# Patient Record
Sex: MALE | Race: WHITE | NOT HISPANIC OR LATINO | Employment: OTHER | URBAN - METROPOLITAN AREA
[De-identification: names, ages, dates, MRNs, and addresses within clinical notes are randomized per-mention and may not be internally consistent; named-entity substitution may affect disease eponyms.]

---

## 2017-09-19 ENCOUNTER — HOSPITAL ENCOUNTER (INPATIENT)
Facility: HOSPITAL | Age: 67
LOS: 3 days | Discharge: HOME | End: 2017-09-22
Attending: INTERNAL MEDICINE | Admitting: INTERNAL MEDICINE

## 2017-09-19 DIAGNOSIS — Z74.09 IMPAIRED FUNCTIONAL MOBILITY, BALANCE, GAIT, AND ENDURANCE: ICD-10-CM

## 2017-09-19 DIAGNOSIS — Z78.9 IMPAIRED MOBILITY AND ADLS: ICD-10-CM

## 2017-09-19 DIAGNOSIS — I63.9 CEREBROVASCULAR ACCIDENT (CVA), UNSPECIFIED MECHANISM (HCC): Primary | ICD-10-CM

## 2017-09-19 DIAGNOSIS — Z74.09 IMPAIRED MOBILITY AND ADLS: ICD-10-CM

## 2017-09-19 PROCEDURE — 93005 ELECTROCARDIOGRAM TRACING: CPT | Performed by: NURSE PRACTITIONER

## 2017-09-19 RX ORDER — ACETAMINOPHEN 325 MG/1
650 TABLET ORAL EVERY 4 HOURS PRN
Status: DISCONTINUED | OUTPATIENT
Start: 2017-09-19 | End: 2017-09-22 | Stop reason: HOSPADM

## 2017-09-19 RX ORDER — ASPIRIN 325 MG
325 TABLET ORAL DAILY
Status: DISCONTINUED | OUTPATIENT
Start: 2017-09-21 | End: 2017-09-20

## 2017-09-19 RX ORDER — ASPIRIN 300 MG/1
300 SUPPOSITORY RECTAL DAILY
Status: DISCONTINUED | OUTPATIENT
Start: 2017-09-21 | End: 2017-09-20

## 2017-09-19 RX ORDER — ONDANSETRON 2 MG/ML
4 INJECTION INTRAMUSCULAR; INTRAVENOUS EVERY 6 HOURS PRN
Status: DISCONTINUED | OUTPATIENT
Start: 2017-09-19 | End: 2017-09-22 | Stop reason: HOSPADM

## 2017-09-19 RX ORDER — ACETAMINOPHEN 650 MG/1
650 SUPPOSITORY RECTAL EVERY 4 HOURS PRN
Status: DISCONTINUED | OUTPATIENT
Start: 2017-09-19 | End: 2017-09-22 | Stop reason: HOSPADM

## 2017-09-19 RX ORDER — LEVOTHYROXINE SODIUM 0.1 MG/1
200 TABLET ORAL
Status: DISCONTINUED | OUTPATIENT
Start: 2017-09-20 | End: 2017-09-22 | Stop reason: HOSPADM

## 2017-09-19 RX ORDER — ATORVASTATIN CALCIUM 40 MG/1
80 TABLET, FILM COATED ORAL NIGHTLY
Status: DISCONTINUED | OUTPATIENT
Start: 2017-09-20 | End: 2017-09-22 | Stop reason: HOSPADM

## 2017-09-19 RX ORDER — SODIUM CHLORIDE 0.9 % (FLUSH) 0.9 %
1-10 SYRINGE (ML) INJECTION AS NEEDED
Status: DISCONTINUED | OUTPATIENT
Start: 2017-09-19 | End: 2017-09-22 | Stop reason: HOSPADM

## 2017-09-19 RX ORDER — SODIUM CHLORIDE 9 MG/ML
50 INJECTION, SOLUTION INTRAVENOUS CONTINUOUS
Status: DISCONTINUED | OUTPATIENT
Start: 2017-09-20 | End: 2017-09-20

## 2017-09-20 ENCOUNTER — APPOINTMENT (OUTPATIENT)
Dept: CT IMAGING | Facility: HOSPITAL | Age: 67
End: 2017-09-20

## 2017-09-20 ENCOUNTER — APPOINTMENT (OUTPATIENT)
Dept: GENERAL RADIOLOGY | Facility: HOSPITAL | Age: 67
End: 2017-09-20

## 2017-09-20 ENCOUNTER — APPOINTMENT (OUTPATIENT)
Dept: CARDIOLOGY | Facility: HOSPITAL | Age: 67
End: 2017-09-20

## 2017-09-20 ENCOUNTER — APPOINTMENT (OUTPATIENT)
Dept: MRI IMAGING | Facility: HOSPITAL | Age: 67
End: 2017-09-20

## 2017-09-20 PROBLEM — I71.40 AAA (ABDOMINAL AORTIC ANEURYSM): Status: ACTIVE | Noted: 2017-09-20

## 2017-09-20 PROBLEM — I25.10 CORONARY ARTERY DISEASE: Status: ACTIVE | Noted: 2017-09-20

## 2017-09-20 PROBLEM — I10 HYPERTENSION: Status: ACTIVE | Noted: 2017-09-20

## 2017-09-20 PROBLEM — E03.9 HYPOTHYROIDISM: Status: ACTIVE | Noted: 2017-09-20

## 2017-09-20 LAB
ALBUMIN SERPL-MCNC: 3.6 G/DL (ref 3.2–4.8)
ALBUMIN/GLOB SERPL: 1.6 G/DL (ref 1.5–2.5)
ALP SERPL-CCNC: 97 U/L (ref 25–100)
ALT SERPL W P-5'-P-CCNC: 36 U/L (ref 7–40)
ANION GAP SERPL CALCULATED.3IONS-SCNC: 6 MMOL/L (ref 3–11)
ARTICHOKE IGE QN: 127 MG/DL (ref 0–130)
AST SERPL-CCNC: 25 U/L (ref 0–33)
BH CV ECHO MEAS - AO ROOT AREA (BSA CORRECTED): 1.7
BH CV ECHO MEAS - AO ROOT AREA: 11.9 CM^2
BH CV ECHO MEAS - AO ROOT DIAM: 3.9 CM
BH CV ECHO MEAS - BSA(HAYCOCK): 2.5 M^2
BH CV ECHO MEAS - BSA: 2.4 M^2
BH CV ECHO MEAS - BZI_BMI: 40.3 KILOGRAMS/M^2
BH CV ECHO MEAS - BZI_METRIC_HEIGHT: 175.3 CM
BH CV ECHO MEAS - BZI_METRIC_WEIGHT: 123.8 KG
BH CV ECHO MEAS - CONTRAST EF (2CH): 65.4 ML/M^2
BH CV ECHO MEAS - CONTRAST EF 4CH: 54.4 ML/M^2
BH CV ECHO MEAS - EDV(CUBED): 52.3 ML
BH CV ECHO MEAS - EDV(MOD-SP2): 107 ML
BH CV ECHO MEAS - EDV(MOD-SP4): 114 ML
BH CV ECHO MEAS - EDV(TEICH): 59.6 ML
BH CV ECHO MEAS - EF(CUBED): 59.4 %
BH CV ECHO MEAS - EF(MOD-SP2): 65.4 %
BH CV ECHO MEAS - EF(MOD-SP4): 54.4 %
BH CV ECHO MEAS - EF(TEICH): 51.7 %
BH CV ECHO MEAS - ESV(CUBED): 21.3 ML
BH CV ECHO MEAS - ESV(MOD-SP2): 37 ML
BH CV ECHO MEAS - ESV(MOD-SP4): 52 ML
BH CV ECHO MEAS - ESV(TEICH): 28.8 ML
BH CV ECHO MEAS - FS: 25.9 %
BH CV ECHO MEAS - IVS/LVPW: 1.4
BH CV ECHO MEAS - IVSD: 2.2 CM
BH CV ECHO MEAS - LA DIMENSION: 3.7 CM
BH CV ECHO MEAS - LA/AO: 0.95
BH CV ECHO MEAS - LAT PEAK E' VEL: 6.8 CM/SEC
BH CV ECHO MEAS - LV DIASTOLIC VOL/BSA (35-75): 48.4 ML/M^2
BH CV ECHO MEAS - LV MASS(C)D: 303.9 GRAMS
BH CV ECHO MEAS - LV MASS(C)DI: 128.9 GRAMS/M^2
BH CV ECHO MEAS - LV MAX PG: 5.8 MMHG
BH CV ECHO MEAS - LV MEAN PG: 3 MMHG
BH CV ECHO MEAS - LV SYSTOLIC VOL/BSA (12-30): 22.1 ML/M^2
BH CV ECHO MEAS - LV V1 MAX: 120 CM/SEC
BH CV ECHO MEAS - LV V1 MEAN: 79.2 CM/SEC
BH CV ECHO MEAS - LV V1 VTI: 36.2 CM
BH CV ECHO MEAS - LVIDD: 3.7 CM
BH CV ECHO MEAS - LVIDS: 2.5 CM
BH CV ECHO MEAS - LVLD AP2: 9.4 CM
BH CV ECHO MEAS - LVLD AP4: 10 CM
BH CV ECHO MEAS - LVLS AP2: 8.1 CM
BH CV ECHO MEAS - LVLS AP4: 7.5 CM
BH CV ECHO MEAS - LVOT AREA (M): 3.1 CM^2
BH CV ECHO MEAS - LVOT AREA: 3.1 CM^2
BH CV ECHO MEAS - LVOT DIAM: 2 CM
BH CV ECHO MEAS - LVPWD: 1.6 CM
BH CV ECHO MEAS - MED PEAK E' VEL: 5.26 CM/SEC
BH CV ECHO MEAS - MV A MAX VEL: 103 CM/SEC
BH CV ECHO MEAS - MV E MAX VEL: 76.5 CM/SEC
BH CV ECHO MEAS - MV E/A: 0.74
BH CV ECHO MEAS - PA ACC SLOPE: 533 CM/SEC^2
BH CV ECHO MEAS - PA ACC TIME: 0.14 SEC
BH CV ECHO MEAS - PA PR(ACCEL): 16 MMHG
BH CV ECHO MEAS - RVDD: 3.2 CM
BH CV ECHO MEAS - SI(CUBED): 13.2 ML/M^2
BH CV ECHO MEAS - SI(LVOT): 48.2 ML/M^2
BH CV ECHO MEAS - SI(MOD-SP2): 29.7 ML/M^2
BH CV ECHO MEAS - SI(MOD-SP4): 26.3 ML/M^2
BH CV ECHO MEAS - SI(TEICH): 13.1 ML/M^2
BH CV ECHO MEAS - SV(CUBED): 31.1 ML
BH CV ECHO MEAS - SV(LVOT): 113.7 ML
BH CV ECHO MEAS - SV(MOD-SP2): 70 ML
BH CV ECHO MEAS - SV(MOD-SP4): 62 ML
BH CV ECHO MEAS - SV(TEICH): 30.9 ML
BH CV VAS BP RIGHT ARM: NORMAL MMHG
BH CV XLRA MEAS LEFT CCA RATIO VEL: 71.1 CM/SEC
BH CV XLRA MEAS LEFT DIST CCA EDV: 13.8 CM/SEC
BH CV XLRA MEAS LEFT DIST CCA PSV: 71.7 CM/SEC
BH CV XLRA MEAS LEFT DIST ICA EDV: 30.3 CM/SEC
BH CV XLRA MEAS LEFT DIST ICA PSV: 79.4 CM/SEC
BH CV XLRA MEAS LEFT ICA RATIO VEL: 80 CM/SEC
BH CV XLRA MEAS LEFT ICA/CCA RATIO: 1.1
BH CV XLRA MEAS LEFT MID ICA EDV: 24.3 CM/SEC
BH CV XLRA MEAS LEFT MID ICA PSV: 78.3 CM/SEC
BH CV XLRA MEAS LEFT PROX CCA EDV: 9.4 CM/SEC
BH CV XLRA MEAS LEFT PROX CCA PSV: 106.9 CM/SEC
BH CV XLRA MEAS LEFT PROX ECA PSV: 131.8 CM/SEC
BH CV XLRA MEAS LEFT PROX ICA EDV: 18.2 CM/SEC
BH CV XLRA MEAS LEFT PROX ICA PSV: 80.5 CM/SEC
BH CV XLRA MEAS LEFT PROX SCLA PSV: 184.8 CM/SEC
BH CV XLRA MEAS LEFT VERTEBRAL A EDV: 13 CM/SEC
BH CV XLRA MEAS LEFT VERTEBRAL A PSV: 39.3 CM/SEC
BH CV XLRA MEAS RIGHT CCA RATIO VEL: 62.4 CM/SEC
BH CV XLRA MEAS RIGHT DIST CCA EDV: 11.8 CM/SEC
BH CV XLRA MEAS RIGHT DIST CCA PSV: 62.9 CM/SEC
BH CV XLRA MEAS RIGHT DIST ICA EDV: 21.7 CM/SEC
BH CV XLRA MEAS RIGHT DIST ICA PSV: 54.1 CM/SEC
BH CV XLRA MEAS RIGHT ICA RATIO VEL: 48.1 CM/SEC
BH CV XLRA MEAS RIGHT ICA/CCA RATIO: 0.77
BH CV XLRA MEAS RIGHT MID ICA EDV: 18.5 CM/SEC
BH CV XLRA MEAS RIGHT MID ICA PSV: 48.4 CM/SEC
BH CV XLRA MEAS RIGHT PROX CCA EDV: 4.8 CM/SEC
BH CV XLRA MEAS RIGHT PROX CCA PSV: 78.6 CM/SEC
BH CV XLRA MEAS RIGHT PROX ECA PSV: 80.8 CM/SEC
BH CV XLRA MEAS RIGHT PROX ICA EDV: 8.8 CM/SEC
BH CV XLRA MEAS RIGHT PROX ICA PSV: 34.3 CM/SEC
BH CV XLRA MEAS RIGHT PROX SCLA PSV: 110 CM/SEC
BH CV XLRA MEAS RIGHT VERTEBRAL A EDV: 10.1 CM/SEC
BH CV XLRA MEAS RIGHT VERTEBRAL A PSV: 32.1 CM/SEC
BILIRUB SERPL-MCNC: 0.5 MG/DL (ref 0.3–1.2)
BUN BLD-MCNC: 11 MG/DL (ref 9–23)
BUN/CREAT SERPL: 13.8 (ref 7–25)
CALCIUM SPEC-SCNC: 8.5 MG/DL (ref 8.7–10.4)
CHLORIDE SERPL-SCNC: 106 MMOL/L (ref 99–109)
CHOLEST SERPL-MCNC: 164 MG/DL (ref 0–200)
CO2 SERPL-SCNC: 25 MMOL/L (ref 20–31)
CREAT BLD-MCNC: 0.8 MG/DL (ref 0.6–1.3)
DEPRECATED RDW RBC AUTO: 40.7 FL (ref 37–54)
E/E' RATIO: 13
ERYTHROCYTE [DISTWIDTH] IN BLOOD BY AUTOMATED COUNT: 13.1 % (ref 11.3–14.5)
GFR SERPL CREATININE-BSD FRML MDRD: 97 ML/MIN/1.73
GLOBULIN UR ELPH-MCNC: 2.2 GM/DL
GLUCOSE BLD-MCNC: 132 MG/DL (ref 70–100)
GLUCOSE BLDC GLUCOMTR-MCNC: 109 MG/DL (ref 70–130)
GLUCOSE BLDC GLUCOMTR-MCNC: 120 MG/DL (ref 70–130)
HBA1C MFR BLD: 5.9 % (ref 4.8–5.6)
HCT VFR BLD AUTO: 43.1 % (ref 38.9–50.9)
HDLC SERPL-MCNC: 34 MG/DL (ref 40–60)
HGB BLD-MCNC: 15 G/DL (ref 13.1–17.5)
LEFT ATRIUM VOLUME INDEX: 19.5 ML/M2
LV EF 2D ECHO EST: 65 %
MCH RBC QN AUTO: 29.5 PG (ref 27–31)
MCHC RBC AUTO-ENTMCNC: 34.8 G/DL (ref 32–36)
MCV RBC AUTO: 84.7 FL (ref 80–99)
PA ADP PRP-ACNC: 157 PRU
PLATELET # BLD AUTO: 179 10*3/MM3 (ref 150–450)
PMV BLD AUTO: 10.7 FL (ref 6–12)
POTASSIUM BLD-SCNC: 3.7 MMOL/L (ref 3.5–5.5)
PROT SERPL-MCNC: 5.8 G/DL (ref 5.7–8.2)
RBC # BLD AUTO: 5.09 10*6/MM3 (ref 4.2–5.76)
SODIUM BLD-SCNC: 137 MMOL/L (ref 132–146)
TRIGL SERPL-MCNC: 132 MG/DL (ref 0–150)
TROPONIN I SERPL-MCNC: <0.006 NG/ML
TSH SERPL DL<=0.05 MIU/L-ACNC: 6.06 MIU/ML (ref 0.35–5.35)
WBC NRBC COR # BLD: 5.72 10*3/MM3 (ref 3.5–10.8)

## 2017-09-20 PROCEDURE — 80061 LIPID PANEL: CPT | Performed by: NURSE PRACTITIONER

## 2017-09-20 PROCEDURE — 97165 OT EVAL LOW COMPLEX 30 MIN: CPT

## 2017-09-20 PROCEDURE — 70551 MRI BRAIN STEM W/O DYE: CPT

## 2017-09-20 PROCEDURE — 93880 EXTRACRANIAL BILAT STUDY: CPT | Performed by: INTERNAL MEDICINE

## 2017-09-20 PROCEDURE — 70450 CT HEAD/BRAIN W/O DYE: CPT

## 2017-09-20 PROCEDURE — 83036 HEMOGLOBIN GLYCOSYLATED A1C: CPT | Performed by: NURSE PRACTITIONER

## 2017-09-20 PROCEDURE — 85027 COMPLETE CBC AUTOMATED: CPT | Performed by: NURSE PRACTITIONER

## 2017-09-20 PROCEDURE — 80053 COMPREHEN METABOLIC PANEL: CPT | Performed by: NURSE PRACTITIONER

## 2017-09-20 PROCEDURE — 82962 GLUCOSE BLOOD TEST: CPT

## 2017-09-20 PROCEDURE — 93880 EXTRACRANIAL BILAT STUDY: CPT

## 2017-09-20 PROCEDURE — 93306 TTE W/DOPPLER COMPLETE: CPT | Performed by: INTERNAL MEDICINE

## 2017-09-20 PROCEDURE — 99223 1ST HOSP IP/OBS HIGH 75: CPT | Performed by: PSYCHIATRY & NEUROLOGY

## 2017-09-20 PROCEDURE — 84484 ASSAY OF TROPONIN QUANT: CPT | Performed by: NURSE PRACTITIONER

## 2017-09-20 PROCEDURE — 97162 PT EVAL MOD COMPLEX 30 MIN: CPT

## 2017-09-20 PROCEDURE — 85576 BLOOD PLATELET AGGREGATION: CPT | Performed by: NURSE PRACTITIONER

## 2017-09-20 PROCEDURE — 93306 TTE W/DOPPLER COMPLETE: CPT

## 2017-09-20 PROCEDURE — 71010 HC CHEST PA OR AP: CPT

## 2017-09-20 PROCEDURE — 84443 ASSAY THYROID STIM HORMONE: CPT | Performed by: NURSE PRACTITIONER

## 2017-09-20 PROCEDURE — 92523 SPEECH SOUND LANG COMPREHEN: CPT

## 2017-09-20 PROCEDURE — 99291 CRITICAL CARE FIRST HOUR: CPT | Performed by: INTERNAL MEDICINE

## 2017-09-20 PROCEDURE — 93010 ELECTROCARDIOGRAM REPORT: CPT | Performed by: INTERNAL MEDICINE

## 2017-09-20 RX ORDER — ASPIRIN 325 MG
325 TABLET ORAL DAILY
Status: DISCONTINUED | OUTPATIENT
Start: 2017-09-20 | End: 2017-09-21

## 2017-09-20 RX ORDER — LEVOTHYROXINE SODIUM 0.2 MG/1
150 TABLET ORAL
Status: ON HOLD | COMMUNITY
End: 2021-08-02

## 2017-09-20 RX ORDER — LOSARTAN POTASSIUM 100 MG/1
100 TABLET ORAL DAILY
COMMUNITY
End: 2022-02-21

## 2017-09-20 RX ORDER — CLOPIDOGREL BISULFATE 75 MG/1
75 TABLET ORAL DAILY
COMMUNITY
End: 2019-03-29 | Stop reason: HOSPADM

## 2017-09-20 RX ORDER — AMLODIPINE BESYLATE 10 MG/1
10 TABLET ORAL DAILY
Status: ON HOLD | COMMUNITY
End: 2017-09-22

## 2017-09-20 RX ORDER — ASPIRIN 300 MG/1
300 SUPPOSITORY RECTAL DAILY
Status: DISCONTINUED | OUTPATIENT
Start: 2017-09-20 | End: 2017-09-21

## 2017-09-20 RX ORDER — FUROSEMIDE 20 MG/1
20 TABLET ORAL DAILY
Status: ON HOLD | COMMUNITY
End: 2021-08-04 | Stop reason: SDUPTHER

## 2017-09-20 RX ORDER — HYDRALAZINE HYDROCHLORIDE 50 MG/1
50 TABLET, FILM COATED ORAL 2 TIMES DAILY
Status: ON HOLD | COMMUNITY
End: 2021-08-04 | Stop reason: SDUPTHER

## 2017-09-20 RX ORDER — CARVEDILOL 12.5 MG/1
12.5 TABLET ORAL DAILY
COMMUNITY
End: 2019-03-29 | Stop reason: HOSPADM

## 2017-09-20 RX ORDER — CLOPIDOGREL BISULFATE 75 MG/1
75 TABLET ORAL DAILY
Status: DISCONTINUED | OUTPATIENT
Start: 2017-09-21 | End: 2017-09-22 | Stop reason: HOSPADM

## 2017-09-20 RX ADMIN — LEVOTHYROXINE SODIUM 200 MCG: 100 TABLET ORAL at 06:54

## 2017-09-20 RX ADMIN — NICARDIPINE HYDROCHLORIDE 5 MG/HR: 25 INJECTION INTRAVENOUS at 08:45

## 2017-09-20 RX ADMIN — ACETAMINOPHEN 650 MG: 325 TABLET, FILM COATED ORAL at 13:24

## 2017-09-20 RX ADMIN — SODIUM CHLORIDE 50 ML/HR: 9 INJECTION, SOLUTION INTRAVENOUS at 00:18

## 2017-09-20 RX ADMIN — ATORVASTATIN CALCIUM 80 MG: 40 TABLET, FILM COATED ORAL at 00:17

## 2017-09-20 RX ADMIN — NICARDIPINE HYDROCHLORIDE 5 MG/HR: 25 INJECTION INTRAVENOUS at 04:32

## 2017-09-20 RX ADMIN — NICARDIPINE HYDROCHLORIDE 5 MG/HR: 25 INJECTION INTRAVENOUS at 00:17

## 2017-09-20 RX ADMIN — ATORVASTATIN CALCIUM 80 MG: 40 TABLET, FILM COATED ORAL at 20:01

## 2017-09-20 NOTE — CONSULTS
"Neurology    Referring Provider: FADUMO Meraz    Reason for Consultation: stroke      Chief complaint: left hemiparesis    Subjective .     History of present illness:  Mr. Hendrix is a pleasant 67-year-old male with a past mental history significant for prior TIA, hypertension, CAD who is admitted to the ICU service for sudden onset left hemiparesis.  He states that he was watching TV on the evening of presentation when he began to \"feel funny.\"  He went to take a bath and noticed that he was later unable to get out of the bathtub on his own.  He complained of weakness involving the left arm and leg as well as some mild numbness.  He denied any visual disturbance or chest discomfort or palpitations.  On arrival to the ED noncontrast CT head was reportedly unremarkable and he received tPA the outlined facility prior to transfer here.  Since arrival here he reports improvement in sensation and strength still complains of some mild left leg heaviness. Of note, pt on Plavix at home.    Review of Systems: Positive for weakness and numbness.Otherwise complete review of systems was discussed with the patient and found to be negative except for that mentioned in history of present illness or in the initial H&P dated 09/20/2017    History  Past Medical History:   Diagnosis Date   • AAA (abdominal aortic aneurysm)    • Coronary artery disease    • CVA (cerebral vascular accident)    • Hypertension    • Hypothyroidism    • TIA (transient ischemic attack)    ,   Past Surgical History:   Procedure Laterality Date   • CORONARY ANGIOPLASTY WITH STENT PLACEMENT     ,   Family History   Problem Relation Age of Onset   • Heart failure Mother    • Aneurysm Father    ,   Social History   Substance Use Topics   • Smoking status: Former Smoker     Packs/day: 1.00     Years: 20.00     Quit date: 9/20/1985   • Smokeless tobacco: None      Comment: quit 20 y ago   • Alcohol use Yes      Comment: \"Drinks once or twice a year\"   , " "  Prescriptions Prior to Admission   Medication Sig Dispense Refill Last Dose   • carvedilol (COREG) 12.5 MG tablet Take 12.5 mg by mouth Daily.      • clopidogrel (PLAVIX) 75 MG tablet Take 75 mg by mouth Daily.      • levothyroxine (SYNTHROID, LEVOTHROID) 200 MCG tablet Take 200 mcg by mouth Daily.      • losartan (COZAAR) 100 MG tablet Take 100 mg by mouth Daily.       and Allergies:  Codeine    Objective     Vital Signs   Blood pressure 155/82, pulse 74, temperature 97.9 °F (36.6 °C), temperature source Oral, resp. rate 18, height 69\" (175.3 cm), weight 273 lb 5.9 oz (124 kg), SpO2 95 %.    Physical Exam:      Gen: Sitting in bedside chair with eyes open. In NAD. Appears stated age   Eyes: PERRL, conjuntivae/lids normal   ENT: External canals normal bilaterally. Dentition normal   Neck: Supple. No thyroid enlargement noted   Respiratory: CTA bilaterally. Respirations unlabored   CV: RRR, S1 and S2 nml. Radial pulses 2+ bilaterally.    Skin: No rashes noted on exposed skin. Normal tugor.   MSK: Normal bulk and tone. Nml ROM     Neurologic:   Mental status: Awake, alert, oriented x4. Follows commands. Speech fluent.    CN: PERRL, EOM intact, sensation intact in upper/mid/lower face bilaterally, facial movements symmetric, hearing intact to finger rub bilaterally, palate elevates symmetrically, tongue movements and SCMs strong bilaterally    Motor: Full strength noted in the right upper and lower extremities throughout.  There is mild weakness about 4+/5 with left  but otherwise he exhibits antigravity movement.  Moderate weakness in the left leg about 4-/5.    Reflexes: 2+ throughout.    Sensory: Mild decreased light touch in the left arm and leg compared to the right   Coordination: No dysmetria noted   Gait: Not tested        Results Reviewed:     Labs reviewed  OSH CT head report reviewed  EKG report reviewed                 Assessment/Plan     1.  Acute ischemic stroke = Mechanism likely due to small " vessel disease. Recommend MRI brain. Post-tPA CT head pending. Carotid duplex report unremarkable. TTE pending. Will check P2Y12 assay in AM. Rehab eval. Currently on ASA and Plavix.    2.  Hypertension = continue meds    3.  Prediabetes = A1c 5.9. Continue glycemic monitoring    4.  Hyperlipidemia = . On atorvastatin        Kaitlynn Samano MD  09/20/17  4:02 PM

## 2017-09-20 NOTE — THERAPY EVALUATION
Acute Care - Physical Therapy Initial Evaluation  [facility]     Patient Name: Camron Hendrix  : 1950  MRN: [id number]  Today's Date: 2017   Onset of Illness/Injury or Date of Surgery Date: (P) 17  Date of Referral to PT: 17  Referring Physician: DARIUS) FADUMO Means      Admit Date: 2017     Visit Dx:    ICD-10-CM ICD-9-CM   1. Cerebrovascular accident (CVA), unspecified mechanism I63.9 434.91   2. Impaired functional mobility, balance, gait, and endurance Z74.09 V49.89   3. Impaired mobility and ADLs Z74.09 799.89     Patient Active Problem List   Diagnosis   • CVA (cerebral vascular accident)   • Hypothyroidism   • Hypertension   • AAA (abdominal aortic aneurysm)   • Coronary artery disease     Past Medical History:   Diagnosis Date   • AAA (abdominal aortic aneurysm)    • Coronary artery disease    • CVA (cerebral vascular accident)    • Hypertension    • Hypothyroidism    • TIA (transient ischemic attack)      Past Surgical History:   Procedure Laterality Date   • CORONARY ANGIOPLASTY WITH STENT PLACEMENT            PT ASSESSMENT (last 72 hours)      PT Evaluation       17 1450 17 1445    Rehab Evaluation    Document Type (P)  evaluation  -CL evaluation  -LM    Subjective Information (P)  agree to therapy;no complaints  -CL agree to therapy;no complaints  -LM    Patient Effort, Rehab Treatment (P)  good  -CL good  -LM    Symptoms Noted During/After Treatment (P)  increased pain;fatigue  -CL increased pain;fatigue  -LM    Symptoms Noted Comment (P)  Pt c/o L calf pain, RN notified.   -CL Pt experienced increased pain in L calf when ambulating; subsided with rest.  -LM    General Information    Patient Profile Review (P)  yes  -CL yes  -LM    Onset of Illness/Injury or Date of Surgery Date (P)  17  -CL 17  -LM    Referring Physician (P)  FADUMO Means  -FADUMO Pendleton  -GARCÍA    General Observations  Pt received in fowlers in bed with tele; no visitors present.   -LM    Pertinent History Of Current Problem (P)  Pt presented to ED 2/2 to L sided weakness/numbness. Pt s/p tPA, imaging pending. PMH: TIA and CVA.   -CL Pt admitted for acute CVA; developed L sided weakness and numbness in transit to OSH ED. Received TPA and transferred to [facility]. History of TIA and CVA.  -LM    Precautions/Limitations (P)  fall precautions  -CL fall precautions  -LM    Prior Level of Function (P)  independent:;all household mobility;community mobility;transfer;ADL's;dressing;bathing;yard work;driving  -CL independent:;all household mobility;community mobility;gait;transfer;bed mobility;ADL's;cooking;cleaning;bathing;dressing  -LM    Equipment Currently Used at Home (P)  shower chair  -CL shower chair  -LM    Plans/Goals Discussed With (P)  patient;agreed upon  -CL patient;agreed upon  -LM    Risks Reviewed (P)  patient:;nausea/vomiting;LOB;dizziness;increased discomfort;change in vital signs;lines disloged  -CL patient:;LOB;nausea/vomiting;dizziness;increased discomfort;change in vital signs  -LM    Benefits Reviewed (P)  patient:;improve function;increase independence;increase strength;increase balance;decrease pain;increase knowledge  -CL patient:;improve function;increase independence;increase strength;increase balance;decrease pain  -LM    Barriers to Rehab (P)  none identified  -CL none identified  -LM    Living Environment    Lives With (P)  spouse  -CL spouse  -LM    Living Arrangements (P)  house  -CL house  -LM    Home Accessibility (P)  bed and bath on same level;tub/shower is not walk in;stairs to enter home  -CL bed and bath on same level;tub/shower is not walk in;stairs to enter home  -LM    Number of Stairs to Enter Home (P)  3  -CL 3  -LM    Stair Railings at Home (P)  none  -CL none  -LM    Clinical Impression    Date of Referral to PT  09/19/17  -LM    PT Diagnosis  Impaired functional mobility  -LM    Prognosis  good  -LM    Patient/Family Goals Statement  return home  -LM     Criteria for Skilled Therapeutic Interventions Met  yes;treatment indicated  -LM    Rehab Potential  good, to achieve stated therapy goals  -LM    Vital Signs    Pre Systolic BP Rehab (P)  155  -  -LM    Pre Treatment Diastolic BP (P)  82  -CL 82  -LM    Post Systolic BP Rehab (P)  170  -  -LM    Post Treatment Diastolic BP (P)  92  -CL 92  -LM    Pretreatment Heart Rate (beats/min) (P)  65  -CL 65  -LM    Posttreatment Heart Rate (beats/min) (P)  72  -CL 72  -LM    Pre SpO2 (%) (P)  --   Pulse ox not reading  -CL     Post SpO2 (%) (P)  97  -CL 97  -LM    O2 Delivery Post Treatment (P)  room air  -CL room air  -LM    Pre Patient Position (P)  Supine  -CL Supine  -LM    Intra Patient Position (P)  Standing  -CL Standing  -LM    Post Patient Position (P)  Sitting  -CL Sitting  -LM    Pain Assessment    Pain Assessment (P)  [place]-Baker FACES  -CL No/denies pain  -LM    Vision Assessment/Intervention    Visual Impairment  WFL with corrective lenses  -LM    Cognitive Assessment/Intervention    Current Cognitive/Communication Assessment  functional  -LM    Orientation Status  oriented x 4  -LM    Follows Commands/Answers Questions  100% of the time;able to follow multi-step instructions;needs cueing  -LM    Personal Safety  WNL/WFL  -LM    Personal Safety Interventions  fall prevention program maintained;gait belt;nonskid shoes/slippers when out of bed  -LM    ROM (Range of Motion)    General ROM  no range of motion deficits identified   BLEs; defer UE to OT  -LM    MMT (Manual Muscle Testing)    General MMT Assessment  lower extremity strength deficits identified   BLEs; defer UE to OT  -LM    Lower Extremity    Lower Ext Manual Muscle Testing Detail  LLE grossly 4-/5; RLE 4+/5  -LM    Bed Mobility, Assessment/Treatment    Bed Mobility, Assistive Device  head of bed elevated;bed rails  -LM    Bed Mob, Supine to Sit, [place]  minimum assist (75% patient effort);2 person assist required;verbal cues required   -LM    Bed Mob, Sit to Supine, [place]  not tested   Pt left UIC  -LM    Bed Mobility, Impairments  strength decreased  -LM    Bed Mobility, Comment  Verbal cues for sequencing; assist required at trunk to upright.  -LM    Transfer Assessment/Treatment    Transfers, Sit-Stand [place]  minimum assist (75% patient effort);2 person assist required;verbal cues required  -LM    Transfers, Stand-Sit [place]  minimum assist (75% patient effort);2 person assist required;verbal cues required  -LM    Transfers, Sit-Stand-Sit, Assist Device  rolling walker  -LM    Transfer, Safety Issues  sequencing ability decreased;step length decreased  -LM    Transfer, Impairments  strength decreased;impaired balance  -LM    Transfer, Comment  Verbal cues for sequencing and hand placement.  -LM    Gait Assessment/Treatment    Gait, [place] Level  contact guard assist;2 person assist required;verbal cues required  -LM    Gait, Assistive Device  rolling walker  -LM    Gait, Distance (Feet)  50  -LM    Gait, Gait Pattern Analysis  swing-through gait  -LM    Gait, Gait Deviations  bilateral:;triston decreased;decreased heel strike;forward flexed posture;step length decreased;weight-shifting ability decreased  -LM    Gait, Safety Issues  sequencing ability decreased;step length decreased  -LM    Gait, Impairments  strength decreased;impaired balance  -LM    Gait, Comment  Pt ambulated with step-through gait pattern at decreased pace. Verbal cues for upright posture and increase step length. After 50 feet; pt bent over to lean on RW due to pain in L calf, bedside chair brought behind pt. Gait distance limited by L calf pain; RN aware.  -LM    Therapy Exercises    Bilateral Lower Extremities  AROM:;10 reps;sitting;ankle pumps/circles;glut sets;quad sets  -LM    Sensory Assessment/Intervention    Sensory Impairment  dull  -LM    Light Touch  LLE;RLE  -LM    LLE Light Touch  mild impairment  -LM    RLE Light Touch  mild  impairment  -LM    Positioning and Restraints    Pre-Treatment Position  in bed  -LM    Post Treatment Position  chair  -LM    In Chair  notified nsg;reclined;sitting;call light within reach;encouraged to call for assist;legs elevated   exit alarm deferred per nursing recommendation  -LM      09/20/17 1115 09/20/17 0600    Rehab Evaluation    Document Type evaluation  -AC (r) AS (t) AC (c)     Subjective Information no complaints;agree to therapy  -AC (r) AS (t) AC (c)     Living Environment    Lives With spouse  -AC (r) AS (t) AC (c)     Living Arrangements house  -AC (r) AS (t) AC (c)     Pain Assessment    Pain Assessment No/denies pain  -AC (r) AS (t) AC (c)     Cognitive Assessment/Intervention    Current Cognitive/Communication Assessment functional  -AC (r) AS (t) AC (c)     Orientation Status oriented x 4  -AC (r) AS (t) AC (c)     Follows Commands/Answers Questions 100% of the time;able to follow multi-step instructions  -AC (r) AS (t) AC (c)     Short/Long Term Memory mild impairment, short term memory;other (see comments)   pt and pt's wife report pt is at [facility]   -AC (r) AS (t) AC (c)     Cognitive Assessment Intervention    Behavior/Mood Observations behavior appropriate to situation, WNL/WFL  -AC (r) AS (t) AC (c)     Attention WNL/WFL  -AC (r) AS (t) AC (c)     Pragmatics WNL/WFL  -AC (r) AS (t) AC (c)     Problem Solving WNL/WFL  -AC (r) AS (t) AC (c)     Executive Function Skills WNL/WFL  -AC (r) AS (t) AC (c)     Reasoning WNL/WFL  -AC (r) AS (t) AC (c)     Organization WNL/WFL  -AC (r) AS (t) AC (c)     Diffuse Language Characteristics no concerns, diffuse language characteristics  -AC (r) AS (t) AC (c)     Muscle Tone Assessment    Muscle Tone Assessment  Left-Side Extremities  -BS    Left-Side Extremities Muscle Tone Assessment  moderately increased tone  -BS      09/20/17 0400 09/20/17 0200    Muscle Tone Assessment    Muscle Tone Assessment Left-Side Extremities  -BS Left-Side Extremities   -BS    Left-Side Extremities Muscle Tone Assessment moderately increased tone  -BS moderately increased tone  -BS      09/20/17 0035 09/20/17 0027    General Information    Equipment Currently Used at Home  none  -BS    Living Environment    Lives With spouse  -SC     Living Arrangements house  -SC     Home Accessibility no concerns  -SC     Stair Railings at Home none  -SC     Type of Financial/Environmental Concern none  -SC     Transportation Available car  -SC       09/20/17 0000       Muscle Tone Assessment    Muscle Tone Assessment Left-Side Extremities  -BS     Left-Side Extremities Muscle Tone Assessment moderately increased tone  -BS       User Key  (r) = Recorded By, (t) = Taken By, (c) = Cosigned By    Initials Name Provider Type    AC Bertha Richardson, MS CCC-SLP Speech and Language Pathologist    SC Svitlana aCrdoso, RN Registered Nurse    DANIEL Valle, OT Occupational Therapist    GARCÍA Correa, PT Physical Therapist    BS Sammy Lin, RN Registered Nurse    AS Ana Duque, Speech Therapy Student Speech Therapy Student          Physical Therapy Education     Title: PT OT SLP Therapies (Done)     Topic: Physical Therapy (Done)     Point: Mobility training (Done)    Learning Progress Summary    Learner Readiness Method Response Comment Documented by Status   Patient Acceptance E,D VU,NR,DU Reviewed benefits of activity, transfer training, HEP.  09/20/17 1602 Done               Point: Home exercise program (Done)    Learning Progress Summary    Learner Readiness Method Response Comment Documented by Status   Patient Acceptance BALDOMERO CRESPO VU,NR,DU Reviewed benefits of activity, transfer training, HEP.  09/20/17 1602 Done               Point: Body mechanics (Done)    Learning Progress Summary    Learner Readiness Method Response Comment Documented by Status   Patient Acceptance CHERIE,BALDOMERO ROLDAN,NR,DU Reviewed benefits of activity, transfer training, HEP.  09/20/17 1602 Done               Point:  Precautions (Done)    Learning Progress Summary    Learner Readiness Method Response Comment Documented by Status   Patient Acceptance E,D YULI,NR,DU Reviewed benefits of activity, transfer training, HEP.  09/20/17 1602 Done                      User Key     Initials Effective Dates Name Provider Type Discipline     06/09/17 -  Sofi Correa, PT Physical Therapist PT                PT Recommendation and Plan  Anticipated Equipment Needs At Discharge: front wheeled walker  Anticipated Discharge Disposition: home with assist, home with outpatient services  Demonstrates Need for Referral to Another Service:  (outpatient PT)  Planned Therapy Interventions: balance training, bed mobility training, gait training, home exercise program, patient/family education, strengthening, stair training, transfer training  PT Frequency: daily  Plan of Care Review  Plan Of Care Reviewed With: patient  Progress: progress toward functional goals as expected  Outcome Summary/Follow up Plan: PT eval complete. Pt presents with decreased functional independence with evolving signs/symptoms warranting further need for skilled PT services. Pt ambulated 50 feet with CGAx2 and RW, limited by L calf pain during ambulation that subsided with rest. Recommend d/c home with assist and outpatient PT.           IP PT Goals       09/20/17 1603          Bed Mobility PT LTG    Bed Mobility PT LTG, Date Established 09/20/17  -LM      Bed Mobility PT LTG, Time to Achieve 2 wks  -LM      Bed Mobility PT LTG, Activity Type supine to sit/sit to supine  -LM      Bed Mobility PT LTG, [place] Level conditional independence  -LM      Transfer Training PT LTG    Transfer Training PT LTG, Date Established 09/20/17  -LM      Transfer Training PT LTG, Time to Achieve 2 wks  -LM      Transfer Training PT LTG, Activity Type sit to stand/stand to sit  -LM      Transfer Training PT LTG, [place] Level supervision required  -LM      Transfer Training PT LTG,  Assist Device walker, rolling  -LM      Gait Training PT LTG    Gait Training Goal PT LTG, Date Established 09/20/17  -LM      Gait Training Goal PT LTG, Time to Achieve 2 wks  -LM      Gait Training Goal PT LTG, [place] Level supervision required  -LM      Gait Training Goal PT LTG, Assist Device walker, rolling  -LM      Gait Training Goal PT LTG, Distance to Achieve 250 feet  -LM      Stair Training PT LTG    Stair Training Goal PT LTG, Date Established 09/20/17  -LM      Stair Training Goal PT LTG, Time to Achieve 2 wks  -LM      Stair Training Goal PT LTG, Number of Steps 3  -LM      Stair Training Goal PT LTG, [place] Level contact guard assist  -LM      Stair Training Goal PT LTG, Assist Device 2 handrails  -LM        User Key  (r) = Recorded By, (t) = Taken By, (c) = Cosigned By    Initials Name Provider Type    GARCÍA Correa, PT Physical Therapist                Outcome Measures       09/20/17 1445 09/20/17 1311       How much help from another person do you currently need...    Turning from your back to your side while in flat bed without using bedrails? 3  -LM --  -LM     Moving from lying on back to sitting on the side of a flat bed without bedrails? 3  -LM --  -LM     Moving to and from a bed to a chair (including a wheelchair)? 3  -LM --  -LM     Standing up from a chair using your arms (e.g., wheelchair, bedside chair)? 3  -LM --  -LM     Climbing 3-5 steps with a railing? 2  -LM --  -LM     To walk in hospital room? 3  -LM --  -LM     AM-PAC 6 Clicks Score 17  -LM --  -LM     Modified Rachel Scale    Modified Rachel Scale 3 - Moderate disability.  Requiring some help, but able to walk without assistance.  -LM      Functional Assessment    Outcome Measure Options AM-PAC 6 Clicks Basic Mobility (PT);Modified [place]  -LM --  -LM       User Key  (r) = Recorded By, (t) = Taken By, (c) = Cosigned By    Initials Name Provider Type    GARCÍA Correa PT Physical Therapist           Time  Calculation:         PT Charges       09/20/17 1610          Time Calculation    Start Time 1445  -LM      PT Received On 09/20/17  -LM      PT Goal Re-Cert Due Date 09/30/17  -LM      Time Calculation- PT    Total Timed Code Minutes- PT 0 minute(s)  -        User Key  (r) = Recorded By, (t) = Taken By, (c) = Cosigned By    Initials Name Provider Type    GARCÍA Correa PT Physical Therapist          Therapy Charges for Today     Code Description Service Date Service Provider Modifiers Qty    89171024973 HC PT EVAL MOD COMPLEXITY 4 9/20/2017 Sofi Corrae, PT GP 1          PT G-Codes  Outcome Measure Options: AM-PAC 6 Clicks Basic Mobility (PT), Modified Rachel Correa PT  9/20/2017

## 2017-09-20 NOTE — PROGRESS NOTES
Discharge Planning Assessment  [facility]     Patient Name: Camron Hendrix  MRN: [id number]  Today's Date: 9/20/2017    Admit Date: 9/19/2017          Discharge Needs Assessment       09/20/17 1755    Living Environment    Lives With spouse    Living Arrangements house    Home Accessibility no concerns;bed and bath on same level    Number of Stairs to Enter Home 3    Type of Financial/Environmental Concern none    Transportation Available car;family or friend will provide    Living Environment    Provides Primary Care For no one    Primary Care Provided By spouse/significant other    Quality Of Family Relationships supportive    Able to Return to Prior Living Arrangements yes    Living Arrangement Comments Resides in private residence with spouse    Discharge Needs Assessment    Concerns To Be Addressed discharge planning concerns    Concerns Comments Plan outpatient PT/OT post d/c; PT recommending rolling walker for home use    Readmission Within The Last 30 Days no previous admission in last 30 days    Anticipated Changes Related to Illness other (see comments)    Equipment Currently Used at Home shower chair    Equipment Needed After Discharge walker, rolling    Discharge Facility/Level Of Care Needs other (see comments)    Current Discharge Risk other (see comments)    Discharge Disposition home or self-care;rehab facility    Discharge Contact Information if Applicable Spouse:  Rowan Hendrix, # [phone]    Discharge Planning Comments Plan home with outpatient PT/OT arranged; rolling walker for home use            Discharge Plan       09/20/17 1759    Case Management/Social Work Plan    Plan Home with Outpatient Rehab    Additional Comments Currently in ICU.  Case Management to follow for all needs.        Discharge Placement     No information found                Demographic Summary       09/20/17 1754    Referral Information    Admission Type inpatient    Arrived From admitted as an inpatient    Referral  Source admission list    Reason For Consult discharge planning    Record Reviewed clinical discipline documentation;history and physical;medical record;patient profile;plan of care    Contact Information    Permission Granted to Share Information With     Behavorial Health Release Obtained no    Primary Care Physician Information    Name Leslie Rhodes MD    Phone [phone]            Functional Status     None            Psychosocial     None            Abuse/Neglect     None            Legal     None            Substance Abuse     None            Patient Forms     None          BOLIVAR M[place]ia

## 2017-09-20 NOTE — THERAPY EVALUATION
Acute Care - Speech Language Pathology Initial Evaluation  [facility]   Cognitive-Communication Evaluation     Patient Name: Camron Hendrix  : 1950  MRN: [id number]  Today's Date: 2017     Date of Referral to SLP: (P) 17         Admit Date: 2017     Visit Dx:    ICD-10-CM ICD-9-CM   1. Cerebrovascular accident (CVA), unspecified mechanism I63.9 434.91     Patient Active Problem List   Diagnosis   • CVA (cerebral vascular accident)   • Hypothyroidism   • Hypertension   • AAA (abdominal aortic aneurysm)   • Coronary artery disease     Past Medical History:   Diagnosis Date   • AAA (abdominal aortic aneurysm)    • Coronary artery disease    • CVA (cerebral vascular accident)    • Hypertension    • Hypothyroidism    • TIA (transient ischemic attack)      Past Surgical History:   Procedure Laterality Date   • CORONARY ANGIOPLASTY WITH STENT PLACEMENT            SLP EVALUATION (last 72 hours)      SLP Evaluation       17 1115                Rehab Evaluation    Document Type (P)  evaluation  -AS        Subjective Information (P)  no complaints;agree to therapy  -AS        General Information    Patient Profile Review (P)  yes  -AS        Onset of Illness/Injury (P)  17  -AS        Subjective Patient Observations (P)  Pt alert and cooperative. Wife present.   -AS        Pertinent History Of Current Problem (P)  CVA. High blood pressure, abdominal aortic aneurysm, coronary heart disease.   -AS        Current Diet Limitations (P)  thin liquids;regular solid  -AS        Precautions/Limitations, Vision (P)  WFL with corrective lenses;other (see comments)   for purposes of eval   -AS        Precautions/Limitations, Hearing (P)  WFL;other (see comments)   for puposes of eval   -AS        Prior Level of Function- Communication (P)  functional in all spheres;other (comment)   for purposes of eval   -AS        Prior Level of Function- Swallowing (P)  no diet consistency restrictions  -AS         Plans/Goals Discussed With (P)  patient;spouse/S.O.;agreed upon  -AS        Barriers to Rehab (P)  none identified  -AS        Living Environment    Lives With (P)  spouse  -AS        Living Arrangements (P)  house  -AS        Clinical Impression    Date of Referral to SLP (P)  09/20/17  -AS        SLP Diagnosis (P)  Communication/cognitive evaluation complete. Pt was alert and cooperative. Receptive/expressive language WFL. Problem solving, attention, reading/writing skills, and orientation WFL. Diffiulty noted w/ divergent naming, which pt was able to complete w/ cues. Mild immediate recall and short-term memory defecits noted. Pt and pt's wife report that this is baseline from previous stroke. They agreed no treatment warranted at this time. Education on outpatient services were provided to the pt and family should issues arise, which they acknowledged. SLP will sign off at this time.   -AS        Functional Level At Time Of Evaluation (P)  WFL  -AS        Patient's Goals For Discharge (P)  return home  -AS        Family Goals For Discharge (P)  family did not state  -AS        Criteria for Skilled Therapeutic Interventions Met (P)  no problems identified which require skilled intervention;other (see comments)   pt and pt's wife report pt is at baseline  -AS        Pain Assessment    Pain Assessment (P)  No/denies pain  -AS        Cognitive Assessment/Intervention    Current Cognitive/Communication Assessment (P)  functional  -AS        Orientation Status (P)  oriented x 4  -AS        Follows Commands/Answers Questions (P)  100% of the time;able to follow multi-step instructions  -AS        Short/Long Term Memory (P)  mild impairment, short term memory;other (see comments)   pt and pt's wife report pt is at basline   -AS        Cognitive Assessment Intervention    Behavior/Mood Observations (P)  behavior appropriate to situation, WNL/WFL  -AS        Attention (P)  WNL/WFL  -AS        Pragmatics (P)  WNL/WFL  -AS         Problem Solving (P)  WNL/WFL  -AS        Executive Function Skills (P)  WNL/WFL  -AS        Reasoning (P)  WNL/WFL  -AS        Organization (P)  WNL/WFL  -AS        Diffuse Language Characteristics (P)  no concerns, diffuse language characteristics  -AS        Auditory Comprehen Assess/Intervention    Auditory Comprehension (P)  WNL/WFL  -AS        Auditory Comprehen Assess/Intervention    Able to Identify Pictures (P)  WNL/WFL;successful, multiple pictures  -AS        Answers Yes/No Questions (P)  WNL/WFL;successful, complex questions  -AS        Able to Follow Commands (P)  WNL/WFL;success, 3-step commands  -AS        Verbal Expression Assess/Intervention    Speech Repetition (P)  WNL/WFL;successful, words  -AS        Speech Fluency (P)  fluent speech  -AS        Conversational Speech (P)  WNL/WFL;successful, sentence  -AS        Conversational Speech Comment (P)  Pt reported intermittent word finding difficulties from previous stroke. Reports this is baseline.  -AS        Reading Assessment/Intervention    Oral Reading Ability (P)  WNL/WFL;successful, paragraphs  -AS        Reading Comprehension (P)  WNL/WFL;successful, paragraph length  -AS        Writing Assessment/Intervention    Writing Skills (P)  WNL/WFL  -AS        Writing Skills Comment (P)  Pt able to generate a sentence on his own w/ 100% acc.  -AS        Motor Speech Assess/Intervention    Motor Speech-Apraxia Observations (P)  no concerns  -AS        Motor Speech- Apraxia (P)  WNL/WFL  -AS        Motor Speech-Dysarthria Observations (P)  no concerns  -AS        Motor Speech- Dysarthria (P)  WNL/WFL  -AS          User Key  (r) = Recorded By, (t) = Taken By, (c) = Cosigned By    Initials Name Effective Dates    AS Ana Duque Speech Therapy Student 08/24/17 -            EDUCATION  The patient has been educated in the following areas:   Cognitive Impairment Communication Impairment.    SLP Recommendation and Plan  SLP Diagnosis: (P)  Communication/cognitive evaluation complete. Pt was alert and cooperative. Receptive/expressive language WFL. Problem solving, attention, reading/writing skills, and orientation WFL. Diffiulty noted w/ divergent naming, which pt was able to complete w/ cues. Mild immediate recall and short-term memory defecits noted. Pt and pt's wife report that this is baseline from previous stroke. They agreed no treatment warranted at this time. Education on outpatient services were provided to the pt and family should issues arise, which they acknowledged. SLP will sign off at this time.         Criteria for Skilled Therapeutic Interventions Met: (P) no problems identified which require skilled intervention, other (see comments) (pt and pt's wife report pt is at baseline)                   Plan of Care Review  Plan Of Care Reviewed With: (P) patient, spouse  Progress: (P)  (eval)  Outcome Summary/Follow up Plan: (P) Communication/cognitive evaluation complete. Pt was alert and cooperative. Receptive/expressive language WFL. Problem solving, attention, reading/writing skills, and orientation WFL. Diffiulty noted w/ divergent naming, which pt was able to complete w/ cues. Mild immediate recall and short-term memory defecits noted. Pt and pt's wife report that this is baseline from previous stroke. They agreed no treatment warranted at this time. Education on outpatient services were provided to the pt and family should issues arise, which they acknowledged. SLP will sign off at this time.             Time Calculation:         Time Calculation- SLP       09/20/17 1352          Time Calculation- SLP    SLP Start Time (P)  1115  -AS      SLP Received On (P)  09/20/17  -AS        User Key  (r) = Recorded By, (t) = Taken By, (c) = Cosigned By    Initials Name Provider Type    AS Ana Duque, Speech Therapy Student Speech Therapy Student          Therapy Charges for Today     Code Description Service Date Service Provider Modifiers Qty     [id number] [facility] EVAL SPEECH AND PROD W LANG  6 9/20/2017 Ana Duque, Speech Therapy Student GN 1                     Ana Duque Speech Therapy Student  9/20/2017

## 2017-09-20 NOTE — PLAN OF CARE
Problem: Patient Care Overview (Adult)  Goal: Plan of Care Review  Outcome: Ongoing (interventions implemented as appropriate)    09/20/17 1626   Coping/Psychosocial Response Interventions   Plan Of Care Reviewed With patient   Patient Care Overview   Progress progress toward functional goals as expected   Outcome Evaluation   Outcome Summary/Follow up Plan Pt presents w/ a brief chart review relating to presenting physical deficits limiting functional performance. Pt Min Ax2 for STS t/f and CGAx2 to ambulate 50 ft. Recommend pt DC home w/ assist and OP rehab. Recommend cont skilled IPOT intervention.          Problem: Inpatient Occupational Therapy  Goal: Transfer Training Goal 1 LTG- OT  Outcome: Ongoing (interventions implemented as appropriate)    09/20/17 1626   Transfer Training OT LTG   Transfer Training OT LTG, Date Established 09/20/17   Transfer Training OT LTG, Time to Achieve by discharge   Transfer Training OT LTG, Activity Type bed to chair /chair to bed;toilet;sit to stand/stand to sit   Transfer Training OT LTG, [place] Level supervision required   Transfer Training OT LTG, Additional Goal AAD       Goal: Patient Education Goal LTG- OT  Outcome: Ongoing (interventions implemented as appropriate)    09/20/17 1626   Patient Education OT LTG   Patient Education OT LTG, Date Established 09/20/17   Patient Education OT LTG, Time to Achieve by discharge   Patient Education OT LTG, Education Type written program;HEP;posture/body mechanics;home safety;energy conservation   Patient Education OT LTG, Education Understanding verbalizes understanding       Goal: LB Dressing Goal LTG- OT  Outcome: Ongoing (interventions implemented as appropriate)    09/20/17 1626   LB Dressing OT LTG   LB Dressing Goal OT LTG, Date Established 09/20/17   LB Dressing Goal OT LTG, Time to Achieve by discharge   LB Dressing Goal OT LTG, Activity Type Don/doff socks   LB Dressing Goal OT LTG, [place] Level supervision  required   LB Dressing Goal OT LTG, Additional Goal AAD       Goal: Functional Mobility Goal LTG- OT  Outcome: Ongoing (interventions implemented as appropriate)    09/20/17 1626   Functional Mobility OT LTG   Functional Mobility Goal OT LTG, Date Established 09/20/17   Functional Mobility Goal OT LTG, Time to Achieve by discharge   Functional Mobility Goal OT LTG, [place] Level standby assist   Functional Mobility Goal OT LTG, Assist Device appropriate AD   Functional Mobility Goal OT LTG, Distance to Achieve to the bathroom   Functional Mobility Goal OT LTG, Additional Goal AAD

## 2017-09-20 NOTE — THERAPY EVALUATION
Acute Care - Occupational Therapy Initial Evaluation  [facility]     Patient Name: Camron Hendrix  : 1950  MRN: [id number]  Today's Date: 2017  Onset of Illness/Injury or Date of Surgery Date: 17  Date of Referral to OT: 17  Referring Physician: FADUMO Means    Admit Date: 2017       ICD-10-CM ICD-9-CM   1. Cerebrovascular accident (CVA), unspecified mechanism I63.9 434.91   2. Impaired functional mobility, balance, gait, and endurance Z74.09 V49.89   3. Impaired mobility and ADLs Z74.09 799.89     Patient Active Problem List   Diagnosis   • CVA (cerebral vascular accident)   • Hypothyroidism   • Hypertension   • AAA (abdominal aortic aneurysm)   • Coronary artery disease     Past Medical History:   Diagnosis Date   • AAA (abdominal aortic aneurysm)    • Coronary artery disease    • CVA (cerebral vascular accident)    • Hypertension    • Hypothyroidism    • TIA (transient ischemic attack)      Past Surgical History:   Procedure Laterality Date   • CORONARY ANGIOPLASTY WITH STENT PLACEMENT            OT ASSESSMENT FLOWSHEET (last 72 hours)      OT Evaluation       17 1450 17 1445 17 1115 17 0600 17 0400    Rehab Evaluation    Document Type evaluation  -CL evaluation  -LM evaluation  -AC (r) AS (t) AC (c)      Subjective Information agree to therapy;no complaints  -CL agree to therapy;no complaints  -LM no complaints;agree to therapy  -AC (r) AS (t) AC (c)      Patient Effort, Rehab Treatment good  -CL good  -LM       Symptoms Noted During/After Treatment increased pain;fatigue  -CL increased pain;fatigue  -LM       Symptoms Noted Comment Pt c/o L calf pain, RN notified.   -CL Pt experienced increased pain in L calf when ambulating; subsided with rest.  -LM       General Information    Patient Profile Review yes  -CL yes  -LM       Onset of Illness/Injury or Date of Surgery Date 17  -CL 17  -LM       Referring Physician FADUMO Means  -CL Clary  APRN  -LM       General Observations  Pt received in fowlers in bed with tele; no visitors present.  -LM       Pertinent History Of Current Problem Pt presented to ED 2/2 to L sided weakness/numbness. Pt s/p tPA, imaging pending. PMH: TIA and CVA.   -CL Pt admitted for acute CVA; developed L sided weakness and numbness in transit to OSH ED. Received TPA and transferred to [facility]. History of TIA and CVA.  -LM       Precautions/Limitations fall precautions  -CL fall precautions  -LM       Prior Level of Function independent:;all household mobility;community mobility;transfer;ADL's;dressing;bathing;yard work;driving  -CL independent:;all household mobility;community mobility;gait;transfer;bed mobility;ADL's;cooking;cleaning;bathing;dressing  -LM       Equipment Currently Used at Home shower chair  -CL shower chair  -LM       Plans/Goals Discussed With patient;agreed upon  -CL patient;agreed upon  -LM       Risks Reviewed patient:;nausea/vomiting;LOB;dizziness;increased discomfort;change in vital signs;lines disloged  -CL patient:;LOB;nausea/vomiting;dizziness;increased discomfort;change in vital signs  -LM       Benefits Reviewed patient:;improve function;increase independence;increase strength;increase balance;decrease pain;increase knowledge  -CL patient:;improve function;increase independence;increase strength;increase balance;decrease pain  -LM       Barriers to Rehab none identified  -CL none identified  -LM       Living Environment    Lives With spouse  -CL spouse  -LM spouse  -AC (r) AS (t) AC (c)      Living Arrangements house  -CL house  -LM house  -AC (r) AS (t) AC (c)      Home Accessibility bed and bath on same level;tub/shower is not walk in;stairs to enter home  -CL bed and bath on same level;tub/shower is not walk in;stairs to enter home  -LM       Number of Stairs to Enter Home 3  -CL 3  -LM       Stair Railings at Home none  -CL none  -LM       Clinical Impression    Date of Referral to OT 09/19/17  -CL         OT Diagnosis Decreased independence in ADLs.   -CL        Patient/Family Goals Statement Return to PLOF.   -CL        Criteria for Skilled Therapeutic Interventions Met yes;treatment indicated  -CL        Rehab Potential good, to achieve stated therapy goals  -CL        Therapy Frequency daily  -CL        Anticipated Equipment Needs At Discharge front wheeled walker   TBA further  -CL        Anticipated Discharge Disposition home with assist;home with outpatient services  -CL        Vital Signs    Pre Systolic BP Rehab 155  -  -LM       Pre Treatment Diastolic BP 82  -CL 82  -LM       Post Systolic BP Rehab 170  -  -LM       Post Treatment Diastolic BP 92  -CL 92  -LM       Pretreatment Heart Rate (beats/min) 65  -CL 65  -LM       Posttreatment Heart Rate (beats/min) 72  -CL 72  -LM       Pre SpO2 (%) --   Pulse ox not reading  -CL        Post SpO2 (%) 97  -CL 97  -LM       O2 Delivery Post Treatment room air  -CL room air  -LM       Pre Patient Position Supine  -CL Supine  -LM       Intra Patient Position Standing  -CL Standing  -LM       Post Patient Position Sitting  -CL Sitting  -LM       Pain Assessment    Pain Assessment Marina-Churchill FACES  -CL No/denies pain  -LM No/denies pain  -AC (r) AS (t) AC (c)      Marina-Baker FACES Pain Rating 4  -CL        Pain Type Acute pain  -CL        Pain Location Leg  -CL        Pain Orientation Left  -CL        Pain Intervention(s) Repositioned;Ambulation/increased activity  -CL        Response to Interventions Tolerated.   -CL        Vision Assessment/Intervention    Visual Impairment  WFL with corrective lenses  -LM       Visual Impairment Comment Slightly decreased L peripheral vision compared to R, though pt reports wearing classes at baseline.   -CL        Visual Tracking Comment WFL in all planes.   -CL        Cognitive Assessment/Intervention    Current Cognitive/Communication Assessment functional  -CL functional  -LM functional  -AC (r) AS (t) AC (c)       Orientation Status oriented x 4  -CL oriented x 4  -LM oriented x 4  -AC (r) AS (t) AC (c)      Follows Commands/Answers Questions 100% of the time  -% of the time;able to follow multi-step instructions;needs cueing  -% of the time;able to follow multi-step instructions  -AC (r) AS (t) AC (c)      Personal Safety WNL/WFL  -CL WNL/WFL  -LM       Personal Safety Interventions fall prevention program maintained;gait belt;nonskid shoes/slippers when out of bed  -CL fall prevention program maintained;gait belt;nonskid shoes/slippers when out of bed  -LM       Short/Long Term Memory   mild impairment, short term memory;other (see comments)   pt and pt's wife report pt is at [facility]   -AC (r) AS (t) AC (c)      Cognitive Assessment Intervention    Behavior/Mood Observations   behavior appropriate to situation, WNL/WFL  -AC (r) AS (t) AC (c)      Attention   WNL/WFL  -AC (r) AS (t) AC (c)      Pragmatics   WNL/WFL  -AC (r) AS (t) AC (c)      Problem Solving   WNL/WFL  -AC (r) AS (t) AC (c)      Executive Function Skills   WNL/WFL  -AC (r) AS (t) AC (c)      Reasoning   WNL/WFL  -AC (r) AS (t) AC (c)      Organization   WNL/WFL  -AC (r) AS (t) AC (c)      Diffuse Language Characteristics   no concerns, diffuse language characteristics  -AC (r) AS (t) AC (c)      ROM (Range of Motion)    General ROM no range of motion deficits identified  -CL no range of motion deficits identified   BLEs; defer UE to OT  -LM       MMT (Manual Muscle Testing)    General MMT Assessment  lower extremity strength deficits identified   BLEs; defer UE to OT  -LM       General MMT Assessment Detail BUE grossly 4-/5.   -CL        Muscle Tone Assessment    Muscle Tone Assessment    Left-Side Extremities  -BS Left-Side Extremities  -BS    Left-Side Extremities Muscle Tone Assessment    moderately increased tone  -BS moderately increased tone  -BS    Bed Mobility, Assessment/Treatment    Bed Mobility, Assistive Device bed rails;head of  bed elevated  -CL head of bed elevated;bed rails  -LM       Bed Mob, Supine to Sit, [place] minimum assist (75% patient effort);2 person assist required;verbal cues required  -CL minimum assist (75% patient effort);2 person assist required;verbal cues required  -LM       Bed Mob, Sit to Supine, [place]  not tested   Pt left UIC  -LM       Bed Mobility, Impairments  strength decreased  -LM       Bed Mobility, Comment VCs for HP/sequencing.   -CL Verbal cues for sequencing; assist required at trunk to upright.  -LM       Transfer Assessment/Treatment    Transfers, Sit-Stand [place] minimum assist (75% patient effort);2 person assist required;verbal cues required  -CL minimum assist (75% patient effort);2 person assist required;verbal cues required  -LM       Transfers, Stand-Sit [place] minimum assist (75% patient effort);2 person assist required;verbal cues required  -CL minimum assist (75% patient effort);2 person assist required;verbal cues required  -LM       Transfers, Sit-Stand-Sit, Assist Device rolling walker  -CL rolling walker  -LM       Transfer, Safety Issues  sequencing ability decreased;step length decreased  -LM       Transfer, Impairments  strength decreased;impaired balance  -LM       Transfer, Comment VCs for HP/sequencing.   -CL Verbal cues for sequencing and hand placement.  -LM       Functional Mobility    Functional Mobility- Ind. Level contact guard assist;2 person assist required;verbal cues required  -CL        Functional Mobility- Device rolling walker  -CL        Functional Mobility-Distance (Feet) 50  -CL        Functional Mobility- Comment Pt c/o episode of significant calf pain, leaned down onto forearms on RW, chair brought up behind.   -CL        Motor Skills/Interventions    Additional Documentation Balance Skills Training (Group);Fine Motor Coordination Training (Group);Gross Motor Coordination Training (Group)  -CL        Balance Skills Training    Sitting-Level  of Assistance Contact guard  -CL        Sitting-Balance Support Right upper extremity supported;Left upper extremity supported;Feet supported  -CL        Sitting-Balance Activities Forward lean;Reaching for objects;Trunk control activities  -CL        Standing-Level of Assistance Contact guard;x2  -CL        Static Standing Balance Support assistive device  -CL        Standing-Balance Activities Weight Shift A-P;Weight Shift R-L  -CL        Gait Balance-Level of Assistance Contact guard;x2  -CL        Gait Balance Support assistive device  -CL        Gait Balance Activities scanning environment R/L;side-stepping  -CL        Therapy Exercises    Bilateral Lower Extremities  AROM:;10 reps;sitting;ankle pumps/circles;glut sets;quad sets  -LM       Gross Motor Coordination Training    Gross Motor Skill, Impairments Detail LUE finger nose w/ eyes closely slightly impaired.   -CL        Fine Motor Coordination Training    Opposition Right:;Left:;intact  -CL        Sensory Assessment/Intervention    Sensory Impairment  dull  -LM       Light Touch LUE;RUE  -CL LLE;RLE  -LM       LUE Light Touch mild impairment  -CL        RUE Light Touch WNL  -CL        LLE Light Touch  mild impairment  -LM       RLE Light Touch  mild impairment  -LM       General Therapy Interventions    Planned Therapy Interventions adaptive equipment training;ADL retraining;balance training;bed mobility training;energy conservation;home exercise program;transfer training  -CL        Positioning and Restraints    Pre-Treatment Position in bed  -CL in bed  -LM       Post Treatment Position chair  -CL chair  -LM       In Chair notified nsg;reclined;call light within reach;encouraged to call for assist;with nsg;legs elevated   RN deferred exit alarm  -CL notified nsg;reclined;sitting;call light within reach;encouraged to call for assist;legs elevated   exit alarm deferred per nursing recommendation  -LM       Lower Extremity    Lower Ext Manual Muscle  Testing Detail  LLE grossly 4-/5; RLE 4+/5  -LM         09/20/17 0200 09/20/17 0035 09/20/17 0027 09/20/17 0000       General Information    Equipment Currently Used at Home   none  -BS      Living Environment    Lives With  spouse  -SC       Living Arrangements  house  -SC       Home Accessibility  no concerns  -SC       Stair Railings at Home  none  -SC       Type of Financial/Environmental Concern  none  -SC       Transportation Available  car  -SC       Functional Level Prior    Ambulation   0-->independent  -BS      Transferring   0-->independent  -BS      Toileting   0-->independent  -BS      Bathing   0-->independent  -BS      Dressing   0-->independent  -BS      Eating   0-->independent  -BS      Communication   0-->understands/communicates without difficulty  -BS      Swallowing   0-->swallows foods/liquids without difficulty  -BS      Muscle Tone Assessment    Muscle Tone Assessment Left-Side Extremities  -BS   Left-Side Extremities  -BS     Left-Side Extremities Muscle Tone Assessment moderately increased tone  -BS   moderately increased tone  -BS       User Key  (r) = Recorded By, (t) = Taken By, (c) = Cosigned By    Initials Name Effective Dates    AC Bertha Richardson, MS CCC-SLP 07/27/17 -     SC Svitlana Cardoso, RN 06/16/16 -     CL Kecia Valle OT 06/08/16 -     LM Sofi Correa, PT 06/09/17 -     BS Sammy Lin, RN 07/25/17 -     AS Ana Duque, Speech Therapy Student 08/24/17 -            Occupational Therapy Education     Title: PT OT SLP Therapies (Active)     Topic: Occupational Therapy (Active)     Point: ADL training (Active)    Description: Instruct learner(s) on proper safety adaptation and remediation techniques during self care or transfers.   Instruct in proper use of assistive devices.    Learning Progress Summary    Learner Readiness Method Response Comment Documented by Status   Patient Acceptance E,D NR Pt educated on appropriate safety precautions, t/f techniques, and  benefits of therapy.  09/20/17 1626 Active               Point: Precautions (Active)    Description: Instruct learner(s) on prescribed precautions during self-care and functional transfers.    Learning Progress Summary    Learner Readiness Method Response Comment Documented by Status   Patient Acceptance E,D NR Pt educated on appropriate safety precautions, t/f techniques, and benefits of therapy.  09/20/17 1626 Active               Point: Body mechanics (Active)    Description: Instruct learner(s) on proper positioning and spine alignment during self-care, functional mobility activities and/or exercises.    Learning Progress Summary    Learner Readiness Method Response Comment Documented by Status   Patient Acceptance E,D NR Pt educated on appropriate safety precautions, t/f techniques, and benefits of therapy.  09/20/17 1626 Active                      User Key     Initials Effective Dates Name Provider Type Discipline     06/08/16 -  Kecia Valle OT Occupational Therapist OT                  OT Recommendation and Plan  Anticipated Equipment Needs At Discharge: front wheeled walker (TBA further)  Anticipated Discharge Disposition: home with assist, home with outpatient services  Planned Therapy Interventions: adaptive equipment training, ADL retraining, balance training, bed mobility training, energy conservation, home exercise program, transfer training  Therapy Frequency: daily  Plan of Care Review  Plan Of Care Reviewed With: patient  Progress: progress toward functional goals as expected  Outcome Summary/Follow up Plan: Pt presents w/ a brief chart review relating to presenting physical deficits limiting functional performance. Pt Min Ax2 for STS t/f and CGAx2 to ambulate 50 ft. Recommend pt DC home w/ assist and OP rehab. Recommend cont skilled IPOT intervention.           OT Goals       09/20/17 1626          Transfer Training OT LTG    Transfer Training OT LTG, Date Established 09/20/17  -       Transfer Training OT LTG, Time to Achieve by discharge  -CL      Transfer Training OT LTG, Activity Type bed to chair /chair to bed;toilet;sit to stand/stand to sit  -CL      Transfer Training OT LTG, [place] Level supervision required  -CL      Transfer Training OT LTG, Additional Goal AAD  -CL      Patient Education OT LTG    Patient Education OT LTG, Date Established 09/20/17  -CL      Patient Education OT LTG, Time to Achieve by discharge  -CL      Patient Education OT LTG, Education Type written program;HEP;posture/body mechanics;home safety;energy conservation  -CL      Patient Education OT LTG, Education Understanding verbalizes understanding  -CL      LB Dressing OT LTG    LB Dressing Goal OT LTG, Date Established 09/20/17  -CL      LB Dressing Goal OT LTG, Time to Achieve by discharge  -CL      LB Dressing Goal OT LTG, Activity Type Don/doff socks  -CL      LB Dressing Goal OT LTG, [place] Level supervision required  -CL      LB Dressing Goal OT LTG, Additional Goal AAD  -CL      Functional Mobility OT LTG    Functional Mobility Goal OT LTG, Date Established 09/20/17  -CL      Functional Mobility Goal OT LTG, Time to Achieve by discharge  -CL      Functional Mobility Goal OT LTG, [place] Level standby assist  -CL      Functional Mobility Goal OT LTG, Assist Device appropriate AD  -CL      Functional Mobility Goal OT LTG, Distance to Achieve to the bathroom  -CL      Functional Mobility Goal OT LTG, Additional Goal AAD  -CL        User Key  (r) = Recorded By, (t) = Taken By, (c) = Cosigned By    Initials Name Provider Type    DANIEL Valle OT Occupational Therapist                Outcome Measures       09/20/17 1450 09/20/17 1445 09/20/17 1311    How much help from another person do you currently need...    Turning from your back to your side while in flat bed without using bedrails?  3  -LM --  -LM    Moving from lying on back to sitting on the side of a flat bed without bedrails?  3  -LM  --  -LM    Moving to and from a bed to a chair (including a wheelchair)?  3  -LM --  -LM    Standing up from a chair using your arms (e.g., wheelchair, bedside chair)?  3  -LM --  -LM    Climbing 3-5 steps with a railing?  2  -LM --  -LM    To walk in hospital room?  3  -LM --  -LM    AM-PAC 6 Clicks Score  17  -LM --  -LM    How much help from another is currently needed...    Putting on and taking off regular lower body clothing? 2  -CL      Bathing (including washing, rinsing, and drying) 2  -CL      Toileting (which includes using toilet bed pan or urinal) 2  -CL      Putting on and taking off regular upper body clothing 3  -CL      Taking care of personal grooming (such as brushing teeth) 3  -CL      Eating meals 4  -CL      Score 16  -CL      Modified Rachel Scale    Modified Whitney Scale 3 - Moderate disability.  Requiring some help, but able to walk without assistance.  -CL 3 - Moderate disability.  Requiring some help, but able to walk without assistance.  -LM     Functional Assessment    Outcome Measure Options AM-PAC 6 Clicks Daily Activity (OT)  -CL AM-PAC 6 Clicks Basic Mobility (PT);Modified Whitney  -LM --  -LM      User Key  (r) = Recorded By, (t) = Taken By, (c) = Cosigned By    Initials Name Provider Type    CL Kecia Valle OT Occupational Therapist    GARCÍA Correa, PT Physical Therapist          Time Calculation:   OT Start Time: 1450    Therapy Charges for Today     Code Description Service Date Service Provider Modifiers Qty    [id number]  OT EVAL LOW COMPLEXITY 4 9/20/2017 Kecia Valle OT GO 1               Kecia Valle OT  9/20/2017

## 2017-09-20 NOTE — PLAN OF CARE
Problem: Patient Care Overview (Adult)  Goal: Plan of Care Review  Outcome: Ongoing (interventions implemented as appropriate)    09/20/17 1603   Coping/Psychosocial Response Interventions   Plan Of Care Reviewed With patient   Patient Care Overview   Progress progress toward functional goals as expected   Outcome Evaluation   Outcome Summary/Follow up Plan PT eval complete. Pt presents with decreased functional independence with evolving signs/symptoms warranting further need for skilled PT services. Pt ambulated 50 feet with CGAx2 and RW, limited by L calf pain during ambulation that subsided with rest. Recommend d/c home with assist and outpatient PT.          Problem: Inpatient Physical Therapy  Goal: Bed Mobility Goal LTG- PT  Outcome: Ongoing (interventions implemented as appropriate)    09/20/17 1603   Bed Mobility PT LTG   Bed Mobility PT LTG, Date Established 09/20/17   Bed Mobility PT LTG, Time to Achieve 2 wks   Bed Mobility PT LTG, Activity Type supine to sit/sit to supine   Bed Mobility PT LTG, [place] Level conditional independence       Goal: Transfer Training Goal 1 LTG- PT  Outcome: Ongoing (interventions implemented as appropriate)    09/20/17 1603   Transfer Training PT LTG   Transfer Training PT LTG, Date Established 09/20/17   Transfer Training PT LTG, Time to Achieve 2 wks   Transfer Training PT LTG, Activity Type sit to stand/stand to sit   Transfer Training PT LTG, [place] Level supervision required   Transfer Training PT LTG, Assist Device walker, rolling       Goal: Gait Training Goal LTG- PT  Outcome: Ongoing (interventions implemented as appropriate)    09/20/17 1603   Gait Training PT LTG   Gait Training Goal PT LTG, Date Established 09/20/17   Gait Training Goal PT LTG, Time to Achieve 2 wks   Gait Training Goal PT LTG, [place] Level supervision required   Gait Training Goal PT LTG, Assist Device walker, rolling   Gait Training Goal PT LTG, Distance to Achieve 250 feet        Goal: Stair Training Goal LTG- PT  Outcome: Ongoing (interventions implemented as appropriate)    09/20/17 1603   Stair Training PT LTG   Stair Training Goal PT LTG, Date Established 09/20/17   Stair Training Goal PT LTG, Time to Achieve 2 wks   Stair Training Goal PT LTG, Number of Steps 3   Stair Training Goal PT LTG, [place] Level contact guard assist   Stair Training Goal PT LTG, Assist Device 2 handrails

## 2017-09-20 NOTE — PLAN OF CARE
Problem: Patient Care Overview (Adult)  Goal: Plan of Care Review  Outcome: Ongoing (interventions implemented as appropriate)    09/20/17 1350   Coping/Psychosocial Response Interventions   Plan Of Care Reviewed With patient;spouse   Patient Care Overview   Progress (eval)   Outcome Evaluation   Outcome Summary/Follow up Plan Communication/cognitive evaluation complete. Complete per CVA pathway protocol. Pt was alert and cooperative. Receptive/expressive language WFL. Problem solving, attention, reading/writing skills, and orientation WFL. Difficulty noted w/ divergent naming, which pt was able to complete w/ cues. Mild immediate recall and short-term memory defecits noted. Pt and pt's wife report that this is baseline from previous stroke. They agreed no treatment warranted at this time. Education on outpatient services were provided to the pt and family should issues arise, which they acknowledged. SLP will sign off at this time.

## 2017-09-20 NOTE — H&P
"Pulmonary/Critical Care History and Physical Exam     LOS: 1 day   Patient Care Team:  Leslie Rhodes MD as PCP - General (Family Medicine)    Chief Complaint:  weakness    Subjective     HPI: Camron Hendrix is a 67 y.o. male who was watching TV this evening when he began to \"feel funny\".  He went to go take a bath and had some difficulty getting down into the tub and was later unable to extract himself from the tub on his own.  He asked his wife to drive him to the ED and while in transit developed L sided weakness/numbness.  Initial NIH was 6.  The patient received TPA @ 2200 and was transferred to our facility.  His NIH upon arrival here is 2.  Lab work was WNL.    He has had a H/O TIA & CVA in the past for which he was placed on plavix.      History taken from: patient    Past Medical History:   Diagnosis Date   • AAA (abdominal aortic aneurysm)    • Coronary artery disease    • CVA (cerebral vascular accident)    • Hypertension    • Hypothyroidism    • TIA (transient ischemic attack)        Past Surgical History:   Procedure Laterality Date   • CORONARY ANGIOPLASTY WITH STENT PLACEMENT         Family History   Problem Relation Age of Onset   • Heart failure Mother    • Aneurysm Father        Social History     Social History   • Marital status:      Spouse name: N/A   • Number of children: N/A   • Years of education: N/A     Occupational History   • Not on file.     Social History Main Topics   • Smoking status: Former Smoker     Packs/day: 1.00     Years: 20.00     Quit date: 9/20/1985   • Smokeless tobacco: Not on file      Comment: quit 20 y ago   • Alcohol use Yes      Comment: \"Drinks once or twice a year\"   • Drug use: No   • Sexual activity: Defer     Other Topics Concern   • Not on file     Social History Narrative       Allergies   Allergen Reactions   • Codeine Hallucinations       PMH/FH/SocH were reviewed by me and updates were made.     Review of Systems:    All systems were reviewed and " negative except as noted in subjective.  Musculoskeletal: positive for  See HPI  Neurological: positive for  See HPI    Objective     Vital Signs  Temp:  [98.1 °F (36.7 °C)] 98.1 °F (36.7 °C)  Heart Rate:  [65] 65  Resp:  [12] 12  BP: (203)/(111) 203/111    Physical Exam:     General Appearance:    Alert, cooperative, in no acute distress   Head:    Normocephalic, without obvious abnormality, atraumatic   Eyes:            Lids and lashes normal, conjunctivae and sclerae normal, no   icterus, no pallor, corneas clear, PERRLA   ENMT:   Ears appear intact with no abnormalities noted      No oral lesions, no thrush, oral mucosa moist   Neck:   No adenopathy, supple, trachea midline, no thyromegaly, no   carotid bruit, no JVD       Lungs/resp:     Normal effort, symmetric chest rise, no crepitus, clear to      auscultation bilaterally, no chest wall tenderness, resonant to percussion throughout.                  Heart/CV:    Regular rhythm and normal rate, normal S1 and S2, no            murmur, no gallop, no rub, no click   Abdomen/GI:     Normal bowel sounds, no masses, no organomegaly, soft        non-tender, non-distended, no guarding, no rebound                tenderness   G/U:     Deferred   Extremities/MSK:   No clubbing, cyanosis or edema.  No deformities.    Pulses:   Pulses palpable and equal bilaterally   Skin:   No bleeding, bruising or rash   Hem/Lymph:   No cervical or supraclavicular adenopathy.    Neurologic:   Moves all extremities with no obvious focal motor deficit.  Cranial nerves 2 - 12 grossly intact.  C/o numbness of L hand and FA            Psychiatric:  Normal mood and affect, oriented x 3.      Results Review:     I reviewed the patient's new clinical results.         Invalid input(s): LABALBU, PROT        Invalid input(s): NEUTOPHILPCT,  EOSPCT        I reviewed the patient's new imaging including images and reports.    Medication Review:     [START ON 9/21/2017] aspirin 325 mg Oral Daily   Or       [START ON 9/21/2017] aspirin 300 mg Rectal Daily   atorvastatin 80 mg Oral Nightly   enoxaparin 40 mg Subcutaneous Daily   levothyroxine 200 mcg Oral Q AM       niCARdipine 5-15 mg/hr Last Rate: 5 mg/hr (09/20/17 0017)   sodium chloride 50 mL/hr Last Rate: 50 mL/hr (09/20/17 0018)       Assessment/Plan     Hospital Problem List     * (Principal)CVA (cerebral vascular accident)    Coronary artery disease    Hypothyroidism    Hypertension    AAA (abdominal aortic aneurysm)        Patient with stroke-like symptoms s/p TPA at referring facility after phone consultation with Dr. Samano with neurology.  Symptoms currently improved but not resolved.     Plan:    - Admit to Neuro ICU  - post-TPA orderset utilized  - high dose statin  - check TSH, P2Y12, FLP, HA1c  - resume ASA/Plavix 24 h post TPA  - check head CT 24 H post-TPA  - Neurology consult in am  - GI & DVT PPX  - Cardene for BP control  - resume home medications      FADUMO De La Rosa  09/20/17  12:35 AM  I performed an independent history and physical examination. Portions of the history were obtained by APRN and were modified by me according to my findings. The above note reflects my findings, assessment, and plan.    Delmer Brady MD    32 minutes of critical care provided by me personally. This time excludes other billable procedures. Time does include preparation of documents, medical consultations, review of old records, and direct bedside care. Patient was at high risk for life-threatening deterioration due to acute CVA.

## 2017-09-21 ENCOUNTER — APPOINTMENT (OUTPATIENT)
Dept: CT IMAGING | Facility: HOSPITAL | Age: 67
End: 2017-09-21
Attending: PSYCHIATRY & NEUROLOGY

## 2017-09-21 PROBLEM — I42.2 ASYMMETRIC SEPTAL HYPERTROPHY (HCC): Status: ACTIVE | Noted: 2017-09-21

## 2017-09-21 PROBLEM — I25.119 CORONARY ARTERY DISEASE INVOLVING NATIVE CORONARY ARTERY OF NATIVE HEART WITH ANGINA PECTORIS (HCC): Status: ACTIVE | Noted: 2017-09-20

## 2017-09-21 PROBLEM — I51.7 ASYMMETRIC SEPTAL HYPERTROPHY: Status: ACTIVE | Noted: 2017-09-21

## 2017-09-21 PROBLEM — I51.7 LVH (LEFT VENTRICULAR HYPERTROPHY): Status: RESOLVED | Noted: 2017-09-21 | Resolved: 2017-09-21

## 2017-09-21 PROBLEM — I51.7 LVH (LEFT VENTRICULAR HYPERTROPHY): Status: ACTIVE | Noted: 2017-09-21

## 2017-09-21 PROBLEM — E66.01 MORBID OBESITY WITH BMI OF 40.0-44.9, ADULT: Status: ACTIVE | Noted: 2017-09-21

## 2017-09-21 LAB
ALBUMIN SERPL-MCNC: 3.7 G/DL (ref 3.2–4.8)
ALBUMIN/GLOB SERPL: 1.6 G/DL (ref 1.5–2.5)
ALP SERPL-CCNC: 97 U/L (ref 25–100)
ALT SERPL W P-5'-P-CCNC: 35 U/L (ref 7–40)
ANION GAP SERPL CALCULATED.3IONS-SCNC: 4 MMOL/L (ref 3–11)
AST SERPL-CCNC: 21 U/L (ref 0–33)
BASOPHILS # BLD AUTO: 0.03 10*3/MM3 (ref 0–0.2)
BASOPHILS NFR BLD AUTO: 0.6 % (ref 0–1)
BILIRUB SERPL-MCNC: 0.5 MG/DL (ref 0.3–1.2)
BUN BLD-MCNC: 9 MG/DL (ref 9–23)
BUN/CREAT SERPL: 11.3 (ref 7–25)
CALCIUM SPEC-SCNC: 8.5 MG/DL (ref 8.7–10.4)
CHLORIDE SERPL-SCNC: 106 MMOL/L (ref 99–109)
CO2 SERPL-SCNC: 28 MMOL/L (ref 20–31)
CREAT BLD-MCNC: 0.8 MG/DL (ref 0.6–1.3)
DEPRECATED RDW RBC AUTO: 38.5 FL (ref 37–54)
EOSINOPHIL # BLD AUTO: 0.3 10*3/MM3 (ref 0–0.3)
EOSINOPHIL NFR BLD AUTO: 6 % (ref 0–3)
ERYTHROCYTE [DISTWIDTH] IN BLOOD BY AUTOMATED COUNT: 12.9 % (ref 11.3–14.5)
GFR SERPL CREATININE-BSD FRML MDRD: 97 ML/MIN/1.73
GLOBULIN UR ELPH-MCNC: 2.3 GM/DL
GLUCOSE BLD-MCNC: 121 MG/DL (ref 70–100)
GLUCOSE BLDC GLUCOMTR-MCNC: 118 MG/DL (ref 70–130)
HCT VFR BLD AUTO: 45.3 % (ref 38.9–50.9)
HGB BLD-MCNC: 15.5 G/DL (ref 13.1–17.5)
IMM GRANULOCYTES # BLD: 0.01 10*3/MM3 (ref 0–0.03)
IMM GRANULOCYTES NFR BLD: 0.2 % (ref 0–0.6)
LYMPHOCYTES # BLD AUTO: 1.99 10*3/MM3 (ref 0.6–4.8)
LYMPHOCYTES NFR BLD AUTO: 40.1 % (ref 24–44)
MAGNESIUM SERPL-MCNC: 1.9 MG/DL (ref 1.3–2.7)
MCH RBC QN AUTO: 28.7 PG (ref 27–31)
MCHC RBC AUTO-ENTMCNC: 34.2 G/DL (ref 32–36)
MCV RBC AUTO: 83.7 FL (ref 80–99)
MONOCYTES # BLD AUTO: 0.41 10*3/MM3 (ref 0–1)
MONOCYTES NFR BLD AUTO: 8.3 % (ref 0–12)
NEUTROPHILS # BLD AUTO: 2.22 10*3/MM3 (ref 1.5–8.3)
NEUTROPHILS NFR BLD AUTO: 44.8 % (ref 41–71)
PA ADP PRP-ACNC: 125 PRU
PHOSPHATE SERPL-MCNC: 3.5 MG/DL (ref 2.4–5.1)
PLATELET # BLD AUTO: 185 10*3/MM3 (ref 150–450)
PMV BLD AUTO: 10.7 FL (ref 6–12)
POTASSIUM BLD-SCNC: 3.7 MMOL/L (ref 3.5–5.5)
PROT SERPL-MCNC: 6 G/DL (ref 5.7–8.2)
RBC # BLD AUTO: 5.41 10*6/MM3 (ref 4.2–5.76)
SODIUM BLD-SCNC: 138 MMOL/L (ref 132–146)
WBC NRBC COR # BLD: 4.96 10*3/MM3 (ref 3.5–10.8)

## 2017-09-21 PROCEDURE — 25010000002 ENOXAPARIN PER 10 MG

## 2017-09-21 PROCEDURE — 82962 GLUCOSE BLOOD TEST: CPT

## 2017-09-21 PROCEDURE — 99291 CRITICAL CARE FIRST HOUR: CPT | Performed by: INTERNAL MEDICINE

## 2017-09-21 PROCEDURE — 84100 ASSAY OF PHOSPHORUS: CPT | Performed by: INTERNAL MEDICINE

## 2017-09-21 PROCEDURE — 99232 SBSQ HOSP IP/OBS MODERATE 35: CPT | Performed by: PSYCHIATRY & NEUROLOGY

## 2017-09-21 PROCEDURE — 99222 1ST HOSP IP/OBS MODERATE 55: CPT | Performed by: INTERNAL MEDICINE

## 2017-09-21 PROCEDURE — 85025 COMPLETE CBC W/AUTO DIFF WBC: CPT | Performed by: INTERNAL MEDICINE

## 2017-09-21 PROCEDURE — 70450 CT HEAD/BRAIN W/O DYE: CPT

## 2017-09-21 PROCEDURE — 83735 ASSAY OF MAGNESIUM: CPT | Performed by: INTERNAL MEDICINE

## 2017-09-21 PROCEDURE — 80053 COMPREHEN METABOLIC PANEL: CPT | Performed by: INTERNAL MEDICINE

## 2017-09-21 PROCEDURE — 85576 BLOOD PLATELET AGGREGATION: CPT | Performed by: PSYCHIATRY & NEUROLOGY

## 2017-09-21 RX ORDER — LEVOTHYROXINE SODIUM 0.1 MG/1
200 TABLET ORAL
Status: DISCONTINUED | OUTPATIENT
Start: 2017-09-21 | End: 2017-09-21

## 2017-09-21 RX ORDER — FUROSEMIDE 20 MG/1
20 TABLET ORAL DAILY
Status: DISCONTINUED | OUTPATIENT
Start: 2017-09-21 | End: 2017-09-22 | Stop reason: HOSPADM

## 2017-09-21 RX ORDER — CARVEDILOL 12.5 MG/1
12.5 TABLET ORAL DAILY
Status: DISCONTINUED | OUTPATIENT
Start: 2017-09-21 | End: 2017-09-22 | Stop reason: HOSPADM

## 2017-09-21 RX ORDER — AMLODIPINE BESYLATE 10 MG/1
10 TABLET ORAL DAILY
Status: DISCONTINUED | OUTPATIENT
Start: 2017-09-21 | End: 2017-09-22 | Stop reason: HOSPADM

## 2017-09-21 RX ORDER — LOSARTAN POTASSIUM 50 MG/1
100 TABLET ORAL DAILY
Status: DISCONTINUED | OUTPATIENT
Start: 2017-09-21 | End: 2017-09-22 | Stop reason: HOSPADM

## 2017-09-21 RX ORDER — ASPIRIN 81 MG/1
81 TABLET, CHEWABLE ORAL DAILY
Status: DISCONTINUED | OUTPATIENT
Start: 2017-09-22 | End: 2017-09-22 | Stop reason: HOSPADM

## 2017-09-21 RX ORDER — CLOPIDOGREL BISULFATE 75 MG/1
75 TABLET ORAL DAILY
Status: CANCELLED | OUTPATIENT
Start: 2017-09-21

## 2017-09-21 RX ORDER — HYDRALAZINE HYDROCHLORIDE 50 MG/1
50 TABLET, FILM COATED ORAL 2 TIMES DAILY
Status: DISCONTINUED | OUTPATIENT
Start: 2017-09-21 | End: 2017-09-22 | Stop reason: HOSPADM

## 2017-09-21 RX ADMIN — LOSARTAN POTASSIUM 100 MG: 50 TABLET, FILM COATED ORAL at 10:53

## 2017-09-21 RX ADMIN — FUROSEMIDE 20 MG: 20 TABLET ORAL at 10:55

## 2017-09-21 RX ADMIN — NICARDIPINE HYDROCHLORIDE 5 MG/HR: 25 INJECTION INTRAVENOUS at 06:48

## 2017-09-21 RX ADMIN — ASPIRIN 325 MG ORAL TABLET 325 MG: 325 PILL ORAL at 00:15

## 2017-09-21 RX ADMIN — NICARDIPINE HYDROCHLORIDE 5 MG/HR: 25 INJECTION INTRAVENOUS at 01:14

## 2017-09-21 RX ADMIN — AMLODIPINE BESYLATE 10 MG: 10 TABLET ORAL at 10:55

## 2017-09-21 RX ADMIN — HYDRALAZINE HYDROCHLORIDE 50 MG: 50 TABLET, FILM COATED ORAL at 10:55

## 2017-09-21 RX ADMIN — LEVOTHYROXINE SODIUM 200 MCG: 100 TABLET ORAL at 06:15

## 2017-09-21 RX ADMIN — ATORVASTATIN CALCIUM 80 MG: 40 TABLET, FILM COATED ORAL at 20:03

## 2017-09-21 RX ADMIN — HYDRALAZINE HYDROCHLORIDE 50 MG: 50 TABLET, FILM COATED ORAL at 17:38

## 2017-09-21 RX ADMIN — CARVEDILOL 12.5 MG: 12.5 TABLET, FILM COATED ORAL at 10:56

## 2017-09-21 RX ADMIN — CLOPIDOGREL BISULFATE 75 MG: 75 TABLET ORAL at 08:05

## 2017-09-21 RX ADMIN — ENOXAPARIN SODIUM 40 MG: 40 INJECTION SUBCUTANEOUS at 08:05

## 2017-09-21 NOTE — PROGRESS NOTES
INTENSIVIST / PULMONARY FOLLOW UP NOTE     Hospital:  LOS: 2 days   Mr. Camron Hendrix, 67 y.o. male is followed for:   Principal Problem:    CVA (cerebral vascular accident)  Active Problems:    Hypothyroidism    Hypertension    AAA (abdominal aortic aneurysm)    Coronary artery disease          SUBJECTIVE   Was doing well when I saw him earlier today, reportedly had some neuro changes and became clammy and diaphoretic.  Went for emergent CT, read pending.    The patient's relevant past medical, surgical, family, and social history were reviewed    Allergies and medications were reviewed    ROS:  Per subjective, all other systems were reviewed and were negative        OBJECTIVE     Vitals:    09/21/17 1313   BP: 131/77   Pulse:    Resp:    Temp:    SpO2:      General Appearance:  Conversant, in no acute distress  Eyes:  No scleral icterus or pallor, PERRLA  Ears, Nose, Mouth, Throat:  Atraumatic, oropharynx clear  Neck:  Trachea midline, thyroid normal  Respiratory:  Clear to auscultation bilaterally, normal effort  Cardiovascular:  Regular rate and rhythm, no murmurs, no peripheral edema  Gastrointestinal:  Soft, non-tender, non-distended, no hepatosplenomegaly  Skin:  Normal temperature, no rash  Psychiatric:  Alert and oriented x 3, normal judgement and insight  Neuro:  No new focal neurologic deficits observed    Relevant imaging studies and labs from 09/21/17 were reviewed and interpreted by me    Assessment/Plan   IMPRESSION / PLAN     Inpatient Problem List:  67 y.o.male:  Principal Problem:    CVA (cerebral vascular accident)  Active Problems:    Hypothyroidism    Hypertension    AAA (abdominal aortic aneurysm)    Coronary artery disease       Impression:  67 y.o.male with relevant PMH of HTN, CAD, Stent, Hypothyroidism admitted 9/19/2017 w/ Left weakness / numbness NIH 6 s/p TPA    Plan:  -CVA - appears to be lacunar on MRI.  Plane per Neuro.    -moderate to severe LVH - will ask cards to comment if this  could be HOCM    -supportive care    -keep in ICU given Neuro changes, repeat CT w/o bleeding by my read    -SCDs, start lovenox tomorrow    Plan of care and goals reviewed with mulitdisciplinary team at daily rounds    Critical care time:  30 min       Jakob Jc MD  Intensive Care Medicine  09/21/17 1:21 PM

## 2017-09-21 NOTE — CONSULTS
[facility] at [facility]  Cardiovascular Consultation Note    Reason for Consult: abnormal echo/?HCM          The patient is a 67-year-old gentleman with a history of coronary artery disease status post PCI, AAA, and historically uncontrolled hypertension who was transferred to [facility] on 9/19/17 for left-sided weakness and numbness.  He received TPA for stroke.  A subsequent MRI of his brain showed chronic small vessel disease and remote lacunar infarcts.  As part of her workup for his CVA, an echocardiogram was performed.  This demonstrated normal LV systolic function but hypertrophy of the left ventricle with particular basal septal hypertrophy.  We were asked to evaluate for the possibility of hypertrophic cardiomyopathy.  The patient states that for nearly 20 years he had uncontrolled hypertension because he will not take medications.  However recently, he has been agreeable to medical therapy and has had when he describes his well-controlled blood pressure.  He denies any exertional chest discomfort to suggest angina.  He does get short of breath with moderate activity.  The patient receives his cardiac care from Dr. Job Perdomo in [place].  There is never been mention of hypertrophic cardiomyopathy.  He states he has no family history of sudden cardiac death.  He denies syncope or palpitations himself.        Cardiac risk factors: Age, Gender, HTN    Past medical and surgical history, social and family history reviewed in EMR.    REVIEW OF SYSTEMS:   H&P ROS reviewed and pertinent CV ROS as noted in HPI.         Vital Sign Min/Max for last 24 hours  Temp  Min: 97.6 °F (36.4 °C)  Max: 98.2 °F (36.8 °C)   BP  Min: 131/77  Max: 196/94   Pulse  Min: 61  Max: 80   Resp  Min: 12  Max: 18   SpO2  Min: 92 %  Max: 98 %   No Data Recorded      Intake/Output Summary (Last 24 hours) at 09/21/17 1706  Last data filed at 09/21/17 0800   Gross per 24 hour   Intake            839.4 ml  "  Output             2150 ml   Net          -1310.6 ml           /77  Pulse 68  Temp 97.9 °F (36.6 °C) (Oral)   Resp 16  Ht 69\" (175.3 cm)  Wt 273 lb 5.9 oz (124 kg)  SpO2 96%  BMI 40.37 kg/m2    General Appearance:    Alert, cooperative, no distress, appears stated age   Head:    Normocephalic, without obvious abnormality, atraumatic   Eyes:    Extra ocular movements intact.     Ears:    Normal External ears    Nose:   Nares normal, septum midline, mucosa normal, no drainage    or sinus tenderness   Throat:   Lips, mucosa, and tongue normal; teeth and gums normal   Neck:   Supple, symmetrical, trachea midline,        thyroid:  no carotid  bruit or JVD   Back:     Symmetric, no curvature, ROM normal   Lungs:     Clear to auscultation bilaterally, respirations unlabored   Chest wall:    No tenderness or deformity   Heart:    Regular rate and rhythm, S1 and S2 normal, 2/6 systolic murmur best heard at the apex   Abdomen:     Soft, non-tender, no masses, no organomegaly   Extremities:   Extremities normal, atraumatic, no cyanosis or edema   Pulses:   2+ and symmetric all extremities   Skin:   Skin color, texture, turgor normal, no rashes or lesions   Neurologic:   Normal strength, sensation and reflexes       throughout         EKG: NSR. Rate= 64 bpm. No acute changes. Normal TX, QRS, and QT     I reviewed the echo images from 9/20/2017.  LV systolic function was normal.  There was concentric LVH with prominent focal basal hypertrophy.  There was no systolic anterior movement of the mitral valve or LVOT obstruction.  Mild to moderate MR.  The aortic valve was sclerotic with no significant stenosis.    Lab Review:   Labs reviewed in the electronic medical record.  Pertinent findings include:  Lab Results   Component Value Date    GLUCOSE 121 (H) 09/21/2017    BUN 9 09/21/2017    CREATININE 0.80 09/21/2017    EGFRIFNONA 97 09/21/2017    BCR 11.3 09/21/2017    K 3.7 09/21/2017    CO2 28.0 09/21/2017    " CALCIUM 8.5 (L) 09/21/2017    ALBUMIN 3.70 09/21/2017    LABIL2 1.6 09/21/2017    AST 21 09/21/2017    ALT 35 09/21/2017     Lab Results   Component Value Date    WBC 4.96 09/21/2017    HGB 15.5 09/21/2017    HCT 45.3 09/21/2017    MCV 83.7 09/21/2017     09/21/2017         Results from last 7 days  Lab Units 09/20/17  0439   HEMOGLOBIN A1C % 5.90*            Hospital Problem List     * (Principal)CVA (cerebral vascular accident)    Overview Signed 9/21/2017  2:37 PM by MIKI Summers     · Remote Hx of TIA/CVA  · Presentation to [facility] 9/19/17 for acute L hemiparesis s/p tPA. MRI brain showed lacunar infarcts          Hypothyroidism    Essential hypertension    AAA (abdominal aortic aneurysm)    Coronary artery disease involving native coronary artery of native heart with angina pectoris    Overview Signed 9/21/2017  4:58 PM by Arias Ferris MD     · Previous PCI, data deficit         Asymmetric septal hypertrophy    Overview Addendum 9/21/2017  5:06 PM by Arias Ferris MD     · Echocardiogram (9/19/17):  LVEF 65%. Mod to severe asymmetric septal hypertrophy. No DANIEL.  Mild to moderate MR.         Morbid obesity with BMI of 40.0-44.9, adult        I suspect the patient's LV hypertrophy is related to previously uncontrolled hypertension.  The mitral valve does not demonstrate any systolic anterior motion nor is their LVOT obstruction.  The most definitive assessment for HCM would be cardiac MRI.  I do not think that this is needed at this time as the patient has no symptoms, no LVOT obstruction, no syncope or palpitations, and no risk factors such as family history of sudden cardiac death.         · Recommend continued blood pressure management with beta blocker and ARB.  · Follow-up with primary cardiologist, Dr. Job Perdomo  · If desired, cardiac MRI would be reasonable to exclude the diagnosis of HCM.  This would best be performed at .  · His call with any other  questions.      Arias Ferris MD  9/21/2017

## 2017-09-21 NOTE — PROGRESS NOTES
Adult Nutrition  Assessment/PES    Patient Name:  Camron Hendrix  YOB: 1950  MRN: [id number]  Admit Date:  9/19/2017    Assessment Date:  9/21/2017        Reason for Assessment       09/21/17 1146    Reason for Assessment    Reason For Assessment/Visit multidisciplinary rounds    Time Spent (min) 20              Nutrition/Diet History       09/21/17 1146    Nutrition/Diet History    Reported/Observed By RN;MD    Other Pt doing , ate most of breakfast, ready for transfer once off cardene  drip; restart home meds                    Nutrition Prescription Ordered       09/21/17 1148    Nutrition Prescription PO    Current PO Diet Regular    Common Modifiers Cardiac                Problem/Interventions:                    Nutrition Intervention       09/21/17 1155    Nutrition Intervention    RD/Tech Action Follow Tx progress;Care plan reviewd              Education/Evaluation       09/21/17 1156    Monitor/Evaluation    Monitor Per protocol;PO intake        Comments:      Electronically signed by:  Shelly Nunes RD  09/21/17 11:56 AM

## 2017-09-21 NOTE — PROGRESS NOTES
Daily Progress Note  Neurology     LOS: 2 days     Subjective     Chief Complaint: Left hemiparesis    Interval History:  Patient had onset of diaphoresis and altered mentation after receiving his BP medication today.  This improved with IV fluid resuscitation.  He still complains of left-sided numbness.    ROS: Negative for fever    Objective     Vital signs in last 24 hours:  Temp:  [97.6 °F (36.4 °C)-98.2 °F (36.8 °C)] 97.9 °F (36.6 °C)  Heart Rate:  [61-80] 75  Resp:  [12-18] 16  BP: (131-196)/() 131/77      Physical Exam:   General: Lying in bed with eyes open. In NAD.     Respiratory: Respirations unlabored   CV: RRR       Neurologic Exam:   Mental status: Awake, alert, Follows commands. Speech fluent   CN: II-XII intact                     Results from last 7 days  Lab Units 09/21/17  0344   WBC 10*3/mm3 4.96   HEMOGLOBIN g/dL 15.5   HEMATOCRIT % 45.3   PLATELETS 10*3/mm3 185           Results Review:    Labs reviewed  MRI brain personally reviewed.  No acute infarct seen.  Agree with the report  Repeat CT head personally reviewed.  No acute process seen.  TTE report reviewed    Assessment/Plan     Principal Problem:    CVA (cerebral vascular accident)  Active Problems:    Hypothyroidism    Hypertension    AAA (abdominal aortic aneurysm)    Coronary artery disease    Asymmetric septal hypertrophy        1.  Acute ischemic stroke = Likely small infarct not appreciated on MRI. Post-tPA CT head negative for hemorrhage. On ASA, Plavix, and atorvastatin. Carotid duplex report unremarkable. TTE with signs of LVH. Cardiology consulted. P2Y12 assay 125 (unremarkable).      2.  Hypertension = continue meds     3.  Prediabetes = A1c 5.9. Continue glycemic monitoring     4.  Hyperlipidemia = . On atorvastatin    No further recommendations.  Okay from neuro standpoint for transfer to the floor or DC home when okay with primary team and cardiology.  Follow-up in neurology clinic with FADUMO Akers,  first available appointment.    Kaitlynn Samano MD  09/21/17  3:12 PM

## 2017-09-22 VITALS
DIASTOLIC BLOOD PRESSURE: 68 MMHG | RESPIRATION RATE: 16 BRPM | HEIGHT: 69 IN | WEIGHT: 273.37 LBS | HEART RATE: 66 BPM | OXYGEN SATURATION: 93 % | TEMPERATURE: 98.6 F | BODY MASS INDEX: 40.49 KG/M2 | SYSTOLIC BLOOD PRESSURE: 120 MMHG

## 2017-09-22 LAB
ALBUMIN SERPL-MCNC: 3.5 G/DL (ref 3.2–4.8)
ALBUMIN/GLOB SERPL: 1.5 G/DL (ref 1.5–2.5)
ALP SERPL-CCNC: 96 U/L (ref 25–100)
ALT SERPL W P-5'-P-CCNC: 31 U/L (ref 7–40)
ANION GAP SERPL CALCULATED.3IONS-SCNC: 5 MMOL/L (ref 3–11)
AST SERPL-CCNC: 16 U/L (ref 0–33)
BASOPHILS # BLD AUTO: 0.02 10*3/MM3 (ref 0–0.2)
BASOPHILS NFR BLD AUTO: 0.3 % (ref 0–1)
BILIRUB SERPL-MCNC: 0.6 MG/DL (ref 0.3–1.2)
BUN BLD-MCNC: 10 MG/DL (ref 9–23)
BUN/CREAT SERPL: 12.5 (ref 7–25)
CALCIUM SPEC-SCNC: 8.8 MG/DL (ref 8.7–10.4)
CHLORIDE SERPL-SCNC: 106 MMOL/L (ref 99–109)
CO2 SERPL-SCNC: 26 MMOL/L (ref 20–31)
CREAT BLD-MCNC: 0.8 MG/DL (ref 0.6–1.3)
DEPRECATED RDW RBC AUTO: 40.3 FL (ref 37–54)
EOSINOPHIL # BLD AUTO: 0.33 10*3/MM3 (ref 0–0.3)
EOSINOPHIL NFR BLD AUTO: 5.3 % (ref 0–3)
ERYTHROCYTE [DISTWIDTH] IN BLOOD BY AUTOMATED COUNT: 13.2 % (ref 11.3–14.5)
GFR SERPL CREATININE-BSD FRML MDRD: 97 ML/MIN/1.73
GLOBULIN UR ELPH-MCNC: 2.3 GM/DL
GLUCOSE BLD-MCNC: 133 MG/DL (ref 70–100)
HCT VFR BLD AUTO: 45.8 % (ref 38.9–50.9)
HGB BLD-MCNC: 15.4 G/DL (ref 13.1–17.5)
IMM GRANULOCYTES # BLD: 0.02 10*3/MM3 (ref 0–0.03)
IMM GRANULOCYTES NFR BLD: 0.3 % (ref 0–0.6)
LYMPHOCYTES # BLD AUTO: 2.13 10*3/MM3 (ref 0.6–4.8)
LYMPHOCYTES NFR BLD AUTO: 34.2 % (ref 24–44)
MAGNESIUM SERPL-MCNC: 1.9 MG/DL (ref 1.3–2.7)
MCH RBC QN AUTO: 28.5 PG (ref 27–31)
MCHC RBC AUTO-ENTMCNC: 33.6 G/DL (ref 32–36)
MCV RBC AUTO: 84.8 FL (ref 80–99)
MONOCYTES # BLD AUTO: 0.47 10*3/MM3 (ref 0–1)
MONOCYTES NFR BLD AUTO: 7.5 % (ref 0–12)
NEUTROPHILS # BLD AUTO: 3.26 10*3/MM3 (ref 1.5–8.3)
NEUTROPHILS NFR BLD AUTO: 52.4 % (ref 41–71)
PHOSPHATE SERPL-MCNC: 3.5 MG/DL (ref 2.4–5.1)
PLATELET # BLD AUTO: 175 10*3/MM3 (ref 150–450)
PMV BLD AUTO: 10.5 FL (ref 6–12)
POTASSIUM BLD-SCNC: 3.8 MMOL/L (ref 3.5–5.5)
PROT SERPL-MCNC: 5.8 G/DL (ref 5.7–8.2)
RBC # BLD AUTO: 5.4 10*6/MM3 (ref 4.2–5.76)
SODIUM BLD-SCNC: 137 MMOL/L (ref 132–146)
WBC NRBC COR # BLD: 6.23 10*3/MM3 (ref 3.5–10.8)

## 2017-09-22 PROCEDURE — 83735 ASSAY OF MAGNESIUM: CPT | Performed by: INTERNAL MEDICINE

## 2017-09-22 PROCEDURE — 97530 THERAPEUTIC ACTIVITIES: CPT

## 2017-09-22 PROCEDURE — 85025 COMPLETE CBC W/AUTO DIFF WBC: CPT | Performed by: INTERNAL MEDICINE

## 2017-09-22 PROCEDURE — 97110 THERAPEUTIC EXERCISES: CPT

## 2017-09-22 PROCEDURE — 80053 COMPREHEN METABOLIC PANEL: CPT | Performed by: INTERNAL MEDICINE

## 2017-09-22 PROCEDURE — 84100 ASSAY OF PHOSPHORUS: CPT | Performed by: INTERNAL MEDICINE

## 2017-09-22 PROCEDURE — 99238 HOSP IP/OBS DSCHRG MGMT 30/<: CPT | Performed by: NURSE PRACTITIONER

## 2017-09-22 RX ORDER — ATORVASTATIN CALCIUM 80 MG/1
80 TABLET, FILM COATED ORAL NIGHTLY
Qty: 30 TABLET | Refills: 3 | Status: SHIPPED | OUTPATIENT
Start: 2017-09-22 | End: 2022-02-21

## 2017-09-22 RX ORDER — ASPIRIN 81 MG/1
81 TABLET, CHEWABLE ORAL DAILY
Qty: 30 TABLET | Refills: 3 | Status: SHIPPED | OUTPATIENT
Start: 2017-09-22

## 2017-09-22 RX ADMIN — HYDRALAZINE HYDROCHLORIDE 50 MG: 50 TABLET, FILM COATED ORAL at 10:44

## 2017-09-22 RX ADMIN — ASPIRIN 81 MG 81 MG: 81 TABLET ORAL at 10:44

## 2017-09-22 RX ADMIN — CARVEDILOL 12.5 MG: 12.5 TABLET, FILM COATED ORAL at 10:44

## 2017-09-22 RX ADMIN — LEVOTHYROXINE SODIUM 200 MCG: 100 TABLET ORAL at 06:13

## 2017-09-22 RX ADMIN — AMLODIPINE BESYLATE 10 MG: 10 TABLET ORAL at 10:42

## 2017-09-22 RX ADMIN — LOSARTAN POTASSIUM 100 MG: 50 TABLET, FILM COATED ORAL at 10:43

## 2017-09-22 RX ADMIN — CLOPIDOGREL BISULFATE 75 MG: 75 TABLET ORAL at 10:43

## 2017-09-22 RX ADMIN — FUROSEMIDE 20 MG: 20 TABLET ORAL at 10:44

## 2017-09-22 NOTE — THERAPY TREATMENT NOTE
Acute Care - Physical Therapy Treatment Note  [facility]     Patient Name: Camron Hendrix  : 1950  MRN: [id number]  Today's Date: 2017  Onset of Illness/Injury or Date of Surgery Date: 17  Date of Referral to PT: 17  Referring Physician: FADUMO Means    Admit Date: 2017    Visit Dx:    ICD-10-CM ICD-9-CM   1. Cerebrovascular accident (CVA), unspecified mechanism I63.9 434.91   2. Impaired functional mobility, balance, gait, and endurance Z74.09 V49.89   3. Impaired mobility and ADLs Z74.09 799.89     Patient Active Problem List   Diagnosis   • CVA (cerebral vascular accident)   • Hypothyroidism   • Essential hypertension   • AAA (abdominal aortic aneurysm)   • Coronary artery disease involving native coronary artery of native heart with angina pectoris   • Asymmetric septal hypertrophy   • Morbid obesity with BMI of 40.0-44.9, adult               Adult Rehabilitation Note       17 0943 17 0940       Rehab Assessment/Intervention    Discipline physical therapist  -LM occupational therapist  -CL     Document Type therapy note (daily note)  -LM therapy note (daily note)  -CL     Subjective Information agree to therapy;no complaints  -LM agree to therapy;no complaints  -CL     Patient Effort, Rehab Treatment excellent  -LM excellent  -CL     Symptoms Noted During/After Treatment none  -LM none  -CL     Precautions/Limitations fall precautions  -LM fall precautions  -CL     Recorded by [LM] Sofi Correa PT [CL] Kecia Valle OT     Vital Signs    Pre Systolic BP Rehab 170  -  -CL     Pre Treatment Diastolic BP 87  -LM 87  -CL     Post Systolic BP Rehab 179  -  -CL     Post Treatment Diastolic BP 93  -LM 93  -CL     Pretreatment Heart Rate (beats/min) 75  -LM 76  -CL     Posttreatment Heart Rate (beats/min) 75  -LM 78  -CL     Pre SpO2 (%) 96  -LM 96  -CL     O2 Delivery Pre Treatment room air  -LM room air  -CL     Post SpO2 (%) 97  -LM 95  -CL     O2 Delivery  Post Treatment room air  -LM room air  -CL     Pre Patient Position Sitting  -LM Sitting  -CL     Intra Patient Position Standing  -LM Standing  -CL     Post Patient Position Sitting  -LM Sitting  -CL     Recorded by [LM] Sofi Correa, PT [CL] Kecia Valle OT     Pain Assessment    Pain Assessment No/denies pain  -LM No/denies pain  -CL     [place] FACES Pain Rating  0  -CL     Recorded by [LM] Sofi Correa, PT [CL] Kecia Valle OT     Cognitive Assessment/Intervention    Current Cognitive/Communication Assessment functional  -LM functional  -CL     Orientation Status oriented x 4  -LM oriented x 4  -CL     Follows Commands/Answers Questions 100% of the time;able to follow multi-step instructions  -% of the time  -CL     Personal Safety WNL/WFL  -LM WNL/WFL  -CL     Personal Safety Interventions fall prevention program maintained;gait belt;nonskid shoes/slippers when out of bed  -LM fall prevention program maintained;gait belt;nonskid shoes/slippers when out of bed  -CL     Recorded by [LM] Sofi Correa, PT [CL] Kecia Valle OT     Bed Mobility, Assessment/Treatment    Bed Mobility, Comment Pt received and left UIC  -LM UIC.   -CL     Recorded by [LM] Sofi Correa PT [CL] Kecia Valle OT     Transfer Assessment/Treatment    Transfers, Sit-Stand [place] contact guard assist;verbal cues required  -LM contact guard assist;verbal cues required  -CL     Transfers, Stand-Sit [place] contact guard assist;verbal cues required  -LM contact guard assist;verbal cues required  -CL     Transfers, Sit-Stand-Sit, Assist Device other (see comments)   support at gait belt  -LM      Transfer, Comment Verbal cues to push from chair armrests when standing; reach for armrests when sitting with slow descent.  -LM VCs for HP/safety.   -CL     Recorded by [LM] Sofi Correa, PT [CL] Kecia Valle OT     Gait Assessment/Treatment    Gait, [place] Level contact guard assist;verbal cues required  -LM       Gait, Assistive Device other (see comments)   none  -LM      Gait, Distance (Feet) 400  -LM      Gait, Gait Pattern Analysis swing-through gait  -LM      Gait, Gait Deviations bilateral:;triston decreased;decreased heel strike;step length decreased;weight-shifting ability decreased  -LM      Gait, Safety Issues balance decreased during turns;sequencing ability decreased;step length decreased  -LM      Gait, Impairments strength decreased;impaired balance  -LM      Gait, Comment Pt ambulated with step-through gait pattern at decreased pace. Pt demo good safety awareness. Verbal cues to increase step length, upright posture. Gait limited by fatigue.  -LM      Recorded by [LM] Sofi Correa PT      Functional Mobility    Functional Mobility- Ind. Level  contact guard assist;1 person + 1 person to manage equipment;verbal cues required  -CL     Functional Mobility-Distance (Feet)  400  -CL     Recorded by  [CL] Kecia Valle OT     Lower Body Dressing Assessment/Training    LB Dressing Assess/Train, Clothing Type  doffing:;donning:;socks  -CL     LB Dressing Assess/Train, Position  supported sitting  -CL     LB Dressing Assess/Train, [place]  supervision required;verbal cues required  -CL     LB Dressing Assess/Train, Comment  Pt performed utilizing crossed-leg technique, educated on side sitting technique.   -CL     Recorded by  [CL] Kecia Valle OT     Motor Skills/Interventions    Additional Documentation Balance Skills Training (Group)  - Balance Skills Training (Group)  -CL     Recorded by [LM] Sofi Correa PT [CL] Kecia Valle OT     Balance Skills Training    Sitting-Level of Assistance Distant supervision  -LM Distant supervision  -CL     Sitting-Balance Support Right upper extremity supported;Left upper extremity supported;Feet supported  -LM Right upper extremity supported;Left upper extremity supported;Feet supported  -CL     Sitting-Balance Activities Forward lean;Reaching for objects;Trunk  control activities  -LM Forward lean;Reaching for objects;Trunk control activities  -CL     Standing-Level of Assistance Contact guard  -LM Contact guard  -CL     Static Standing Balance Support No upper extremity supported  -LM No upper extremity supported  -CL     Standing-Balance Activities Weight Shift A-P;Weight Shift R-L;Retrieve object from floor  -LM Weight Shift A-P;Weight Shift R-L;Reaching for objects;Retrieve object from floor  -CL     Gait Balance-Level of Assistance Contact guard  -LM Contact guard  -CL     Gait Balance Support No upper extremity supported  -LM No upper extremity supported  -CL     Gait Balance Activities backwards;scanning environment R/L;side-stepping  -LM backwards;scanning environment R/L;side-stepping  -CL     Recorded by [LM] Sofi Correa, PT [CL] Kecia Valle OT     Therapy Exercises    Bilateral Lower Extremities --   Reviewed ankle pumps, quad set, glut set; pt return demo  -LM      Recorded by [LM] Sofi Correa PT      Positioning and Restraints    Pre-Treatment Position sitting in chair/recliner  -LM sitting in chair/recliner  -CL     Post Treatment Position chair  -LM chair  -CL     In Chair notified nsg;reclined;sitting;call light within reach;encouraged to call for assist;with family/caregiver  -LM sitting;call light within reach;encouraged to call for assist;with family/caregiver  -CL     Recorded by [LM] Sofi Correa, PT [CL] Kecia Valle OT       User Key  (r) = Recorded By, (t) = Taken By, (c) = Cosigned By    Initials Name Effective Dates    CL Kecia Valle OT 06/08/16 -     LM Sofi Correa PT 06/09/17 -                 IP PT Goals       09/22/17 1047 09/20/17 1603       Bed Mobility PT LTG    Bed Mobility PT LTG, Date Established  09/20/17  -LM     Bed Mobility PT LTG, Time to Achieve  2 wks  -LM     Bed Mobility PT LTG, Activity Type  supine to sit/sit to supine  -LM     Bed Mobility PT LTG, [place] Level  conditional independence  -LM     Bed  Mobility PT LTG, Date Goal Reviewed 09/22/17  -LM      Bed Mobility PT LTG, Outcome goal ongoing  -LM      Transfer Training PT LTG    Transfer Training PT LTG, Date Established  09/20/17  -LM     Transfer Training PT LTG, Time to Achieve  2 wks  -LM     Transfer Training PT LTG, Activity Type  sit to stand/stand to sit  -LM     Transfer Training PT LTG, [place] Level  supervision required  -LM     Transfer Training PT LTG, Assist Device  walker, rolling  -LM     Transfer Training PT  LTG, Date Goal Reviewed 09/22/17  -LM      Transfer Training PT LTG, Outcome goal ongoing  -LM      Gait Training PT LTG    Gait Training Goal PT LTG, Date Established  09/20/17  -LM     Gait Training Goal PT LTG, Time to Achieve  2 wks  -LM     Gait Training Goal PT LTG, [place] Level  supervision required  -LM     Gait Training Goal PT LTG, Assist Device  walker, rolling  -LM     Gait Training Goal PT LTG, Distance to Achieve  250 feet  -LM     Gait Training Goal PT LTG, Date Goal Reviewed 09/22/17  -LM      Gait Training Goal PT LTG, Outcome goal partially met   met distance; able to perform without AD  -LM      Stair Training PT LTG    Stair Training Goal PT LTG, Date Established  09/20/17  -LM     Stair Training Goal PT LTG, Time to Achieve  2 wks  -LM     Stair Training Goal PT LTG, Number of Steps  3  -LM     Stair Training Goal PT LTG, [place] Level  contact guard assist  -LM     Stair Training Goal PT LTG, Assist Device  2 handrails  -LM     Stair Training Goal PT LTG, Date Goal Reviewed 09/22/17  -LM      Stair Training Goal PT LTG, Outcome goal ongoing  -LM        User Key  (r) = Recorded By, (t) = Taken By, (c) = Cosigned By    Initials Name Provider Type    GARCÍA Correa PT Physical Therapist          Physical Therapy Education     Title: PT OT SLP Therapies (Active)     Topic: Physical Therapy (Done)     Point: Mobility training (Done)    Learning Progress Summary    Learner Readiness Method Response  Comment Documented by Status   Patient Acceptance BALDOMERO CRESPO DU Reviewed benefits of activity, safety with mobility, balance exercises, HEP, gait mechanics.  09/22/17 1047 Done    Acceptance BALDOMERO CRESPO,NR,TANIA Reviewed benefits of activity, transfer training, HEP.  09/20/17 1602 Done   Significant Other Acceptance BALDOMERO CRESPO DU Reviewed benefits of activity, safety with mobility, balance exercises, HEP, gait mechanics.  09/22/17 1047 Done               Point: Home exercise program (Done)    Learning Progress Summary    Learner Readiness Method Response Comment Documented by Status   Patient Acceptance BALDOMERO CRESPO DU Reviewed benefits of activity, safety with mobility, balance exercises, HEP, gait mechanics.  09/22/17 1047 Done    Acceptance BALDOMERO CRESPO,NR,TANIA Reviewed benefits of activity, transfer training, HEP.  09/20/17 1602 Done   Significant Other Acceptance BALDOMERO CRESPO DU Reviewed benefits of activity, safety with mobility, balance exercises, HEP, gait mechanics.  09/22/17 1047 Done               Point: Body mechanics (Done)    Learning Progress Summary    Learner Readiness Method Response Comment Documented by Status   Patient Acceptance BALDOMERO CRESPO DU Reviewed benefits of activity, safety with mobility, balance exercises, HEP, gait mechanics.  09/22/17 1047 Done    Acceptance BALDOMERO CRESPO,NR,TANIA Reviewed benefits of activity, transfer training, HEP.  09/20/17 1602 Done   Significant Other Acceptance BALDOMERO CRESPO DU Reviewed benefits of activity, safety with mobility, balance exercises, HEP, gait mechanics.  09/22/17 1047 Done               Point: Precautions (Done)    Learning Progress Summary    Learner Readiness Method Response Comment Documented by Status   Patient Acceptance BALDOMERO CRESPO DU Reviewed benefits of activity, safety with mobility, balance exercises, HEP, gait mechanics.  09/22/17 1047 Done    Acceptance BALDOMERO CRESPO,NR,TANIA Reviewed benefits of activity, transfer training, HEP.  09/20/17 1602 Done   Significant Other Acceptance CHERIED  TANIA ROLDAN Reviewed benefits of activity, safety with mobility, balance exercises, HEP, gait mechanics.  09/22/17 1047 Done                      User Key     Initials Effective Dates Name Provider Type Discipline     06/09/17 -  Sofi Correa, PT Physical Therapist PT                    PT Recommendation and Plan  Anticipated Equipment Needs At Discharge: front wheeled walker  Anticipated Discharge Disposition: home with assist, home with outpatient services  Demonstrates Need for Referral to Another Service:  (outpatient PT)  Planned Therapy Interventions: balance training, bed mobility training, gait training, home exercise program, patient/family education, strengthening, stair training, transfer training  PT Frequency: daily  Plan of Care Review  Plan Of Care Reviewed With: patient  Progress: progress toward functional goals as expected  Outcome Summary/Follow up Plan: Pt demo improved independence with ambulation; ambulated 400 feet with CGA, no AD, and good safety awareness. Balance exercises initiated. Will continue to progress with mobility as able.            Outcome Measures       09/22/17 0943 09/22/17 0940 09/20/17 1450    How much help from another person do you currently need...    Turning from your back to your side while in flat bed without using bedrails? 3  -LM      Moving from lying on back to sitting on the side of a flat bed without bedrails? 3  -LM      Moving to and from a bed to a chair (including a wheelchair)? 3  -LM      Standing up from a chair using your arms (e.g., wheelchair, bedside chair)? 3  -LM      Climbing 3-5 steps with a railing? 3  -LM      To walk in hospital room? 3  -LM      AM-PAC 6 Clicks Score 18  -LM      How much help from another is currently needed...    Putting on and taking off regular lower body clothing?  3  -CL 2  -CL    Bathing (including washing, rinsing, and drying)  2  -CL 2  -CL    Toileting (which includes using toilet bed pan or urinal)  3  -CL 2  -CL     Putting on and taking off regular upper body clothing  3  -CL 3  -CL    Taking care of personal grooming (such as brushing teeth)  4  -CL 3  -CL    Eating meals  4  -CL 4  -CL    Score  19  -CL 16  -CL    Modified Rachel Scale    Modified Rachel Scale 3 - Moderate disability.  Requiring some help, but able to walk without assistance.  -LM 3 - Moderate disability.  Requiring some help, but able to walk without assistance.  -CL 3 - Moderate disability.  Requiring some help, but able to walk without assistance.  -CL    Functional Assessment    Outcome Measure Options AM-PAC 6 Clicks Basic Mobility (PT);Modified [place]  -LM AM-PAC 6 Clicks Daily Activity (OT)  -CL AM-PAC 6 Clicks Daily Activity (OT)  -CL      09/20/17 1445 09/20/17 1311       How much help from another person do you currently need...    Turning from your back to your side while in flat bed without using bedrails? 3  -LM --  -LM     Moving from lying on back to sitting on the side of a flat bed without bedrails? 3  -LM --  -LM     Moving to and from a bed to a chair (including a wheelchair)? 3  -LM --  -LM     Standing up from a chair using your arms (e.g., wheelchair, bedside chair)? 3  -LM --  -LM     Climbing 3-5 steps with a railing? 2  -LM --  -LM     To walk in hospital room? 3  -LM --  -LM     AM-PAC 6 Clicks Score 17  -LM --  -LM     Modified [place] Scale    Modified Rachel Scale 3 - Moderate disability.  Requiring some help, but able to walk without assistance.  -LM      Functional Assessment    Outcome Measure Options AM-PAC 6 Clicks Basic Mobility (PT);Modified [place]  -LM --  -LM       User Key  (r) = Recorded By, (t) = Taken By, (c) = Cosigned By    Initials Name Provider Type    DAINEL Valle, OT Occupational Therapist    GARCÍA Correa, PT Physical Therapist           Time Calculation:         PT Charges       09/22/17 1051          Time Calculation    Start Time 0943  -LM      PT Received On 09/22/17  -LM      PT Goal Re-Cert Due  Date 09/30/17  -LM      Time Calculation- PT    Total Timed Code Minutes- PT 12 minute(s)  -LM        User Key  (r) = Recorded By, (t) = Taken By, (c) = Cosigned By    Initials Name Provider Type    GARCÍA Correa PT Physical Therapist          Therapy Charges for Today     Code Description Service Date Service Provider Modifiers Qty    [id number] HC PT THER PROC EA 15 MIN 9/22/2017 Sofi Correa PT GP 1          PT G-Codes  Outcome Measure Options: AM-PAC 6 Clicks Basic Mobility (PT), Modified Gig Harbornaseem Correa PT  9/22/2017

## 2017-09-22 NOTE — DISCHARGE INSTR - LAB
Appt for outpatient physical therapy scheduled for Monday, October 2, 2017, at 1:00 pm.   Outpatient therapy location: [facility] ([address] [place] [address]/[address]). Phone: [phone].    Patient has an appt with Dr. Rhodes PCP on October 4, 2017 at 4:00pm (please arrive at 3:45)    Appt with Dr. Job Perdomo (Cardiologist) on October 6,2017 11:30am    Appt with Renata Richardson ([facility]) October 3, 2017 at 10:00am

## 2017-09-22 NOTE — PLAN OF CARE
Problem: Patient Care Overview (Adult)  Goal: Plan of Care Review  Outcome: Ongoing (interventions implemented as appropriate)    09/22/17 1047   Coping/Psychosocial Response Interventions   Plan Of Care Reviewed With patient   Patient Care Overview   Progress progress toward functional goals as expected   Outcome Evaluation   Outcome Summary/Follow up Plan Pt demo improved independence with ambulation; ambulated 400 feet with CGA, no AD, and good safety awareness. Balance exercises initiated. Will continue to progress with mobility as able.          Problem: Inpatient Physical Therapy  Goal: Bed Mobility Goal LTG- PT  Outcome: Ongoing (interventions implemented as appropriate)    09/20/17 1603 09/22/17 1047   Bed Mobility PT LTG   Bed Mobility PT LTG, Date Established 09/20/17 --    Bed Mobility PT LTG, Time to Achieve 2 wks --    Bed Mobility PT LTG, Activity Type supine to sit/sit to supine --    Bed Mobility PT LTG, [place] Level conditional independence --    Bed Mobility PT LTG, Date Goal Reviewed --  09/22/17   Bed Mobility PT LTG, Outcome --  goal ongoing       Goal: Transfer Training Goal 1 LTG- PT  Outcome: Ongoing (interventions implemented as appropriate)    09/20/17 1603 09/22/17 1047   Transfer Training PT LTG   Transfer Training PT LTG, Date Established 09/20/17 --    Transfer Training PT LTG, Time to Achieve 2 wks --    Transfer Training PT LTG, Activity Type sit to stand/stand to sit --    Transfer Training PT LTG, [place] Level supervision required --    Transfer Training PT LTG, Assist Device walker, rolling --    Transfer Training PT LTG, Date Goal Reviewed --  09/22/17   Transfer Training PT LTG, Outcome --  goal ongoing       Goal: Gait Training Goal LTG- PT  Outcome: Ongoing (interventions implemented as appropriate)    09/20/17 1603 09/22/17 1047   Gait Training PT LTG   Gait Training Goal PT LTG, Date Established 09/20/17 --    Gait Training Goal PT LTG, Time to Achieve 2 wks --     Gait Training Goal PT LTG, [place] Level supervision required --    Gait Training Goal PT LTG, Assist Device walker, rolling --    Gait Training Goal PT LTG, Distance to Achieve 250 feet --    Gait Training Goal PT LTG, Date Goal Reviewed --  09/22/17   Gait Training Goal PT LTG, Outcome --  goal partially met  (met distance; able to perform without AD)       Goal: Stair Training Goal LTG- PT  Outcome: Ongoing (interventions implemented as appropriate)    09/20/17 1603 09/22/17 1047   Stair Training PT LTG   Stair Training Goal PT LTG, Date Established 09/20/17 --    Stair Training Goal PT LTG, Time to Achieve 2 wks --    Stair Training Goal PT LTG, Number of Steps 3 --    Stair Training Goal PT LTG, [place] Level contact guard assist --    Stair Training Goal PT LTG, Assist Device 2 handrails --    Stair Training Goal PT LTG, Date Goal Reviewed --  09/22/17   Stair Training Goal PT LTG, Outcome --  goal ongoing         Problem: Stroke (Ischemic) (Adult)  Goal: Signs and Symptoms of Listed Potential Problems Will be Absent or Manageable (Stroke)  Outcome: Ongoing (interventions implemented as appropriate)    09/22/17 1047   Stroke (Ischemic)   Problems Assessed (Stroke (Ischemic)/TIA) motor/sensory impairment   Problems Present (Stroke (Ischemic)/TIA) motor/sensory impairment

## 2017-09-22 NOTE — PROGRESS NOTES
Continued Stay Note  [facility]     Patient Name: Camron Hendrix  MRN: [id number]  Today's Date: 9/22/2017    Admit Date: 9/19/2017          Discharge Plan       09/22/17 1023    Case Management/Social Work Plan    Plan Home    Patient/Family In Agreement With Plan --   Patient/Spouse    Additional Comments Ready for discharge home today.  Arrangements in place for outpatient physical therapy @ [facility] ([address], telephone# [phone]); appointment scheduled for Monday, October 2 @ 1:00pm, (first available).  Faxed order/face sheet to [facility] per request.  No additional needs at this time.      Martha Foreman, Ext. [phone]              Discharge Codes     None            BOLIVAR Murguia

## 2017-09-22 NOTE — THERAPY TREATMENT NOTE
Acute Care - Occupational Therapy Treatment Note  [facility]     Patient Name: Camron Hendrix  : 1950  MRN: [id number]  Today's Date: 2017  Onset of Illness/Injury or Date of Surgery Date: 17  Date of Referral to OT: 17  Referring Physician: FADUMO Means      Admit Date: 2017    Visit Dx:     ICD-10-CM ICD-9-CM   1. Cerebrovascular accident (CVA), unspecified mechanism I63.9 434.91   2. Impaired functional mobility, balance, gait, and endurance Z74.09 V49.89   3. Impaired mobility and ADLs Z74.09 799.89     Patient Active Problem List   Diagnosis   • CVA (cerebral vascular accident)   • Hypothyroidism   • Essential hypertension   • AAA (abdominal aortic aneurysm)   • Coronary artery disease involving native coronary artery of native heart with angina pectoris   • Asymmetric septal hypertrophy   • Morbid obesity with BMI of 40.0-44.9, adult             Adult Rehabilitation Note       17 0940          Rehab Assessment/Intervention    Discipline occupational therapist  -CL      Document Type therapy note (daily note)  -CL      Subjective Information agree to therapy;no complaints  -CL      Patient Effort, Rehab Treatment excellent  -CL      Symptoms Noted During/After Treatment none  -CL      Precautions/Limitations fall precautions  -CL      Recorded by [CL] Kecia Valle OT      Vital Signs    Pre Systolic BP Rehab 170  -CL      Pre Treatment Diastolic BP 87  -CL      Post Systolic BP Rehab 179  -CL      Post Treatment Diastolic BP 93  -CL      Pretreatment Heart Rate (beats/min) 76  -CL      Posttreatment Heart Rate (beats/min) 78  -CL      Pre SpO2 (%) 96  -CL      O2 Delivery Pre Treatment room air  -CL      Post SpO2 (%) 95  -CL      O2 Delivery Post Treatment room air  -CL      Pre Patient Position Sitting  -CL      Intra Patient Position Standing  -CL      Post Patient Position Sitting  -CL      Recorded by [CL] Kecia Valle OT      Pain Assessment    Pain Assessment  No/denies pain  -CL      [place] FACES Pain Rating 0  -CL      Recorded by [CL] Kecia Valle OT      Cognitive Assessment/Intervention    Current Cognitive/Communication Assessment functional  -CL      Orientation Status oriented x 4  -CL      Follows Commands/Answers Questions 100% of the time  -CL      Personal Safety WNL/WFL  -CL      Personal Safety Interventions fall prevention program maintained;gait belt;nonskid shoes/slippers when out of bed  -CL      Recorded by [CL] Kecia Valle OT      Bed Mobility, Assessment/Treatment    Bed Mobility, Comment UIC.   -CL      Recorded by [CL] Kecia Valle OT      Transfer Assessment/Treatment    Transfers, Sit-Stand [place] contact guard assist;verbal cues required  -CL      Transfers, Stand-Sit [place] contact guard assist;verbal cues required  -CL      Transfer, Comment VCs for HP/safety.   -CL      Recorded by [CL] Kecia Valle OT      Functional Mobility    Functional Mobility- Ind. Level contact guard assist;1 person + 1 person to manage equipment;verbal cues required  -CL      Functional Mobility-Distance (Feet) 400  -CL      Recorded by [CL] Kecia Valle OT      Lower Body Dressing Assessment/Training    LB Dressing Assess/Train, Clothing Type doffing:;donning:;socks  -CL      LB Dressing Assess/Train, Position supported sitting  -CL      LB Dressing Assess/Train, [place] supervision required;verbal cues required  -CL      LB Dressing Assess/Train, Comment Pt performed utilizing crossed-leg technique, educated on side sitting technique.   -CL      Recorded by [CL] Kecia Valle OT      Motor Skills/Interventions    Additional Documentation Balance Skills Training (Group)  -CL      Recorded by [CL] Kecia Valle OT      Balance Skills Training    Sitting-Level of Assistance Distant supervision  -CL      Sitting-Balance Support Right upper extremity supported;Left upper extremity supported;Feet supported  -CL      Sitting-Balance Activities Forward  lean;Reaching for objects;Trunk control activities  -CL      Standing-Level of Assistance Contact guard  -CL      Static Standing Balance Support No upper extremity supported  -CL      Standing-Balance Activities Weight Shift A-P;Weight Shift R-L;Reaching for objects;Retrieve object from floor  -CL      Gait Balance-Level of Assistance Contact guard  -CL      Gait Balance Support No upper extremity supported  -CL      Gait Balance Activities backwards;scanning environment R/L;side-stepping  -CL      Recorded by [CL] Kecia Valle OT      Positioning and Restraints    Pre-Treatment Position sitting in chair/recliner  -CL      Post Treatment Position chair  -CL      In Chair sitting;call light within reach;encouraged to call for assist;with family/caregiver  -CL      Recorded by [CL] Kecia Valle OT        User Key  (r) = Recorded By, (t) = Taken By, (c) = Cosigned By    Initials Name Effective Dates    CL Kecia Valle OT 06/08/16 -                 OT Goals       09/22/17 1028 09/20/17 1626       Transfer Training OT LTG    Transfer Training OT LTG, Date Established  09/20/17  -CL     Transfer Training OT LTG, Time to Achieve  by discharge  -CL     Transfer Training OT LTG, Activity Type  bed to chair /chair to bed;toilet;sit to stand/stand to sit  -CL     Transfer Training OT LTG, [place] Level  supervision required  -CL     Transfer Training OT LTG, Additional Goal  AAD  -CL     Transfer Training OT LTG, Outcome goal ongoing  -CL      Patient Education OT LTG    Patient Education OT LTG, Date Established  09/20/17  -CL     Patient Education OT LTG, Time to Achieve  by discharge  -CL     Patient Education OT LTG, Education Type  written program;HEP;posture/body mechanics;home safety;energy conservation  -CL     Patient Education OT LTG, Education Understanding  verbalizes understanding  -CL     Patient Education OT LTG Outcome goal ongoing  -CL      LB Dressing OT LTG    LB Dressing Goal OT LTG, Date Established   09/20/17  -CL     LB Dressing Goal OT LTG, Time to Achieve  by discharge  -CL     LB Dressing Goal OT LTG, Activity Type  Don/doff socks  -CL     LB Dressing Goal OT LTG, [place] Level  supervision required  -CL     LB Dressing Goal OT LTG, Additional Goal  AAD  -CL     LB Dressing Goal OT LTG, Outcome goal met  -CL      Functional Mobility OT LTG    Functional Mobility Goal OT LTG, Date Established  09/20/17  -CL     Functional Mobility Goal OT LTG, Time to Achieve  by discharge  -CL     Functional Mobility Goal OT LTG, [place] Level  standby assist  -CL     Functional Mobility Goal OT LTG, Assist Device  appropriate AD  -CL     Functional Mobility Goal OT LTG, Distance to Achieve  to the bathroom  -CL     Functional Mobility Goal OT LTG, Additional Goal  AAD  -CL     Functional Mobility Goal OT LTG, Outcome goal ongoing  -CL        User Key  (r) = Recorded By, (t) = Taken By, (c) = Cosigned By    Initials Name Provider Type    DANIEL Valle, OT Occupational Therapist          Occupational Therapy Education     Title: PT OT SLP Therapies (Active)     Topic: Occupational Therapy (Active)     Point: ADL training (Active)    Description: Instruct learner(s) on proper safety adaptation and remediation techniques during self care or transfers.   Instruct in proper use of assistive devices.    Learning Progress Summary    Learner Readiness Method Response Comment Documented by Status   Patient Acceptance E,D NR Pt educated on adaptive dressing techniques, energy conservation, appropriate t/f techniques, and benefits of OP therapy.  09/22/17 1028 Active    Acceptance E,D NR Pt educated on appropriate safety precautions, t/f techniques, and benefits of therapy.  09/20/17 1626 Active   Family Acceptance E,D NR Pt educated on adaptive dressing techniques, energy conservation, appropriate t/f techniques, and benefits of OP therapy.  09/22/17 1028 Active               Point: Precautions (Active)     Description: Instruct learner(s) on prescribed precautions during self-care and functional transfers.    Learning Progress Summary    Learner Readiness Method Response Comment Documented by Status   Patient Acceptance E,D NR Pt educated on adaptive dressing techniques, energy conservation, appropriate t/f techniques, and benefits of OP therapy.  09/22/17 1028 Active    Acceptance E,D NR Pt educated on appropriate safety precautions, t/f techniques, and benefits of therapy.  09/20/17 1626 Active   Family Acceptance E,D NR Pt educated on adaptive dressing techniques, energy conservation, appropriate t/f techniques, and benefits of OP therapy.  09/22/17 1028 Active               Point: Body mechanics (Active)    Description: Instruct learner(s) on proper positioning and spine alignment during self-care, functional mobility activities and/or exercises.    Learning Progress Summary    Learner Readiness Method Response Comment Documented by Status   Patient Acceptance E,D NR Pt educated on adaptive dressing techniques, energy conservation, appropriate t/f techniques, and benefits of OP therapy.  09/22/17 1028 Active    Acceptance E,D NR Pt educated on appropriate safety precautions, t/f techniques, and benefits of therapy.  09/20/17 1626 Active   Family Acceptance E,D NR Pt educated on adaptive dressing techniques, energy conservation, appropriate t/f techniques, and benefits of OP therapy.  09/22/17 1028 Active                      User Key     Initials Effective Dates Name Provider Type Discipline     06/08/16 -  Kecia Valle OT Occupational Therapist OT                  OT Recommendation and Plan  Anticipated Equipment Needs At Discharge: front wheeled walker (TBA further)  Anticipated Discharge Disposition: home with assist, home with outpatient services  Planned Therapy Interventions: adaptive equipment training, ADL retraining, balance training, bed mobility training, energy conservation, home exercise  program, transfer training  Therapy Frequency: daily  Plan of Care Review  Plan Of Care Reviewed With: patient, spouse  Progress: progress toward functional goals as expected  Outcome Summary/Follow up Plan: Pt demo improved independence and activity tolerance this date by peforming STS t/f and ambulating 400 ft w/ CGA. Pt met LBD goal this date. Recommend cont skilled IPOT POC. Recommend pt DC home w/ assist and OP rehab.         Outcome Measures       09/22/17 0940 09/20/17 1450 09/20/17 1445    How much help from another person do you currently need...    Turning from your back to your side while in flat bed without using bedrails?   3  -LM    Moving from lying on back to sitting on the side of a flat bed without bedrails?   3  -LM    Moving to and from a bed to a chair (including a wheelchair)?   3  -LM    Standing up from a chair using your arms (e.g., wheelchair, bedside chair)?   3  -LM    Climbing 3-5 steps with a railing?   2  -LM    To walk in hospital room?   3  -LM    AM-PAC 6 Clicks Score   17  -LM    How much help from another is currently needed...    Putting on and taking off regular lower body clothing? 3  -CL 2  -CL     Bathing (including washing, rinsing, and drying) 2  -CL 2  -CL     Toileting (which includes using toilet bed pan or urinal) 3  -CL 2  -CL     Putting on and taking off regular upper body clothing 3  -CL 3  -CL     Taking care of personal grooming (such as brushing teeth) 4  -CL 3  -CL     Eating meals 4  -CL 4  -CL     Score 19  -CL 16  -CL     Modified [place] Scale    Modified [place] Scale 3 - Moderate disability.  Requiring some help, but able to walk without assistance.  -CL 3 - Moderate disability.  Requiring some help, but able to walk without assistance.  -CL 3 - Moderate disability.  Requiring some help, but able to walk without assistance.  -LM    Functional Assessment    Outcome Measure Options AM-PAC 6 Clicks Daily Activity (OT)  -CL AM-PAC 6 Clicks Daily Activity (OT)   -CL AM-PAC 6 Clicks Basic Mobility (PT);Modified [place]  -LM      09/20/17 1311          How much help from another person do you currently need...    Turning from your back to your side while in flat bed without using bedrails? --  -LM      Moving from lying on back to sitting on the side of a flat bed without bedrails? --  -LM      Moving to and from a bed to a chair (including a wheelchair)? --  -LM      Standing up from a chair using your arms (e.g., wheelchair, bedside chair)? --  -LM      Climbing 3-5 steps with a railing? --  -LM      To walk in hospital room? --  -LM      AM-PAC 6 Clicks Score --  -LM      Functional Assessment    Outcome Measure Options --  -LM        User Key  (r) = Recorded By, (t) = Taken By, (c) = Cosigned By    Initials Name Provider Type    CL Kecia Valle OT Occupational Therapist    LM Sofi Correa, PT Physical Therapist           Time Calculation:         Time Calculation- OT       09/22/17 1030          Time Calculation- OT    OT Start Time 0940  -CL      Total Timed Code Minutes- OT 14 minute(s)  -CL      OT Received On 09/22/17  -CL      OT Goal Re-Cert Due Date 09/30/17  -CL        User Key  (r) = Recorded By, (t) = Taken By, (c) = Cosigned By    Initials Name Provider Type    DANIEL Valle OT Occupational Therapist           Therapy Charges for Today     Code Description Service Date Service Provider Modifiers Qty    [id number]  OT THERAPEUTIC ACT EA 15 MIN 9/22/2017 Kecia Valle OT GO 1               Kecia Valle OT  9/22/2017

## 2017-09-22 NOTE — PROGRESS NOTES
Adult Nutrition  Assessment/PES    Patient Name:  Camron Hendrix  YOB: 1950  MRN: [id number]  Admit Date:  9/19/2017    Assessment Date:  9/22/2017        Reason for Assessment       09/22/17 1202    Reason for Assessment    Reason For Assessment/Visit multidisciplinary rounds    Time Spent (min) 20              Nutrition/Diet History       09/22/17 1202    Nutrition/Diet History    Reported/Observed By RN;MD    Other pt doing well, eating, wants to go home, NiHSS down to 1 for dullness in [place].  Plan dc home today.                    Nutrition Prescription Ordered       09/22/17 1203    Nutrition Prescription PO    Current PO Diet Regular    Common Modifiers Cardiac                Problem/Interventions:                    Nutrition Intervention       09/22/17 1203    Nutrition Intervention    RD/Tech Action Follow Tx progress;Care plan reviewd              Education/Evaluation       09/22/17 1203    Monitor/Evaluation    Monitor Per protocol        Comments:      Electronically signed by:  Shelly Nunes RD  09/22/17 12:04 PM

## 2017-09-22 NOTE — PLAN OF CARE
Problem: Patient Care Overview (Adult)  Goal: Plan of Care Review  Outcome: Ongoing (interventions implemented as appropriate)    09/22/17 1028   Coping/Psychosocial Response Interventions   Plan Of Care Reviewed With patient;spouse   Patient Care Overview   Progress progress toward functional goals as expected   Outcome Evaluation   Outcome Summary/Follow up Plan Pt demo improved independence and activity tolerance this date by peforming STS t/f and ambulating 400 ft w/ CGA. Pt met LBD goal this date. Recommend cont skilled IPOT POC. Recommend pt DC home w/ assist and OP rehab.          Problem: Inpatient Occupational Therapy  Goal: Transfer Training Goal 1 LTG- OT  Outcome: Ongoing (interventions implemented as appropriate)    09/20/17 1626 09/22/17 1028   Transfer Training OT LTG   Transfer Training OT LTG, Date Established 09/20/17 --    Transfer Training OT LTG, Time to Achieve by discharge --    Transfer Training OT LTG, Activity Type bed to chair /chair to bed;toilet;sit to stand/stand to sit --    Transfer Training OT LTG, [place] Level supervision required --    Transfer Training OT LTG, Additional Goal AAD --    Transfer Training OT LTG, Outcome --  goal ongoing       Goal: Patient Education Goal LTG- OT  Outcome: Ongoing (interventions implemented as appropriate)    09/20/17 1626 09/22/17 1028   Patient Education OT LTG   Patient Education OT LTG, Date Established 09/20/17 --    Patient Education OT LTG, Time to Achieve by discharge --    Patient Education OT LTG, Education Type written program;HEP;posture/body mechanics;home safety;energy conservation --    Patient Education OT LTG, Education Understanding verbalizes understanding --    Patient Education OT LTG Outcome --  goal ongoing       Goal: LB Dressing Goal LTG- OT  Outcome: Outcome(s) achieved Date Met:  09/22/17 09/20/17 1626 09/22/17 1028   LB Dressing OT LTG   LB Dressing Goal OT LTG, Date Established 09/20/17 --    LB Dressing Goal OT  LTG, Time to Achieve by discharge --    LB Dressing Goal OT LTG, Activity Type Don/doff socks --    LB Dressing Goal OT LTG, [place] Level supervision required --    LB Dressing Goal OT LTG, Additional Goal AAD --    LB Dressing Goal OT LTG, Outcome --  goal met       Goal: Functional Mobility Goal LTG- OT  Outcome: Ongoing (interventions implemented as appropriate)    09/20/17 1626 09/22/17 1028   Functional Mobility OT LTG   Functional Mobility Goal OT LTG, Date Established 09/20/17 --    Functional Mobility Goal OT LTG, Time to Achieve by discharge --    Functional Mobility Goal OT LTG, [place] Level standby assist --    Functional Mobility Goal OT LTG, Assist Device appropriate AD --    Functional Mobility Goal OT LTG, Distance to Achieve to the bathroom --    Functional Mobility Goal OT LTG, Additional Goal AAD --    Functional Mobility Goal OT LTG, Outcome --  goal ongoing

## 2017-09-22 NOTE — DISCHARGE SUMMARY
"Discharge Summary    Patient name: Camron Hendrix  CSN: [id number]  MRN: [id number]  : 1950  Today's date: 2017     Date of Admission: 2017  Date of Discharge:  2017    Admitting Physician:  Delmer Brady MD  Primary Care Provider: Leslie Rhodes MD  Consultations:   Kaitlynn Samano MD- Neurology  Arias Ferris MD- Cardiology    Admission Diagnosis: CVA     Discharge Diagnoses:   Hospital Problem List     * (Principal)CVA (cerebral vascular accident)    Overview Signed 2017  2:37 PM by MIKI Summers     · Remote Hx of TIA/CVA  · Presentation to [facility] 17 for acute L hemiparesis s/p tPA. MRI brain showed lacunar infarcts          Hypothyroidism    Essential hypertension    AAA (abdominal aortic aneurysm)    Coronary artery disease involving native coronary artery of native heart with angina pectoris    Overview Signed 2017  4:58 PM by Arias Ferris MD     · Previous PCI, data deficit         Asymmetric septal hypertrophy    Overview Addendum 2017  5:06 PM by Arias Ferris MD     · Echocardiogram (17):  LVEF 65%. Mod to severe asymmetric septal hypertrophy. No DANIEL.  Mild to moderate MR.         Morbid obesity with BMI of 40.0-44.9, adult          Allergies:  Codeine    Procedures:  none       History of Present Illness:  Camron Hendrix is a 67 y.o. male who was watching TV this evening when he began to \"feel funny\".  He went to go take a bath and had some difficulty getting down into the tub and was later unable to extract himself from the tub on his own.  He asked his wife to drive him to the ED and while in transit developed L sided weakness/numbness.  Initial NIH was 6.  The patient received TPA @ 2200 and was transferred to our facility.  His NIH upon arrival here is 2.  Lab work was WNL.    Hospital Course:  He was admitted to the ICU per the TPA protocol.  His carotid duplex was unremarkable.  He was placed on Lipitor.  He " "had a MRI brain that showed lacunar stroke.  Cardiology was consulted for possible HCM.  They recommended follow-up with his Cardiologist in [place] and abnormal ECHo was felt to be due to long term untreated hypertension.  They also recommended a cardiac MRI at [facility], if further diagnosis of HCM was needed.  The patient was started on a 81mg ASA and he was already on Plavix for a previous CVA.  He has a slight deficit of weakness and dullness on his left lower extremity that he states is improving.  He had walked around the unit today with assistance.  His family requests he have outpatient PT at [facility] due to him living in [place].  He is eating and drinking at baseline.  He is deemed ready for discharge today and his wife will be assisting him at home.          Vitals:  BP (!) 164/109  Pulse 66  Temp 97.9 °F (36.6 °C) (Oral)   Resp 14  Ht 69\" (175.3 cm)  Wt 273 lb 5.9 oz (124 kg)  SpO2 96%  BMI 40.37 kg/m2    Physical Exam:  Constitutional:  Appears well-developed and well-nourished. No distress.   HEENT:  Normocephalic and atraumatic. PERRL  Neck:  Neck supple. No JVD present.   CV: Normal rate, regular rhythm,  intact distal pulses.  +murmur,  No gallop or rub.  Pulmonary/Chest: Effort normal and breath sounds normal. No respiratory distress. No wheezes, rhonchi or rales.   Abdominal: Soft. +BS. No distension and no mass. There is no tenderness.   Musculoskeletal: Normal muscle tone and strength  Neurological: Alert and oriented to person, place, and time.  No focal deficits, slight dullness on left lower extremity.  Skin: Skin is warm and dry. No rash noted.   Extremities:  No clubbing, edema or cyanosis  Psychiatric: Normal mood and affect. Behavior is normal.     Labs:    Results from last 7 days  Lab Units 09/22/17  0411   WBC 10*3/mm3 6.23   HEMOGLOBIN g/dL 15.4   HEMATOCRIT % 45.8   PLATELETS 10*3/mm3 175       Results from last 7 days  Lab Units 09/22/17  0411   SODIUM " mmol/L 137   POTASSIUM mmol/L 3.8   CHLORIDE mmol/L 106   CO2 mmol/L 26.0   BUN mg/dL 10   CREATININE mg/dL 0.80   CALCIUM mg/dL 8.8   BILIRUBIN mg/dL 0.6   ALK PHOS U/L 96   ALT (SGPT) U/L 31   AST (SGOT) U/L 16   GLUCOSE mg/dL 133*         Magnesium   Date Value Ref Range Status   09/22/2017 1.9 1.3 - 2.7 mg/dL Final   09/21/2017 1.9 1.3 - 2.7 mg/dL Final     Phosphorus   Date Value Ref Range Status   09/22/2017 3.5 2.4 - 5.1 mg/dL Final   09/21/2017 3.5 2.4 - 5.1 mg/dL Final               Discharge Medications:   Camron Hendrix   Home Medication Instructions SHANNAN:[id number]    Printed on:09/22/17 1041   Medication Information                      amLODIPine (NORVASC) 10 MG tablet  Take 10 mg by mouth Daily.             aspirin 81 MG chewable tablet  Chew 1 tablet Daily.             atorvastatin (LIPITOR) 80 MG tablet  Take 1 tablet by mouth Every Night.             carvedilol (COREG) 12.5 MG tablet  Take 12.5 mg by mouth Daily.             clopidogrel (PLAVIX) 75 MG tablet  Take 75 mg by mouth Daily.             furosemide (LASIX) 20 MG tablet  Take 20 mg by mouth Daily As Needed.             hydrALAZINE (APRESOLINE) 50 MG tablet  Take 50 mg by mouth 2 (Two) Times a Day.             levothyroxine (SYNTHROID, LEVOTHROID) 200 MCG tablet  Take 200 mcg by mouth Daily.             losartan (COZAAR) 100 MG tablet  Take 100 mg by mouth Daily.                 Discharge Diet:   Diet Instructions     Diet: Regular; Thin       Discharge Diet:  Regular   Fluid Consistency:  Thin                 Activity at Discharge:    Activity Instructions     Activity as Tolerated                     Follow-up Appointments  No future appointments.  Additional Instructions for the Follow-ups that You Need to Schedule     Additional Discharge Follow-Up (Specify Provider)    As directed    To:  FADUMO Akers   Follow Up Details:  first available appointment       Ambulatory Referral to Physical Therapy    As directed    Patient wants  to do PT as outpatient at [facility]       Discharge Follow-Up With Specified Provider    As directed    To:  Dr. Job Perdomo   Follow Up:  2 Weeks   Follow Up Details:  in [place] (his cardiologist)       Discharge Follow-up with PCP    As directed    Follow Up Details:  Dr. Rhodes in 1 week in [place]                 Discharge Instructions:  OK for Discharge  F/U ar ordered above  Lipitor and ASA scripts sent to his pharmacy       FADUMO Lozada  Pulmonary & Critical Care Medicine  09/22/17 10:41 AM    Time: Discharge 30 min    CC: Leslie Rhodes MD             *Please note that portions of this note were completed with a voice recognition program. Efforts were made to edit the dictations, but occasionally words are mistranscribed.

## 2017-09-29 PROBLEM — F03.90 DEMENTIA: Status: ACTIVE | Noted: 2017-09-29

## 2017-09-29 PROBLEM — G47.30 SLEEP APNEA: Status: ACTIVE | Noted: 2017-09-29

## 2017-10-03 ENCOUNTER — OFFICE VISIT (OUTPATIENT)
Dept: NEUROLOGY | Facility: CLINIC | Age: 67
End: 2017-10-03

## 2017-10-03 VITALS
HEIGHT: 69 IN | DIASTOLIC BLOOD PRESSURE: 72 MMHG | WEIGHT: 273 LBS | SYSTOLIC BLOOD PRESSURE: 122 MMHG | BODY MASS INDEX: 40.43 KG/M2

## 2017-10-03 DIAGNOSIS — I63.9 CEREBROVASCULAR ACCIDENT (CVA), UNSPECIFIED MECHANISM (HCC): Primary | ICD-10-CM

## 2017-10-03 PROCEDURE — 99214 OFFICE O/P EST MOD 30 MIN: CPT | Performed by: PHYSICIAN ASSISTANT

## 2017-10-03 RX ORDER — CARVEDILOL 3.12 MG/1
TABLET ORAL
COMMUNITY
Start: 2017-10-02 | End: 2017-10-18 | Stop reason: HOSPADM

## 2017-10-03 RX ORDER — MONTELUKAST SODIUM 10 MG/1
TABLET ORAL
COMMUNITY
Start: 2017-07-03 | End: 2022-02-21

## 2017-10-03 NOTE — PROGRESS NOTES
"Subjective     History of Present Illness   Camron Hendrix is a 67 y.o. male who comes to clinic today following a hospitalization in 9/17 for stroke. He noted left sided weakness and numbness just prior to his hospitalization. He also noted associated blurry vision. He denied any additional associated neurological symptoms. An MRI was notable for remote lacunar infarcts in the basal ganglia bilaterally, though was otherwise unremarkable. An echo and carotids were also unremarkable. He was treated with tPA and tolerated this well. He was also treated with ASA 81mg, Plavix, and Lipitor 80mg daily. Since his hospitalization, he notes that his left sided weakness and numbness have significantly improved, though have not completely resolved.       I have reviewed and confirmed the past family, social and medical history as accurate on 10/3/17.     Review of Systems   Constitutional: Negative.    HENT: Negative.    Eyes: Negative.    Respiratory: Negative.    Cardiovascular: Negative.    Gastrointestinal: Negative.    Endocrine: Negative.    Genitourinary: Negative.    Musculoskeletal: Negative.    Skin: Negative.    Allergic/Immunologic: Negative.    Hematological: Negative.    Psychiatric/Behavioral: Negative.        Objective     /72  Ht 69\" (175.3 cm)  Wt 273 lb (124 kg)  BMI 40.32 kg/m2    General appearance today is normal.       Physical Exam   Neurological: He has a normal Finger-Nose-Finger Test. Gait normal.   Reflex Scores:       Tricep reflexes are 1+ on the right side and 1+ on the left side.       Bicep reflexes are 1+ on the right side and 1+ on the left side.       Brachioradialis reflexes are 1+ on the right side and 1+ on the left side.       Patellar reflexes are 1+ on the right side and 1+ on the left side.       Neurologic Exam     Mental Status   Level of consciousness: alert  Normal comprehension.     Cranial Nerves   Cranial nerves II through XII intact.     CN XII   Tongue deviation: " left    Motor Exam   Muscle bulk: normal  Overall muscle tone: normal    Strength   Strength 5/5 except as noted.        Left sided weakness noted      Sensory Exam        Decreased sensation to light touch on left      Gait, Coordination, and Reflexes     Gait  Gait: normal    Coordination   Finger to nose coordination: normal    Tremor   Resting tremor: absent    Reflexes   Right brachioradialis: 1+  Left brachioradialis: 1+  Right biceps: 1+  Left biceps: 1+  Right triceps: 1+  Left triceps: 1+  Right patellar: 1+  Left patellar: 1+        Assessment/Plan   Camron was seen today for stroke.    Diagnoses and all orders for this visit:    Cerebrovascular accident (CVA), unspecified mechanism          Discussion/Summary   Camron Hendrix comes to clinic today with a history of stroke. His workup has been complete and appropriate. Therefore, I do not have any new recommendations concerning this. After discussing potential treatment options, it was elected to continue on ASA, Plavix, and Lipitor unchanged. I may consider discontinuing Plavix after the initial 90 days following his stroke and simply remaining on ASA at that point. However, he will discuss this further with his PCP. He will also continue to work closely with physical therapy. He will then follow up in our clinic on an as needed basis.      I spent 20 minutes out of 25 minutes face to face with the patient and family and discussing diagnosis, prognosis, diagnostic testing, evaluation, current status, treatment options and management.    As part of this visit I reviewed radiology results, reviewed radiology images, obtained additional history from the family which is incorporated in the HPI and reviewed records from prior hospitalizations which is incorporated in the HPI.    Renata Richardson PA-C

## 2017-10-17 ENCOUNTER — HOSPITAL ENCOUNTER (INPATIENT)
Facility: HOSPITAL | Age: 67
LOS: 1 days | Discharge: HOME | End: 2017-10-18
Attending: HOSPITALIST | Admitting: HOSPITALIST

## 2017-10-17 ENCOUNTER — APPOINTMENT (OUTPATIENT)
Dept: CT IMAGING | Facility: HOSPITAL | Age: 67
End: 2017-10-17

## 2017-10-17 DIAGNOSIS — Z74.09 IMPAIRED FUNCTIONAL MOBILITY, BALANCE, GAIT, AND ENDURANCE: Primary | ICD-10-CM

## 2017-10-17 PROBLEM — I63.9 CVA (CEREBRAL VASCULAR ACCIDENT): Status: ACTIVE | Noted: 2017-10-17

## 2017-10-17 PROBLEM — I34.0 MITRAL VALVE REGURGITATION: Status: ACTIVE | Noted: 2017-10-17

## 2017-10-17 PROCEDURE — 0 IOPAMIDOL PER 1 ML: Performed by: HOSPITALIST

## 2017-10-17 PROCEDURE — 70498 CT ANGIOGRAPHY NECK: CPT

## 2017-10-17 PROCEDURE — 85576 BLOOD PLATELET AGGREGATION: CPT | Performed by: NURSE PRACTITIONER

## 2017-10-17 PROCEDURE — 86140 C-REACTIVE PROTEIN: CPT | Performed by: NURSE PRACTITIONER

## 2017-10-17 PROCEDURE — 85730 THROMBOPLASTIN TIME PARTIAL: CPT | Performed by: NURSE PRACTITIONER

## 2017-10-17 PROCEDURE — 85610 PROTHROMBIN TIME: CPT | Performed by: NURSE PRACTITIONER

## 2017-10-17 PROCEDURE — 70496 CT ANGIOGRAPHY HEAD: CPT

## 2017-10-17 PROCEDURE — 0042T HC CT CEREBRAL PERFUSION W/WO CONTRAST: CPT

## 2017-10-17 PROCEDURE — 85652 RBC SED RATE AUTOMATED: CPT | Performed by: NURSE PRACTITIONER

## 2017-10-17 PROCEDURE — 99223 1ST HOSP IP/OBS HIGH 75: CPT | Performed by: HOSPITALIST

## 2017-10-17 PROCEDURE — 93005 ELECTROCARDIOGRAM TRACING: CPT | Performed by: NURSE PRACTITIONER

## 2017-10-17 PROCEDURE — 84484 ASSAY OF TROPONIN QUANT: CPT | Performed by: NURSE PRACTITIONER

## 2017-10-17 RX ORDER — CARVEDILOL 12.5 MG/1
12.5 TABLET ORAL DAILY
Status: DISCONTINUED | OUTPATIENT
Start: 2017-10-18 | End: 2017-10-18 | Stop reason: HOSPADM

## 2017-10-17 RX ORDER — SODIUM CHLORIDE 0.9 % (FLUSH) 0.9 %
1-10 SYRINGE (ML) INJECTION AS NEEDED
Status: DISCONTINUED | OUTPATIENT
Start: 2017-10-17 | End: 2017-10-18 | Stop reason: HOSPADM

## 2017-10-17 RX ORDER — ACETAMINOPHEN 325 MG/1
650 TABLET ORAL EVERY 4 HOURS PRN
Status: DISCONTINUED | OUTPATIENT
Start: 2017-10-17 | End: 2017-10-18 | Stop reason: HOSPADM

## 2017-10-17 RX ORDER — ONDANSETRON 2 MG/ML
4 INJECTION INTRAMUSCULAR; INTRAVENOUS EVERY 6 HOURS PRN
Status: DISCONTINUED | OUTPATIENT
Start: 2017-10-17 | End: 2017-10-18 | Stop reason: HOSPADM

## 2017-10-17 RX ORDER — CLOPIDOGREL BISULFATE 75 MG/1
75 TABLET ORAL DAILY
Status: DISCONTINUED | OUTPATIENT
Start: 2017-10-18 | End: 2017-10-18 | Stop reason: HOSPADM

## 2017-10-17 RX ORDER — ONDANSETRON 4 MG/1
4 TABLET, FILM COATED ORAL EVERY 6 HOURS PRN
Status: DISCONTINUED | OUTPATIENT
Start: 2017-10-17 | End: 2017-10-18 | Stop reason: HOSPADM

## 2017-10-17 RX ORDER — LEVOTHYROXINE SODIUM 0.1 MG/1
200 TABLET ORAL DAILY
Status: DISCONTINUED | OUTPATIENT
Start: 2017-10-18 | End: 2017-10-18 | Stop reason: HOSPADM

## 2017-10-17 RX ORDER — ASPIRIN 81 MG/1
81 TABLET, CHEWABLE ORAL DAILY
Status: DISCONTINUED | OUTPATIENT
Start: 2017-10-18 | End: 2017-10-18 | Stop reason: HOSPADM

## 2017-10-17 RX ORDER — MONTELUKAST SODIUM 10 MG/1
10 TABLET ORAL NIGHTLY
Status: DISCONTINUED | OUTPATIENT
Start: 2017-10-17 | End: 2017-10-18

## 2017-10-17 RX ORDER — ATORVASTATIN CALCIUM 40 MG/1
80 TABLET, FILM COATED ORAL NIGHTLY
Status: DISCONTINUED | OUTPATIENT
Start: 2017-10-17 | End: 2017-10-18 | Stop reason: HOSPADM

## 2017-10-17 RX ORDER — DOCUSATE SODIUM 100 MG/1
100 CAPSULE, LIQUID FILLED ORAL 2 TIMES DAILY PRN
Status: DISCONTINUED | OUTPATIENT
Start: 2017-10-17 | End: 2017-10-18 | Stop reason: HOSPADM

## 2017-10-17 RX ORDER — CALCIUM CARBONATE 200(500)MG
2 TABLET,CHEWABLE ORAL 2 TIMES DAILY PRN
Status: DISCONTINUED | OUTPATIENT
Start: 2017-10-17 | End: 2017-10-18 | Stop reason: HOSPADM

## 2017-10-17 RX ORDER — HEPARIN SODIUM 5000 [USP'U]/ML
5000 INJECTION, SOLUTION INTRAVENOUS; SUBCUTANEOUS EVERY 8 HOURS SCHEDULED
Status: DISCONTINUED | OUTPATIENT
Start: 2017-10-18 | End: 2017-10-18 | Stop reason: HOSPADM

## 2017-10-17 RX ADMIN — IOPAMIDOL 115 ML: 755 INJECTION, SOLUTION INTRAVENOUS at 23:05

## 2017-10-17 NOTE — NURSING NOTE
ACC REVIEW REPORT: [facility]        PATIENT NAME: Camron Hendrix    PATIENT ID: [id number]    BED: [id number]    BED TYPE: TELE    BED GIVEN TO:  DAI MON RN     TIME BED GIVEN: 1845    YOB: 1950    AGE: 67 YRS    GENDER: MALE    PREVIOUS ADMIT TO [facility]:     PREVIOUS ADMISSION DATE:     PATIENT CLASS:     TODAY'S DATE: 10/17/2017    TRANSFER DATE: 10/17/17    ETA:     TRANSFERRING FACILITY: [facility]    TRANSFERRING FACILITY PHONE # : [phone]    TRANSFERRING MD: DR JONES    DATE/TIME REQUEST RECEIVED: 10/17/17 @ 1839    [facility] RN: YAIR CHAIDEZ     REPORT FROM: DAI MON RN     TIME REPORT TAKEN: 1845    DIAGNOSIS: TIA    REASON FOR TRANSFER TO [facility]: HIGHER LEVEL CARE    TRANSPORTATION: GROUND    CLINICAL REASON FOR TRANSFER TO [facility]: PATIENT WAS DISCHARGED FROM [facility] ABOUT A MONTH AGO WITH A CVA AND RECEIVED TPA AT THAT TIME.  HE WAS DISCHARGED ON AT THAT TIME ON PLAVIX & ASA.  HE IS STILL RECEIVING PHYSICAL THERAPY FOR HIS RISIDUAL LOWER EXTREMITY WEAKNESS.  HE DOES AMBULATE WITH SOME DIFFICULTY.   PATIENT WAS AT PHYSICAL THERAPY TODAY WHEN HE STARTED COMPLAINING OF HAVING OF HALOS IN HIS PERIPHERAL VISION.    HE WAS ALSO HAVING TROUBLE FINDING WORDS.  HE HAS A HISTORY OF HAVING A AAA, STROKE AND CAD.  PER NURSE, THE ONLY NEW DEFICIT IS THE VISION CHANGES.      CLINICAL INFORMATION    HEIGHT:     WEIGHT:     ALLERGIES:  CODEINE    HENRY:     INFECTIOUS DISEASE:     ISOLATION:       LAST VITAL SIGNS:  TIME: 1900  TEMP: AFEBRILE  PULSE: 64  B/P: 157/87  RESP: 17    LAB INFORMATION:  ALL LAB RESULTS WERE WNL, PER REPORT.    CULTURE INFORMATION:     MEDS/IV FLUIDS: #  18 G RIGHT HAND/SL  HAS RECEIVED NO MEDICATIONS SINCE HE'S BEEN IN THE ER.      CARDIAC SYSTEM:    CHEST PAIN:     RATE:     SCALE:     RHYTHM:     Is patient taking or has patient been given any drugs that could increase bleeding? YES  (Plavix, Brilinta, Effient, Eliquis, Xarelto, Warfarin, Integrilin,  Angiomax)    DRUG:  PLAVIX 75 MG, ASA 81 MG    DOSE/FREQUENCY:     CARDIAC ENZYMES:    DATE:   TIME:   CK:   CKMB:   CANDIDO:   TROP:     DATE:   TIME:   CK:   CKMB:   CANDIDO:   TROP:               CARDIAC NOTES:       RESPIRATORY SYSTEM:    LUNG SOUNDS:    CLEAR:   CRACKLES:   WHEEZES:   RHONCHI:   DIMINISHED:           RESPIRATORY STATUS:       CNS/MUSCULOSKELETAL    ALERT AND ORIENTED:    PERSON: YES  PLACE: YES  TIME: YES      STROKE SCALE:   NIH=2    SIZE OF HEMORRHAGE:     SYMPTOMS: (CHOOSE APPROPRIATE)    ASPHASIA:   ATAXIA:   DYSARTHRIA:   DYSPHASIA:   HEADACHE:   PARALYSIS:   SEIZURE:   SYNCOPE:   VERTIGO:   VISION CHANGE:  YES, COMPLAINING OF SEEING HALOS IN HIS PERIPHERAL VISION, WHICH STARTED TODAY.         EXTREMITY WEAKNESS: RISIDUAL LEFT LOWER EXTREMITY WEAKNESS, CONTINUING TO SEE PHYSICAL THERAPY.      LEFT ARM:   RIGHT ARM:   LEFT LEG:   RIGHT LEG:     CAT SCAN RESULTS:  NEGATIVE.  HE WILL HAVE A CTA OF HIS HEAD AND NECK AND A CT PERFUSION WHEN HE ARRIVES HERE.      MRI RESULTS:     CNS/MUSCULOSKELETAL NOTES:       GI//GY-WNL            CT SCAN:      CT SCAN RESULTS:       GI//GY NOTES:     PAST MEDICAL HISTORY:  CVA, AAA, DEPRESSION, HYN., THYROID DISEASE.    OTHER SYMPTOM NOTES:     ADDITIONAL NOTES:           Maria Victoria Silva RN  10/17/2017  6:35 PM

## 2017-10-18 VITALS
WEIGHT: 258.6 LBS | RESPIRATION RATE: 16 BRPM | OXYGEN SATURATION: 96 % | BODY MASS INDEX: 38.3 KG/M2 | TEMPERATURE: 97.8 F | HEIGHT: 69 IN | SYSTOLIC BLOOD PRESSURE: 144 MMHG | HEART RATE: 65 BPM | DIASTOLIC BLOOD PRESSURE: 74 MMHG

## 2017-10-18 LAB
ALBUMIN SERPL-MCNC: 3.7 G/DL (ref 3.2–4.8)
ALBUMIN/GLOB SERPL: 1.7 G/DL (ref 1.5–2.5)
ALP SERPL-CCNC: 82 U/L (ref 25–100)
ALT SERPL W P-5'-P-CCNC: 23 U/L (ref 7–40)
ANION GAP SERPL CALCULATED.3IONS-SCNC: 6 MMOL/L (ref 3–11)
APTT PPP: 27.1 SECONDS (ref 24–31)
ARTICHOKE IGE QN: 64 MG/DL (ref 0–130)
AST SERPL-CCNC: 15 U/L (ref 0–33)
BASOPHILS # BLD AUTO: 0.02 10*3/MM3 (ref 0–0.2)
BASOPHILS NFR BLD AUTO: 0.3 % (ref 0–1)
BILIRUB SERPL-MCNC: 0.4 MG/DL (ref 0.3–1.2)
BILIRUB UR QL STRIP: NEGATIVE
BUN BLD-MCNC: 21 MG/DL (ref 9–23)
BUN/CREAT SERPL: 21 (ref 7–25)
CALCIUM SPEC-SCNC: 9 MG/DL (ref 8.7–10.4)
CHLORIDE SERPL-SCNC: 103 MMOL/L (ref 99–109)
CHOLEST SERPL-MCNC: 109 MG/DL (ref 0–200)
CLARITY UR: CLEAR
CLOSURE TME COLL+ADP BLD: 114 SECONDS (ref 54–123)
CLOSURE TME COLL+EPINEP BLD: >300 SECONDS (ref 72–192)
CO2 SERPL-SCNC: 27 MMOL/L (ref 20–31)
COLOR UR: YELLOW
CREAT BLD-MCNC: 1 MG/DL (ref 0.6–1.3)
CRP SERPL-MCNC: 0.04 MG/DL (ref 0–1)
DEPRECATED RDW RBC AUTO: 40.8 FL (ref 37–54)
EOSINOPHIL # BLD AUTO: 0.15 10*3/MM3 (ref 0–0.3)
EOSINOPHIL NFR BLD AUTO: 1.9 % (ref 0–3)
ERYTHROCYTE [DISTWIDTH] IN BLOOD BY AUTOMATED COUNT: 13.2 % (ref 11.3–14.5)
ERYTHROCYTE [SEDIMENTATION RATE] IN BLOOD: 1 MM/HR (ref 0–20)
GFR SERPL CREATININE-BSD FRML MDRD: 75 ML/MIN/1.73
GLOBULIN UR ELPH-MCNC: 2.2 GM/DL
GLUCOSE BLD-MCNC: 111 MG/DL (ref 70–100)
GLUCOSE UR STRIP-MCNC: NEGATIVE MG/DL
HBA1C MFR BLD: 5.9 % (ref 4.8–5.6)
HCT VFR BLD AUTO: 46.4 % (ref 38.9–50.9)
HDLC SERPL-MCNC: 32 MG/DL (ref 40–60)
HGB BLD-MCNC: 15.5 G/DL (ref 13.1–17.5)
HGB UR QL STRIP.AUTO: NEGATIVE
IMM GRANULOCYTES # BLD: 0.03 10*3/MM3 (ref 0–0.03)
IMM GRANULOCYTES NFR BLD: 0.4 % (ref 0–0.6)
INR PPP: 1.06
KETONES UR QL STRIP: NEGATIVE
LEUKOCYTE ESTERASE UR QL STRIP.AUTO: NEGATIVE
LYMPHOCYTES # BLD AUTO: 2.62 10*3/MM3 (ref 0.6–4.8)
LYMPHOCYTES NFR BLD AUTO: 33.8 % (ref 24–44)
MCH RBC QN AUTO: 28.2 PG (ref 27–31)
MCHC RBC AUTO-ENTMCNC: 33.4 G/DL (ref 32–36)
MCV RBC AUTO: 84.4 FL (ref 80–99)
MONOCYTES # BLD AUTO: 0.64 10*3/MM3 (ref 0–1)
MONOCYTES NFR BLD AUTO: 8.2 % (ref 0–12)
NEUTROPHILS # BLD AUTO: 4.3 10*3/MM3 (ref 1.5–8.3)
NEUTROPHILS NFR BLD AUTO: 55.4 % (ref 41–71)
NITRITE UR QL STRIP: NEGATIVE
PA ADP PRP-ACNC: 92 PRU
PH UR STRIP.AUTO: 6 [PH] (ref 5–8)
PLATELET # BLD AUTO: 211 10*3/MM3 (ref 150–450)
PMV BLD AUTO: 10.6 FL (ref 6–12)
POTASSIUM BLD-SCNC: 3.8 MMOL/L (ref 3.5–5.5)
PROT SERPL-MCNC: 5.9 G/DL (ref 5.7–8.2)
PROT UR QL STRIP: NEGATIVE
PROTHROMBIN TIME: 11.6 SECONDS (ref 9.6–11.5)
RBC # BLD AUTO: 5.5 10*6/MM3 (ref 4.2–5.76)
SODIUM BLD-SCNC: 136 MMOL/L (ref 132–146)
SP GR UR STRIP: 1.04 (ref 1–1.03)
T4 FREE SERPL-MCNC: 1.66 NG/DL (ref 0.89–1.76)
TRIGL SERPL-MCNC: 130 MG/DL (ref 0–150)
TROPONIN I SERPL-MCNC: <0.006 NG/ML
TSH SERPL DL<=0.05 MIU/L-ACNC: 10.26 MIU/ML (ref 0.35–5.35)
UROBILINOGEN UR QL STRIP: ABNORMAL
WBC NRBC COR # BLD: 7.76 10*3/MM3 (ref 3.5–10.8)

## 2017-10-18 PROCEDURE — 85025 COMPLETE CBC W/AUTO DIFF WBC: CPT | Performed by: NURSE PRACTITIONER

## 2017-10-18 PROCEDURE — 83036 HEMOGLOBIN GLYCOSYLATED A1C: CPT | Performed by: NURSE PRACTITIONER

## 2017-10-18 PROCEDURE — G8979 MOBILITY GOAL STATUS: HCPCS

## 2017-10-18 PROCEDURE — 80061 LIPID PANEL: CPT | Performed by: NURSE PRACTITIONER

## 2017-10-18 PROCEDURE — 97165 OT EVAL LOW COMPLEX 30 MIN: CPT

## 2017-10-18 PROCEDURE — 84439 ASSAY OF FREE THYROXINE: CPT | Performed by: NURSE PRACTITIONER

## 2017-10-18 PROCEDURE — 81003 URINALYSIS AUTO W/O SCOPE: CPT | Performed by: NURSE PRACTITIONER

## 2017-10-18 PROCEDURE — G8989 SELF CARE D/C STATUS: HCPCS

## 2017-10-18 PROCEDURE — G8987 SELF CARE CURRENT STATUS: HCPCS

## 2017-10-18 PROCEDURE — 84484 ASSAY OF TROPONIN QUANT: CPT | Performed by: NURSE PRACTITIONER

## 2017-10-18 PROCEDURE — 85576 BLOOD PLATELET AGGREGATION: CPT | Performed by: HOSPITALIST

## 2017-10-18 PROCEDURE — 25010000002 HEPARIN (PORCINE) PER 1000 UNITS: Performed by: HOSPITALIST

## 2017-10-18 PROCEDURE — 93010 ELECTROCARDIOGRAM REPORT: CPT | Performed by: INTERNAL MEDICINE

## 2017-10-18 PROCEDURE — 97161 PT EVAL LOW COMPLEX 20 MIN: CPT

## 2017-10-18 PROCEDURE — 99222 1ST HOSP IP/OBS MODERATE 55: CPT | Performed by: PSYCHIATRY & NEUROLOGY

## 2017-10-18 PROCEDURE — 99239 HOSP IP/OBS DSCHRG MGMT >30: CPT | Performed by: INTERNAL MEDICINE

## 2017-10-18 PROCEDURE — G8988 SELF CARE GOAL STATUS: HCPCS

## 2017-10-18 PROCEDURE — 92523 SPEECH SOUND LANG COMPREHEN: CPT

## 2017-10-18 PROCEDURE — 80053 COMPREHEN METABOLIC PANEL: CPT | Performed by: NURSE PRACTITIONER

## 2017-10-18 PROCEDURE — 84443 ASSAY THYROID STIM HORMONE: CPT | Performed by: NURSE PRACTITIONER

## 2017-10-18 PROCEDURE — G8978 MOBILITY CURRENT STATUS: HCPCS

## 2017-10-18 PROCEDURE — G8980 MOBILITY D/C STATUS: HCPCS

## 2017-10-18 RX ADMIN — HEPARIN SODIUM 5000 UNITS: 5000 INJECTION, SOLUTION INTRAVENOUS; SUBCUTANEOUS at 06:06

## 2017-10-18 RX ADMIN — CLOPIDOGREL BISULFATE 75 MG: 75 TABLET ORAL at 10:09

## 2017-10-18 RX ADMIN — ATORVASTATIN CALCIUM 80 MG: 40 TABLET, FILM COATED ORAL at 00:05

## 2017-10-18 RX ADMIN — CARVEDILOL 12.5 MG: 12.5 TABLET, FILM COATED ORAL at 10:18

## 2017-10-18 RX ADMIN — ASPIRIN 81 MG 81 MG: 81 TABLET ORAL at 10:09

## 2017-10-18 RX ADMIN — LEVOTHYROXINE SODIUM 200 MCG: 100 TABLET ORAL at 06:06

## 2017-10-18 NOTE — CONSULTS
"Neurology    Referring Provider: FADUMO Wiseman    Reason for Consultation: TIA      Chief complaint: word-finding difficulty    Subjective .     History of present illness:  Mr. Hendrix is a pleasant 67-year-old male with a past medical history significant for recent ischemic stroke, hypertension, hyperlipidemia who is admitted to the hospitalist service for weakness and word finding difficulty.  He was recently admitted here in September 2017 for left-sided weakness and numbness and was diagnosed clinically with an ischemic stroke though his MRI was unremarkable.  He has been on aspirin, Plavix, and atorvastatin and has been getting physical therapy since his discharge.  He was at a PT session and reportedly his BP was somewhat elevated with systolic BP in the 150s.  He \"did not feel well\" and per report his wife stated that he looked \"off.\"  He began having some word finding trouble and felt generally weak.  He was taken to an outside hospital where initial NIH was 2 and transferred here for further evaluation. IV tPA was not administered due to prolonged onset of symptoms. Since arrival here he has returned to baseline.  He does have some residual left arm and hand numbness from his previous stroke however.    Review of Systems: Positive for word finding difficulty.Otherwise complete review of systems was discussed with the patient and found to be negative except for that mentioned in history of present illness or in the initial H&P dated 10/17/2017    History  Past Medical History:   Diagnosis Date   • AAA (abdominal aortic aneurysm)    • Coronary artery disease    • CVA (cerebral vascular accident)    • Hypertension    • Hypothyroidism    • TIA (transient ischemic attack)    ,   Past Surgical History:   Procedure Laterality Date   • CORONARY ANGIOPLASTY WITH STENT PLACEMENT     ,   Family History   Problem Relation Age of Onset   • Heart failure Mother    • Aneurysm Father    ,   Social History   Substance " "Use Topics   • Smoking status: Former Smoker     Packs/day: 1.00     Years: 20.00     Quit date: 9/20/1985   • Smokeless tobacco: None      Comment: quit 20 y ago   • Alcohol use Yes      Comment: \"Drinks once or twice a year\"   ,   Prescriptions Prior to Admission   Medication Sig Dispense Refill Last Dose   • aspirin 81 MG chewable tablet Chew 1 tablet Daily. 30 tablet 3    • atorvastatin (LIPITOR) 80 MG tablet Take 1 tablet by mouth Every Night. 30 tablet 3    • carvedilol (COREG) 12.5 MG tablet Take 12.5 mg by mouth Daily.      • clopidogrel (PLAVIX) 75 MG tablet Take 75 mg by mouth Daily.      • furosemide (LASIX) 20 MG tablet Take 20 mg by mouth Daily As Needed.      • hydrALAZINE (APRESOLINE) 50 MG tablet Take 50 mg by mouth 2 (Two) Times a Day.      • levothyroxine (SYNTHROID, LEVOTHROID) 200 MCG tablet Take 200 mcg by mouth Daily.      • losartan (COZAAR) 100 MG tablet Take 100 mg by mouth Daily.      • carvedilol (COREG) 3.125 MG tablet       • montelukast (SINGULAIR) 10 MG tablet        and Allergies:  Codeine    Objective     Vital Signs   Blood pressure 144/74, pulse 65, temperature 97.8 °F (36.6 °C), temperature source Oral, resp. rate 16, height 69\" (175.3 cm), weight 258 lb 9.6 oz (117 kg), SpO2 96 %.    Physical Exam:      Gen: Sitting in bedside chair with eyes open. In NAD. Appears stated age   Eyes: PERRL, conjuntivae/lids normal   ENT: External canals normal bilaterally. Oropharynx normal.    Neck: Supple. No thyroid enlargement noted   Respiratory: CTA bilaterally. Respirations unlabored   CV: RRR, S1 and S2 nml. Radial pulses 2+ bilaterally.    Skin: No rashes noted on exposed skin. Normal tugor.   MSK: Normal bulk and tone. Nml ROM     Neurologic:   Mental status: Awake, alert, oriented x4. Follows commands. Speech fluent.    CN: PERRL, EOM intact, sensation intact in upper/mid/lower face bilaterally, facial movements symmetric, hearing intact to finger rub bilaterally, palate elevates " symmetrically, tongue movements and SCMs strong bilaterally    Motor: Strength full (5/5) throughout in BUE and BLE    Reflexes: 2+ throughout.    Sensory: Intact to LT throughout   Coordination: No dysmetria noted   Gait: Not tested        Results Reviewed:     Labs reviewed  CT perfusion report reviewed  CTA head and neck personally reviewed.  No significant stenosis or occlusion seen.  Agree with the report             EKG report reviewed      Assessment/Plan     1.  TIA = symptoms resolved.  Possibly related to his hypertension.  Recommend improved BP control with a goal systolic BP less than 140.  Continue aspirin, Plavix, and atorvastatin.    2.  Hypertension = continue meds    3.  Hyperlipidemia = on atorvastatin    Okay from neuro standpoint for discharge home when okay with primary team.  Follow-up with PCP.      Kaitlynn Samano MD  10/18/17  3:17 PM

## 2017-10-18 NOTE — DISCHARGE SUMMARY
[facility] Medicine Services  DISCHARGE SUMMARY       Date of Admission: 10/17/2017  Date of Discharge:  10/18/2017  Primary Care Physician: Leslie Rhodes MD  Consulting Physician(s)     Provider Relationship Specialty    Kaitlynn Samano MD Consulting Physician Neurology          Discharge Diagnoses:  Active Hospital Problems (** Indicates Principal Problem)    Diagnosis Date Noted   • **CVA (cerebral vascular accident) [I63.9] 10/17/2017   • Mitral valve regurgitation [I34.0] 10/17/2017     Mild to Moderate per Echo September 2017     • Dementia [F03.90] 09/29/2017     Moderate, likely mixed vascular /degenerative     • Hypothyroidism [E03.9] 09/20/2017   • Essential hypertension [I10] 09/20/2017      Resolved Hospital Problems    Diagnosis Date Noted Date Resolved   • LVH (left ventricular hypertrophy) [I51.7] 09/21/2017 09/21/2017       Presenting Problem/History of Present Illness  TIA (transient ischemic attack) [G45.9]  CVA (cerebral vascular accident) [I63.9]     Chief Complaint on Day of Discharge: F/U vision changes    History of Present Illness on Day of Discharge: Feeling much better now.  Symptoms have resolved.  Still has numbness in left hand from stroke last month.    Hospital Course  Patient is a 67 y.o. male with recent ischemic stroke s/p TPA, HTN, HLD who presented to the ED with weakness, word-finding difficulty, and vision changes.  All of his symptoms rapidly resolved upon admission.  CT perfusion was concerning for area of ischemia in right MCA territory.  CTA head/neck did not show any significant blockages.  He was evaluated by neurology who felt this may be TIA related to HTN vs. New ischemic stroke.  MRI was considered, however this was not performed as he is already on optimal medical therapy for stroke prevention and MRI results would not .  His CT incidentally showed esophageal thickening which may be seen with esophagitis or esophageal  cancer.  This finding was discussed with the patient.  He does have occasional heartburn, but nothing significant and no difficulty swallowing.  He was instructed to follow up with his primary care provider to discuss possibly EGD for further evaluation.  He has already been following closely with his PCP for titration of his blood pressure medications and should continue to do so for goal BP <140/90.    Procedures Performed         Consults:   Consults     Date and Time Order Name Status Description    10/17/2017 2223 Inpatient Consult to Neurology Completed     9/21/2017 1327 Inpatient Consult to Cardiology Completed     9/19/2017 2357 Inpatient Consult to Neurology Completed           Pertinent Test Results:    Results from last 7 days  Lab Units 10/18/17  0612   SODIUM mmol/L 136   POTASSIUM mmol/L 3.8   CHLORIDE mmol/L 103   CO2 mmol/L 27.0   BUN mg/dL 21   CREATININE mg/dL 1.00   CALCIUM mg/dL 9.0   BILIRUBIN mg/dL 0.4   ALK PHOS U/L 82   ALT (SGPT) U/L 23   AST (SGOT) U/L 15   GLUCOSE mg/dL 111*       Results from last 7 days  Lab Units 10/18/17  0612   WBC 10*3/mm3 7.76   HEMOGLOBIN g/dL 15.5   HEMATOCRIT % 46.4   PLATELETS 10*3/mm3 211     Imaging Results (last 72 hours)     Procedure Component Value Units Date/Time    CT Cerebral Perfusion With & Without Contrast [[id number]] Collected:  10/17/17 2218     Updated:  10/17/17 2327    Addenda:        THIS REPORT CONTAINS FINDINGS THAT MAY BE CRITICAL TO PATIENT CARE. The   findings were verbally communicated via telephone conference with LUIS CHUNG at 11:27 PM EDT on 10/17/2017. The findings were acknowledged and   understood.    THIS DOCUMENT HAS BEEN ELECTRONICALLY SIGNED BY COLETTE MAST MD  Signed:  10/17/17 2327 by Colette Mast MD    Narrative:       EXAM:    CT Brain Perfusion With Intravenous Contrast    EXAM DATE/TIME:    10/17/2017 10:18 PM    CLINICAL HISTORY:    67 years old, male; Signs and symptoms; Other: See notes; Additional info:    Recurrent tia/cva symptoms. CVA last month    TECHNIQUE:    Axial computed tomography images of the brain with intravenous contrast using   protocol.  Post-processing parametric maps were created and reviewed.  These   include cerebral blood flow, cerebral blood volume and mean transit time.  All   CT scans at this facility use one or more dose reduction techniques, viz.:   automated exposure control; ma/kV adjustment per patient size (including   targeted exams where dose is matched to indication; i.e. head); or iterative   reconstruction technique.    MIP reconstructed images were created and reviewed.    CONTRAST:    40 mL of Isovue 370 administered intravenously.    COMPARISON:    CT HEAD WO CONTRAST 2017-09-21 13:00    FINDINGS:    Cerebral blood flow:  Unremarkable.  Symmetric with no defects.    Cerebral blood volume:  Unremarkable.  Symmetric.    Mean transit time: Delayed right cerebral perfusion with elevation in time to   drain and mild elevation in mean transit time centered at the right MCA   territory.        Impression:         Findings concerning for right cerebral ischemia centered at the right MCA   territory.  No definite core infarction.        THIS DOCUMENT HAS BEEN ELECTRONICALLY SIGNED BY COLETTE MAST MD    CT Angiogram Neck With & Without Contrast [[id number]] Collected:  10/17/17 2218     Updated:  10/17/17 2331    Narrative:       EXAM:    CT Angiography Neck Without and With Intravenous Contrast    EXAM DATE/TIME:    10/17/2017 10:18 PM    CLINICAL HISTORY:    67 years old, male; Signs and symptoms; Speech disturbance and visual   disturbance and weakness; Additional info: Recurrent tia/cva symptoms with word   finding difficulty, visual changes, left sided weakness    TECHNIQUE:    Axial computed tomographic angiography images of the neck without and with   intravenous contrast using CT angiography protocol.  All CT scans at this   facility use one or more dose reduction techniques, viz.:  automated exposure   control; ma/kV adjustment per patient size (including targeted exams where dose   is matched to indication; i.e. head); or iterative reconstruction technique.    MIP reconstructed images were created and reviewed.    Coronal and sagittal reformatted images were created and reviewed.    CONTRAST:    75 mL of Isovue 370 administered intravenously.    COMPARISON:    MR MRA NECK W CONTRAST 2011-11-28 22:48    FINDINGS:     VASCULATURE:    Right common carotid artery:  Mild calcification of the right common carotid   bifurcation without significant stenosis.  No dissection or occlusion.    Right internal carotid artery:  Unremarkable.  Extracranial segment is patent   with no significant stenosis.  No dissection or occlusion.    Right external carotid artery:  Unremarkable.  No occlusion.    Right vertebral artery:  Unremarkable.  No significant stenosis.  No   dissection or occlusion.      Left common carotid artery:  Mild to moderate calcification involving the   left common carotid bifurcation without significant stenosis.  No dissection or   occlusion.    Left internal carotid artery:  Unremarkable.  Extracranial segment is patent   with no significant stenosis.  No dissection or occlusion.    Left external carotid artery:  Unremarkable.  No occlusion.    Left vertebral artery:  Unremarkable.  No significant stenosis.  No   dissection or occlusion.    Other vasculature:  Calcification involving the thoracic aorta.     NECK:    Bones/joints:  Degenerative changes involving the spine.    Soft tissues:  Unremarkable as visualized.  No mass.    Other findings:  There is mild esophageal thickening, nonspecific.     CAROTID STENOSIS REFERENCE USING NASCET CRITERIA:    % ICA stenosis = (1 - narrowest ICA diameter/diameter of distal cervical ICA)   x 100.    Mild - <50% stenosis.    Moderate - 50-69% stenosis.    Severe - 70-94% stenosis.    Near occlusion - 95-99% stenosis.    Occluded - 100%  stenosis.      Impression:         No hemodynamically significant stenosis.      Mild nonspecific esophageal thickening.  Consider esophagitis, neoplasia   considered less likely however not excluded.      THIS DOCUMENT HAS BEEN ELECTRONICALLY SIGNED BY COLETTE MAST MD    CT Angiogram Head With & Without Contrast [[id number]] Collected:  10/17/17 2217     Updated:  10/17/17 2341    Narrative:       EXAM:    CT Angiography Head Without and With Intravenous Contrast    CLINICAL HISTORY:    67 years old, male; Signs and symptoms; Speech disturbance and visual   disturbance and weakness; Additional info: Recurrent tia/cva symptoms with word   finding difficulty, visual changes.    TECHNIQUE:    Axial computed tomographic angiography images of the head without and with   intravenous contrast using CT angiography protocol.  All CT scans at this   facility use one or more dose reduction techniques, viz.: automated exposure   control; ma/kV adjustment per patient size (including targeted exams where dose   is matched to indication; i.e. head); or iterative reconstruction technique.    MIP reconstructed images were created and reviewed.    Coronal and sagittal reformatted images were created and reviewed.    CONTRAST:    75 mL of Isovue 370 administered intravenously.    COMPARISON:    No relevant prior studies available.    FINDINGS:     VASCULATURE:    Right internal carotid artery:  Calcification involving the right carotid   siphon without definite significant stenosis.  No aneurysm.    Right anterior cerebral artery:  Patient anterior communicating artery.  No   occlusion or significant stenosis.  No aneurysm.    Right middle cerebral artery:  There is truncation of the proximal right M1   segment with high-grade stenosis.  Short segment occlusion of the right mid M1   segment.  There is reconstitution of right MCA sylvian branches however there   remains relative right MCA territory oligemia.  No aneurysm.    Right  posterior cerebral artery:  There is irregularity involving the right   P2 segment with mild to moderate stenosis and mild fusiform aneurysmal   dilatation measuring up to 3 mm.    Right vertebral artery:  Mild calcification involving the right vertebral   artery without stenosis.      Left internal carotid artery:  Calcification involving the left carotid   siphon without significant stenosis.  No aneurysm.    Left anterior cerebral artery:  Hypoplastic left A1 segment.    Left middle cerebral artery:  Irregularity involving the distal left M1   segment with moderate stenosis.  No aneurysm.    Left posterior cerebral artery:  Unremarkable.  No occlusion or significant   stenosis.  No aneurysm.    Left vertebral artery:  Unremarkable as visualized.      Basilar artery:  Unremarkable.  No occlusion or significant stenosis.  No   aneurysm.      Impression:         Abrupt truncation of the proximal right M1 segment with high-grade stenosis.    There is short segment occlusion involving the mid right M1 segment.      Reconstitution of right MCA sylvian branches, however there remains relative   right MCA territory oligemia.      Short segment irregularity involving the right P2 segment with mild to   moderate stenosis and areas of mild fusiform aneurysmal dilatation measuring up   to 3 mm.      Irregularity involving the distal left M1 segment with moderate stenosis.      _______________________________________________    EXAM:    CT Head Without Intravenous Contrast    EXAM DATE/TIME:    10/17/2017 10:17 PM    CLINICAL HISTORY:    67 years old, male; Signs and symptoms; Speech disturbance and visual   disturbance and weakness; Additional info: Recurrent tia/cva symptoms with word   finding difficulty, visual changes.    TECHNIQUE:    Axial computed tomography images of the head/brain without intravenous   contrast.    COMPARISON:    CT HEAD WO CONTRAST 2017-09-21 13:00    FINDINGS:    Brain:  Decreased attenuation of  "the supratentorial white matter is likely   secondary to chronic microvascular ischemia.  Small chronic right cerebellar   infarct.  No hemorrhage.    Ventricles:  Ventricular and subarachnoid spaces are age appropriate.    Bones/joints:  Unremarkable.  No acute fracture.    Soft tissues:  Unremarkable.    Vasculature:  Intracranial vascular calcification.    Sinuses:  Unremarkable as visualized.  No acute sinusitis.    Mastoid air cells:  Partial opacification of the mastoid air cells.    IMPRESSION:         No acute intracranial abnormality.      THIS DOCUMENT HAS BEEN ELECTRONICALLY SIGNED BY COLETTE MAST MD          Condition on Discharge:  good    Physical Exam on Discharge:/74 (BP Location: Left arm, Patient Position: Lying)  Pulse 65  Temp 97.8 °F (36.6 °C) (Oral)   Resp 16  Ht 69\" (175.3 cm)  Wt 258 lb 9.6 oz (117 kg)  SpO2 96%  BMI 38.19 kg/m2  Physical Exam  Constitutional: No acute distress, awake, alert, sitting up in chair  HENT: NCAT, mucous membranes moist  Respiratory: Clear to auscultation bilaterally, respiratory effort normal   Cardiovascular: RRR, no murmurs, rubs, or gallops  Gastrointestinal: Positive bowel sounds, soft, nontender, nondistended  Musculoskeletal: No bilateral ankle edema  Psychiatric: Oriented x 3, appropriate affect, cooperative  Neurologic: Strength symmetric in all extremities, CN grossly intact to confrontation, speech is clear  Skin: No rashes    Discharge Disposition  Home or Self Care    Discharge Medications   Camron Hendrix   Home Medication Instructions SHANNAN:[id number]    Printed on:10/18/17 2220   Medication Information                      aspirin 81 MG chewable tablet  Chew 1 tablet Daily.             atorvastatin (LIPITOR) 80 MG tablet  Take 1 tablet by mouth Every Night.             carvedilol (COREG) 12.5 MG tablet  Take 12.5 mg by mouth Daily.             clopidogrel (PLAVIX) 75 MG tablet  Take 75 mg by mouth Daily.             furosemide (LASIX) 20 " MG tablet  Take 20 mg by mouth Daily As Needed.             hydrALAZINE (APRESOLINE) 50 MG tablet  Take 50 mg by mouth 2 (Two) Times a Day.             levothyroxine (SYNTHROID, LEVOTHROID) 200 MCG tablet  Take 200 mcg by mouth Daily.             losartan (COZAAR) 100 MG tablet  Take 100 mg by mouth Daily.             montelukast (SINGULAIR) 10 MG tablet                   Discharge Diet:     Discharge Care Plan / Instructions: F/U with PCP in 1 week    Activity at Discharge: As tolerated    Follow-up Appointments  No future appointments.  Additional Instructions for the Follow-ups that You Need to Schedule     Discharge Follow-up with PCP    As directed    Follow Up Details:  1 week                 Test Results Pending at Discharge       Letha Larios MD 10/18/17 10:20 PM    Time: Discharge 37 min    Please note that portions of this note may have been completed with a voice recognition program. Efforts were made to edit the dictations, but occasionally words are mistranscribed.

## 2017-10-18 NOTE — THERAPY EVALUATION
Acute Care - Speech Language Pathology Initial Evaluation  [facility]   Cognitive-Communication Evaluation     Patient Name: Camron Hendrxi  : 1950  MRN: [id number]  Today's Date: 10/18/2017               Admit Date: 10/17/2017     Visit Dx:  No diagnosis found.  Patient Active Problem List   Diagnosis   • CVA (cerebral vascular accident)   • Hypothyroidism   • Essential hypertension   • AAA (abdominal aortic aneurysm)   • Coronary artery disease involving native coronary artery of native heart with angina pectoris   • Asymmetric septal hypertrophy   • Morbid obesity with BMI of 40.0-44.9, adult   • Sleep apnea   • Dementia   • CVA (cerebral vascular accident)   • Mitral valve regurgitation     Past Medical History:   Diagnosis Date   • AAA (abdominal aortic aneurysm)    • Coronary artery disease    • CVA (cerebral vascular accident)    • Hypertension    • Hypothyroidism    • TIA (transient ischemic attack)      Past Surgical History:   Procedure Laterality Date   • CORONARY ANGIOPLASTY WITH STENT PLACEMENT            SLP EVALUATION (last 72 hours)      SLP Evaluation       10/18/17 0930                Rehab Evaluation    Document Type evaluation  -RD        Subjective Information no complaints;agree to therapy  -RD        Patient Effort, Rehab Treatment excellent  -RD        General Information    Patient Profile Review yes  -RD        Onset of Illness/Injury 10/17/17  -RD        Subjective Patient Observations alert and cooperative  -RD        Pertinent History Of Current Problem Adm w/ CVA, dementia. Hx of prior CVA ~1mo ago. Passed RN dysphagia screen. Per stroke protocol.   -RD        Current Diet Limitations thin liquids;regular solid  -RD        Precautions/Limitations, Vision WFL with corrective lenses  -RD        Precautions/Limitations, Hearing hearing impairment, bilaterally;other (see comments)   Does not wear HA's per pt and spouse  -RD        Prior Level of Function- Communication other  (comment)   some word-finding difficulty from prior CVA & dementia  -RD        Prior Level of Function- Swallowing safe, efficient swallowing in all situations  -RD        Plans/Goals Discussed With patient;spouse/S.O.;agreed upon  -RD        Barriers to Rehab none identified  -RD        Living Environment    Lives With spouse  -RD        Living Arrangements house  -RD        Clinical Impression    SLP Diagnosis Cognitive-linguistic skills were functional and at baseline for patient. Receptive and expressive language, reading, and writing were found to be WFL. No dysarthria. Speech was 100% intelligible in conversation. Pt and spouse report that deficits have resolved and pt's skills have returned to baseline.   -RD        Functional Level At Time Of Evaluation WFL  -RD        Patient's Goals For Discharge return to all previous roles/activities  -RD        Family Goals For Discharge patient able to return to all previous activities/roles  -RD        Criteria for Skilled Therapeutic Interventions Met current level of function same as previous level of function  -RD        Rehab Potential good, to achieve stated therapy goals  -RD        Therapy Frequency evaluation only  -RD        Pain Assessment    Pain Assessment No/denies pain  -RD        Cognitive Assessment/Intervention    Current Cognitive/Communication Assessment functional  -RD        Orientation Status oriented x 4  -RD        Follows Commands/Answers Questions 100% of the time;able to follow multi-step instructions  -RD        Short/Long Term Memory intact short term memory;intact long term memory  -RD        Additional Documentation Cognitive Assessment Intervention (Group)  -RD        Cognitive Assessment Intervention    Behavior/Mood Observations behavior appropriate to situation, WNL/WFL  -RD        Attention WNL/WFL  -RD        Pragmatics WNL/WFL  -RD        Problem Solving WNL/WFL  -RD        Executive Function Skills WNL/WFL  -RD        Reasoning  WNL/WFL  -RD        Diffuse Language Characteristics no concerns, diffuse language characteristics  -RD        Communication Assessment/Intervention    Additional Documentation Auditory Comprehen Assess/Intervention (Group);Reading Assessment/Intervention (Group);Verbal Expression Assess/Intervention (Group);Writing Assessment/Intervention (Group);Motor Speech Assessment/Intervention (Group)  -RD        Auditory Comprehen Assess/Intervention    Auditory Comprehension WNL/WFL  -RD        Auditory Comprehen Assess/Intervention    Able to Identify Objects WNL/WFL  -RD        Able to Identify Pictures WNL/WFL;successful, multiple pictures  -RD        Answers Yes/No Questions WNL/WFL;successful, complex questions  -RD        Able to Follow Commands WNL/WFL  -RD        Verbal Expression Assess/Intervention    Automatic Speech WNL/WFL  -RD        Speech Repetition WNL/WFL  -RD        Speech Fluency fluent speech  -RD        Conversational Speech WNL/WFL;other (see comments)   pt reports some word-finding difficulty at baseline   -RD        Reading Assessment/Intervention    Reading Skills WNL/WFL  -RD        Oral Reading Ability WNL/WFL  -RD        Reading Comprehension WNL/WFL  -RD        Writing Assessment/Intervention    Writing Skills WNL/WFL  -RD        Motor Speech Assess/Intervention    Motor Speech-Apraxia Observations no concerns  -RD        Motor Speech- Apraxia WNL/WFL  -RD        Motor Speech-Dysarthria Observations no concerns  -RD        Motor Speech- Dysarthria WNL/WFL  -RD          User Key  (r) = Recorded By, (t) = Taken By, (c) = Cosigned By    Initials Name Effective Dates    DOT Salas, MS CCC-SLP 09/27/17 -            EDUCATION  The patient has been educated in the following areas:   Cognitive Impairment Communication Impairment.    SLP Recommendation and Plan  SLP Diagnosis: Cognitive-linguistic skills were functional and at baseline for patient. Receptive and expressive language, reading,  and writing were found to be WFL. No dysarthria. Speech was 100% intelligible in conversation. Pt and spouse report that deficits have resolved and pt's skills have returned to baseline.      Rehab Potential: good, to achieve stated therapy goals  Criteria for Skilled Therapeutic Interventions Met: current level of function same as previous level of function        Therapy Frequency: evaluation only          Plan of Care Review  Plan Of Care Reviewed With: patient, spouse  Progress:  (initial eval)  Outcome Summary/Follow up Plan: Cognitive-linguistic skills were functional and at baseline for patient. Receptive and expressive language, reading, and writing were found to be WFL. No dysarthria. Speech was 100% intelligible in conversation. Pt and spouse report that deficits have resolved and pt's skills have returned to baseline. Pt passed RN dysphagia screen and reports tolerating diet w/o complications. RECS: No further ST needs at this time.             Time Calculation:         Time Calculation- SLP       10/18/17 1059          Time Calculation- SLP    SLP Start Time 0930  -RD      SLP Received On 10/18/17  -RD        User Key  (r) = Recorded By, (t) = Taken By, (c) = Cosigned By    Initials Name Provider Type    RD Dilia Salas MS CCC-SLP Speech and Language Pathologist          Therapy Charges for Today     Code Description Service Date Service Provider Modifiers Qty    [id number] HC ST EVAL SPEECH AND PROD W LANG  5 10/18/2017 iDlia Salas MS CCC-SLP GN 1                     MS MANUEL Antony  10/18/2017

## 2017-10-18 NOTE — PLAN OF CARE
Problem: Patient Care Overview (Adult)  Goal: Plan of Care Review  Outcome: Ongoing (interventions implemented as appropriate)    10/18/17 0102   Coping/Psychosocial Response Interventions   Plan Of Care Reviewed With patient   Patient Care Overview   Progress progress toward functional goals as expected   Outcome Evaluation   Outcome Summary/Follow up Plan pt admitted with vision changes, word finding difficulty, mri angiogram completed, vss, no c/o at this time         Problem: Stroke (Ischemic) (Adult)  Goal: Signs and Symptoms of Listed Potential Problems Will be Absent or Manageable (Stroke)  Outcome: Ongoing (interventions implemented as appropriate)

## 2017-10-18 NOTE — PROGRESS NOTES
Discharge Planning Assessment  [facility]     Patient Name: Camron Hendrix  MRN: [id number]  Today's Date: 10/18/2017    Admit Date: 10/17/2017          Discharge Needs Assessment       10/18/17 1156    Living Environment    Lives With spouse    Living Arrangements house    Transportation Available car;family or friend will provide    Living Environment    Provides Primary Care For no one    Quality Of Family Relationships supportive    Able to Return to Prior Living Arrangements yes    Discharge Needs Assessment    Concerns To Be Addressed no discharge needs identified;denies needs/concerns at this time    Readmission Within The Last 30 Days previous discharge plan unsuccessful    Anticipated Changes Related to Illness none    Equipment Currently Used at Home shower chair;grab bar;cane, straight;bipap/ cpap    Equipment Needed After Discharge none            Discharge Plan       10/18/17 1158    Case Management/Social Work Plan    Plan Home with OP services    Patient/Family In Agreement With Plan yes    Additional Comments Met with pt and wife at bedside.  They reside in [facility] [place] [facility].  Pt is relatively independent with ADLs.  Has all necessary DME.  Has been completing OP PT in [facility] [place] [facility].  Confirmed he has Medicare and MOO secondary with Rx coverage.  Goal is to return home and resume OP therapy.  CM will cont to follow.        Discharge Placement     No information found        Expected Discharge Date and Time     Expected Discharge Date Expected Discharge Time    Oct 20, 2017               Demographic Summary       10/18/17 1156    Referral Information    Referral Source admission list    Reason For Consult discharge planning    Record Reviewed history and physical;medical record    Contact Information    Permission Granted to Share Information With ;family/designee            Functional Status       10/18/17 1156    Functional Status Prior    Ambulation 1-->assistive equipment    Transferring  0-->independent    Toileting 0-->independent    Bathing 1-->assistive equipment    Dressing 0-->independent    Eating 0-->independent    Communication 0-->understands/communicates without difficulty    IADL    Medications independent    Meal Preparation independent    Housekeeping independent    Laundry independent    Shopping independent    Oral Care independent    Activity Tolerance    Usual Activity Tolerance good            Psychosocial     None            Abuse/Neglect     None            Legal     None            Substance Abuse     None            Patient Forms     None          Angelina June

## 2017-10-18 NOTE — THERAPY DISCHARGE NOTE
Acute Care - Occupational Therapy Initial Eval/Discharge  [facility]     Patient Name: Camron Hendrix  : 1950  MRN: [id number]  Today's Date: 10/18/2017  Onset of Illness/Injury or Date of Surgery Date: 10/17/17  Date of Referral to OT: 10/17/17  Referring Physician: FADUMO Santacruz      Admit Date: 10/17/2017     No diagnosis found.  Patient Active Problem List   Diagnosis   • CVA (cerebral vascular accident)   • Hypothyroidism   • Essential hypertension   • AAA (abdominal aortic aneurysm)   • Coronary artery disease involving native coronary artery of native heart with angina pectoris   • Asymmetric septal hypertrophy   • Morbid obesity with BMI of 40.0-44.9, adult   • Sleep apnea   • Dementia   • CVA (cerebral vascular accident)   • Mitral valve regurgitation     Past Medical History:   Diagnosis Date   • AAA (abdominal aortic aneurysm)    • Coronary artery disease    • CVA (cerebral vascular accident)    • Hypertension    • Hypothyroidism    • TIA (transient ischemic attack)      Past Surgical History:   Procedure Laterality Date   • CORONARY ANGIOPLASTY WITH STENT PLACEMENT            OT ASSESSMENT FLOWSHEET (last 72 hours)      OT Evaluation       10/18/17 1142 10/18/17 1055 10/18/17 0930 10/17/17 2000       Rehab Evaluation    Document Type  evaluation;discharge evaluation/summary  - evaluation  -RD      Subjective Information  no complaints;agree to therapy  - no complaints;agree to therapy  -RD      Patient Effort, Rehab Treatment  excellent  -MC excellent  -RD      Symptoms Noted During/After Treatment  none  -       General Information    Patient Profile Review  yes  -MC       Onset of Illness/Injury or Date of Surgery Date  10/17/17  -       Referring Physician  FADUMO Santacruz  -       General Observations  Pt received UIC, wife present  -       Pertinent History Of Current Problem  Pt is 67 YOM who was at PT, had sudden onset of weakness, visual disturbances, difficulty finding words,  "and \"not feeling well.\" In 9/2017, pt with CVA and s/p tPA  -       Precautions/Limitations  no known precautions/limitations  -       Prior Level of Function  independent:;all household mobility;community mobility;gait;transfer;bed mobility;ADL's;home management  -       Equipment Currently Used at Home  shower chair;grab bar;rollator  -  none  -     Plans/Goals Discussed With  patient and family;agreed upon  -       Risks Reviewed  patient and family:;nausea/vomiting;LOB;dizziness;increased discomfort;change in vital signs  -       Benefits Reviewed  patient and family:;improve function;increase independence;increase strength;increase balance;increase knowledge  -       Barriers to Rehab  previous functional deficit  -       Living Environment    Lives With  spouse  - spouse  -RD spouse  -     Living Arrangements  house  - house  - house  -     Home Accessibility  stairs to enter home;stairs within home;tub/shower is not walk in  -  no concerns  -TA     Number of Stairs to Enter Home  1  -       Number of Stairs Within Home  --   All needs met on first floor  -       Stair Railings at Home    none  -     Type of Financial/Environmental Concern    none  -     Transportation Available    car;family or friend will provide  -     Living Environment Comment  pt's wife available to assist 24/7  -       Clinical Impression    Date of Referral to OT  10/17/17  -       Patient/Family Goals Statement  to go home  -       Criteria for Skilled Therapeutic Interventions Met  no  -       Therapy Frequency  evaluation only  -       Anticipated Discharge Disposition home  -[facility]  -       Functional Level Prior    Ambulation    0-->independent  -TA     Transferring    0-->independent  -TA     Toileting    0-->independent  -TA     Bathing    0-->independent  -TA     Dressing    0-->independent  -TA     Eating    0-->independent  -TA     Communication    " 0-->understands/communicates without difficulty  -TA     Swallowing    0-->swallows foods/liquids without difficulty  -TA     Vital Signs    Pre Systolic BP Rehab  149  -MC       Pre Treatment Diastolic BP  84  -MC       Post Systolic BP Rehab  136  -MC       Post Treatment Diastolic BP  79  -MC       Pretreatment Heart Rate (beats/min)  70  -MC       Posttreatment Heart Rate (beats/min)  83  -MC       Pre Patient Position  Sitting  -MC       Intra Patient Position  Standing  -MC       Post Patient Position  Sitting  -MC       Pain Assessment    Pain Assessment  No/denies pain  -MC No/denies pain  -RD      Vision Assessment/Intervention    Visual Impairment  [facility]  -       Vision Comment  Tracking, peripheral vision,  ID room numbers on wall all L  -       Cognitive Assessment/Intervention    Current Cognitive/Communication Assessment  functional  - functional  -RD      Orientation Status  oriented x 4  - oriented x 4  -RD      Follows Commands/Answers Questions  100% of the time  - 100% of the time;able to follow multi-step instructions  -RD      Personal Safety  WNL/WFL  -       Personal Safety Interventions  gait belt  -       Short/Long Term Memory  intact short term memory;intact long term memory  - intact short term memory;intact long term memory  -RD      Additional Documentation   Cognitive Assessment Intervention (Group)  -RD      Cognitive Assessment Intervention    Behavior/Mood Observations   behavior appropriate to situation, WNL/WFL  -RD      Attention   WNL/WFL  -RD      Pragmatics   WNL/WFL  -RD      Problem Solving   WNL/WFL  -RD      Executive Function Skills   WNL/WFL  -RD      Reasoning   WNL/WFL  -RD      Diffuse Language Characteristics   no concerns, diffuse language characteristics  -RD      ROM (Range of Motion)    General ROM  no range of motion deficits identified  -       General ROM Detail  for BUE. Defer BLE to PT  -MC       MMT (Manual Muscle Testing)    General MMT  Assessment  no strength deficits identified  -       General MMT Assessment Detail  for BUE. Defer BLE to PT  -       Bed Mobility, Assessment/Treatment    Bed Mobility, Comment  NT - UIC  -       Transfer Assessment/Treatment    Transfers, Sit-Stand Sanders  independent  -       Transfers, Stand-Sit Sanders  independent  -       Bathtub Transfer, Sanders  independent   Simulated  -       Transfer, Comment  pt demonstrated good strength and balance as well as safety awareness during all transfers  -       Functional Mobility    Functional Mobility- Ind. Level  independent  -       Functional Mobility- Comment  No LOB noted.   -       Upper Body Bathing Assessment/Training    UB Bathing Assess/Train, Position  supported sitting  -       UB Bathing Assess/Train, Sanders Level  independent  -       Upper Body Dressing Assessment/Training    UB Dressing Assess/Train, Clothing Type  donning:;hospital gown  -       UB Dressing Assess/Train, Position  sitting  -       UB Dressing Assess/Train, Sanders  independent  -       Lower Body Dressing Assessment/Training    LB Dressing Assess/Train, Clothing Type  doffing:;donning:;slipper socks  -       LB Dressing Assess/Train, Position  sitting  -       LB Dressing Assess/Train, Sanders  independent  -       Motor Skills/Interventions    Additional Documentation  Balance Skills Training (Group);Fine Motor Coordination Training (Group);Gross Motor Coordination Training (Group)  -       Balance Skills Training    Sitting-Level of Assistance  Independent  -       Sitting-Balance Support  Feet supported  -       Standing-Level of Assistance  Independent  -       Static Standing Balance Support  No upper extremity supported  -       Standing-Balance Activities  Retrieve object from floor  -       Gait Balance-Level of Assistance  Independent  -       Gait Balance Support  No upper extremity supported  -        Gross Motor Coordination Training    Gross Motor Skill, Impairments Detail  BUE WFL  -       Fine Motor Coordination Training    Opposition  Right:;Left:;intact  -       Sensory Assessment/Intervention    Sensory Impairment  tingling  -       Light Touch  LUE;RUE  -       LUE Light Touch  mild impairment   residual from CVA in 9/2017  -       RUE Light Touch  WNL  -       Positioning and Restraints    Pre-Treatment Position  sitting in chair/recliner  -       Post Treatment Position  chair  -       In Chair  notified nsg;sitting;call light within reach;encouraged to call for assist;with family/caregiver  -         User Key  (r) = Recorded By, (t) = Taken By, (c) = Cosigned By    Initials Name Effective Dates    TA Noni Kelly RN 06/16/16 -      Cheri Mahajan OT 03/14/16 -     DOT Salas MS CCC-SLP 09/27/17 -           Occupational Therapy Education     Title: PT OT SLP Therapies (Active)     Topic: Occupational Therapy (Active)     Point: ADL training (Done)    Description: Instruct learner(s) on proper safety adaptation and remediation techniques during self care or transfers.   Instruct in proper use of assistive devices.    Learning Progress Summary    Learner Readiness Method Response Comment Documented by Status   Patient BALDOMERO Parada DU   10/18/17 1140 Done   Family BALDOMERO Parada DU   10/18/17 1140 Done               Point: Body mechanics (Done)    Description: Instruct learner(s) on proper positioning and spine alignment during self-care, functional mobility activities and/or exercises.    Learning Progress Summary    Learner Readiness Method Response Comment Documented by Status   Patient BALDOMERO Parada DU   10/18/17 1140 Done   Family BALDOMERO Parada DU   10/18/17 1140 Done                      User Key     Initials Effective Dates Name Provider Type Discipline     03/14/16 -  Cheri Mahajan, OT Occupational Therapist OT                OT Recommendation and  Plan  Anticipated Discharge Disposition: home  Therapy Frequency: evaluation only  Plan of Care Review  Plan Of Care Reviewed With: patient, spouse  Progress: improving  Outcome Summary/Follow up Plan: OT initial eval completed. Pt demonstrates good strength, balance, safety awareness and performance of ADLs. Pt is independent.  No skilled OT services indicated, OR OT.                Outcome Measures       10/18/17 1055          How much help from another is currently needed...    Putting on and taking off regular lower body clothing? 4  -MC      Bathing (including washing, rinsing, and drying) 4  -MC      Toileting (which includes using toilet bed pan or urinal) 4  -MC      Putting on and taking off regular upper body clothing 4  -MC      Taking care of personal grooming (such as brushing teeth) 4  -MC      Eating meals 4  -      Score 24  -      Modified [place] Scale    Modified [place] Scale 1 - No significant disability despite symptoms.  Able to carry out all usual duties and activities.  -      Functional Assessment    Outcome Measure Options AM-PAC 6 Clicks Daily Activity (OT);Modified Rachel  -        User Key  (r) = Recorded By, (t) = Taken By, (c) = Cosigned By    Initials Name Provider Type     Cheri Mahajan OT Occupational Therapist          Time Calculation:         Time Calculation- OT       10/18/17 1142          Time Calculation- OT    OT Start Time 1055  -      Total Timed Code Minutes- OT 0 minute(s)  -      OT Received On 10/18/17  -        User Key  (r) = Recorded By, (t) = Taken By, (c) = Cosigned By    Initials Name Provider Type    ANJANA Mahajan OT Occupational Therapist          Therapy Charges for Today     Code Description Service Date Service Provider Modifiers Qty    [id number]  OT EVAL LOW COMPLEXITY 4 10/18/2017 Cheri Mahajan OT GO 1               OT Discharge Summary  Anticipated Discharge Disposition: home  Reason for Discharge: Independent  Outcomes Achieved: Able  to achieve all goals within established timeline  Discharge Destination: Home    Cheri Mahajan, OT  10/18/2017

## 2017-10-18 NOTE — PLAN OF CARE
Problem: Patient Care Overview (Adult)  Goal: Plan of Care Review  Outcome: Outcome(s) achieved Date Met:  10/18/17    10/18/17 1141   Coping/Psychosocial Response Interventions   Plan Of Care Reviewed With patient;spouse   Patient Care Overview   Progress improving   Outcome Evaluation   Outcome Summary/Follow up Plan OT initial eval completed. Pt demonstrates good strength, balance, safety awareness and performance of ADLs. Pt is independent. No skilled OT services indicated, DC OT.

## 2017-10-18 NOTE — PLAN OF CARE
Problem: Patient Care Overview (Adult)  Goal: Plan of Care Review  Outcome: Outcome(s) achieved Date Met:  10/18/17    10/18/17 7930   Coping/Psychosocial Response Interventions   Plan Of Care Reviewed With patient   Outcome Evaluation   Outcome Summary/Follow up Plan GOALS NOT ESTABLISHED AS PT IS AT BASELINE WITH FUNCTIONAL MOBILITY. NO SKILLED INPT P.T. SERVICES NEEDED AT THIS TIME. D/C P.T. PT IS SAFE TO BE UP AD KERMIT.          Problem: Stroke (Ischemic) (Adult)  Goal: Signs and Symptoms of Listed Potential Problems Will be Absent or Manageable (Stroke)  Outcome: Outcome(s) achieved Date Met:  10/18/17

## 2017-10-18 NOTE — H&P
"    [facility] Medicine Services  HISTORY AND PHYSICAL    Primary Care Physician: Leslie Rhodes MD    Subjective     Chief Complaint:  Word finding difficulty and sudden onset weakness.    History of Present Illness:   Chronic knee pain he is a very pleasant 67-year-old gentleman [facility].  He was at physical therapy today when he began having sudden onset of weakness and \"not feeling well\" and had some visual disturbances.  According to the patient and his wife his blood pressure was elevated when checked at PT and the therapist stated he didn't look well.  EMS was called and he was taken to the local hospital where she stroke protocol was initiated.  Initial stroke score NIH of 2.  He did have a CT which did not show any acute changes.  Stroke team was contacted the outside hospital and patient was referred for transfer siting the need for higher level of care.  Patient is been referred for admission to [facility] medicine service.  At this time he expresses he is still having some word finding difficulty which started prior to physical therapy this morning and some visual disturbances with some central blurring and questionable slight field cuts.  He denies any chest pain, palpitations or feelings of near syncope.  Since his discharge last month he states that he has been very compliant with his medications he has had an intentional weight loss of 13 pounds as well.  He does state that he has been having some episodes where he will feel very flushed and sweat profusely but has no other accompanying symptoms.    The patient has a history of CVA last month s/p tpa. Today he was working with PT and prior to that \"just didn't feel right/well\". Upon performing his work up exercises, he experienced visual changes with \"halos\", and dysarthric speech with word finding difficulty. All of his symptoms have resolved, except a ?peripheral vision deficity. He has felt flushed and " "had sweating episodes. Otherwise agree with above.    Review of Systems   Otherwise complete 10 system ROS performed and negative except as mentioned in the HPI.    Past Medical History:   Diagnosis Date   • AAA (abdominal aortic aneurysm)    • Coronary artery disease    • CVA (cerebral vascular accident)    • Hypertension    • Hypothyroidism    • TIA (transient ischemic attack)        Past Surgical History:   Procedure Laterality Date   • CORONARY ANGIOPLASTY WITH STENT PLACEMENT         Family History   Problem Relation Age of Onset   • Heart failure Mother    • Aneurysm Father        Social History     Social History   • Marital status:      Spouse name: N/A   • Number of children: N/A   • Years of education: N/A     Occupational History   • Not on file.     Social History Main Topics   • Smoking status: Former Smoker     Packs/day: 1.00     Years: 20.00     Quit date: 9/20/1985   • Smokeless tobacco: Not on file      Comment: quit 20 y ago   • Alcohol use Yes      Comment: \"Drinks once or twice a year\"   • Drug use: No   • Sexual activity: Defer     Other Topics Concern   • Not on file     Social History Narrative    . Lives with wife. Retired from [employer]       Medications:  Prescriptions Prior to Admission   Medication Sig Dispense Refill Last Dose   • aspirin 81 MG chewable tablet Chew 1 tablet Daily. 30 tablet 3    • atorvastatin (LIPITOR) 80 MG tablet Take 1 tablet by mouth Every Night. 30 tablet 3    • carvedilol (COREG) 12.5 MG tablet Take 12.5 mg by mouth Daily.      • clopidogrel (PLAVIX) 75 MG tablet Take 75 mg by mouth Daily.      • furosemide (LASIX) 20 MG tablet Take 20 mg by mouth Daily As Needed.      • hydrALAZINE (APRESOLINE) 50 MG tablet Take 50 mg by mouth 2 (Two) Times a Day.      • levothyroxine (SYNTHROID, LEVOTHROID) 200 MCG tablet Take 200 mcg by mouth Daily.      • losartan (COZAAR) 100 MG tablet Take 100 mg by mouth Daily.      • carvedilol (COREG) 3.125 MG tablet       • " montelukast (SINGULAIR) 10 MG tablet           Allergies:  Allergies   Allergen Reactions   • Codeine Hallucinations         Objective     Physical Exam:  Vital Signs: There were no vitals taken for this visit.  Physical Exam   Constitutional: He is oriented to person, place, and time. He appears well-developed. No distress.   HENT:   Head: Normocephalic and atraumatic.   Mouth/Throat: Oropharynx is clear and moist.   Eyes: EOM are normal. Pupils are equal, round, and reactive to light. Right eye exhibits no discharge. Left eye exhibits no discharge. No scleral icterus.   Patient with questionable slight 10° to 15° circumferential field cut.  Expresses some central blurring.   Neck: Normal range of motion. Neck supple. No JVD present. No tracheal deviation present. No thyromegaly present.   Cardiovascular: Normal rate, regular rhythm and intact distal pulses.    Murmur (II/VI systolic ) heard.  Pulmonary/Chest: Effort normal and breath sounds normal. No respiratory distress. He has no wheezes. He has no rales. He exhibits no tenderness.   Abdominal: Soft. Bowel sounds are normal. He exhibits no distension and no mass. There is no tenderness. There is no guarding.   Musculoskeletal: Normal range of motion. He exhibits no edema, tenderness or deformity.   Lymphadenopathy:     He has no cervical adenopathy.   Neurological: He is alert and oriented to person, place, and time. He exhibits normal muscle tone. Coordination normal.   Very subtle word finding difficulty.    Skin: Skin is warm and dry. No rash noted. He is not diaphoretic. No erythema. No pallor.   Psychiatric: He has a normal mood and affect. His behavior is normal. Judgment and thought content normal.   Nursing note and vitals reviewed.    NAD, alert and oriented, normal affect, pleasant  OP clear, MMM  Face symmetric, tongue midline, speech clear/fluent  PERRL  Neck supple  RRR  CTAB  +BS, mild distention, NT  No c/c/e  DELUNA strength preserved in all  extremities  No rashes  Agree with above      Results Reviewed:            I have personally reviewed and interpreted available lab data, radiology studies and ECG obtained at time of admission.     Assessment / Plan     Problem List:   Hospital Problem List     * (Principal)CVA (cerebral vascular accident)    Hypothyroidism    Essential hypertension    Dementia    Overview Signed 9/29/2017  6:41 AM by Dilia Rodriguez     Moderate, likely mixed vascular /degenerative         Mitral valve regurgitation    Overview Signed 10/17/2017 10:01 PM by FADUMO Ornelas     Mild to Moderate per Echo September 2017               Assessment:    Plan:  1. CVA/TIA: Not a candidate for TPA due to time restraints.  Patient develop symptoms greater than 3 hours ago.  Symptoms improved by time of arrival.  Neurology will see in the morning.  They've requested CTA of the head and neck tonight as well as cerebral perfusion CT.  Patient is on optimal medication management at this point.  2.  Hypothyroid: Will check TSH and T4.  Is on replacement.  3.  Essential hypertension: Had to discontinue amlodipine due to edema.  Is on ARB hydralazine and Lasix and Coreg.  Restart as soon as possible.  We will hold tonight until perfusion studies completed.  There was some concern of hypertensive cardiomyopathy noted on last discharge summary.  Patient had been referred for a cardiac MRI at the [facility].  4.  Dementia: Data deficit is not currently on any therapeutic agents  5.  Mitral valve regurgitation: Mild to moderate noted on echo last month  6.  Left ventricular hypertrophy: Is on an ARB.  Echocardiogram within the past 30 days..    TIA/CVA  --Abnormal CT perfusion, d/w DrHarris Given, nothing acutely needed, continue current therapy, further work up tomorrow  --continue asa/plavix, check p2y12  HTN  --permissive HTN per stroke protocol  Hx of LVH  Hypothyroidism  DVT prophylaxis    DVT prophylaxis:TAMIE SCD  Code Status:  Full  Admission Status: Patient will be admitted to (LEXOBS or LEXINPT)     FADUMO Arvizu 10/17/17 10:41 PM

## 2017-10-18 NOTE — PLAN OF CARE
Problem: Patient Care Overview (Adult)  Goal: Plan of Care Review  Outcome: Ongoing (interventions implemented as appropriate)    10/18/17 1057   Coping/Psychosocial Response Interventions   Plan Of Care Reviewed With patient;spouse   Patient Care Overview   Progress (initial eval)   Outcome Evaluation   Outcome Summary/Follow up Plan Cognitive-linguistic skills were functional and at baseline for patient. Receptive and expressive language, reading, and writing were found to be WFL. Pt reports some difficulty w/ word-finding from prior CVA, but not observed during eval. No dysarthria. Speech was 100% intelligible in conversation. Pt and spouse report that deficits have resolved and pt's skills have returned to baseline. Pt passed RN dysphagia screen and reports tolerating diet w/o complications. RECS: No further ST needs at this time.          Problem: Stroke (Ischemic) (Adult)  Goal: Signs and Symptoms of Listed Potential Problems Will be Absent or Manageable (Stroke)  Outcome: Ongoing (interventions implemented as appropriate)    10/18/17 1057   Stroke (Ischemic)   Problems Assessed (Stroke (Ischemic)/TIA) cognitive impairment;communication impairment   Problems Present (Stroke (Ischemic)/TIA) none

## 2018-05-15 ENCOUNTER — LAB (OUTPATIENT)
Dept: LAB | Facility: HOSPITAL | Age: 68
End: 2018-05-15

## 2018-05-15 ENCOUNTER — TRANSCRIBE ORDERS (OUTPATIENT)
Dept: LAB | Facility: HOSPITAL | Age: 68
End: 2018-05-15

## 2018-05-15 DIAGNOSIS — I13.10 BENIGN HYPERTENSIVE HEART AND RENAL DISEASE: Primary | ICD-10-CM

## 2018-05-15 DIAGNOSIS — I13.10 BENIGN HYPERTENSIVE HEART AND RENAL DISEASE: ICD-10-CM

## 2018-05-15 LAB
ALBUMIN SERPL-MCNC: 2.2 G/DL (ref 3.2–4.8)
ANION GAP SERPL CALCULATED.3IONS-SCNC: 19 MMOL/L (ref 3–11)
BACTERIA UR QL AUTO: ABNORMAL /HPF
BASOPHILS # BLD AUTO: 0.02 10*3/MM3 (ref 0–0.2)
BASOPHILS NFR BLD AUTO: 0.4 % (ref 0–1)
BILIRUB UR QL STRIP: NEGATIVE
BUN BLD-MCNC: 9 MG/DL (ref 9–23)
BUN/CREAT SERPL: 22.5 (ref 7–25)
CALCIUM SPEC-SCNC: 8.8 MG/DL (ref 8.7–10.4)
CHLORIDE SERPL-SCNC: 107 MMOL/L (ref 99–109)
CLARITY UR: ABNORMAL
CO2 SERPL-SCNC: 14 MMOL/L (ref 20–31)
COLOR UR: YELLOW
CREAT BLD-MCNC: 0.4 MG/DL (ref 0.6–1.3)
CREAT UR-MCNC: 199 MG/DL
DEPRECATED RDW RBC AUTO: 41.2 FL (ref 37–54)
EOSINOPHIL # BLD AUTO: 0.2 10*3/MM3 (ref 0–0.3)
EOSINOPHIL NFR BLD AUTO: 3.8 % (ref 0–3)
ERYTHROCYTE [DISTWIDTH] IN BLOOD BY AUTOMATED COUNT: 13.2 % (ref 11.3–14.5)
GFR SERPL CREATININE-BSD FRML MDRD: >150 ML/MIN/1.73
GLUCOSE BLD-MCNC: 73 MG/DL (ref 70–100)
GLUCOSE UR STRIP-MCNC: NEGATIVE MG/DL
HCT VFR BLD AUTO: 42.2 % (ref 38.9–50.9)
HGB BLD-MCNC: 14.3 G/DL (ref 13.1–17.5)
HGB UR QL STRIP.AUTO: NEGATIVE
HYALINE CASTS UR QL AUTO: ABNORMAL /LPF
IMM GRANULOCYTES # BLD: 0.01 10*3/MM3 (ref 0–0.03)
IMM GRANULOCYTES NFR BLD: 0.2 % (ref 0–0.6)
KETONES UR QL STRIP: NEGATIVE
LEUKOCYTE ESTERASE UR QL STRIP.AUTO: ABNORMAL
LYMPHOCYTES # BLD AUTO: 1.88 10*3/MM3 (ref 0.6–4.8)
LYMPHOCYTES NFR BLD AUTO: 35.6 % (ref 24–44)
MCH RBC QN AUTO: 29.1 PG (ref 27–31)
MCHC RBC AUTO-ENTMCNC: 33.9 G/DL (ref 32–36)
MCV RBC AUTO: 85.8 FL (ref 80–99)
MONOCYTES # BLD AUTO: 0.46 10*3/MM3 (ref 0–1)
MONOCYTES NFR BLD AUTO: 8.7 % (ref 0–12)
NEUTROPHILS # BLD AUTO: 2.72 10*3/MM3 (ref 1.5–8.3)
NEUTROPHILS NFR BLD AUTO: 51.5 % (ref 41–71)
NITRITE UR QL STRIP: NEGATIVE
PH UR STRIP.AUTO: 5.5 [PH] (ref 5–8)
PHOSPHATE SERPL-MCNC: 1.9 MG/DL (ref 2.4–5.1)
PLATELET # BLD AUTO: 196 10*3/MM3 (ref 150–450)
PMV BLD AUTO: 11.9 FL (ref 6–12)
POTASSIUM BLD-SCNC: 3.8 MMOL/L (ref 3.5–5.5)
PROT UR QL STRIP: ABNORMAL
PROT UR-MCNC: 22 MG/DL (ref 1–14)
RBC # BLD AUTO: 4.92 10*6/MM3 (ref 4.2–5.76)
RBC # UR: ABNORMAL /HPF
REF LAB TEST METHOD: ABNORMAL
SODIUM BLD-SCNC: 140 MMOL/L (ref 132–146)
SP GR UR STRIP: 1.02 (ref 1–1.03)
SQUAMOUS #/AREA URNS HPF: ABNORMAL /HPF
UROBILINOGEN UR QL STRIP: ABNORMAL
WBC NRBC COR # BLD: 5.28 10*3/MM3 (ref 3.5–10.8)
WBC UR QL AUTO: ABNORMAL /HPF

## 2018-05-15 PROCEDURE — 84156 ASSAY OF PROTEIN URINE: CPT

## 2018-05-15 PROCEDURE — 81001 URINALYSIS AUTO W/SCOPE: CPT

## 2018-05-15 PROCEDURE — 82570 ASSAY OF URINE CREATININE: CPT | Performed by: INTERNAL MEDICINE

## 2018-05-15 PROCEDURE — 36415 COLL VENOUS BLD VENIPUNCTURE: CPT

## 2018-05-15 PROCEDURE — 80069 RENAL FUNCTION PANEL: CPT

## 2018-05-15 PROCEDURE — 85025 COMPLETE CBC W/AUTO DIFF WBC: CPT

## 2018-06-04 ENCOUNTER — TRANSCRIBE ORDERS (OUTPATIENT)
Dept: LAB | Facility: HOSPITAL | Age: 68
End: 2018-06-04

## 2018-06-04 ENCOUNTER — LAB (OUTPATIENT)
Dept: LAB | Facility: HOSPITAL | Age: 68
End: 2018-06-04

## 2018-06-04 DIAGNOSIS — I10 ESSENTIAL HYPERTENSION, MALIGNANT: ICD-10-CM

## 2018-06-04 DIAGNOSIS — I10 ESSENTIAL HYPERTENSION, MALIGNANT: Primary | ICD-10-CM

## 2018-06-04 LAB
ALBUMIN SERPL-MCNC: 3.9 G/DL (ref 3.2–4.8)
ANION GAP SERPL CALCULATED.3IONS-SCNC: 5 MMOL/L (ref 3–11)
BUN BLD-MCNC: 14 MG/DL (ref 9–23)
BUN/CREAT SERPL: 15.6 (ref 7–25)
CALCIUM SPEC-SCNC: 8.8 MG/DL (ref 8.7–10.4)
CHLORIDE SERPL-SCNC: 104 MMOL/L (ref 99–109)
CO2 SERPL-SCNC: 28 MMOL/L (ref 20–31)
CREAT BLD-MCNC: 0.9 MG/DL (ref 0.6–1.3)
CREAT UR-MCNC: 71.3 MG/DL
GFR SERPL CREATININE-BSD FRML MDRD: 84 ML/MIN/1.73
GLUCOSE BLD-MCNC: 156 MG/DL (ref 70–100)
PHOSPHATE SERPL-MCNC: 3.5 MG/DL (ref 2.4–5.1)
POTASSIUM BLD-SCNC: 4.6 MMOL/L (ref 3.5–5.5)
PROT UR-MCNC: 3 MG/DL (ref 1–14)
SODIUM BLD-SCNC: 137 MMOL/L (ref 132–146)
URATE SERPL-MCNC: 4.8 MG/DL (ref 3.7–9.2)

## 2018-06-04 PROCEDURE — 80069 RENAL FUNCTION PANEL: CPT

## 2018-06-04 PROCEDURE — 82043 UR ALBUMIN QUANTITATIVE: CPT

## 2018-06-04 PROCEDURE — 36415 COLL VENOUS BLD VENIPUNCTURE: CPT

## 2018-06-04 PROCEDURE — 84550 ASSAY OF BLOOD/URIC ACID: CPT

## 2018-06-04 PROCEDURE — 84156 ASSAY OF PROTEIN URINE: CPT

## 2018-06-04 PROCEDURE — 84244 ASSAY OF RENIN: CPT

## 2018-06-04 PROCEDURE — 82088 ASSAY OF ALDOSTERONE: CPT

## 2018-06-04 PROCEDURE — 82570 ASSAY OF URINE CREATININE: CPT | Performed by: INTERNAL MEDICINE

## 2018-06-06 LAB — MICROALBUMIN UR-MCNC: <3 UG/ML

## 2018-06-08 LAB
ALDOST SERPL-MCNC: 6 NG/DL (ref 0–30)
ALDOST SERPL-MCNC: 6.6 NG/DL (ref 0–30)
RENIN PLAS-CCNC: 2.25 NG/ML/HR (ref 0.17–5.38)

## 2019-03-28 ENCOUNTER — APPOINTMENT (OUTPATIENT)
Dept: CT IMAGING | Facility: HOSPITAL | Age: 69
End: 2019-03-28

## 2019-03-28 ENCOUNTER — APPOINTMENT (OUTPATIENT)
Dept: CARDIOLOGY | Facility: HOSPITAL | Age: 69
End: 2019-03-28

## 2019-03-28 ENCOUNTER — APPOINTMENT (OUTPATIENT)
Dept: GENERAL RADIOLOGY | Facility: HOSPITAL | Age: 69
End: 2019-03-28

## 2019-03-28 ENCOUNTER — HOSPITAL ENCOUNTER (INPATIENT)
Facility: HOSPITAL | Age: 69
LOS: 1 days | Discharge: HOME | End: 2019-03-29
Attending: INTERNAL MEDICINE | Admitting: HOSPITALIST

## 2019-03-28 DIAGNOSIS — I21.4 NSTEMI (NON-ST ELEVATED MYOCARDIAL INFARCTION) (HCC): Primary | ICD-10-CM

## 2019-03-28 PROBLEM — I48.0 PAF (PAROXYSMAL ATRIAL FIBRILLATION) (HCC): Status: ACTIVE | Noted: 2019-03-28

## 2019-03-28 LAB
ALBUMIN SERPL-MCNC: 3.89 G/DL (ref 3.2–4.8)
ALBUMIN/GLOB SERPL: 1.9 G/DL (ref 1.5–2.5)
ALP SERPL-CCNC: 103 U/L (ref 25–100)
ALT SERPL W P-5'-P-CCNC: 39 U/L (ref 7–40)
ANION GAP SERPL CALCULATED.3IONS-SCNC: 10 MMOL/L (ref 3–11)
APTT PPP: 29.6 SECONDS (ref 85–120)
AST SERPL-CCNC: 28 U/L (ref 0–33)
BASOPHILS # BLD AUTO: 0.02 10*3/MM3 (ref 0–0.2)
BASOPHILS NFR BLD AUTO: 0.3 % (ref 0–1)
BILIRUB SERPL-MCNC: 0.5 MG/DL (ref 0.3–1.2)
BNP SERPL-MCNC: 20 PG/ML (ref 0–100)
BUN BLD-MCNC: 17 MG/DL (ref 9–23)
BUN/CREAT SERPL: 15.6 (ref 7–25)
CALCIUM SPEC-SCNC: 9.2 MG/DL (ref 8.7–10.4)
CHLORIDE SERPL-SCNC: 91 MMOL/L (ref 99–109)
CO2 SERPL-SCNC: 32 MMOL/L (ref 20–31)
CREAT BLD-MCNC: 1.09 MG/DL (ref 0.6–1.3)
DEPRECATED RDW RBC AUTO: 39 FL (ref 37–54)
EOSINOPHIL # BLD AUTO: 0.14 10*3/MM3 (ref 0–0.3)
EOSINOPHIL NFR BLD AUTO: 2 % (ref 0–3)
ERYTHROCYTE [DISTWIDTH] IN BLOOD BY AUTOMATED COUNT: 13.2 % (ref 11.3–14.5)
GFR SERPL CREATININE-BSD FRML MDRD: 67 ML/MIN/1.73
GLOBULIN UR ELPH-MCNC: 2 GM/DL
GLUCOSE BLD-MCNC: 177 MG/DL (ref 70–100)
HCT VFR BLD AUTO: 44.5 % (ref 38.9–50.9)
HGB BLD-MCNC: 15.7 G/DL (ref 13.1–17.5)
IMM GRANULOCYTES # BLD AUTO: 0.02 10*3/MM3 (ref 0–0.05)
IMM GRANULOCYTES NFR BLD AUTO: 0.3 % (ref 0–0.6)
INR PPP: 1.05 (ref 0.85–1.16)
LYMPHOCYTES # BLD AUTO: 2.06 10*3/MM3 (ref 0.6–4.8)
LYMPHOCYTES NFR BLD AUTO: 30.1 % (ref 24–44)
MAGNESIUM SERPL-MCNC: 2 MG/DL (ref 1.3–2.7)
MCH RBC QN AUTO: 29 PG (ref 27–31)
MCHC RBC AUTO-ENTMCNC: 35.3 G/DL (ref 32–36)
MCV RBC AUTO: 82.3 FL (ref 80–99)
MONOCYTES # BLD AUTO: 0.6 10*3/MM3 (ref 0–1)
MONOCYTES NFR BLD AUTO: 8.8 % (ref 0–12)
NEUTROPHILS # BLD AUTO: 4.02 10*3/MM3 (ref 1.5–8.3)
NEUTROPHILS NFR BLD AUTO: 58.8 % (ref 41–71)
PLATELET # BLD AUTO: 222 10*3/MM3 (ref 150–450)
PMV BLD AUTO: 10.1 FL (ref 6–12)
POTASSIUM BLD-SCNC: 2.6 MMOL/L (ref 3.5–5.5)
POTASSIUM BLD-SCNC: 2.9 MMOL/L (ref 3.5–5.5)
PROT SERPL-MCNC: 5.9 G/DL (ref 5.7–8.2)
PROTHROMBIN TIME: 13.2 SECONDS (ref 11.2–14.3)
RBC # BLD AUTO: 5.41 10*6/MM3 (ref 4.2–5.76)
SODIUM BLD-SCNC: 133 MMOL/L (ref 132–146)
TROPONIN I SERPL-MCNC: 0.13 NG/ML
TROPONIN I SERPL-MCNC: 0.14 NG/ML
UFH PPP CHRO-ACNC: 0.1 IU/ML (ref 0.3–0.7)
UFH PPP CHRO-ACNC: 0.31 IU/ML (ref 0.3–0.7)
WBC NRBC COR # BLD: 6.84 10*3/MM3 (ref 3.5–10.8)

## 2019-03-28 PROCEDURE — 99223 1ST HOSP IP/OBS HIGH 75: CPT | Performed by: FAMILY MEDICINE

## 2019-03-28 PROCEDURE — 83735 ASSAY OF MAGNESIUM: CPT | Performed by: PHYSICIAN ASSISTANT

## 2019-03-28 PROCEDURE — 80053 COMPREHEN METABOLIC PANEL: CPT | Performed by: PHYSICIAN ASSISTANT

## 2019-03-28 PROCEDURE — 85730 THROMBOPLASTIN TIME PARTIAL: CPT | Performed by: PHYSICIAN ASSISTANT

## 2019-03-28 PROCEDURE — 85520 HEPARIN ASSAY: CPT

## 2019-03-28 PROCEDURE — 84484 ASSAY OF TROPONIN QUANT: CPT | Performed by: PHYSICIAN ASSISTANT

## 2019-03-28 PROCEDURE — 71045 X-RAY EXAM CHEST 1 VIEW: CPT

## 2019-03-28 PROCEDURE — 25010000002 HEPARIN (PORCINE) PER 1000 UNITS

## 2019-03-28 PROCEDURE — 94799 UNLISTED PULMONARY SVC/PX: CPT

## 2019-03-28 PROCEDURE — 85520 HEPARIN ASSAY: CPT | Performed by: HOSPITALIST

## 2019-03-28 PROCEDURE — 25010000002 HEPARIN (PORCINE) PER 1000 UNITS: Performed by: HOSPITALIST

## 2019-03-28 PROCEDURE — 85025 COMPLETE CBC W/AUTO DIFF WBC: CPT | Performed by: PHYSICIAN ASSISTANT

## 2019-03-28 PROCEDURE — 93306 TTE W/DOPPLER COMPLETE: CPT | Performed by: INTERNAL MEDICINE

## 2019-03-28 PROCEDURE — 93306 TTE W/DOPPLER COMPLETE: CPT

## 2019-03-28 PROCEDURE — 75635 CT ANGIO ABDOMINAL ARTERIES: CPT

## 2019-03-28 PROCEDURE — 99223 1ST HOSP IP/OBS HIGH 75: CPT | Performed by: PHYSICIAN ASSISTANT

## 2019-03-28 PROCEDURE — 85610 PROTHROMBIN TIME: CPT | Performed by: HOSPITALIST

## 2019-03-28 PROCEDURE — 93005 ELECTROCARDIOGRAM TRACING: CPT | Performed by: PHYSICIAN ASSISTANT

## 2019-03-28 PROCEDURE — 0 IOPAMIDOL PER 1 ML: Performed by: HOSPITALIST

## 2019-03-28 PROCEDURE — 84132 ASSAY OF SERUM POTASSIUM: CPT | Performed by: HOSPITALIST

## 2019-03-28 PROCEDURE — 83880 ASSAY OF NATRIURETIC PEPTIDE: CPT | Performed by: PHYSICIAN ASSISTANT

## 2019-03-28 PROCEDURE — 93010 ELECTROCARDIOGRAM REPORT: CPT | Performed by: INTERNAL MEDICINE

## 2019-03-28 RX ORDER — HEPARIN SODIUM 1000 [USP'U]/ML
30 INJECTION, SOLUTION INTRAVENOUS; SUBCUTANEOUS AS NEEDED
Status: DISCONTINUED | OUTPATIENT
Start: 2019-03-28 | End: 2019-03-28

## 2019-03-28 RX ORDER — SODIUM CHLORIDE 0.9 % (FLUSH) 0.9 %
3 SYRINGE (ML) INJECTION EVERY 12 HOURS SCHEDULED
Status: DISCONTINUED | OUTPATIENT
Start: 2019-03-28 | End: 2019-03-28

## 2019-03-28 RX ORDER — HEPARIN SODIUM 1000 [USP'U]/ML
60 INJECTION, SOLUTION INTRAVENOUS; SUBCUTANEOUS AS NEEDED
Status: DISCONTINUED | OUTPATIENT
Start: 2019-03-28 | End: 2019-03-28

## 2019-03-28 RX ORDER — SODIUM CHLORIDE 9 MG/ML
330 INJECTION, SOLUTION INTRAVENOUS CONTINUOUS
Status: DISCONTINUED | OUTPATIENT
Start: 2019-03-28 | End: 2019-03-28

## 2019-03-28 RX ORDER — SODIUM CHLORIDE 0.9 % (FLUSH) 0.9 %
3-10 SYRINGE (ML) INJECTION AS NEEDED
Status: DISCONTINUED | OUTPATIENT
Start: 2019-03-28 | End: 2019-03-29 | Stop reason: HOSPADM

## 2019-03-28 RX ORDER — POTASSIUM CHLORIDE 1.5 G/1.77G
40 POWDER, FOR SOLUTION ORAL AS NEEDED
Status: DISCONTINUED | OUTPATIENT
Start: 2019-03-28 | End: 2019-03-29 | Stop reason: HOSPADM

## 2019-03-28 RX ORDER — CARVEDILOL 12.5 MG/1
12.5 TABLET ORAL DAILY
Status: DISCONTINUED | OUTPATIENT
Start: 2019-03-28 | End: 2019-03-28

## 2019-03-28 RX ORDER — POTASSIUM CHLORIDE 750 MG/1
40 CAPSULE, EXTENDED RELEASE ORAL AS NEEDED
Status: DISCONTINUED | OUTPATIENT
Start: 2019-03-28 | End: 2019-03-29 | Stop reason: HOSPADM

## 2019-03-28 RX ORDER — FLUTICASONE PROPIONATE 50 MCG
2 SPRAY, SUSPENSION (ML) NASAL DAILY
Status: DISCONTINUED | OUTPATIENT
Start: 2019-03-28 | End: 2019-03-29 | Stop reason: HOSPADM

## 2019-03-28 RX ORDER — MAGNESIUM SULFATE HEPTAHYDRATE 40 MG/ML
2 INJECTION, SOLUTION INTRAVENOUS AS NEEDED
Status: DISCONTINUED | OUTPATIENT
Start: 2019-03-28 | End: 2019-03-29 | Stop reason: HOSPADM

## 2019-03-28 RX ORDER — CLOPIDOGREL BISULFATE 75 MG/1
75 TABLET ORAL DAILY
Status: DISCONTINUED | OUTPATIENT
Start: 2019-03-28 | End: 2019-03-29

## 2019-03-28 RX ORDER — HEPARIN SODIUM 10000 [USP'U]/100ML
16 INJECTION, SOLUTION INTRAVENOUS
Status: DISCONTINUED | OUTPATIENT
Start: 2019-03-28 | End: 2019-03-29

## 2019-03-28 RX ORDER — ATORVASTATIN CALCIUM 40 MG/1
80 TABLET, FILM COATED ORAL NIGHTLY
Status: DISCONTINUED | OUTPATIENT
Start: 2019-03-28 | End: 2019-03-29 | Stop reason: HOSPADM

## 2019-03-28 RX ORDER — HYDRALAZINE HYDROCHLORIDE 50 MG/1
50 TABLET, FILM COATED ORAL EVERY 12 HOURS SCHEDULED
Status: DISCONTINUED | OUTPATIENT
Start: 2019-03-28 | End: 2019-03-29 | Stop reason: HOSPADM

## 2019-03-28 RX ORDER — MAGNESIUM SULFATE HEPTAHYDRATE 40 MG/ML
4 INJECTION, SOLUTION INTRAVENOUS AS NEEDED
Status: DISCONTINUED | OUTPATIENT
Start: 2019-03-28 | End: 2019-03-29 | Stop reason: HOSPADM

## 2019-03-28 RX ORDER — FUROSEMIDE 20 MG/1
20 TABLET ORAL DAILY PRN
Status: DISCONTINUED | OUTPATIENT
Start: 2019-03-28 | End: 2019-03-28

## 2019-03-28 RX ORDER — LEVOTHYROXINE SODIUM 0.1 MG/1
200 TABLET ORAL DAILY
Status: DISCONTINUED | OUTPATIENT
Start: 2019-03-28 | End: 2019-03-29 | Stop reason: HOSPADM

## 2019-03-28 RX ORDER — SODIUM CHLORIDE 0.9 % (FLUSH) 0.9 %
3 SYRINGE (ML) INJECTION EVERY 12 HOURS SCHEDULED
Status: DISCONTINUED | OUTPATIENT
Start: 2019-03-28 | End: 2019-03-29 | Stop reason: HOSPADM

## 2019-03-28 RX ORDER — ASPIRIN 81 MG/1
324 TABLET, CHEWABLE ORAL DAILY
Status: DISCONTINUED | OUTPATIENT
Start: 2019-03-28 | End: 2019-03-29 | Stop reason: HOSPADM

## 2019-03-28 RX ORDER — GUAIFENESIN/DEXTROMETHORPHAN 100-10MG/5
10 SYRUP ORAL EVERY 4 HOURS PRN
Status: DISCONTINUED | OUTPATIENT
Start: 2019-03-28 | End: 2019-03-29 | Stop reason: HOSPADM

## 2019-03-28 RX ORDER — LOSARTAN POTASSIUM 50 MG/1
100 TABLET ORAL DAILY
Status: DISCONTINUED | OUTPATIENT
Start: 2019-03-28 | End: 2019-03-29 | Stop reason: HOSPADM

## 2019-03-28 RX ORDER — CARVEDILOL 3.12 MG/1
3.12 TABLET ORAL DAILY
Status: DISCONTINUED | OUTPATIENT
Start: 2019-03-28 | End: 2019-03-29

## 2019-03-28 RX ORDER — POTASSIUM CHLORIDE 7.45 MG/ML
10 INJECTION INTRAVENOUS
Status: DISCONTINUED | OUTPATIENT
Start: 2019-03-28 | End: 2019-03-29 | Stop reason: HOSPADM

## 2019-03-28 RX ORDER — HEPARIN SODIUM 1000 [USP'U]/ML
4000 INJECTION, SOLUTION INTRAVENOUS; SUBCUTANEOUS ONCE
Status: COMPLETED | OUTPATIENT
Start: 2019-03-28 | End: 2019-03-28

## 2019-03-28 RX ADMIN — GUAIFENESIN AND DEXTROMETHORPHAN 10 ML: 100; 10 SYRUP ORAL at 23:49

## 2019-03-28 RX ADMIN — LOSARTAN POTASSIUM 100 MG: 50 TABLET ORAL at 09:03

## 2019-03-28 RX ADMIN — SODIUM CHLORIDE, PRESERVATIVE FREE 3 ML: 5 INJECTION INTRAVENOUS at 09:10

## 2019-03-28 RX ADMIN — HEPARIN SODIUM 8.2 UNITS/KG/HR: 10000 INJECTION, SOLUTION INTRAVENOUS at 09:04

## 2019-03-28 RX ADMIN — HYDRALAZINE HYDROCHLORIDE 50 MG: 50 TABLET, FILM COATED ORAL at 09:03

## 2019-03-28 RX ADMIN — ASPIRIN 81 MG 324 MG: 81 TABLET ORAL at 09:03

## 2019-03-28 RX ADMIN — CLOPIDOGREL BISULFATE 75 MG: 75 TABLET ORAL at 09:03

## 2019-03-28 RX ADMIN — POTASSIUM CHLORIDE 40 MEQ: 750 CAPSULE, EXTENDED RELEASE ORAL at 23:18

## 2019-03-28 RX ADMIN — LEVOTHYROXINE SODIUM 200 MCG: 100 TABLET ORAL at 06:30

## 2019-03-28 RX ADMIN — POTASSIUM CHLORIDE 40 MEQ: 750 CAPSULE, EXTENDED RELEASE ORAL at 12:30

## 2019-03-28 RX ADMIN — HEPARIN SODIUM 4000 UNITS: 1000 INJECTION, SOLUTION INTRAVENOUS; SUBCUTANEOUS at 14:35

## 2019-03-28 RX ADMIN — ATORVASTATIN CALCIUM 80 MG: 40 TABLET, FILM COATED ORAL at 23:01

## 2019-03-28 RX ADMIN — CARVEDILOL 3.12 MG: 3.12 TABLET, FILM COATED ORAL at 09:03

## 2019-03-28 RX ADMIN — POTASSIUM CHLORIDE 40 MEQ: 750 CAPSULE, EXTENDED RELEASE ORAL at 09:02

## 2019-03-28 RX ADMIN — SODIUM CHLORIDE, PRESERVATIVE FREE 3 ML: 5 INJECTION INTRAVENOUS at 23:07

## 2019-03-28 RX ADMIN — HYDRALAZINE HYDROCHLORIDE 50 MG: 50 TABLET, FILM COATED ORAL at 23:05

## 2019-03-28 RX ADMIN — IOPAMIDOL 150 ML: 755 INJECTION, SOLUTION INTRAVENOUS at 11:40

## 2019-03-28 RX ADMIN — POTASSIUM CHLORIDE 40 MEQ: 750 CAPSULE, EXTENDED RELEASE ORAL at 16:25

## 2019-03-29 VITALS
SYSTOLIC BLOOD PRESSURE: 121 MMHG | RESPIRATION RATE: 18 BRPM | BODY MASS INDEX: 39.84 KG/M2 | TEMPERATURE: 97.7 F | DIASTOLIC BLOOD PRESSURE: 66 MMHG | OXYGEN SATURATION: 96 % | HEIGHT: 69 IN | WEIGHT: 269 LBS | HEART RATE: 77 BPM

## 2019-03-29 PROBLEM — R77.8 ELEVATED TROPONIN: Status: ACTIVE | Noted: 2019-03-28

## 2019-03-29 PROBLEM — R07.9 CHEST PAIN: Status: ACTIVE | Noted: 2019-03-29

## 2019-03-29 PROBLEM — R77.8 ELEVATED TROPONIN: Status: RESOLVED | Noted: 2019-03-28 | Resolved: 2019-03-29

## 2019-03-29 PROBLEM — R79.89 ELEVATED TROPONIN: Status: RESOLVED | Noted: 2019-03-28 | Resolved: 2019-03-29

## 2019-03-29 PROBLEM — E87.6 HYPOKALEMIA: Status: ACTIVE | Noted: 2019-03-29

## 2019-03-29 PROBLEM — R07.9 CHEST PAIN: Status: RESOLVED | Noted: 2019-03-29 | Resolved: 2019-03-29

## 2019-03-29 PROBLEM — R79.89 ELEVATED TROPONIN: Status: ACTIVE | Noted: 2019-03-28

## 2019-03-29 PROBLEM — E87.6 HYPOKALEMIA: Status: RESOLVED | Noted: 2019-03-29 | Resolved: 2019-03-29

## 2019-03-29 LAB
ANION GAP SERPL CALCULATED.3IONS-SCNC: 11 MMOL/L (ref 3–11)
ARTICHOKE IGE QN: 60 MG/DL (ref 0–130)
BH CV ECHO MEAS - AO MAX PG: 15.7 MMHG
BH CV ECHO MEAS - AO MEAN PG: 8.9 MMHG
BH CV ECHO MEAS - AO V2 MAX: 198.2 CM/SEC
BH CV ECHO MEAS - AO V2 MEAN: 136.1 CM/SEC
BH CV ECHO MEAS - AO V2 VTI: 40.2 CM
BH CV ECHO MEAS - BSA(HAYCOCK): 2.5 M^2
BH CV ECHO MEAS - BSA: 2.3 M^2
BH CV ECHO MEAS - BZI_BMI: 39.7 KILOGRAMS/M^2
BH CV ECHO MEAS - BZI_METRIC_HEIGHT: 175.3 CM
BH CV ECHO MEAS - BZI_METRIC_WEIGHT: 122 KG
BH CV ECHO MEAS - EDV(CUBED): 70.8 ML
BH CV ECHO MEAS - EDV(TEICH): 75.8 ML
BH CV ECHO MEAS - EF(CUBED): 69.6 %
BH CV ECHO MEAS - EF(TEICH): 61.6 %
BH CV ECHO MEAS - ESV(CUBED): 21.6 ML
BH CV ECHO MEAS - ESV(TEICH): 29.1 ML
BH CV ECHO MEAS - FS: 32.7 %
BH CV ECHO MEAS - IVS/LVPW: 1
BH CV ECHO MEAS - IVSD: 1.5 CM
BH CV ECHO MEAS - LA DIMENSION: 3.2 CM
BH CV ECHO MEAS - LAD MAJOR: 4.3 CM
BH CV ECHO MEAS - LAT PEAK E' VEL: 5 CM/SEC
BH CV ECHO MEAS - LATERAL E/E' RATIO: 16.4
BH CV ECHO MEAS - LV MASS(C)D: 245.9 GRAMS
BH CV ECHO MEAS - LV MASS(C)DI: 104.9 GRAMS/M^2
BH CV ECHO MEAS - LVIDD: 4.1 CM
BH CV ECHO MEAS - LVIDS: 2.8 CM
BH CV ECHO MEAS - LVOT AREA (M): 4.2 CM^2
BH CV ECHO MEAS - LVOT AREA: 4 CM^2
BH CV ECHO MEAS - LVOT DIAM: 2.3 CM
BH CV ECHO MEAS - LVPWD: 1.5 CM
BH CV ECHO MEAS - MED PEAK E' VEL: 5.3 CM/SEC
BH CV ECHO MEAS - MEDIAL E/E' RATIO: 15.7
BH CV ECHO MEAS - MV A MAX VEL: 102.7 CM/SEC
BH CV ECHO MEAS - MV DEC TIME: 0.35 SEC
BH CV ECHO MEAS - MV E MAX VEL: 84.9 CM/SEC
BH CV ECHO MEAS - MV E/A: 0.83
BH CV ECHO MEAS - PA ACC SLOPE: 768.9 CM/SEC^2
BH CV ECHO MEAS - PA ACC TIME: 0.12 SEC
BH CV ECHO MEAS - PA MAX PG: 4.8 MMHG
BH CV ECHO MEAS - PA PR(ACCEL): 26.7 MMHG
BH CV ECHO MEAS - PA V2 MAX: 109.9 CM/SEC
BH CV ECHO MEAS - SI(CUBED): 21 ML/M^2
BH CV ECHO MEAS - SI(TEICH): 19.9 ML/M^2
BH CV ECHO MEAS - SV(CUBED): 49.3 ML
BH CV ECHO MEAS - SV(TEICH): 46.7 ML
BH CV ECHO MEAS - TAPSE (>1.6): 4.4 CM2
BH CV ECHO MEASUREMENTS AVERAGE E/E' RATIO: 16.49
BH CV XLRA - RV BASE: 3.5 CM
BH CV XLRA - RV LENGTH: 8 CM
BH CV XLRA - RV MID: 3.6 CM
BH CV XLRA - TDI S': 14.7 CM/SEC
BUN BLD-MCNC: 17 MG/DL (ref 9–23)
BUN/CREAT SERPL: 16 (ref 7–25)
CALCIUM SPEC-SCNC: 8.6 MG/DL (ref 8.7–10.4)
CHLORIDE SERPL-SCNC: 93 MMOL/L (ref 99–109)
CHOLEST SERPL-MCNC: 110 MG/DL (ref 0–200)
CO2 SERPL-SCNC: 29 MMOL/L (ref 20–31)
CREAT BLD-MCNC: 1.06 MG/DL (ref 0.6–1.3)
DEPRECATED RDW RBC AUTO: 40.1 FL (ref 37–54)
ERYTHROCYTE [DISTWIDTH] IN BLOOD BY AUTOMATED COUNT: 13.4 % (ref 11.3–14.5)
GFR SERPL CREATININE-BSD FRML MDRD: 70 ML/MIN/1.73
GLUCOSE BLD-MCNC: 156 MG/DL (ref 70–100)
GLUCOSE BLDC GLUCOMTR-MCNC: 161 MG/DL (ref 70–130)
HBA1C MFR BLD: 7.2 % (ref 4.8–5.6)
HCT VFR BLD AUTO: 42.2 % (ref 38.9–50.9)
HDLC SERPL-MCNC: 28 MG/DL (ref 40–60)
HGB BLD-MCNC: 14.7 G/DL (ref 13.1–17.5)
LEFT ATRIUM VOLUME INDEX: 12.8 ML/M^2
LEFT ATRIUM VOLUME: 30 ML
LV EF 2D ECHO EST: 75 %
MAGNESIUM SERPL-MCNC: 2.1 MG/DL (ref 1.3–2.7)
MAXIMAL PREDICTED HEART RATE: 153 BPM
MCH RBC QN AUTO: 29.2 PG (ref 27–31)
MCHC RBC AUTO-ENTMCNC: 34.8 G/DL (ref 32–36)
MCV RBC AUTO: 83.7 FL (ref 80–99)
PLATELET # BLD AUTO: 204 10*3/MM3 (ref 150–450)
PMV BLD AUTO: 10.2 FL (ref 6–12)
POTASSIUM BLD-SCNC: 2.9 MMOL/L (ref 3.5–5.5)
POTASSIUM BLD-SCNC: 3.4 MMOL/L (ref 3.5–5.5)
RBC # BLD AUTO: 5.04 10*6/MM3 (ref 4.2–5.76)
SODIUM BLD-SCNC: 133 MMOL/L (ref 132–146)
STRESS TARGET HR: 130 BPM
TRIGL SERPL-MCNC: 188 MG/DL (ref 0–150)
TSH SERPL DL<=0.05 MIU/L-ACNC: 0.62 MIU/ML (ref 0.35–5.35)
UFH PPP CHRO-ACNC: 0.23 IU/ML (ref 0.3–0.7)
UFH PPP CHRO-ACNC: 0.29 IU/ML (ref 0.3–0.7)
WBC NRBC COR # BLD: 7.28 10*3/MM3 (ref 3.5–10.8)

## 2019-03-29 PROCEDURE — 0 IOPAMIDOL PER 1 ML: Performed by: INTERNAL MEDICINE

## 2019-03-29 PROCEDURE — 25010000002 HEPARIN (PORCINE) PER 1000 UNITS

## 2019-03-29 PROCEDURE — 99232 SBSQ HOSP IP/OBS MODERATE 35: CPT | Performed by: INTERNAL MEDICINE

## 2019-03-29 PROCEDURE — 4A023N7 MEASUREMENT OF CARDIAC SAMPLING AND PRESSURE, LEFT HEART, PERCUTANEOUS APPROACH: ICD-10-PCS | Performed by: INTERNAL MEDICINE

## 2019-03-29 PROCEDURE — 93458 L HRT ARTERY/VENTRICLE ANGIO: CPT | Performed by: INTERNAL MEDICINE

## 2019-03-29 PROCEDURE — 85520 HEPARIN ASSAY: CPT

## 2019-03-29 PROCEDURE — 25010000002 MIDAZOLAM PER 1 MG: Performed by: INTERNAL MEDICINE

## 2019-03-29 PROCEDURE — 80048 BASIC METABOLIC PNL TOTAL CA: CPT | Performed by: HOSPITALIST

## 2019-03-29 PROCEDURE — C1894 INTRO/SHEATH, NON-LASER: HCPCS | Performed by: INTERNAL MEDICINE

## 2019-03-29 PROCEDURE — C1769 GUIDE WIRE: HCPCS | Performed by: INTERNAL MEDICINE

## 2019-03-29 PROCEDURE — 99239 HOSP IP/OBS DSCHRG MGMT >30: CPT | Performed by: HOSPITALIST

## 2019-03-29 PROCEDURE — 84443 ASSAY THYROID STIM HORMONE: CPT | Performed by: PHYSICIAN ASSISTANT

## 2019-03-29 PROCEDURE — B2111ZZ FLUOROSCOPY OF MULTIPLE CORONARY ARTERIES USING LOW OSMOLAR CONTRAST: ICD-10-PCS | Performed by: INTERNAL MEDICINE

## 2019-03-29 PROCEDURE — 85027 COMPLETE CBC AUTOMATED: CPT | Performed by: HOSPITALIST

## 2019-03-29 PROCEDURE — 83036 HEMOGLOBIN GLYCOSYLATED A1C: CPT | Performed by: FAMILY MEDICINE

## 2019-03-29 PROCEDURE — 80061 LIPID PANEL: CPT | Performed by: PHYSICIAN ASSISTANT

## 2019-03-29 PROCEDURE — 25010000002 FENTANYL CITRATE (PF) 100 MCG/2ML SOLUTION: Performed by: INTERNAL MEDICINE

## 2019-03-29 PROCEDURE — 82962 GLUCOSE BLOOD TEST: CPT

## 2019-03-29 PROCEDURE — 84132 ASSAY OF SERUM POTASSIUM: CPT | Performed by: HOSPITALIST

## 2019-03-29 PROCEDURE — 25010000002 HEPARIN (PORCINE) PER 1000 UNITS: Performed by: INTERNAL MEDICINE

## 2019-03-29 PROCEDURE — 83735 ASSAY OF MAGNESIUM: CPT | Performed by: PHYSICIAN ASSISTANT

## 2019-03-29 PROCEDURE — B2151ZZ FLUOROSCOPY OF LEFT HEART USING LOW OSMOLAR CONTRAST: ICD-10-PCS | Performed by: INTERNAL MEDICINE

## 2019-03-29 RX ORDER — POTASSIUM CHLORIDE 750 MG/1
20 TABLET, FILM COATED, EXTENDED RELEASE ORAL DAILY
Qty: 60 TABLET | Refills: 0 | Status: SHIPPED | OUTPATIENT
Start: 2019-03-29 | End: 2019-04-28

## 2019-03-29 RX ORDER — DIPHENHYDRAMINE HYDROCHLORIDE 50 MG/ML
25 INJECTION INTRAMUSCULAR; INTRAVENOUS EVERY 6 HOURS PRN
Status: DISCONTINUED | OUTPATIENT
Start: 2019-03-29 | End: 2019-03-29 | Stop reason: HOSPADM

## 2019-03-29 RX ORDER — HEPARIN SODIUM 1000 [USP'U]/ML
3000 INJECTION, SOLUTION INTRAVENOUS; SUBCUTANEOUS ONCE
Status: COMPLETED | OUTPATIENT
Start: 2019-03-29 | End: 2019-03-29

## 2019-03-29 RX ORDER — LIDOCAINE HYDROCHLORIDE 10 MG/ML
INJECTION, SOLUTION EPIDURAL; INFILTRATION; INTRACAUDAL; PERINEURAL AS NEEDED
Status: DISCONTINUED | OUTPATIENT
Start: 2019-03-29 | End: 2019-03-29 | Stop reason: HOSPADM

## 2019-03-29 RX ORDER — FENTANYL CITRATE 50 UG/ML
INJECTION, SOLUTION INTRAMUSCULAR; INTRAVENOUS AS NEEDED
Status: DISCONTINUED | OUTPATIENT
Start: 2019-03-29 | End: 2019-03-29 | Stop reason: HOSPADM

## 2019-03-29 RX ORDER — MIDAZOLAM HYDROCHLORIDE 1 MG/ML
INJECTION INTRAMUSCULAR; INTRAVENOUS AS NEEDED
Status: DISCONTINUED | OUTPATIENT
Start: 2019-03-29 | End: 2019-03-29 | Stop reason: HOSPADM

## 2019-03-29 RX ORDER — ACETAMINOPHEN 325 MG/1
650 TABLET ORAL EVERY 6 HOURS PRN
Status: DISCONTINUED | OUTPATIENT
Start: 2019-03-29 | End: 2019-03-29 | Stop reason: HOSPADM

## 2019-03-29 RX ORDER — ACETAMINOPHEN 325 MG/1
650 TABLET ORAL EVERY 4 HOURS PRN
Status: DISCONTINUED | OUTPATIENT
Start: 2019-03-29 | End: 2019-03-29 | Stop reason: HOSPADM

## 2019-03-29 RX ORDER — SODIUM CHLORIDE 9 MG/ML
INJECTION, SOLUTION INTRAVENOUS CONTINUOUS PRN
Status: COMPLETED | OUTPATIENT
Start: 2019-03-29 | End: 2019-03-29

## 2019-03-29 RX ORDER — CETIRIZINE HYDROCHLORIDE 10 MG/1
10 TABLET ORAL DAILY
Qty: 30 TABLET | Refills: 0 | Status: SHIPPED | OUTPATIENT
Start: 2019-03-30 | End: 2019-04-29

## 2019-03-29 RX ORDER — FLUTICASONE PROPIONATE 50 MCG
2 SPRAY, SUSPENSION (ML) NASAL DAILY
Qty: 1 BOTTLE | Refills: 0 | Status: SHIPPED | OUTPATIENT
Start: 2019-03-30 | End: 2019-04-29

## 2019-03-29 RX ORDER — CETIRIZINE HYDROCHLORIDE 10 MG/1
10 TABLET ORAL DAILY
Status: DISCONTINUED | OUTPATIENT
Start: 2019-03-29 | End: 2019-03-29 | Stop reason: HOSPADM

## 2019-03-29 RX ADMIN — CLOPIDOGREL BISULFATE 75 MG: 75 TABLET ORAL at 08:09

## 2019-03-29 RX ADMIN — POTASSIUM CHLORIDE 40 MEQ: 750 CAPSULE, EXTENDED RELEASE ORAL at 08:09

## 2019-03-29 RX ADMIN — HYDRALAZINE HYDROCHLORIDE 50 MG: 50 TABLET, FILM COATED ORAL at 08:12

## 2019-03-29 RX ADMIN — LOSARTAN POTASSIUM 100 MG: 50 TABLET ORAL at 08:12

## 2019-03-29 RX ADMIN — ASPIRIN 81 MG 324 MG: 81 TABLET ORAL at 08:09

## 2019-03-29 RX ADMIN — HEPARIN SODIUM 3000 UNITS: 1000 INJECTION, SOLUTION INTRAVENOUS; SUBCUTANEOUS at 10:57

## 2019-03-29 RX ADMIN — APIXABAN 5 MG: 5 TABLET, FILM COATED ORAL at 12:44

## 2019-03-29 RX ADMIN — ACETAMINOPHEN 650 MG: 325 TABLET, FILM COATED ORAL at 10:51

## 2019-03-29 RX ADMIN — POTASSIUM CHLORIDE 40 MEQ: 750 CAPSULE, EXTENDED RELEASE ORAL at 03:38

## 2019-03-29 RX ADMIN — FLUTICASONE PROPIONATE 2 SPRAY: 50 SPRAY, METERED NASAL at 10:53

## 2019-03-29 RX ADMIN — CETIRIZINE HYDROCHLORIDE 10 MG: 10 TABLET, FILM COATED ORAL at 10:57

## 2019-03-29 RX ADMIN — HEPARIN SODIUM 14 UNITS/KG/HR: 10000 INJECTION, SOLUTION INTRAVENOUS at 03:42

## 2019-03-29 RX ADMIN — LEVOTHYROXINE SODIUM 200 MCG: 100 TABLET ORAL at 05:42

## 2019-03-29 RX ADMIN — SODIUM CHLORIDE, PRESERVATIVE FREE 3 ML: 5 INJECTION INTRAVENOUS at 08:13

## 2019-03-29 RX ADMIN — CARVEDILOL 3.12 MG: 3.12 TABLET, FILM COATED ORAL at 08:11

## 2019-03-30 ENCOUNTER — READMISSION MANAGEMENT (OUTPATIENT)
Dept: CALL CENTER | Facility: HOSPITAL | Age: 69
End: 2019-03-30

## 2019-03-31 NOTE — OUTREACH NOTE
Prep Survey      Responses   Facility patient discharged from?  [facility]   Is patient eligible?  Yes   Discharge diagnosis  Chest pain  NSTEMI (non-ST elevated myocardial infarction   Does the patient have one of the following disease processes/diagnoses(primary or secondary)?  Acute MI (STEMI,NSTEMI)   Does the patient have Home health ordered?  No   Is there a DME ordered?  No   Medication alerts for this patient  Eliquis   Prep survey completed?  Yes          Brigida Cha RN

## 2019-04-01 ENCOUNTER — READMISSION MANAGEMENT (OUTPATIENT)
Dept: CALL CENTER | Facility: HOSPITAL | Age: 69
End: 2019-04-01

## 2019-04-01 NOTE — OUTREACH NOTE
AMI Week 1 Survey      Responses   Facility patient discharged from?  [facility]   Does the patient have one of the following disease processes/diagnoses(primary or secondary)?  Acute MI (STEMI,NSTEMI)   Is there a successful TCM telephone encounter documented?  No   Week 1 attempt successful?  Yes   Call start time  1648   Call end time  1700   Discharge diagnosis  Chest pain  NSTEMI (non-ST elevated myocardial infarction   Is patient permission given to speak with other caregiver?  Yes   List who call center can speak with  Rowan, spouse   Person spoke with today (if not patient) and relationship  Rowan, spouse   Meds reviewed with patient/caregiver?  Yes   Is the patient having any side effects they believe may be caused by any medication additions or changes?  Yes   Side effects comments   Wife states patient feeling weak, problems with balance.    Does the patient have all prescriptions related to this admission filled (includes statins,anticoagulants,HTN meds,anti-arrhythmia meds)  Yes   Is the patient taking all medications as directed (includes completed medication regime)?  Yes   Does the patient have a primary care provider?   Yes   Does the patient have an appointment with their PCP,cardiologist,or clinic within 7 days of discharge?  Yes   Comments regarding PCP  Leslie Rhodes MD   Has the patient kept scheduled appointments due by today?  Yes   Comments  Patient seeing PCP and cardiology this week.    Has home health visited the patient within 72 hours of discharge?  N/A   Psychosocial issues?  No   Comments  Wife states that they are monitoring patients B/P and keeping record.    Did the patient receive a copy of their discharge instructions?  Yes   Nursing interventions  Reviewed instructions with patient   What is the patient's perception of their health status since discharge?  Improving   Nursing interventions  Nurse provided patient education   Is the patient/caregiver able to teach back signs  and symptoms of when to call for help immediately:  Sudden chest discomfort, Sudden discomfort in arms, back, neck or jaw, Shortness of breath at any time, Sudden sweating or clammy skin, Nausea or vomiting, Dizziness or lightheadedness, Irregular or rapid heart rate   Nursing interventions  Nurse provided patient education   Is the patient/caregiver able to teach back lifestyle changes to help prevent MIs  Regular exercise as approved by provider, Heart healthy diet, Maintaining a healthy weight   Is the patient/caregiver able to teach back ways to prevent a second heart attack:  Take medications, Follow up with MD, Manage risk factors   Is the patient/caregiver able to teach back the hierarchy of who to call/visit for symptoms/problems? PCP, Specialist, Home health nurse, Urgent Care, ED, 911  Yes   Week 1 call completed?  Yes          Cheri Hawley RN

## 2019-04-08 ENCOUNTER — READMISSION MANAGEMENT (OUTPATIENT)
Dept: CALL CENTER | Facility: HOSPITAL | Age: 69
End: 2019-04-08

## 2019-04-08 NOTE — OUTREACH NOTE
AMI Week 2 Survey      Responses   Facility patient discharged from?  [facility]   Does the patient have one of the following disease processes/diagnoses(primary or secondary)?  Acute MI (STEMI,NSTEMI)   Week 2 attempt successful?  Yes   Call start time  1207   Call end time  1209   Meds reviewed with patient/caregiver?  Yes   Is the patient taking all medications as directed (includes completed medication regime)?  Yes   Has the patient kept scheduled appointments due by today?  Yes   What is the patient's perception of their health status since discharge?  Improving   Week 2 call completed?  Yes          Shira Wu RN

## 2019-04-16 ENCOUNTER — READMISSION MANAGEMENT (OUTPATIENT)
Dept: CALL CENTER | Facility: HOSPITAL | Age: 69
End: 2019-04-16

## 2019-04-16 NOTE — OUTREACH NOTE
AMI Week 3 Survey      Responses   Facility patient discharged from?  [facility]   Does the patient have one of the following disease processes/diagnoses(primary or secondary)?  Acute MI (STEMI,NSTEMI)   Week 3 attempt successful?  Yes   Call start time  1004   Call end time  1007   Discharge diagnosis  Chest pain  NSTEMI (non-ST elevated myocardial infarction   Meds reviewed with patient/caregiver?  Yes   Is the patient having any side effects they believe may be caused by any medication additions or changes?  No   Is the patient taking all medications as directed (includes completed medication regime)?  Yes   Does the patient have a primary care provider?   Yes   Has the patient kept scheduled appointments due by today?  Yes   Psychosocial issues?  No   Comments  LHC left wrist site is healed. He did have to go to ER for CP since DC but everything was okay per his report.    What is the patient's perception of their health status since discharge?  Returned to baseline/stable   Nursing interventions  Nurse provided patient education   Is the patient/caregiver able to teach back signs and symptoms of when to call for help immediately:  Sudden chest discomfort, Shortness of breath at any time, Dizziness or lightheadedness   Nursing interventions  Nurse provided patient education   Is the pateint /caregiver able to teach back the importance of cardiac rehab?  No   Is the patient/caregiver able to teach back lifestyle changes to help prevent MIs  Managing diabetes, Limiting alcohol intake, Regular exercise as approved by provider   Is the patient/caregiver able to teach back ways to prevent a second heart attack:  Participate in Cardiac Rehab, Follow up with MD, Take medications   Is the patient/caregiver able to teach back the hierarchy of who to call/visit for symptoms/problems? PCP, Specialist, Home health nurse, Urgent Care, ED, 911  Yes   Week 3 call completed?  Yes          Izabella Villa RN

## 2019-04-24 ENCOUNTER — READMISSION MANAGEMENT (OUTPATIENT)
Dept: CALL CENTER | Facility: HOSPITAL | Age: 69
End: 2019-04-24

## 2019-04-24 NOTE — OUTREACH NOTE
AMI Week 4 Survey      Responses   Facility patient discharged from?  [facility]   Does the patient have one of the following disease processes/diagnoses(primary or secondary)?  Acute MI (STEMI,NSTEMI)   Week 4 attempt successful?  No          Leslie Boswell RN

## 2019-06-03 NOTE — PROGRESS NOTES
"Subjective:     Encounter Date:06/04/2019    Primary Care Physician: Leslie Rhodes MD      Patient ID: Camron Hendrix is a 69 y.o. male.    Chief Complaint:Follow-up    PROBLEM LIST/PMHx:  1. Coronary artery disease )  1. H/O abnormal MPS with LHC.  S/P stenting of the \" maker,\" idb.  Follows with Dr. Job Perdomo.  2. NSTEMI  - slight elevation of troponins in setting of hypokalemia, cold, atrial fibrillation.  3. March 29, 2019 cardiac catheterization non-flow-limiting disease, 40% distal circumflex and 50% mid and distal RCA.  LVEF 75% with mid cavity gradient of approximately 18 mmHg.  2. Essential Hypertension  3. PAF  1. CHADS2 Vasc = 5.  2. First occurrence noted in ED 2019 at OSH.   4. AAA, reported history  1. March 28, 2019 CTA with no evidence of abdominal aortic aneurysm.  5. H/O CVA and TIAs  6. Hypothyroidism  7. Sleep apnea, non compliant with CPAP  8. Obesity, Body mass index is 39.72 kg/m².  9. Former Smoking Tobacco Abuse, quit 1985  10. Hyperglycemia, currently diet controlled  11. Surgeries:  1. Cervical fusion        Allergies   Allergen Reactions   • Codeine Hallucinations         Current Outpatient Medications:   •  apixaban (ELIQUIS) 5 MG tablet tablet, Take 1 tablet by mouth Every 12 (Twelve) Hours., Disp: 60 tablet, Rfl: 0  •  aspirin 81 MG chewable tablet, Chew 1 tablet Daily., Disp: 30 tablet, Rfl: 3  •  atorvastatin (LIPITOR) 80 MG tablet, Take 1 tablet by mouth Every Night., Disp: 30 tablet, Rfl: 3  •  carvedilol (COREG) 3.125 MG tablet, Take 3.125 mg by mouth 2 (Two) Times a Day With Meals., Disp: , Rfl:   •  furosemide (LASIX) 20 MG tablet, Take 40 mg by mouth Daily., Disp: , Rfl:   •  furosemide (LASIX) 20 MG tablet, Take 20 mg by mouth Daily As Needed (edema)., Disp: , Rfl:   •  losartan (COZAAR) 100 MG tablet, Take 100 mg by mouth Daily., Disp: , Rfl:   •  montelukast (SINGULAIR) 10 MG tablet, , Disp: , Rfl:   •  potassium chloride (K-DUR) 10 MEQ CR tablet, Take 10 mEq by " "mouth Every Morning., Disp: , Rfl:   •  Thyroid 90 MG PO tablet, Take 90 mg by mouth Daily., Disp: , Rfl:   •  hydrALAZINE (APRESOLINE) 50 MG tablet, Take 50 mg by mouth 2 (Two) Times a Day., Disp: , Rfl:   •  levothyroxine (SYNTHROID, LEVOTHROID) 200 MCG tablet, Take 200 mcg by mouth Daily., Disp: , Rfl:         History of Present Illness    Today for annual follow-up of coronary artery disease and atrial fibrillation.  Patient was recent hospitalization at [facility] for this, was found to have non-flow-limiting disease.  His primary cardiologist is Nunu Perdomo and this visit was made as part of hospital follow-up.  His only symptoms were apparently developed some increasing volume overload, which he took his metolazone for 2 days which is followed by significant dehydration and recurrent orthostasis since then.  He is slowly getting better since stopping his metolazone.    The following portions of the patient's history were reviewed and updated as appropriate: allergies, current medications, past family history, past medical history, past social history, past surgical history and problem list.    Social History     Tobacco Use   • Smoking status: Former Smoker     Packs/day: 1.00     Years: 20.00     Pack years: 20.00     Last attempt to quit: 1985     Years since quittin.7   • Tobacco comment: quit 20 y ago   Substance Use Topics   • Alcohol use: Yes     Comment: \"Drinks once or twice a year\"   • Drug use: No         Review of Systems   Constitution: Negative.   Cardiovascular: Negative.    Respiratory: Negative.    Hematologic/Lymphatic: Negative for bleeding problem. Does not bruise/bleed easily.   Skin: Negative for rash.   Musculoskeletal: Negative for muscle weakness and myalgias.   Gastrointestinal: Negative for heartburn, nausea and vomiting.   Neurological: Positive for dizziness.          Objective:    height is 175.3 cm (69\") and weight is 122 kg (270 lb). His blood pressure is 122/72 " and his pulse is 76. His oxygen saturation is 96%.         Physical Exam   Constitutional: He is oriented to person, place, and time. He appears well-developed and well-nourished.   HENT:   Mouth/Throat: Oropharynx is clear and moist.   Neck: No JVD present. Carotid bruit is not present. No thyromegaly present.   Cardiovascular: Regular rhythm, S1 normal, S2 normal, normal heart sounds and intact distal pulses. Exam reveals no gallop, no S3 and no S4.   No murmur heard.  Pulses:       Carotid pulses are 2+ on the right side, and 2+ on the left side.       Radial pulses are 2+ on the right side, and 2+ on the left side.   Pulmonary/Chest: Breath sounds normal.   Abdominal: Soft. Bowel sounds are normal. He exhibits no mass. There is no tenderness.   Musculoskeletal: He exhibits no edema.   Neurological: He is alert and oriented to person, place, and time.   Skin: Skin is warm and dry. No rash noted.       Procedures          Assessment:   Assessment/Plan      Camron was seen today for follow-up.    Diagnoses and all orders for this visit:    Coronary artery disease involving native coronary artery of native heart with angina pectoris (CMS/HCC)    PAF (paroxysmal atrial fibrillation) (CMS/[facility])    Essential hypertension      1.  Coronary artery disease, moderate nonfluctuating by cath last month.  Continue current primary preventative strategies.  2.  Dyslipidemia tolerating high-dose statin  3.  Paroxysmal atrial fibrillation, currently maintaining sinus rhythm  4.  Attention, not well controlled  5.  Volume overload, over her diuresed with metolazone.  Proved with time/gentle hydration  .  Hyperdynamic left ventricular function on beta-blocker.  This certainly makes him more susceptible to relative volume depletion.  Improving slowly with hydration, make no further changes at this time.  He will follow-up with his primary cardiologist Dr. Job Perdomo and will see us on a as needed basis.       Andrea Holloway,  MD      Dictated utilizing Dragon dictation

## 2019-06-04 ENCOUNTER — OFFICE VISIT (OUTPATIENT)
Dept: CARDIOLOGY | Facility: CLINIC | Age: 69
End: 2019-06-04

## 2019-06-04 VITALS
HEIGHT: 69 IN | BODY MASS INDEX: 39.99 KG/M2 | WEIGHT: 270 LBS | OXYGEN SATURATION: 96 % | DIASTOLIC BLOOD PRESSURE: 72 MMHG | SYSTOLIC BLOOD PRESSURE: 122 MMHG | HEART RATE: 76 BPM

## 2019-06-04 DIAGNOSIS — I10 ESSENTIAL HYPERTENSION: ICD-10-CM

## 2019-06-04 DIAGNOSIS — I25.119 CORONARY ARTERY DISEASE INVOLVING NATIVE CORONARY ARTERY OF NATIVE HEART WITH ANGINA PECTORIS (HCC): Primary | ICD-10-CM

## 2019-06-04 DIAGNOSIS — I48.0 PAF (PAROXYSMAL ATRIAL FIBRILLATION) (HCC): ICD-10-CM

## 2019-06-04 PROCEDURE — 99214 OFFICE O/P EST MOD 30 MIN: CPT | Performed by: INTERNAL MEDICINE

## 2019-06-04 RX ORDER — POTASSIUM CHLORIDE 750 MG/1
10 TABLET, FILM COATED, EXTENDED RELEASE ORAL EVERY MORNING
COMMUNITY

## 2019-06-04 RX ORDER — CARVEDILOL 3.12 MG/1
3.12 TABLET ORAL 2 TIMES DAILY WITH MEALS
COMMUNITY
End: 2021-08-04 | Stop reason: HOSPADM

## 2019-06-04 RX ORDER — LEVOTHYROXINE AND LIOTHYRONINE 57; 13.5 UG/1; UG/1
90 TABLET ORAL DAILY
Status: ON HOLD | COMMUNITY
End: 2021-08-02

## 2019-06-04 RX ORDER — FUROSEMIDE 20 MG/1
20 TABLET ORAL DAILY PRN
COMMUNITY
End: 2021-08-04 | Stop reason: HOSPADM

## 2020-01-01 NOTE — THERAPY DISCHARGE NOTE
"Acute Care - Physical Therapy Initial Eval/Discharge  [facility]     Patient Name: Camron Hendrix  : 1950  MRN: [id number]  Today's Date: 10/18/2017   Onset of Illness/Injury or Date of Surgery Date: 10/17/17  Date of Referral to PT: 10/17/17  Referring Physician: FADUMO CHUNG      Admit Date: 10/17/2017    Visit Dx:    ICD-10-CM ICD-9-CM   1. Impaired functional mobility, balance, gait, and endurance Z74.09 V49.89     Patient Active Problem List   Diagnosis   • CVA (cerebral vascular accident)   • Hypothyroidism   • Essential hypertension   • AAA (abdominal aortic aneurysm)   • Coronary artery disease involving native coronary artery of native heart with angina pectoris   • Asymmetric septal hypertrophy   • Morbid obesity with BMI of 40.0-44.9, adult   • Sleep apnea   • Dementia   • CVA (cerebral vascular accident)   • Mitral valve regurgitation     Past Medical History:   Diagnosis Date   • AAA (abdominal aortic aneurysm)    • Coronary artery disease    • CVA (cerebral vascular accident)    • Hypertension    • Hypothyroidism    • TIA (transient ischemic attack)      Past Surgical History:   Procedure Laterality Date   • CORONARY ANGIOPLASTY WITH STENT PLACEMENT            PT ASSESSMENT (last 72 hours)      PT Evaluation       10/18/17 1405 10/18/17 1156    Rehab Evaluation    Document Type evaluation   COMPLETED CHART REVIEW [id number].   -CD     Subjective Information agree to therapy;no complaints  -CD     Patient Effort, Rehab Treatment excellent  -CD     Symptoms Noted During/After Treatment none  -CD     General Information    Patient Profile Review yes  -CD     Onset of Illness/Injury or Date of Surgery Date 10/17/17  -CD     Referring Physician FADUMO CHUNG  -CD     General Observations PT SUPINE UPON ARRIVAL ON RA AND WITH IV HEPLOCKED.   -CD     Pertinent History Of Current Problem Pt is 67 YOM who was at PT, had sudden onset of weakness, visual disturbances, difficulty finding words, and \"not " Neonatology "feeling well.\" In 9/2017, pt with CVA and s/p tPA  -CD     Precautions/Limitations no known precautions/limitations  -CD     Prior Level of Function independent:;all household mobility;community mobility;ADL's;home management   WAS GOING TO OPPT FOLLOWING LAST CVA.   -CD     Equipment Currently Used at Home shower chair;grab bar   HAS A CANE BUT WAS NOT USING PTA.   -CD shower chair;grab bar;cane, straight;bipap/ cpap  -SG    Plans/Goals Discussed With patient;agreed upon  -CD     Risks Reviewed patient:;dizziness;change in vital signs  -CD     Benefits Reviewed patient:;increase knowledge  -CD     Barriers to Rehab none identified  -CD     Living Environment    Lives With spouse  -CD spouse  -SG    Living Arrangements house  -CD house  -SG    Home Accessibility no concerns  -CD     Transportation Available  car;family or friend will provide  -SG    Living Environment Comment WIFE IS AVAILABLE 24/7 IF NEEDED.   -CD     Clinical Impression    Date of Referral to PT 10/17/17  -CD     PT Diagnosis TIA  -CD     Criteria for Skilled Therapeutic Interventions Met no problems identified which require skilled intervention  -CD     Vital Signs    Pre Systolic BP Rehab --   VITALS STABLE AFTER GAIT IN [place].   -CD     Pain Assessment    Pain Assessment No/denies pain  -CD     Vision Assessment/Intervention    Visual Impairment WFL  -CD     Cognitive Assessment/Intervention    Current Cognitive/Communication Assessment functional  -CD     Orientation Status oriented x 4  -CD     Follows Commands/Answers Questions 100% of the time;able to follow multi-step instructions  -CD     ROM (Range of Motion)    General ROM no range of motion deficits identified   B LE'S   -CD     MMT (Manual Muscle Testing)    General MMT Assessment no strength deficits identified   B LE'S   -CD     Muscle Tone Assessment    Muscle Tone Assessment LLE;RLE   WNL'S   -CD     Bed Mobility, Assessment/Treatment    Bed Mob, Supine to Sit, [place] " "independent  -CD     Transfer Assessment/Treatment    Transfers, Sit-Stand [place] independent  -CD     Gait Assessment/Treatment    Gait, [place] Level independent  -CD     Gait, Assistive Device --   GAIT BELT FOR EVAL SAFETY.   -CD     Gait, Distance (Feet) 350  -CD     Gait, Comment NO LOB WITH BACKWARDS, SIDESTEPPING OR RETRIEVING ITEM OFF FLOOR.   -CD     Motor Skills/Interventions    Additional Documentation Balance Skills Training (Group)  -CD     Balance Skills Training    Sitting-Level of Assistance Independent  -CD     Standing-Level of Assistance Independent  -CD     Gait Balance-Level of Assistance Independent  -CD     Sensory Assessment/Intervention    Light Touch LLE;RLE   WNL'S   -CD     Positioning and Restraints    Pre-Treatment Position in bed  -CD     Post Treatment Position chair  -CD     In Chair sitting;call light within reach;encouraged to call for assist;notified nsg   PT IS SAFE TO BE UP AD KERMIT. NSG NOTIFIED.   -CD       10/18/17 1055 10/18/17 0930    Rehab Evaluation    Document Type evaluation;discharge evaluation/summary  - evaluation  -    Subjective Information no complaints;agree to therapy  - no complaints;agree to therapy  -    Patient Effort, Rehab Treatment excellent  - excellent  -RD    Symptoms Noted During/After Treatment none  -     General Information    Patient Profile Review yes  -MC     Onset of Illness/Injury or Date of Surgery Date 10/17/17  -     Referring Physician FADUMO Santacruz  -     General Observations Pt received [facility], wife present  -     Pertinent History Of Current Problem Pt is 67 YOM who was at PT, had sudden onset of weakness, visual disturbances, difficulty finding words, and \"not feeling well.\" In 9/2017, pt with CVA and s/p tPA  -     Precautions/Limitations no known precautions/limitations  -     Prior Level of Function independent:;all household mobility;community mobility;gait;transfer;bed mobility;ADL's;home management  " -     Equipment Currently Used at Home shower chair;grab bar;rollator  -     Plans/Goals Discussed With patient and family;agreed upon  -     Risks Reviewed patient and family:;nausea/vomiting;LOB;dizziness;increased discomfort;change in vital signs  -     Benefits Reviewed patient and family:;improve function;increase independence;increase strength;increase balance;increase knowledge  -     Barriers to Rehab previous functional deficit  -     Living Environment    Lives With spouse  - spouse  -    Living Arrangements house  - house  -RD    Home Accessibility stairs to enter home;stairs within home;tub/shower is not walk in  -     Number of Stairs to Enter Home 1  -     Number of Stairs Within Home --   All needs met on first floor  -     Living Environment Comment pt's wife available to assist 24/7  -     Vital Signs    Pre Systolic BP Rehab 149  -MC     Pre Treatment Diastolic BP 84  -MC     Post Systolic BP Rehab 136  -MC     Post Treatment Diastolic BP 79  -     Pretreatment Heart Rate (beats/min) 70  -     Posttreatment Heart Rate (beats/min) 83  -MC     Pre Patient Position Sitting  -     Intra Patient Position Standing  -     Post Patient Position Sitting  -     Pain Assessment    Pain Assessment No/denies pain  - No/denies pain  -RD    Vision Assessment/Intervention    Visual Impairment WFL  -     Vision Comment Tracking, peripheral vision,  ID room numbers on wall all WFL  -     Cognitive Assessment/Intervention    Current Cognitive/Communication Assessment functional  - functional  -    Orientation Status oriented x 4  - oriented x 4  -RD    Follows Commands/Answers Questions 100% of the time  - 100% of the time;able to follow multi-step instructions  -    Personal Safety WNL/WFL  -     Personal Safety Interventions gait belt  -     Short/Long Term Memory intact short term memory;intact long term memory  - intact short term memory;intact long term  memory  -RD    Additional Documentation  Cognitive Assessment Intervention (Group)  -RD    Cognitive Assessment Intervention    Behavior/Mood Observations  behavior appropriate to situation, WNL/WFL  -RD    Attention  WNL/WFL  -RD    Pragmatics  WNL/WFL  -RD    Problem Solving  WNL/WFL  -RD    Executive Function Skills  WNL/WFL  -RD    Reasoning  WNL/WFL  -RD    Diffuse Language Characteristics  no concerns, diffuse language characteristics  -RD    ROM (Range of Motion)    General ROM no range of motion deficits identified  -     General ROM Detail for BUE. Defer BLE to PT  -     MMT (Manual Muscle Testing)    General MMT Assessment no strength deficits identified  -     General MMT Assessment Detail for BUE. Defer BLE to PT  -     Bed Mobility, Assessment/Treatment    Bed Mobility, Comment NT - UIC  -     Transfer Assessment/Treatment    Transfers, Sit-Stand [place] independent  -     Transfers, Stand-Sit [place] independent  -     Bathtub Transfer, [place] independent   Simulated  -     Transfer, Comment pt demonstrated good strength and balance as well as safety awareness during all transfers  -     Motor Skills/Interventions    Additional Documentation Balance Skills Training (Group);Fine Motor Coordination Training (Group);Gross Motor Coordination Training (Group)  -     Balance Skills Training    Sitting-Level of Assistance Independent  -     Sitting-Balance Support Feet supported  -     Standing-Level of Assistance Independent  -     Static Standing Balance Support No upper extremity supported  -     Standing-Balance Activities Retrieve object from floor  -     Gait Balance-Level of Assistance Independent  -     Gait Balance Support No upper extremity supported  -     Fine Motor Coordination Training    Opposition Right:;Left:;intact  -     Gross Motor Coordination Training    Gross Motor Skill, Impairments Detail BUE [facility]  -     Sensory  Assessment/Intervention    Sensory Impairment tingling  -     Light Touch LUE;RUE  -     LUE Light Touch mild impairment   residual from CVA in 9/2017  -     RUE Light Touch WNL  -     Positioning and Restraints    Pre-Treatment Position sitting in chair/recliner  -     Post Treatment Position chair  -MC     In Chair notified nsg;sitting;call light within reach;encouraged to call for assist;with family/caregiver  -       10/17/17 2000       General Information    Equipment Currently Used at Home none  -TA     Living Environment    Lives With spouse  -     Living Arrangements house  -TA     Home Accessibility no concerns  -TA     Stair Railings at Home none  -TA     Type of Financial/Environmental Concern none  -TA     Transportation Available car;family or friend will provide  -TA       User Key  (r) = Recorded By, (t) = Taken By, (c) = Cosigned By    Initials Name Provider Type    CD Ines Gill, PT Physical Therapist    TA Noni Kelly, RN Registered Nurse    NOEL June      Cheri Mahajan, OT Occupational Therapist    DOT Salas, MS CCC-SLP Speech and Language Pathologist          Physical Therapy Education     Title: PT OT SLP Therapies (Active)     Topic: Physical Therapy (Done)     Point: Precautions (Done)    Learning Progress Summary    Learner Readiness Method Response Comment Documented by Status   Patient Acceptance E VU BENEFITS OF OOB ACTIVITY, S&S OF CVA, F.A.S.T.  10/18/17 1429 Done                      User Key     Initials Effective Dates Name Provider Type Discipline     06/19/15 -  Ines Gill, PT Physical Therapist PT                PT Recommendation and Plan  Anticipated Discharge Disposition: home, home with home health  PT Frequency: evaluation only  Plan of Care Review  Plan Of Care Reviewed With: patient  Outcome Summary/Follow up Plan: GOALS NOT ESTABLISHED AS PT IS AT BASELINE WITH FUNCTIONAL MOBILITY. NO SKILLED INPT P.T. SERVICES  NEEDED AT THIS TIME. D/C P.T. PT IS SAFE TO BE UP AD KERMIT.               Outcome Measures       10/18/17 1405 10/18/17 1055       How much help from another person do you currently need...    Turning from your back to your side while in flat bed without using bedrails? 4  -CD      Moving from lying on back to sitting on the side of a flat bed without bedrails? 4  -CD      Moving to and from a bed to a chair (including a wheelchair)? 4  -CD      Standing up from a chair using your arms (e.g., wheelchair, bedside chair)? 4  -CD      Climbing 3-5 steps with a railing? 4  -CD      To walk in hospital room? 4  -CD      AM-PAC 6 Clicks Score 24  -CD      How much help from another is currently needed...    Putting on and taking off regular lower body clothing?  4  -MC     Bathing (including washing, rinsing, and drying)  4  -MC     Toileting (which includes using toilet bed pan or urinal)  4  -MC     Putting on and taking off regular upper body clothing  4  -MC     Taking care of personal grooming (such as brushing teeth)  4  -MC     Eating meals  4  -MC     Score  24  -MC     Modified [place] Scale    Modified [place] Scale 0 - No Symptoms at all.  -CD 1 - No significant disability despite symptoms.  Able to carry out all usual duties and activities.  -     Functional Assessment    Outcome Measure Options AM-PAC 6 Clicks Basic Mobility (PT)  -CD AM-PAC 6 Clicks Daily Activity (OT);Modified [place]  -       User Key  (r) = Recorded By, (t) = Taken By, (c) = Cosigned By    Initials Name Provider Type    SHILO Gill, KARLY Physical Therapist    ANJANA Mahajan, OT Occupational Therapist           Time Calculation:         PT Charges       10/18/17 1432          Time Calculation    Start Time 1405  -CD      PT Received On 10/18/17  -CD        User Key  (r) = Recorded By, (t) = Taken By, (c) = Cosigned By    Initials Name Provider Type    SHILO Gill, KARLY Physical Therapist          Therapy Charges for Today     Code  Description Service Date Service Provider Modifiers Qty    48848849957 HC PT EVAL LOW COMPLEXITY 3 10/18/2017 Ines Gill, PT GP 1          PT G-Codes  Outcome Measure Options: AM-PAC 6 Clicks Basic Mobility (PT)    PT Discharge Summary  Anticipated Discharge Disposition: home, home with home health  Reason for Discharge: At baseline function  Discharge Destination: Home, Home with home health    Ines Gill, PT  10/18/2017

## 2021-07-30 ENCOUNTER — APPOINTMENT (OUTPATIENT)
Dept: CT IMAGING | Facility: HOSPITAL | Age: 71
End: 2021-07-30

## 2021-07-30 ENCOUNTER — APPOINTMENT (OUTPATIENT)
Dept: GENERAL RADIOLOGY | Facility: HOSPITAL | Age: 71
End: 2021-07-30

## 2021-07-30 ENCOUNTER — APPOINTMENT (OUTPATIENT)
Dept: OTHER | Facility: HOSPITAL | Age: 71
End: 2021-07-30

## 2021-07-30 ENCOUNTER — APPOINTMENT (OUTPATIENT)
Dept: MRI IMAGING | Facility: HOSPITAL | Age: 71
End: 2021-07-30

## 2021-07-30 ENCOUNTER — HOSPITAL ENCOUNTER (OUTPATIENT)
Facility: HOSPITAL | Age: 71
Setting detail: OBSERVATION
Discharge: ANOTHER HOSPITAL | End: 2021-08-04
Attending: INTERNAL MEDICINE | Admitting: INTERNAL MEDICINE

## 2021-07-30 PROBLEM — G45.9 TIA (TRANSIENT ISCHEMIC ATTACK): Status: ACTIVE | Noted: 2021-07-30

## 2021-07-30 LAB
ALBUMIN SERPL-MCNC: 3.6 G/DL (ref 3.5–5.2)
ALBUMIN/GLOB SERPL: 1.8 G/DL
ALP SERPL-CCNC: 119 U/L (ref 39–117)
ALT SERPL W P-5'-P-CCNC: 33 U/L (ref 1–41)
ANION GAP SERPL CALCULATED.3IONS-SCNC: 10 MMOL/L (ref 5–15)
AST SERPL-CCNC: 26 U/L (ref 1–40)
BASOPHILS # BLD AUTO: 0.02 10*3/MM3 (ref 0–0.2)
BASOPHILS NFR BLD AUTO: 0.4 % (ref 0–1.5)
BILIRUB SERPL-MCNC: 0.4 MG/DL (ref 0–1.2)
BUN SERPL-MCNC: 13 MG/DL (ref 8–23)
BUN/CREAT SERPL: 13.7 (ref 7–25)
CALCIUM SPEC-SCNC: 8.6 MG/DL (ref 8.6–10.5)
CHLORIDE SERPL-SCNC: 106 MMOL/L (ref 98–107)
CO2 SERPL-SCNC: 25 MMOL/L (ref 22–29)
CREAT SERPL-MCNC: 0.95 MG/DL (ref 0.76–1.27)
DEPRECATED RDW RBC AUTO: 38.5 FL (ref 37–54)
EOSINOPHIL # BLD AUTO: 0.14 10*3/MM3 (ref 0–0.4)
EOSINOPHIL NFR BLD AUTO: 2.7 % (ref 0.3–6.2)
ERYTHROCYTE [DISTWIDTH] IN BLOOD BY AUTOMATED COUNT: 12.7 % (ref 12.3–15.4)
GFR SERPL CREATININE-BSD FRML MDRD: 78 ML/MIN/1.73
GLOBULIN UR ELPH-MCNC: 2 GM/DL
GLUCOSE BLDC GLUCOMTR-MCNC: 100 MG/DL (ref 70–130)
GLUCOSE SERPL-MCNC: 113 MG/DL (ref 65–99)
HCT VFR BLD AUTO: 45.7 % (ref 37.5–51)
HGB BLD-MCNC: 15.7 G/DL (ref 13–17.7)
IMM GRANULOCYTES # BLD AUTO: 0.01 10*3/MM3 (ref 0–0.05)
IMM GRANULOCYTES NFR BLD AUTO: 0.2 % (ref 0–0.5)
INR PPP: 2.34 (ref 0.85–1.16)
LYMPHOCYTES # BLD AUTO: 1.97 10*3/MM3 (ref 0.7–3.1)
LYMPHOCYTES NFR BLD AUTO: 37.9 % (ref 19.6–45.3)
MAGNESIUM SERPL-MCNC: 2 MG/DL (ref 1.6–2.4)
MCH RBC QN AUTO: 28.8 PG (ref 26.6–33)
MCHC RBC AUTO-ENTMCNC: 34.4 G/DL (ref 31.5–35.7)
MCV RBC AUTO: 83.9 FL (ref 79–97)
MONOCYTES # BLD AUTO: 0.5 10*3/MM3 (ref 0.1–0.9)
MONOCYTES NFR BLD AUTO: 9.6 % (ref 5–12)
NEUTROPHILS NFR BLD AUTO: 2.56 10*3/MM3 (ref 1.7–7)
NEUTROPHILS NFR BLD AUTO: 49.2 % (ref 42.7–76)
NRBC BLD AUTO-RTO: 0 /100 WBC (ref 0–0.2)
PLATELET # BLD AUTO: 183 10*3/MM3 (ref 140–450)
PMV BLD AUTO: 10.9 FL (ref 6–12)
POTASSIUM SERPL-SCNC: 4.5 MMOL/L (ref 3.5–5.2)
PROT SERPL-MCNC: 5.6 G/DL (ref 6–8.5)
PROTHROMBIN TIME: 24.7 SECONDS (ref 11.4–14.4)
RBC # BLD AUTO: 5.45 10*6/MM3 (ref 4.14–5.8)
SODIUM SERPL-SCNC: 141 MMOL/L (ref 136–145)
TROPONIN T SERPL-MCNC: <0.01 NG/ML (ref 0–0.03)
TSH SERPL DL<=0.05 MIU/L-ACNC: 0.01 UIU/ML (ref 0.27–4.2)
WBC # BLD AUTO: 5.2 10*3/MM3 (ref 3.4–10.8)

## 2021-07-30 PROCEDURE — G0379 DIRECT REFER HOSPITAL OBSERV: HCPCS

## 2021-07-30 PROCEDURE — 84443 ASSAY THYROID STIM HORMONE: CPT | Performed by: NURSE PRACTITIONER

## 2021-07-30 PROCEDURE — G0378 HOSPITAL OBSERVATION PER HR: HCPCS

## 2021-07-30 PROCEDURE — 70496 CT ANGIOGRAPHY HEAD: CPT

## 2021-07-30 PROCEDURE — 85610 PROTHROMBIN TIME: CPT | Performed by: NURSE PRACTITIONER

## 2021-07-30 PROCEDURE — 70450 CT HEAD/BRAIN W/O DYE: CPT

## 2021-07-30 PROCEDURE — 99220 PR INITIAL OBSERVATION CARE/DAY 70 MINUTES: CPT | Performed by: INTERNAL MEDICINE

## 2021-07-30 PROCEDURE — 71045 X-RAY EXAM CHEST 1 VIEW: CPT

## 2021-07-30 PROCEDURE — 85025 COMPLETE CBC W/AUTO DIFF WBC: CPT | Performed by: NURSE PRACTITIONER

## 2021-07-30 PROCEDURE — 0 IOPAMIDOL PER 1 ML: Performed by: INTERNAL MEDICINE

## 2021-07-30 PROCEDURE — 0042T HC CT CEREBRAL PERFUSION W/WO CONTRAST: CPT

## 2021-07-30 PROCEDURE — 83735 ASSAY OF MAGNESIUM: CPT | Performed by: NURSE PRACTITIONER

## 2021-07-30 PROCEDURE — 99204 OFFICE O/P NEW MOD 45 MIN: CPT | Performed by: PSYCHIATRY & NEUROLOGY

## 2021-07-30 PROCEDURE — 82962 GLUCOSE BLOOD TEST: CPT

## 2021-07-30 PROCEDURE — 70498 CT ANGIOGRAPHY NECK: CPT

## 2021-07-30 PROCEDURE — 80053 COMPREHEN METABOLIC PANEL: CPT | Performed by: NURSE PRACTITIONER

## 2021-07-30 PROCEDURE — 70551 MRI BRAIN STEM W/O DYE: CPT

## 2021-07-30 PROCEDURE — 84484 ASSAY OF TROPONIN QUANT: CPT | Performed by: NURSE PRACTITIONER

## 2021-07-30 PROCEDURE — 93005 ELECTROCARDIOGRAM TRACING: CPT | Performed by: NURSE PRACTITIONER

## 2021-07-30 RX ORDER — WARFARIN SODIUM 10 MG/1
10 TABLET ORAL NIGHTLY
COMMUNITY

## 2021-07-30 RX ORDER — AMLODIPINE BESYLATE 10 MG/1
10 TABLET ORAL DAILY
COMMUNITY

## 2021-07-30 RX ORDER — ACETAMINOPHEN 650 MG/1
650 SUPPOSITORY RECTAL EVERY 4 HOURS PRN
Status: DISCONTINUED | OUTPATIENT
Start: 2021-07-30 | End: 2021-08-04 | Stop reason: HOSPADM

## 2021-07-30 RX ORDER — SODIUM CHLORIDE 0.9 % (FLUSH) 0.9 %
10 SYRINGE (ML) INJECTION AS NEEDED
Status: DISCONTINUED | OUTPATIENT
Start: 2021-07-30 | End: 2021-08-04 | Stop reason: HOSPADM

## 2021-07-30 RX ORDER — POTASSIUM CHLORIDE 7.45 MG/ML
10 INJECTION INTRAVENOUS
Status: DISCONTINUED | OUTPATIENT
Start: 2021-07-30 | End: 2021-08-04 | Stop reason: HOSPADM

## 2021-07-30 RX ORDER — SODIUM CHLORIDE 0.9 % (FLUSH) 0.9 %
10 SYRINGE (ML) INJECTION EVERY 12 HOURS SCHEDULED
Status: DISCONTINUED | OUTPATIENT
Start: 2021-07-30 | End: 2021-08-04 | Stop reason: HOSPADM

## 2021-07-30 RX ORDER — LEVOTHYROXINE SODIUM 300 UG/1
300 TABLET ORAL DAILY
COMMUNITY
End: 2021-08-04 | Stop reason: HOSPADM

## 2021-07-30 RX ORDER — BISACODYL 10 MG
10 SUPPOSITORY, RECTAL RECTAL DAILY PRN
Status: DISCONTINUED | OUTPATIENT
Start: 2021-07-30 | End: 2021-08-04 | Stop reason: HOSPADM

## 2021-07-30 RX ORDER — ASPIRIN 325 MG
325 TABLET ORAL DAILY
Status: DISCONTINUED | OUTPATIENT
Start: 2021-07-30 | End: 2021-08-01

## 2021-07-30 RX ORDER — NALTREXONE HYDROCHLORIDE 50 MG/1
4.5 TABLET, FILM COATED ORAL DAILY
COMMUNITY
End: 2023-03-13

## 2021-07-30 RX ORDER — ASPIRIN 300 MG/1
300 SUPPOSITORY RECTAL DAILY
Status: DISCONTINUED | OUTPATIENT
Start: 2021-07-30 | End: 2021-08-01

## 2021-07-30 RX ORDER — WARFARIN SODIUM 5 MG/1
5 TABLET ORAL NIGHTLY
COMMUNITY

## 2021-07-30 RX ORDER — LEVOTHYROXINE SODIUM 0.15 MG/1
300 TABLET ORAL
Status: DISCONTINUED | OUTPATIENT
Start: 2021-07-31 | End: 2021-08-01

## 2021-07-30 RX ORDER — MAGNESIUM SULFATE HEPTAHYDRATE 40 MG/ML
2 INJECTION, SOLUTION INTRAVENOUS AS NEEDED
Status: DISCONTINUED | OUTPATIENT
Start: 2021-07-30 | End: 2021-08-04 | Stop reason: HOSPADM

## 2021-07-30 RX ORDER — POTASSIUM CHLORIDE 750 MG/1
40 CAPSULE, EXTENDED RELEASE ORAL AS NEEDED
Status: DISCONTINUED | OUTPATIENT
Start: 2021-07-30 | End: 2021-08-04 | Stop reason: HOSPADM

## 2021-07-30 RX ORDER — ACETAMINOPHEN 325 MG/1
650 TABLET ORAL EVERY 4 HOURS PRN
Status: DISCONTINUED | OUTPATIENT
Start: 2021-07-30 | End: 2021-08-04 | Stop reason: HOSPADM

## 2021-07-30 RX ORDER — POTASSIUM CHLORIDE 1.5 G/1.77G
40 POWDER, FOR SOLUTION ORAL AS NEEDED
Status: DISCONTINUED | OUTPATIENT
Start: 2021-07-30 | End: 2021-08-04 | Stop reason: HOSPADM

## 2021-07-30 RX ORDER — POLYETHYLENE GLYCOL 3350 17 G/17G
17 POWDER, FOR SOLUTION ORAL DAILY PRN
Status: DISCONTINUED | OUTPATIENT
Start: 2021-07-30 | End: 2021-08-04 | Stop reason: HOSPADM

## 2021-07-30 RX ORDER — MAGNESIUM SULFATE HEPTAHYDRATE 40 MG/ML
4 INJECTION, SOLUTION INTRAVENOUS AS NEEDED
Status: DISCONTINUED | OUTPATIENT
Start: 2021-07-30 | End: 2021-08-04 | Stop reason: HOSPADM

## 2021-07-30 RX ORDER — ACETAMINOPHEN 650 MG/1
650 SUPPOSITORY RECTAL EVERY 4 HOURS PRN
Status: DISCONTINUED | OUTPATIENT
Start: 2021-07-30 | End: 2021-07-30 | Stop reason: SDUPTHER

## 2021-07-30 RX ORDER — ACETAMINOPHEN 325 MG/1
650 TABLET ORAL EVERY 4 HOURS PRN
Status: DISCONTINUED | OUTPATIENT
Start: 2021-07-30 | End: 2021-07-30 | Stop reason: SDUPTHER

## 2021-07-30 RX ORDER — BISACODYL 5 MG/1
5 TABLET, DELAYED RELEASE ORAL DAILY PRN
Status: DISCONTINUED | OUTPATIENT
Start: 2021-07-30 | End: 2021-08-04 | Stop reason: HOSPADM

## 2021-07-30 RX ORDER — POTASSIUM CHLORIDE 750 MG/1
10 TABLET, FILM COATED, EXTENDED RELEASE ORAL DAILY PRN
COMMUNITY

## 2021-07-30 RX ORDER — ATORVASTATIN CALCIUM 40 MG/1
80 TABLET, FILM COATED ORAL NIGHTLY
Status: DISCONTINUED | OUTPATIENT
Start: 2021-07-30 | End: 2021-08-04 | Stop reason: HOSPADM

## 2021-07-30 RX ORDER — ACETAMINOPHEN 160 MG/5ML
650 SOLUTION ORAL EVERY 4 HOURS PRN
Status: DISCONTINUED | OUTPATIENT
Start: 2021-07-30 | End: 2021-07-30 | Stop reason: SDUPTHER

## 2021-07-30 RX ORDER — AMOXICILLIN 250 MG
2 CAPSULE ORAL 2 TIMES DAILY PRN
Status: DISCONTINUED | OUTPATIENT
Start: 2021-07-30 | End: 2021-08-04 | Stop reason: HOSPADM

## 2021-07-30 RX ADMIN — SODIUM CHLORIDE, PRESERVATIVE FREE 10 ML: 5 INJECTION INTRAVENOUS at 23:42

## 2021-07-30 RX ADMIN — IOPAMIDOL 110 ML: 755 INJECTION, SOLUTION INTRAVENOUS at 20:00

## 2021-07-30 RX ADMIN — ATORVASTATIN CALCIUM 80 MG: 40 TABLET, FILM COATED ORAL at 23:42

## 2021-07-30 RX ADMIN — ASPIRIN 325 MG ORAL TABLET 325 MG: 325 PILL ORAL at 23:42

## 2021-07-31 ENCOUNTER — APPOINTMENT (OUTPATIENT)
Dept: CARDIOLOGY | Facility: HOSPITAL | Age: 71
End: 2021-07-31

## 2021-07-31 LAB
ANION GAP SERPL CALCULATED.3IONS-SCNC: 10 MMOL/L (ref 5–15)
B PARAPERT DNA SPEC QL NAA+PROBE: NOT DETECTED
B PERT DNA SPEC QL NAA+PROBE: NOT DETECTED
BASOPHILS # BLD AUTO: 0.02 10*3/MM3 (ref 0–0.2)
BASOPHILS NFR BLD AUTO: 0.4 % (ref 0–1.5)
BH CV ECHO MEAS - AO MAX PG (FULL): 19.3 MMHG
BH CV ECHO MEAS - AO MAX PG: 25 MMHG
BH CV ECHO MEAS - AO MEAN PG (FULL): 8.8 MMHG
BH CV ECHO MEAS - AO MEAN PG: 11.8 MMHG
BH CV ECHO MEAS - AO ROOT AREA (BSA CORRECTED): 1.8
BH CV ECHO MEAS - AO ROOT AREA: 13.9 CM^2
BH CV ECHO MEAS - AO ROOT DIAM: 4.2 CM
BH CV ECHO MEAS - AO V2 MAX: 247.5 CM/SEC
BH CV ECHO MEAS - AO V2 MEAN: 157.8 CM/SEC
BH CV ECHO MEAS - AO V2 VTI: 54.2 CM
BH CV ECHO MEAS - AVA(I,A): 2.4 CM^2
BH CV ECHO MEAS - AVA(I,D): 2.4 CM^2
BH CV ECHO MEAS - AVA(V,A): 2.2 CM^2
BH CV ECHO MEAS - AVA(V,D): 2.2 CM^2
BH CV ECHO MEAS - BSA(HAYCOCK): 2.4 M^2
BH CV ECHO MEAS - BSA: 2.3 M^2
BH CV ECHO MEAS - BZI_BMI: 38 KILOGRAMS/M^2
BH CV ECHO MEAS - BZI_METRIC_HEIGHT: 175.3 CM
BH CV ECHO MEAS - BZI_METRIC_WEIGHT: 116.6 KG
BH CV ECHO MEAS - EDV(CUBED): 144.7 ML
BH CV ECHO MEAS - EDV(MOD-SP2): 108 ML
BH CV ECHO MEAS - EDV(MOD-SP4): 60.7 ML
BH CV ECHO MEAS - EDV(TEICH): 132.4 ML
BH CV ECHO MEAS - EF(CUBED): 76.3 %
BH CV ECHO MEAS - EF(MOD-BP): 50 %
BH CV ECHO MEAS - EF(MOD-SP2): 53.6 %
BH CV ECHO MEAS - EF(MOD-SP4): 50.7 %
BH CV ECHO MEAS - EF(TEICH): 67.9 %
BH CV ECHO MEAS - ESV(CUBED): 34.3 ML
BH CV ECHO MEAS - ESV(MOD-SP2): 50.1 ML
BH CV ECHO MEAS - ESV(MOD-SP4): 29.9 ML
BH CV ECHO MEAS - ESV(TEICH): 42.5 ML
BH CV ECHO MEAS - FS: 38.1 %
BH CV ECHO MEAS - IVS/LVPW: 1
BH CV ECHO MEAS - IVSD: 1.4 CM
BH CV ECHO MEAS - LA DIMENSION: 4.6 CM
BH CV ECHO MEAS - LA/AO: 1.1
BH CV ECHO MEAS - LAD MAJOR: 4.3 CM
BH CV ECHO MEAS - LAT PEAK E' VEL: 6.4 CM/SEC
BH CV ECHO MEAS - LATERAL E/E' RATIO: 14.9
BH CV ECHO MEAS - LV DIASTOLIC VOL/BSA (35-75): 26.4 ML/M^2
BH CV ECHO MEAS - LV MASS(C)D: 306.2 GRAMS
BH CV ECHO MEAS - LV MASS(C)DI: 133.2 GRAMS/M^2
BH CV ECHO MEAS - LV MAX PG: 5.7 MMHG
BH CV ECHO MEAS - LV MEAN PG: 3 MMHG
BH CV ECHO MEAS - LV SYSTOLIC VOL/BSA (12-30): 13 ML/M^2
BH CV ECHO MEAS - LV V1 MAX: 119 CM/SEC
BH CV ECHO MEAS - LV V1 MEAN: 78.6 CM/SEC
BH CV ECHO MEAS - LV V1 VTI: 28.4 CM
BH CV ECHO MEAS - LVIDD: 5.3 CM
BH CV ECHO MEAS - LVIDS: 3.3 CM
BH CV ECHO MEAS - LVLD AP2: 8.8 CM
BH CV ECHO MEAS - LVLD AP4: 8.9 CM
BH CV ECHO MEAS - LVLS AP2: 7 CM
BH CV ECHO MEAS - LVLS AP4: 6.5 CM
BH CV ECHO MEAS - LVOT AREA (M): 4.5 CM^2
BH CV ECHO MEAS - LVOT AREA: 4.5 CM^2
BH CV ECHO MEAS - LVOT DIAM: 2.4 CM
BH CV ECHO MEAS - LVPWD: 1.4 CM
BH CV ECHO MEAS - MED PEAK E' VEL: 4.5 CM/SEC
BH CV ECHO MEAS - MEDIAL E/E' RATIO: 21.4
BH CV ECHO MEAS - MV A MAX VEL: 113 CM/SEC
BH CV ECHO MEAS - MV DEC SLOPE: 272.5 CM/SEC^2
BH CV ECHO MEAS - MV DEC TIME: 0.36 SEC
BH CV ECHO MEAS - MV E MAX VEL: 95.5 CM/SEC
BH CV ECHO MEAS - MV E/A: 0.85
BH CV ECHO MEAS - MV P1/2T MAX VEL: 92.1 CM/SEC
BH CV ECHO MEAS - MV P1/2T: 99 MSEC
BH CV ECHO MEAS - MVA P1/2T LCG: 2.4 CM^2
BH CV ECHO MEAS - MVA(P1/2T): 2.2 CM^2
BH CV ECHO MEAS - PA ACC TIME: 0.14 SEC
BH CV ECHO MEAS - PA MAX PG: 5.3 MMHG
BH CV ECHO MEAS - PA PR(ACCEL): 17.8 MMHG
BH CV ECHO MEAS - PA V2 MAX: 115 CM/SEC
BH CV ECHO MEAS - RAP SYSTOLE: 3 MMHG
BH CV ECHO MEAS - RVSP: 12 MMHG
BH CV ECHO MEAS - SI(AO): 326.5 ML/M^2
BH CV ECHO MEAS - SI(CUBED): 48 ML/M^2
BH CV ECHO MEAS - SI(LVOT): 55.9 ML/M^2
BH CV ECHO MEAS - SI(MOD-SP2): 25.2 ML/M^2
BH CV ECHO MEAS - SI(MOD-SP4): 13.4 ML/M^2
BH CV ECHO MEAS - SI(TEICH): 39.1 ML/M^2
BH CV ECHO MEAS - SV(AO): 750.2 ML
BH CV ECHO MEAS - SV(CUBED): 110.4 ML
BH CV ECHO MEAS - SV(LVOT): 128.5 ML
BH CV ECHO MEAS - SV(MOD-SP2): 57.9 ML
BH CV ECHO MEAS - SV(MOD-SP4): 30.8 ML
BH CV ECHO MEAS - SV(TEICH): 89.9 ML
BH CV ECHO MEAS - TAPSE (>1.6): 2 CM
BH CV ECHO MEAS - TR MAX PG: 9 MMHG
BH CV ECHO MEAS - TR MAX VEL: 152 CM/SEC
BH CV ECHO MEASUREMENTS AVERAGE E/E' RATIO: 17.52
BH CV XLRA - RV BASE: 3.6 CM
BH CV XLRA - RV LENGTH: 7 CM
BH CV XLRA - RV MID: 3.1 CM
BH CV XLRA - TDI S': 14.8 CM/SEC
BUN SERPL-MCNC: 11 MG/DL (ref 8–23)
BUN/CREAT SERPL: 12.8 (ref 7–25)
C PNEUM DNA NPH QL NAA+NON-PROBE: NOT DETECTED
CALCIUM SPEC-SCNC: 8.6 MG/DL (ref 8.6–10.5)
CHLORIDE SERPL-SCNC: 106 MMOL/L (ref 98–107)
CHOLEST SERPL-MCNC: 77 MG/DL (ref 0–200)
CO2 SERPL-SCNC: 24 MMOL/L (ref 22–29)
CREAT SERPL-MCNC: 0.86 MG/DL (ref 0.76–1.27)
DEPRECATED RDW RBC AUTO: 37.9 FL (ref 37–54)
EOSINOPHIL # BLD AUTO: 0.17 10*3/MM3 (ref 0–0.4)
EOSINOPHIL NFR BLD AUTO: 3.7 % (ref 0.3–6.2)
ERYTHROCYTE [DISTWIDTH] IN BLOOD BY AUTOMATED COUNT: 12.6 % (ref 12.3–15.4)
FLUAV SUBTYP SPEC NAA+PROBE: NOT DETECTED
FLUBV RNA ISLT QL NAA+PROBE: NOT DETECTED
GFR SERPL CREATININE-BSD FRML MDRD: 88 ML/MIN/1.73
GLUCOSE BLDC GLUCOMTR-MCNC: 112 MG/DL (ref 70–130)
GLUCOSE BLDC GLUCOMTR-MCNC: 126 MG/DL (ref 70–130)
GLUCOSE BLDC GLUCOMTR-MCNC: 181 MG/DL (ref 70–130)
GLUCOSE BLDC GLUCOMTR-MCNC: 193 MG/DL (ref 70–130)
GLUCOSE SERPL-MCNC: 115 MG/DL (ref 65–99)
HADV DNA SPEC NAA+PROBE: NOT DETECTED
HBA1C MFR BLD: 6.7 % (ref 4.8–5.6)
HCOV 229E RNA SPEC QL NAA+PROBE: NOT DETECTED
HCOV HKU1 RNA SPEC QL NAA+PROBE: NOT DETECTED
HCOV NL63 RNA SPEC QL NAA+PROBE: NOT DETECTED
HCOV OC43 RNA SPEC QL NAA+PROBE: NOT DETECTED
HCT VFR BLD AUTO: 44.6 % (ref 37.5–51)
HDLC SERPL-MCNC: 30 MG/DL (ref 40–60)
HGB BLD-MCNC: 15.2 G/DL (ref 13–17.7)
HMPV RNA NPH QL NAA+NON-PROBE: NOT DETECTED
HPIV1 RNA SPEC QL NAA+PROBE: NOT DETECTED
HPIV2 RNA SPEC QL NAA+PROBE: NOT DETECTED
HPIV3 RNA NPH QL NAA+PROBE: NOT DETECTED
HPIV4 P GENE NPH QL NAA+PROBE: NOT DETECTED
IMM GRANULOCYTES # BLD AUTO: 0.01 10*3/MM3 (ref 0–0.05)
IMM GRANULOCYTES NFR BLD AUTO: 0.2 % (ref 0–0.5)
INR PPP: 2.4 (ref 0.85–1.16)
LDLC SERPL CALC-MCNC: 29 MG/DL (ref 0–100)
LDLC/HDLC SERPL: 0.95 {RATIO}
LEFT ATRIUM VOLUME INDEX: 22.7 ML/M2
LV EF 2D ECHO EST: 55 %
LYMPHOCYTES # BLD AUTO: 1.62 10*3/MM3 (ref 0.7–3.1)
LYMPHOCYTES NFR BLD AUTO: 35.1 % (ref 19.6–45.3)
M PNEUMO IGG SER IA-ACNC: NOT DETECTED
MAXIMAL PREDICTED HEART RATE: 150 BPM
MCH RBC QN AUTO: 28.5 PG (ref 26.6–33)
MCHC RBC AUTO-ENTMCNC: 34.1 G/DL (ref 31.5–35.7)
MCV RBC AUTO: 83.7 FL (ref 79–97)
MONOCYTES # BLD AUTO: 0.48 10*3/MM3 (ref 0.1–0.9)
MONOCYTES NFR BLD AUTO: 10.4 % (ref 5–12)
NEUTROPHILS NFR BLD AUTO: 2.32 10*3/MM3 (ref 1.7–7)
NEUTROPHILS NFR BLD AUTO: 50.2 % (ref 42.7–76)
NRBC BLD AUTO-RTO: 0 /100 WBC (ref 0–0.2)
PLATELET # BLD AUTO: 179 10*3/MM3 (ref 140–450)
PMV BLD AUTO: 11 FL (ref 6–12)
POTASSIUM SERPL-SCNC: 3.8 MMOL/L (ref 3.5–5.2)
PROTHROMBIN TIME: 25.2 SECONDS (ref 11.4–14.4)
RBC # BLD AUTO: 5.33 10*6/MM3 (ref 4.14–5.8)
RHINOVIRUS RNA SPEC NAA+PROBE: NOT DETECTED
RSV RNA NPH QL NAA+NON-PROBE: NOT DETECTED
SARS-COV-2 RNA NPH QL NAA+NON-PROBE: NOT DETECTED
SODIUM SERPL-SCNC: 140 MMOL/L (ref 136–145)
STRESS TARGET HR: 128 BPM
T4 FREE SERPL-MCNC: 2.53 NG/DL (ref 0.93–1.7)
TRIGL SERPL-MCNC: 92 MG/DL (ref 0–150)
VLDLC SERPL-MCNC: 18 MG/DL (ref 5–40)
WBC # BLD AUTO: 4.62 10*3/MM3 (ref 3.4–10.8)

## 2021-07-31 PROCEDURE — 97166 OT EVAL MOD COMPLEX 45 MIN: CPT

## 2021-07-31 PROCEDURE — 80061 LIPID PANEL: CPT | Performed by: NURSE PRACTITIONER

## 2021-07-31 PROCEDURE — 93010 ELECTROCARDIOGRAM REPORT: CPT | Performed by: INTERNAL MEDICINE

## 2021-07-31 PROCEDURE — 85610 PROTHROMBIN TIME: CPT | Performed by: NURSE PRACTITIONER

## 2021-07-31 PROCEDURE — 93306 TTE W/DOPPLER COMPLETE: CPT | Performed by: INTERNAL MEDICINE

## 2021-07-31 PROCEDURE — 83036 HEMOGLOBIN GLYCOSYLATED A1C: CPT | Performed by: NURSE PRACTITIONER

## 2021-07-31 PROCEDURE — 82962 GLUCOSE BLOOD TEST: CPT

## 2021-07-31 PROCEDURE — G0378 HOSPITAL OBSERVATION PER HR: HCPCS

## 2021-07-31 PROCEDURE — 97162 PT EVAL MOD COMPLEX 30 MIN: CPT

## 2021-07-31 PROCEDURE — 99226 PR SBSQ OBSERVATION CARE/DAY 35 MINUTES: CPT | Performed by: FAMILY MEDICINE

## 2021-07-31 PROCEDURE — 93306 TTE W/DOPPLER COMPLETE: CPT

## 2021-07-31 PROCEDURE — 80048 BASIC METABOLIC PNL TOTAL CA: CPT | Performed by: NURSE PRACTITIONER

## 2021-07-31 PROCEDURE — 97530 THERAPEUTIC ACTIVITIES: CPT

## 2021-07-31 PROCEDURE — 85025 COMPLETE CBC W/AUTO DIFF WBC: CPT | Performed by: NURSE PRACTITIONER

## 2021-07-31 PROCEDURE — 97110 THERAPEUTIC EXERCISES: CPT

## 2021-07-31 PROCEDURE — 0202U NFCT DS 22 TRGT SARS-COV-2: CPT | Performed by: INTERNAL MEDICINE

## 2021-07-31 PROCEDURE — 84439 ASSAY OF FREE THYROXINE: CPT | Performed by: FAMILY MEDICINE

## 2021-07-31 PROCEDURE — 92523 SPEECH SOUND LANG COMPREHEN: CPT

## 2021-07-31 RX ORDER — CLOPIDOGREL BISULFATE 75 MG/1
300 TABLET ORAL ONCE
Status: COMPLETED | OUTPATIENT
Start: 2021-07-31 | End: 2021-07-31

## 2021-07-31 RX ORDER — WARFARIN SODIUM 5 MG/1
5 TABLET ORAL
Status: DISCONTINUED | OUTPATIENT
Start: 2021-07-31 | End: 2021-07-31

## 2021-07-31 RX ORDER — WARFARIN SODIUM 5 MG/1
5 TABLET ORAL 3 TIMES WEEKLY
Status: DISCONTINUED | OUTPATIENT
Start: 2021-08-02 | End: 2021-08-04 | Stop reason: HOSPADM

## 2021-07-31 RX ORDER — CLOPIDOGREL BISULFATE 75 MG/1
75 TABLET ORAL DAILY
Status: DISCONTINUED | OUTPATIENT
Start: 2021-08-01 | End: 2021-08-04 | Stop reason: HOSPADM

## 2021-07-31 RX ORDER — WARFARIN SODIUM 5 MG/1
10 TABLET ORAL
Status: DISCONTINUED | OUTPATIENT
Start: 2021-07-31 | End: 2021-08-04 | Stop reason: HOSPADM

## 2021-07-31 RX ORDER — WARFARIN SODIUM 5 MG/1
5 TABLET ORAL ONCE
Status: COMPLETED | OUTPATIENT
Start: 2021-07-31 | End: 2021-07-31

## 2021-07-31 RX ADMIN — WARFARIN SODIUM 10 MG: 5 TABLET ORAL at 17:11

## 2021-07-31 RX ADMIN — CLOPIDOGREL BISULFATE 300 MG: 75 TABLET ORAL at 17:11

## 2021-07-31 RX ADMIN — SODIUM CHLORIDE, PRESERVATIVE FREE 10 ML: 5 INJECTION INTRAVENOUS at 21:33

## 2021-07-31 RX ADMIN — LEVOTHYROXINE SODIUM 300 MCG: 150 TABLET ORAL at 05:55

## 2021-07-31 RX ADMIN — WARFARIN SODIUM 5 MG: 5 TABLET ORAL at 02:29

## 2021-07-31 RX ADMIN — ATORVASTATIN CALCIUM 80 MG: 40 TABLET, FILM COATED ORAL at 21:32

## 2021-08-01 LAB
GLUCOSE BLDC GLUCOMTR-MCNC: 136 MG/DL (ref 70–130)
INR PPP: 2.43 (ref 0.85–1.16)
PROTHROMBIN TIME: 25.4 SECONDS (ref 11.4–14.4)
QT INTERVAL: 394 MS
QTC INTERVAL: 422 MS

## 2021-08-01 PROCEDURE — 99225 PR SBSQ OBSERVATION CARE/DAY 25 MINUTES: CPT | Performed by: FAMILY MEDICINE

## 2021-08-01 PROCEDURE — 82962 GLUCOSE BLOOD TEST: CPT

## 2021-08-01 PROCEDURE — 99214 OFFICE O/P EST MOD 30 MIN: CPT | Performed by: PSYCHIATRY & NEUROLOGY

## 2021-08-01 PROCEDURE — G0378 HOSPITAL OBSERVATION PER HR: HCPCS

## 2021-08-01 PROCEDURE — 85610 PROTHROMBIN TIME: CPT | Performed by: NURSE PRACTITIONER

## 2021-08-01 RX ORDER — ASPIRIN 81 MG/1
81 TABLET, CHEWABLE ORAL DAILY
Status: DISCONTINUED | OUTPATIENT
Start: 2021-08-02 | End: 2021-08-04 | Stop reason: HOSPADM

## 2021-08-01 RX ADMIN — LEVOTHYROXINE SODIUM 300 MCG: 150 TABLET ORAL at 05:42

## 2021-08-01 RX ADMIN — ASPIRIN 325 MG ORAL TABLET 325 MG: 325 PILL ORAL at 09:40

## 2021-08-01 RX ADMIN — CLOPIDOGREL BISULFATE 75 MG: 75 TABLET ORAL at 09:40

## 2021-08-01 RX ADMIN — ATORVASTATIN CALCIUM 80 MG: 40 TABLET, FILM COATED ORAL at 21:12

## 2021-08-01 RX ADMIN — WARFARIN SODIUM 10 MG: 5 TABLET ORAL at 16:28

## 2021-08-01 RX ADMIN — SODIUM CHLORIDE, PRESERVATIVE FREE 10 ML: 5 INJECTION INTRAVENOUS at 09:40

## 2021-08-01 NOTE — PROGRESS NOTES
"Pharmacy Consult  -  Warfarin  Camron Hendrix is a  71 y.o. male   Height - 175.3 cm (69\")  Weight - 117 kg (257 lb)    Consulting Service: Neuro   Indication: A fib  Goal INR: 2-3  Home Regimen:   - Warfarin 5 mg on Mon, Wed, Fri   - Warfarin 10 mg on all other days (Sun, Tues, Thurs, Sat)    Bridge Therapy: No     Drug-Drug Interactions with current regimen:     Aspirin - may increase risk of bleeding     Clopidogrel -may increase bleeding risk    Warfarin Dosing During Admission:    Date  7/30 7/31 8/1         INR  2.34 2.4 2.43         Dose  5 mg (given 7/31 @ 0229) (10 mg) 10 mg            Education Provided: Warfarin education provided on 7/31 verbally and in writing.  Discussed effects of warfarin, importance of checking INR, drug-drug and drug-food interactions, and signs/symptoms of bleeding and clotting.  Patient verbalized understanding through teach back.  All pertinent questions were answered.      Discharge Follow up:  Following Provider - Coumadin Clinic at [facility]  Follow up time range or appointment - Recommend repeat INR within 2-3 days of discharge    Labs:  Results from last 7 days   Lab Units 08/01/21  0607 07/31/21  0559 07/30/21  2221   INR  2.43* 2.40* 2.34*   HEMOGLOBIN g/dL  --  15.2 15.7   HEMATOCRIT %  --  44.6 45.7   PLATELETS 10*3/mm3  --  179 183     Results from last 7 days   Lab Units 07/31/21  0559 07/30/21  2221   SODIUM mmol/L 140 141   POTASSIUM mmol/L 3.8 4.5   CHLORIDE mmol/L 106 106   CO2 mmol/L 24.0 25.0   BUN mg/dL 11 13   CREATININE mg/dL 0.86 0.95   CALCIUM mg/dL 8.6 8.6   BILIRUBIN mg/dL  --  0.4   ALK PHOS U/L  --  119*   ALT (SGPT) U/L  --  33   AST (SGOT) U/L  --  26   GLUCOSE mg/dL 115* 113*     Current dietary intake: % of meals documented since admission    Diet Order   Procedures    Diet Regular; Cardiac, Consistent Carbohydrate     Assessment/Plan:    Warfarin therapy was initiated for AFIB. Goal INR level 2-3  Today's INR is  2.42  Based on " patient specific factors, will continue home dose of warfarin 10 mg today  Obtain PT/INR daily.  Monitor for signs and symptoms of bleeding, food intake, and drug - drug interactions. Make dose adjustments as neccessary   Pharmacy will follow.     Thank you,    Isauro Basilio  Pharmacy Resident  8/1/2021  10:43 EDT

## 2021-08-01 NOTE — PROGRESS NOTES
[facility] Medicine Services  PROGRESS NOTE    Patient Name: Camron Hendrix  : 1950  MRN: [id number]    Date of Admission: 2021  Primary Care Physician: Leslie Rhodes MD    Subjective   Subjective     CC:  F/U L side weakness     HPI:  Patient seen and examined. RN notes reviewed. No acute events overnight. Patient doing well, wife at bedside. He doesn't have any complaints, wants to take a shower. Discussed that we need to keep his BP elevated.     ROS:  Gen- No fevers, chills  CV- No chest pain, palpitations  Resp- No cough, dyspnea  GI- No N/V/D, abd pain    Objective   Objective     Vital Signs:   Temp:  [97.8 °F (36.6 °C)-98.5 °F (36.9 °C)] 98.5 °F (36.9 °C)  Heart Rate:  [64-78] 69  Resp:  [16-18] 16  BP: (160-184)/(83-99) 168/91     Physical Exam:  Constitutional: No acute distress, awake, alert  HENT: NCAT, mucous membranes moist  Respiratory: Clear to auscultation bilaterally, respiratory effort normal   Cardiovascular: RRR, no murmurs, rubs, or gallops  Gastrointestinal: Positive bowel sounds, soft, nontender, nondistended  Musculoskeletal: No bilateral ankle edema  Psychiatric: Appropriate affect, cooperative  Neurologic: Oriented x 3, speech clear, proximal L LE weakness   Skin: No rashes    Results Reviewed:  LAB RESULTS:      Lab 21  0607 21  0559 21  2221   WBC  --  4.62 5.20   HEMOGLOBIN  --  15.2 15.7   HEMATOCRIT  --  44.6 45.7   PLATELETS  --  179 183   NEUTROS ABS  --  2.32 2.56   IMMATURE GRANS (ABS)  --  0.01 0.01   LYMPHS ABS  --  1.62 1.97   MONOS ABS  --  0.48 0.50   EOS ABS  --  0.17 0.14   MCV  --  83.7 83.9   PROTIME 25.4* 25.2* 24.7*         Lab 21  0559 21  2221   SODIUM 140 141   POTASSIUM 3.8 4.5   CHLORIDE 106 106   CO2 24.0 25.0   ANION GAP 10.0 10.0   BUN 11 13   CREATININE 0.86 0.95   GLUCOSE 115* 113*   CALCIUM 8.6 8.6   MAGNESIUM  --  2.0   HEMOGLOBIN A1C 6.70*  --    TSH  --  0.012*         Lab  07/30/21  2221   TOTAL PROTEIN 5.6*   ALBUMIN 3.60   GLOBULIN 2.0   ALT (SGPT) 33   AST (SGOT) 26   BILIRUBIN 0.4   ALK PHOS 119*         Lab 08/01/21  0607 07/31/21  0559 07/30/21  2221   TROPONIN T  --   --  <0.010   PROTIME 25.4* 25.2* 24.7*   INR 2.43* 2.40* 2.34*         Lab 07/31/21  0559   CHOLESTEROL 77   LDL CHOL 29   HDL CHOL 30*   TRIGLYCERIDES 92             Brief Urine Lab Results     None          Microbiology Results Abnormal     Procedure Component Value - Date/Time    COVID PRE-OP / PRE-PROCEDURE SCREENING ORDER (NO ISOLATION) - Swab, Nasopharynx [[id number]]  (Normal) Collected: 07/31/21 0035    Lab Status: Final result Specimen: Swab from Nasopharynx Updated: 07/31/21 0125    Narrative:      The following orders were created for panel order COVID PRE-OP / PRE-PROCEDURE SCREENING ORDER (NO ISOLATION) - Swab, Nasopharynx.  Procedure                               Abnormality         Status                     ---------                               -----------         ------                     Respiratory Panel PCR w/...[[id number]]  Normal              Final result                 Please view results for these tests on the individual orders.    Respiratory Panel PCR w/COVID-19(SARS-CoV-2) DINO/CONNIE/PRICILA/PAD/COR/MAD/PARAS In-House, NP Swab in UTM/VTM, 3-4 HR TAT - Swab, Nasopharynx [[id number]]  (Normal) Collected: 07/31/21 0035    Lab Status: Final result Specimen: Swab from Nasopharynx Updated: 07/31/21 0125     ADENOVIRUS, PCR Not Detected     Coronavirus 229E Not Detected     Coronavirus HKU1 Not Detected     Coronavirus NL63 Not Detected     Coronavirus OC43 Not Detected     COVID19 Not Detected     Human Metapneumovirus Not Detected     Human Rhinovirus/Enterovirus Not Detected     Influenza A PCR Not Detected     Influenza B PCR Not Detected     Parainfluenza Virus 1 Not Detected     Parainfluenza Virus 2 Not Detected     Parainfluenza Virus 3 Not Detected     Parainfluenza Virus 4 Not Detected      RSV, PCR Not Detected     Bordetella pertussis pcr Not Detected     Bordetella parapertussis PCR Not Detected     Chlamydophila pneumoniae PCR Not Detected     Mycoplasma pneumo by PCR Not Detected    Narrative:      In the setting of a positive respiratory panel with a viral infection PLUS a negative procalcitonin without other underlying concern for bacterial infection, consider observing off antibiotics or discontinuation of antibiotics and continue supportive care. If the respiratory panel is positive for atypical bacterial infection (Bordetella pertussis, Chlamydophila pneumoniae, or Mycoplasma pneumoniae), consider antibiotic de-escalation to target atypical bacterial infection.          Adult Transthoracic Echo Complete W/ Cont if Necessary Per Protocol    Result Date: 7/31/2021  · Moderate left atrial enlargement. · Left ventricular wall thickness is consistent with mild concentric hypertrophy. · Normal left ventricular systolic function, estimated EF 55%. · Calcified aortic valve with mild aortic stenosis, mean gradient 11.8 mmHg. · Mild mitral vegetation. · Trace tricuspid regurgitation with normal RVSP. · Dilated aortic root (4.2 cm).      CT Head Without Contrast    Result Date: 7/30/2021  HISTORY: Stroke follow-up. Slurred speech starting at 1500 today. On blood thinners. Recurrent TIAs. Time of scan 7:58 PM. Radiologist not called. Inpatient. TECHNIQUE: CT head without contrast. Radiation dose reduction techniques included automated exposure control or exposure modulation based on body size. Radiation audit for number of CT and nuclear cardiology exams performed in the last year: 0.  COMPARISON: Head CT from 9/21/2017. FINDINGS: There is a small chronic lacune in the right cerebellar hemisphere inferiorly. There is chronic low-attenuation left hemipons that is probably artifact. There is no evidence for acute intracranial hemorrhage or extra-axial fluid collection. There is mild age-appropriate volume  loss. There is mild white matter low-attenuation that is nonspecific but likely due to small vessel disease. There are small lacunar-type insults likely in the basal ganglia bilaterally. No acute cortical infarct is suspected but if there is clinical concern for acute CVA, follow-up imaging is recommended. There are prominent vascular calcifications at the base of the brain. There is no intracranial mass affect. There is mild mucosal thickening in the visualized paranasal sinuses. The mastoid air cells are underpneumatized and partially opacified.     Impression: 1. No acute intracranial abnormality suspected but if there is clinical concern for acute CVA, follow-up imaging is recommended. 2. Mild probable sequelae of small vessel disease similar to the study from 2017. Signer Name: Jyotsna Liang MD  Signed: 7/30/2021 8:11 PM  Workstation Name: [email or web address]  [facility] [place]    CT Angiogram Neck    Result Date: 7/30/2021  CTA  HEAD W AI ANALYSIS FOR LVO, CTA Neck Critical results relayed by myself to the stroke navigator at [facility] at 8:22 PM. The CT angiogram is still in progress but I did discuss with her the findings on the noncontrast head CT and CT perfusion. There is a small ischemic penumbra of 12 mL within the right parietal lobe but there is a large area of prolonged Tmax greater than 4 seconds involving the right MCA territory. On the images available for review of the right MCA territory there appears to be a vascular cut off at the origin of the right M1 vessel with reconstitution via collaterals. There is contrast enhancement in the right MCA territory but it is diminished on comparison to the left side. The patient is at risk for infarction if there is any compromise of collateral flow. Stroke navigator notes that the patient is currently quite hypertensive. INDICATION: Stroke evaluation. Slurred speech onset at 1500 on blood thinners. Subtle left body weakness on exam.  TECHNIQUE: CT angiogram of the head and neck with during IV contrast. Contrast dose recorded in the patient's chart. 3-D  MIP reconstructions were obtained and reviewed.  Evaluation for a significant carotid arterial stenosis is based on the NASCET criteria. Radiation dose reduction techniques included automated exposure control or exposure modulation based on body size. Count of known CT and cardiac nuc med studies performed in previous 12 months: 0. Noncontrast images were also obtained. AI analysis of LVO was utilized COMPARISON: Earlier noncontrast head CT and CT perfusion. FINDINGS: CTA neck:  There are vascular calcifications at the aortic arch and involving great vessel origins. There is no hemodynamically significant stenosis of great vessel origins. Proximal right common carotid artery is obscured by venous contrast inflow. Right carotid system: There is mild calcified plaque at the right carotid bifurcation but there is 0% stenosis by NASCET criteria. There is calcified plaque at the right carotid siphon with approximately moderate stenosis. Left carotid system: There is mostly calcified plaque at the left carotid bifurcation. By NASCET criteria there is about 10% diameter stenosis. There is also considerable calcified plaque at the left carotid siphon with at least moderate stenosis. Both vertebral arteries are patent and essentially codominant. Both supply the basilar. CTA head:  There is an abnormal appearance to the right MCA territory with what appears to be occlusion (or possibly near complete occlusion) at the origin of the right M1 vessel extending over about a centimeter in length. There are indistinct collaterals along the expected right M1 course. There is contrast enhancement of the more distal right MCA vessels but this is diminished visually on comparison to the contrast enhancement in the left MCA vessels. There is an anterior communicating artery present. There is some irregular narrowing  of the proximal left A2 vessel with likely some hemodynamically significant narrowing. Allowing for technical factors there is likely irregularity of the intracranial vessels in general suggestive of intracranial atherosclerotic disease or less likely vasculitis. No significant sized posterior communicating artery is seen on either side. There is irregularity of the proximal left posterior cerebral artery distribution again likely due to atherosclerotic disease. The dural venous sinuses are grossly patent. There is no obvious intracranial aneurysm on this limited CT angiogram performed for stroke evaluation with rapid imaging technique. There are coronary artery calcifications. There are degenerative changes in the spine.     Impression: 1. There is an abnormal appearance the right middle cerebral artery territory. There is either occlusion or near complete occlusion of the right M1 vessel beginning at its origin and extending over about a centimeter in length with collateral reconstitution of the more distal right MCA vessels. There is flow within the more distal right MCA territory but usually there is diminished contrast enhancement in the right MCA territory when compared to the left. This is consistent with the accompanying perfusion study. The findings are partly been discussed by myself with the stroke navigator. 2. Likely intracranial atherosclerotic disease with other areas of intracranial vascular irregularity. Vasculitis is a differential consideration. 3. By NASCET criteria, 0% stenosis of the right carotid bifurcation and about 10% stenosis of the left carotid bifurcation. 4. Both vertebral arteries are patent with supply the basilar. Signer Name: Jyotsna Liang MD  Signed: 7/30/2021 8:34 PM  Workstation Name: [email or web address]  [facility] [place]    MRI Brain Without Contrast    Result Date: 7/30/2021  INDICATION:  Follow-up CVA. Lower extremity weakness. Evaluate for acute CVA. TECHNIQUE: MRI  of the brain without contrast. COMPARISON:  Please see earlier head CT, CT perfusion, and CT angiograms head and neck FINDINGS: There is no evidence for a recent infarct on the diffusion series. There is no MRI evidence for intracranial hemorrhage. There is no extra-axial fluid collection. The basilar cisterns are patent. There is no intracranial mass affect. There our small chronic lacunes in the right greater than left cerebellar hemisphere and brainstem. There is mild to moderate white matter signal abnormality predominantly in the deep and periventricular white matter of the supratentorial brain that is likely due to small vessel disease. There is a small focus of cortical malacia at the right more superolateral frontal lobe consistent with old insult with mild focal volume loss. There is generalized prominence of perivascular spaces in addition to chronic lacunar insults in the basal ganglia. There is a small amount of fluid or inflammatory change in the mastoid air cells bilaterally. There is only mild paranasal sinus mucosal thickening. Please refer to the CT angiogram report for description of the abnormal right MCA. The right MCA flow voids are smaller than the left MCA flow voids consistent with asymmetrically diminished flow to the right MCA territory.     Impression: 1. No evidence for recent infarct. 2. Generalized atrophy with prominence of perivascular spaces and overall moderate probable sequelae of small vessel disease. 3. There is a small focus of cortical malacia at the superior lateral right frontal lobe consistent with a chronic right MCA territory insult. The right MCA abnormality that is best appreciated on the CT angiogram is age indeterminate on imaging, but the presence of a chronic MCA territory insult favors the presence of at least a component of chronic vascular disease. Please correlate further with the clinical findings as well as the earlier CT perfusion/angiogram results. Tommy  Name: Jyotsna Liang MD  Signed: 7/30/2021 11:32 PM  Workstation Name: [email or web address]  [facility] [place]    XR Chest 1 View    Result Date: 7/30/2021  CR Chest 1 Vw INDICATION: Stroke. Hypertension. COMPARISON:  3/28/2019 FINDINGS: Single portable AP view(s) of the chest. There is support equipment artifact. Cardiac silhouette stable. Unremarkable vascularity. There is some probable atelectasis in the lung bases. No pneumothorax. No effusion.     Impression: 1. Probable bibasilar atelectasis. No pneumothorax or effusion. Signer Name: Richie Donahue MD  Signed: 7/30/2021 10:33 PM  Workstation Name: [email or web address]  [facility]    XR Outside Films    Result Date: 7/30/2021  This procedure was auto-finalized with no dictation required.    CT Angiogram Head w AI Analysis of LVO    Result Date: 7/30/2021  CTA  HEAD W AI ANALYSIS FOR LVO, CTA Neck Critical results relayed by myself to the stroke navigator at [facility] at 8:22 PM. The CT angiogram is still in progress but I did discuss with her the findings on the noncontrast head CT and CT perfusion. There is a small ischemic penumbra of 12 mL within the right parietal lobe but there is a large area of prolonged Tmax greater than 4 seconds involving the right MCA territory. On the images available for review of the right MCA territory there appears to be a vascular cut off at the origin of the right M1 vessel with reconstitution via collaterals. There is contrast enhancement in the right MCA territory but it is diminished on comparison to the left side. The patient is at risk for infarction if there is any compromise of collateral flow. Stroke navigator notes that the patient is currently quite hypertensive. INDICATION: Stroke evaluation. Slurred speech onset at 1500 on blood thinners. Subtle left body weakness on exam. TECHNIQUE: CT angiogram of the head and neck with during IV contrast. Contrast dose recorded in the  patient's chart. 3-D  MIP reconstructions were obtained and reviewed.  Evaluation for a significant carotid arterial stenosis is based on the NASCET criteria. Radiation dose reduction techniques included automated exposure control or exposure modulation based on body size. Count of known CT and cardiac nuc med studies performed in previous 12 months: 0. Noncontrast images were also obtained. AI analysis of LVO was utilized COMPARISON: Earlier noncontrast head CT and CT perfusion. FINDINGS: CTA neck:  There are vascular calcifications at the aortic arch and involving great vessel origins. There is no hemodynamically significant stenosis of great vessel origins. Proximal right common carotid artery is obscured by venous contrast inflow. Right carotid system: There is mild calcified plaque at the right carotid bifurcation but there is 0% stenosis by NASCET criteria. There is calcified plaque at the right carotid siphon with approximately moderate stenosis. Left carotid system: There is mostly calcified plaque at the left carotid bifurcation. By NASCET criteria there is about 10% diameter stenosis. There is also considerable calcified plaque at the left carotid siphon with at least moderate stenosis. Both vertebral arteries are patent and essentially codominant. Both supply the basilar. CTA head:  There is an abnormal appearance to the right MCA territory with what appears to be occlusion (or possibly near complete occlusion) at the origin of the right M1 vessel extending over about a centimeter in length. There are indistinct collaterals along the expected right M1 course. There is contrast enhancement of the more distal right MCA vessels but this is diminished visually on comparison to the contrast enhancement in the left MCA vessels. There is an anterior communicating artery present. There is some irregular narrowing of the proximal left A2 vessel with likely some hemodynamically significant narrowing. Allowing for  technical factors there is likely irregularity of the intracranial vessels in general suggestive of intracranial atherosclerotic disease or less likely vasculitis. No significant sized posterior communicating artery is seen on either side. There is irregularity of the proximal left posterior cerebral artery distribution again likely due to atherosclerotic disease. The dural venous sinuses are grossly patent. There is no obvious intracranial aneurysm on this limited CT angiogram performed for stroke evaluation with rapid imaging technique. There are coronary artery calcifications. There are degenerative changes in the spine.     Impression: 1. There is an abnormal appearance the right middle cerebral artery territory. There is either occlusion or near complete occlusion of the right M1 vessel beginning at its origin and extending over about a centimeter in length with collateral reconstitution of the more distal right MCA vessels. There is flow within the more distal right MCA territory but usually there is diminished contrast enhancement in the right MCA territory when compared to the left. This is consistent with the accompanying perfusion study. The findings are partly been discussed by myself with the stroke navigator. 2. Likely intracranial atherosclerotic disease with other areas of intracranial vascular irregularity. Vasculitis is a differential consideration. 3. By NASCET criteria, 0% stenosis of the right carotid bifurcation and about 10% stenosis of the left carotid bifurcation. 4. Both vertebral arteries are patent with supply the basilar. Signer Name: Jyotsna Liang MD  Signed: 7/30/2021 8:34 PM  Workstation Name: [email or web address]  [facility] [place]    CT CEREBRAL PERFUSION WITH & WITHOUT CONTRAST    Result Date: 7/30/2021  CT CEREBRAL PERFUSION W WO CONTRAST HISTORY:  bilateral lower extremity weakness and recurrent TIA. Slurred speech started at 1600 on blood thinners. TECHNIQUE: Axial CT  images of the brain without and with intravenous contrast using cerebral perfusion protocol. Post-processing parametric maps were created using RAPID software and reviewed. Radiation dose reduction techniques included automated exposure control or exposure modulation based on body size. CT and nuclear cardiology exams in the last 12 months: 0. COMPARISON: Earlier noncontrast head CT. FINDINGS: Arterial input function is optimal. Ischemic tissue volume (Tmax > 6 seconds, includes core and penumbra): 12 mL in the right parietal lobe Ischemic core volume (rCBF < 30%): 0 Mismatch (penumbra) volume 12, ratio infinite Volume of poor collateral perfusion (Tmax > 10 seconds): 0 Hypoperfusion index (Tmax > 10 seconds/Tmax > 6 seconds): 0 CBV index: 0.6 There is a large portion of the right middle cerebral artery distribution that shows prolonged mean transit time with Tmax greater than 4 seconds (179 mL. See the accompanying CT angiogram when it becomes available, there is visually delayed enhancement of the right MCA territory on contrast inflow.     Impression: 1. Abnormal brain perfusion study. There is a 12 mL focus of ischemic tissue in the right parietal cortex. No core infarct is documented. Of note there is a large volume of Tmax greater than 4 seconds involving the right middle cerebral artery territory (179 mL) and on visual inspection of the perfusion source imaging there is delayed enhancement of the right MCA territory. Please refer to the CT angiogram report when images become available. They are currently in process. Signer Name: Jyotsna Liang MD  Signed: 7/30/2021 8:17 PM  Workstation Name: [email or web address]  [facility] [place]    CT Outside Films    Result Date: 7/30/2021  This procedure was auto-finalized with no dictation required.      Results for orders placed during the hospital encounter of 07/30/21    Adult Transthoracic Echo Complete W/ Cont if Necessary Per Protocol    Interpretation  Summary  · Moderate left atrial enlargement.  · Left ventricular wall thickness is consistent with mild concentric hypertrophy.  · Normal left ventricular systolic function, estimated EF 55%.  · Calcified aortic valve with mild aortic stenosis, mean gradient 11.8 mmHg.  · Mild mitral vegetation.  · Trace tricuspid regurgitation with normal RVSP.  · Dilated aortic root (4.2 cm).      I have reviewed the medications:  Scheduled Meds:aspirin, 325 mg, Oral, Daily   Or  aspirin, 300 mg, Rectal, Daily  atorvastatin, 80 mg, Oral, Nightly  clopidogrel, 75 mg, Oral, Daily  levothyroxine, 300 mcg, Oral, Q AM  sodium chloride, 10 mL, Intravenous, Q12H  sodium chloride, 10 mL, Intravenous, Q12H  [START ON 8/2/2021] warfarin, 5 mg, Oral, Once per day on Mon Wed Fri   And  warfarin, 10 mg, Oral, Once per day on Sun Tue Thu Sat      Continuous Infusions:Pharmacy to dose warfarin,       PRN Meds:.•  acetaminophen **OR** acetaminophen  •  senna-docusate sodium **AND** polyethylene glycol **AND** bisacodyl **AND** bisacodyl  •  magnesium sulfate **OR** magnesium sulfate **OR** magnesium sulfate  •  Pharmacy to dose warfarin  •  potassium chloride **OR** potassium chloride **OR** potassium chloride  •  sodium chloride  •  sodium chloride    Assessment/Plan   Assessment & Plan     Active Hospital Problems    Diagnosis  POA   • TIA (transient ischemic attack) [G45.9]  Yes   • PAF (paroxysmal atrial fibrillation) (CMS/[facility]) [I48.0]  Yes   • Hypothyroidism [E03.9]  Yes   • Essential hypertension [I10]  Yes   • Coronary artery disease involving native coronary artery of native heart with angina pectoris (CMS/[facility]) [I25.119]  Yes   • CVA (cerebral vascular accident) (CMS/[facility]) [I63.9]  Yes      Resolved Hospital Problems   No resolved problems to display.        Brief Hospital Course to date:  Camron Hendrix is a 71 y.o. male with PMH significant for CVA, HTN, PAF (on warfarin), CAD, hypothyroid, HLD, [place]  [facility] [place] and [place] who is found to have concern for CVA.     This patient's problems and plans were partially entered by my partner and updated as appropriate by me 08/01/21.    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 Currently recommends outpatient cerebral angiogram as long as patient remains stable. Could be a candidate for stent if he fails medical therapy. Bedrest unless working with PT. HOB flat when lying down. Keep -220.   -Continue ASA 325mg, Plavix added, 75mg daily   -Continue high intensity statin  -Echo reviewed: EF normal   -PT/OT following, recommends rehab, patient interested, CM to follow-up tomorrow.      PAF  -rate controlled   -Continue Warfarin      Hypertension   -As above, keep -220     Hypothyroid   -TSH low, T4 elevated   -Decrease Synthroid from 300mcg to 275mcg  -Pt follows with Endocrine outpatient per wife  -Repeat thyroid panel in 4-6 weeks      Hypokalemia  -resolved     DVT prophylaxis:  Medical and mechanical DVT prophylaxis orders are present.     Disposition: I expect the patient to be discharged to rehab when arranged and OK with Neuro    CODE STATUS:   Code Status and Medical Interventions:   Ordered at: 07/30/21 2200     Level Of Support Discussed With:    Patient     Code Status:    CPR     Medical Interventions (Level of Support Prior to Arrest):    Full       Krystle Hanson,   08/01/21

## 2021-08-01 NOTE — PROGRESS NOTES
"Neurology       Patient Care Team:  Leslie Rhodes MD as PCP - General (Family Medicine)  Delmer Perdomo MD as Cardiologist (Cardiology)    Chief complaint left leg weakness      Subjective .     History: No new problems overnight.  Blood pressures running 160//99.  Leg strength is no worse and may be a little better.  Still some trouble getting up, but able to get to the shower with a walker.  No worsening of weakness with getting up.  No arm weakness.      ROS: No fever or chest pain    Objective     Vital Signs   Blood pressure 168/91, pulse 69, temperature 98.5 °F (36.9 °C), temperature source Oral, resp. rate 16, height 175.3 cm (69\"), weight 117 kg (257 lb), SpO2 96 %.    Physical Exam:              Neuro: Obese elderly white man in no acute distress lying comfortably in the hospital bed, alert, fluent and appropriate.  No dysarthria or dysphonia.  Pupils 3 mm and reactive EOMI face symmetric  Motor:  equal and strong, proximal left lower extremity weakness 4/5, right 5/5    Results Review:              Labs reviewed  INR today 2.43      Assessment/Plan     Impression:   Left sided weakness due to right MCA territory slow flow causing ischemia, due to right M1 occlusion or near occlusion.  The M1 narrowing or occlusion appears to be chronic given presence of collaterals, with symptoms more likely due to variable blood pressure affecting perfusion. Patient remains clinically stable.    Yesterday discussed the case with Dr. Can who notes patient could probably benefit from an angiogram but this may be done as an outpatient unless circumstances change.  Could be candidate for stent if he fails medical therapy.      Plavix loaded yesterday and now on scheduled dosing of that and aspirin along with warfarin.  INR therapeutic for A. fib.  Will decrease aspirin to 81 mg.  Discussed with patient and his wife his increased risk of bleeding, but in the short-term, higher risk of very large " right MCA infarct which would likely be life-threatening.  Recommend continuing to maintain blood pressure between 140 and 220 systolic for another day.  Blood pressure is currently ideal.    I discussed the patients findings and my recommendations with patient and family    Angelina Cartwright MD  08/01/21  13:28 EDT

## 2021-08-02 LAB
INR PPP: 2.69 (ref 0.85–1.16)
PROTHROMBIN TIME: 27.5 SECONDS (ref 11.4–14.4)

## 2021-08-02 PROCEDURE — 99226 PR SBSQ OBSERVATION CARE/DAY 35 MINUTES: CPT | Performed by: INTERNAL MEDICINE

## 2021-08-02 PROCEDURE — G0378 HOSPITAL OBSERVATION PER HR: HCPCS

## 2021-08-02 PROCEDURE — 85610 PROTHROMBIN TIME: CPT | Performed by: NURSE PRACTITIONER

## 2021-08-02 PROCEDURE — 99214 OFFICE O/P EST MOD 30 MIN: CPT | Performed by: PSYCHIATRY & NEUROLOGY

## 2021-08-02 RX ORDER — CARVEDILOL 3.12 MG/1
3.12 TABLET ORAL 2 TIMES DAILY WITH MEALS
Status: DISCONTINUED | OUTPATIENT
Start: 2021-08-02 | End: 2021-08-03

## 2021-08-02 RX ADMIN — CARVEDILOL 3.12 MG: 3.12 TABLET, FILM COATED ORAL at 18:26

## 2021-08-02 RX ADMIN — SODIUM CHLORIDE, PRESERVATIVE FREE 10 ML: 5 INJECTION INTRAVENOUS at 20:32

## 2021-08-02 RX ADMIN — ASPIRIN 81 MG: 81 TABLET, CHEWABLE ORAL at 08:53

## 2021-08-02 RX ADMIN — LEVOTHYROXINE SODIUM 275 MCG: 150 TABLET ORAL at 05:31

## 2021-08-02 RX ADMIN — SODIUM CHLORIDE, PRESERVATIVE FREE 10 ML: 5 INJECTION INTRAVENOUS at 08:53

## 2021-08-02 RX ADMIN — WARFARIN SODIUM 5 MG: 5 TABLET ORAL at 18:26

## 2021-08-02 RX ADMIN — CLOPIDOGREL BISULFATE 75 MG: 75 TABLET ORAL at 08:53

## 2021-08-02 RX ADMIN — ATORVASTATIN CALCIUM 80 MG: 40 TABLET, FILM COATED ORAL at 20:31

## 2021-08-02 NOTE — PROGRESS NOTES
"Neurology       Patient Care Team:  Leslie Rhodes MD as PCP - General (Family Medicine)  Delmer Perdomo MD as Cardiologist (Cardiology)    Chief complaint left leg weakness, right MCA territory ischemia      Subjective .     History: Has done well since yesterday, left leg seems stronger although still not back to baseline.  No new weakness or numbness.    ROS: No fever or chest pain    Objective     Vital Signs   Blood pressure 179/92, pulse 71, temperature 98.1 °F (36.7 °C), temperature source Axillary, resp. rate 16, height 175.3 cm (69\"), weight 117 kg (257 lb), SpO2 94 %.    Physical Exam:              Neuro: Obese elderly white man lying in hospital bed in no acute distress, alert, fluent and appropriate.  No dysarthria.  Pupils 2.5 mm and reactive EOMI face symmetric  Motor: Upper extremities with no noted weakness; mild proximal left lower extremity weakness  Gait not tested    Results Review:              INR today 2.69    Assessment/Plan     Left sided weakness due to right MCA territory slow flow causing ischemia, due to right M1 occlusion or near occlusion.  The M1 narrowing or occlusion appears to be chronic given presence of collaterals, with symptoms more likely due to variable blood pressure affecting perfusion. Patient remains clinically stable with elevated blood pressure, tolerating exercise.    Have discussed the case with Dr. Can who notes patient could probably benefit from an angiogram but this may be done as an outpatient unless circumstances change.  Could be candidate for stent if he fails medical therapy.    Okay to lift activity restriction, and for discharge as below.  We will plan on follow-up with Dr. Can in clinic in 2 weeks.    Would like blood pressure to remain elevated, systolic about 140-160 prior to that appointment.        Now on Plavix, aspirin and warfarin.  INR therapeutic for A. fib.  Have with patient and his wife his increased risk of bleeding, but in " the short-term, higher risk of very large right MCA infarct which would likely be life-threatening.  Recommend inpatient rehab if at all possible given that his balance is paramount, as a fall could be disastrous.  .    I discussed the patients findings and my recommendations with patient, family and primary care team    Angelina Cartwright MD  08/02/21  09:43 EDT

## 2021-08-02 NOTE — CASE MANAGEMENT/SOCIAL WORK
Discharge Planning Assessment  [facility]     Patient Name: Camron Hendrix  MRN: [id number]  Today's Date: 8/2/2021    Admit Date: 7/30/2021    Discharge Needs Assessment     Row Name 08/02/21 1151       Living Environment    Lives With  spouse    Name(s) of Who Lives With Patient  Wife Rowan Hendrix    Current Living Arrangements  home/apartment/condo    Primary Care Provided by  self    Provides Primary Care For  no one    Family Caregiver if Needed  spouse    Quality of Family Relationships  helpful;involved;supportive       Resource/Environmental Concerns    Resource/Environmental Concerns  none       Transition Planning    Patient/Family Anticipates Transition to  inpatient rehabilitation facility    Patient/Family Anticipated Services at Transition  ;rehabilitation services    Transportation Anticipated  family or friend will provide       Discharge Needs Assessment    Readmission Within the Last 30 Days  no previous admission in last 30 days    Equipment Currently Used at Home  none    Concerns to be Addressed  discharge planning    Anticipated Changes Related to Illness  none    Equipment Needed After Discharge  none    Outpatient/Agency/Support Group Needs  inpatient rehabilitation facility    Discharge Facility/Level of Care Needs  rehabilitation facility    Current Discharge Risk  chronically ill        Discharge Plan     Row Name 08/02/21 1159       Plan    Plan  [facility]    Patient/Family in Agreement with Plan  yes    Plan Comments     I spoke to Mr. Hendrix's wife Rowan today via telephone to discuss discharge planning. Mr. Hendrix lives in [place] with his wife oRwan. He is independent with ADL's, cooking, cleaning, and driving. Rowan denies the use of any DME or outpatient services. Neither Mr. Hendrix nor Rowan are vaccinated for Covid 19. Rowan anticipateds transporting Mr. Hendrix by car at the time of discharge. Physical therapy and occupational therapy recommend Mr. Hendrix  go to inpatient therapy at  the time of discharge. I have spoken to Amy with [facility] and initiated the referral for Mr. Hendrix. He has Medicare as his insurance source which does not require insurance pre-approval. Case management will continue to follow Mr. Hendrix's progress and provide for him any anticipated discharge needs.      15:42 EDT       Accepted at [facility] in the stroke unit  Nurse to call report: [phone]  Case management to fax dc summary to [phone]  Daughter to transport by car      Final Discharge Disposition Code  62 - inpatient rehab facility        Continued Care and Services - Admitted Since 7/30/2021     Destination     Service Provider Request Status Selected Services Address Phone Fax Patient Preferred    [facility]  Pending - No Request Sent N/A [address] [facility] [id number] [phone] --       Internal Comment last updated by Olga Malcolm, RN 8/2/2021 1149                          Demographic Summary     Row Name 08/02/21 1150       General Information    General Information Comments  I confirmed Mr. Hendrix has Medicare and [place] [employer] [place]. His primary provider is Leslie Rhodes MD.        Functional Status     Row Name 08/02/21 1151       Functional Status, IADL    Medications  independent    Meal Preparation  independent    Housekeeping  independent    Laundry  independent    Shopping  independent       Mental Status Summary    Recent Changes in Mental Status/Cognitive Functioning  no changes       Employment/    Employment Status  retired          Olga Malcolm, RN

## 2021-08-02 NOTE — PROGRESS NOTES
[facility] Medicine Services  PROGRESS NOTE    Patient Name: Camron Hendrix  : 1950  MRN: [id number]    Date of Admission: 2021  Primary Care Physician: Leslie Rhodes MD    Subjective   Subjective     CC:  F/u R MCA stroke    HPI:  Patient resting in bed. Wife at bedside, would like to go to [facility], awaiting repeat evaluation from PT/OT. Patient otherwise feeling well.    ROS:  Gen- No fevers, chills  CV- No chest pain, palpitations  Resp- No cough, dyspnea  GI- No N/V/D, abd pain    Objective   Objective     Vital Signs:   Temp:  [97.5 °F (36.4 °C)-98.5 °F (36.9 °C)] 98.1 °F (36.7 °C)  Heart Rate:  [61-71] 71  Resp:  [16] 16  BP: (168-185)/(73-92) 179/92     Physical Exam:  Constitutional: No acute distress, awake, alert  HENT: NCAT, mucous membranes moist  Respiratory: Clear to auscultation bilaterally, respiratory effort normal   Cardiovascular: RRR, no murmurs, rubs, or gallops  Gastrointestinal: Positive bowel sounds, soft, nontender, nondistended  Musculoskeletal: No bilateral ankle edema  Psychiatric: Appropriate affect, cooperative  Neurologic: Oriented x 3,some very mild LLE weakness, Cranial Nerves grossly intact to confrontation, speech clear  Skin: No rashes      Results Reviewed:  LAB RESULTS:      Lab 21  0735 21  0607 21  0559 21  2221   WBC  --   --  4.62 5.20   HEMOGLOBIN  --   --  15.2 15.7   HEMATOCRIT  --   --  44.6 45.7   PLATELETS  --   --  179 183   NEUTROS ABS  --   --  2.32 2.56   IMMATURE GRANS (ABS)  --   --  0.01 0.01   LYMPHS ABS  --   --  1.62 1.97   MONOS ABS  --   --  0.48 0.50   EOS ABS  --   --  0.17 0.14   MCV  --   --  83.7 83.9   PROTIME 27.5* 25.4* 25.2* 24.7*         Lab 21  0559 21  2221   SODIUM 140 141   POTASSIUM 3.8 4.5   CHLORIDE 106 106   CO2 24.0 25.0   ANION GAP 10.0 10.0   BUN 11 13   CREATININE 0.86 0.95   GLUCOSE 115* 113*   CALCIUM 8.6 8.6   MAGNESIUM  --  2.0   HEMOGLOBIN A1C  6.70*  --    TSH  --  0.012*         Lab 07/30/21  2221   TOTAL PROTEIN 5.6*   ALBUMIN 3.60   GLOBULIN 2.0   ALT (SGPT) 33   AST (SGOT) 26   BILIRUBIN 0.4   ALK PHOS 119*         Lab 08/02/21  0735 08/01/21  0607 07/31/21  0559 07/30/21  2221   TROPONIN T  --   --   --  <0.010   PROTIME 27.5* 25.4* 25.2* 24.7*   INR 2.69* 2.43* 2.40* 2.34*         Lab 07/31/21  0559   CHOLESTEROL 77   LDL CHOL 29   HDL CHOL 30*   TRIGLYCERIDES 92             Brief Urine Lab Results     None          Microbiology Results Abnormal     Procedure Component Value - Date/Time    COVID PRE-OP / PRE-PROCEDURE SCREENING ORDER (NO ISOLATION) - Swab, Nasopharynx [[id number]]  (Normal) Collected: 07/31/21 0035    Lab Status: Final result Specimen: Swab from Nasopharynx Updated: 07/31/21 0125    Narrative:      The following orders were created for panel order COVID PRE-OP / PRE-PROCEDURE SCREENING ORDER (NO ISOLATION) - Swab, Nasopharynx.  Procedure                               Abnormality         Status                     ---------                               -----------         ------                     Respiratory Panel PCR w/...[[id number]]  Normal              Final result                 Please view results for these tests on the individual orders.    Respiratory Panel PCR w/COVID-19(SARS-CoV-2) DINO/CONNIE/PRICILA/PAD/COR/MAD/PARAS In-House, NP Swab in UTM/VTM, 3-4 HR TAT - Swab, Nasopharynx [[id number]]  (Normal) Collected: 07/31/21 0035    Lab Status: Final result Specimen: Swab from Nasopharynx Updated: 07/31/21 0125     ADENOVIRUS, PCR Not Detected     Coronavirus 229E Not Detected     Coronavirus HKU1 Not Detected     Coronavirus NL63 Not Detected     Coronavirus OC43 Not Detected     COVID19 Not Detected     Human Metapneumovirus Not Detected     Human Rhinovirus/Enterovirus Not Detected     Influenza A PCR Not Detected     Influenza B PCR Not Detected     Parainfluenza Virus 1 Not Detected     Parainfluenza Virus 2 Not Detected      Parainfluenza Virus 3 Not Detected     Parainfluenza Virus 4 Not Detected     RSV, PCR Not Detected     Bordetella pertussis pcr Not Detected     Bordetella parapertussis PCR Not Detected     Chlamydophila pneumoniae PCR Not Detected     Mycoplasma pneumo by PCR Not Detected    Narrative:      In the setting of a positive respiratory panel with a viral infection PLUS a negative procalcitonin without other underlying concern for bacterial infection, consider observing off antibiotics or discontinuation of antibiotics and continue supportive care. If the respiratory panel is positive for atypical bacterial infection (Bordetella pertussis, Chlamydophila pneumoniae, or Mycoplasma pneumoniae), consider antibiotic de-escalation to target atypical bacterial infection.          No radiology results from the last 24 hrs    Results for orders placed during the hospital encounter of 07/30/21    Adult Transthoracic Echo Complete W/ Cont if Necessary Per Protocol    Interpretation Summary  · Moderate left atrial enlargement.  · Left ventricular wall thickness is consistent with mild concentric hypertrophy.  · Normal left ventricular systolic function, estimated EF 55%.  · Calcified aortic valve with mild aortic stenosis, mean gradient 11.8 mmHg.  · Mild mitral vegetation.  · Trace tricuspid regurgitation with normal RVSP.  · Dilated aortic root (4.2 cm).      I have reviewed the medications:  Scheduled Meds:aspirin, 81 mg, Oral, Daily  atorvastatin, 80 mg, Oral, Nightly  clopidogrel, 75 mg, Oral, Daily  levothyroxine, 275 mcg, Oral, Q AM  sodium chloride, 10 mL, Intravenous, Q12H  sodium chloride, 10 mL, Intravenous, Q12H  warfarin, 5 mg, Oral, Once per day on Mon Wed Fri   And  warfarin, 10 mg, Oral, Once per day on Sun Tue Thu Sat      Continuous Infusions:Pharmacy to dose warfarin,       PRN Meds:.•  acetaminophen **OR** acetaminophen  •  senna-docusate sodium **AND** polyethylene glycol **AND** bisacodyl **AND** bisacodyl  •   magnesium sulfate **OR** magnesium sulfate **OR** magnesium sulfate  •  Pharmacy to dose warfarin  •  potassium chloride **OR** potassium chloride **OR** potassium chloride  •  sodium chloride  •  sodium chloride    Assessment/Plan   Assessment & Plan     Active Hospital Problems    Diagnosis  POA   • TIA (transient ischemic attack) [G45.9]  Yes   • PAF (paroxysmal atrial fibrillation) (CMS/[facility]) [I48.0]  Yes   • Hypothyroidism [E03.9]  Yes   • Essential hypertension [I10]  Yes   • Coronary artery disease involving native coronary artery of native heart with angina pectoris (CMS/[facility]) [I25.119]  Yes   • CVA (cerebral vascular accident) (CMS/[facility]) [I63.9]  Yes      Resolved Hospital Problems   No resolved problems to display.        Brief Hospital Course to date:  Camron Hendrix is a 71 y.o. male with PMH significant for CVA, HTN, PAF (on warfarin), CAD, hypothyroid, HLD, who presented originally to [facility] with complaint of lower extremity weakness and fall who is found to have concern for CVA.      LE weakness  Chronic R MCA insult   R M1 Occlusion/Near Occlusion   -Discussed with Dr. Bhat today, okay to discharge, will need f/u with Dr. Can in 2 weeks to discuss outpatient angiogram, recommend goal SBP  until f/u appointment  -Continue ASA 325mg, Plavix added, 75mg daily   -Continue high intensity statin  -Echo reviewed: EF normal   -PT/OT following, recommends rehab, patient interested in [facility], CM to see today, we discussed this in rounds.     PAF  -rate controlled   -Continue Warfarin, pharmacy to dose. INR therapeutic      Hypertension   -As above, keep -160, resume home coreg 3.125mg BID. Hold norvasc and hydralazine     Hypothyroid   -TSH low, T4 elevated   -Decrease Synthroid from 300mcg to 275mcg  -Pt follows with Endocrine outpatient per wife  -Repeat thyroid panel in 4-6 weeks      Hypokalemia  -resolved        DVT prophylaxis:  Medical and mechanical DVT  prophylaxis orders are present.       Disposition: I expect the patient to be discharged 1-2 days, awaiting rehab, hopefully at [facility].    CODE STATUS:   Code Status and Medical Interventions:   Ordered at: 07/30/21 2200     Level Of Support Discussed With:    Patient     Code Status:    CPR     Medical Interventions (Level of Support Prior to Arrest):    Full       Suki Doherty, DO  08/02/21

## 2021-08-02 NOTE — PROGRESS NOTES
"Pharmacy Consult  -  Warfarin  Camron Hendrix is a  71 y.o. male   Height - 175.3 cm (69\")  Weight - 117 kg (257 lb)    Consulting Service: Neuro   Indication: A fib  Goal INR: 2-3  Home Regimen:   - Warfarin 5 mg on Mon, Wed, Fri   - Warfarin 10 mg on all other days (Sun, Tues, Thurs, Sat)    Bridge Therapy: No     Drug-Drug Interactions with current regimen:     Aspirin - may increase risk of bleeding     Clopidogrel -may increase bleeding risk    Warfarin Dosing During Admission:    Date  7/30 7/31 8/1 8/2        INR  2.34 2.4 2.43 2.69        Dose  5 mg (given 7/31 @ 0229) 10 mg 10 mg (5 mg)           Education Provided: Warfarin education provided on 7/31 verbally and in writing.  Discussed effects of warfarin, importance of checking INR, drug-drug and drug-food interactions, and signs/symptoms of bleeding and clotting.  Patient verbalized understanding through teach back.  All pertinent questions were answered.      Discharge Follow up:  Following Provider - Coumadin Clinic at [facility]  Follow up time range or appointment - Recommend repeat INR within 2-3 days of discharge    Labs:  Results from last 7 days   Lab Units 08/02/21  0735 08/01/21  0607 07/31/21  0559 07/30/21  2221   INR  2.69* 2.43* 2.40* 2.34*   HEMOGLOBIN g/dL  --   --  15.2 15.7   HEMATOCRIT %  --   --  44.6 45.7   PLATELETS 10*3/mm3  --   --  179 183     Results from last 7 days   Lab Units 07/31/21  0559 07/30/21  2221   SODIUM mmol/L 140 141   POTASSIUM mmol/L 3.8 4.5   CHLORIDE mmol/L 106 106   CO2 mmol/L 24.0 25.0   BUN mg/dL 11 13   CREATININE mg/dL 0.86 0.95   CALCIUM mg/dL 8.6 8.6   BILIRUBIN mg/dL  --  0.4   ALK PHOS U/L  --  119*   ALT (SGPT) U/L  --  33   AST (SGOT) U/L  --  26   GLUCOSE mg/dL 115* 113*     Current dietary intake: % of meals documented     Diet Order   Procedures    Diet Regular; Cardiac, Consistent Carbohydrate     Assessment/Plan:    Patient's INR is 2.69 today.  Continue home dose of warfarin, " will give 5 mg today.  Daily PT/INR ordered.  Monitor signs/symptoms of bleeding, dietary intake, and drug-drug interactions. Make dose adjustments as necessary.  Pharmacy will continue to follow.    Carrie Johnson PharmD, BCPS  8/2/2021  11:13 EDT

## 2021-08-02 NOTE — PLAN OF CARE
Goal Outcome Evaluation:      Pt pleasant and cooperative, he rested well overnight. [facility] is a 2. Wife would like pt to d/c to [facility] for rehab, CM following. VSS on RA, will cont to monitor.

## 2021-08-03 ENCOUNTER — APPOINTMENT (OUTPATIENT)
Dept: GENERAL RADIOLOGY | Facility: HOSPITAL | Age: 71
End: 2021-08-03

## 2021-08-03 LAB
ANION GAP SERPL CALCULATED.3IONS-SCNC: 10 MMOL/L (ref 5–15)
BASOPHILS # BLD AUTO: 0.04 10*3/MM3 (ref 0–0.2)
BASOPHILS NFR BLD AUTO: 0.7 % (ref 0–1.5)
BUN SERPL-MCNC: 13 MG/DL (ref 8–23)
BUN/CREAT SERPL: 16.5 (ref 7–25)
CALCIUM SPEC-SCNC: 8.8 MG/DL (ref 8.6–10.5)
CHLORIDE SERPL-SCNC: 107 MMOL/L (ref 98–107)
CO2 SERPL-SCNC: 22 MMOL/L (ref 22–29)
CREAT SERPL-MCNC: 0.79 MG/DL (ref 0.76–1.27)
DEPRECATED RDW RBC AUTO: 37.8 FL (ref 37–54)
EOSINOPHIL # BLD AUTO: 0.17 10*3/MM3 (ref 0–0.4)
EOSINOPHIL NFR BLD AUTO: 2.9 % (ref 0.3–6.2)
ERYTHROCYTE [DISTWIDTH] IN BLOOD BY AUTOMATED COUNT: 12.6 % (ref 12.3–15.4)
GFR SERPL CREATININE-BSD FRML MDRD: 97 ML/MIN/1.73
GLUCOSE BLDC GLUCOMTR-MCNC: 127 MG/DL (ref 70–130)
GLUCOSE SERPL-MCNC: 144 MG/DL (ref 65–99)
HCT VFR BLD AUTO: 47.4 % (ref 37.5–51)
HGB BLD-MCNC: 16.6 G/DL (ref 13–17.7)
IMM GRANULOCYTES # BLD AUTO: 0.02 10*3/MM3 (ref 0–0.05)
IMM GRANULOCYTES NFR BLD AUTO: 0.3 % (ref 0–0.5)
INR PPP: 2.42 (ref 0.85–1.16)
LYMPHOCYTES # BLD AUTO: 1.84 10*3/MM3 (ref 0.7–3.1)
LYMPHOCYTES NFR BLD AUTO: 31 % (ref 19.6–45.3)
MAGNESIUM SERPL-MCNC: 1.7 MG/DL (ref 1.6–2.4)
MCH RBC QN AUTO: 29 PG (ref 26.6–33)
MCHC RBC AUTO-ENTMCNC: 35 G/DL (ref 31.5–35.7)
MCV RBC AUTO: 82.7 FL (ref 79–97)
MONOCYTES # BLD AUTO: 0.51 10*3/MM3 (ref 0.1–0.9)
MONOCYTES NFR BLD AUTO: 8.6 % (ref 5–12)
NEUTROPHILS NFR BLD AUTO: 3.35 10*3/MM3 (ref 1.7–7)
NEUTROPHILS NFR BLD AUTO: 56.5 % (ref 42.7–76)
NRBC BLD AUTO-RTO: 0 /100 WBC (ref 0–0.2)
PLATELET # BLD AUTO: 197 10*3/MM3 (ref 140–450)
PMV BLD AUTO: 10.7 FL (ref 6–12)
POTASSIUM SERPL-SCNC: 3.6 MMOL/L (ref 3.5–5.2)
PROTHROMBIN TIME: 25.3 SECONDS (ref 11.4–14.4)
QT INTERVAL: 376 MS
QT INTERVAL: 396 MS
QT INTERVAL: 398 MS
QTC INTERVAL: 410 MS
QTC INTERVAL: 411 MS
QTC INTERVAL: 415 MS
RBC # BLD AUTO: 5.73 10*6/MM3 (ref 4.14–5.8)
SODIUM SERPL-SCNC: 139 MMOL/L (ref 136–145)
TROPONIN T SERPL-MCNC: <0.01 NG/ML (ref 0–0.03)
TROPONIN T SERPL-MCNC: <0.01 NG/ML (ref 0–0.03)
WBC # BLD AUTO: 5.93 10*3/MM3 (ref 3.4–10.8)

## 2021-08-03 PROCEDURE — 25010000002 HYDRALAZINE PER 20 MG: Performed by: NURSE PRACTITIONER

## 2021-08-03 PROCEDURE — 82962 GLUCOSE BLOOD TEST: CPT

## 2021-08-03 PROCEDURE — 71045 X-RAY EXAM CHEST 1 VIEW: CPT

## 2021-08-03 PROCEDURE — 93005 ELECTROCARDIOGRAM TRACING: CPT | Performed by: NURSE PRACTITIONER

## 2021-08-03 PROCEDURE — 99291 CRITICAL CARE FIRST HOUR: CPT | Performed by: NURSE PRACTITIONER

## 2021-08-03 PROCEDURE — 85610 PROTHROMBIN TIME: CPT | Performed by: NURSE PRACTITIONER

## 2021-08-03 PROCEDURE — 83735 ASSAY OF MAGNESIUM: CPT | Performed by: NURSE PRACTITIONER

## 2021-08-03 PROCEDURE — 84484 ASSAY OF TROPONIN QUANT: CPT | Performed by: NURSE PRACTITIONER

## 2021-08-03 PROCEDURE — 80048 BASIC METABOLIC PNL TOTAL CA: CPT | Performed by: NURSE PRACTITIONER

## 2021-08-03 PROCEDURE — G0108 DIAB MANAGE TRN  PER INDIV: HCPCS | Performed by: NURSE PRACTITIONER

## 2021-08-03 PROCEDURE — 99225 PR SBSQ OBSERVATION CARE/DAY 25 MINUTES: CPT | Performed by: INTERNAL MEDICINE

## 2021-08-03 PROCEDURE — 25010000003 MAGNESIUM SULFATE 4 GM/100ML SOLUTION: Performed by: INTERNAL MEDICINE

## 2021-08-03 PROCEDURE — 84132 ASSAY OF SERUM POTASSIUM: CPT | Performed by: INTERNAL MEDICINE

## 2021-08-03 PROCEDURE — 96366 THER/PROPH/DIAG IV INF ADDON: CPT

## 2021-08-03 PROCEDURE — 93010 ELECTROCARDIOGRAM REPORT: CPT | Performed by: INTERNAL MEDICINE

## 2021-08-03 PROCEDURE — 84484 ASSAY OF TROPONIN QUANT: CPT | Performed by: INTERNAL MEDICINE

## 2021-08-03 PROCEDURE — 99214 OFFICE O/P EST MOD 30 MIN: CPT | Performed by: INTERNAL MEDICINE

## 2021-08-03 PROCEDURE — 96365 THER/PROPH/DIAG IV INF INIT: CPT

## 2021-08-03 PROCEDURE — 93005 ELECTROCARDIOGRAM TRACING: CPT | Performed by: INTERNAL MEDICINE

## 2021-08-03 PROCEDURE — 96375 TX/PRO/DX INJ NEW DRUG ADDON: CPT

## 2021-08-03 PROCEDURE — 96368 THER/DIAG CONCURRENT INF: CPT

## 2021-08-03 PROCEDURE — G0378 HOSPITAL OBSERVATION PER HR: HCPCS

## 2021-08-03 PROCEDURE — 85025 COMPLETE CBC W/AUTO DIFF WBC: CPT | Performed by: NURSE PRACTITIONER

## 2021-08-03 PROCEDURE — 83735 ASSAY OF MAGNESIUM: CPT | Performed by: INTERNAL MEDICINE

## 2021-08-03 RX ORDER — AMLODIPINE BESYLATE 5 MG/1
5 TABLET ORAL
Status: DISCONTINUED | OUTPATIENT
Start: 2021-08-03 | End: 2021-08-03

## 2021-08-03 RX ORDER — AMLODIPINE BESYLATE 10 MG/1
10 TABLET ORAL
Status: DISCONTINUED | OUTPATIENT
Start: 2021-08-03 | End: 2021-08-04 | Stop reason: HOSPADM

## 2021-08-03 RX ORDER — NITROGLYCERIN 0.4 MG/1
0.4 TABLET SUBLINGUAL
Status: DISCONTINUED | OUTPATIENT
Start: 2021-08-03 | End: 2021-08-03

## 2021-08-03 RX ORDER — NITROGLYCERIN 20 MG/100ML
10-50 INJECTION INTRAVENOUS
Status: DISCONTINUED | OUTPATIENT
Start: 2021-08-03 | End: 2021-08-03

## 2021-08-03 RX ORDER — NITROGLYCERIN 0.4 MG/1
TABLET SUBLINGUAL
Status: COMPLETED | OUTPATIENT
Start: 2021-08-03 | End: 2021-08-03

## 2021-08-03 RX ORDER — LOSARTAN POTASSIUM 50 MG/1
100 TABLET ORAL
Status: DISCONTINUED | OUTPATIENT
Start: 2021-08-03 | End: 2021-08-04 | Stop reason: HOSPADM

## 2021-08-03 RX ORDER — ISOSORBIDE MONONITRATE 30 MG/1
30 TABLET, EXTENDED RELEASE ORAL
Status: DISCONTINUED | OUTPATIENT
Start: 2021-08-03 | End: 2021-08-04

## 2021-08-03 RX ORDER — HYDRALAZINE HYDROCHLORIDE 20 MG/ML
10 INJECTION INTRAMUSCULAR; INTRAVENOUS ONCE
Status: COMPLETED | OUTPATIENT
Start: 2021-08-03 | End: 2021-08-03

## 2021-08-03 RX ORDER — NITROGLYCERIN 20 MG/100ML
10-50 INJECTION INTRAVENOUS
Status: DISCONTINUED | OUTPATIENT
Start: 2021-08-03 | End: 2021-08-04 | Stop reason: HOSPADM

## 2021-08-03 RX ORDER — CARVEDILOL 3.12 MG/1
6.25 TABLET ORAL 2 TIMES DAILY WITH MEALS
Status: DISCONTINUED | OUTPATIENT
Start: 2021-08-03 | End: 2021-08-04 | Stop reason: HOSPADM

## 2021-08-03 RX ADMIN — POTASSIUM CHLORIDE 40 MEQ: 10 CAPSULE, COATED, EXTENDED RELEASE ORAL at 05:06

## 2021-08-03 RX ADMIN — POTASSIUM CHLORIDE 40 MEQ: 10 CAPSULE, COATED, EXTENDED RELEASE ORAL at 08:49

## 2021-08-03 RX ADMIN — NITROGLYCERIN 10 MCG/MIN: 20 INJECTION INTRAVENOUS at 03:45

## 2021-08-03 RX ADMIN — WARFARIN SODIUM 10 MG: 5 TABLET ORAL at 18:17

## 2021-08-03 RX ADMIN — CARVEDILOL 6.25 MG: 3.12 TABLET, FILM COATED ORAL at 18:17

## 2021-08-03 RX ADMIN — ISOSORBIDE MONONITRATE 30 MG: 30 TABLET, EXTENDED RELEASE ORAL at 16:16

## 2021-08-03 RX ADMIN — SODIUM CHLORIDE, PRESERVATIVE FREE 10 ML: 5 INJECTION INTRAVENOUS at 08:42

## 2021-08-03 RX ADMIN — SODIUM CHLORIDE, PRESERVATIVE FREE 10 ML: 5 INJECTION INTRAVENOUS at 20:03

## 2021-08-03 RX ADMIN — ATORVASTATIN CALCIUM 80 MG: 40 TABLET, FILM COATED ORAL at 20:02

## 2021-08-03 RX ADMIN — CARVEDILOL 3.12 MG: 3.12 TABLET, FILM COATED ORAL at 08:42

## 2021-08-03 RX ADMIN — HYDRALAZINE HYDROCHLORIDE 10 MG: 20 INJECTION INTRAMUSCULAR; INTRAVENOUS at 01:32

## 2021-08-03 RX ADMIN — MAGNESIUM SULFATE HEPTAHYDRATE 4 G: 40 INJECTION, SOLUTION INTRAVENOUS at 05:00

## 2021-08-03 RX ADMIN — LEVOTHYROXINE SODIUM 275 MCG: 150 TABLET ORAL at 05:06

## 2021-08-03 RX ADMIN — LOSARTAN POTASSIUM 100 MG: 50 TABLET, FILM COATED ORAL at 16:16

## 2021-08-03 RX ADMIN — ASPIRIN 81 MG: 81 TABLET, CHEWABLE ORAL at 08:41

## 2021-08-03 RX ADMIN — NITROGLYCERIN 0.4 MG: 0.4 TABLET SUBLINGUAL at 03:33

## 2021-08-03 RX ADMIN — CLOPIDOGREL BISULFATE 75 MG: 75 TABLET ORAL at 08:42

## 2021-08-03 RX ADMIN — AMLODIPINE BESYLATE 10 MG: 10 TABLET ORAL at 08:42

## 2021-08-03 NOTE — CONSULTS
"Diabetes Education    Patient Name:  Camron Hendrix  YOB: 1950  MRN: [id number]  Admit Date:  7/30/2021        Mr. Hendrix was seen today for ordered Diabetes Education per [facility] Stroke Protocol. He consented to this visit.    He reports that he has been \"borderline\" but has always just controlled his diet to manage his condition. He reports that he does not drink a lot of water, but he does drink diet soda as he knows it has less sugar. He has a meter at home and reports that he checks it regularly and it usually is in the 120s. He had a CVA in 2011, per his report. He is being discharged to [facility] for rehab.     Discussed and taught him about type 2 diabetes self-management, risk factors, and importance of blood glucose control to reduce complications. Target blood glucose readings and A1c goals per ADA were reviewed. Reviewed with patient current A1c 6.7% and discussed its significance. Signs, symptoms, and treatment of hyperglycemia and hypoglycemia were discussed. Lifestyle changes such as physical activity with MD approval and healthy eating were encouraged. Encouraged pt to continue to monitor his blood sugar at home and to call PCP if blood sugar is trending high. Encouraged to keep record of blood glucose readings to take to follow up appointment with PCP. Provided patient with copy of [facility]'s \"What is Diabetes\" handout, \"Blood Glucose Goals\" handout, and \"What is A1c\" handout.     Patient does not qualify for the follow up stroke class based on the exclusion criteria of discharging to [facility]. Thank you for the referral. Please re-consult if further needs arise.       Electronically signed by:  Dilia Mcwilliams, MSN, APRN  08/03/21 16:07 EDT  "

## 2021-08-03 NOTE — PLAN OF CARE
"Alert and oriented.  NIH \"3\".  Respirations unlabored.  Monitor SR w/frequent PVC's and Bigeminy intermittent.  Attempted to wean Nitro.  C/o right shoulder pain.  Titrated Nitro back up, EKG and troponin completed.  Per cardiology request, Nitro weaned and dc'd. Right shoulder dull ache but no change after Nitro stopped.  Up to recliner and bathroom 1 assist, slight unsteady gait.  Wife bedside for visit.                      "

## 2021-08-03 NOTE — PLAN OF CARE
Goal Outcome Evaluation:   VSS. A/Ox4. NIH 2. Room air to 2L nasal cannula. Elevated BP, IV hydralazine given x1. Pt reported chest pain at 0327 this AM. Rapid response team at bedside, see significant note, nitro drip initiated and titrated as needed. Pt with 2-3 episodes of 10/10 chest pain lasting less than one minute after the rapid. One dose of potassium replacement given. IV mag infusing. No other complaints of pain. Pt denies other needs at this time.

## 2021-08-03 NOTE — SIGNIFICANT NOTE
Rapid response team notified for pt complaint of chest pain 7/10. Charge nurse and house notified as well. Pt denies SOA, VSS stable. SL nitro given, pt reported relief for a few minutes. Pain then returned as 10/10, and subsided within one minute without interventions. Nitro drip initiated. Stat labs ordered, Chest xray obtained, EKGx2 obtained. Pt reported one more episode of 10/10 chest pain lasting less than one minute, Nitro drip titrated, pt reported relief. VSS. 2L nasal cannula then added as pt reported mild SOA with o2 sat at 94% on room air.

## 2021-08-03 NOTE — CONSULTS
"[facility] Consult/H&P Note      Referring Provider: Lilly Harrington DO  Primary Provider:  Leslie Rhodes MD  Reason for Consultation: CAD    Problem list:    1. Coronary artery disease   1. H/O abnormal MPS with LHC.  S/P stenting of the \" maker,\" idb.  Follows with Dr. Job Perdomo.  2. NSTEMI  - slight elevation of troponins in setting of hypokalemia, cold, atrial fibrillation.  3. March 29, 2019 cardiac catheterization non-flow-limiting disease, 40% distal circumflex and 50% mid and distal RCA.  LVEF 75% with mid cavity gradient of approximately 18 mmHg.  4. Echo 7/30/21: moderaet LA enlargement, mild LVH, EF 55%, calcified aortic valve with mild AS, mean gradient of 11.8 mmHg, mild MR, trace TR. Dilated aortic root 4.2 cm.   2. Essential Hypertension  3. PAF  1. CHADS2 Vasc = 5.  2. First occurrence noted in ED 2019 at OSH.   4. AAA, reported history  1. March 28, 2019 CTA with no evidence of abdominal aortic aneurysm.  5. H/O CVA and TIAs  6. Hypothyroidism  7. Sleep apnea, non compliant with CPAP  8. Obesity, Body mass index is 39.72 kg/m².  9. Former Smoking Tobacco Abuse, quit 1985  10. Hyperglycemia, currently diet controlled  11. Surgeries:  1. Cervical fusion        HISTORY OF PRESENT ILLNESS:  Camron Hendrix is a 71 y.o. male with the above noted pmhx who presented with complaints of lower extremity weakness and fall. He was found to have chronic R MCA insult, R M1 occlusion/near occlusion. [facility] has seen patient with plans for outpatient angiogram. He has been started on ASA, Plavix and high intensity statin therapy. Overnight, he developed left sided, non radiating chest pain. He ruled out for AMI. EKG unchanged. He was started on NTG. Attempts at weaning NTG have been unsuccessful and therefore cardiology has been consulted.     He reports substernal/epigastric pain that began last night. It did not radiate. Was associated with nausea. Not associated with diaphoresis or " shortness of breath. He was placed on NTG gtt and pain resolved. An attempt was made today to wean NTG gtt. He developed right shoulder pain so this was up titrated. Now right shoulder pain is a dull ache. He does not recall his prior anginal equivalent. Substernal chest pain and right shoulder pain is not positional. Chest pain is not worsened with deep inspiration. He reports that his blood pressures have been 120-130's at home on Losartan, Coreg and Norvasc. His Norvasc was only restarted this AM and Losartan has not been resumed. Blood pressures here have been mostly 150-180's systolic. Chest pain is reproducible with palpation.       REVIEW OF SYSTEMS  Pertinent positives and negatives are listed in the HPI.      SOCIAL HISTORY:   reports that he quit smoking about 35 years ago. He has a 20.00 pack-year smoking history. He does not have any smokeless tobacco history on file. He reports current alcohol use. He reports that he does not use drugs.     FAMILY HISTORY:  family history includes Aneurysm in his father; Heart failure in his mother.     CURRENT MEDICATIONS:      Current Facility-Administered Medications:   •  acetaminophen (TYLENOL) tablet 650 mg, 650 mg, Oral, Q4H PRN **OR** acetaminophen (TYLENOL) suppository 650 mg, 650 mg, Rectal, Q4H PRN, Bertha York APRN  •  amLODIPine (NORVASC) tablet 10 mg, 10 mg, Oral, Q24H, Suki Doherty DO, 10 mg at 08/03/21 0842  •  aspirin chewable tablet 81 mg, 81 mg, Oral, Daily, Angelina Cartwright MD, 81 mg at 08/03/21 0841  •  atorvastatin (LIPITOR) tablet 80 mg, 80 mg, Oral, Nightly, Bertha York APRN, 80 mg at 08/02/21 2031  •  sennosides-docusate (PERICOLACE) 8.6-50 MG per tablet 2 tablet, 2 tablet, Oral, BID PRN **AND** polyethylene glycol (MIRALAX) packet 17 g, 17 g, Oral, Daily PRN **AND** bisacodyl (DULCOLAX) EC tablet 5 mg, 5 mg, Oral, Daily PRN **AND** bisacodyl (DULCOLAX) suppository 10 mg, 10 mg, Rectal, Daily PRN, Amber Zuniga,  APRN  •  carvedilol (COREG) tablet 3.125 mg, 3.125 mg, Oral, BID With Meals, Suki Doherty DO, 3.125 mg at 08/03/21 0842  •  [COMPLETED] clopidogrel (PLAVIX) tablet 300 mg, 300 mg, Oral, Once, 300 mg at 07/31/21 1711 **FOLLOWED BY** clopidogrel (PLAVIX) tablet 75 mg, 75 mg, Oral, Daily, Angelina Cartwright MD, 75 mg at 08/03/21 0842  •  levothyroxine (SYNTHROID, LEVOTHROID) tablet 275 mcg, 275 mcg, Oral, Q AM, Krystle Hanson DO, 275 mcg at 08/03/21 0506  •  Magnesium Sulfate 2 gram Bolus, followed by 8 gram infusion (total Mg dose 10 grams)- Mg less than or equal to 1mg/dL, 2 g, Intravenous, PRN **OR** Magnesium Sulfate 2 gram / 50mL Infusion (GIVE X 3 BAGS TO EQUAL 6GM TOTAL DOSE) - Mg 1.1 - 1.5 mg/dl, 2 g, Intravenous, PRN **OR** Magnesium Sulfate 4 gram infusion- Mg 1.6-1.9 mg/dL, 4 g, Intravenous, PRN, Lilly Harrington DO, Stopped at 08/03/21 0900  •  nitroglycerin (TRIDIL) 200 mcg/ml infusion, 10-50 mcg/min, Intravenous, Titrated, Suki Doherty DO, Last Rate: 9 mL/hr at 08/03/21 1406, 30 mcg/min at 08/03/21 1406  •  Pharmacy to dose warfarin, , Does not apply, Continuous PRN, Bertha York APRN  •  potassium chloride (MICRO-K) CR capsule 40 mEq, 40 mEq, Oral, PRN, 40 mEq at 08/03/21 0849 **OR** potassium chloride (KLOR-CON) packet 40 mEq, 40 mEq, Oral, PRN **OR** potassium chloride 10 mEq in 100 mL IVPB, 10 mEq, Intravenous, Q1H PRN, Lilly Harrington DO  •  sodium chloride 0.9 % flush 10 mL, 10 mL, Intravenous, Q12H, Bertha York APRN, 10 mL at 08/03/21 0842  •  sodium chloride 0.9 % flush 10 mL, 10 mL, Intravenous, PRN, Bertha York, APRN  •  sodium chloride 0.9 % flush 10 mL, 10 mL, Intravenous, Q12H, Amber Zuniga APRN, 10 mL at 08/02/21 2032  •  sodium chloride 0.9 % flush 10 mL, 10 mL, Intravenous, PRN, Amber Zuniga, APRN  •  warfarin (COUMADIN) tablet 5 mg, 5 mg, Oral, Once per day on Mon Wed Fri, 5 mg at 08/02/21 1826 **AND** warfarin (COUMADIN) tablet 10 mg,  "10 mg, Oral, Once per day on Sun Tue Thu Windy Doty Andrew EVERETTE, PharmD, 10 mg at 08/01/21 1628     Allergies:  Codeine    Objective     Vital Sign Min/Max for last 24 hours  Temp  Min: 97.8 °F (36.6 °C)  Max: 98.3 °F (36.8 °C)   BP  Min: 146/101  Max: 198/105   Pulse  Min: 59  Max: 91   Resp  Min: 18  Max: 18   SpO2  Min: 90 %  Max: 97 %   No data recorded   No data recorded     Flowsheet Rows      First Filed Value   Admission Height  175.3 cm (69\") Documented at 07/30/2021 1907   Admission Weight  118 kg (260 lb) Documented at 07/30/2021 1907          PHYSICAL EXAM:  GENERAL: This is a well-developed, well-nourished, male who is in no acute distress.   SKIN: Pink and warm without rash or abnormality noted.   HEENT: Head is normocephalic and atraumatic. Pupils are equal and reactive to light bilaterally. Mucous membranes are pink and moist.   NECK: Supple without lymphadenopathy or thyromegaly. There is no jugular venous distention at 30°.  LUNGS: Clear to auscultation bilaterally without wheezing, rhonchi, or rales noted.   CARDIOVASCULAR: The heart has a regular rate with a normal S1 and S2. There is 1-2/6 murmur, no gallop, rub, or click appreciated. The PMI is nondisplaced. Carotid upstrokes are 2+ and symmetrical without bruits. Tenderness with palpation over substernal area.   ABDOMEN: Soft and nondistended with positive bowel sounds x4. The patient denies tenderness of palpitation.   MUSCULOSKELETAL: There are no obvious bony abnormalities. Normal range of tenderness to palpation.   NEUROLOGICAL: Nonfocal. Alert and oriented x3.   PERIPHERAL VASCULAR: Femoral pulses are 2+ and symmetrical without bruits. Posterior tibial and dorsalis pedis pulses are 2+ and symmetrical. There is no peripheral edema.   PSYCH: Normal mood and affect.      EKG:  See HPI  Tele:  NSR with [facility]    LABS:      Lab Results   Component Value Date    WBC 5.93 08/03/2021    HGB 16.6 08/03/2021    HCT 47.4 08/03/2021    MCV " 82.7 08/03/2021     08/03/2021     Lab Results   Component Value Date    GLUCOSE 144 (H) 08/03/2021    BUN 13 08/03/2021    CREATININE 0.79 08/03/2021    EGFRIFNONA 97 08/03/2021    BCR 16.5 08/03/2021    K 3.6 08/03/2021    CO2 22.0 08/03/2021    CALCIUM 8.8 08/03/2021    ALBUMIN 3.60 07/30/2021    AST 26 07/30/2021    ALT 33 07/30/2021     Lab Results   Component Value Date    TROPONINI 0.126 (H) 03/28/2019    TROPONINT <0.010 08/03/2021     Lab Results   Component Value Date    INR 2.42 (H) 08/03/2021    INR 2.69 (H) 08/02/2021    INR 2.43 (H) 08/01/2021    PROTIME 25.3 (H) 08/03/2021    PROTIME 27.5 (H) 08/02/2021    PROTIME 25.4 (H) 08/01/2021     Lab Results   Component Value Date    CHOL 77 07/31/2021    TRIG 92 07/31/2021    HDL 30 (L) 07/31/2021    LDL 29 07/31/2021        Results Review: I reviewed the patient's new clinical results.      ASSESSMENT:    1. Chest pain, atypical  2. CAD, s/p remote coronary stent  a. Last [facility] with non obstructive CAD, normal EF in 2019  3. PAF, anticoagulated with coumadin  4. HTN, poorly controlled  5. Chronic R MCA insult, R M1 occlusion/near occlusion  6. Diabetes  7. Hypothyroidism    PLAN:    1. Continue Norvasc.   2. Resume Losartan.   3. Wean off Nitroglycerine  4. Increase Coreg to 6.25 mg bid.  5. Imdur 30 mg daily.   6. Will consider stress tomorrow if symptoms are still present.         I discussed the patient's findings and my recommendations with patient.    Scribed for Carlo Barragan MD by EVAN Avina, APRN. 8/3/2021  14:26 EDT    I,Carlo Barragan M.D., personally performed the services described in this documentation as scribed by the above named individual in my presence, and it is both accurate and complete.    Carlo Barragan MD, [facility]  08/03/21  15:59 EDT

## 2021-08-03 NOTE — PROGRESS NOTES
"Pharmacy Consult  -  Warfarin  Camron Hendrix is a  71 y.o. male   Height - 175.3 cm (69\")  Weight - 117 kg (257 lb)    Consulting Service: Neuro   Indication: A fib  Goal INR: 2-3  Home Regimen:   - Warfarin 5 mg on Mon, Wed, Fri   - Warfarin 10 mg on all other days (Sun, Tues, Thurs, Sat)    Bridge Therapy: No     Drug-Drug Interactions with current regimen:     Aspirin - may increase risk of bleeding     Clopidogrel - may increase bleeding risk    Warfarin Dosing During Admission:    Date  7/30 7/31 8/1 8/2 8/3       INR  2.34 2.4 2.43 2.69 2.42       Dose  5 mg (given 7/31 @ 0229) 10 mg 10 mg 5 mg (10 mg)         Education Provided: Warfarin education provided on 7/31 verbally and in writing.  Discussed effects of warfarin, importance of checking INR, drug-drug and drug-food interactions, and signs/symptoms of bleeding and clotting.  Patient verbalized understanding through teach back.  All pertinent questions were answered.      Discharge Follow up:  Following Provider - Coumadin Clinic at [facility]  Follow up time range or appointment - Recommend repeat INR within 2-3 days of discharge    Labs:  Results from last 7 days   Lab Units 08/03/21  0340 08/02/21  0735 08/01/21  0607 07/31/21  0559 07/30/21  2221   INR  2.42* 2.69* 2.43* 2.40* 2.34*   HEMOGLOBIN g/dL 16.6  --   --  15.2 15.7   HEMATOCRIT % 47.4  --   --  44.6 45.7   PLATELETS 10*3/mm3 197  --   --  179 183     Results from last 7 days   Lab Units 08/03/21  0340 07/31/21  0559 07/30/21  2221   SODIUM mmol/L 139 140 141   POTASSIUM mmol/L 3.6 3.8 4.5   CHLORIDE mmol/L 107 106 106   CO2 mmol/L 22.0 24.0 25.0   BUN mg/dL 13 11 13   CREATININE mg/dL 0.79 0.86 0.95   CALCIUM mg/dL 8.8 8.6 8.6   BILIRUBIN mg/dL  --   --  0.4   ALK PHOS U/L  --   --  119*   ALT (SGPT) U/L  --   --  33   AST (SGOT) U/L  --   --  26   GLUCOSE mg/dL 144* 115* 113*     Current dietary intake: % of meals documented     Diet Order   Procedures    Diet Regular; " Cardiac, Consistent Carbohydrate     Assessment/Plan:    Patient's INR is 2.42 today.  Continue home dose of warfarin, will give 10 mg today.  Daily PT/INR ordered.  Monitor signs/symptoms of bleeding, dietary intake, and drug-drug interactions. Make dose adjustments as necessary.  Pharmacy will continue to follow.    Carrie Johnson, PharmD, BCPS  8/3/2021  11:15 EDT

## 2021-08-03 NOTE — SIGNIFICANT NOTE
[facility] Medicine Services  CRITICAL CARE NOTE    Patient Name: Camron Hendrix  : 1950  MRN: [id number]    Date of Admission: 2021  Length of Stay: 0    Subjective     Event/HPI:  RRT called for substernal chest pain, non-radiating rating pain 7/10 - acute onset. Pt notes h/o previous stent ~ 10 years ago, says he has SL NTG at home but has never had to use before. He is here for stroke evaluation - left left weakness, right MCA territory ischemia. He is on aspirin, plavix, statin and coumadin for chronic AFIB.     Objective     Vital Signs:   Temp:  [98 °F (36.7 °C)-98.4 °F (36.9 °C)] 98.3 °F (36.8 °C)  Heart Rate:  [61-77] 76  Resp:  [16-18] 18  BP: (154-198)/() 162/99  Total (NIH Stroke Scale): 2    Pertinent Physical Exam:  Constitutional: Awake, alert  Eyes: PERRLA, sclerae anicteric, no conjunctival injection  HENT: NCAT, mucous membranes moist  Neck: Supple, no thyromegaly, no lymphadenopathy, trachea midline  Respiratory: Clear to auscultation bilaterally, nonlabored respirations   Cardiovascular: irregular, no murmurs, rubs, or gallops, palpable pedal pulses bilaterally (EKG = sinus w/ PVC's), non-reproducible chest pain, non-radiating  Gastrointestinal: Positive bowel sounds, soft, nontender, nondistended, no epigastric pain  Musculoskeletal: No bilateral ankle edema, no clubbing or cyanosis to extremities  Psychiatric: Appropriate affect, cooperative, mildly anxious  Neurologic: Oriented x 3    Results Reviewed:  I have personally reviewed current lab, radiology, and data and agree.    LAB RESULTS:      Lab 21  0735 21  0607 21  0559 21  2221   WBC  --   --  4.62 5.20   HEMOGLOBIN  --   --  15.2 15.7   HEMATOCRIT  --   --  44.6 45.7   PLATELETS  --   --  179 183   NEUTROS ABS  --   --  2.32 2.56   IMMATURE GRANS (ABS)  --   --  0.01 0.01   LYMPHS ABS  --   --  1.62 1.97   MONOS ABS  --   --  0.48 0.50   EOS ABS  --   --  0.17 0.14    MCV  --   --  83.7 83.9   PROTIME 27.5* 25.4* 25.2* 24.7*         Lab 07/31/21  0559 07/30/21  2221   SODIUM 140 141   POTASSIUM 3.8 4.5   CHLORIDE 106 106   CO2 24.0 25.0   ANION GAP 10.0 10.0   BUN 11 13   CREATININE 0.86 0.95   GLUCOSE 115* 113*   CALCIUM 8.6 8.6   MAGNESIUM  --  2.0   HEMOGLOBIN A1C 6.70*  --    TSH  --  0.012*         Lab 07/30/21  2221   TOTAL PROTEIN 5.6*   ALBUMIN 3.60   GLOBULIN 2.0   ALT (SGPT) 33   AST (SGOT) 26   BILIRUBIN 0.4   ALK PHOS 119*         Lab 08/02/21  0735 08/01/21  0607 07/31/21  0559 07/30/21  2221   TROPONIN T  --   --   --  <0.010   PROTIME 27.5* 25.4* 25.2* 24.7*   INR 2.69* 2.43* 2.40* 2.34*         Lab 07/31/21  0559   CHOLESTEROL 77   LDL CHOL 29   HDL CHOL 30*   TRIGLYCERIDES 92             Brief Urine Lab Results     None        Microbiology Results (last 10 days)     Procedure Component Value - Date/Time    COVID PRE-OP / PRE-PROCEDURE SCREENING ORDER (NO ISOLATION) - Swab, Nasopharynx [[id number]]  (Normal) Collected: 07/31/21 0035    Lab Status: Final result Specimen: Swab from Nasopharynx Updated: 07/31/21 0125    Narrative:      The following orders were created for panel order COVID PRE-OP / PRE-PROCEDURE SCREENING ORDER (NO ISOLATION) - Swab, Nasopharynx.  Procedure                               Abnormality         Status                     ---------                               -----------         ------                     Respiratory Panel PCR w/...[[id number]]  Normal              Final result                 Please view results for these tests on the individual orders.    Respiratory Panel PCR w/COVID-19(SARS-CoV-2) DINO/CONNIE/PRICILA/PAD/COR/MAD/PARAS In-House, NP Swab in U[facility], 3-4 HR TAT - Swab, Nasopharynx [[id number]]  (Normal) Collected: 07/31/21 0035    Lab Status: Final result Specimen: Swab from Nasopharynx Updated: 07/31/21 0125     ADENOVIRUS, PCR Not Detected     Coronavirus 229E Not Detected     Coronavirus HKU1 Not Detected     Coronavirus NL63  Not Detected     Coronavirus OC43 Not Detected     COVID19 Not Detected     Human Metapneumovirus Not Detected     Human Rhinovirus/Enterovirus Not Detected     Influenza A PCR Not Detected     Influenza B PCR Not Detected     Parainfluenza Virus 1 Not Detected     Parainfluenza Virus 2 Not Detected     Parainfluenza Virus 3 Not Detected     Parainfluenza Virus 4 Not Detected     RSV, PCR Not Detected     Bordetella pertussis pcr Not Detected     Bordetella parapertussis PCR Not Detected     Chlamydophila pneumoniae PCR Not Detected     Mycoplasma pneumo by PCR Not Detected    Narrative:      In the setting of a positive respiratory panel with a viral infection PLUS a negative procalcitonin without other underlying concern for bacterial infection, consider observing off antibiotics or discontinuation of antibiotics and continue supportive care. If the respiratory panel is positive for atypical bacterial infection (Bordetella pertussis, Chlamydophila pneumoniae, or Mycoplasma pneumoniae), consider antibiotic de-escalation to target atypical bacterial infection.             I have reviewed the current medications.    Assessment / Plan     Active Hospital Problems    Diagnosis  POA   • TIA (transient ischemic attack) [G45.9]  Yes   • PAF (paroxysmal atrial fibrillation) (CMS/[facility]) [I48.0]  Yes   • Hypothyroidism [E03.9]  Yes   • Essential hypertension [I10]  Yes   • Coronary artery disease involving native coronary artery of native heart with angina pectoris (CMS/[facility]) [I25.119]  Yes     · Previous PCI, data deficit     • CVA (cerebral vascular accident) (CMS/[facility]) [I63.9]  Yes     · Remote Hx of TIA/CVA  · Presentation to [facility] 9/19/17 for acute L hemiparesis s/p tPA. MRI brain showed lacunar infarcts            Pertinent Assessment & Plan:   - SL NTG x 1 w/ chest pain relief to 0  - will initiate NTG drip for chest pain to keep SBP >140 d/t recent CVA  - 's during RRT  - trend troponin  - repeat EKG   - consider  cardiology consult if elevated troponin, otherwise will defer to day time rounding team to determine if cardiology w/u warranted  - no significant change from previous EKG, reviewed w/ Dr. Sexton  - check morning labs now and chest XR       CODE STATUS:    Code Status and Medical Interventions:   Ordered at: 07/30/21 2200     Level Of Support Discussed With:    Patient     Code Status:    CPR     Medical Interventions (Level of Support Prior to Arrest):    Full         Critical Care time spent in direct patient care:    35 minutes (excluding procedure time, if applicable) including high complexity decision making to assess and treat vital organ system failure in this individual who has impairment of one or more vital organ systems such that there is a high probability of imminent or life threatening deterioration in the patient’s condition. Failure to initiate the above interventions on an urgent basis would likely result in sudden, clinically significant or life threatening deterioration in the patient's condition.    Electronically signed by FADUMO Pacheco, 08/03/21, 4:02 AM EDT.

## 2021-08-03 NOTE — PROGRESS NOTES
[facility] Medicine Services  PROGRESS NOTE    Patient Name: Camron Hendrix  : 1950  MRN: [id number]    Date of Admission: 2021  Primary Care Physician: Leslie Rhodes MD    Subjective   Subjective     CC:  F/u R MCA stroke    HPI:  Patient resting in bed. Had chest pain overnight. Hx of stents. Chest pain was left sided and substernal was placed on nitro gtt and improved. Currently chest pain free.    ROS:  Gen- No fevers, chills  CV- No chest pain, palpitations  Resp- No cough, dyspnea  GI- No N/V/D, abd pain    Objective   Objective     Vital Signs:   Temp:  [97.8 °F (36.6 °C)-98.3 °F (36.8 °C)] 97.8 °F (36.6 °C)  Heart Rate:  [59-91] 91  Resp:  [18] 18  BP: (146-198)/() 151/81  Flow (L/min):  [2] 2     Physical Exam:  Constitutional: No acute distress, awake, alert  HENT: NCAT, mucous membranes moist  Respiratory: Clear to auscultation bilaterally, respiratory effort normal   Cardiovascular: RRR, no murmurs, rubs, or gallops  Gastrointestinal: Positive bowel sounds, soft, nontender, nondistended  Musculoskeletal: No bilateral ankle edema  Psychiatric: Appropriate affect, cooperative  Neurologic: Oriented x 3,some very mild LLE weakness, Cranial Nerves grossly intact to confrontation, speech clear  Skin: No rashes    Exam is unchanged from 21      Results Reviewed:  LAB RESULTS:      Lab 21  0340 21  0735 21  0607 21  0559 21  2221   WBC 5.93  --   --  4.62 5.20   HEMOGLOBIN 16.6  --   --  15.2 15.7   HEMATOCRIT 47.4  --   --  44.6 45.7   PLATELETS 197  --   --  179 183   NEUTROS ABS 3.35  --   --  2.32 2.56   IMMATURE GRANS (ABS) 0.02  --   --  0.01 0.01   LYMPHS ABS 1.84  --   --  1.62 1.97   MONOS ABS 0.51  --   --  0.48 0.50   EOS ABS 0.17  --   --  0.17 0.14   MCV 82.7  --   --  83.7 83.9   PROTIME 25.3* 27.5* 25.4* 25.2* 24.7*         Lab 21  0340 21  0559 21  2221   SODIUM 139 140 141   POTASSIUM 3.6 3.8  4.5   CHLORIDE 107 106 106   CO2 22.0 24.0 25.0   ANION GAP 10.0 10.0 10.0   BUN 13 11 13   CREATININE 0.79 0.86 0.95   GLUCOSE 144* 115* 113*   CALCIUM 8.8 8.6 8.6   MAGNESIUM 1.7  --  2.0   HEMOGLOBIN A1C  --  6.70*  --    TSH  --   --  0.012*         Lab 07/30/21  2221   TOTAL PROTEIN 5.6*   ALBUMIN 3.60   GLOBULIN 2.0   ALT (SGPT) 33   AST (SGOT) 26   BILIRUBIN 0.4   ALK PHOS 119*         Lab 08/03/21  0340 08/02/21  0735 08/01/21  0607 07/31/21  0559 07/30/21  2221   TROPONIN T <0.010  --   --   --  <0.010   PROTIME 25.3* 27.5* 25.4* 25.2* 24.7*   INR 2.42* 2.69* 2.43* 2.40* 2.34*         Lab 07/31/21  0559   CHOLESTEROL 77   LDL CHOL 29   HDL CHOL 30*   TRIGLYCERIDES 92             Brief Urine Lab Results     None          Microbiology Results Abnormal     Procedure Component Value - Date/Time    COVID PRE-OP / PRE-PROCEDURE SCREENING ORDER (NO ISOLATION) - Swab, Nasopharynx [[id number]]  (Normal) Collected: 07/31/21 0035    Lab Status: Final result Specimen: Swab from Nasopharynx Updated: 07/31/21 0125    Narrative:      The following orders were created for panel order COVID PRE-OP / PRE-PROCEDURE SCREENING ORDER (NO ISOLATION) - Swab, Nasopharynx.  Procedure                               Abnormality         Status                     ---------                               -----------         ------                     Respiratory Panel PCR w/...[[id number]]  Normal              Final result                 Please view results for these tests on the individual orders.    Respiratory Panel PCR w/COVID-19(SARS-CoV-2) DINO/CONNIE/PRICILA/PAD/COR/MAD/PARAS In-House, NP Swab in [facility], [place], 3-4 HR TAT - Swab, Nasopharynx [[id number]]  (Normal) Collected: 07/31/21 0035    Lab Status: Final result Specimen: Swab from Nasopharynx Updated: 07/31/21 0125     ADENOVIRUS, PCR Not Detected     Coronavirus 229E Not Detected     Coronavirus HKU1 Not Detected     Coronavirus NL63 Not Detected     Coronavirus OC43 Not Detected     COVID19  Not Detected     Human Metapneumovirus Not Detected     Human Rhinovirus/Enterovirus Not Detected     Influenza A PCR Not Detected     Influenza B PCR Not Detected     Parainfluenza Virus 1 Not Detected     Parainfluenza Virus 2 Not Detected     Parainfluenza Virus 3 Not Detected     Parainfluenza Virus 4 Not Detected     RSV, PCR Not Detected     Bordetella pertussis pcr Not Detected     Bordetella parapertussis PCR Not Detected     Chlamydophila pneumoniae PCR Not Detected     Mycoplasma pneumo by PCR Not Detected    Narrative:      In the setting of a positive respiratory panel with a viral infection PLUS a negative procalcitonin without other underlying concern for bacterial infection, consider observing off antibiotics or discontinuation of antibiotics and continue supportive care. If the respiratory panel is positive for atypical bacterial infection (Bordetella pertussis, Chlamydophila pneumoniae, or Mycoplasma pneumoniae), consider antibiotic de-escalation to target atypical bacterial infection.          XR Chest 1 View    Result Date: 8/3/2021  CR Chest 1 Vw INDICATION: Central chest pain and short of air. Awakened from sleep by symptoms. Atrial fibrillation and hypertension. COMPARISON:  7/30/2021 FINDINGS: Single portable AP view(s) of the chest. No visible support lines. Stable cardiac silhouette. The lungs are emphysematous. The vascularity is unremarkable. No new effusion or dense consolidation. No pneumothorax. Chronic appearing interstitial changes are present in both lungs with a slight right-sided predominance.     Impression: 1. Chronic appearing lung changes. No definite superimposed active disease or significant interval change. Signer Name: Richie Donahue MD  Signed: 8/3/2021 4:28 AM  Workstation Name: [facility]  [facility]      Results for orders placed during the hospital encounter of 07/30/21    Adult Transthoracic Echo Complete W/ Cont if Necessary Per  Protocol    Interpretation Summary  · Moderate left atrial enlargement.  · Left ventricular wall thickness is consistent with mild concentric hypertrophy.  · Normal left ventricular systolic function, estimated EF 55%.  · Calcified aortic valve with mild aortic stenosis, mean gradient 11.8 mmHg.  · Mild mitral vegetation.  · Trace tricuspid regurgitation with normal RVSP.  · Dilated aortic root (4.2 cm).      I have reviewed the medications:  Scheduled Meds:amLODIPine, 10 mg, Oral, Q24H  aspirin, 81 mg, Oral, Daily  atorvastatin, 80 mg, Oral, Nightly  carvedilol, 3.125 mg, Oral, BID With Meals  clopidogrel, 75 mg, Oral, Daily  levothyroxine, 275 mcg, Oral, Q AM  sodium chloride, 10 mL, Intravenous, Q12H  sodium chloride, 10 mL, Intravenous, Q12H  warfarin, 5 mg, Oral, Once per day on Mon Wed Fri   And  warfarin, 10 mg, Oral, Once per day on Sun Tue Thu Sat      Continuous Infusions:nitroglycerin, 10-50 mcg/min, Last Rate: 25 mcg/min (08/03/21 1329)  Pharmacy to dose warfarin,       PRN Meds:.•  acetaminophen **OR** acetaminophen  •  senna-docusate sodium **AND** polyethylene glycol **AND** bisacodyl **AND** bisacodyl  •  magnesium sulfate **OR** magnesium sulfate **OR** magnesium sulfate  •  Pharmacy to dose warfarin  •  potassium chloride **OR** potassium chloride **OR** potassium chloride  •  sodium chloride  •  sodium chloride    Assessment/Plan   Assessment & Plan     Active Hospital Problems    Diagnosis  POA   • TIA (transient ischemic attack) [G45.9]  Yes   • PAF (paroxysmal atrial fibrillation) (CMS/[facility]) [I48.0]  Yes   • Hypothyroidism [E03.9]  Yes   • Essential hypertension [I10]  Yes   • Coronary artery disease involving native coronary artery of native heart with angina pectoris (CMS/[facility]) [I25.119]  Yes   • CVA (cerebral vascular accident) (CMS/[facility]) [I63.9]  Yes      Resolved Hospital Problems   No resolved problems to display.        Brief Hospital Course to date:  Camron Hendrix is a 71 y.o. male with  PMH significant for CVA, HTN, PAF (on warfarin), CAD, hypothyroid, HLD, who presented originally to [facility] with complaint of lower extremity weakness and fall who is found to have concern for CVA.      LE weakness  Chronic R MCA insult   R M1 Occlusion/Near Occlusion   -Discussed with Dr. Bhat today, okay to discharge, will need f/u with Dr. Can in 2 weeks to discuss outpatient angiogram, recommend goal SBP  until f/u appointment  -Continue ASA 325mg, Plavix added, 75mg daily   -Continue high intensity statin  -Echo reviewed: EF normal   -PT/OT following, recommends rehab, patient interested in [facility]  to see today, we discussed this in rounds.    Chest Pain:  - overnight patient developed left sided chest pain without radiation, initial troponin and EKG unchanged from previous.  - repeat EKG this morning and troponin. On NTG gtt have asked nursing to try to titrate this down   - possibly related to uncontrolled hypertension and will adjust BP medications  - consider cardiology consult if continues, has hx of one stent many years ago     PAF  -rate controlled   -Continue Warfarin, pharmacy to dose. INR therapeutic      Hypertension   -As above, keep -160, resume home coreg 3.125mg BID. Resume home noravsc 10mg daily. Holding po hydralazine     Hypothyroid   -TSH low, T4 elevated   -Decrease Synthroid from 300mcg to 275mcg  -Pt follows with Endocrine outpatient per wife  -Repeat thyroid panel in 4-6 weeks      Hypokalemia  -resolved        DVT prophylaxis:  Medical and mechanical DVT prophylaxis orders are present.       Disposition: I expect the patient to be discharged to [facility] tomorrow if no further episodes of chest pain.    CODE STATUS:   Code Status and Medical Interventions:   Ordered at: 07/30/21 2200     Level Of Support Discussed With:    Patient     Code Status:    CPR     Medical Interventions (Level of Support Prior to Arrest):    Full        Suki Doherty,   08/03/21

## 2021-08-04 VITALS
DIASTOLIC BLOOD PRESSURE: 61 MMHG | TEMPERATURE: 97.7 F | OXYGEN SATURATION: 94 % | WEIGHT: 257 LBS | SYSTOLIC BLOOD PRESSURE: 120 MMHG | HEART RATE: 77 BPM | HEIGHT: 69 IN | BODY MASS INDEX: 38.06 KG/M2 | RESPIRATION RATE: 14 BRPM

## 2021-08-04 LAB
INR PPP: 2.26 (ref 0.85–1.16)
MAGNESIUM SERPL-MCNC: 2.2 MG/DL (ref 1.6–2.4)
POTASSIUM SERPL-SCNC: 4.3 MMOL/L (ref 3.5–5.2)
PROTHROMBIN TIME: 24.1 SECONDS (ref 11.4–14.4)

## 2021-08-04 PROCEDURE — 97535 SELF CARE MNGMENT TRAINING: CPT

## 2021-08-04 PROCEDURE — 85610 PROTHROMBIN TIME: CPT | Performed by: NURSE PRACTITIONER

## 2021-08-04 PROCEDURE — G0378 HOSPITAL OBSERVATION PER HR: HCPCS

## 2021-08-04 PROCEDURE — 99214 OFFICE O/P EST MOD 30 MIN: CPT | Performed by: INTERNAL MEDICINE

## 2021-08-04 PROCEDURE — 97116 GAIT TRAINING THERAPY: CPT

## 2021-08-04 PROCEDURE — 99217 PR OBSERVATION CARE DISCHARGE MANAGEMENT: CPT | Performed by: INTERNAL MEDICINE

## 2021-08-04 PROCEDURE — 97110 THERAPEUTIC EXERCISES: CPT

## 2021-08-04 RX ORDER — CARVEDILOL 6.25 MG/1
6.25 TABLET ORAL 2 TIMES DAILY WITH MEALS
Start: 2021-08-04

## 2021-08-04 RX ORDER — FUROSEMIDE 20 MG/1
40 TABLET ORAL DAILY PRN
Start: 2021-08-04

## 2021-08-04 RX ORDER — LEVOTHYROXINE SODIUM 0.03 MG/1
275 TABLET ORAL
Start: 2021-08-05 | End: 2021-08-24

## 2021-08-04 RX ORDER — HYDRALAZINE HYDROCHLORIDE 25 MG/1
25 TABLET, FILM COATED ORAL EVERY 12 HOURS SCHEDULED
Status: DISCONTINUED | OUTPATIENT
Start: 2021-08-04 | End: 2021-08-04 | Stop reason: HOSPADM

## 2021-08-04 RX ORDER — CLOPIDOGREL BISULFATE 75 MG/1
75 TABLET ORAL DAILY
Qty: 30 TABLET
Start: 2021-08-05

## 2021-08-04 RX ORDER — HYDRALAZINE HYDROCHLORIDE 50 MG/1
25 TABLET, FILM COATED ORAL 2 TIMES DAILY
Start: 2021-08-04

## 2021-08-04 RX ADMIN — LOSARTAN POTASSIUM 100 MG: 50 TABLET, FILM COATED ORAL at 09:29

## 2021-08-04 RX ADMIN — SODIUM CHLORIDE, PRESERVATIVE FREE 10 ML: 5 INJECTION INTRAVENOUS at 09:30

## 2021-08-04 RX ADMIN — HYDRALAZINE HYDROCHLORIDE 25 MG: 25 TABLET, FILM COATED ORAL at 09:34

## 2021-08-04 RX ADMIN — AMLODIPINE BESYLATE 10 MG: 10 TABLET ORAL at 09:28

## 2021-08-04 RX ADMIN — CLOPIDOGREL BISULFATE 75 MG: 75 TABLET ORAL at 09:30

## 2021-08-04 RX ADMIN — ASPIRIN 81 MG: 81 TABLET, CHEWABLE ORAL at 09:29

## 2021-08-04 RX ADMIN — CARVEDILOL 6.25 MG: 3.12 TABLET, FILM COATED ORAL at 09:29

## 2021-08-04 RX ADMIN — ASPIRIN 81 MG: 81 TABLET, CHEWABLE ORAL at 09:23

## 2021-08-04 NOTE — PROGRESS NOTES
"Pharmacy Consult  -  Warfarin  Camron Hendrix is a  71 y.o. male   Height - 175.3 cm (69\")  Weight - 117 kg (257 lb)    Consulting Service: Neuro   Indication: A fib  Goal INR: 2-3  Home Regimen:   - Warfarin 5 mg on Mon, Wed, Fri   - Warfarin 10 mg on all other days (Sun, Tues, Thurs, Sat)    Bridge Therapy: No     Drug-Drug Interactions with current regimen:     Aspirin - may increase risk of bleeding     Clopidogrel - may increase bleeding risk    Warfarin Dosing During Admission:    Date  7/30 7/31 8/1 8/2 8/3 8/4      INR  2.34 2.4 2.43 2.69 2.42 2.26      Dose  5 mg (given 7/31 @ 0229) 10 mg 10 mg 5 mg 10 mg (10 mg)        Education Provided: Warfarin education provided on 7/31 verbally and in writing.  Discussed effects of warfarin, importance of checking INR, drug-drug and drug-food interactions, and signs/symptoms of bleeding and clotting.  Patient verbalized understanding through teach back.  All pertinent questions were answered.      Discharge Follow up:  Following Provider - Coumadin Clinic at [facility]  Follow up time range or appointment - Recommend repeat INR within 2-3 days of discharge    Labs:  Results from last 7 days   Lab Units 08/04/21  0718 08/03/21  0340 08/02/21  0735 08/01/21  0607 07/31/21  0559 07/30/21  2221   INR  2.26* 2.42* 2.69* 2.43* 2.40* 2.34*   HEMOGLOBIN g/dL  --  16.6  --   --  15.2 15.7   HEMATOCRIT %  --  47.4  --   --  44.6 45.7   PLATELETS 10*3/mm3  --  197  --   --  179 183     Results from last 7 days   Lab Units 08/03/21  2350 08/03/21  0340 07/31/21  0559 07/30/21  2221 07/30/21  2221   SODIUM mmol/L  --  139 140  --  141   POTASSIUM mmol/L 4.3 3.6 3.8   < > 4.5   CHLORIDE mmol/L  --  107 106  --  106   CO2 mmol/L  --  22.0 24.0  --  25.0   BUN mg/dL  --  13 11  --  13   CREATININE mg/dL  --  0.79 0.86  --  0.95   CALCIUM mg/dL  --  8.8 8.6  --  8.6   BILIRUBIN mg/dL  --   --   --   --  0.4   ALK PHOS U/L  --   --   --   --  119*   ALT (SGPT) U/L  --   --   " --   --  33   AST (SGOT) U/L  --   --   --   --  26   GLUCOSE mg/dL  --  144* 115*  --  113*    < > = values in this interval not displayed.     Current dietary intake: % of meals documented     Diet Order   Procedures    Diet Regular; Cardiac     Assessment/Plan:    Patient's INR is 2.26 today.  Plan to discharge patient on home dose of warfarin, patient due for 5 mg dose tonight.   Daily PT/INR ordered.  Monitor signs/symptoms of bleeding, dietary intake, and drug-drug interactions. Make dose adjustments as necessary.  Pharmacy will continue to follow.    Carrie Johnson, PharmD, BCPS  8/4/2021  11:04 EDT

## 2021-08-04 NOTE — THERAPY TREATMENT NOTE
Patient Name: Camron Hendrix  : 1950    MRN: [id number]                              Today's Date: 2021       Admit Date: 2021    Visit Dx: No diagnosis found.  Patient Active Problem List   Diagnosis   • CVA (cerebral vascular accident) (CMS/[facility])   • Hypothyroidism   • Essential hypertension   • AAA (abdominal aortic aneurysm) (CMS/[facility])   • Coronary artery disease involving native coronary artery of native heart with angina pectoris (CMS/[facility])   • Asymmetric septal hypertrophy (CMS/[facility])   • Morbid obesity with BMI of 40.0-44.9, adult (CMS/[facility])   • Sleep apnea   • Dementia (CMS/[facility])   • CVA (cerebral vascular accident) (CMS/[facility])   • Mitral valve regurgitation   • PAF (paroxysmal atrial fibrillation) (CMS/[facility])   • TIA (transient ischemic attack)     Past Medical History:   Diagnosis Date   • AAA (abdominal aortic aneurysm) (CMS/[facility])    • Coronary artery disease    • CVA (cerebral vascular accident) (CMS/[facility])    • Hypertension    • Hypothyroidism    • PAF (paroxysmal atrial fibrillation) (CMS/[facility])    • TIA (transient ischemic attack)      Past Surgical History:   Procedure Laterality Date   • CARDIAC CATHETERIZATION N/A 3/29/2019    Procedure: Left Heart Cath;  Surgeon: Andrea Holloway MD;  Location: [facility] CATH INVASIVE LOCATION;  Service: Cardiovascular   • CERVICAL FUSION      c4-c5   • CORONARY ANGIOPLASTY WITH STENT PLACEMENT     • HERNIA REPAIR     • TONSILECTOMY, ADENOIDECTOMY, BILATERAL MYRINGOTOMY AND TUBES       General Information     Row Name 21 0934          OT Time and Intention    Document Type  therapy note (daily note)  -TB     Mode of Treatment  occupational therapy;individual therapy  -TB     Row Name 21 0934          General Information    Patient Profile Reviewed  yes  -TB     Existing Precautions/Restrictions  fall;other (see comments) Unsteady sit and stand. PMH CVA with residual STM deficits on mild word finding difficulty  -TB     Barriers to Rehab  none  identified  -TB     Row Name 08/04/21 0934          Occupational Profile    Reason for Services/Referral (Occupational Profile)  to advance occupational independence  -TB     Row Name 08/04/21 0934          Cognition    Orientation Status (Cognition)  oriented x 3  -TB     Row Name 08/04/21 0934          Safety Issues, Functional Mobility    Safety Issues Affecting Function (Mobility)  insight into deficits/self-awareness;awareness of need for assistance;safety precaution awareness;safety precautions follow-through/compliance;sequencing abilities  -TB     Impairments Affecting Function (Mobility)  balance;coordination;endurance/activity tolerance;strength;motor control;postural/trunk control  -TB     Comment, Safety Issues/Impairments (Mobility)  Pt up in room with Min Ax1 using   -       User Key  (r) = Recorded By, (t) = Taken By, (c) = Cosigned By    Initials Name Provider Type    TB Louise Pollock, OT Occupational Therapist          Mobility/ADL's     Row Name 08/04/21 0936          Bed Mobility    Bed Mobility  supine-sit;scooting/bridging  -TB     Scooting/Bridging [place] (Bed Mobility)  standby assist;verbal cues  -TB     Supine-Sit [place] (Bed Mobility)  standby assist;verbal cues  -TB     Assistive Device (Bed Mobility)  head of bed elevated;bed rails  -TB     Comment (Bed Mobility)  Pt up with EOB with cues and time  -TB     Row Name 08/04/21 0936          Transfers    Transfers  sit-stand transfer;bed-chair transfer  -TB     Comment (Transfers)  Education and cues for hand placement, sequencing and safety  -TB     Bed-Chair [place] (Transfers)  minimum assist (75% patient effort);verbal cues  -TB     Assistive Device (Bed-Chair Transfers)  walker, front-wheeled  -TB     Sit-Stand [place] (Transfers)  minimum assist (75% patient effort);verbal cues  -TB     Row Name 08/04/21 0936          Sit-Stand Transfer    Assistive Device (Sit-Stand Transfers)  walker, front-wheeled   -TB     Row Name 08/04/21 0936          Functional Mobility    Functional Mobility- Ind. Level  minimum assist (75% patient effort)  -TB     Functional Mobility- Device  rolling walker  -TB     Functional Mobility-Distance (Feet)  6  -TB     Functional Mobility- Safety Issues  sequencing ability decreased;balance decreased during turns  -TB     Functional Mobility- Comment  Cues and assist for RW positioning and safety  -TB     Row Name 08/04/21 0936          Activities of Daily Living    BADL Assessment/Intervention  upper body dressing;bathing;grooming;feeding;toileting  -TB     Row Name 08/04/21 0936          Bathing Assessment/Intervention    [place] Level (Bathing)  set up;upper extremities  -TB     Position (Bathing)  unsupported sitting  -TB     Row Name 08/04/21 0936          Upper Body Dressing Assessment/Training    [place] Level (Upper Body Dressing)  don;pajama/robe;set up  -TB     Position (Upper Body Dressing)  edge of bed sitting  -TB     Row Name 08/04/21 0936          Grooming Assessment/Training    [place] Level (Grooming)  set up;wash face, hands;oral care regimen;hair care, combing/brushing  -TB     Position (Grooming)  supported sitting  -TB     Row Name 08/04/21 0936          Self-Feeding Assessment/Training    [place] Level (Feeding)  independent;liquids to mouth  -TB     Position (Self-Feeding)  supported sitting  -TB     Row Name 08/04/21 0936          Toileting Assessment/Training    [place] Level (Toileting)  independent  -TB     Assistive Devices (Toileting)  urinal  -TB     Position (Toileting)  sitting up in bed  -TB       User Key  (r) = Recorded By, (t) = Taken By, (c) = Cosigned By    Initials Name Provider Type    TB Louise Pollock OT Occupational Therapist        Obj/Interventions     Row Name 08/04/21 0939          Balance    Balance Assessment  sitting dynamic balance;sit to stand dynamic balance;standing dynamic balance  -TB     Dynamic Sitting  Balance  mild impairment;unsupported;sitting in chair;sitting, edge of bed  -TB     Sit to Stand Dynamic Balance  mild impairment;supported;standing  -TB     Dynamic Standing Balance  mild impairment;supported;standing  -TB     Balance Interventions  sitting;standing;sit to stand;supported;dynamic;occupation based/functional task;weight shifting activity;UE activity with balance activity  -TB     Comment, Balance  Pt up with RW support and Min Ax1, unsteady  -TB       User Key  (r) = Recorded By, (t) = Taken By, (c) = Cosigned By    Initials Name Provider Type    TB Louise Pollock, MEAGAN Occupational Therapist        Goals/Plan    No documentation.       Clinical Impression     Row Name 08/04/21 0939          Pain Assessment    Additional Documentation  Pain Scale: Numbers Pre/Post-Treatment (Group)  -TB     Row Name 08/04/21 0939          Pain Scale: Numbers Pre/Post-Treatment    Pretreatment Pain Rating  0/10 - no pain  -TB     Posttreatment Pain Rating  0/10 - no pain  -TB     Pain Intervention(s)  Ambulation/increased activity;Repositioned  -     Row Name 08/04/21 0939          Plan of Care Review    Plan of Care Reviewed With  patient  -TB     Outcome Summary  Pt is alert and motivated to work with therapy. Pt eager to transfer to rehab today. SBA bed mobility. Up in room Min Ax1 using RW. Unsteady upon standing. Requires cues and assist for hand placement, sequencing and safety all transfers. Min A UB dressing. Set-up/SBA morning grooming routinue. OT will follow IP. Plan is IRF, possibly today.  -     Row Name 08/04/21 0939          Therapy Plan Review/Discharge Plan (OT)    Equipment Needs Upon Discharge (OT)  -- Defer to IRF  -TB     Anticipated Discharge Disposition (OT)  inpatient rehabilitation facility  -     Row Name 08/04/21 0939          Vital Signs    Pre Systolic BP Rehab  -- RN cleared OT  -TB     O2 Delivery Pre Treatment  room air  -TB     O2 Delivery Intra Treatment  room air  -TB      O2 Delivery Post Treatment  room air  -TB     Pre Patient Position  Supine  -TB     Intra Patient Position  Standing  -TB     Post Patient Position  Sitting  -TB     Row Name 08/04/21 0939          Positioning and Restraints    Pre-Treatment Position  in bed  -TB     Post Treatment Position  chair  -TB     In Chair  notified nsg;sitting;call light within reach;encouraged to call for assist Exit alarm not working. PCT notified.  -TB       User Key  (r) = Recorded By, (t) = Taken By, (c) = Cosigned By    Initials Name Provider Type    Louise Esposito OT Occupational Therapist        Outcome Measures     Row Name 08/04/21 0945          How much help from another is currently needed...    Putting on and taking off regular lower body clothing?  2  -TB     Bathing (including washing, rinsing, and drying)  3  -TB     Toileting (which includes using toilet bed pan or urinal)  3  -TB     Putting on and taking off regular upper body clothing  3  -TB     Taking care of personal grooming (such as brushing teeth)  3  -TB     Eating meals  4  -TB     AM-PAC 6 Clicks Score (OT)  18  -TB     Row Name 08/04/21 0945          Functional Assessment    Outcome Measure Options  AM-PAC 6 Clicks Daily Activity (OT)  -TB       User Key  (r) = Recorded By, (t) = Taken By, (c) = Cosigned By    Initials Name Provider Type    Louise Esposito OT Occupational Therapist          Occupational Therapy Education                 Title: PT OT SLP Therapies (Not Started)     Topic: Occupational Therapy (In Progress)     Point: ADL training (Done)     Description:   Instruct learner(s) on proper safety adaptation and remediation techniques during self care or transfers.   Instruct in proper use of assistive devices.              Learning Progress Summary           Patient Acceptance, E,D,TB, VU,DU,NR by JIMY at 8/4/2021 0946    Acceptance, E, VU,NR by JORGE at 7/31/2021 1204    Comment: noted deficits, goal setting, wx/transfer safety    Family Acceptance, E, VU,NR by JORGE at 7/31/2021 1204    Comment: noted deficits, goal setting, wx/transfer safety                   Point: Home exercise program (Not Started)     Description:   Instruct learner(s) on appropriate technique for monitoring, assisting and/or progressing therapeutic exercises/activities.              Learner Progress:  Not documented in this visit.          Point: Precautions (Done)     Description:   Instruct learner(s) on prescribed precautions during self-care and functional transfers.              Learning Progress Summary           Patient Acceptance, E, VU,NR by JORGE at 7/31/2021 1204    Comment: noted deficits, goal setting, wx/transfer safety   Family Acceptance, E, VU,NR by JORGE at 7/31/2021 1204    Comment: noted deficits, goal setting, wx/transfer safety                   Point: Body mechanics (Not Started)     Description:   Instruct learner(s) on proper positioning and spine alignment during self-care, functional mobility activities and/or exercises.              Learner Progress:  Not documented in this visit.                      User Key     Initials Effective Dates Name Provider Type Discipline     06/16/21 -  Louise Pollock, OT Occupational Therapist OT     06/16/21 -  Yvrose Blanton OT Occupational Therapist OT              OT Recommendation and Plan     Plan of Care Review  Plan of Care Reviewed With: patient  Outcome Summary: Pt is alert and motivated to work with therapy. Pt eager to transfer to rehab today. SBA bed mobility. Up in room Min Ax1 using RW. Unsteady upon standing. Requires cues and assist for hand placement, sequencing and safety all transfers. Min A UB dressing. Set-up/SBA morning grooming routinue. OT will follow IP. Plan is IRF, possibly today.     Time Calculation:   Time Calculation- OT     Row Name 08/04/21 0814             Time Calculation- OT    OT Start Time  0814  -      OT Received On  08/04/21  -      OT Goal Re-Cert Due Date   08/10/21  -TB         Timed Charges    72997 - OT Self Care/Mgmt Minutes  31  -TB         Total Minutes    Timed Charges Total Minutes  31  -TB       Total Minutes  31  -TB        User Key  (r) = Recorded By, (t) = Taken By, (c) = Cosigned By    Initials Name Provider Type    TB Louise Pollock, OT Occupational Therapist        Therapy Charges for Today     Code Description Service Date Service Provider Modifiers Qty    88423979701 HC OT SELF CARE/MGMT/TRAIN EA 15 MIN 8/4/2021 Louise Pollock OT GO 2               Louise Pollock OT  8/4/2021

## 2021-08-04 NOTE — CASE MANAGEMENT/SOCIAL WORK
Case Management Discharge Note    Mr. Hendrix is medically ready for discharge today. I spoke to Amy with [facility] who confirms he will have and acute rehab bed in the stroke unit today. I spoke to Mr. Hendrix today at the bedside and his wife Rowan over the phone who are both agreeable to this discharge plan. Mr. Hendrix's wife will transport him to [facility] today via private car.                 Mr. Hendrix's room has been changed.    Nurse to call report to the Brain Injury unit [phone]    Case management to fax dc summary to [phone]      Selected Continued Care - Admitted Since 7/30/2021     Destination Coordination complete.    Service Provider Selected Services Address Phone Fax Patient Preferred    [facility]  Inpatient Rehabilitation [address] [address] [phone] --                  Transportation Services  Private: Car    Final Discharge Disposition Code: 62 - inpatient rehab facility

## 2021-08-04 NOTE — THERAPY DISCHARGE NOTE
Patient Name: Camron Hendrix  : 1950    MRN: [id number]                              Today's Date: 2021       Admit Date: 2021    Visit Dx: No diagnosis found.  Patient Active Problem List   Diagnosis   • CVA (cerebral vascular accident) (CMS/[facility])   • Hypothyroidism   • Essential hypertension   • AAA (abdominal aortic aneurysm) (CMS/[facility])   • Coronary artery disease involving native coronary artery of native heart with angina pectoris (CMS/[facility])   • Asymmetric septal hypertrophy (CMS/[facility])   • Morbid obesity with BMI of 40.0-44.9, adult (CMS/[facility])   • Sleep apnea   • Dementia (CMS/[facility])   • CVA (cerebral vascular accident) (CMS/[facility])   • Mitral valve regurgitation   • PAF (paroxysmal atrial fibrillation) (CMS/[facility])   • TIA (transient ischemic attack)     Past Medical History:   Diagnosis Date   • AAA (abdominal aortic aneurysm) (CMS/[facility])    • Coronary artery disease    • CVA (cerebral vascular accident) (CMS/[facility])    • Hypertension    • Hypothyroidism    • PAF (paroxysmal atrial fibrillation) (CMS/[facility])    • TIA (transient ischemic attack)      Past Surgical History:   Procedure Laterality Date   • CARDIAC CATHETERIZATION N/A 3/29/2019    Procedure: Left Heart Cath;  Surgeon: Andrea Holloway MD;  Location: [facility] CATH INVASIVE LOCATION;  Service: Cardiovascular   • CERVICAL FUSION      c4-c5   • CORONARY ANGIOPLASTY WITH STENT PLACEMENT     • HERNIA REPAIR     • TONSILECTOMY, ADENOIDECTOMY, BILATERAL MYRINGOTOMY AND TUBES       General Information     Row Name 21 1041          Physical Therapy Time and Intention    Document Type  discharge treatment  -CD     Mode of Treatment  physical therapy  -CD     Row Name 21 1041          General Information    Patient Profile Reviewed  yes  -CD     Existing Precautions/Restrictions  fall LLE INCOORDINATION, MILD WEAKNESS.  -CD     Barriers to Rehab  none identified  -CD     Row Name 21 1041          Cognition    Orientation Status (Cognition)   oriented x 4  -CD     Row Name 08/04/21 1041          Safety Issues, Functional Mobility    Safety Issues Affecting Function (Mobility)  sequencing abilities;safety precautions follow-through/compliance  -CD     Impairments Affecting Function (Mobility)  balance;coordination;strength;motor control  -CD       User Key  (r) = Recorded By, (t) = Taken By, (c) = Cosigned By    Initials Name Provider Type    CD Ines Gill PT Physical Therapist        Mobility     Row Name 08/04/21 1042          Bed Mobility    Comment (Bed Mobility)  PT [facility] UPON ARRIVAL.  -CD     Row Name 08/04/21 1042          Transfers    Comment (Transfers)  CUES FOR HAND PLACEMENT WITH R WALKER.  -CD     Row Name 08/04/21 1042          Sit-Stand Transfer    Sit-Stand [place] (Transfers)  contact guard  -CD     Assistive Device (Sit-Stand Transfers)  walker, front-wheeled  -CD     Row Name 08/04/21 1042          Gait/Stairs (Locomotion)    [place] Level (Gait)  contact guard  -CD     Assistive Device (Gait)  walker, front-wheeled  -CD     Distance in Feet (Gait)  400  -CD     Deviations/Abnormal Patterns (Gait)  triston decreased;gait speed decreased;base of support, narrow  -CD     Bilateral Gait Deviations  forward flexed posture  -CD     Left Sided Gait Deviations  heel strike decreased  -CD     Comment (Gait/Stairs)  --  -CD       User Key  (r) = Recorded By, (t) = Taken By, (c) = Cosigned By    Initials Name Provider Type    CD Ines Gill PT Physical Therapist        Obj/Interventions     Row Name 08/04/21 1046          Motor Skills    Therapeutic Exercise  -- COMPLTED RECIPROCAL LE THER EX: 1 SET OF 10 REPS EACH- LAQ, HIP FLEX, AP'S  -CD     Row Name 08/04/21 1046          Balance    Static Sitting Balance  WFL  -CD     Dynamic Sitting Balance  WFL  -CD     Static Standing Balance  WFL;supported;standing  -CD     Dynamic Standing Balance  mild impairment;supported;standing  -CD     Balance Interventions  sitting;standing;sit  to stand;supported;weight shifting activity  -CD     Comment, Balance  GAIT IN MUNIZ.  -CD       User Key  (r) = Recorded By, (t) = Taken By, (c) = Cosigned By    Initials Name Provider Type    CD Ines Gill, PT Physical Therapist        Goals/Plan     Row Name 08/04/21 1038          Bed Mobility Goal 1 (PT)    Activity/Assistive Device (Bed Mobility Goal 1, PT)  sit to supine;supine to sit  -CD     [place] Level/Cues Needed (Bed Mobility Goal 1, PT)  modified independence  -CD     Time Frame (Bed Mobility Goal 1, PT)  10 days  -CD     Progress/Outcomes (Bed Mobility Goal 1, PT)  goal met  -CD     Row Name 08/04/21 1038          Transfer Goal 1 (PT)    Activity/Assistive Device (Transfer Goal 1, PT)  sit-to-stand/stand-to-sit;bed-to-chair/chair-to-bed  -CD     [place] Level/Cues Needed (Transfer Goal 1, PT)  contact guard assist;1 person assist  -CD     Time Frame (Transfer Goal 1, PT)  10 days  -CD     Progress/Outcome (Transfer Goal 1, PT)  goal met  -CD     Row Name 08/04/21 1038          Gait Training Goal 1 (PT)    Activity/Assistive Device (Gait Training Goal 1, PT)  gait (walking locomotion);assistive device use;walker, rolling  -CD     [place] Level (Gait Training Goal 1, PT)  contact guard assist;1 person assist  -CD     Distance (Gait Training Goal 1, PT)  150'  -CD     Time Frame (Gait Training Goal 1, PT)  10 days  -CD     Progress/Outcome (Gait Training Goal 1, PT)  goal met  -CD     Row Name 08/04/21 1038          Stairs Goal 1 (PT)    Activity/Assistive Device (Stairs Goal 1, PT)  ascending stairs;descending stairs  -CD     [place] Level/Cues Needed (Stairs Goal 1, PT)  contact guard assist;1 person to manage equipment  -CD     Number of Stairs (Stairs Goal 1, PT)  5  -CD     Time Frame (Stairs Goal 1, PT)  10 days  -CD     Progress/Outcome (Stairs Goal 1, PT)  discharged from facility DEFER TO IRF  -CD       User Key  (r) = Recorded By, (t) = Taken By, (c) = Cosigned By     Initials Name Provider Type    Ines Unger, PT Physical Therapist        Clinical Impression     Row Name 08/04/21 1046          Pain Scale: Numbers Pre/Post-Treatment    Pretreatment Pain Rating  0/10 - no pain  -CD     Posttreatment Pain Rating  0/10 - no pain  -CD     Row Name 08/04/21 1046          Plan of Care Review    Progress  improving  -CD     Outcome Summary  GOALS PARTIALLY MET. INCREASED DISTANCE AMBULATED WITH IMPROVED CONTROL L LE. PT IS DISCHARGING TO [facility] TODAY. EXPECT CONTINUED PROGRESS.  -CD     Row Name 08/04/21 1046          Therapy Assessment/Plan (PT)    Patient/Family Therapy Goals Statement (PT)  TO GO TO [facility].  -CD     Rehab Potential (PT)  good, to achieve stated therapy goals  -CD     Criteria for Skilled Interventions Met (PT)  yes  -CD     Row Name 08/04/21 1046          Vital Signs    Pre Systolic BP Rehab  -- VSS. NSG CLEARED FOR TREATMENT.  -CD     Pre Patient Position  Sitting  -CD     Intra Patient Position  Standing  -CD     Post Patient Position  Sitting  -CD     Row Name 08/04/21 1046          Positioning and Restraints    Pre-Treatment Position  sitting in chair/recliner  -CD     Post Treatment Position  chair  -CD     In Chair  reclined;call light within reach;encouraged to call for assist;legs elevated;notified nsg NO EXIT ALARM UPON ARRIVAL.  -CD       User Key  (r) = Recorded By, (t) = Taken By, (c) = Cosigned By    Initials Name Provider Type    CD Ines Gill, PT Physical Therapist        Outcome Measures     Row Name 08/04/21 1040          How much help from another person do you currently need...    Turning from your back to your side while in flat bed without using bedrails?  3  -CD     Moving from lying on back to sitting on the side of a flat bed without bedrails?  3  -CD     Moving to and from a bed to a chair (including a wheelchair)?  3  -CD     Standing up from a chair using your arms (e.g., wheelchair, bedside chair)?  3  -CD     Climbing 3-5 steps with a  railing?  3  -CD     To walk in hospital room?  3  -CD     AM-PAC 6 Clicks Score (PT)  18  -CD     Row Name 08/04/21 1040          Modified [place] Scale    Modified [place] Scale  3 - Moderate disability.  Requiring some help, but able to walk without assistance.  -CD     Row Name 08/04/21 0945          Functional Assessment    Outcome Measure Options  AM-PAC 6 Clicks Daily Activity (OT)  -TB       User Key  (r) = Recorded By, (t) = Taken By, (c) = Cosigned By    Initials Name Provider Type    TB Louise Pollock, OT Occupational Therapist    CD Ines Gill, PT Physical Therapist        Physical Therapy Education                 Title: PT OT SLP Therapies (Not Started)     Topic: Physical Therapy (Done)     Point: Mobility training (Done)     Learning Progress Summary           Patient Acceptance, E, VU,NR by CD at 8/4/2021 1041    Comment: SEE FLOWSHEET.    Acceptance, E,D, VU,NR by  at 7/31/2021 1344   Family Acceptance, E,D, VU,NR by  at 7/31/2021 1344                   Point: Home exercise program (Done)     Learning Progress Summary           Patient Acceptance, E, VU,NR by CD at 8/4/2021 1041    Comment: SEE FLOWSHEET.    Acceptance, E,D, VU,NR by MP at 7/31/2021 1344   Family Acceptance, E,D, VU,NR by MP at 7/31/2021 1344                   Point: Body mechanics (Done)     Learning Progress Summary           Patient Acceptance, E, VU,NR by CD at 8/4/2021 1041    Comment: SEE FLOWSHEET.    Acceptance, E,D, VU,NR by MP at 7/31/2021 1344   Family Acceptance, E,D, VU,NR by MP at 7/31/2021 1344                   Point: Precautions (Done)     Learning Progress Summary           Patient Acceptance, E, VU,NR by  at 8/4/2021 1041    Comment: SEE FLOWSHEET.    Acceptance, E,D, VU,NR by MP at 7/31/2021 1344   Family Acceptance, E,D, VU,NR by MP at 7/31/2021 1344                               User Key     Initials Effective Dates Name Provider Type Discipline    CD 06/16/21 -  Ines Gill, PT Physical  Therapist PT    MP 06/16/21 -  Steven Frost PT Physical Therapist PT              PT Recommendation and Plan     Progress: improving  Outcome Summary: GOALS PARTIALLY MET. INCREASED DISTANCE AMBULATED WITH IMPROVED CONTROL L LE. PT IS DISCHARGING TO [facility] TODAY. EXPECT CONTINUED PROGRESS.     Time Calculation:   PT Charges     Row Name 08/04/21 1049             Time Calculation    Start Time  1005  -CD      PT Received On  08/04/21  -CD         Time Calculation- PT    Total Timed Code Minutes- PT  25 minute(s)  -CD         Timed Charges    32474 - PT Therapeutic Exercise Minutes  8  -CD      21959 - Gait Training Minutes   17  -CD         Total Minutes    Timed Charges Total Minutes  25  -CD       Total Minutes  25  -CD        User Key  (r) = Recorded By, (t) = Taken By, (c) = Cosigned By    Initials Name Provider Type    CD Ines Gill PT Physical Therapist        Therapy Charges for Today     Code Description Service Date Service Provider Modifiers Qty    68594302730  PT THER PROC EA 15 MIN 8/4/2021 Ines Gill, PT GP 1    [id number] HC GAIT TRAINING EA 15 MIN 8/4/2021 Ines Gill, PT GP 1          PT G-Codes  Outcome Measure Options: AM-PAC 6 Clicks Daily Activity (OT)  AM-PAC 6 Clicks Score (PT): 18  AM-PAC 6 Clicks Score (OT): 18  Modified [place] Scale: 3 - Moderate disability.  Requiring some help, but able to walk without assistance.    PT Discharge Summary  Anticipated Discharge Disposition (PT): inpatient rehabilitation facility    Ines Gill PT  8/4/2021

## 2021-08-04 NOTE — PROGRESS NOTES
[facility]  INPATIENT PROGRESS NOTE         [facility] 3F    8/4/2021      PATIENT IDENTIFICATION:   Name:  Camron Hendrix      MRN:  [id number]     71 y.o.  male             Reason for visit: Chest pain      SUBJECTIVE:   No further chest pain this morning, blood pressure better controlled overnight.     OBJECTIVE:  Vitals:    08/04/21 0700 08/04/21 0900 08/04/21 0928 08/04/21 0934   BP: 177/88  177/88 177/88   BP Location: Right arm      Patient Position: Lying      Pulse: 64 71 76 79   Resp: 18      Temp: 97.9 °F (36.6 °C)      TempSrc: Oral      SpO2: 95% 94%     Weight:       Height:               Body mass index is 37.95 kg/m².    Intake/Output Summary (Last 24 hours) at 8/4/2021 0943  Last data filed at 8/4/2021 0500  Gross per 24 hour   Intake 225 ml   Output 500 ml   Net -275 ml       Telemetry: Personally reviewed, normal sinus rhythm, no arrhythmia     Exam:  General Appearance:   · well developed  · well nourished  Neck:  · thyroid not enlarged  · supple  Respiratory:  · no respiratory distress  · normal breath sounds  · no rales  Cardiovascular:  · no jugular venous distention  · regular rhythm  · apical impulse normal  · S1 normal, S2 normal  · no S3, no S4   · no murmur  · no rub, no thrill  · carotid pulses normal; no bruit  · pedal pulses normal  · lower extremity edema: none    Skin:   warm, dry      Allergies   Allergen Reactions   • Codeine Hallucinations     Scheduled meds:       amLODIPine, 10 mg, Oral, Q24H  aspirin, 81 mg, Oral, Daily  atorvastatin, 80 mg, Oral, Nightly  carvedilol, 6.25 mg, Oral, BID With Meals  clopidogrel, 75 mg, Oral, Daily  hydrALAZINE, 25 mg, Oral, Q12H  levothyroxine, 275 mcg, Oral, Q AM  losartan, 100 mg, Oral, Q24H  sodium chloride, 10 mL, Intravenous, Q12H  sodium chloride, 10 mL, Intravenous, Q12H  warfarin, 5 mg, Oral, Once per day on Mon Wed Fri   And  warfarin, 10 mg, Oral, Once per day on Sun Tue Thu  Sat      IV meds:                      nitroglycerin, 10-50 mcg/min, Last Rate: Stopped (08/03/21 1843)  Pharmacy to dose warfarin,       Data Review:  Results from last 7 days   Lab Units 08/03/21  0340 07/31/21  0559 07/30/21  2221   SODIUM mmol/L 139 140 141   BUN mg/dL 13 11 13   CREATININE mg/dL 0.79 0.86 0.95   GLUCOSE mg/dL 144* 115* 113*     Results from last 7 days   Lab Units 08/03/21  0340 07/31/21  0559 07/30/21  2221   WBC 10*3/mm3 5.93 4.62 5.20   HEMOGLOBIN g/dL 16.6 15.2 15.7     Results from last 7 days   Lab Units 08/04/21  0718 08/03/21  0340 08/02/21  0735 08/01/21  0607 07/31/21  0559   INR  2.26* 2.42* 2.69* 2.43* 2.40*     Results from last 7 days   Lab Units 07/30/21  2221   ALT (SGPT) U/L 33   AST (SGOT) U/L 26     No results found for: DIGOXIN   Lab Results   Component Value Date    TSH 0.012 (L) 07/30/2021     Results from last 7 days   Lab Units 07/31/21  0559   CHOLESTEROL mg/dL 77   HDL CHOL mg/dL 30*       Estimated Creatinine Clearance: 108.4 mL/min (by C-G formula based on SCr of 0.79 mg/dL).        Imaging (last 24 hr):   I personally reviewed the most recent chest x-ray and other pertinent imaging studies.  Results for orders placed during the hospital encounter of 07/30/21    XR Chest 1 View    Narrative  CR Chest 1 Vw    INDICATION:  Central chest pain and short of air. Awakened from sleep by symptoms. Atrial fibrillation and hypertension.    COMPARISON:  7/30/2021    FINDINGS:  Single portable AP view(s) of the chest.    No visible support lines.    Stable cardiac silhouette. The lungs are emphysematous. The vascularity is unremarkable. No new effusion or dense consolidation. No pneumothorax. Chronic appearing interstitial changes are present in both lungs with a slight right-sided predominance.    Impression  1. Chronic appearing lung changes. No definite superimposed active disease or significant interval change.    Signer Name: Richie Donahue MD  Signed: 8/3/2021 4:28  AM  Workstation Name: [facility]  [facility]        Last ECHO:  Results for orders placed during the hospital encounter of 07/30/21    Adult Transthoracic Echo Complete W/ Cont if Necessary Per Protocol    Interpretation Summary  · Moderate left atrial enlargement.  · Left ventricular wall thickness is consistent with mild concentric hypertrophy.  · Normal left ventricular systolic function, estimated EF 55%.  · Calcified aortic valve with mild aortic stenosis, mean gradient 11.8 mmHg.  · Mild mitral vegetation.  · Trace tricuspid regurgitation with normal RVSP.  · Dilated aortic root (4.2 cm).        PROBLEM LIST:     CVA (cerebral vascular accident) (CMS/HCC)    Hypothyroidism    Essential hypertension    Coronary artery disease involving native coronary artery of native heart with angina pectoris (CMS/HCC)    PAF (paroxysmal atrial fibrillation) (CMS/[facility])    TIA (transient ischemic attack)          ASSESSMENT/PLAN:  1.  Atypical chest pain:  Chest pain was positional, tender to palpation, off nitroglycerin drip now  Symptoms resolved overnight  Possible musculoskeletal, versus GI versus hypertension  Follow-up with Dr. Job Perdomo his primary cardiologist in 4 to 6 weeks    2.  Hypertension:  Increase carvedilol to 6.25 mg twice daily  Continue all other home medication    3.  PVCs:  Improved on increased dose of carvedilol  Continue for now.  He is asymptomatic  Echo shows normal EF    Okay for discharge from cardiac standpoint    Discussed with patient's nurse and Dr. Bessy Barragan MD  8/4/2021    09:43 EDT

## 2021-08-04 NOTE — DISCHARGE PLACEMENT REQUEST
"Avel Gutierrez (71 y.o. Male)     Date of Birth Social Security Number Address Home Phone MRN    1950  [address] [place] [id number] [phone] [id number]    Oriental orthodox Marital Status          [employer]        Admission Date Admission Type Admitting Provider Attending Provider Department, Room/Bed    21 Urgent Suki Doherty DO Barbato, Hayley R, DO [facility] 3F, S303/1    Discharge Date Discharge Disposition Discharge Destination         [facility]              Attending Provider: Suki Doherty DO    Allergies: Codeine    Isolation: None   Infection: None   Code Status: CPR    Ht: 175.3 cm (69\")   Wt: 117 kg (257 lb)    Admission Cmt: None   Principal Problem: None                Active Insurance as of 2021     Primary Coverage     Payor Plan Insurance Group Employer/Plan Group    MEDICARE MEDICARE A & B      Payor Plan Address Payor Plan Phone Number Payor Plan Fax Number Effective Dates    [address] [phone]  2014 - None Entered    [facility] [id number]       Subscriber Name Subscriber Birth Date Member ID       AVEL GUTIERREZ 1950 [id number]           Secondary Coverage     Payor Plan Insurance Group Employer/Plan Group    [place] [employer] [place] [employer] [place]      Payor Plan Address Payor Plan Phone Number Payor Plan Fax Number Effective Dates    [address] [phone]  2016 - None Entered    [facility] [id number]       Subscriber Name Subscriber Birth Date Member ID       AVEL GUTIERREZ 1950 [id number]                 Emergency Contacts      (Rel.) Home Phone Work Phone Mobile Phone    Rowan Gutierrez (Spouse) -- -- [phone]                 Discharge Summary      Suki Doherty DO at 21 0957              [facility] Medicine Services  DISCHARGE SUMMARY    Patient Name: Avel Gutierrez  : 1950  MRN: [id number]    Date of Admission: 2021  " 7:04 PM  Date of Discharge:  8/4/2021  Primary Care Physician: Leslie Rhodes MD    Consults     Date and Time Order Name Status Description    8/3/2021  2:10 PM Inpatient Cardiology Consult Completed     7/30/2021  8:05 PM Inpatient Neurology Consult Stroke Completed           Hospital Course     Presenting Problem:   TIA (transient ischemic attack) [G45.9]    Active Hospital Problems    Diagnosis  POA   • TIA (transient ischemic attack) [G45.9]  Yes   • PAF (paroxysmal atrial fibrillation) (CMS/[facility]) [I48.0]  Yes   • Hypothyroidism [E03.9]  Yes   • Essential hypertension [I10]  Yes   • Coronary artery disease involving native coronary artery of native heart with angina pectoris (CMS/[facility]) [I25.119]  Yes   • CVA (cerebral vascular accident) (CMS/[facility]) [I63.9]  Yes      Resolved Hospital Problems   No resolved problems to display.          Hospital Course:  Camron Hendrix is a 71 y.o. male with PMH significant for CVA, HTN, PAF (on warfarin), CAD, hypothyroid, HLD, who presented originally to [facility] with complaint of lower extremity weakness and fall who is found to have concern for CVA.     LE weakness  Chronic R MCA insult   R M1 Occlusion/Near Occlusion   - Per Neurology, okay to discharge, will need f/u with Dr. Can in 2 weeks to discuss outpatient angiogram, recommend goal -160 until f/u appointment  -Continue ASA 325mg, Plavix added, 75mg daily   -Continue high intensity statin  -Echo reviewed: EF normal   -PT/OT following, recommends rehab, patient to transfer to [facility] today     Chest Pain:  - overnight 8/3 patient developed left sided chest pain without radiation,troponin and EKG remain negative and unchanged from previous.  - he was started on NTG with improvement. Cardiology was consulted and adjust beta-blocker. Felt no need for further ischemic evaluation at this time. MSK vs. GI vs. HTN felt to be cause of atypical chest pain. Patient to f/u with his cardiologist  in [place].     PAF  -rate controlled   -Continue Warfarin     Hypertension   -As above, keep -160, coreg restarted at increased dose (per cardiology), resumed home norvasc, resume hydralazine at 1/2 home dose, may need titration.     Hypothyroid   -TSH low (0.012), T4 elevated   -Decreased Synthroid from 300mcg to 275mcg  -Pt follows with Endocrine outpatient per wife  -Repeat thyroid panel in 4-6 weeks      Discharge Follow Up Recommendations for outpatient labs/diagnostics:  - f/u with Dr. Can in 2 weeks to discuss cerebral angiogram  - permissive hypertension with goal -160 until f/u with neurosurgery, BP medications may need some titration while at [facility]  - f/u with PCP 1 week after discharge from rehab, will need repeat TSH in 4-6 weeks  - keep regularly scheduled f/u with Cardiologist in [place] at the end of the month    Day of Discharge     HPI:   Patient with no further chest pain overnight, BP improved and weaned off nitroglycerin. Feels ready for discharge.    Review of Systems  Gen- No fevers, chills  CV- No chest pain, palpitations  Resp- No cough, dyspnea  GI- No N/V/D, abd pain    Vital Signs:   Temp:  [97.9 °F (36.6 °C)-99.2 °F (37.3 °C)] 97.9 °F (36.6 °C)  Heart Rate:  [64-91] 79  Resp:  [18] 18  BP: (121-177)/(45-93) 177/88     Physical Exam:  Constitutional: No acute distress, awake, alert  HENT: NCAT, mucous membranes moist  Respiratory: Clear to auscultation bilaterally, respiratory effort normal   Cardiovascular: RRR, no murmurs, rubs, or gallops  Gastrointestinal: Positive bowel sounds, soft, nontender, nondistended  Musculoskeletal: No bilateral ankle edema  Psychiatric: Appropriate affect, cooperative  Neurologic: Oriented x 3, strength symmetric in all extremities, Cranial Nerves grossly intact to confrontation, speech clear  Skin: No rashes      Pertinent  and/or Most Recent Results     LAB RESULTS:      Lab 08/04/21  0718 08/03/21  0340 08/02/21  0735  08/01/21  0607 07/31/21  0559 07/30/21 2221 07/30/21 2221   WBC  --  5.93  --   --  4.62  --  5.20   HEMOGLOBIN  --  16.6  --   --  15.2  --  15.7   HEMATOCRIT  --  47.4  --   --  44.6  --  45.7   PLATELETS  --  197  --   --  179  --  183   NEUTROS ABS  --  3.35  --   --  2.32  --  2.56   IMMATURE GRANS (ABS)  --  0.02  --   --  0.01  --  0.01   LYMPHS ABS  --  1.84  --   --  1.62  --  1.97   MONOS ABS  --  0.51  --   --  0.48  --  0.50   EOS ABS  --  0.17  --   --  0.17  --  0.14   MCV  --  82.7  --   --  83.7  --  83.9   PROTIME 24.1* 25.3* 27.5* 25.4* 25.2*   < > 24.7*    < > = values in this interval not displayed.         Lab 08/03/21  2350 08/03/21 0340 07/31/21 0559 07/30/21 2221   SODIUM  --  139 140 141   POTASSIUM 4.3 3.6 3.8 4.5   CHLORIDE  --  107 106 106   CO2  --  22.0 24.0 25.0   ANION GAP  --  10.0 10.0 10.0   BUN  --  13 11 13   CREATININE  --  0.79 0.86 0.95   GLUCOSE  --  144* 115* 113*   CALCIUM  --  8.8 8.6 8.6   MAGNESIUM 2.2 1.7  --  2.0   HEMOGLOBIN A1C  --   --  6.70*  --    TSH  --   --   --  0.012*         Lab 07/30/21 2221   TOTAL PROTEIN 5.6*   ALBUMIN 3.60   GLOBULIN 2.0   ALT (SGPT) 33   AST (SGOT) 26   BILIRUBIN 0.4   ALK PHOS 119*         Lab 08/04/21  0718 08/03/21  1421 08/03/21  0340 08/02/21  0735 08/01/21  0607 07/31/21  0559 07/30/21 2221 07/30/21 2221   TROPONIN T  --  <0.010 <0.010  --   --   --   --  <0.010   PROTIME 24.1*  --  25.3* 27.5* 25.4* 25.2*   < > 24.7*   INR 2.26*  --  2.42* 2.69* 2.43* 2.40*   < > 2.34*    < > = values in this interval not displayed.         Lab 07/31/21  0559   CHOLESTEROL 77   LDL CHOL 29   HDL CHOL 30*   TRIGLYCERIDES 92             Brief Urine Lab Results     None        Microbiology Results (last 10 days)     Procedure Component Value - Date/Time    COVID PRE-OP / PRE-PROCEDURE SCREENING ORDER (NO ISOLATION) - Swab, Nasopharynx [[id number]]  (Normal) Collected: 07/31/21 0035    Lab Status: Final result Specimen: Swab from Nasopharynx  Updated: 07/31/21 0125    Narrative:      The following orders were created for panel order COVID PRE-OP / PRE-PROCEDURE SCREENING ORDER (NO ISOLATION) - Swab, Nasopharynx.  Procedure                               Abnormality         Status                     ---------                               -----------         ------                     Respiratory Panel PCR w/...[[id number]]  Normal              Final result                 Please view results for these tests on the individual orders.    Respiratory Panel PCR w/COVID-19(SARS-CoV-2) DINO/CONNIE/PRICILA/PAD/COR/MAD/PARAS In-House, NP Swab in UTM/VTM, 3-4 HR TAT - Swab, Nasopharynx [[id number]]  (Normal) Collected: 07/31/21 0035    Lab Status: Final result Specimen: Swab from Nasopharynx Updated: 07/31/21 0125     ADENOVIRUS, PCR Not Detected     Coronavirus 229E Not Detected     Coronavirus HKU1 Not Detected     Coronavirus NL63 Not Detected     Coronavirus OC43 Not Detected     COVID19 Not Detected     Human Metapneumovirus Not Detected     Human Rhinovirus/Enterovirus Not Detected     Influenza A PCR Not Detected     Influenza B PCR Not Detected     Parainfluenza Virus 1 Not Detected     Parainfluenza Virus 2 Not Detected     Parainfluenza Virus 3 Not Detected     Parainfluenza Virus 4 Not Detected     RSV, PCR Not Detected     Bordetella pertussis pcr Not Detected     Bordetella parapertussis PCR Not Detected     Chlamydophila pneumoniae PCR Not Detected     Mycoplasma pneumo by PCR Not Detected    Narrative:      In the setting of a positive respiratory panel with a viral infection PLUS a negative procalcitonin without other underlying concern for bacterial infection, consider observing off antibiotics or discontinuation of antibiotics and continue supportive care. If the respiratory panel is positive for atypical bacterial infection (Bordetella pertussis, Chlamydophila pneumoniae, or Mycoplasma pneumoniae), consider antibiotic de-escalation to target atypical  bacterial infection.          Adult Transthoracic Echo Complete W/ Cont if Necessary Per Protocol    Result Date: 7/31/2021  · Moderate left atrial enlargement. · Left ventricular wall thickness is consistent with mild concentric hypertrophy. · Normal left ventricular systolic function, estimated EF 55%. · Calcified aortic valve with mild aortic stenosis, mean gradient 11.8 mmHg. · Mild mitral vegetation. · Trace tricuspid regurgitation with normal RVSP. · Dilated aortic root (4.2 cm).      CT Head Without Contrast    Result Date: 7/30/2021  HISTORY: Stroke follow-up. Slurred speech starting at 1500 today. On blood thinners. Recurrent TIAs. Time of scan 7:58 PM. Radiologist not called. Inpatient. TECHNIQUE: CT head without contrast. Radiation dose reduction techniques included automated exposure control or exposure modulation based on body size. Radiation audit for number of CT and nuclear cardiology exams performed in the last year: 0.  COMPARISON: Head CT from 9/21/2017. FINDINGS: There is a small chronic lacune in the right cerebellar hemisphere inferiorly. There is chronic low-attenuation left hemipons that is probably artifact. There is no evidence for acute intracranial hemorrhage or extra-axial fluid collection. There is mild age-appropriate volume loss. There is mild white matter low-attenuation that is nonspecific but likely due to small vessel disease. There are small lacunar-type insults likely in the basal ganglia bilaterally. No acute cortical infarct is suspected but if there is clinical concern for acute CVA, follow-up imaging is recommended. There are prominent vascular calcifications at the base of the brain. There is no intracranial mass affect. There is mild mucosal thickening in the visualized paranasal sinuses. The mastoid air cells are underpneumatized and partially opacified.     1. No acute intracranial abnormality suspected but if there is clinical concern for acute CVA, follow-up imaging is  recommended. 2. Mild probable sequelae of small vessel disease similar to the study from 2017. Signer Name: Jyotsna Liang MD  Signed: 7/30/2021 8:11 PM  Workstation Name: [email or web address]  [facility]    CT Angiogram Neck    Result Date: 7/30/2021  CTA  HEAD W AI ANALYSIS FOR LVO, CTA Neck Critical results relayed by myself to the stroke navigator at [facility] at 8:22 PM. The CT angiogram is still in progress but I did discuss with her the findings on the noncontrast head CT and CT perfusion. There is a small ischemic penumbra of 12 mL within the right parietal lobe but there is a large area of prolonged Tmax greater than 4 seconds involving the right MCA territory. On the images available for review of the right MCA territory there appears to be a vascular cut off at the origin of the right M1 vessel with reconstitution via collaterals. There is contrast enhancement in the right MCA territory but it is diminished on comparison to the left side. The patient is at risk for infarction if there is any compromise of collateral flow. Stroke navigator notes that the patient is currently quite hypertensive. INDICATION: Stroke evaluation. Slurred speech onset at 1500 on blood thinners. Subtle left body weakness on exam. TECHNIQUE: CT angiogram of the head and neck with during IV contrast. Contrast dose recorded in the patient's chart. 3-D  MIP reconstructions were obtained and reviewed.  Evaluation for a significant carotid arterial stenosis is based on the NASCET criteria. Radiation dose reduction techniques included automated exposure control or exposure modulation based on body size. Count of known CT and cardiac nuc med studies performed in previous 12 months: 0. Noncontrast images were also obtained. AI analysis of LVO was utilized COMPARISON: Earlier noncontrast head CT and CT perfusion. FINDINGS: CTA neck:  There are vascular calcifications at the aortic arch and involving great vessel  origins. There is no hemodynamically significant stenosis of great vessel origins. Proximal right common carotid artery is obscured by venous contrast inflow. Right carotid system: There is mild calcified plaque at the right carotid bifurcation but there is 0% stenosis by NASCET criteria. There is calcified plaque at the right carotid siphon with approximately moderate stenosis. Left carotid system: There is mostly calcified plaque at the left carotid bifurcation. By NASCET criteria there is about 10% diameter stenosis. There is also considerable calcified plaque at the left carotid siphon with at least moderate stenosis. Both vertebral arteries are patent and essentially codominant. Both supply the basilar. CTA head:  There is an abnormal appearance to the right MCA territory with what appears to be occlusion (or possibly near complete occlusion) at the origin of the right M1 vessel extending over about a centimeter in length. There are indistinct collaterals along the expected right M1 course. There is contrast enhancement of the more distal right MCA vessels but this is diminished visually on comparison to the contrast enhancement in the left MCA vessels. There is an anterior communicating artery present. There is some irregular narrowing of the proximal left A2 vessel with likely some hemodynamically significant narrowing. Allowing for technical factors there is likely irregularity of the intracranial vessels in general suggestive of intracranial atherosclerotic disease or less likely vasculitis. No significant sized posterior communicating artery is seen on either side. There is irregularity of the proximal left posterior cerebral artery distribution again likely due to atherosclerotic disease. The dural venous sinuses are grossly patent. There is no obvious intracranial aneurysm on this limited CT angiogram performed for stroke evaluation with rapid imaging technique. There are coronary artery calcifications.  There are degenerative changes in the spine.     1. There is an abnormal appearance the right middle cerebral artery territory. There is either occlusion or near complete occlusion of the right M1 vessel beginning at its origin and extending over about a centimeter in length with collateral reconstitution of the more distal right MCA vessels. There is flow within the more distal right MCA territory but usually there is diminished contrast enhancement in the right MCA territory when compared to the left. This is consistent with the accompanying perfusion study. The findings are partly been discussed by myself with the stroke navigator. 2. Likely intracranial atherosclerotic disease with other areas of intracranial vascular irregularity. Vasculitis is a differential consideration. 3. By NASCET criteria, 0% stenosis of the right carotid bifurcation and about 10% stenosis of the left carotid bifurcation. 4. Both vertebral arteries are patent with supply the basilar. Signer Name: Jyotsna Liang MD  Signed: 7/30/2021 8:34 PM  Workstation Name: [email or web address]  [facility]    MRI Brain Without Contrast    Result Date: 7/30/2021  INDICATION:  Follow-up CVA. Lower extremity weakness. Evaluate for acute CVA. TECHNIQUE: MRI of the brain without contrast. COMPARISON:  Please see earlier head CT, CT perfusion, and CT angiograms head and neck FINDINGS: There is no evidence for a recent infarct on the diffusion series. There is no MRI evidence for intracranial hemorrhage. There is no extra-axial fluid collection. The basilar cisterns are patent. There is no intracranial mass affect. There our small chronic lacunes in the right greater than left cerebellar hemisphere and brainstem. There is mild to moderate white matter signal abnormality predominantly in the deep and periventricular white matter of the supratentorial brain that is likely due to small vessel disease. There is a small focus of cortical malacia at the  right more superolateral frontal lobe consistent with old insult with mild focal volume loss. There is generalized prominence of perivascular spaces in addition to chronic lacunar insults in the basal ganglia. There is a small amount of fluid or inflammatory change in the mastoid air cells bilaterally. There is only mild paranasal sinus mucosal thickening. Please refer to the CT angiogram report for description of the abnormal right MCA. The right MCA flow voids are smaller than the left MCA flow voids consistent with asymmetrically diminished flow to the right MCA territory.     1. No evidence for recent infarct. 2. Generalized atrophy with prominence of perivascular spaces and overall moderate probable sequelae of small vessel disease. 3. There is a small focus of cortical malacia at the superior lateral right frontal lobe consistent with a chronic right MCA territory insult. The right MCA abnormality that is best appreciated on the CT angiogram is age indeterminate on imaging, but the presence of a chronic MCA territory insult favors the presence of at least a component of chronic vascular disease. Please correlate further with the clinical findings as well as the earlier CT perfusion/angiogram results. Signer Name: Jyotsna Liang MD  Signed: 7/30/2021 11:32 PM  Workstation Name: [email or web address]  [facility]    XR Chest 1 View    Result Date: 8/3/2021  CR Chest 1 Vw INDICATION: Central chest pain and short of air. Awakened from sleep by symptoms. Atrial fibrillation and hypertension. COMPARISON:  7/30/2021 FINDINGS: Single portable AP view(s) of the chest. No visible support lines. Stable cardiac silhouette. The lungs are emphysematous. The vascularity is unremarkable. No new effusion or dense consolidation. No pneumothorax. Chronic appearing interstitial changes are present in both lungs with a slight right-sided predominance.     1. Chronic appearing lung changes. No definite superimposed active  disease or significant interval change. Signer Name: Richie Donahue MD  Signed: 8/3/2021 4:28 AM  Workstation Name: [facility]  [facility]    XR Chest 1 View    Result Date: 7/30/2021  CR Chest 1 Vw INDICATION: Stroke. Hypertension. COMPARISON:  3/28/2019 FINDINGS: Single portable AP view(s) of the chest. There is support equipment artifact. Cardiac silhouette stable. Unremarkable vascularity. There is some probable atelectasis in the lung bases. No pneumothorax. No effusion.     1. Probable bibasilar atelectasis. No pneumothorax or effusion. Signer Name: Richie Donahue MD  Signed: 7/30/2021 10:33 PM  Workstation Name: [email or web address]  [facility]    XR Outside Films    Result Date: 7/30/2021  This procedure was auto-finalized with no dictation required.    CT Angiogram Head w AI Analysis of LVO    Result Date: 7/30/2021  CTA  HEAD W AI ANALYSIS FOR LVO, CTA Neck Critical results relayed by myself to the stroke navigator at [facility] at 8:22 PM. The CT angiogram is still in progress but I did discuss with her the findings on the noncontrast head CT and CT perfusion. There is a small ischemic penumbra of 12 mL within the right parietal lobe but there is a large area of prolonged Tmax greater than 4 seconds involving the right MCA territory. On the images available for review of the right MCA territory there appears to be a vascular cut off at the origin of the right M1 vessel with reconstitution via collaterals. There is contrast enhancement in the right MCA territory but it is diminished on comparison to the left side. The patient is at risk for infarction if there is any compromise of collateral flow. Stroke navigator notes that the patient is currently quite hypertensive. INDICATION: Stroke evaluation. Slurred speech onset at 1500 on blood thinners. Subtle left body weakness on exam. TECHNIQUE: CT angiogram of the head and neck with during IV contrast.  Contrast dose recorded in the patient's chart. 3-D  MIP reconstructions were obtained and reviewed.  Evaluation for a significant carotid arterial stenosis is based on the NASCET criteria. Radiation dose reduction techniques included automated exposure control or exposure modulation based on body size. Count of known CT and cardiac nuc med studies performed in previous 12 months: 0. Noncontrast images were also obtained. AI analysis of LVO was utilized COMPARISON: Earlier noncontrast head CT and CT perfusion. FINDINGS: CTA neck:  There are vascular calcifications at the aortic arch and involving great vessel origins. There is no hemodynamically significant stenosis of great vessel origins. Proximal right common carotid artery is obscured by venous contrast inflow. Right carotid system: There is mild calcified plaque at the right carotid bifurcation but there is 0% stenosis by NASCET criteria. There is calcified plaque at the right carotid siphon with approximately moderate stenosis. Left carotid system: There is mostly calcified plaque at the left carotid bifurcation. By NASCET criteria there is about 10% diameter stenosis. There is also considerable calcified plaque at the left carotid siphon with at least moderate stenosis. Both vertebral arteries are patent and essentially codominant. Both supply the basilar. CTA head:  There is an abnormal appearance to the right MCA territory with what appears to be occlusion (or possibly near complete occlusion) at the origin of the right M1 vessel extending over about a centimeter in length. There are indistinct collaterals along the expected right M1 course. There is contrast enhancement of the more distal right MCA vessels but this is diminished visually on comparison to the contrast enhancement in the left MCA vessels. There is an anterior communicating artery present. There is some irregular narrowing of the proximal left A2 vessel with likely some hemodynamically  significant narrowing. Allowing for technical factors there is likely irregularity of the intracranial vessels in general suggestive of intracranial atherosclerotic disease or less likely vasculitis. No significant sized posterior communicating artery is seen on either side. There is irregularity of the proximal left posterior cerebral artery distribution again likely due to atherosclerotic disease. The dural venous sinuses are grossly patent. There is no obvious intracranial aneurysm on this limited CT angiogram performed for stroke evaluation with rapid imaging technique. There are coronary artery calcifications. There are degenerative changes in the spine.     1. There is an abnormal appearance the right middle cerebral artery territory. There is either occlusion or near complete occlusion of the right M1 vessel beginning at its origin and extending over about a centimeter in length with collateral reconstitution of the more distal right MCA vessels. There is flow within the more distal right MCA territory but usually there is diminished contrast enhancement in the right MCA territory when compared to the left. This is consistent with the accompanying perfusion study. The findings are partly been discussed by myself with the stroke navigator. 2. Likely intracranial atherosclerotic disease with other areas of intracranial vascular irregularity. Vasculitis is a differential consideration. 3. By NASCET criteria, 0% stenosis of the right carotid bifurcation and about 10% stenosis of the left carotid bifurcation. 4. Both vertebral arteries are patent with supply the basilar. Signer Name: Jyotsna Liang MD  Signed: 7/30/2021 8:34 PM  Workstation Name: [email or web address]  [facility]    CT CEREBRAL PERFUSION WITH & WITHOUT CONTRAST    Result Date: 7/30/2021  CT CEREBRAL PERFUSION W WO CONTRAST HISTORY:  bilateral lower extremity weakness and recurrent TIA. Slurred speech started at 1600 on blood thinners.  TECHNIQUE: Axial CT images of the brain without and with intravenous contrast using cerebral perfusion protocol. Post-processing parametric maps were created using RAPID software and reviewed. Radiation dose reduction techniques included automated exposure control or exposure modulation based on body size. CT and nuclear cardiology exams in the last 12 months: 0. COMPARISON: Earlier noncontrast head CT. FINDINGS: Arterial input function is optimal. Ischemic tissue volume (Tmax > 6 seconds, includes core and penumbra): 12 mL in the right parietal lobe Ischemic core volume (rCBF < 30%): 0 Mismatch (penumbra) volume 12, ratio infinite Volume of poor collateral perfusion (Tmax > 10 seconds): 0 Hypoperfusion index (Tmax > 10 seconds/Tmax > 6 seconds): 0 CBV index: 0.6 There is a large portion of the right middle cerebral artery distribution that shows prolonged mean transit time with Tmax greater than 4 seconds (179 mL. See the accompanying CT angiogram when it becomes available, there is visually delayed enhancement of the right MCA territory on contrast inflow.     1. Abnormal brain perfusion study. There is a 12 mL focus of ischemic tissue in the right parietal cortex. No core infarct is documented. Of note there is a large volume of Tmax greater than 4 seconds involving the right middle cerebral artery territory (179 mL) and on visual inspection of the perfusion source imaging there is delayed enhancement of the right MCA territory. Please refer to the CT angiogram report when images become available. They are currently in process. Signer Name: Jyotsna Liang MD  Signed: 7/30/2021 8:17 PM  Workstation Name: [email or web address]  [facility]    CT Outside Films    Result Date: 7/30/2021  This procedure was auto-finalized with no dictation required.      Results for orders placed during the hospital encounter of 09/19/17    Duplex Carotid Ultrasound CAR    Interpretation Summary  · No obstructive carotid  stenosis bilaterally  · Antegrade bilateral vertebral flow.      Results for orders placed during the hospital encounter of 09/19/17    Duplex Carotid Ultrasound CAR    Interpretation Summary  · No obstructive carotid stenosis bilaterally  · Antegrade bilateral vertebral flow.      Results for orders placed during the hospital encounter of 07/30/21    Adult Transthoracic Echo Complete W/ Cont if Necessary Per Protocol    Interpretation Summary  · Moderate left atrial enlargement.  · Left ventricular wall thickness is consistent with mild concentric hypertrophy.  · Normal left ventricular systolic function, estimated EF 55%.  · Calcified aortic valve with mild aortic stenosis, mean gradient 11.8 mmHg.  · Mild mitral vegetation.  · Trace tricuspid regurgitation with normal RVSP.  · Dilated aortic root (4.2 cm).      Plan for Follow-up of Pending Labs/Results:     Discharge Details        Discharge Medications      New Medications      Instructions Start Date   clopidogrel 75 MG tablet  Commonly known as: PLAVIX   75 mg, Oral, Daily   Start Date: August 5, 2021        Changes to Medications      Instructions Start Date   carvedilol 6.25 MG tablet  Commonly known as: COREG  What changed:   · medication strength  · how much to take   6.25 mg, Oral, 2 Times Daily With Meals      furosemide 20 MG tablet  Commonly known as: LASIX  What changed:   · how much to take  · when to take this  · reasons to take this  · Another medication with the same name was removed. Continue taking this medication, and follow the directions you see here.   40 mg, Oral, Daily PRN      hydrALAZINE 50 MG tablet  Commonly known as: APRESOLINE  What changed:   · how much to take  · when to take this   25 mg, Oral, 2 times daily      levothyroxine 25 MCG tablet  Commonly known as: SYNTHROID, LEVOTHROID  What changed:   · medication strength  · how much to take  · when to take this   275 mcg, Oral, Every Early Morning   Start Date: August 5, 2021         Continue These Medications      Instructions Start Date   amLODIPine 10 MG tablet  Commonly known as: NORVASC   10 mg, Oral, Daily      aspirin 81 MG chewable tablet   81 mg, Oral, Daily      atorvastatin 80 MG tablet  Commonly known as: LIPITOR   80 mg, Oral, Nightly      montelukast 10 MG tablet  Commonly known as: SINGULAIR   No dose, route, or frequency recorded.      naltrexone 50 MG tablet  Commonly known as: DEPADE   4.5 mg, Oral, Daily, Low dose per patient      potassium chloride 10 MEQ CR tablet   10 mEq, Oral, Every Morning      potassium chloride 10 MEQ CR tablet   10 mEq, Oral, Daily PRN      warfarin 5 MG tablet  Commonly known as: COUMADIN   5 mg, Oral, Nightly, MWF       warfarin 10 MG tablet  Commonly known as: COUMADIN   10 mg, Oral, Nightly, Tuesday, Thursday, Saturday, and Sunday          ASK your doctor about these medications      Instructions Start Date   losartan 100 MG tablet  Commonly known as: COZAAR   100 mg, Oral, Daily             Allergies   Allergen Reactions   • Codeine Hallucinations         Discharge Disposition:  Short Term Hospital (DC - External)    Diet:  Hospital:  Diet Order   Procedures   • Diet Regular; Cardiac       Activity:  - as tolerated    Restrictions or Other Recommendations:  - none       CODE STATUS:    Code Status and Medical Interventions:   Ordered at: 07/30/21 2200     Level Of Support Discussed With:    Patient     Code Status:    CPR     Medical Interventions (Level of Support Prior to Arrest):    Full       No future appointments.    Additional Instructions for the Follow-ups that You Need to Schedule     Discharge Follow-up with Specialty: Neurosurgery, Dr. Can; 2 Weeks   As directed      Specialty: NeurosurgeryDr. Can    Follow Up: 2 Weeks                     Suki Doherty DO  08/04/21      Time Spent on Discharge:  I spent  35 minutes on this discharge activity which included: face-to-face encounter with the patient, reviewing the data in  the system, coordination of the care with the nursing staff as well as consultants, documentation, and entering orders.            Electronically signed by Suki Doherty DO at 08/04/21 1002

## 2021-08-04 NOTE — PLAN OF CARE
Goal Outcome Evaluation:   VSS this shift. Pt A/Ox4. NIH 4. No complaints of pain or SOA. Pt rested well this shift. NPO since midnight for possible interventions/procedures today. Continued frequent PVC's, Labs WDL. Pt denies any needs at this time.

## 2021-08-04 NOTE — PLAN OF CARE
Problem: Adult Inpatient Plan of Care  Goal: Plan of Care Review  Recent Flowsheet Documentation  Taken 8/4/2021 0939 by Louise Pollock OT  Plan of Care Reviewed With: patient  Outcome Summary: Pt is alert and motivated to work with therapy. Pt eager to transfer to rehab today. SBA bed mobility. Up in room Min Ax1 using RW. Unsteady upon standing. Requires cues and assist for hand placement, sequencing and safety all transfers. Min A UB dressing. Set-up/SBA morning grooming routinue. OT will follow IP. Plan is IRF, possibly today.

## 2021-08-04 NOTE — PLAN OF CARE
Goal Outcome Evaluation:           Progress: improving  Outcome Summary: GOALS PARTIALLY MET. INCREASED DISTANCE AMBULATED WITH IMPROVED CONTROL L LE. PT IS DISCHARGING TO [facility] TODAY. EXPECT CONTINUED PROGRESS.

## 2021-08-04 NOTE — DISCHARGE SUMMARY
[facility] Medicine Services  DISCHARGE SUMMARY    Patient Name: Camron Hendrix  : 1950  MRN: [id number]    Date of Admission: 2021  7:04 PM  Date of Discharge:  2021  Primary Care Physician: Leslie Rhodes MD    Consults     Date and Time Order Name Status Description    8/3/2021  2:10 PM Inpatient Cardiology Consult Completed     2021  8:05 PM Inpatient Neurology Consult Stroke Completed           Hospital Course     Presenting Problem:   TIA (transient ischemic attack) [G45.9]    Active Hospital Problems    Diagnosis  POA   • TIA (transient ischemic attack) [G45.9]  Yes   • PAF (paroxysmal atrial fibrillation) (CMS/Formerly [facility]) [I48.0]  Yes   • Hypothyroidism [E03.9]  Yes   • Essential hypertension [I10]  Yes   • Coronary artery disease involving native coronary artery of native heart with angina pectoris (CMS/HCC) [I25.119]  Yes   • CVA (cerebral vascular accident) (CMS/Formerly [facility]) [I63.9]  Yes      Resolved Hospital Problems   No resolved problems to display.          Hospital Course:  Camron Hendrix is a 71 y.o. male with PMH significant for CVA, HTN, PAF (on warfarin), CAD, hypothyroid, HLD, who presented originally to [facility] with complaint of lower extremity weakness and fall who is found to have concern for CVA.     LE weakness  Chronic R MCA insult   R M1 Occlusion/Near Occlusion   - Per Neurology, okay to discharge, will need f/u with Dr. Can in 2 weeks to discuss outpatient angiogram, recommend goal -160 until f/u appointment  -Continue ASA 325mg, Plavix added, 75mg daily   -Continue high intensity statin  -Echo reviewed: EF normal   -PT/OT following, recommends rehab, patient to transfer to [facility] today     Chest Pain:  - overnight 8/3 patient developed left sided chest pain without radiation,troponin and EKG remain negative and unchanged from previous.  - he was started on NTG with improvement. Cardiology was consulted and  adjust beta-blocker. Felt no need for further ischemic evaluation at this time. MSK vs. GI vs. HTN felt to be cause of atypical chest pain. Patient to f/u with his cardiologist in [place].     PAF  -rate controlled   -Continue Warfarin     Hypertension   -As above, keep -160, coreg restarted at increased dose (per cardiology), resumed home norvasc, resume hydralazine at 1/2 home dose, may need titration.     Hypothyroid   -TSH low (0.012), T4 elevated   -Decreased Synthroid from 300mcg to 275mcg  -Pt follows with Endocrine outpatient per wife  -Repeat thyroid panel in 4-6 weeks      Discharge Follow Up Recommendations for outpatient labs/diagnostics:  - f/u with Dr. Can in 2 weeks to discuss cerebral angiogram  - permissive hypertension with goal -160 until f/u with neurosurgery, BP medications may need some titration while at [facility]  - f/u with PCP 1 week after discharge from rehab, will need repeat TSH in 4-6 weeks  - keep regularly scheduled f/u with Cardiologist in [place] at the end of the month    Day of Discharge     HPI:   Patient with no further chest pain overnight, BP improved and weaned off nitroglycerin. Feels ready for discharge.    Review of Systems  Gen- No fevers, chills  CV- No chest pain, palpitations  Resp- No cough, dyspnea  GI- No N/V/D, abd pain    Vital Signs:   Temp:  [97.9 °F (36.6 °C)-99.2 °F (37.3 °C)] 97.9 °F (36.6 °C)  Heart Rate:  [64-91] 79  Resp:  [18] 18  BP: (121-177)/(45-93) 177/88     Physical Exam:  Constitutional: No acute distress, awake, alert  HENT: NCAT, mucous membranes moist  Respiratory: Clear to auscultation bilaterally, respiratory effort normal   Cardiovascular: RRR, no murmurs, rubs, or gallops  Gastrointestinal: Positive bowel sounds, soft, nontender, nondistended  Musculoskeletal: No bilateral ankle edema  Psychiatric: Appropriate affect, cooperative  Neurologic: Oriented x 3, strength symmetric in all extremities, Cranial Nerves  grossly intact to confrontation, speech clear  Skin: No rashes      Pertinent  and/or Most Recent Results     LAB RESULTS:      Lab 08/04/21  0718 08/03/21  0340 08/02/21  0735 08/01/21  0607 07/31/21  0559 07/30/21  2221 07/30/21  2221   WBC  --  5.93  --   --  4.62  --  5.20   HEMOGLOBIN  --  16.6  --   --  15.2  --  15.7   HEMATOCRIT  --  47.4  --   --  44.6  --  45.7   PLATELETS  --  197  --   --  179  --  183   NEUTROS ABS  --  3.35  --   --  2.32  --  2.56   IMMATURE GRANS (ABS)  --  0.02  --   --  0.01  --  0.01   LYMPHS ABS  --  1.84  --   --  1.62  --  1.97   MONOS ABS  --  0.51  --   --  0.48  --  0.50   EOS ABS  --  0.17  --   --  0.17  --  0.14   MCV  --  82.7  --   --  83.7  --  83.9   PROTIME 24.1* 25.3* 27.5* 25.4* 25.2*   < > 24.7*    < > = values in this interval not displayed.         Lab 08/03/21 2350 08/03/21 0340 07/31/21 0559 07/30/21  2221   SODIUM  --  139 140 141   POTASSIUM 4.3 3.6 3.8 4.5   CHLORIDE  --  107 106 106   CO2  --  22.0 24.0 25.0   ANION GAP  --  10.0 10.0 10.0   BUN  --  13 11 13   CREATININE  --  0.79 0.86 0.95   GLUCOSE  --  144* 115* 113*   CALCIUM  --  8.8 8.6 8.6   MAGNESIUM 2.2 1.7  --  2.0   HEMOGLOBIN A1C  --   --  6.70*  --    TSH  --   --   --  0.012*         Lab 07/30/21 2221   TOTAL PROTEIN 5.6*   ALBUMIN 3.60   GLOBULIN 2.0   ALT (SGPT) 33   AST (SGOT) 26   BILIRUBIN 0.4   ALK PHOS 119*         Lab 08/04/21  0718 08/03/21  1421 08/03/21  0340 08/02/21  0735 08/01/21  0607 07/31/21  0559 07/30/21  2221 07/30/21 2221   TROPONIN T  --  <0.010 <0.010  --   --   --   --  <0.010   PROTIME 24.1*  --  25.3* 27.5* 25.4* 25.2*   < > 24.7*   INR 2.26*  --  2.42* 2.69* 2.43* 2.40*   < > 2.34*    < > = values in this interval not displayed.         Lab 07/31/21  0559   CHOLESTEROL 77   LDL CHOL 29   HDL CHOL 30*   TRIGLYCERIDES 92             Brief Urine Lab Results     None        Microbiology Results (last 10 days)     Procedure Component Value - Date/Time    COVID  PRE-OP / PRE-PROCEDURE SCREENING ORDER (NO ISOLATION) - Swab, Nasopharynx [[id number]]  (Normal) Collected: 07/31/21 0035    Lab Status: Final result Specimen: Swab from Nasopharynx Updated: 07/31/21 0125    Narrative:      The following orders were created for panel order COVID PRE-OP / PRE-PROCEDURE SCREENING ORDER (NO ISOLATION) - Swab, Nasopharynx.  Procedure                               Abnormality         Status                     ---------                               -----------         ------                     Respiratory Panel PCR w/...[[id number]]  Normal              Final result                 Please view results for these tests on the individual orders.    Respiratory Panel PCR w/COVID-19(SARS-CoV-2) DINO/CONNIE/PRICILA/PAD/COR/MAD/PARAS In-House, NP Swab in UTM/VTM, 3-4 HR TAT - Swab, Nasopharynx [[id number]]  (Normal) Collected: 07/31/21 0035    Lab Status: Final result Specimen: Swab from Nasopharynx Updated: 07/31/21 0125     ADENOVIRUS, PCR Not Detected     Coronavirus 229E Not Detected     Coronavirus HKU1 Not Detected     Coronavirus NL63 Not Detected     Coronavirus OC43 Not Detected     COVID19 Not Detected     Human Metapneumovirus Not Detected     Human Rhinovirus/Enterovirus Not Detected     Influenza A PCR Not Detected     Influenza B PCR Not Detected     Parainfluenza Virus 1 Not Detected     Parainfluenza Virus 2 Not Detected     Parainfluenza Virus 3 Not Detected     Parainfluenza Virus 4 Not Detected     RSV, PCR Not Detected     Bordetella pertussis pcr Not Detected     Bordetella parapertussis PCR Not Detected     Chlamydophila pneumoniae PCR Not Detected     Mycoplasma pneumo by PCR Not Detected    Narrative:      In the setting of a positive respiratory panel with a viral infection PLUS a negative procalcitonin without other underlying concern for bacterial infection, consider observing off antibiotics or discontinuation of antibiotics and continue supportive care. If the respiratory  panel is positive for atypical bacterial infection (Bordetella pertussis, Chlamydophila pneumoniae, or Mycoplasma pneumoniae), consider antibiotic de-escalation to target atypical bacterial infection.          Adult Transthoracic Echo Complete W/ Cont if Necessary Per Protocol    Result Date: 7/31/2021  · Moderate left atrial enlargement. · Left ventricular wall thickness is consistent with mild concentric hypertrophy. · Normal left ventricular systolic function, estimated EF 55%. · Calcified aortic valve with mild aortic stenosis, mean gradient 11.8 mmHg. · Mild mitral vegetation. · Trace tricuspid regurgitation with normal RVSP. · Dilated aortic root (4.2 cm).      CT Head Without Contrast    Result Date: 7/30/2021  HISTORY: Stroke follow-up. Slurred speech starting at 1500 today. On blood thinners. Recurrent TIAs. Time of scan 7:58 PM. Radiologist not called. Inpatient. TECHNIQUE: CT head without contrast. Radiation dose reduction techniques included automated exposure control or exposure modulation based on body size. Radiation audit for number of CT and nuclear cardiology exams performed in the last year: 0.  COMPARISON: Head CT from 9/21/2017. FINDINGS: There is a small chronic lacune in the right cerebellar hemisphere inferiorly. There is chronic low-attenuation left hemipons that is probably artifact. There is no evidence for acute intracranial hemorrhage or extra-axial fluid collection. There is mild age-appropriate volume loss. There is mild white matter low-attenuation that is nonspecific but likely due to small vessel disease. There are small lacunar-type insults likely in the basal ganglia bilaterally. No acute cortical infarct is suspected but if there is clinical concern for acute CVA, follow-up imaging is recommended. There are prominent vascular calcifications at the base of the brain. There is no intracranial mass affect. There is mild mucosal thickening in the visualized paranasal sinuses. The  mastoid air cells are underpneumatized and partially opacified.     1. No acute intracranial abnormality suspected but if there is clinical concern for acute CVA, follow-up imaging is recommended. 2. Mild probable sequelae of small vessel disease similar to the study from 2017. Signer Name: Jyotsna Liang MD  Signed: 7/30/2021 8:11 PM  Workstation Name: [email or web address]  [facility]    CT Angiogram Neck    Result Date: 7/30/2021  CTA  HEAD W AI ANALYSIS FOR LVO, CTA Neck Critical results relayed by myself to the stroke navigator at [facility] at 8:22 PM. The CT angiogram is still in progress but I did discuss with her the findings on the noncontrast head CT and CT perfusion. There is a small ischemic penumbra of 12 mL within the right parietal lobe but there is a large area of prolonged Tmax greater than 4 seconds involving the right MCA territory. On the images available for review of the right MCA territory there appears to be a vascular cut off at the origin of the right M1 vessel with reconstitution via collaterals. There is contrast enhancement in the right MCA territory but it is diminished on comparison to the left side. The patient is at risk for infarction if there is any compromise of collateral flow. Stroke navigator notes that the patient is currently quite hypertensive. INDICATION: Stroke evaluation. Slurred speech onset at 1500 on blood thinners. Subtle left body weakness on exam. TECHNIQUE: CT angiogram of the head and neck with during IV contrast. Contrast dose recorded in the patient's chart. 3-D  MIP reconstructions were obtained and reviewed.  Evaluation for a significant carotid arterial stenosis is based on the NASCET criteria. Radiation dose reduction techniques included automated exposure control or exposure modulation based on body size. Count of known CT and cardiac nuc med studies performed in previous 12 months: 0. Noncontrast images were also obtained. JINNY  analysis of LVO was utilized COMPARISON: Earlier noncontrast head CT and CT perfusion. FINDINGS: CTA neck:  There are vascular calcifications at the aortic arch and involving great vessel origins. There is no hemodynamically significant stenosis of great vessel origins. Proximal right common carotid artery is obscured by venous contrast inflow. Right carotid system: There is mild calcified plaque at the right carotid bifurcation but there is 0% stenosis by NASCET criteria. There is calcified plaque at the right carotid siphon with approximately moderate stenosis. Left carotid system: There is mostly calcified plaque at the left carotid bifurcation. By NASCET criteria there is about 10% diameter stenosis. There is also considerable calcified plaque at the left carotid siphon with at least moderate stenosis. Both vertebral arteries are patent and essentially codominant. Both supply the basilar. CTA head:  There is an abnormal appearance to the right MCA territory with what appears to be occlusion (or possibly near complete occlusion) at the origin of the right M1 vessel extending over about a centimeter in length. There are indistinct collaterals along the expected right M1 course. There is contrast enhancement of the more distal right MCA vessels but this is diminished visually on comparison to the contrast enhancement in the left MCA vessels. There is an anterior communicating artery present. There is some irregular narrowing of the proximal left A2 vessel with likely some hemodynamically significant narrowing. Allowing for technical factors there is likely irregularity of the intracranial vessels in general suggestive of intracranial atherosclerotic disease or less likely vasculitis. No significant sized posterior communicating artery is seen on either side. There is irregularity of the proximal left posterior cerebral artery distribution again likely due to atherosclerotic disease. The dural venous sinuses are  grossly patent. There is no obvious intracranial aneurysm on this limited CT angiogram performed for stroke evaluation with rapid imaging technique. There are coronary artery calcifications. There are degenerative changes in the spine.     1. There is an abnormal appearance the right middle cerebral artery territory. There is either occlusion or near complete occlusion of the right M1 vessel beginning at its origin and extending over about a centimeter in length with collateral reconstitution of the more distal right MCA vessels. There is flow within the more distal right MCA territory but usually there is diminished contrast enhancement in the right MCA territory when compared to the left. This is consistent with the accompanying perfusion study. The findings are partly been discussed by myself with the stroke navigator. 2. Likely intracranial atherosclerotic disease with other areas of intracranial vascular irregularity. Vasculitis is a differential consideration. 3. By NASCET criteria, 0% stenosis of the right carotid bifurcation and about 10% stenosis of the left carotid bifurcation. 4. Both vertebral arteries are patent with supply the basilar. Signer Name: Jyotsna Liang MD  Signed: 7/30/2021 8:34 PM  Workstation Name: [email or web address]  [facility]    MRI Brain Without Contrast    Result Date: 7/30/2021  INDICATION:  Follow-up CVA. Lower extremity weakness. Evaluate for acute CVA. TECHNIQUE: MRI of the brain without contrast. COMPARISON:  Please see earlier head CT, CT perfusion, and CT angiograms head and neck FINDINGS: There is no evidence for a recent infarct on the diffusion series. There is no MRI evidence for intracranial hemorrhage. There is no extra-axial fluid collection. The basilar cisterns are patent. There is no intracranial mass affect. There our small chronic lacunes in the right greater than left cerebellar hemisphere and brainstem. There is mild to moderate white matter signal  abnormality predominantly in the deep and periventricular white matter of the supratentorial brain that is likely due to small vessel disease. There is a small focus of cortical malacia at the right more superolateral frontal lobe consistent with old insult with mild focal volume loss. There is generalized prominence of perivascular spaces in addition to chronic lacunar insults in the basal ganglia. There is a small amount of fluid or inflammatory change in the mastoid air cells bilaterally. There is only mild paranasal sinus mucosal thickening. Please refer to the CT angiogram report for description of the abnormal right MCA. The right MCA flow voids are smaller than the left MCA flow voids consistent with asymmetrically diminished flow to the right MCA territory.     1. No evidence for recent infarct. 2. Generalized atrophy with prominence of perivascular spaces and overall moderate probable sequelae of small vessel disease. 3. There is a small focus of cortical malacia at the superior lateral right frontal lobe consistent with a chronic right MCA territory insult. The right MCA abnormality that is best appreciated on the CT angiogram is age indeterminate on imaging, but the presence of a chronic MCA territory insult favors the presence of at least a component of chronic vascular disease. Please correlate further with the clinical findings as well as the earlier CT perfusion/angiogram results. Signer Name: Jyotsna Liang MD  Signed: 7/30/2021 11:32 PM  Workstation Name: [email or web address]  [facility]    XR Chest 1 View    Result Date: 8/3/2021  CR Chest 1 Vw INDICATION: Central chest pain and short of air. Awakened from sleep by symptoms. Atrial fibrillation and hypertension. COMPARISON:  7/30/2021 FINDINGS: Single portable AP view(s) of the chest. No visible support lines. Stable cardiac silhouette. The lungs are emphysematous. The vascularity is unremarkable. No new effusion or dense consolidation.  No pneumothorax. Chronic appearing interstitial changes are present in both lungs with a slight right-sided predominance.     1. Chronic appearing lung changes. No definite superimposed active disease or significant interval change. Signer Name: Richie Donahue MD  Signed: 8/3/2021 4:28 AM  Workstation Name: [email or web address]  [facility]    XR Chest 1 View    Result Date: 7/30/2021  CR Chest 1 Vw INDICATION: Stroke. Hypertension. COMPARISON:  3/28/2019 FINDINGS: Single portable AP view(s) of the chest. There is support equipment artifact. Cardiac silhouette stable. Unremarkable vascularity. There is some probable atelectasis in the lung bases. No pneumothorax. No effusion.     1. Probable bibasilar atelectasis. No pneumothorax or effusion. Signer Name: Richie Donahue MD  Signed: 7/30/2021 10:33 PM  Workstation Name: [email or web address]  [facility]    XR Outside Films    Result Date: 7/30/2021  This procedure was auto-finalized with no dictation required.    CT Angiogram Head w AI Analysis of LVO    Result Date: 7/30/2021  CTA  HEAD W AI ANALYSIS FOR LVO, CTA Neck Critical results relayed by myself to the stroke navigator at [facility] at 8:22 PM. The CT angiogram is still in progress but I did discuss with her the findings on the noncontrast head CT and CT perfusion. There is a small ischemic penumbra of 12 mL within the right parietal lobe but there is a large area of prolonged Tmax greater than 4 seconds involving the right MCA territory. On the images available for review of the right MCA territory there appears to be a vascular cut off at the origin of the right M1 vessel with reconstitution via collaterals. There is contrast enhancement in the right MCA territory but it is diminished on comparison to the left side. The patient is at risk for infarction if there is any compromise of collateral flow. Stroke navigator notes that the patient is currently quite  hypertensive. INDICATION: Stroke evaluation. Slurred speech onset at 1500 on blood thinners. Subtle left body weakness on exam. TECHNIQUE: CT angiogram of the head and neck with during IV contrast. Contrast dose recorded in the patient's chart. 3-D  MIP reconstructions were obtained and reviewed.  Evaluation for a significant carotid arterial stenosis is based on the NASCET criteria. Radiation dose reduction techniques included automated exposure control or exposure modulation based on body size. Count of known CT and cardiac nuc med studies performed in previous 12 months: 0. Noncontrast images were also obtained. AI analysis of LVO was utilized COMPARISON: Earlier noncontrast head CT and CT perfusion. FINDINGS: CTA neck:  There are vascular calcifications at the aortic arch and involving great vessel origins. There is no hemodynamically significant stenosis of great vessel origins. Proximal right common carotid artery is obscured by venous contrast inflow. Right carotid system: There is mild calcified plaque at the right carotid bifurcation but there is 0% stenosis by NASCET criteria. There is calcified plaque at the right carotid siphon with approximately moderate stenosis. Left carotid system: There is mostly calcified plaque at the left carotid bifurcation. By NASCET criteria there is about 10% diameter stenosis. There is also considerable calcified plaque at the left carotid siphon with at least moderate stenosis. Both vertebral arteries are patent and essentially codominant. Both supply the basilar. CTA head:  There is an abnormal appearance to the right MCA territory with what appears to be occlusion (or possibly near complete occlusion) at the origin of the right M1 vessel extending over about a centimeter in length. There are indistinct collaterals along the expected right M1 course. There is contrast enhancement of the more distal right MCA vessels but this is diminished visually on comparison to the  contrast enhancement in the left MCA vessels. There is an anterior communicating artery present. There is some irregular narrowing of the proximal left A2 vessel with likely some hemodynamically significant narrowing. Allowing for technical factors there is likely irregularity of the intracranial vessels in general suggestive of intracranial atherosclerotic disease or less likely vasculitis. No significant sized posterior communicating artery is seen on either side. There is irregularity of the proximal left posterior cerebral artery distribution again likely due to atherosclerotic disease. The dural venous sinuses are grossly patent. There is no obvious intracranial aneurysm on this limited CT angiogram performed for stroke evaluation with rapid imaging technique. There are coronary artery calcifications. There are degenerative changes in the spine.     1. There is an abnormal appearance the right middle cerebral artery territory. There is either occlusion or near complete occlusion of the right M1 vessel beginning at its origin and extending over about a centimeter in length with collateral reconstitution of the more distal right MCA vessels. There is flow within the more distal right MCA territory but usually there is diminished contrast enhancement in the right MCA territory when compared to the left. This is consistent with the accompanying perfusion study. The findings are partly been discussed by myself with the stroke navigator. 2. Likely intracranial atherosclerotic disease with other areas of intracranial vascular irregularity. Vasculitis is a differential consideration. 3. By NASCET criteria, 0% stenosis of the right carotid bifurcation and about 10% stenosis of the left carotid bifurcation. 4. Both vertebral arteries are patent with supply the basilar. Signer Name: Jyotsna Liang MD  Signed: 7/30/2021 8:34 PM  Workstation Name: [email or web address]  [facility]    CT CEREBRAL PERFUSION WITH &  WITHOUT CONTRAST    Result Date: 7/30/2021  CT CEREBRAL PERFUSION W WO CONTRAST HISTORY:  bilateral lower extremity weakness and recurrent TIA. Slurred speech started at 1600 on blood thinners. TECHNIQUE: Axial CT images of the brain without and with intravenous contrast using cerebral perfusion protocol. Post-processing parametric maps were created using RAPID software and reviewed. Radiation dose reduction techniques included automated exposure control or exposure modulation based on body size. CT and nuclear cardiology exams in the last 12 months: 0. COMPARISON: Earlier noncontrast head CT. FINDINGS: Arterial input function is optimal. Ischemic tissue volume (Tmax > 6 seconds, includes core and penumbra): 12 mL in the right parietal lobe Ischemic core volume (rCBF < 30%): 0 Mismatch (penumbra) volume 12, ratio infinite Volume of poor collateral perfusion (Tmax > 10 seconds): 0 Hypoperfusion index (Tmax > 10 seconds/Tmax > 6 seconds): 0 CBV index: 0.6 There is a large portion of the right middle cerebral artery distribution that shows prolonged mean transit time with Tmax greater than 4 seconds (179 mL. See the accompanying CT angiogram when it becomes available, there is visually delayed enhancement of the right MCA territory on contrast inflow.     1. Abnormal brain perfusion study. There is a 12 mL focus of ischemic tissue in the right parietal cortex. No core infarct is documented. Of note there is a large volume of Tmax greater than 4 seconds involving the right middle cerebral artery territory (179 mL) and on visual inspection of the perfusion source imaging there is delayed enhancement of the right MCA territory. Please refer to the CT angiogram report when images become available. They are currently in process. Signer Name: Jyotsna Liang MD  Signed: 7/30/2021 8:17 PM  Workstation Name: [email or web address]  [facility]    CT Outside Films    Result Date: 7/30/2021  This procedure was  auto-finalized with no dictation required.      Results for orders placed during the hospital encounter of 09/19/17    Duplex Carotid Ultrasound CAR    Interpretation Summary  · No obstructive carotid stenosis bilaterally  · Antegrade bilateral vertebral flow.      Results for orders placed during the hospital encounter of 09/19/17    Duplex Carotid Ultrasound CAR    Interpretation Summary  · No obstructive carotid stenosis bilaterally  · Antegrade bilateral vertebral flow.      Results for orders placed during the hospital encounter of 07/30/21    Adult Transthoracic Echo Complete W/ Cont if Necessary Per Protocol    Interpretation Summary  · Moderate left atrial enlargement.  · Left ventricular wall thickness is consistent with mild concentric hypertrophy.  · Normal left ventricular systolic function, estimated EF 55%.  · Calcified aortic valve with mild aortic stenosis, mean gradient 11.8 mmHg.  · Mild mitral vegetation.  · Trace tricuspid regurgitation with normal RVSP.  · Dilated aortic root (4.2 cm).      Plan for Follow-up of Pending Labs/Results:     Discharge Details        Discharge Medications      New Medications      Instructions Start Date   clopidogrel 75 MG tablet  Commonly known as: PLAVIX   75 mg, Oral, Daily   Start Date: August 5, 2021        Changes to Medications      Instructions Start Date   carvedilol 6.25 MG tablet  Commonly known as: COREG  What changed:   · medication strength  · how much to take   6.25 mg, Oral, 2 Times Daily With Meals      furosemide 20 MG tablet  Commonly known as: LASIX  What changed:   · how much to take  · when to take this  · reasons to take this  · Another medication with the same name was removed. Continue taking this medication, and follow the directions you see here.   40 mg, Oral, Daily PRN      hydrALAZINE 50 MG tablet  Commonly known as: APRESOLINE  What changed:   · how much to take  · when to take this   25 mg, Oral, 2 times daily      levothyroxine 25  MCG tablet  Commonly known as: SYNTHROID, LEVOTHROID  What changed:   · medication strength  · how much to take  · when to take this   275 mcg, Oral, Every Early Morning   Start Date: August 5, 2021        Continue These Medications      Instructions Start Date   amLODIPine 10 MG tablet  Commonly known as: NORVASC   10 mg, Oral, Daily      aspirin 81 MG chewable tablet   81 mg, Oral, Daily      atorvastatin 80 MG tablet  Commonly known as: LIPITOR   80 mg, Oral, Nightly      montelukast 10 MG tablet  Commonly known as: SINGULAIR   No dose, route, or frequency recorded.      naltrexone 50 MG tablet  Commonly known as: DEPADE   4.5 mg, Oral, Daily, Low dose per patient      potassium chloride 10 MEQ CR tablet   10 mEq, Oral, Every Morning      potassium chloride 10 MEQ CR tablet   10 mEq, Oral, Daily PRN      warfarin 5 MG tablet  Commonly known as: COUMADIN   5 mg, Oral, Nightly, MWF       warfarin 10 MG tablet  Commonly known as: COUMADIN   10 mg, Oral, Nightly, Tuesday, Thursday, Saturday, and Sunday          ASK your doctor about these medications      Instructions Start Date   losartan 100 MG tablet  Commonly known as: COZAAR   100 mg, Oral, Daily             Allergies   Allergen Reactions   • Codeine Hallucinations         Discharge Disposition:  Short Term Hospital (DC - External)    Diet:  Hospital:  Diet Order   Procedures   • Diet Regular; Cardiac       Activity:  - as tolerated    Restrictions or Other Recommendations:  - none       CODE STATUS:    Code Status and Medical Interventions:   Ordered at: 07/30/21 2200     Level Of Support Discussed With:    Patient     Code Status:    CPR     Medical Interventions (Level of Support Prior to Arrest):    Full       No future appointments.    Additional Instructions for the Follow-ups that You Need to Schedule     Discharge Follow-up with Specialty: NeurosurgeryDr. Can; 2 Weeks   As directed      Specialty: NeurosurgeryDr. Can    Follow Up: 2 Weeks                      Suki Doherty,   08/04/21      Time Spent on Discharge:  I spent  35 minutes on this discharge activity which included: face-to-face encounter with the patient, reviewing the data in the system, coordination of the care with the nursing staff as well as consultants, documentation, and entering orders.

## 2021-08-23 ENCOUNTER — TELEPHONE (OUTPATIENT)
Dept: NEUROSURGERY | Facility: CLINIC | Age: 71
End: 2021-08-23

## 2021-08-23 NOTE — TELEPHONE ENCOUNTER
Caller: Rowan Hendrix    Relationship: Emergency Contact    Best call back number: [phone]    What was the call regarding: PATIENT'S WIFE CALLED AGAIN AS SHE HAD NOT BEEN CONTACTED YET.   I WARM TRANSFERRED TO TEERSO AT THE PRACTICE AS HIS APPOINTMENT IS 8/24 AT 11:30

## 2021-08-23 NOTE — TELEPHONE ENCOUNTER
Caller: Rowan Hendrix    Relationship: Emergency Contact    Best call back number: [phone]  Type of visit: NEW PATIENT    What was the call regarding:   PATIENT'S WIFE CALLED AS THEIR APPOINTMENT FOR 8/23 WAS RESCHEDULED AND THEY WILL NOT BE ABLE TO MAKE THE RESCHEDULED 8/24 APPOINTMENT AT11:30AM.     SHE STATES THAT THEY COULD MAKE AN APPOINTMENT LATER IN THE DAY AFTER 1PM.     PLEASE CONTACT THE PATIENT'S WIFE TO RESCHEDULE [phone].

## 2021-08-24 ENCOUNTER — OFFICE VISIT (OUTPATIENT)
Dept: NEUROSURGERY | Facility: CLINIC | Age: 71
End: 2021-08-24

## 2021-08-24 VITALS — BODY MASS INDEX: 38.21 KG/M2 | HEIGHT: 69 IN | WEIGHT: 258 LBS

## 2021-08-24 DIAGNOSIS — I63.9 CEREBROVASCULAR ACCIDENT (CVA), UNSPECIFIED MECHANISM (HCC): ICD-10-CM

## 2021-08-24 DIAGNOSIS — I69.30 CHRONIC ISCHEMIC RIGHT MIDDLE CEREBRAL ARTERY (MCA) STROKE: Primary | ICD-10-CM

## 2021-08-24 PROCEDURE — 99213 OFFICE O/P EST LOW 20 MIN: CPT | Performed by: PHYSICIAN ASSISTANT

## 2021-08-24 RX ORDER — LEVOTHYROXINE SODIUM 175 UG/1
TABLET ORAL
COMMUNITY
Start: 2021-08-09

## 2021-08-24 RX ORDER — LEVOTHYROXINE SODIUM 0.1 MG/1
TABLET ORAL
COMMUNITY
Start: 2021-08-09

## 2021-08-24 NOTE — PATIENT INSTRUCTIONS
Obesity, Adult  Obesity is the condition of having too much total body fat. Being overweight or obese means that your weight is greater than what is considered healthy for your body size. Obesity is determined by a measurement called BMI. BMI is an estimate of body fat and is calculated from height and weight. For adults, a BMI of 30 or higher is considered obese.  Obesity can lead to other health concerns and major illnesses, including:  · Stroke.  · Coronary artery disease (CAD).  · Type 2 diabetes.  · Some types of cancer, including cancers of the colon, breast, uterus, and gallbladder.  · Osteoarthritis.  · High blood pressure (hypertension).  · High cholesterol.  · Sleep apnea.  · Gallbladder stones.  · Infertility problems.  What are the causes?  Common causes of this condition include:  · Eating daily meals that are high in calories, sugar, and fat.  · Being born with genes that may make you more likely to become obese.  · Having a medical condition that causes obesity, including:  ? Hypothyroidism.  ? Polycystic ovarian syndrome (PCOS).  ? Binge-eating disorder.  ? Cushing syndrome.  · Taking certain medicines, such as steroids, antidepressants, and seizure medicines.  · Not being physically active (sedentary lifestyle).  · Not getting enough sleep.  · Drinking high amounts of sugar-sweetened beverages, such as soft drinks.  What increases the risk?  The following factors may make you more likely to develop this condition:  · Having a family history of obesity.  · Being a woman of  descent.  · Being a man of  descent.  · Living in an area with limited access to:  ? [place], recreation centers, or sidewalks.  ? Healthy food choices, such as grocery stores and farmers' markets.  What are the signs or symptoms?  The main sign of this condition is having too much body fat.  How is this diagnosed?  This condition is diagnosed based on:  · Your BMI. If you are an adult with a BMI of 30 or  higher, you are considered obese.  · Your waist circumference. This measures the distance around your waistline.  · Your skinfold thickness. Your health care provider may gently pinch a fold of your skin and measure it.  You may have other tests to check for underlying conditions.  How is this treated?  Treatment for this condition often includes changing your lifestyle. Treatment may include some or all of the following:  · Dietary changes. This may include developing a healthy meal plan.  · Regular physical activity. This may include activity that causes your heart to beat faster (aerobic exercise) and strength training. Work with your health care provider to design an exercise program that works for you.  · Medicine to help you lose weight if you are unable to lose 1 pound a week after 6 weeks of healthy eating and more physical activity.  · Treating conditions that cause the obesity (underlying conditions).  · Surgery. Surgical options may include gastric banding and gastric bypass. Surgery may be done if:  ? Other treatments have not helped to improve your condition.  ? You have a BMI of 40 or higher.  ? You have life-threatening health problems related to obesity.  Follow these instructions at home:  Eating and drinking    · Follow recommendations from your health care provider about what you eat and drink. Your health care provider may advise you to:  ? Limit fast food, sweets, and processed snack foods.  ? Choose low-fat options, such as low-fat milk instead of whole milk.  ? Eat 5 or more servings of fruits or vegetables every day.  ? Eat at home more often. This gives you more control over what you eat.  ? Choose healthy foods when you eat out.  ? Learn to read food labels. This will help you understand how much food is considered 1 serving.  ? Learn what a healthy serving size is.  ? Keep low-fat snacks available.  ? Limit sugary drinks, such as soda, fruit juice, sweetened iced tea, and flavored  milk.  · Drink enough water to keep your urine pale yellow.  · Do not follow a fad diet. Fad diets can be unhealthy and even dangerous.  Physical activity  · Exercise regularly, as told by your health care provider.  ? Most adults should get up to 150 minutes of moderate-intensity exercise every week.  ? Ask your health care provider what types of exercise are safe for you and how often you should exercise.  · Warm up and stretch before being active.  · Cool down and stretch after being active.  · Rest between periods of activity.  Lifestyle  · Work with your health care provider and a dietitian to set a weight-loss goal that is healthy and reasonable for you.  · Limit your screen time.  · Find ways to reward yourself that do not involve food.  · Do not drink alcohol if:  ? Your health care provider tells you not to drink.  ? You are pregnant, may be pregnant, or are planning to become pregnant.  · If you drink alcohol:  ? Limit how much you use to:  § 0-1 drink a day for women.  § 0-2 drinks a day for men.  ? Be aware of how much alcohol is in your drink. In the [place] one drink equals one 12 oz bottle of beer (355 mL), one 5 oz glass of wine (148 mL), or one 1½ oz glass of hard liquor (44 mL).  General instructions  · Keep a weight-loss journal to keep track of the food you eat and how much exercise you get.  · Take over-the-counter and prescription medicines only as told by your health care provider.  · Take vitamins and supplements only as told by your health care provider.  · Consider joining a support group. Your health care provider may be able to recommend a support group.  · Keep all follow-up visits as told by your health care provider. This is important.  Contact a health care provider if:  · You are unable to meet your weight loss goal after 6 weeks of dietary and lifestyle changes.  Get help right away if you are having:  · Trouble breathing.  · Suicidal thoughts or behaviors.  Summary  · Obesity is the  condition of having too much total body fat.  · Being overweight or obese means that your weight is greater than what is considered healthy for your body size.  · Work with your health care provider and a dietitian to set a weight-loss goal that is healthy and reasonable for you.  · Exercise regularly, as told by your health care provider. Ask your health care provider what types of exercise are safe for you and how often you should exercise.  This information is not intended to replace advice given to you by your health care provider. Make sure you discuss any questions you have with your health care provider.  Document Revised: 08/22/2019 Document Reviewed: 08/22/2019  [employer] Patient Education © 2021 [employer]

## 2021-08-24 NOTE — PROGRESS NOTES
Patient: Camron Hendrix  : 1950    Primary Care Provider: Leslie Rhodes MD      Chief Complaint: Left-sided weakness    History of Present Illness:       Patient is a nice 71-year-old gentleman who was evaluated by the stroke team on 2021 secondary to some subtle left-sided weakness.  Left upper and left lower extremity 4 out of 5.  Patient had NIH stroke scale of 7 due to Coumadin being therapeutic he was not offered any TPA.    Patient was alert and oriented no sign of speech issues trunk were treated at midline.    Patient was taken to the CT scanner CT perfusion as well CTAs showed holohemispheric right-sided slowing of flow but nonocclusive disease in the right MCA more consistent with a chronic ischemia.    Patient is back today for a follow-up.    Patient back today with no new films.  Patient is on aspirin Plavix and high-dose statin as well as Coumadin.  Patient will continue take these for the indefinite future.    Patient states that he is back to his baseline which is reasonably strong no sided weakness and feeling well.    Review of Systems   Constitutional: Negative for activity change, appetite change, chills, diaphoresis, fatigue, fever and unexpected weight change.   HENT: Negative for congestion, dental problem, drooling, ear discharge, ear pain, facial swelling, hearing loss, mouth sores, nosebleeds, postnasal drip, rhinorrhea, sinus pressure, sneezing, sore throat, tinnitus, trouble swallowing and voice change.    Eyes: Negative for photophobia, pain, discharge, redness, itching and visual disturbance.   Respiratory: Negative for apnea, cough, choking, chest tightness, shortness of breath, wheezing and stridor.    Cardiovascular: Negative for chest pain, palpitations and leg swelling.   Gastrointestinal: Negative for abdominal distention, abdominal pain, anal bleeding, blood in stool, constipation, diarrhea, nausea, rectal pain and vomiting.   Endocrine: Negative for cold  intolerance, heat intolerance, polydipsia, polyphagia and polyuria.   Genitourinary: Negative for decreased urine volume, difficulty urinating, dysuria, enuresis, flank pain, frequency, genital sores, hematuria and urgency.   Musculoskeletal: Negative for arthralgias, back pain, gait problem, joint swelling, myalgias, neck pain and neck stiffness.   Skin: Negative for color change, pallor, rash and wound.   Allergic/Immunologic: Negative for environmental allergies, food allergies and immunocompromised state.   Neurological: Negative for dizziness, tremors, seizures, syncope, facial asymmetry, speech difficulty, weakness, light-headedness, numbness and headaches.   Hematological: Negative for adenopathy. Does not bruise/bleed easily.   Psychiatric/Behavioral: Negative for agitation, behavioral problems, confusion, decreased concentration, dysphoric mood, hallucinations, self-injury, sleep disturbance and suicidal ideas. The patient is not nervous/anxious and is not hyperactive.        Past Medical History:     Past Medical History:   Diagnosis Date    AAA (abdominal aortic aneurysm) (CMS/HCC)     Coronary artery disease     CVA (cerebral vascular accident) (CMS/HCC) 2011    Hypertension     Hypothyroidism     PAF (paroxysmal atrial fibrillation) (CMS/HCC)     TIA (transient ischemic attack)        Family History:     Family History   Problem Relation Age of Onset    Heart failure Mother     Aneurysm Father        Social History:    reports that he quit smoking about 35 years ago. He has a 20.00 pack-year smoking history. He does not have any smokeless tobacco history on file. He reports current alcohol use. He reports that he does not use drugs.   SMOKING STATUS: Non-smoker    Surgical History:     Past Surgical History:   Procedure Laterality Date    CARDIAC CATHETERIZATION N/A 3/29/2019    Procedure: Left Heart Cath;  Surgeon: Andrea Holloway MD;  Location: [facility] CATH INVASIVE LOC[facility];  Service: Cardiovascular  "   CERVICAL FUSION      c4-c5    CORONARY ANGIOPLASTY WITH STENT PLACEMENT      HERNIA REPAIR      TONSILECTOMY, ADENOIDECTOMY, BILATERAL MYRINGOTOMY AND TUBES         Allergies:   Codeine    Physical Exam:    Vital Signs:Ht 175.3 cm (69\")   Wt 117 kg (258 lb)   BMI 38.10 kg/m²    BMI: Body mass index is 38.1 kg/m².     Exam limited secondary to telephone encounter    Medical Decision Making    Data Review:   CTA CTP from hospital reviewed as described in HPI.  Patient has a nonocclusive stenosis of the right MCA causing slowing of the perfusion.    Diagnosis:   Right MCA stenosis  ICAD      Treatment Options:   We will can see the patient back within a month with a repeat CTA to ensure there is no worsening of the iCAD.     At that time we can consider other interventions if necessary.    Patient understands that if he were to have any neurologic deficit that he should present to the emergency department for strokelike symptoms etc.    Patient's Body mass index is 38.1 kg/m². indicating that he is obese (BMI >30). Obesity-related health conditions include the following: none. Obesity is unchanged. BMI is is above average; BMI management plan is completed. We discussed portion control and increasing exercise.     Diagnosis Plan   1. Chronic ischemic right middle cerebral artery (MCA) stroke  CT Angiogram Head    CT Angiogram Neck   2. Cerebrovascular accident (CVA), unspecified mechanism (CMS/HCC)  CT Angiogram Head    CT Angiogram Neck     "

## 2021-09-29 ENCOUNTER — HOSPITAL ENCOUNTER (OUTPATIENT)
Dept: CT IMAGING | Facility: HOSPITAL | Age: 71
Discharge: HOME | End: 2021-09-29

## 2021-09-29 DIAGNOSIS — I69.30 CHRONIC ISCHEMIC RIGHT MIDDLE CEREBRAL ARTERY (MCA) STROKE: ICD-10-CM

## 2021-09-29 DIAGNOSIS — I63.9 CEREBROVASCULAR ACCIDENT (CVA), UNSPECIFIED MECHANISM (HCC): ICD-10-CM

## 2021-09-29 LAB — CREAT BLDA-MCNC: 0.9 MG/DL (ref 0.6–1.3)

## 2021-09-29 PROCEDURE — 82565 ASSAY OF CREATININE: CPT

## 2021-09-30 ENCOUNTER — TELEPHONE (OUTPATIENT)
Dept: NEUROSURGERY | Facility: CLINIC | Age: 71
End: 2021-09-30

## 2021-10-25 ENCOUNTER — OFFICE VISIT (OUTPATIENT)
Dept: NEUROSURGERY | Facility: CLINIC | Age: 71
End: 2021-10-25

## 2021-10-25 ENCOUNTER — HOSPITAL ENCOUNTER (OUTPATIENT)
Dept: CT IMAGING | Facility: HOSPITAL | Age: 71
Discharge: HOME | End: 2021-10-25
Admitting: PHYSICIAN ASSISTANT

## 2021-10-25 VITALS
TEMPERATURE: 97.6 F | SYSTOLIC BLOOD PRESSURE: 142 MMHG | DIASTOLIC BLOOD PRESSURE: 62 MMHG | HEIGHT: 69 IN | WEIGHT: 255 LBS | BODY MASS INDEX: 37.77 KG/M2

## 2021-10-25 DIAGNOSIS — I69.30 CHRONIC ISCHEMIC RIGHT MIDDLE CEREBRAL ARTERY (MCA) STROKE: Primary | ICD-10-CM

## 2021-10-25 PROCEDURE — 99214 OFFICE O/P EST MOD 30 MIN: CPT | Performed by: PHYSICIAN ASSISTANT

## 2021-10-25 PROCEDURE — 70496 CT ANGIOGRAPHY HEAD: CPT

## 2021-10-25 PROCEDURE — 70498 CT ANGIOGRAPHY NECK: CPT

## 2021-10-25 PROCEDURE — 0 IOPAMIDOL PER 1 ML: Performed by: PHYSICIAN ASSISTANT

## 2021-10-25 RX ADMIN — IOPAMIDOL 75 ML: 755 INJECTION, SOLUTION INTRAVENOUS at 13:15

## 2021-10-25 NOTE — PROGRESS NOTES
NAME: AVEL GUTIERREZ   DOS: 10/26/2021  : 1950  PCP: Leslie Rhodes MD    Chief Complaint:  Stroke (Hospital follow-up)      History of Present Illness: Mr. Gutierrez is a 71 y.o. male who is seen today in follow-up due to intracranial atherosclerotic disease.  Patient initially presented to [facility] after left upper and lower extremity weakness.  He was evaluated by the stroke team with an NIH of 7.  Patient was on Coumadin and therefore was not offered any TPA.  MRI did not show any acute infarct, however, there was noted concern for a chronic right MCA territory insult.  CTA at the time demonstrated holohemispheric right-sided slowing of flow, however, no occlusive disease.  Therefore, Plavix was added to his regiment of Coumadin, aspirin and high-dose statin.  He was discharged home near his neurologic baseline.    Today, patient notes he is doing okay.  Denies any episodes of TIA or strokelike symptoms, specifically unilateral weakness/numbness or changes in vision or speech.  He does note that he has been dizzy/off balance for the last several weeks and has complained of shortness of breath.  Has remained on Coumadin, Plavix, aspirin, and high-dose statin without issue.  Does note that his blood pressure is being maintained between 120-130/80.  Patient is a non-smoker.  Nondiabetic.  He is seen today with a CTA.      Past Medical History:   Diagnosis Date   • AAA (abdominal aortic aneurysm) ([facility])    • Coronary artery disease    • CVA (cerebral vascular accident) ([facility])    • Hypertension    • Hypothyroidism    • PAF (paroxysmal atrial fibrillation) ([facility])    • TIA (transient ischemic attack)        Past Surgical History:   Procedure Laterality Date   • CARDIAC CATHETERIZATION N/A 3/29/2019    Procedure: Left Heart Cath;  Surgeon: Andrea Holloway MD;  Location: [facility] CATH INVASIVE LOCATION;  Service: Cardiovascular   • CERVICAL FUSION      c4-c5   • CORONARY ANGIOPLASTY WITH STENT  PLACEMENT     • HERNIA REPAIR     • TONSILECTOMY, ADENOIDECTOMY, BILATERAL MYRINGOTOMY AND TUBES               Review of Systems   Constitutional: Positive for fatigue.   HENT: Positive for hearing loss.    Musculoskeletal: Positive for gait problem.   Neurological: Positive for headaches.   Psychiatric/Behavioral: Positive for sleep disturbance.   All other systems reviewed and are negative.           Medications:    Current Outpatient Medications:   •  amLODIPine (NORVASC) 10 MG tablet, Take 10 mg by mouth Daily., Disp: , Rfl:   •  aspirin 81 MG chewable tablet, Chew 1 tablet Daily., Disp: 30 tablet, Rfl: 3  •  atorvastatin (LIPITOR) 80 MG tablet, Take 1 tablet by mouth Every Night., Disp: 30 tablet, Rfl: 3  •  carvedilol (COREG) 6.25 MG tablet, Take 1 tablet by mouth 2 (Two) Times a Day With Meals., Disp: , Rfl:   •  clopidogrel (PLAVIX) 75 MG tablet, Take 1 tablet by mouth Daily., Disp: 30 tablet, Rfl:   •  furosemide (LASIX) 20 MG tablet, Take 2 tablets by mouth Daily As Needed (edema)., Disp: , Rfl:   •  hydrALAZINE (APRESOLINE) 50 MG tablet, Take 0.5 tablets by mouth 2 (two) times a day., Disp: , Rfl:   •  levothyroxine (SYNTHROID, LEVOTHROID) 100 MCG tablet, , Disp: , Rfl:   •  levothyroxine (SYNTHROID, LEVOTHROID) 175 MCG tablet, , Disp: , Rfl:   •  losartan (COZAAR) 100 MG tablet, Take 100 mg by mouth Daily., Disp: , Rfl:   •  montelukast (SINGULAIR) 10 MG tablet, , Disp: , Rfl:   •  naltrexone (DEPADE) 50 MG tablet, Take 4.5 mg by mouth Daily. Low dose per patient, Disp: , Rfl:   •  potassium chloride (K-DUR) 10 MEQ CR tablet, Take 10 mEq by mouth Every Morning., Disp: , Rfl:   •  potassium chloride 10 MEQ CR tablet, Take 10 mEq by mouth Daily As Needed., Disp: , Rfl:   •  warfarin (COUMADIN) 10 MG tablet, Take 10 mg by mouth Every Night. Tuesday, Thursday, Saturday, and Sunday, Disp: , Rfl:   •  warfarin (COUMADIN) 5 MG tablet, Take 5 mg by mouth Every Night. MWF, Disp: , Rfl:     Allergies:  Allergies    Allergen Reactions   • Codeine Hallucinations       Social History     Tobacco Use   • Smoking status: Former Smoker     Packs/day: 1.00     Years: 20.00     Pack years: 20.00     Quit date: 1985     Years since quittin.1   • Smokeless tobacco: Not on file   Substance Use Topics   • Alcohol use: Yes     Comment: occasional    • Drug use: No       Family History   Problem Relation Age of Onset   • Heart failure Mother    • Aneurysm Father        Review of Imaging:  CTA of the head and neck was reviewed with Dr. Vu and Dr. Can along with the corresponding radiology report.  Study demonstrates most notably right MCA stenosis, however, there is evidence of iCAD throughout.  This was also compared to patient's prior CTAs that were completed on 2021 and 2017.  In comparison of the studies the stenosis/ICAD have remained stable.    Vitals:    10/25/21 1529   BP: 142/62   Temp: 97.6 °F (36.4 °C)     Body mass index is 37.66 kg/m².    Physical Exam  Constitutional:       Appearance: Normal appearance. He is obese.   Neurological:      Mental Status: He is alert and oriented to person, place, and time.      Gait: Gait is intact.      Deep Tendon Reflexes: Strength normal.   Psychiatric:         Speech: Speech normal.       Neurologic Exam     Mental Status   Oriented to person, place, and time.   Speech: speech is normal     Cranial Nerves   Cranial nerves II through XII intact.     Motor Exam   Muscle bulk: normal  Overall muscle tone: normal    Strength   Strength 5/5 throughout.     Gait, Coordination, and Reflexes     Gait  Gait: normal      Diagnoses/Plan:    Mr. Hendrix is a 71 y.o. male who is seen today with right MCA stenosis diffuse intracranial atherosclerotic disease. After reviewing the studies, patient does have evidence of right MCA stenosis and diffuse iCAD, however, this has remained stable over the last several years.  In addition, patient has not had any new symptoms since the  initiation of Plavix.  Therefore, at this time we continue to recommend patient optimal medical management.  Patient and wife did note some concern with this plan, therefore, I did offer for them to follow-up in several months with another CTA of the head and neck to again ensure stability with the medical management.  It was also noted to patient to continue to work on his cardiovascular risk factors.  They were very appreciative of this and willing to proceed.  It was stressed to patient that if he does experience any TIA or strokelike symptoms to call our clinic and/or 911.  He will remain on the Coumadin, Plavix, and aspirin regiment at this time.      Patient's Body mass index is 37.66 kg/m². indicating that he is obese (BMI >30). Obesity-related health conditions include the following: hypertension. Obesity is unchanged. BMI is is above average; BMI management plan is completed. We discussed portion control and increasing exercise..         Richelle Robles PA-C

## 2021-10-26 NOTE — PATIENT INSTRUCTIONS
Obesity, Adult  Obesity is the condition of having too much total body fat. Being overweight or obese means that your weight is greater than what is considered healthy for your body size. Obesity is determined by a measurement called BMI. BMI is an estimate of body fat and is calculated from height and weight. For adults, a BMI of 30 or higher is considered obese.  Obesity can lead to other health concerns and major illnesses, including:  · Stroke.  · Coronary artery disease (CAD).  · Type 2 diabetes.  · Some types of cancer, including cancers of the colon, breast, uterus, and gallbladder.  · Osteoarthritis.  · High blood pressure (hypertension).  · High cholesterol.  · Sleep apnea.  · Gallbladder stones.  · Infertility problems.  What are the causes?  Common causes of this condition include:  · Eating daily meals that are high in calories, sugar, and fat.  · Being born with genes that may make you more likely to become obese.  · Having a medical condition that causes obesity, including:  ? Hypothyroidism.  ? Polycystic ovarian syndrome (PCOS).  ? Binge-eating disorder.  ? Cushing syndrome.  · Taking certain medicines, such as steroids, antidepressants, and seizure medicines.  · Not being physically active (sedentary lifestyle).  · Not getting enough sleep.  · Drinking high amounts of sugar-sweetened beverages, such as soft drinks.  What increases the risk?  The following factors may make you more likely to develop this condition:  · Having a family history of obesity.  · Being a woman of  descent.  · Being a man of  descent.  · Living in an area with limited access to:  ? [place], recreation centers, or sidewalks.  ? Healthy food choices, such as grocery stores and farmers' markets.  What are the signs or symptoms?  The main sign of this condition is having too much body fat.  How is this diagnosed?  This condition is diagnosed based on:  · Your BMI. If you are an adult with a BMI of 30 or  higher, you are considered obese.  · Your waist circumference. This measures the distance around your waistline.  · Your skinfold thickness. Your health care provider may gently pinch a fold of your skin and measure it.  You may have other tests to check for underlying conditions.  How is this treated?  Treatment for this condition often includes changing your lifestyle. Treatment may include some or all of the following:  · Dietary changes. This may include developing a healthy meal plan.  · Regular physical activity. This may include activity that causes your heart to beat faster (aerobic exercise) and strength training. Work with your health care provider to design an exercise program that works for you.  · Medicine to help you lose weight if you are unable to lose 1 pound a week after 6 weeks of healthy eating and more physical activity.  · Treating conditions that cause the obesity (underlying conditions).  · Surgery. Surgical options may include gastric banding and gastric bypass. Surgery may be done if:  ? Other treatments have not helped to improve your condition.  ? You have a BMI of 40 or higher.  ? You have life-threatening health problems related to obesity.  Follow these instructions at home:  Eating and drinking    · Follow recommendations from your health care provider about what you eat and drink. Your health care provider may advise you to:  ? Limit fast food, sweets, and processed snack foods.  ? Choose low-fat options, such as low-fat milk instead of whole milk.  ? Eat 5 or more servings of fruits or vegetables every day.  ? Eat at home more often. This gives you more control over what you eat.  ? Choose healthy foods when you eat out.  ? Learn to read food labels. This will help you understand how much food is considered 1 serving.  ? Learn what a healthy serving size is.  ? Keep low-fat snacks available.  ? Limit sugary drinks, such as soda, fruit juice, sweetened iced tea, and flavored  milk.  · Drink enough water to keep your urine pale yellow.  · Do not follow a fad diet. Fad diets can be unhealthy and even dangerous.    Physical activity  · Exercise regularly, as told by your health care provider.  ? Most adults should get up to 150 minutes of moderate-intensity exercise every week.  ? Ask your health care provider what types of exercise are safe for you and how often you should exercise.  · Warm up and stretch before being active.  · Cool down and stretch after being active.  · Rest between periods of activity.  Lifestyle  · Work with your health care provider and a dietitian to set a weight-loss goal that is healthy and reasonable for you.  · Limit your screen time.  · Find ways to reward yourself that do not involve food.  · Do not drink alcohol if:  ? Your health care provider tells you not to drink.  ? You are pregnant, may be pregnant, or are planning to become pregnant.  · If you drink alcohol:  ? Limit how much you use to:  § 0-1 drink a day for women.  § 0-2 drinks a day for men.  ? Be aware of how much alcohol is in your drink. In the [place] one drink equals one 12 oz bottle of beer (355 mL), one 5 oz glass of wine (148 mL), or one 1½ oz glass of hard liquor (44 mL).  General instructions  · Keep a weight-loss journal to keep track of the food you eat and how much exercise you get.  · Take over-the-counter and prescription medicines only as told by your health care provider.  · Take vitamins and supplements only as told by your health care provider.  · Consider joining a support group. Your health care provider may be able to recommend a support group.  · Keep all follow-up visits as told by your health care provider. This is important.  Contact a health care provider if:  · You are unable to meet your weight loss goal after 6 weeks of dietary and lifestyle changes.  Get help right away if you are having:  · Trouble breathing.  · Suicidal thoughts or behaviors.  Summary  · Obesity is  the condition of having too much total body fat.  · Being overweight or obese means that your weight is greater than what is considered healthy for your body size.  · Work with your health care provider and a dietitian to set a weight-loss goal that is healthy and reasonable for you.  · Exercise regularly, as told by your health care provider. Ask your health care provider what types of exercise are safe for you and how often you should exercise.  This information is not intended to replace advice given to you by your health care provider. Make sure you discuss any questions you have with your health care provider.  Document Revised: 08/22/2019 Document Reviewed: 08/22/2019  Elsevier Patient Education © 2021 [employer]

## 2022-02-18 ENCOUNTER — TELEPHONE (OUTPATIENT)
Dept: NEUROSURGERY | Facility: CLINIC | Age: 72
End: 2022-02-18

## 2022-02-21 ENCOUNTER — OFFICE VISIT (OUTPATIENT)
Dept: NEUROSURGERY | Facility: CLINIC | Age: 72
End: 2022-02-21

## 2022-02-21 ENCOUNTER — HOSPITAL ENCOUNTER (OUTPATIENT)
Dept: CT IMAGING | Facility: HOSPITAL | Age: 72
Discharge: HOME | End: 2022-02-21
Admitting: PHYSICIAN ASSISTANT

## 2022-02-21 VITALS
SYSTOLIC BLOOD PRESSURE: 150 MMHG | DIASTOLIC BLOOD PRESSURE: 80 MMHG | TEMPERATURE: 97.1 F | HEIGHT: 69 IN | WEIGHT: 252.4 LBS | BODY MASS INDEX: 37.38 KG/M2

## 2022-02-21 DIAGNOSIS — I63.9 CEREBROVASCULAR ACCIDENT (CVA), UNSPECIFIED MECHANISM: Primary | ICD-10-CM

## 2022-02-21 DIAGNOSIS — I69.30 CHRONIC ISCHEMIC RIGHT MIDDLE CEREBRAL ARTERY (MCA) STROKE: ICD-10-CM

## 2022-02-21 PROCEDURE — 70498 CT ANGIOGRAPHY NECK: CPT

## 2022-02-21 PROCEDURE — 0 IOPAMIDOL PER 1 ML: Performed by: PHYSICIAN ASSISTANT

## 2022-02-21 PROCEDURE — 70496 CT ANGIOGRAPHY HEAD: CPT

## 2022-02-21 PROCEDURE — 99213 OFFICE O/P EST LOW 20 MIN: CPT | Performed by: PHYSICIAN ASSISTANT

## 2022-02-21 PROCEDURE — 82565 ASSAY OF CREATININE: CPT

## 2022-02-21 RX ORDER — ATORVASTATIN CALCIUM 80 MG/1
80 TABLET, FILM COATED ORAL NIGHTLY
Qty: 30 TABLET | Refills: 6 | Status: SHIPPED | OUTPATIENT
Start: 2022-02-21

## 2022-02-21 RX ADMIN — IOPAMIDOL 100 ML: 755 INJECTION, SOLUTION INTRAVENOUS at 13:00

## 2022-02-21 NOTE — PROGRESS NOTES
Patient: Camron Hendrix  : 1950    Primary Care Provider: Leslie Rhodes MD      Chief Complaint: Intermittent unsteadiness of gait    History of Present Illness:       Patient I 71-year-old gentleman who was initially seen in 2021 for acute stroke work-up for left hemiparesis with 4-5 strength on his left side.  Patient was found to have an old stroke and nothing new and acute but had globalized slowing on this right hemisphere.  Patient was treated with aspirin Plavix and maximal medical therapy and was discharged home in close to baseline condition.  Patient has been taking aspirin Plavix and Coumadin.  Coumadin for A. fib that was apparently an isolated event with hypokalemia.  Patient is not routinely seeing a cardiologist currently.    Aspirin Plavix secondary to his intracranial atherosclerotic disease and right M1 stenosis with left A2 stenosis.  Patient is back today for CTA recheck.  Patient is been 4 months since last being seen.  CTAs do not show any worsening stenosis of the intracranial vasculature nor any hemodynamically significant carotid disease.  Patient has little bit worse carotid plaque that is calcific on his left than right and the left is probably 30-40%.     Patient not really have any neurologic issues other than some intermittent unsteadiness.  Patient states that he drinks about 6.Mountain Dew's a day if not more I encouraged him to get on water only secondary to the excessive sodium in the soda could be functionally dehydrating him.  Other generalized health recommendations also discussed    Review of Systems   Constitutional: Positive for fatigue. Negative for activity change, appetite change, chills, diaphoresis, fever and unexpected weight change.   HENT: Negative for congestion, dental problem, drooling, ear discharge, ear pain, facial swelling, hearing loss, mouth sores, nosebleeds, postnasal drip, rhinorrhea, sinus pressure, sinus pain, sneezing, sore throat,  tinnitus, trouble swallowing and voice change.    Eyes: Negative for photophobia, pain, discharge, redness, itching and visual disturbance.   Respiratory: Positive for shortness of breath. Negative for apnea, cough, choking, chest tightness, wheezing and stridor.    Cardiovascular: Negative for chest pain, palpitations and leg swelling.   Gastrointestinal: Negative for abdominal distention, abdominal pain, anal bleeding, blood in stool, constipation, diarrhea, nausea, rectal pain and vomiting.   Endocrine: Negative for cold intolerance, heat intolerance, polydipsia, polyphagia and polyuria.   Genitourinary: Negative for decreased urine volume, difficulty urinating, dysuria, enuresis, flank pain, frequency, genital sores, hematuria and urgency.   Musculoskeletal: Negative for arthralgias, back pain, gait problem, joint swelling, myalgias, neck pain and neck stiffness.   Skin: Negative for color change, pallor, rash and wound.   Allergic/Immunologic: Negative for environmental allergies, food allergies and immunocompromised state.   Neurological: Negative for dizziness, tremors, seizures, syncope, facial asymmetry, speech difficulty, weakness, light-headedness, numbness and headaches.   Hematological: Negative for adenopathy. Does not bruise/bleed easily.   Psychiatric/Behavioral: Negative for agitation, behavioral problems, confusion, decreased concentration, dysphoric mood, hallucinations, self-injury, sleep disturbance and suicidal ideas. The patient is not nervous/anxious and is not hyperactive.    All other systems reviewed and are negative.      Past Medical History:     Past Medical History:   Diagnosis Date   • AAA (abdominal aortic aneurysm) ([facility])    • Coronary artery disease    • CVA (cerebral vascular accident) ([facility]) 2011   • Hypertension    • Hypothyroidism    • PAF (paroxysmal atrial fibrillation) ([facility])    • TIA (transient ischemic attack)        Family History:     Family History   Problem Relation Age  "of Onset   • Heart failure Mother    • Aneurysm Father        Social History:    reports that he quit smoking about 36 years ago. He has a 20.00 pack-year smoking history. He has never used smokeless tobacco. He reports current alcohol use. He reports that he does not use drugs.   SMOKING STATUS: Non-smoker    Surgical History:     Past Surgical History:   Procedure Laterality Date   • CARDIAC CATHETERIZATION N/A 3/29/2019    Procedure: Left Heart Cath;  Surgeon: Andrea Holloway MD;  Location: [facility] CATH INVASIVE LOCATION;  Service: Cardiovascular   • CERVICAL FUSION      c4-c5   • CORONARY ANGIOPLASTY WITH STENT PLACEMENT     • HERNIA REPAIR     • TONSILECTOMY, ADENOIDECTOMY, BILATERAL MYRINGOTOMY AND TUBES         Allergies:   Codeine    Physical Exam:    Vital Signs:/80 (BP Location: Right arm, Patient Position: Sitting, Cuff Size: Adult)   Temp 97.1 °F (36.2 °C)   Ht 175.3 cm (69.02\")   Wt 114 kg (252 lb 6.4 oz)   BMI 37.26 kg/m²    BMI: Body mass index is 37.26 kg/m².     GENERAL:           The patient is in no acute distress, and is able to answer all questions appropriately.    Neck:          Supple without lymphadenopathy    Cardiovascular:       Peripheral pulses 2+ at dorsalis pedis and posterior tibialis    Lungs:         Breathing unlabored    Musculoskeletal:            strength is 5 out of 5 bilaterally.        Shoulder abduction is 5 out of 5.         Dorsiflexion is 5/5 Bilaterally       Plantarflexion is 5/5 bilaterally       Hip Flexion 5/5 bilaterally.         The patient´s gait is normal without antalgia.    Neurologic:          The patient is alert and oriented by 3.          Pupils are equal and reactive to light.         Visual fields are full.         Extraocular movements are intact without nystagmus.         There is no evidence of central motor drift. No facial droop.  No difficulty with rapid alternating movements.         Sensation is equal bilaterally with no deficit.    "        Reflexes:  2+ through out    CRANIAL NERVES:         Cranial nerve II: Visual fields are full to confrontation.       Cranial nerves III, IV and VI: PERRLA DC.  Extraocular movements are intact.  Nystagmus is not present.       Cranial nerve V: Facial sensation is intact to light touch.       Cranial nerve VII: Muscles of facial expression revealed no asymmetry.       Cranial nerve VIII: Hearing is intact to finger rub bilaterally.       Cranial nerve IX and X: Palate elevates symmetrically.        Cranial nerve XI: Shoulder shrug is intact.       Cranial nerve XII: Tongue is midline without evidence of Atrophy or fasciculation.    Medical Decision Making    Data Review:   CTA of the head neck reviewed and compared with ones from October.  No significant change.  Left A2 stenosis and a right M1 proximal occlusion with revascularization distal to stenosis.    Diagnosis:   CVA  Intracranial atherosclerotic disease  CAD  Obesity    Treatment Options:   I encouraged the patient to try to lose a little weight and drink more water.  As far as recommendations from his medicines go I would continue him on aspirin and Plavix indefinitely secondary to the atherosclerotic disease intracranially.  I have asked him to reach out to his cardiologist because from their recollection he has never had more than 1 episode of A. fib that was caught on monitor.  Consideration for reevaluation for his chads vas score and contemplation discontinuing Coumadin could be considered.    Since they tried Eliquis it has since gone generic and the reason they got on Coumadin in the first place was the expense.    We will see him back in a years time with repeat CTA of the head neck to ensure no worsening stenosis.    Patient's Body mass index is 37.26 kg/m². indicating that he is obese (BMI >30). Obesity-related health conditions include the following: none. Obesity is newly identified. BMI is is above average; BMI management plan is  completed. We discussed portion control and increasing exercise.    No diagnosis found.

## 2022-02-22 ENCOUNTER — TELEPHONE (OUTPATIENT)
Dept: NEUROSURGERY | Facility: CLINIC | Age: 72
End: 2022-02-22

## 2022-02-22 DIAGNOSIS — I69.30 CHRONIC ISCHEMIC RIGHT MIDDLE CEREBRAL ARTERY (MCA) STROKE: ICD-10-CM

## 2022-02-22 DIAGNOSIS — I63.9 CEREBROVASCULAR ACCIDENT (CVA), UNSPECIFIED MECHANISM: Primary | ICD-10-CM

## 2022-02-25 LAB — CREAT BLDA-MCNC: 0.9 MG/DL (ref 0.6–1.3)

## 2023-02-09 NOTE — PLAN OF CARE
"  Chief Complaint   Patient presents with    Lab Results                                                                                                                                       HPI:   Hayley presents today with the following.    Problem   Intractable Migraine Without Aura and Without Status Migrainosus       Current Outpatient Medications   Medication Sig Dispense Refill    Galcanezumab-gnlm (EMGALITY) 120 MG/ML Solution Prefilled Syringe Adminster 2 MLs once subcutaneously for loading dose. 2 mL 0    Galcanezumab-gnlm (EMGALITY) 120 MG/ML Solution Prefilled Syringe Inject 120 mg under the skin Q30 DAYS. 1 mL 10    Rimegepant Sulfate 75 MG TABLET DISPERSIBLE Take 1 Tablet by mouth 1 time a day as needed (migraine). 8 Tablet 5    Multiple Vitamins-Minerals (ONE-A-DAY WOMENS PO) Take  by mouth every day.       No current facility-administered medications for this visit.       Allergies as of 02/09/2023 - Reviewed 01/09/2023   Allergen Reaction Noted    Bupropion Itching 06/08/2018    Latex Hives and Rash 12/26/2014    Topiramate  09/26/2018    Triptans [sumatriptan]  01/09/2023        ROS:  All systems negative expect as addressed in assessment and plan.     /76 (BP Location: Right arm, Patient Position: Sitting, BP Cuff Size: Adult)   Pulse 95   Temp 36.3 °C (97.3 °F) (Temporal)   Ht 1.702 m (5' 7\")   Wt 87.2 kg (192 lb 3.2 oz)   LMP 10/30/2021   SpO2 96%   BMI 30.10 kg/m²       Physical Exam  Vitals reviewed.   Constitutional:       Appearance: Normal appearance.   HENT:      Head: Normocephalic and atraumatic.      Mouth/Throat:      Mouth: Mucous membranes are moist.   Eyes:      Extraocular Movements: Extraocular movements intact.      Conjunctiva/sclera: Conjunctivae normal.   Pulmonary:      Effort: Pulmonary effort is normal.   Musculoskeletal:         General: Normal range of motion.      Cervical back: Normal range of motion.   Skin:     General: Skin is warm and dry.   Neurological: " Problem: Stroke (Ischemic) (Adult)  Goal: Signs and Symptoms of Listed Potential Problems Will be Absent or Manageable (Stroke)  Outcome: Ongoing (interventions implemented as appropriate)            General: No focal deficit present.      Mental Status: She is alert and oriented to person, place, and time.   Psychiatric:         Mood and Affect: Mood normal.         Behavior: Behavior normal.         Thought Content: Thought content normal.       Assessment and Plan:  32 y.o. female with the following issues.    1. Intractable migraine without aura and without status migrainosus  Galcanezumab-gnlm (EMGALITY) 120 MG/ML Solution Prefilled Syringe    Galcanezumab-gnlm (EMGALITY) 120 MG/ML Solution Prefilled Syringe           Intractable migraine without aura and without status migrainosus  This is a chronic condition. She has a long standing history of migraines. Her migraines started around age 6. She has a family history of migraines on both sides of the family. She does get nausea, photosensitivity, and auras. She gets her headaches on both sides.      Patient has been using advil and sinus medication to help with her migraines.  She reports that her migraines have slightly improved since her sinus infection was treated.    Patient reports that she has not been able to get the MRI done as she has not met her deductible and this is quite expensive.  Discussed with patient that MRI is not urgent and she can schedule the MRI when she meets her deductible.     Will start emgality monthly. Will provide with loading dose then 120 mg monthly. Will provide with nurtec 75mg ODT for abortive therapy.        Return in about 3 months (around 5/9/2023) for follow up for migraines .      I have placed the below orders and discussed them with an approved delegating provider.  The MA is performing the below orders under the direction of Dr. Waddell.    Please note that this dictation was created using voice recognition software. I have worked with consultants from the vendor as well as technical experts from [facility] [employer] to optimize the interface. I have made every reasonable attempt to correct obvious errors, but I  expect that there are errors of grammar and possibly content that I did not discover before finalizing the note.

## 2023-02-14 ENCOUNTER — TELEPHONE (OUTPATIENT)
Dept: NEUROSURGERY | Facility: CLINIC | Age: 73
End: 2023-02-14
Payer: MEDICARE

## 2023-02-14 DIAGNOSIS — I69.30 CHRONIC ISCHEMIC RIGHT MIDDLE CEREBRAL ARTERY (MCA) STROKE: ICD-10-CM

## 2023-02-14 DIAGNOSIS — I63.9 CEREBROVASCULAR ACCIDENT (CVA), UNSPECIFIED MECHANISM: Primary | ICD-10-CM

## 2023-02-14 NOTE — TELEPHONE ENCOUNTER
CTA of Head/ Neck has been R/S to 03/13/23 and f/u w/ Rolando Bird @1:30 same day. Talked w/ Pt and he is ok with it and appreciated it very much.

## 2023-02-14 NOTE — TELEPHONE ENCOUNTER
Can someone please place a new order for CTA head and neck on Pt for the I yr f/u Pt needs to R/S appt due to Surgery. Spoke w/ Pt and he would like to do it about 2-3 wks after his Sx. CTA orders will  on 23. Thank you.

## 2023-02-14 NOTE — TELEPHONE ENCOUNTER
Caller: Camron Hendrix    Relationship to patient: Self    Best call back number: [phone]      Type of visit: F/U-WITH CT SCAN ON SAME DAY    Requested date: ANY    If rescheduling, when is the original appointment: 2-16-23    Additional notes:PT HAVING SURGERY 2-16-23 AND NEED TO RESCHEDULE.     PLEASE CALL TO ADVISE.     THANK YOU,

## 2023-03-13 ENCOUNTER — OFFICE VISIT (OUTPATIENT)
Dept: NEUROSURGERY | Facility: CLINIC | Age: 73
End: 2023-03-13
Payer: MEDICARE

## 2023-03-13 ENCOUNTER — HOSPITAL ENCOUNTER (OUTPATIENT)
Dept: CT IMAGING | Facility: HOSPITAL | Age: 73
Discharge: HOME | End: 2023-03-13
Admitting: PHYSICIAN ASSISTANT
Payer: MEDICARE

## 2023-03-13 VITALS
DIASTOLIC BLOOD PRESSURE: 82 MMHG | SYSTOLIC BLOOD PRESSURE: 144 MMHG | HEIGHT: 69 IN | BODY MASS INDEX: 41.47 KG/M2 | TEMPERATURE: 97.3 F | WEIGHT: 280 LBS

## 2023-03-13 DIAGNOSIS — I69.30 CHRONIC ISCHEMIC RIGHT MIDDLE CEREBRAL ARTERY (MCA) STROKE: Primary | ICD-10-CM

## 2023-03-13 DIAGNOSIS — I69.30 CHRONIC ISCHEMIC RIGHT MIDDLE CEREBRAL ARTERY (MCA) STROKE: ICD-10-CM

## 2023-03-13 DIAGNOSIS — I63.9 CEREBROVASCULAR ACCIDENT (CVA), UNSPECIFIED MECHANISM: ICD-10-CM

## 2023-03-13 PROBLEM — Z86.73 CHRONIC ISCHEMIC RIGHT MIDDLE CEREBRAL ARTERY (MCA) STROKE: Status: ACTIVE | Noted: 2023-03-13

## 2023-03-13 LAB — CREAT BLDA-MCNC: 1.3 MG/DL (ref 0.6–1.3)

## 2023-03-13 PROCEDURE — 99214 OFFICE O/P EST MOD 30 MIN: CPT | Performed by: PHYSICIAN ASSISTANT

## 2023-03-13 PROCEDURE — 70496 CT ANGIOGRAPHY HEAD: CPT

## 2023-03-13 PROCEDURE — 82565 ASSAY OF CREATININE: CPT

## 2023-03-13 PROCEDURE — 70498 CT ANGIOGRAPHY NECK: CPT

## 2023-03-13 PROCEDURE — 25510000001 IOPAMIDOL PER 1 ML: Performed by: PHYSICIAN ASSISTANT

## 2023-03-13 RX ADMIN — IOPAMIDOL 65 ML: 755 INJECTION, SOLUTION INTRAVENOUS at 11:30

## 2023-03-13 NOTE — PROGRESS NOTES
Patient: Camron Hendrix  : 1950    Primary Care Provider: Leslie Rhodes MD      Chief Complaint: CTA follow-up    History of Present Illness:       Patient is a nice 72-year-old gentleman known to the neurosurgical practice for being followed for a right MCA stenosis.  Along with left AKI stenosis.     Patient has been doing very well and attends the Coumadin clinic every 2 weeks.  Patient has also been working on try to lose a little bit of weight.    Patient has been asymptomatic over the last year and feels relatively well.    Patient is back today with repeat CTA    Review of Systems   Constitutional: Negative for activity change, appetite change, chills, fatigue and fever.   HENT: Negative for congestion, dental problem, ear pain, hearing loss, sinus pressure and tinnitus.    Eyes: Negative for pain and redness.   Respiratory: Negative for apnea, cough, shortness of breath and wheezing.    Cardiovascular: Negative for chest pain, palpitations and leg swelling.   Gastrointestinal: Negative for abdominal distention, abdominal pain, blood in stool, constipation, diarrhea, nausea and vomiting.   Endocrine: Negative for cold intolerance, heat intolerance and polyuria.   Genitourinary: Negative for enuresis, frequency and urgency.   Skin: Negative for color change and rash.   Neurological: Negative for dizziness, tremors, seizures, syncope, speech difficulty, weakness, light-headedness, numbness and headaches.   Psychiatric/Behavioral: Negative for behavioral problems and confusion. The patient is not nervous/anxious.    All other systems reviewed and are negative.      Past Medical History:     Past Medical History:   Diagnosis Date   • AAA (abdominal aortic aneurysm)    • Coronary artery disease    • CVA (cerebral vascular accident) ([facility])    • Hypertension    • Hypothyroidism    • PAF (paroxysmal atrial fibrillation) ([facility])    • TIA (transient ischemic attack)        Family History:     Family  "History   Problem Relation Age of Onset   • Heart failure Mother    • Aneurysm Father        Social History:    reports that he quit smoking about 37 years ago. His smoking use included cigarettes. He has a 20.00 pack-year smoking history. He has never used smokeless tobacco. He reports current alcohol use. He reports that he does not use drugs.   SMOKING STATUS: Non-smoker    Surgical History:     Past Surgical History:   Procedure Laterality Date   • CARDIAC CATHETERIZATION N/A 3/29/2019    Procedure: Left Heart Cath;  Surgeon: Andrea Holloway MD;  Location: [facility] CATH INVASIVE LOCATION;  Service: Cardiovascular   • CERVICAL FUSION      c4-c5   • CORONARY ANGIOPLASTY WITH STENT PLACEMENT     • HERNIA REPAIR     • TONSILECTOMY, ADENOIDECTOMY, BILATERAL MYRINGOTOMY AND TUBES         Allergies:   Codeine    Physical Exam:    Vital Signs:/82 (BP Location: Right arm, Patient Position: Sitting, Cuff Size: Adult)   Temp 97.3 °F (36.3 °C) (Temporal)   Ht 175.3 cm (69\")   Wt 127 kg (280 lb)   BMI 41.35 kg/m²    BMI: Body mass index is 41.35 kg/m².     GENERAL:           The patient is in no acute distress, and is able to answer all questions appropriately.    Neck:          Supple without lymphadenopathy    Cardiovascular:       Peripheral pulses 2+ at dorsalis pedis and posterior tibialis    Lungs:         Breathing unlabored    Musculoskeletal:            strength is 5 out of 5 bilaterally.        Shoulder abduction is 5 out of 5.         Dorsiflexion is 5/5 Bilaterally       Plantarflexion is 5/5 bilaterally       Hip Flexion 5/5 bilaterally.         The patient´s gait is normal without antalgia.    Neurologic:          The patient is alert and oriented by 3.          Pupils are equal and reactive to light.         Visual fields are full.         Extraocular movements are intact without nystagmus.         There is no evidence of central motor drift. No facial droop.  No difficulty with rapid alternating " movements.         Sensation is equal bilaterally with no deficit.           Reflexes:  2+ through out    CRANIAL NERVES:         Cranial nerve II: Visual fields are full to confrontation.       Cranial nerves III, IV and VI: PERRLA DC.  Extraocular movements are intact.  Nystagmus is not present.       Cranial nerve V: Facial sensation is intact to light touch.       Cranial nerve VII: Muscles of facial expression revealed no asymmetry.       Cranial nerve VIII: Hearing is intact to finger rub bilaterally.       Cranial nerve IX and X: Palate elevates symmetrically.        Cranial nerve XI: Shoulder shrug is intact.       Cranial nerve XII: Tongue is midline without evidence of Atrophy or fasciculation.    Medical Decision Making    Data Review:   CTA of the head neck reviewed showing severe right MCA stenosis with collateral flow.  This is stable when compared to previous examination.    Diagnosis:   Right MCA stenosis  A-fib    Treatment Options:   Patient will continue on his Coumadin.  Patient continue ongoing conservative measures.  No need for neurosurgical intervention.  Signs and symptoms of stroke were reviewed with patient and wife.    Have also asked them to discuss changing over to a NOAC with the cardiologist.     Class 2 Severe Obesity (BMI >=35 and <=39.9). Obesity-related health conditions include the following: none. Obesity is unchanged. BMI is is above average; BMI management plan is completed. We discussed portion control and increasing exercise.     Diagnosis Plan   1. Chronic ischemic right middle cerebral artery (MCA) stroke

## 2023-07-19 ENCOUNTER — APPOINTMENT (OUTPATIENT)
Dept: CT IMAGING | Facility: HOSPITAL | Age: 73
End: 2023-07-19
Payer: MEDICARE

## 2023-07-19 ENCOUNTER — APPOINTMENT (OUTPATIENT)
Dept: GENERAL RADIOLOGY | Facility: HOSPITAL | Age: 73
End: 2023-07-19
Payer: MEDICARE

## 2023-07-19 ENCOUNTER — HOSPITAL ENCOUNTER (OUTPATIENT)
Facility: HOSPITAL | Age: 73
Setting detail: OBSERVATION
Discharge: HOME | End: 2023-07-20
Attending: EMERGENCY MEDICINE | Admitting: INTERNAL MEDICINE
Payer: MEDICARE
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
## 2023-07-19 DIAGNOSIS — I10 ELEVATED BLOOD PRESSURE READING WITH DIAGNOSIS OF HYPERTENSION: ICD-10-CM

## 2023-07-19 DIAGNOSIS — I63.9 CEREBROVASCULAR ACCIDENT (CVA), UNSPECIFIED MECHANISM: Primary | ICD-10-CM

## 2023-07-19 DIAGNOSIS — Z86.73 HISTORY OF ISCHEMIC STROKE: ICD-10-CM

## 2023-07-19 DIAGNOSIS — Z79.01 CHRONIC ANTICOAGULATION: ICD-10-CM

## 2023-07-19 DIAGNOSIS — R27.0 ATAXIA: ICD-10-CM

## 2023-07-19 LAB
ALBUMIN SERPL-MCNC: 3.5 G/DL (ref 3.5–5.2)
ALBUMIN/GLOB SERPL: 1.7 G/DL
ALP SERPL-CCNC: 86 U/L (ref 39–117)
ALT SERPL W P-5'-P-CCNC: 25 U/L (ref 1–41)
ANION GAP SERPL CALCULATED.3IONS-SCNC: 12 MMOL/L (ref 5–15)
AST SERPL-CCNC: 22 U/L (ref 1–40)
BASOPHILS # BLD AUTO: 0.03 10*3/MM3 (ref 0–0.2)
BASOPHILS NFR BLD AUTO: 0.5 % (ref 0–1.5)
BILIRUB SERPL-MCNC: 0.3 MG/DL (ref 0–1.2)
BILIRUB UR QL STRIP: NEGATIVE
BUN BLDA-MCNC: 17 MG/DL (ref 8–26)
BUN SERPL-MCNC: 15 MG/DL (ref 8–23)
BUN/CREAT SERPL: 14.3 (ref 7–25)
CA-I BLDA-SCNC: 1.19 MMOL/L (ref 1.2–1.32)
CALCIUM SPEC-SCNC: 8.4 MG/DL (ref 8.6–10.5)
CHLORIDE BLDA-SCNC: 101 MMOL/L (ref 98–109)
CHLORIDE SERPL-SCNC: 103 MMOL/L (ref 98–107)
CLARITY UR: CLEAR
CO2 BLDA-SCNC: 27 MMOL/L (ref 24–29)
CO2 SERPL-SCNC: 24 MMOL/L (ref 22–29)
COLOR UR: YELLOW
CREAT BLDA-MCNC: 1.2 MG/DL (ref 0.6–1.3)
CREAT SERPL-MCNC: 1.05 MG/DL (ref 0.76–1.27)
DEPRECATED RDW RBC AUTO: 39.3 FL (ref 37–54)
EGFRCR SERPLBLD CKD-EPI 2021: 64.3 ML/MIN/1.73
EGFRCR SERPLBLD CKD-EPI 2021: 75.4 ML/MIN/1.73
EOSINOPHIL # BLD AUTO: 0.23 10*3/MM3 (ref 0–0.4)
EOSINOPHIL NFR BLD AUTO: 4.2 % (ref 0.3–6.2)
ERYTHROCYTE [DISTWIDTH] IN BLOOD BY AUTOMATED COUNT: 12.6 % (ref 12.3–15.4)
GLOBULIN UR ELPH-MCNC: 2.1 GM/DL
GLUCOSE BLDC GLUCOMTR-MCNC: 127 MG/DL (ref 70–130)
GLUCOSE BLDC GLUCOMTR-MCNC: 133 MG/DL (ref 70–130)
GLUCOSE SERPL-MCNC: 127 MG/DL (ref 65–99)
GLUCOSE UR STRIP-MCNC: NEGATIVE MG/DL
HBA1C MFR BLD: 6.7 % (ref 4.8–5.6)
HCT VFR BLD AUTO: 47.1 % (ref 37.5–51)
HCT VFR BLDA CALC: 47 % (ref 38–51)
HGB BLD-MCNC: 16.7 G/DL (ref 13–17.7)
HGB BLDA-MCNC: 16 G/DL (ref 12–17)
HGB UR QL STRIP.AUTO: NEGATIVE
HOLD SPECIMEN: NORMAL
IMM GRANULOCYTES # BLD AUTO: 0.02 10*3/MM3 (ref 0–0.05)
IMM GRANULOCYTES NFR BLD AUTO: 0.4 % (ref 0–0.5)
INR PPP: 2.3 (ref 0.8–1.2)
INR PPP: 2.42 (ref 0.89–1.12)
INR PPP: 2.5 (ref 0.89–1.12)
KETONES UR QL STRIP: NEGATIVE
LEUKOCYTE ESTERASE UR QL STRIP.AUTO: NEGATIVE
LYMPHOCYTES # BLD AUTO: 1.73 10*3/MM3 (ref 0.7–3.1)
LYMPHOCYTES NFR BLD AUTO: 31.5 % (ref 19.6–45.3)
MAGNESIUM SERPL-MCNC: 1.9 MG/DL (ref 1.6–2.4)
MCH RBC QN AUTO: 30.5 PG (ref 26.6–33)
MCHC RBC AUTO-ENTMCNC: 35.5 G/DL (ref 31.5–35.7)
MCV RBC AUTO: 86.1 FL (ref 79–97)
MONOCYTES # BLD AUTO: 0.52 10*3/MM3 (ref 0.1–0.9)
MONOCYTES NFR BLD AUTO: 9.5 % (ref 5–12)
NEUTROPHILS NFR BLD AUTO: 2.97 10*3/MM3 (ref 1.7–7)
NEUTROPHILS NFR BLD AUTO: 53.9 % (ref 42.7–76)
NITRITE UR QL STRIP: NEGATIVE
NRBC BLD AUTO-RTO: 0 /100 WBC (ref 0–0.2)
PH UR STRIP.AUTO: 7 [PH] (ref 5–8)
PLATELET # BLD AUTO: 231 10*3/MM3 (ref 140–450)
PMV BLD AUTO: 10.9 FL (ref 6–12)
POTASSIUM BLDA-SCNC: 4 MMOL/L (ref 3.5–4.9)
POTASSIUM SERPL-SCNC: 3.9 MMOL/L (ref 3.5–5.2)
PROT SERPL-MCNC: 5.6 G/DL (ref 6–8.5)
PROT UR QL STRIP: NEGATIVE
PROTHROMBIN TIME: 26 SECONDS (ref 12.8–15.2)
PROTHROMBIN TIME: 26.5 SECONDS (ref 12.2–14.5)
PROTHROMBIN TIME: 27.2 SECONDS (ref 12.2–14.5)
RBC # BLD AUTO: 5.47 10*6/MM3 (ref 4.14–5.8)
SODIUM BLD-SCNC: 140 MMOL/L (ref 138–146)
SODIUM SERPL-SCNC: 139 MMOL/L (ref 136–145)
SP GR UR STRIP: 1.03 (ref 1–1.03)
TROPONIN T SERPL HS-MCNC: 13 NG/L
TSH SERPL DL<=0.05 MIU/L-ACNC: 3.8 UIU/ML (ref 0.27–4.2)
UROBILINOGEN UR QL STRIP: NORMAL
WBC NRBC COR # BLD: 5.5 10*3/MM3 (ref 3.4–10.8)
WHOLE BLOOD HOLD COAG: NORMAL
WHOLE BLOOD HOLD SPECIMEN: NORMAL

## 2023-07-19 PROCEDURE — 85610 PROTHROMBIN TIME: CPT

## 2023-07-19 PROCEDURE — 85014 HEMATOCRIT: CPT

## 2023-07-19 PROCEDURE — 85610 PROTHROMBIN TIME: CPT | Performed by: EMERGENCY MEDICINE

## 2023-07-19 PROCEDURE — 99214 OFFICE O/P EST MOD 30 MIN: CPT | Performed by: NURSE PRACTITIONER

## 2023-07-19 PROCEDURE — 84443 ASSAY THYROID STIM HORMONE: CPT | Performed by: STUDENT IN AN ORGANIZED HEALTH CARE EDUCATION/TRAINING PROGRAM

## 2023-07-19 PROCEDURE — 99285 EMERGENCY DEPT VISIT HI MDM: CPT

## 2023-07-19 PROCEDURE — 80053 COMPREHEN METABOLIC PANEL: CPT | Performed by: EMERGENCY MEDICINE

## 2023-07-19 PROCEDURE — 84484 ASSAY OF TROPONIN QUANT: CPT | Performed by: EMERGENCY MEDICINE

## 2023-07-19 PROCEDURE — 93005 ELECTROCARDIOGRAM TRACING: CPT | Performed by: STUDENT IN AN ORGANIZED HEALTH CARE EDUCATION/TRAINING PROGRAM

## 2023-07-19 PROCEDURE — G0378 HOSPITAL OBSERVATION PER HR: HCPCS

## 2023-07-19 PROCEDURE — 70496 CT ANGIOGRAPHY HEAD: CPT

## 2023-07-19 PROCEDURE — 85610 PROTHROMBIN TIME: CPT | Performed by: STUDENT IN AN ORGANIZED HEALTH CARE EDUCATION/TRAINING PROGRAM

## 2023-07-19 PROCEDURE — 82948 REAGENT STRIP/BLOOD GLUCOSE: CPT

## 2023-07-19 PROCEDURE — 83036 HEMOGLOBIN GLYCOSYLATED A1C: CPT | Performed by: STUDENT IN AN ORGANIZED HEALTH CARE EDUCATION/TRAINING PROGRAM

## 2023-07-19 PROCEDURE — 80047 BASIC METABLC PNL IONIZED CA: CPT

## 2023-07-19 PROCEDURE — 83735 ASSAY OF MAGNESIUM: CPT | Performed by: EMERGENCY MEDICINE

## 2023-07-19 PROCEDURE — 81003 URINALYSIS AUTO W/O SCOPE: CPT | Performed by: EMERGENCY MEDICINE

## 2023-07-19 PROCEDURE — 36415 COLL VENOUS BLD VENIPUNCTURE: CPT

## 2023-07-19 PROCEDURE — 70498 CT ANGIOGRAPHY NECK: CPT

## 2023-07-19 PROCEDURE — 70450 CT HEAD/BRAIN W/O DYE: CPT

## 2023-07-19 PROCEDURE — 71045 X-RAY EXAM CHEST 1 VIEW: CPT

## 2023-07-19 PROCEDURE — 25510000001 IOPAMIDOL PER 1 ML: Performed by: EMERGENCY MEDICINE

## 2023-07-19 PROCEDURE — 0042T HC CT CEREBRAL PERFUSION W/WO CONTRAST: CPT

## 2023-07-19 PROCEDURE — 85025 COMPLETE CBC W/AUTO DIFF WBC: CPT | Performed by: EMERGENCY MEDICINE

## 2023-07-19 RX ORDER — LEVOTHYROXINE SODIUM 0.1 MG/1
200 TABLET ORAL
Status: DISCONTINUED | OUTPATIENT
Start: 2023-07-20 | End: 2023-07-20 | Stop reason: HOSPADM

## 2023-07-19 RX ORDER — LOSARTAN POTASSIUM 25 MG/1
25 TABLET ORAL DAILY
COMMUNITY

## 2023-07-19 RX ORDER — CARVEDILOL 6.25 MG/1
6.25 TABLET ORAL 2 TIMES DAILY WITH MEALS
Status: DISCONTINUED | OUTPATIENT
Start: 2023-07-20 | End: 2023-07-20 | Stop reason: HOSPADM

## 2023-07-19 RX ORDER — ATORVASTATIN CALCIUM 40 MG/1
80 TABLET, FILM COATED ORAL NIGHTLY
Status: DISCONTINUED | OUTPATIENT
Start: 2023-07-19 | End: 2023-07-20

## 2023-07-19 RX ORDER — ASPIRIN 325 MG
325 TABLET ORAL ONCE
Status: COMPLETED | OUTPATIENT
Start: 2023-07-19 | End: 2023-07-19

## 2023-07-19 RX ORDER — SODIUM CHLORIDE 9 MG/ML
40 INJECTION, SOLUTION INTRAVENOUS AS NEEDED
Status: DISCONTINUED | OUTPATIENT
Start: 2023-07-19 | End: 2023-07-20 | Stop reason: HOSPADM

## 2023-07-19 RX ORDER — SODIUM CHLORIDE 0.9 % (FLUSH) 0.9 %
10 SYRINGE (ML) INJECTION AS NEEDED
Status: DISCONTINUED | OUTPATIENT
Start: 2023-07-19 | End: 2023-07-20

## 2023-07-19 RX ORDER — SODIUM CHLORIDE 0.9 % (FLUSH) 0.9 %
10 SYRINGE (ML) INJECTION EVERY 12 HOURS SCHEDULED
Status: DISCONTINUED | OUTPATIENT
Start: 2023-07-19 | End: 2023-07-20 | Stop reason: HOSPADM

## 2023-07-19 RX ORDER — TRAZODONE HYDROCHLORIDE 50 MG/1
50 TABLET ORAL NIGHTLY
COMMUNITY
End: 2023-07-20 | Stop reason: HOSPADM

## 2023-07-19 RX ORDER — ASPIRIN 325 MG
325 TABLET ORAL DAILY
Status: DISCONTINUED | OUTPATIENT
Start: 2023-07-20 | End: 2023-07-20

## 2023-07-19 RX ORDER — TRAZODONE HYDROCHLORIDE 50 MG/1
50 TABLET ORAL NIGHTLY
Status: DISCONTINUED | OUTPATIENT
Start: 2023-07-19 | End: 2023-07-20 | Stop reason: HOSPADM

## 2023-07-19 RX ORDER — WARFARIN SODIUM 5 MG/1
10 TABLET ORAL
Status: DISCONTINUED | OUTPATIENT
Start: 2023-07-19 | End: 2023-07-20 | Stop reason: HOSPADM

## 2023-07-19 RX ORDER — SODIUM CHLORIDE 0.9 % (FLUSH) 0.9 %
10 SYRINGE (ML) INJECTION AS NEEDED
Status: DISCONTINUED | OUTPATIENT
Start: 2023-07-19 | End: 2023-07-20 | Stop reason: HOSPADM

## 2023-07-19 RX ORDER — ASPIRIN 300 MG/1
300 SUPPOSITORY RECTAL DAILY
Status: DISCONTINUED | OUTPATIENT
Start: 2023-07-20 | End: 2023-07-20

## 2023-07-19 RX ORDER — ACETAMINOPHEN 325 MG/1
650 TABLET ORAL EVERY 6 HOURS PRN
Status: DISCONTINUED | OUTPATIENT
Start: 2023-07-19 | End: 2023-07-20 | Stop reason: HOSPADM

## 2023-07-19 RX ADMIN — IOPAMIDOL 115 ML: 755 INJECTION, SOLUTION INTRAVENOUS at 15:47

## 2023-07-19 RX ADMIN — ATORVASTATIN CALCIUM 80 MG: 40 TABLET, FILM COATED ORAL at 19:44

## 2023-07-19 RX ADMIN — WARFARIN SODIUM 10 MG: 5 TABLET ORAL at 21:26

## 2023-07-19 RX ADMIN — ASPIRIN 325 MG: 325 TABLET ORAL at 16:58

## 2023-07-19 RX ADMIN — Medication 10 ML: at 19:44

## 2023-07-19 RX ADMIN — ACETAMINOPHEN 650 MG: 325 TABLET ORAL at 23:14

## 2023-07-19 NOTE — CONSULTS
"Stroke Consult Note    Patient Name: Camron Hendrix   MRN: [id number]  Age: 73 y.o.  Sex: male  : 1950    Primary Care Physician: Leslie Rhodes MD  Referring Physician: Dr. Peter Oneal    TIME STROKE TEAM CALLED: 1510 EST     TIME PATIENT SEEN: 1520 EST    Handedness: Right  Race:     Chief Complaint/Reason for Consultation: Fall, vision changes    Subjective .  HPI:   Camron Hendrix is a 73-year-old  male with a past medical history significant for AAA, CAD, CVA (, hypertension, hyperlipidemia, atrial fibrillation with chronic Coumadin use, and thyroid disease.  Who presented [facility] emergency department via EMS this afternoon with complaints of feeling \"off\".  He reports that he noticed vision changes at approximately noon which she described as blurring of his vision.  He denies any areas of vision loss.  He reports compliance with all of his medications.  He reports that he fell on Monday after losing his balance, denies any head contact.  He reports he has been fine since his fall without symptoms until noon when he reports the onset of his symptoms.     NIH on my examination 2 for RUE ataxia alongside RUE sensory deficit. Denies any pain. He is alert and oriented and able to follow commands without issue.  Not considered to be a thrombolytic therapy candidate secondary to Coumadin use with INR 2.3.  He is not considered to be an emergent endovascular therapy candidate as his scans are stable in comparison to prior imaging.  He will be admitted to the hospital medicine team for further work-up and evaluation.     Last Known Normal Date/Time: Noon    Review of Systems   Eyes:  Positive for visual disturbance.   Musculoskeletal:  Positive for gait problem.   Neurological:  Positive for numbness.      Past Medical History:   Diagnosis Date    AAA (abdominal aortic aneurysm)     Coronary artery disease     CVA (cerebral vascular accident)     Hypertension     " Hypothyroidism     PAF (paroxysmal atrial fibrillation)     TIA (transient ischemic attack)      Past Surgical History:   Procedure Laterality Date    CARDIAC CATHETERIZATION N/A 3/29/2019    Procedure: Left Heart Cath;  Surgeon: Andrea Holloway MD;  Location: [facility] CATH INVASIVE LOCATION;  Service: Cardiovascular    CERVICAL FUSION      c4-c5    CORONARY ANGIOPLASTY WITH STENT PLACEMENT      HERNIA REPAIR      TONSILECTOMY, ADENOIDECTOMY, BILATERAL MYRINGOTOMY AND TUBES       Family History   Problem Relation Age of Onset    Heart failure Mother     Aneurysm Father      Social History     Socioeconomic History    Marital status:    Tobacco Use    Smoking status: Former     Packs/day: 1.00     Years: 20.00     Pack years: 20.00     Types: Cigarettes     Quit date: 1985     Years since quittin.8    Smokeless tobacco: Never   Vaping Use    Vaping Use: Never used   Substance and Sexual Activity    Alcohol use: Yes     Comment: occasional     Drug use: No    Sexual activity: Defer     Allergies   Allergen Reactions    Codeine Hallucinations     Prior to Admission medications    Medication Sig Start Date End Date Taking? Authorizing Provider   amLODIPine (NORVASC) 10 MG tablet Take 1 tablet by mouth Daily.    Provider, MD Prabhakar   aspirin 81 MG chewable tablet Chew 1 tablet Daily. 17   Krysta Bedolal APRN   atorvastatin (Lipitor) 80 MG tablet Take 1 tablet by mouth Every Night. 22   Rolando Bird PA-C   carvedilol (COREG) 6.25 MG tablet Take 1 tablet by mouth 2 (Two) Times a Day With Meals. 21   Suki Doherty DO   clopidogrel (PLAVIX) 75 MG tablet Take 1 tablet by mouth Daily. 21   Suki Doherty DO   furosemide (LASIX) 20 MG tablet Take 2 tablets by mouth Daily As Needed (edema). 21   Suki Doherty DO   hydrALAZINE (APRESOLINE) 50 MG tablet Take 0.5 tablets by mouth 2 (two) times a day. 21   Suki Doherty DO   levothyroxine (SYNTHROID, LEVOTHROID)  100 MCG tablet  8/9/21   Prabhakar Busby MD   levothyroxine (SYNTHROID, LEVOTHROID) 175 MCG tablet  8/9/21   Prabhakar Busby MD   potassium chloride (K-DUR) 10 MEQ CR tablet Take 1 tablet by mouth Every Morning.    Prabhakar Busby MD   potassium chloride 10 MEQ CR tablet Take 1 tablet by mouth Daily As Needed.    Prabhakar Busby MD   warfarin (COUMADIN) 10 MG tablet Take 1 tablet by mouth Every Night. Tuesday, Thursday, Saturday, and Sunday    Prabhakar Busby MD   warfarin (COUMADIN) 5 MG tablet Take 1 tablet by mouth Every Night. [facility]    Prabhakar Busby MD           Objective     Heart Rate:  [60] 60  Resp:  [18] 18  BP: (174)/(83) 174/83    Neurological Exam  Mental Status  Alert. Oriented to person, place, time and situation. Oriented to person, place, and time. Speech is normal. Language is fluent with no aphasia. Attention and concentration are normal.    Cranial Nerves  CN II: Visual fields full to confrontation.  CN III, IV, VI: Extraocular movements intact bilaterally. Pupils equal round and reactive to light bilaterally.  CN V: Facial sensation is normal.  CN VII: Full and symmetric facial movement.  CN VIII: At baseline complete hearing loss in left ear, hard of hearing in the right ear.  CN IX, X: Palate elevates symmetrically  CN XI: Shoulder shrug strength is normal.  CN XII: Tongue midline without atrophy or fasciculations.    Motor  Normal muscle bulk throughout. No fasciculations present. Normal muscle tone. Strength is 5/5 throughout all four extremities.    Sensory  Light touch abnormality: Sensation: Tingling noted in right upper extremity.     Coordination  Right: Finger-to-nose abnormality: Rapid alternating movement normal.Left: Finger-to-nose normal. Rapid alternating movement normal.  Right upper extremity dysmetria.    Gait    Observed secondary to the acuity of the patient's condition.    Physical Exam  Constitutional:       General: He is not in acute  distress.     Appearance: Normal appearance. He is obese.   HENT:      Head: Normocephalic.      Mouth/Throat:      Pharynx: Oropharynx is clear.   Eyes:      Extraocular Movements: Extraocular movements intact.      Pupils: Pupils are equal, round, and reactive to light.   Cardiovascular:      Rate and Rhythm: Regular rhythm. Bradycardia present.   Pulmonary:      Effort: Pulmonary effort is normal.   Musculoskeletal:         General: Normal range of motion.   Skin:     General: Skin is warm and dry.   Neurological:      Mental Status: He is alert and oriented to person, place, and time.      Cranial Nerves: Cranial nerve deficit present.      Sensory: Sensory deficit present.      Motor: Motor strength is normal. No weakness.   Psychiatric:         Mood and Affect: Mood normal.         Speech: Speech normal.         Behavior: Behavior normal.       Acute Stroke Data    IV Thrombolytic (TPA/Tenecteplase) Inclusion / Exclusion Criteria    Time: 15:44 EDT  Person Administering Scale: FADUMO Restrepo    Inclusion Criteria  [x]   18 years of age or greater   []   Onset of symptoms < 4.5 hours before beginning treatment (stroke onset = time patient was last seen well or without symptoms).   []   Diagnosis of acute ischemic stroke causing measurable disabling deficit (Complete Hemianopia, Any Aphasia, Visual or Sensory Extinction, Any weakness limiting sustained effort against gravity)   []   Any remaining deficit considered potentially disabling in view of patient and practitioner   Exclusion criteria (Do not proceed with Alteplase if any are checked under exclusion criteria)  []   Onset unknown or GREATER than 4.5 hours   []   ICH on CT/MRI   []   CT demonstrates hypodensity representing acute or subacute infarct   []   Significant head trauma or prior stroke in the previous 3 months   []   Symptoms suggestive of subarachnoid hemorrhage   []   History of un-ruptured intracranial aneurysm GREATER than 10 mm   []    Recent intracranial or intraspinal surgery within the last 3 months   []   Arterial puncture at a non-compressible site in the previous 7 days   []   Active internal bleeding   []   Acute bleeding tendency   []   Platelet count LESS than 100,000 for known hematological diseases such as leukemia, thrombocytopenia or chronic cirrhosis   [x]   Current use of anticoagulant with INR GREATER than 1.7 or PT GREATER than 15 seconds, aPTT GREATER than 40 seconds   []   Heparin received within 48 hours, resulting in abnormally elevated aPTT GREATER than upper limit of normal   []   Current use of direct thrombin inhibitors or direct factor Xa inhibitors in the past 48 hours   []   Elevated blood pressure refractory to treatment (systolic GREATER than 185 mm/Hg or diastolic  GREATER than 110 mm/Hg   []   Suspected infective endocarditis and aortic arch dissection   []   Current use of therapeutic treatment dose of low-molecular-weight heparin (LMWH) within the previous 24 hours   []   Structural GI malignancy or bleed   Relative exclusion for all patients  []   Only minor nondisabling symptoms   []   Pregnancy   []   Seizure at onset with postictal residual neurological impairments   []   Major surgery or previous trauma within past 14 days   []   History of previous spontaneous ICH, intracranial neoplasm, or AV malformation   []   Postpartum (within previous 14 days)   []   Recent GI or urinary tract hemorrhage (within previous 21 days)   []   Recent acute MI (within previous 3 months)   []   History of unruptured intracranial aneurysm LESS than 10 mm   []   History of ruptured intracranial aneurysm   []   Blood glucose LESS than 50 mg/dL (2.7 mmol/L)   []   Dural puncture within the last 7 days   []   Known GREATER than 10 cerebral microbleeds   Additional exclusions for patients with symptoms onset between 3 and 4.5 hours.  []   Age > 80.   [x]   On any anticoagulants regardless of INR  >>> Warfarin (Coumadin), Heparin,  Enoxaparin (Lovenox), fondaparinux (Arixtra), bivalirudin (Angiomax), Argatroban, dabigatran (Pradaxa), rivaroxaban (Xarelto), or apixaban (Eliquis)   []   Severe stroke (NIHSS > 25).   []   History of BOTH diabetes and previous ischemic stroke.   []   The risks and benefits have been discussed with the patient or family related to the administration of IV alteplase for stroke symptoms.   []   I have discussed and reviewed the patient's case and imaging with the attending prior to IV Thrombolytic (TPA/Tenecteplase).   Not applicable Time Thrombolytic administered       Hospital Meds:  Scheduled-    Infusions-     PRNs-   sodium chloride    sodium chloride    Functional Status Prior to Current Stroke/C[place]ra Score: 0    NIH Stroke Scale  Time: 15:44 EDT  Person Administering Scale: FADUMO Restrepo    1a  Level of consciousness: 0=alert; keenly responsive   1b. LOC questions:  0=Performs both tasks correctly   1c. LOC commands: 0=Performs both tasks correctly   2.  Best Gaze: 0=normal   3.  Visual: 0=No visual loss   4. Facial Palsy: 0=Normal symmetric movement   5a.  Motor left arm: 0=No drift, limb holds 90 (or 45) degrees for full 10 seconds   5b.  Motor right arm: 0=No drift, limb holds 90 (or 45) degrees for full 10 seconds   6a. motor left le=No drift, limb holds 90 (or 45) degrees for full 10 seconds   6b  Motor right le=No drift, limb holds 90 (or 45) degrees for full 10 seconds   7. Limb Ataxia: 1=Present in one limb   8.  Sensory: 1=Mild to moderate sensory loss; patient feels pinprick is less sharp or is dull on the affected side; there is a loss of superficial pain with pinprick but patient is aware He is being touched   9. Best Language:  0=No aphasia, normal   10. Dysarthria: 0=Normal   11. Extinction and Inattention: 0=No abnormality    Total:   2       Results Reviewed:  I have personally reviewed current lab, radiology, and data and agree with results.    CT Head Without  Contrast    Result Date: 7/19/2023  CT HEAD WO CONTRAST Date of Exam: 7/19/2023 3:31 PM EDT Indication: Neuro deficit, acute, stroke suspected. Comparison: Brain MRI 7/30/2021 Technique: Axial CT images were obtained of the head without contrast administration.  Automated exposure control and iterative construction methods were used. Findings: No acute intracranial hemorrhage. No acute large territory infarct. There are scattered subcortical and periventricular white matter hypodensities which are nonspecific and can be seen in the setting of chronic small vessel ischemic change. There is a small chronic linear infarct in the right cerebellum, present on prior brain MRI. There is generalized cerebral volume loss. No extra-axial collections. No midline shift or herniation. Normal size and configuration of the ventricles. Unremarkable appearance of the orbits. There is trace fluid in the bilateral mastoid air cells, nonspecific. The mastoid air cells are clear. No acute or suspicious bony findings.     Impression: Impression: No acute intracranial findings. Electronically Signed: Peter Alcantara  7/19/2023 3:50 PM EDT  Workstation ID: [id number]     CT CEREBRAL PERFUSION W WO CONTRAST, CT ANGIOGRAM NECK, CT ANGIOGRAM HEAD W AI ANALYSIS OF LVO     Date of Exam: 7/19/2023 3:32 PM EDT     Indication: Neuro deficit, acute stroke suspected.     Comparison: CTA of the head and neck dated 2/21/2022     Technique: Axial CT images of the brain were obtained prior to and after the administration of 115 mL Isovue-370. Core blood volume, core blood flow, mean transit time, and Tmax images were obtained utilizing the Rapid software protocol. A limited CT   angiogram of the head was also performed to measure the blood vessel density.     The radiation dose reduction device was turned on for each scan per the ALARA (As Low as Reasonably Achievable) protocol.        FINDINGS:     Vascular Findings:     Chronic, moderate to severe  stenosis of the right MCA M1 segment appears essentially unchanged in comparison with 2/21/2022. Single dominant right MCA branch vessel and asymmetrically small cortical vessels are seen throughout the right MCA territory.      Atherosclerotic plaque is seen within the right carotid bifurcation and right carotid siphon. The right common carotid, internal carotid, anterior cerebral, vertebral, and posterior cerebral arteries are patent without abrupt cut off or aneurysmal   dilation.     Atherosclerotic plaque is seen within the left carotid bifurcation and left carotid siphon. The left common carotid, internal carotid, middle cerebral, anterior cerebral, vertebral, and posterior cerebral arteries are patent without abrupt cut off or   aneurysmal dilation.     Basilar artery appears patent and appears unremarkable.     Non-vascular Findings:     For description of nonvascular intracranial findings, please refer to the noncontrast head CT performed the same date.     No acute abnormality is identified within the visualized soft tissue or bony structures of the neck.     The visualized lung apices are clear.        CT Perfusion:  CBF (<30%) volume: 0 mL  Tmax (>6.0s) volume: 21 mL  Mismatch volume: 21 mL  Mismatch ratio: Infinite     IMPRESSION:  1.Chronic, moderate to severe stenosis of the right MCA M1 segment appears essentially unchanged in comparison with 2/21/2022. Single dominant right MCA branch vessel and asymmetrically small cortical vessels are seen throughout the right MCA territory.   Findings are essentially unchanged since 3/13/2023.  2.CT perfusion study demonstrates prolonged Tmax (greater than 6 seconds, 21 mL) within the right MCA territory. This may be related to #1, although acute right MCA territory ischemia cannot be excluded. Recommend correlation with MRI.  3.Atherosclerotic plaque within the bilateral carotid bifurcations, left greater than right. Estimated degree of stenosis within the ICAs  "is less than 50% bilaterally.     The above findings were discussed with Pat Lynn via telephone on 7/19/2023 3:53 PM EDT.     Electronically Signed: Anatoly Tamayo    7/19/2023 4:12 PM EDT    Workstation ID: [id number]    Sodium 139  Potassium 3.9  Creatinine 1.05  Glucose 127  AST 22  ALT 25  WBC 5.50  Platelets 231    Assessment/Plan:    73-year-old  male with multiple vascular risk factors including prior CVA who presented to [facility] emergency department with complaints of feeling \"off\".  He was noted to have right upper extremity ataxia alongside sensory deficit and blurred vision.  CTA head and neck are stable from prior imaging therefore he is not deemed to be a endovascular therapy candidate.  He is not deemed to be an IV thrombolytic therapy candidate secondary to compliant Coumadin use with an INR of 2.3.    Antiplatelet: Aspirin  Anticoagulant: Coumadin        Blurred vision, unsteady gait, right upper extremity ataxia and sensory deficit  -CVA/TIA order set  -Bedside dysphagia screening prior to any p.o. intake including medications  -325 mg aspirin in ED  -MRI brain without contrast, routine  -Allow for permissive hypertension with management per hospital medicine team, goal SBP less than 180  -Hemoglobin A1c and FLP in a.m.  -Ambulate as tolerated  -PT/OT/SLP to see and assess  -TTE, defer bubble study previously completed  -Continue aspirin 325 mg daily  -Continue Lipitor 80 mg nightly  -We will discuss with rounding team on reinitiation of Plavix, unsure when this medication was removed  -Continue Coumadin per pharmacy dosing and recommendations      Neurology will continue to follow.  Plan of care discussed with the patient as well as Dr. Oneal.  Thank you for the consult the care of this patient.  Please call with any further questions or concerns.    Pat Lynn, APRMIN  July 19, 2023  15:44 EDT                "

## 2023-07-19 NOTE — PLAN OF CARE
Problem: Fall Injury Risk  Goal: Absence of Fall and Fall-Related Injury  Outcome: Ongoing, Progressing     Problem: Adult Inpatient Plan of Care  Goal: Plan of Care Review  Outcome: Ongoing, Progressing  Goal: Patient-Specific Goal (Individualized)  Outcome: Ongoing, Progressing  Goal: Absence of Hospital-Acquired Illness or Injury  Outcome: Ongoing, Progressing  Goal: Optimal Comfort and Wellbeing  Outcome: Ongoing, Progressing  Intervention: Provide Person-Centered Care  Recent Flowsheet Documentation  Taken 7/19/2023 1745 by Kasey Bob RN  Trust Relationship/Rapport:   care explained   choices provided   emotional support provided   empathic listening provided   questions answered   questions encouraged   reassurance provided   thoughts/feelings acknowledged  Goal: Readiness for Transition of Care  Outcome: Ongoing, Progressing  Intervention: Mutually Develop Transition Plan  Recent Flowsheet Documentation  Taken 7/19/2023 1757 by Kasey Bob RN  Transportation Anticipated: family or friend will provide  Patient/Family Anticipated Services at Transition: none  Patient/Family Anticipates Transition to: home  Taken 7/19/2023 1756 by Kasey Bob, RN  Equipment Currently Used at Home: none     Problem: Pain Acute  Goal: Acceptable Pain Control and Functional Ability  Outcome: Ongoing, Progressing  Intervention: Optimize Psychosocial Wellbeing  Recent Flowsheet Documentation  Taken 7/19/2023 1745 by Kasey Bob, RN  Diversional Activities: television   Goal Outcome Evaluation:

## 2023-07-19 NOTE — ED PROVIDER NOTES
Subjective   History of Present Illness  Patient is a 73-year-old male presenting to the emergency department after a fall on Monday which she felt was due to difficulty with walking.  Patient also reports some difficulty with walking with some mild sensory deficits on that right side.  First noticed the sensory deficits around noon today.  Patient does have a history of TIA, atrial fibrillation, hypertension, CVA, coronary disease, and AAA.  Patient evaluated on arrival by the stroke navigator.  They determined NIH stroke scale of 2.  Recommended activating the code stroke.    History provided by:  Patient    Review of Systems    Past Medical History:   Diagnosis Date    AAA (abdominal aortic aneurysm)     Coronary artery disease     CVA (cerebral vascular accident)     Hypertension     Hypothyroidism     PAF (paroxysmal atrial fibrillation)     TIA (transient ischemic attack)        Allergies   Allergen Reactions    Codeine Hallucinations       Past Surgical History:   Procedure Laterality Date    CARDIAC CATHETERIZATION N/A 3/29/2019    Procedure: Left Heart Cath;  Surgeon: Andrea Holloway MD;  Location: [facility] CATH INVASIVE LOCATION;  Service: Cardiovascular    CERVICAL FUSION      c4-c5    CORONARY ANGIOPLASTY WITH STENT PLACEMENT      HERNIA REPAIR      TONSILECTOMY, ADENOIDECTOMY, BILATERAL MYRINGOTOMY AND TUBES         Family History   Problem Relation Age of Onset    Heart failure Mother     Aneurysm Father        Social History     Socioeconomic History    Marital status:    Tobacco Use    Smoking status: Former     Packs/day: 1.00     Years: 20.00     Pack years: 20.00     Types: Cigarettes     Quit date: 1985     Years since quittin.8    Smokeless tobacco: Never   Vaping Use    Vaping Use: Never used   Substance and Sexual Activity    Alcohol use: Yes     Comment: occasional     Drug use: No    Sexual activity: Defer           Objective   Physical Exam  Vitals and nursing note  reviewed.   Constitutional:       General: He is not in acute distress.     Appearance: He is obese. He is not toxic-appearing.   Eyes:      Pupils: Pupils are equal, round, and reactive to light.   Cardiovascular:      Rate and Rhythm: Normal rate and regular rhythm.      Pulses: Normal pulses.   Pulmonary:      Effort: Pulmonary effort is normal. No respiratory distress.      Breath sounds: Normal breath sounds.   Abdominal:      Palpations: Abdomen is soft.   Skin:     General: Skin is warm and dry.   Neurological:      Mental Status: He is alert and oriented to person, place, and time.      Comments: Mild sensory deficits on the right and ataxia.  NIH stroke scale 2.  No facial asymmetry.  No speech deficit.  No pronator drift.   Psychiatric:         Mood and Affect: Mood normal.         Behavior: Behavior normal.       Procedures           ED Course  ED Course as of 07/19/23 1832 Wed Jul 19, 2023   1541 INR(!): 2.3  Patient on chronic warfarin therapy.  INR noted [RS]   1618 Patient evaluated in the CT suite with the stroke navigator.  We will plan to admit the patient for further evaluation as indicated.  Patient not a candidate for thrombolysis secondary to time since last known well.  I personally reviewed the CT scan with the radiologist which was negative for acute findings. [RS]   1618 CT Head Without Contrast  Personally reviewed the CT scan images of the head.  On my interpretation there is no hemorrhage or mass effect.  See report for radiology for details. [RS]      ED Course User Index  [RS] Peter Oneal MD                                           Medical Decision Making  Problems Addressed:  Ataxia: complicated acute illness or injury  Chronic anticoagulation: complicated acute illness or injury  Elevated blood pressure reading with diagnosis of hypertension: complicated acute illness or injury  Fall, initial encounter: complicated acute illness or injury  History of ischemic stroke:  complicated acute illness or injury    Amount and/or Complexity of Data Reviewed  Labs: ordered. Decision-making details documented in ED Course.  Radiology: ordered. Decision-making details documented in ED Course.  ECG/medicine tests: ordered.    Risk  Prescription drug management.  Decision regarding hospitalization.        Final diagnoses:   Ataxia   Fall, initial encounter   Chronic anticoagulation   History of ischemic stroke   Elevated blood pressure reading with diagnosis of hypertension       ED Disposition  ED Disposition       ED Disposition   Decision to Admit    Condition   --    Comment   Level of Care: Telemetry [5]   Diagnosis: Stroke [955019]   Admitting Physician: ILA MELVIN [[id number]]   Attending Physician: ILA MELVIN [[id number]]                 No follow-up provider specified.       Medication List        ASK your doctor about these medications      warfarin 10 MG tablet  Commonly known as: COUMADIN  Ask about: Which instructions should I use?                 Peter Oneal MD  07/19/23 2927

## 2023-07-19 NOTE — Clinical Note
Level of Care: Telemetry [5]   Diagnosis: Stroke [186396]   Admitting Physician: ILA MELVIN [[id number]]   Attending Physician: ILA MELVIN [[id number]]

## 2023-07-20 ENCOUNTER — APPOINTMENT (OUTPATIENT)
Dept: MRI IMAGING | Facility: HOSPITAL | Age: 73
End: 2023-07-20
Payer: MEDICARE

## 2023-07-20 ENCOUNTER — APPOINTMENT (OUTPATIENT)
Dept: NEUROLOGY | Facility: HOSPITAL | Age: 73
End: 2023-07-20
Payer: MEDICARE

## 2023-07-20 ENCOUNTER — APPOINTMENT (OUTPATIENT)
Dept: CARDIOLOGY | Facility: HOSPITAL | Age: 73
End: 2023-07-20
Payer: MEDICARE

## 2023-07-20 VITALS
WEIGHT: 283.4 LBS | HEIGHT: 69 IN | SYSTOLIC BLOOD PRESSURE: 160 MMHG | BODY MASS INDEX: 41.98 KG/M2 | RESPIRATION RATE: 18 BRPM | TEMPERATURE: 97.6 F | OXYGEN SATURATION: 96 % | HEART RATE: 58 BPM | DIASTOLIC BLOOD PRESSURE: 79 MMHG

## 2023-07-20 LAB
CHOLEST SERPL-MCNC: 121 MG/DL (ref 0–200)
GLUCOSE BLDC GLUCOMTR-MCNC: 130 MG/DL (ref 70–130)
GLUCOSE BLDC GLUCOMTR-MCNC: 133 MG/DL (ref 70–130)
GLUCOSE BLDC GLUCOMTR-MCNC: 210 MG/DL (ref 70–130)
HDLC SERPL-MCNC: 30 MG/DL (ref 40–60)
INR PPP: 2.63 (ref 0.89–1.12)
LDLC SERPL CALC-MCNC: 59 MG/DL (ref 0–100)
LDLC/HDLC SERPL: 1.74 {RATIO}
PROTHROMBIN TIME: 28.3 SECONDS (ref 12.2–14.5)
TRIGL SERPL-MCNC: 194 MG/DL (ref 0–150)
VLDLC SERPL-MCNC: 32 MG/DL (ref 5–40)

## 2023-07-20 PROCEDURE — 97116 GAIT TRAINING THERAPY: CPT

## 2023-07-20 PROCEDURE — 97165 OT EVAL LOW COMPLEX 30 MIN: CPT

## 2023-07-20 PROCEDURE — 99214 OFFICE O/P EST MOD 30 MIN: CPT | Performed by: PSYCHIATRY & NEUROLOGY

## 2023-07-20 PROCEDURE — 95819 EEG AWAKE AND ASLEEP: CPT | Performed by: PSYCHIATRY & NEUROLOGY

## 2023-07-20 PROCEDURE — G0378 HOSPITAL OBSERVATION PER HR: HCPCS

## 2023-07-20 PROCEDURE — 97161 PT EVAL LOW COMPLEX 20 MIN: CPT

## 2023-07-20 PROCEDURE — 70551 MRI BRAIN STEM W/O DYE: CPT

## 2023-07-20 PROCEDURE — 82948 REAGENT STRIP/BLOOD GLUCOSE: CPT

## 2023-07-20 PROCEDURE — 92523 SPEECH SOUND LANG COMPREHEN: CPT

## 2023-07-20 PROCEDURE — 95819 EEG AWAKE AND ASLEEP: CPT

## 2023-07-20 PROCEDURE — 85610 PROTHROMBIN TIME: CPT | Performed by: STUDENT IN AN ORGANIZED HEALTH CARE EDUCATION/TRAINING PROGRAM

## 2023-07-20 PROCEDURE — 80061 LIPID PANEL: CPT | Performed by: NURSE PRACTITIONER

## 2023-07-20 RX ORDER — FUROSEMIDE 20 MG/1
20 TABLET ORAL DAILY
Status: DISCONTINUED | OUTPATIENT
Start: 2023-07-20 | End: 2023-07-20 | Stop reason: HOSPADM

## 2023-07-20 RX ORDER — DEXTROSE MONOHYDRATE 25 G/50ML
25 INJECTION, SOLUTION INTRAVENOUS
Status: DISCONTINUED | OUTPATIENT
Start: 2023-07-20 | End: 2023-07-20 | Stop reason: HOSPADM

## 2023-07-20 RX ORDER — ASPIRIN 81 MG/1
162 TABLET ORAL DAILY
Status: DISCONTINUED | OUTPATIENT
Start: 2023-07-21 | End: 2023-07-20 | Stop reason: HOSPADM

## 2023-07-20 RX ORDER — ATORVASTATIN CALCIUM 20 MG/1
20 TABLET, FILM COATED ORAL NIGHTLY
Qty: 90 TABLET | Refills: 0 | Status: SHIPPED | OUTPATIENT
Start: 2023-07-20

## 2023-07-20 RX ORDER — IBUPROFEN 600 MG/1
1 TABLET ORAL
Status: DISCONTINUED | OUTPATIENT
Start: 2023-07-20 | End: 2023-07-20 | Stop reason: HOSPADM

## 2023-07-20 RX ORDER — INSULIN LISPRO 100 [IU]/ML
2-7 INJECTION, SOLUTION INTRAVENOUS; SUBCUTANEOUS
Status: DISCONTINUED | OUTPATIENT
Start: 2023-07-20 | End: 2023-07-20 | Stop reason: HOSPADM

## 2023-07-20 RX ORDER — AMLODIPINE BESYLATE 10 MG/1
10 TABLET ORAL
Status: DISCONTINUED | OUTPATIENT
Start: 2023-07-20 | End: 2023-07-20 | Stop reason: HOSPADM

## 2023-07-20 RX ORDER — LOSARTAN POTASSIUM 25 MG/1
25 TABLET ORAL
Status: DISCONTINUED | OUTPATIENT
Start: 2023-07-20 | End: 2023-07-20 | Stop reason: HOSPADM

## 2023-07-20 RX ORDER — NICOTINE POLACRILEX 4 MG
15 LOZENGE BUCCAL
Status: DISCONTINUED | OUTPATIENT
Start: 2023-07-20 | End: 2023-07-20 | Stop reason: HOSPADM

## 2023-07-20 RX ORDER — ASPIRIN 81 MG/1
162 TABLET ORAL DAILY
Qty: 60 TABLET | Refills: 0 | Status: SHIPPED | OUTPATIENT
Start: 2023-07-21

## 2023-07-20 RX ORDER — ATORVASTATIN CALCIUM 20 MG/1
20 TABLET, FILM COATED ORAL NIGHTLY
Status: DISCONTINUED | OUTPATIENT
Start: 2023-07-20 | End: 2023-07-20 | Stop reason: HOSPADM

## 2023-07-20 RX ORDER — MELATONIN 5 MG
5 TABLET,CHEWABLE ORAL NIGHTLY PRN
Qty: 30 TABLET | Refills: 0 | Status: SHIPPED | OUTPATIENT
Start: 2023-07-20

## 2023-07-20 RX ADMIN — LOSARTAN POTASSIUM 25 MG: 25 TABLET, FILM COATED ORAL at 09:49

## 2023-07-20 RX ADMIN — FUROSEMIDE 20 MG: 20 TABLET ORAL at 09:49

## 2023-07-20 RX ADMIN — CARVEDILOL 6.25 MG: 6.25 TABLET, FILM COATED ORAL at 08:33

## 2023-07-20 RX ADMIN — Medication 10 ML: at 08:33

## 2023-07-20 RX ADMIN — ASPIRIN 325 MG: 325 TABLET ORAL at 08:33

## 2023-07-20 RX ADMIN — AMLODIPINE BESYLATE 10 MG: 10 TABLET ORAL at 09:49

## 2023-07-20 RX ADMIN — LEVOTHYROXINE SODIUM 200 MCG: 0.1 TABLET ORAL at 05:16

## 2023-07-20 RX ADMIN — ACETAMINOPHEN 650 MG: 325 TABLET ORAL at 08:33

## 2023-07-20 NOTE — THERAPY DISCHARGE NOTE
Acute Care - Occupational Therapy Discharge  [facility]    Patient Name: Camron Hendrix  : 1950    MRN: [id number]                              Today's Date: 2023       Admit Date: 2023    Visit Dx:     ICD-10-CM ICD-9-CM   1. Ataxia  R27.0 781.3   2. Fall, initial encounter  W19.XXXA E888.9   3. Chronic anticoagulation  Z79.01 V58.61   4. History of ischemic stroke  Z86.73 V12.54   5. 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    Stroke     Past Medical History:   Diagnosis Date    AAA (abdominal aortic aneurysm)     Coronary artery disease     CVA (cerebral vascular accident)     Hypertension     Hypothyroidism     PAF (paroxysmal atrial fibrillation)     TIA (transient ischemic attack)      Past Surgical History:   Procedure Laterality Date    CARDIAC CATHETERIZATION N/A 3/29/2019    Procedure: Left Heart Cath;  Surgeon: Andrea Holloway MD;  Location: [facility] INVASIVE LOCATION;  Service: Cardiovascular    CERVICAL FUSION      c4-c5    CORONARY ANGIOPLASTY WITH STENT PLACEMENT      HERNIA REPAIR      TONSILECTOMY, ADENOIDECTOMY, BILATERAL MYRINGOTOMY AND TUBES        General Information       Row Name 23 0808          OT Time and Intention    Document Type discharge evaluation/summary  -CS     Mode of Treatment occupational therapy  -CS       Row Name 23 0808          General Information     Prior Level of Function independent:;all household mobility;ADL's;yard work;driving;using stairs  Pt's spouse passed away recently, lives alone with one Dtr next door and another nearby. Owns Rollator but uses infrequently. Enjoys gardening and keeps bees. Step-in tub with no seating at present.  -CS     Existing Precautions/Restrictions fall  -CS     Barriers to Rehab none identified  -CS       Row Name 07/20/23 0808          Living Environment    People in Home alone;other (see comments)  Dtr next door  -CS       Row Name 07/20/23 0808          Home Main Entrance    Number of Stairs, Main Entrance two  -CS       Row Name 07/20/23 0808          Stairs Within Home, Primary    Stairs, Within Home, Primary 2 level home over basement, Pt remains on main floor for most needs and visits basement occasionally  -CS     Number of Stairs, Within Home, Primary other (see comments)  -CS       Row Name 07/20/23 0808          Cognition    Orientation Status (Cognition) oriented x 4  -CS       Row Name 07/20/23 0808          Safety Issues, Functional Mobility    Impairments Affecting Function (Mobility) balance  -CS     Comment, Safety Issues/Impairments (Mobility) mild dynamic standing balance deficit  -CS               User Key  (r) = Recorded By, (t) = Taken By, (c) = Cosigned By      Initials Name Provider Type    CS Ravin Healy OT Occupational Therapist                   Mobility/ADL's       Row Name 07/20/23 0817          Bed Mobility    Comment, (Bed Mobility) OT met Pt in transport chair  -CS       Row Name 07/20/23 0817          Transfers    Transfers bed-chair transfer;sit-stand transfer  -CS     Comment, (Transfers) no dizziness reported, no LOB  -CS       Row Name 07/20/23 0817          Bed-Chair Transfer    Bed-Chair [place] (Transfers) standby assist  -CS       Row Name 07/20/23 0817          Sit-Stand Transfer    Sit-Stand [place] (Transfers) supervision  -CS       Row Name 07/20/23 0817           Functional Mobility    Functional Mobility- Comment defer to PT for specifics, no LOB during short in-room distance w/ no AD  -CS       Row Name 07/20/23 0817          Activities of Daily Living    BADL Assessment/Intervention lower body dressing;grooming;feeding;toileting  -CS       Row Name 07/20/23 0817          Lower Body Dressing Assessment/Training    [place] Level (Lower Body Dressing) don;pants/bottoms;independent  -CS     Position (Lower Body Dressing) unsupported sitting;supported standing  -CS       Row Name 07/20/23 0817          Grooming Assessment/Training    [place] Level (Grooming) grooming skills;independent  -CS       Row Name 07/20/23 0817          Self-Feeding Assessment/Training    [place] Level (Feeding) feeding skills;liquids to mouth;prepare tray/open items;scoop food and bring to mouth;independent  -CS     Position (Self-Feeding) supported sitting  -CS       Row Name 07/20/23 0817          Toileting Assessment/Training    [place] Level (Toileting) toileting skills;adjust/manage clothing;perform perineal hygiene  -CS     Assistive Devices (Toileting) urinal  -CS               User Key  (r) = Recorded By, (t) = Taken By, (c) = Cosigned By      Initials Name Provider Type    CS Ravin Healy, OT Occupational Therapist                   Obj/Interventions       Row Name 07/20/23 0820          Sensory Assessment (Somatosensory)    Sensory Assessment (Somatosensory) UE sensation intact  -       Row Name 07/20/23 0820          Vision Assessment/Intervention    Visual Impairment/Limitations WFL;other (see comments)  -CS     Vision Assessment Comment continues to endorse mild blurriness, able to read all signage in room from variable distances  -CS       Row Name 07/20/23 0820          Motor Skills    Motor Skills coordination;functional endurance  -CS     Coordination bilateral;upper extremity;finger to nose;bimanual skills;WFL  -CS     Functional Endurance O2 sats stable on  RA  -CS       Row Name 07/20/23 0820          Balance    Balance Assessment sitting static balance;sitting dynamic balance;standing static balance;standing dynamic balance  -CS     Static Sitting Balance independent  -CS     Dynamic Sitting Balance independent  -CS     Position, Sitting Balance unsupported;sitting in chair  -CS     Static Standing Balance independent  -CS     Dynamic Standing Balance supervision  -CS     Position/Device Used, Standing Balance unsupported  -CS     Balance Interventions sitting;sit to stand;standing;occupation based/functional task  -CS     Comment, Balance no LOB during ADL completion or related mobility  -CS               User Key  (r) = Recorded By, (t) = Taken By, (c) = Cosigned By      Initials Name Provider Type    CS Ravin Healy, OT Occupational Therapist                   Goals/Plan    No documentation.                  Clinical Impression       Row Name 07/20/23 0821          Pain Assessment    Additional Documentation Pain Scale: FACES Pre/Post-Treatment (Group)  -       Row Name 07/20/23 0821          Pain Scale: FACES Pre/Post-Treatment    Pain: FACES Scale, Pretreatment 0-->no hurt  -CS     Posttreatment Pain Rating 0-->no hurt  -CS       Row Name 07/20/23 0821          Plan of Care Review    Plan of Care Reviewed With patient  -CS     Progress no change  -CS     Outcome Evaluation Pt presents at baseline for ADL completion with symmetrical BUE strength and coordination/sensation intact. Endorses mild blurriness compared to baseline, however, able to read in-room signage at variable distances and is full to confrontation. OT signing off, defer to PT for any mobility needs. Rec d/c to home when medically appropriate.  -CS       Row Name 07/20/23 0821          Therapy Assessment/Plan (OT)    Criteria for Skilled Therapeutic Interventions Met (OT) does not meet criteria for skilled intervention;no  -CS     Therapy Frequency (OT) evaluation only  -       Row Name  07/20/23 0821          Therapy Plan Review/Discharge Plan (OT)    Anticipated Discharge Disposition (OT) home  -CS       Row Name 07/20/23 0821          Vital Signs    Pre Systolic BP Rehab 167  RN cleared for eval, VSS on RA  -CS     Pre Treatment Diastolic BP 89  -CS     Post Systolic BP Rehab 187  -CS     Post Treatment Diastolic BP 95  -CS     Posttreatment Heart Rate (beats/min) 63  -CS     Post SpO2 (%) 96  -CS     Pre Patient Position Sitting  -CS     Intra Patient Position Standing  -CS     Post Patient Position Sitting  -CS       Row Name 07/20/23 0821          Positioning and Restraints    Pre-Treatment Position sitting in chair/recliner  -CS     Post Treatment Position chair  -CS     In Chair notified nsg;reclined;sitting;call light within reach;encouraged to call for assist;exit alarm on;with family/caregiver;legs elevated  -CS               User Key  (r) = Recorded By, (t) = Taken By, (c) = Cosigned By      Initials Name Provider Type    CS Ravin Healy, OT Occupational Therapist                   Outcome Measures       Row Name 07/20/23 0824          How much help from another is currently needed...    Putting on and taking off regular lower body clothing? 4  -CS     Bathing (including washing, rinsing, and drying) 4  -CS     Toileting (which includes using toilet bed pan or urinal) 4  -CS     Putting on and taking off regular upper body clothing 4  -CS     Taking care of personal grooming (such as brushing teeth) 4  -CS     Eating meals 4  -CS     AM-PAC 6 Clicks Score (OT) 24  -CS       Row Name 07/20/23 0824          Modified Rachel Scale    Modified Rachel Scale 1 - No significant disability despite symptoms.  Able to carry out all usual duties and activities.  -CS       Row Name 07/20/23 0824          Functional Assessment    Outcome Measure Options AM-PAC 6 Clicks Daily Activity (OT);Modified Rachel  -CS               User Key  (r) = Recorded By, (t) = Taken By, (c) = Cosigned By       Initials Name Provider Type     Ravin Healy OT Occupational Therapist                  Occupational Therapy Education       Title: PT OT SLP Therapies (In Progress)       Topic: Occupational Therapy (In Progress)       Point: ADL training (Done)       Description:   Instruct learner(s) on proper safety adaptation and remediation techniques during self care or transfers.   Instruct in proper use of assistive devices.                  Learning Progress Summary             Patient Acceptance, E,D, VU,DU by  at 7/20/2023 0824                         Point: Home exercise program (Not Started)       Description:   Instruct learner(s) on appropriate technique for monitoring, assisting and/or progressing therapeutic exercises/activities.                  Learner Progress:  Not documented in this visit.              Point: Precautions (Done)       Description:   Instruct learner(s) on prescribed precautions during self-care and functional transfers.                  Learning Progress Summary             Patient Acceptance, E,D, VU,DU by  at 7/20/2023 0824                         Point: Body mechanics (Done)       Description:   Instruct learner(s) on proper positioning and spine alignment during self-care, functional mobility activities and/or exercises.                  Learning Progress Summary             Patient Acceptance, E,D, VU,DU by  at 7/20/2023 0824                                         User Key       Initials Effective Dates Name Provider Type Discipline     06/16/21 -  Ravin Healy OT Occupational Therapist OT                  OT Recommendation and Plan  Therapy Frequency (OT): evaluation only  Plan of Care Review  Plan of Care Reviewed With: patient  Progress: no change  Outcome Evaluation: Pt presents at baseline for ADL completion with symmetrical BUE strength and coordination/sensation intact. Endorses mild blurriness compared to baseline, however, able to read in-room signage at  variable distances and is full to confrontation. OT signing off, defer to PT for any mobility needs. Rec d/c to home when medically appropriate.  Plan of Care Reviewed With: patient  Outcome Evaluation: Pt presents at baseline for ADL completion with symmetrical BUE strength and coordination/sensation intact. Endorses mild blurriness compared to baseline, however, able to read in-room signage at variable distances and is full to confrontation. OT signing off, defer to PT for any mobility needs. Rec d/c to home when medically appropriate.     Time Calculation:   Evaluation Complexity (OT)  Review Occupational Profile/Medical/Therapy History Complexity: expanded/moderate complexity  Assessment, Occupational Performance/Identification of Deficit Complexity: 1-3 performance deficits  Clinical Decision Making Complexity (OT): problem focused assessment/low complexity  Overall Complexity of Evaluation (OT): low complexity     Time Calculation- OT       Row Name 07/20/23 0825             Time Calculation- OT    OT Start Time 0736  -CS      OT Received On 07/20/23  -CS         Untimed Charges    OT Eval/Re-eval Minutes 48  -CS         Total Minutes    Untimed Charges Total Minutes 48  -CS       Total Minutes 48  -CS                User Key  (r) = Recorded By, (t) = Taken By, (c) = Cosigned By      Initials Name Provider Type    CS Ravin Healy, OT Occupational Therapist                  Therapy Charges for Today       Code Description Service Date Service Provider Modifiers Qty    [id number]  OT EVAL LOW COMPLEXITY 4 7/20/2023 Ravin Healy OT GO 1               OT Discharge Summary  Anticipated Discharge Disposition (OT): home  Reason for Discharge: At baseline function, Independent, other (comment) (for ADLs)  Discharge Destination: Home    Ravin Healy OT  7/20/2023

## 2023-07-20 NOTE — CASE MANAGEMENT/SOCIAL WORK
Discharge Planning Assessment  [facility]     Patient Name: Camron Hendrix  MRN: [id number]  Today's Date: 7/20/2023    Admit Date: 7/19/2023    Plan: HOME   Discharge Needs Assessment    No documentation.                  Discharge Plan       Row Name 07/20/23 1526       Plan    Plan HOME    Patient/Family in Agreement with Plan yes    Plan Comments Met with pt and daughter at bedside for DCP.  Pt resides alone in [facility].  He is independent with ADLs.  Uses a rollator at baseline.  Also has a cane and grab bars in the shower.  No current  services.  Confirmed he has Medicare and [employer] insurance with Rx coverage.  Goal is home upon DC.  No immediate needs identified/voiced.  CM will cont to follow.    Final Discharge Disposition Code 01 - home or self-care                  Continued Care and Services - Admitted Since 7/19/2023    Coordination has not been started for this encounter.       Expected Discharge Date and Time       Expected Discharge Date Expected Discharge Time    Jul 20, 2023            Demographic Summary    No documentation.                  Functional Status    No documentation.                  Psychosocial    No documentation.                  Abuse/Neglect    No documentation.                  Legal    No documentation.                  Substance Abuse    No documentation.                  Patient Forms    No documentation.                     Angelina Nelson RN

## 2023-07-20 NOTE — PROGRESS NOTES
[facility] Medicine Services  PROGRESS NOTE    Patient Name: Camron Hendrix  : 1950  MRN: [id number]    Date of Admission: 2023  Primary Care Physician: Leslie Rhodes MD    Subjective   Subjective     CC:  Blurry vision, ataxia    HPI:  Feels better, ambulated well, some mild bilateral blurry vision but no double vision. No chest pain, no dyspnea, no palpitations    ROS:  No f/c  No cp  No palpitations    Objective   Objective     Vital Signs:   Temp:  [97.5 °F (36.4 °C)-98.4 °F (36.9 °C)] 97.9 °F (36.6 °C)  Heart Rate:  [57-67] 67  Resp:  [16-18] 18  BP: (149-187)/(62-95) 187/95  Flow (L/min):  [2] 2     Physical Exam:  Constitutional:Alert, oriented x 3, nontoxic appearing  Psych:Normal/appropriate affect  HEENT:NCAT, oropharynx clear  Neck: neck supple, full range of motion  Neuro: Face symmetric, speech clear, equal , moves all extremities; gait not assessed  Cardiac: irr irr, regular rate, 1/6 murmur; trace BLE edema  Resp: CTAB, normal effort  GI: abd soft, nontender  Skin: No extremity rash  Musculoskeletal/extremities: no cyanosis of extremities; no significant ankle edema      Results Reviewed:  LAB RESULTS:      Lab 23  0308 23  2104 23  1532 23  1530   WBC  --   --   --  5.50   HEMOGLOBIN  --   --   --  16.7   HEMOGLOBIN, POC  --   --  16.0  --    HEMATOCRIT  --   --   --  47.1   HEMATOCRIT POC  --   --  47  --    PLATELETS  --   --   --  231   NEUTROS ABS  --   --   --  2.97   IMMATURE GRANS (ABS)  --   --   --  0.02   LYMPHS ABS  --   --   --  1.73   MONOS ABS  --   --   --  0.52   EOS ABS  --   --   --  0.23   MCV  --   --   --  86.1   PROTIME 28.3* 27.2*  --  26.0*  26.5*         Lab 23  1616 23  1532 23  1530   SODIUM 139  --   --    POTASSIUM 3.9  --   --    CHLORIDE 103  --   --    CO2 24.0  --   --    ANION GAP 12.0  --   --    BUN 15  --   --    CREATININE 1.05 1.20  --    EGFR 75.4 64.3  --    GLUCOSE  127*  --   --    CALCIUM 8.4*  --   --    MAGNESIUM 1.9  --   --    HEMOGLOBIN A1C  --   --  6.70*   TSH 3.800  --   --          Lab 07/19/23  1616   TOTAL PROTEIN 5.6*   ALBUMIN 3.5   GLOBULIN 2.1   ALT (SGPT) 25   AST (SGOT) 22   BILIRUBIN 0.3   ALK PHOS 86         Lab 07/20/23  0308 07/19/23  2104 07/19/23  1616 07/19/23  1530   HSTROP T  --   --  13  --    PROTIME 28.3* 27.2*  --  26.0*  26.5*   INR 2.63* 2.50*  --  2.42*  2.3*         Lab 07/20/23  0308   CHOLESTEROL 121   LDL CHOL 59   HDL CHOL 30*   TRIGLYCERIDES 194*             Brief Urine Lab Results  (Last result in the past 365 days)        Color   Clarity   Blood   Leuk Est   Nitrite   Protein   CREAT   Urine HCG        07/19/23 1700 Yellow   Clear   Negative   Negative   Negative   Negative                   Microbiology Results Abnormal       None            CT Head Without Contrast    Result Date: 7/19/2023  CT HEAD WO CONTRAST Date of Exam: 7/19/2023 3:31 PM EDT Indication: Neuro deficit, acute, stroke suspected. Comparison: Brain MRI 7/30/2021 Technique: Axial CT images were obtained of the head without contrast administration.  Automated exposure control and iterative construction methods were used. Findings: No acute intracranial hemorrhage. No acute large territory infarct. There are scattered subcortical and periventricular white matter hypodensities which are nonspecific and can be seen in the setting of chronic small vessel ischemic change. There is a small chronic linear infarct in the right cerebellum, present on prior brain MRI. There is generalized cerebral volume loss. No extra-axial collections. No midline shift or herniation. Normal size and configuration of the ventricles. Unremarkable appearance of the orbits. There is trace fluid in the bilateral mastoid air cells, nonspecific. The mastoid air cells are clear. No acute or suspicious bony findings.     Impression: Impression: No acute intracranial findings. Electronically Signed:  Peter Alcantara  7/19/2023 3:50 PM EDT  Workstation ID: [id number]    CT Angiogram Neck    Result Date: 7/19/2023  CT CEREBRAL PERFUSION W WO CONTRAST, CT ANGIOGRAM NECK, CT ANGIOGRAM HEAD W AI ANALYSIS OF LVO Date of Exam: 7/19/2023 3:32 PM EDT Indication: Neuro deficit, acute stroke suspected.  Comparison: CTA of the head and neck dated 2/21/2022 Technique: Axial CT images of the brain were obtained prior to and after the administration of 115 mL Isovue-370. Core blood volume, core blood flow, mean transit time, and Tmax images were obtained utilizing the Rapid software protocol. A limited CT angiogram of the head was also performed to measure the blood vessel density. The radiation dose reduction device was turned on for each scan per the ALARA (As Low as Reasonably Achievable) protocol. FINDINGS: Vascular Findings: Chronic, moderate to severe stenosis of the right MCA M1 segment appears essentially unchanged in comparison with 2/21/2022. Single dominant right MCA branch vessel and asymmetrically small cortical vessels are seen throughout the right MCA territory. Atherosclerotic plaque is seen within the right carotid bifurcation and right carotid siphon. The right common carotid, internal carotid, anterior cerebral, vertebral, and posterior cerebral arteries are patent without abrupt cut off or aneurysmal dilation. Atherosclerotic plaque is seen within the left carotid bifurcation and left carotid siphon. The left common carotid, internal carotid, middle cerebral, anterior cerebral, vertebral, and posterior cerebral arteries are patent without abrupt cut off or aneurysmal dilation. Basilar artery appears patent and appears unremarkable. Non-vascular Findings: For description of nonvascular intracranial findings, please refer to the noncontrast head CT performed the same date. No acute abnormality is identified within the visualized soft tissue or bony structures of the neck. The visualized lung apices are clear. CT  Perfusion: CBF (<30%) volume: 0 mL Tmax (>6.0s) volume: 21 mL Mismatch volume: 21 mL Mismatch ratio: Infinite     Impression: 1.Chronic, moderate to severe stenosis of the right MCA M1 segment appears essentially unchanged in comparison with 2/21/2022. Single dominant right MCA branch vessel and asymmetrically small cortical vessels are seen throughout the right MCA territory. Findings are essentially unchanged since 3/13/2023. 2.CT perfusion study demonstrates prolonged Tmax (greater than 6 seconds, 21 mL) within the right MCA territory. This may be related to #1, although acute right MCA territory ischemia cannot be excluded. Recommend correlation with MRI. 3.Atherosclerotic plaque within the bilateral carotid bifurcations, left greater than right. Estimated degree of stenosis within the ICAs is less than 50% bilaterally. The above findings were discussed with Pat Lynn via telephone on 7/19/2023 3:53 PM EDT. Electronically Signed: Anatoly Tamayo  7/19/2023 4:12 PM EDT  Workstation ID: [id number]    MRI Brain Without Contrast    Result Date: 7/20/2023  MRI BRAIN WO CONTRAST Date of Exam: 7/20/2023 7:13 AM EDT Indication: Stroke, follow up.  Comparison: 7/30/2021. Technique:  Routine multiplanar/multisequence sequence images of the brain were obtained without contrast administration. Findings: There is moderate atrophy. Mild ventricular prominence is compatible with atrophy. There are multiple foci of increased signal intensity in the subcortical and periventricular white matter of both cerebral hemispheres, advanced since prior study. There is no acute mass effect or edema. There are no findings suspicious for acute CVA or hemorrhage.     Impression: Impression: Atrophy and evidence of chronic small vessel ischemic insult. No acute process. Electronically Signed: Sravani Vickers MD  7/20/2023 8:31 AM EDT  Workstation ID: [id number]    XR Chest 1 View    Result Date: 7/19/2023  XR CHEST 1 VW Date of Exam:  7/19/2023 3:54 PM EDT Indication: Weak/Dizzy/AMS triage protocol Comparison: 8/3/2021. Findings: Heart and pulmonary vessels appear within normal limits for technique. There is unchanged mild elevation of the right diaphragm. No acute infiltrates or effusions are identified.     Impression: Impression: No acute process. Electronically Signed: Sravani Vickers MD  7/19/2023 4:13 PM EDT  Workstation ID: [id number]    CT Angiogram Head w AI Analysis of LVO    Result Date: 7/19/2023  CT CEREBRAL PERFUSION W WO CONTRAST, CT ANGIOGRAM NECK, CT ANGIOGRAM HEAD W AI ANALYSIS OF LVO Date of Exam: 7/19/2023 3:32 PM EDT Indication: Neuro deficit, acute stroke suspected.  Comparison: CTA of the head and neck dated 2/21/2022 Technique: Axial CT images of the brain were obtained prior to and after the administration of 115 mL Isovue-370. Core blood volume, core blood flow, mean transit time, and Tmax images were obtained utilizing the Rapid software protocol. A limited CT angiogram of the head was also performed to measure the blood vessel density. The radiation dose reduction device was turned on for each scan per the ALARA (As Low as Reasonably Achievable) protocol. FINDINGS: Vascular Findings: Chronic, moderate to severe stenosis of the right MCA M1 segment appears essentially unchanged in comparison with 2/21/2022. Single dominant right MCA branch vessel and asymmetrically small cortical vessels are seen throughout the right MCA territory. Atherosclerotic plaque is seen within the right carotid bifurcation and right carotid siphon. The right common carotid, internal carotid, anterior cerebral, vertebral, and posterior cerebral arteries are patent without abrupt cut off or aneurysmal dilation. Atherosclerotic plaque is seen within the left carotid bifurcation and left carotid siphon. The left common carotid, internal carotid, middle cerebral, anterior cerebral, vertebral, and posterior cerebral arteries are patent without abrupt  cut off or aneurysmal dilation. Basilar artery appears patent and appears unremarkable. Non-vascular Findings: For description of nonvascular intracranial findings, please refer to the noncontrast head CT performed the same date. No acute abnormality is identified within the visualized soft tissue or bony structures of the neck. The visualized lung apices are clear. CT Perfusion: CBF (<30%) volume: 0 mL Tmax (>6.0s) volume: 21 mL Mismatch volume: 21 mL Mismatch ratio: Infinite     Impression: 1.Chronic, moderate to severe stenosis of the right MCA M1 segment appears essentially unchanged in comparison with 2/21/2022. Single dominant right MCA branch vessel and asymmetrically small cortical vessels are seen throughout the right MCA territory. Findings are essentially unchanged since 3/13/2023. 2.CT perfusion study demonstrates prolonged Tmax (greater than 6 seconds, 21 mL) within the right MCA territory. This may be related to #1, although acute right MCA territory ischemia cannot be excluded. Recommend correlation with MRI. 3.Atherosclerotic plaque within the bilateral carotid bifurcations, left greater than right. Estimated degree of stenosis within the ICAs is less than 50% bilaterally. The above findings were discussed with Pat Lynn via telephone on 7/19/2023 3:53 PM EDT. Electronically Signed: Anatoly Tamayo  7/19/2023 4:12 PM EDT  Workstation ID: [id number]    CT CEREBRAL PERFUSION WITH & WITHOUT CONTRAST    Result Date: 7/19/2023  CT CEREBRAL PERFUSION W WO CONTRAST, CT ANGIOGRAM NECK, CT ANGIOGRAM HEAD W AI ANALYSIS OF LVO Date of Exam: 7/19/2023 3:32 PM EDT Indication: Neuro deficit, acute stroke suspected.  Comparison: CTA of the head and neck dated 2/21/2022 Technique: Axial CT images of the brain were obtained prior to and after the administration of 115 mL Isovue-370. Core blood volume, core blood flow, mean transit time, and Tmax images were obtained utilizing the Rapid software protocol. A limited  CT angiogram of the head was also performed to measure the blood vessel density. The radiation dose reduction device was turned on for each scan per the ALARA (As Low as Reasonably Achievable) protocol. FINDINGS: Vascular Findings: Chronic, moderate to severe stenosis of the right MCA M1 segment appears essentially unchanged in comparison with 2/21/2022. Single dominant right MCA branch vessel and asymmetrically small cortical vessels are seen throughout the right MCA territory. Atherosclerotic plaque is seen within the right carotid bifurcation and right carotid siphon. The right common carotid, internal carotid, anterior cerebral, vertebral, and posterior cerebral arteries are patent without abrupt cut off or aneurysmal dilation. Atherosclerotic plaque is seen within the left carotid bifurcation and left carotid siphon. The left common carotid, internal carotid, middle cerebral, anterior cerebral, vertebral, and posterior cerebral arteries are patent without abrupt cut off or aneurysmal dilation. Basilar artery appears patent and appears unremarkable. Non-vascular Findings: For description of nonvascular intracranial findings, please refer to the noncontrast head CT performed the same date. No acute abnormality is identified within the visualized soft tissue or bony structures of the neck. The visualized lung apices are clear. CT Perfusion: CBF (<30%) volume: 0 mL Tmax (>6.0s) volume: 21 mL Mismatch volume: 21 mL Mismatch ratio: Infinite     Impression: 1.Chronic, moderate to severe stenosis of the right MCA M1 segment appears essentially unchanged in comparison with 2/21/2022. Single dominant right MCA branch vessel and asymmetrically small cortical vessels are seen throughout the right MCA territory. Findings are essentially unchanged since 3/13/2023. 2.CT perfusion study demonstrates prolonged Tmax (greater than 6 seconds, 21 mL) within the right MCA territory. This may be related to #1, although acute right  MCA territory ischemia cannot be excluded. Recommend correlation with MRI. 3.Atherosclerotic plaque within the bilateral carotid bifurcations, left greater than right. Estimated degree of stenosis within the ICAs is less than 50% bilaterally. The above findings were discussed with Pat Lynn via telephone on 7/19/2023 3:53 PM EDT. Electronically Signed: Anatoly Tamayo  7/19/2023 4:12 PM EDT  Workstation ID: [id number]     Results for orders placed during the hospital encounter of 07/30/21    Adult Transthoracic Echo Complete W/ Cont if Necessary Per Protocol    Interpretation Summary  · Moderate left atrial enlargement.  · Left ventricular wall thickness is consistent with mild concentric hypertrophy.  · Normal left ventricular systolic function, estimated EF 55%.  · Calcified aortic valve with mild aortic stenosis, mean gradient 11.8 mmHg.  · Mild mitral vegetation.  · Trace tricuspid regurgitation with normal RVSP.  · Dilated aortic root (4.2 cm).      Current medications:  Scheduled Meds:amLODIPine, 10 mg, Oral, Q24H  aspirin, 325 mg, Oral, Daily   Or  aspirin, 300 mg, Rectal, Daily  atorvastatin, 80 mg, Oral, Nightly  carvedilol, 6.25 mg, Oral, BID With Meals  furosemide, 20 mg, Oral, Daily  levothyroxine, 200 mcg, Oral, Q AM  losartan, 25 mg, Oral, Q24H  sodium chloride, 10 mL, Intravenous, Q12H  traZODone, 50 mg, Oral, Nightly  warfarin, 10 mg, Oral, Daily      Continuous Infusions:Pharmacy to dose warfarin,       PRN Meds:.  acetaminophen    Pharmacy to dose warfarin    sodium chloride    sodium chloride    Assessment & Plan   Assessment & Plan     Active Hospital Problems    Diagnosis  POA    **Stroke [I63.9]  Yes      Resolved Hospital Problems   No resolved problems to display.        Brief Hospital Course to date:  Camron Hendrix is a 73 y.o. male w/ hx of previous cva, cad (previous remote stent), afib (on chronic coumadin), ascending aortic aneurysm (monitored w/ serial echos by cardiologist   Conor in [place]). On asa 81 and coumadin, statin at baseline. Presented with some blurred vision and some ataxia. In ED EKG sinus, inr therapeutic range, u/a benign, tsh normal, ct scans revealed some atherosclerotic dz w/ severe stenosis right mca m1 segment (unchanged from comparison 2/21/22) and less than 50% bilateral ica dz. Mri subsequently negative. Symptoms improved. Still has some mild blurry vision bilaterally but no double vision. Daughter says tends to complain of this when he is tired or doesn't sleep well, and didn't sleep well last night    Transient neurologic symptoms improved  Intermittent blurry vision  Intracranial atherosclerosis  Hx previous CVA  Chronically anticoagulated on coumadin  -mri brain negative for acute process  -EKG negative  -neuro following: -continue asa 325 (up from 81mg) and coumadin (inr therapeutic levels), high dose statin. Since mri negative will start back home bp meds; echo pending. Still some mild blurry vision but ambulated well w/ therapy, ataxia resolved and feels much improved and eager to go home. Will see what neurology says later    Afib (on coumadin)  CAD (remote stent)  Ascending aortic aneurysm (previous 4.3 cm)  Chronic HFpEF  HTN  HL  -start back home bp meds and lasix (amlodipine ,losartan, coreg, lasix)  -continue coumadin, pharmacy to dose (inr therapeutic)  -follows w/ Dr. Perdomo ([facility]) regarding his ascending aortic aneurysm w/ serial echos, most recent echo here was 10/'22 w/ normal LV ef 70-75%, 4.3 cm ascending aortic ansurysm    New dx dm2   -A1c 6.7  -diabetes ed  -sliding scale humalog while here  -f/u pcp as outpatient        Expected Discharge Location and Transportation: home  Expected Discharge later 7/20 vs 7/21 depending on clinical course and neurology recs    DVT prophylaxis:  Medical and mechanical DVT prophylaxis orders are present.          CODE STATUS:   Code Status and Medical Interventions:   Ordered at: 07/19/23  2042     Medical Intervention Limits:    NO intubation (DNI)     Level Of Support Discussed With:    Patient     Code Status (Patient has no pulse and is not breathing):    No CPR (Do Not Attempt to Resuscitate)     Medical Interventions (Patient has pulse or is breathing):    Limited Support     Release to patient:    Routine Release       Abebe Sterling MD  07/20/23

## 2023-07-20 NOTE — THERAPY EVALUATION
Patient Name: Camron Hendrix  : 1950    MRN: [id number]                              Today's Date: 2023       Admit Date: 2023    Visit Dx:     ICD-10-CM ICD-9-CM   1. Ataxia  R27.0 781.3   2. Fall, initial encounter  W19.XXXA E888.9   3. Chronic anticoagulation  Z79.01 V58.61   4. History of ischemic stroke  Z86.73 V12.54   5. 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    Stroke     Past Medical History:   Diagnosis Date    AAA (abdominal aortic aneurysm)     Coronary artery disease     CVA (cerebral vascular accident)     Hypertension     Hypothyroidism     PAF (paroxysmal atrial fibrillation)     TIA (transient ischemic attack)      Past Surgical History:   Procedure Laterality Date    CARDIAC CATHETERIZATION N/A 3/29/2019    Procedure: Left Heart Cath;  Surgeon: Andrea Holloway MD;  Location: [facility] CATH INVASIVE LOCATION;  Service: Cardiovascular    CERVICAL FUSION      c4-c5    CORONARY ANGIOPLASTY WITH STENT PLACEMENT      HERNIA REPAIR      TONSILECTOMY, ADENOIDECTOMY, BILATERAL MYRINGOTOMY AND TUBES        General Information       Row Name 23 0931          Physical Therapy Time and Intention    Document Type evaluation  -SS     Mode of Treatment physical therapy  -SS       Row Name 23 0931          General Information    Patient Profile Reviewed yes  -SS     Prior Level of Function  independent:;all household mobility;community mobility;gait;transfer;bed mobility;using stairs;yard work  -     Existing Precautions/Restrictions fall  -     Barriers to Rehab medically complex  -       Row Name 07/20/23 0931          Living Environment    People in Home alone;other (see comments)  adult daughter lives next door  -       Row Name 07/20/23 0931          Home Main Entrance    Number of Stairs, Main Entrance two  -SS     Stair Railings, Main Entrance none  -       Row Name 07/20/23 0931          Stairs Within Home, Primary    Stairs, Within Home, Primary basement as needed; able to maintain first level for essentials  -     Number of Stairs, Within Home, Primary twelve  -SS     Stair Railings, Within Home, Primary railing on left side (ascending)  -       Row Name 07/20/23 0931          Cognition    Orientation Status (Cognition) oriented x 4  -       Row Name 07/20/23 0931          Safety Issues, Functional Mobility    Safety Issues Affecting Function (Mobility) insight into deficits/self-awareness;safety precaution awareness;safety precautions follow-through/compliance;sequencing abilities  -     Impairments Affecting Function (Mobility) balance;strength;visual/perceptual  -               User Key  (r) = Recorded By, (t) = Taken By, (c) = Cosigned By      Initials Name Provider Type     Reema Croft PT Physical Therapist                   Mobility       Row Name 07/20/23 0934          Bed Mobility    Comment, (Bed Mobility) up in chair  -       Row Name 07/20/23 0934          Sit-Stand Transfer    Sit-Stand [place] (Transfers) contact guard;verbal cues  -     Assistive Device (Sit-Stand Transfers) other (see comments);walker, front-wheeled  -     Comment, (Sit-Stand Transfer) VC for hand placement; pt. initially stood w/no AD and immediately returned to sitting due to dizziness that subsided w/rest; pt. then used FWW and was asymptomatic  -       Row Name  07/20/23 0934          Gait/Stairs (Locomotion)    [place] Level (Gait) contact guard  -SS     Assistive Device (Gait) walker, front-wheeled;other (see comments)  none  -SS     Distance in Feet (Gait) 200  -SS     Deviations/Abnormal Patterns (Gait) bilateral deviations;[place] decreased;gait speed decreased;stride length decreased  -SS     Bilateral Gait Deviations forward flexed posture;heel strike decreased  -SS     Comment, (Gait/Stairs) Pt. ambulated with a step through gait pattern. VC for upright posture, decreased BUE support on FWW. Pt. trialed 100' w/AD and 100' w/o. No LOB or symptoms noted w/head turns, turn 180. Activity limited by fatigue.  -SS               User Key  (r) = Recorded By, (t) = Taken By, (c) = Cosigned By      Initials Name Provider Type     Reema Croft, PT Physical Therapist                   Obj/Interventions       Row Name 07/20/23 1019          Range of Motion Comprehensive    General Range of Motion bilateral lower extremity ROM WFL  -SS       Row Name 07/20/23 1019          Strength Comprehensive (MMT)    Comment, General Manual Muscle Testing (MMT) Assessment BLE gross 4/5 symmetrical  -SS       Row Name 07/20/23 1019          Motor Skills    Motor Skills coordination  -     Coordination WFL;lower extremity;bilateral  -SS       Row Name 07/20/23 1019          Balance    Balance Assessment sitting static balance;sitting dynamic balance;sit to stand dynamic balance;standing static balance;standing dynamic balance  -SS     Static Sitting Balance standby assist  -SS     Dynamic Sitting Balance standby assist  -SS     Position, Sitting Balance unsupported;sitting in chair  -SS     Sit to Stand Dynamic Balance contact guard  -SS     Static Standing Balance contact guard  -SS     Dynamic Standing Balance contact guard  -SS     Position/Device Used, Standing Balance unsupported;supported;walker, rolling  -SS     Balance Interventions sitting;sit to  stand;standing;supported;static;dynamic  -     Comment, Balance pt. c./o blurry vision during ambulation; no LOB or running into obstacles noted - RN notified.  -       Row Name 07/20/23 1019          Sensory Assessment (Somatosensory)    Sensory Assessment (Somatosensory) LE sensation intact;right LE  -SS     Right LE Sensory Assessment general sensation;impaired  intact but diminished through RLE  -SS               User Key  (r) = Recorded By, (t) = Taken By, (c) = Cosigned By      Initials Name Provider Type    SS Reema Croft, PT Physical Therapist                   Goals/Plan       Row Name 07/20/23 1031          Bed Mobility Goal 1 (PT)    Activity/Assistive Device (Bed Mobility Goal 1, PT) bed mobility activities, all  -SS     [place] Level/Cues Needed (Bed Mobility Goal 1, PT) modified independence  -SS     Time Frame (Bed Mobility Goal 1, PT) long term goal (LTG);10 days  -       Row Name 07/20/23 1031          Transfer Goal 1 (PT)    Activity/Assistive Device (Transfer Goal 1, PT) sit-to-stand/stand-to-sit;bed-to-chair/chair-to-bed  -SS     [place] Level/Cues Needed (Transfer Goal 1, PT) modified independence  -SS     Time Frame (Transfer Goal 1, PT) long term goal (LTG);10 days  -       Row Name 07/20/23 1031          Gait Training Goal 1 (PT)    Activity/Assistive Device (Gait Training Goal 1, PT) gait (walking locomotion)  -     [place] Level (Gait Training Goal 1, PT) independent  -SS     Distance (Gait Training Goal 1, PT) 500  -SS     Time Frame (Gait Training Goal 1, PT) long term goal (LTG);10 days  -       Row Name 07/20/23 1031          Stairs Goal 1 (PT)    Activity/Assistive Device (Stairs Goal 1, PT) ascending stairs;descending stairs  -SS     [place] Level/Cues Needed (Stairs Goal 1, PT) independent  -SS     Number of Stairs (Stairs Goal 1, PT) 2  -SS     Time Frame (Stairs Goal 1, PT) long term goal (LTG);10 days  -       Row Name 07/20/23 1031           Therapy Assessment/Plan (PT)    Planned Therapy Interventions (PT) balance training;bed mobility training;gait training;home exercise program;neuromuscular re-education;patient/family education;postural re-education;ROM (range of motion);stair training;strengthening;stretching;transfer training  -               User Key  (r) = Recorded By, (t) = Taken By, (c) = Cosigned By      Initials Name Provider Type     Reema Croft, PT Physical Therapist                   Clinical Impression       Row Name 07/20/23 1024          Pain    Pretreatment Pain Rating 0/10 - no pain  -     Posttreatment Pain Rating 0/10 - no pain  -     Pain Intervention(s) Repositioned;Ambulation/increased activity;Elevated  -     Additional Documentation Pain Scale: Numbers Pre/Post-Treatment (Group)  -       Row Name 07/20/23 1024          Plan of Care Review    Plan of Care Reviewed With patient;daughter  -     Outcome Evaluation Pt. presents below baseline function w/generalized weakness, balance deficits and visual deficits affecting his ability to safely participate in functional mobility. He performed transfers and ambulated 100' w/front wheeled walker and 100' w/no assistive device, contact guard assist. Activity limited by fatigue. Pt. would benefit from IPPT to address stated deficits.  -       Row Name 07/20/23 1024          Therapy Assessment/Plan (PT)    Rehab Potential (PT) good, to achieve stated therapy goals  -     Criteria for Skilled Interventions Met (PT) yes;meets criteria;skilled treatment is necessary  -     Therapy Frequency (PT) daily  -       Row Name 07/20/23 1024          Vital Signs    Pre Systolic BP Rehab 170  -SS     Pre Treatment Diastolic BP 91  -SS     Post Systolic BP Rehab 172  -SS     Post Treatment Diastolic BP 86  -SS     Pretreatment Heart Rate (beats/min) 66  -SS     Posttreatment Heart Rate (beats/min) 63  -SS     Pre SpO2 (%) 94  -SS     O2 Delivery Pre Treatment room air  -      Post SpO2 (%) 93  -SS     O2 Delivery Post Treatment room air  -SS     Pre Patient Position Sitting  -SS     Post Patient Position Sitting  -SS       Row Name 07/20/23 1024          Positioning and Restraints    Pre-Treatment Position sitting in chair/recliner  -SS     Post Treatment Position chair  -SS     In Chair notified nsg;reclined;call light within reach;encouraged to call for assist;exit alarm on;with family/caregiver;waffle cushion  -SS               User Key  (r) = Recorded By, (t) = Taken By, (c) = Cosigned By      Initials Name Provider Type    SS Reema Croft, PT Physical Therapist                   Outcome Measures       Row Name 07/20/23 1032          How much help from another person do you currently need...    Turning from your back to your side while in flat bed without using bedrails? 3  -SS     Moving from lying on back to sitting on the side of a flat bed without bedrails? 3  -SS     Moving to and from a bed to a chair (including a wheelchair)? 3  -SS     Standing up from a chair using your arms (e.g., wheelchair, bedside chair)? 3  -SS     Climbing 3-5 steps with a railing? 3  -SS     To walk in hospital room? 3  -SS     AM-PAC 6 Clicks Score (PT) 18  -SS     Highest level of mobility 6 --> Walked 10 steps or more  -       Row Name 07/20/23 1032 07/20/23 0824       Modified Parmer Scale    Pre-Stroke Modified [place] Scale 6 - Unable to determine (UTD) from the medical record documentation  -SS --    Modified Rachel Scale 1 - No significant disability despite symptoms.  Able to carry out all usual duties and activities.  -SS 1 - No significant disability despite symptoms.  Able to carry out all usual duties and activities.  -      Row Name 07/20/23 1032 07/20/23 0824       Functional Assessment    Outcome Measure Options AM-PAC 6 Clicks Basic Mobility (PT);Modified [place]  -SS AM-PAC 6 Clicks Daily Activity (OT);Modified [place]  -CS              User Key  (r) = Recorded By, (t) = Taken By,  (c) = Cosigned By      Initials Name Provider Type     Ravin Healy, OT Occupational Therapist    SS Reema Croft, PT Physical Therapist                                 Physical Therapy Education       Title: PT OT SLP Therapies (In Progress)       Topic: Physical Therapy (In Progress)       Point: Mobility training (Done)       Learning Progress Summary             Patient Eager, E, VU,DU,NR by  at 7/20/2023 1032    Comment: Educated pt. safety/technique w/transfers, ambulation, PT POC   Family Eager, E, VU,DU,NR by  at 7/20/2023 1032    Comment: Educated pt. safety/technique w/transfers, ambulation, PT POC                         Point: Home exercise program (Not Started)       Learner Progress:  Not documented in this visit.              Point: Body mechanics (Done)       Learning Progress Summary             Patient Eager, E, VU,DU,NR by  at 7/20/2023 1032    Comment: Educated pt. safety/technique w/transfers, ambulation, PT POC   Family Eager, E, VU,DU,NR by  at 7/20/2023 1032    Comment: Educated pt. safety/technique w/transfers, ambulation, PT POC                         Point: Precautions (Done)       Learning Progress Summary             Patient Eager, E, VU,DU,NR by  at 7/20/2023 1032    Comment: Educated pt. safety/technique w/transfers, ambulation, PT POC   Family Eager, E, VU,DU,NR by  at 7/20/2023 1032    Comment: Educated pt. safety/technique w/transfers, ambulation, PT POC                                         User Key       Initials Effective Dates Name Provider Type Discipline     06/01/21 -  Reema Croft, KARLY Physical Therapist PT                  PT Recommendation and Plan  Planned Therapy Interventions (PT): balance training, bed mobility training, gait training, home exercise program, neuromuscular re-education, patient/family education, postural re-education, ROM (range of motion), stair training, strengthening, stretching, transfer training  Plan of Care Reviewed  With: patient, daughter  Outcome Evaluation: Pt. presents below baseline function w/generalized weakness, balance deficits and visual deficits affecting his ability to safely participate in functional mobility. He performed transfers and ambulated 100' w/front wheeled walker and 100' w/no assistive device, contact guard assist. Activity limited by fatigue. Pt. would benefit from IPPT to address stated deficits.     Time Calculation:   PT Evaluation Complexity  History, PT Evaluation Complexity: 3 or more personal factors and/or comorbidities  Examination of Body Systems (PT Eval Complexity): total of 4 or more elements  Clinical Presentation (PT Evaluation Complexity): evolving  Clinical Decision Making (PT Evaluation Complexity): low complexity  Overall Complexity (PT Evaluation Complexity): low complexity     PT Charges       Row Name 07/20/23 1033             Time Calculation    Start Time 0839  -SS      PT Received On 07/20/23  -SS      PT Goal Re-Cert Due Date 07/30/23  -SS         Timed Charges    88537 - Gait Training Minutes  8  -SS         SNF Physical Therapy Minutes    Skilled Minutes- PT 50 min  -SS         Total Minutes    Timed Charges Total Minutes 8  -SS       Total Minutes 8  -SS                User Key  (r) = Recorded By, (t) = Taken By, (c) = Cosigned By      Initials Name Provider Type    SS Reema Croft, PT Physical Therapist                  Therapy Charges for Today       Code Description Service Date Service Provider Modifiers Qty    17875550600 HC GAIT TRAINING EA 15 MIN 7/20/2023 Reema Croft, PT GP 1    64999257347 HC PT EVAL LOW COMPLEXITY 4 7/20/2023 Reema Croft, PT GP 1            PT G-Codes  Outcome Measure Options: AM-PAC 6 Clicks Basic Mobility (PT), Modified [place]  AM-PAC 6 Clicks Score (PT): 18  AM-PAC 6 Clicks Score (OT): 24  Modified Rachel Scale: 1 - No significant disability despite symptoms.  Able to carry out all usual duties and activities.  PT Discharge  Summary  Anticipated Discharge Disposition (PT): home with assist    Reema Croft, PT  7/20/2023

## 2023-07-20 NOTE — PROGRESS NOTES
"Pharmacy Consult  -  Warfarin    Camron Hendrix is a  73 y.o. male   Height - 175.3 cm (69\")  Weight - 129 kg (283 lb 6.4 oz)    Consulting Provider: - Hospitalist  Indication: - Afib  Goal INR: - 2-3  Home Regimen:   - 10mg qd    Bridge Therapy: No       Drug-Drug Interactions with current regimen:   ASA: May enhance the anticoagulant effect of warfarin     Warfarin Dosing During Admission:    Date  7/19           INR  2.42           Dose  10mg                Education Provided:    Discharge Follow up:   Following Provider - [facility]   Follow up time range or appointment - 2-3 days post discharge      Labs:    Results from last 7 days   Lab Units 07/19/23  1532 07/19/23  1530   INR   --  2.42*  2.3*   HEMOGLOBIN g/dL  --  16.7   HEMOGLOBIN, POC g/dL 16.0  --    HEMATOCRIT %  --  47.1   HEMATOCRIT POC % 47  --      Results from last 7 days   Lab Units 07/19/23  1616 07/19/23  1532   SODIUM mmol/L 139  --    POTASSIUM mmol/L 3.9  --    CHLORIDE mmol/L 103  --    CO2 mmol/L 24.0  --    BUN mg/dL 15  --    CREATININE mg/dL 1.05 1.20   CALCIUM mg/dL 8.4*  --    BILIRUBIN mg/dL 0.3  --    ALK PHOS U/L 86  --    ALT (SGPT) U/L 25  --    AST (SGOT) U/L 22  --    GLUCOSE mg/dL 127*  --        Current dietary intake: Not charted yet      Assessment/Plan:      Patient's INR is therapeutic at 2.42 today.  Give home dose of 10mg tonight   Daily PT/INR ordered.  Monitor signs/symptoms of bleeding, dietary intake, and drug-drug interactions. Make dose adjustments as necessary.  Pharmacy will continue to follow.       Thank you    Abena Thomas [facility]  7/19/2023  20:49 EDT      "

## 2023-07-20 NOTE — PLAN OF CARE
Goal Outcome Evaluation:  Plan of Care Reviewed With: patient, daughter      SLP evaluation completed. Will sign-off as cognitive-communication skills are grossly WFL/at baseline level of function. Please see note for further details and recommendations.

## 2023-07-20 NOTE — PLAN OF CARE
Goal Outcome Evaluation:  Plan of Care Reviewed With: patient        Progress: no change  Outcome Evaluation: Pt presents at baseline for ADL completion with symmetrical BUE strength and coordination/sensation intact. Endorses mild blurriness compared to baseline, however, able to read in-room signage at variable distances and is full to confrontation. OT signing off, defer to PT for any mobility needs. Rec d/c to home when medically appropriate.      Anticipated Discharge Disposition (OT): home

## 2023-07-20 NOTE — DISCHARGE SUMMARY
[facility] Medicine Services  DISCHARGE SUMMARY    Patient Name: Camron Hendrix  : 1950  MRN: [id number]    Date of Admission: 2023  3:15 PM  Date of Discharge:  2023  Primary Care Physician: Leslie Rhodes MD    Consults       Date and Time Order Name Status Description    2023  3:31 PM Inpatient Neurology Consult Stroke Completed             Hospital Course     Presenting Problem:        -------------final diagnoses----------------  Transient visual disturbance and ataxia, resolved  -cannot rule out TIA  -mri negative  -symptoms resolved  -per neurology, advanced asa to 162mg daily, continue coumadin, increased lipitor to 20mg daily  Afib  CAD  Ascending aortic aneurysm (previously 4.3cm per 10/'22 echo)  -follows w/ Dr. Perdomo of cardiology in [place] for surveillance echo's; follow up in 1-2 weeks  -asa 162, coumadin, increased statin  New DM2 (A1c 6.7)  -counseled, needs f/u w/ pcp within a week    Hospital Course:  Camron Hendrix is a 73 y.o. male w/ hx of previous cva, cad (previous remote stent), afib (on chronic coumadin), ascending aortic aneurysm (monitored w/ serial echos by cardiologist Dr. Perdomo in [place]). On asa 81 and coumadin, statin at baseline. Presented with some blurred vision and some ataxia. In ED EKG sinus, inr therapeutic range, u/a benign, tsh normal, ct scans revealed some atherosclerotic dz w/ severe stenosis right mca m1 segment (unchanged from comparison 22) and less than 50% bilateral ica dz. Mri subsequently negative. Symptoms improved. Still has some mild blurry vision bilaterally but no double vision. Daughter says tends to complain of this when he is tired or doesn't sleep well and he has not been sleeping well. Symptoms resolved during the stay. Patient was seen by neurology who recommended asa advanced to 162mg daily, increase atorvastatin to 20mg daily, continue coumadin. Patient to f/u w/ pcp regarding new dx  dm2 (A1c 6.7).       Discharge Follow Up Recommendations for outpatient labs/diagnostics:  Pcp 1 week, post-hospitalization for tia like symptoms, also new diabetes (A1c 6.7)  Cardiologist 1-2 weeks    Day of Discharge     HPI:   Symptoms resolved now at baseline    Review of Systems  No cp  No palpitations  No dyspnea    Vital Signs:   Temp:  [97.5 °F (36.4 °C)-98.4 °F (36.9 °C)] 97.6 °F (36.4 °C)  Heart Rate:  [57-67] 58  Resp:  [16-18] 18  BP: (149-187)/(62-95) 160/79  Flow (L/min):  [2] 2      Physical Exam:  See physical exam from earlier in day    Pertinent  and/or Most Recent Results     LAB RESULTS:      Lab 07/20/23  0308 07/19/23  2104 07/19/23  1532 07/19/23  1530   WBC  --   --   --  5.50   HEMOGLOBIN  --   --   --  16.7   HEMOGLOBIN, POC  --   --  16.0  --    HEMATOCRIT  --   --   --  47.1   HEMATOCRIT POC  --   --  47  --    PLATELETS  --   --   --  231   NEUTROS ABS  --   --   --  2.97   IMMATURE GRANS (ABS)  --   --   --  0.02   LYMPHS ABS  --   --   --  1.73   MONOS ABS  --   --   --  0.52   EOS ABS  --   --   --  0.23   MCV  --   --   --  86.1   PROTIME 28.3* 27.2*  --  26.0*  26.5*         Lab 07/19/23  1616 07/19/23  1532 07/19/23  1530   SODIUM 139  --   --    POTASSIUM 3.9  --   --    CHLORIDE 103  --   --    CO2 24.0  --   --    ANION GAP 12.0  --   --    BUN 15  --   --    CREATININE 1.05 1.20  --    EGFR 75.4 64.3  --    GLUCOSE 127*  --   --    CALCIUM 8.4*  --   --    MAGNESIUM 1.9  --   --    HEMOGLOBIN A1C  --   --  6.70*   TSH 3.800  --   --          Lab 07/19/23  1616   TOTAL PROTEIN 5.6*   ALBUMIN 3.5   GLOBULIN 2.1   ALT (SGPT) 25   AST (SGOT) 22   BILIRUBIN 0.3   ALK PHOS 86         Lab 07/20/23  0308 07/19/23  2104 07/19/23  1616 07/19/23  1530   HSTROP T  --   --  13  --    PROTIME 28.3* 27.2*  --  26.0*  26.5*   INR 2.63* 2.50*  --  2.42*  2.3*         Lab 07/20/23  0308   CHOLESTEROL 121   LDL CHOL 59   HDL CHOL 30*   TRIGLYCERIDES 194*             Brief Urine Lab Results   (Last result in the past 365 days)        Color   Clarity   Blood   Leuk Est   Nitrite   Protein   CREAT   Urine HCG        07/19/23 1700 Yellow   Clear   Negative   Negative   Negative   Negative                 Microbiology Results (last 10 days)       ** No results found for the last 240 hours. **            CT Head Without Contrast    Result Date: 7/19/2023  CT HEAD WO CONTRAST Date of Exam: 7/19/2023 3:31 PM EDT Indication: Neuro deficit, acute, stroke suspected. Comparison: Brain MRI 7/30/2021 Technique: Axial CT images were obtained of the head without contrast administration.  Automated exposure control and iterative construction methods were used. Findings: No acute intracranial hemorrhage. No acute large territory infarct. There are scattered subcortical and periventricular white matter hypodensities which are nonspecific and can be seen in the setting of chronic small vessel ischemic change. There is a small chronic linear infarct in the right cerebellum, present on prior brain MRI. There is generalized cerebral volume loss. No extra-axial collections. No midline shift or herniation. Normal size and configuration of the ventricles. Unremarkable appearance of the orbits. There is trace fluid in the bilateral mastoid air cells, nonspecific. The mastoid air cells are clear. No acute or suspicious bony findings.     Impression: No acute intracranial findings. Electronically Signed: Peter Alcantara  7/19/2023 3:50 PM EDT  Workstation ID: [id number]    CT Angiogram Neck    Result Date: 7/19/2023  CT CEREBRAL PERFUSION W WO CONTRAST, CT ANGIOGRAM NECK, CT ANGIOGRAM HEAD W AI ANALYSIS OF LVO Date of Exam: 7/19/2023 3:32 PM EDT Indication: Neuro deficit, acute stroke suspected.  Comparison: CTA of the head and neck dated 2/21/2022 Technique: Axial CT images of the brain were obtained prior to and after the administration of 115 mL Isovue-370. Core blood volume, core blood flow, mean transit time, and Tmax images were  obtained utilizing the Rapid software protocol. A limited CT angiogram of the head was also performed to measure the blood vessel density. The radiation dose reduction device was turned on for each scan per the ALARA (As Low as Reasonably Achievable) protocol. FINDINGS: Vascular Findings: Chronic, moderate to severe stenosis of the right MCA M1 segment appears essentially unchanged in comparison with 2/21/2022. Single dominant right MCA branch vessel and asymmetrically small cortical vessels are seen throughout the right MCA territory. Atherosclerotic plaque is seen within the right carotid bifurcation and right carotid siphon. The right common carotid, internal carotid, anterior cerebral, vertebral, and posterior cerebral arteries are patent without abrupt cut off or aneurysmal dilation. Atherosclerotic plaque is seen within the left carotid bifurcation and left carotid siphon. The left common carotid, internal carotid, middle cerebral, anterior cerebral, vertebral, and posterior cerebral arteries are patent without abrupt cut off or aneurysmal dilation. Basilar artery appears patent and appears unremarkable. Non-vascular Findings: For description of nonvascular intracranial findings, please refer to the noncontrast head CT performed the same date. No acute abnormality is identified within the visualized soft tissue or bony structures of the neck. The visualized lung apices are clear. CT Perfusion: CBF (<30%) volume: 0 mL Tmax (>6.0s) volume: 21 mL Mismatch volume: 21 mL Mismatch ratio: Infinite     1.Chronic, moderate to severe stenosis of the right MCA M1 segment appears essentially unchanged in comparison with 2/21/2022. Single dominant right MCA branch vessel and asymmetrically small cortical vessels are seen throughout the right MCA territory. Findings are essentially unchanged since 3/13/2023. 2.CT perfusion study demonstrates prolonged Tmax (greater than 6 seconds, 21 mL) within the right MCA territory. This  may be related to #1, although acute right MCA territory ischemia cannot be excluded. Recommend correlation with MRI. 3.Atherosclerotic plaque within the bilateral carotid bifurcations, left greater than right. Estimated degree of stenosis within the ICAs is less than 50% bilaterally. The above findings were discussed with Pat Lynn via telephone on 7/19/2023 3:53 PM EDT. Electronically Signed: Anatoly Tamayo  7/19/2023 4:12 PM EDT  Workstation ID: [id number]    MRI Brain Without Contrast    Result Date: 7/20/2023  MRI BRAIN WO CONTRAST Date of Exam: 7/20/2023 7:13 AM EDT Indication: Stroke, follow up.  Comparison: 7/30/2021. Technique:  Routine multiplanar/multisequence sequence images of the brain were obtained without contrast administration. Findings: There is moderate atrophy. Mild ventricular prominence is compatible with atrophy. There are multiple foci of increased signal intensity in the subcortical and periventricular white matter of both cerebral hemispheres, advanced since prior study. There is no acute mass effect or edema. There are no findings suspicious for acute CVA or hemorrhage.     Impression: Atrophy and evidence of chronic small vessel ischemic insult. No acute process. Electronically Signed: Sravani Vickers MD  7/20/2023 8:31 AM EDT  Workstation ID: [id number]    XR Chest 1 View    Result Date: 7/19/2023  XR CHEST 1 VW Date of Exam: 7/19/2023 3:54 PM EDT Indication: Weak/Dizzy/AMS triage protocol Comparison: 8/3/2021. Findings: Heart and pulmonary vessels appear within normal limits for technique. There is unchanged mild elevation of the right diaphragm. No acute infiltrates or effusions are identified.     Impression: No acute process. Electronically Signed: Sravani Vickers MD  7/19/2023 4:13 PM EDT  Workstation ID: [id number]    CT Angiogram Head w AI Analysis of LVO    Result Date: 7/19/2023  CT CEREBRAL PERFUSION W WO CONTRAST, CT ANGIOGRAM NECK, CT ANGIOGRAM HEAD W AI ANALYSIS OF LVO Date  of Exam: 7/19/2023 3:32 PM EDT Indication: Neuro deficit, acute stroke suspected.  Comparison: CTA of the head and neck dated 2/21/2022 Technique: Axial CT images of the brain were obtained prior to and after the administration of 115 mL Isovue-370. Core blood volume, core blood flow, mean transit time, and Tmax images were obtained utilizing the Rapid software protocol. A limited CT angiogram of the head was also performed to measure the blood vessel density. The radiation dose reduction device was turned on for each scan per the ALARA (As Low as Reasonably Achievable) protocol. FINDINGS: Vascular Findings: Chronic, moderate to severe stenosis of the right MCA M1 segment appears essentially unchanged in comparison with 2/21/2022. Single dominant right MCA branch vessel and asymmetrically small cortical vessels are seen throughout the right MCA territory. Atherosclerotic plaque is seen within the right carotid bifurcation and right carotid siphon. The right common carotid, internal carotid, anterior cerebral, vertebral, and posterior cerebral arteries are patent without abrupt cut off or aneurysmal dilation. Atherosclerotic plaque is seen within the left carotid bifurcation and left carotid siphon. The left common carotid, internal carotid, middle cerebral, anterior cerebral, vertebral, and posterior cerebral arteries are patent without abrupt cut off or aneurysmal dilation. Basilar artery appears patent and appears unremarkable. Non-vascular Findings: For description of nonvascular intracranial findings, please refer to the noncontrast head CT performed the same date. No acute abnormality is identified within the visualized soft tissue or bony structures of the neck. The visualized lung apices are clear. CT Perfusion: CBF (<30%) volume: 0 mL Tmax (>6.0s) volume: 21 mL Mismatch volume: 21 mL Mismatch ratio: Infinite     1.Chronic, moderate to severe stenosis of the right MCA M1 segment appears essentially unchanged  in comparison with 2/21/2022. Single dominant right MCA branch vessel and asymmetrically small cortical vessels are seen throughout the right MCA territory. Findings are essentially unchanged since 3/13/2023. 2.CT perfusion study demonstrates prolonged Tmax (greater than 6 seconds, 21 mL) within the right MCA territory. This may be related to #1, although acute right MCA territory ischemia cannot be excluded. Recommend correlation with MRI. 3.Atherosclerotic plaque within the bilateral carotid bifurcations, left greater than right. Estimated degree of stenosis within the ICAs is less than 50% bilaterally. The above findings were discussed with Pat Lynn via telephone on 7/19/2023 3:53 PM EDT. Electronically Signed: Anatoly Tamayo  7/19/2023 4:12 PM EDT  Workstation ID: [id number]    CT CEREBRAL PERFUSION WITH & WITHOUT CONTRAST    Result Date: 7/19/2023  CT CEREBRAL PERFUSION W WO CONTRAST, CT ANGIOGRAM NECK, CT ANGIOGRAM HEAD W AI ANALYSIS OF LVO Date of Exam: 7/19/2023 3:32 PM EDT Indication: Neuro deficit, acute stroke suspected.  Comparison: CTA of the head and neck dated 2/21/2022 Technique: Axial CT images of the brain were obtained prior to and after the administration of 115 mL Isovue-370. Core blood volume, core blood flow, mean transit time, and Tmax images were obtained utilizing the Rapid software protocol. A limited CT angiogram of the head was also performed to measure the blood vessel density. The radiation dose reduction device was turned on for each scan per the ALARA (As Low as Reasonably Achievable) protocol. FINDINGS: Vascular Findings: Chronic, moderate to severe stenosis of the right MCA M1 segment appears essentially unchanged in comparison with 2/21/2022. Single dominant right MCA branch vessel and asymmetrically small cortical vessels are seen throughout the right MCA territory. Atherosclerotic plaque is seen within the right carotid bifurcation and right carotid siphon. The right common  carotid, internal carotid, anterior cerebral, vertebral, and posterior cerebral arteries are patent without abrupt cut off or aneurysmal dilation. Atherosclerotic plaque is seen within the left carotid bifurcation and left carotid siphon. The left common carotid, internal carotid, middle cerebral, anterior cerebral, vertebral, and posterior cerebral arteries are patent without abrupt cut off or aneurysmal dilation. Basilar artery appears patent and appears unremarkable. Non-vascular Findings: For description of nonvascular intracranial findings, please refer to the noncontrast head CT performed the same date. No acute abnormality is identified within the visualized soft tissue or bony structures of the neck. The visualized lung apices are clear. CT Perfusion: CBF (<30%) volume: 0 mL Tmax (>6.0s) volume: 21 mL Mismatch volume: 21 mL Mismatch ratio: Infinite     1.Chronic, moderate to severe stenosis of the right MCA M1 segment appears essentially unchanged in comparison with 2/21/2022. Single dominant right MCA branch vessel and asymmetrically small cortical vessels are seen throughout the right MCA territory. Findings are essentially unchanged since 3/13/2023. 2.CT perfusion study demonstrates prolonged Tmax (greater than 6 seconds, 21 mL) within the right MCA territory. This may be related to #1, although acute right MCA territory ischemia cannot be excluded. Recommend correlation with MRI. 3.Atherosclerotic plaque within the bilateral carotid bifurcations, left greater than right. Estimated degree of stenosis within the ICAs is less than 50% bilaterally. The above findings were discussed with Pat Lynn via telephone on 7/19/2023 3:53 PM EDT. Electronically Signed: Anatoly Tamayo  7/19/2023 4:12 PM EDT  Workstation ID: [id number]     Results for orders placed during the hospital encounter of 09/19/17    Duplex Carotid Ultrasound CAR    Interpretation Summary  · No obstructive carotid stenosis bilaterally  ·  Antegrade bilateral vertebral flow.      Results for orders placed during the hospital encounter of 09/19/17    Duplex Carotid Ultrasound CAR    Interpretation Summary  · No obstructive carotid stenosis bilaterally  · Antegrade bilateral vertebral flow.      Results for orders placed during the hospital encounter of 07/30/21    Adult Transthoracic Echo Complete W/ Cont if Necessary Per Protocol    Interpretation Summary  · Moderate left atrial enlargement.  · Left ventricular wall thickness is consistent with mild concentric hypertrophy.  · Normal left ventricular systolic function, estimated EF 55%.  · Calcified aortic valve with mild aortic stenosis, mean gradient 11.8 mmHg.  · Mild mitral vegetation.  · Trace tricuspid regurgitation with normal RVSP.  · Dilated aortic root (4.2 cm).      Plan for Follow-up of Pending Labs/Results: pcp    Discharge Details        Discharge Medications        New Medications        Instructions Start Date   aspirin 81 MG EC tablet  Replaces: aspirin 81 MG chewable tablet   162 mg, Oral, Daily   Start Date: July 21, 2023     Melatonin 5 MG chewable tablet   5 mg, Oral, Nightly PRN             Changes to Medications        Instructions Start Date   atorvastatin 20 MG tablet  Commonly known as: LIPITOR  What changed:   medication strength  how much to take   20 mg, Oral, Nightly             Continue These Medications        Instructions Start Date   amLODIPine 10 MG tablet  Commonly known as: NORVASC   10 mg, Oral, Daily      carvedilol 6.25 MG tablet  Commonly known as: COREG   6.25 mg, Oral, 2 Times Daily With Meals      furosemide 20 MG tablet  Commonly known as: LASIX   40 mg, Oral, Daily PRN      hydrALAZINE 50 MG tablet  Commonly known as: APRESOLINE   25 mg, Oral, 2 times daily      levothyroxine 175 MCG tablet  Commonly known as: SYNTHROID, LEVOTHROID   200 mcg      losartan 25 MG tablet  Commonly known as: COZAAR   25 mg, Oral, Daily      potassium chloride 10 MEQ CR tablet    10 mEq, Oral, Every Morning      warfarin 10 MG tablet  Commonly known as: COUMADIN   10 mg, Oral, Nightly, Tuesday, Thursday, Saturday, and Sunday              Stop These Medications      aspirin 81 MG chewable tablet  Replaced by: aspirin 81 MG EC tablet     clopidogrel 75 MG tablet  Commonly known as: PLAVIX     traZODone 50 MG tablet  Commonly known as: DESYREL              Allergies   Allergen Reactions    Codeine Hallucinations         Discharge Disposition:  Home or Self Care    Diet:  Hospital:  Diet Order   Procedures    Diet: Cardiac Diets, Diabetic Diets; Healthy Heart (2-3 Na+); Consistent Carbohydrate; Texture: Regular Texture (IDDSI 7); Fluid Consistency: Thin (IDDSI 0)       Activity:             CODE STATUS:    Code Status and Medical Interventions:   Ordered at: 07/19/23 2042     Medical Intervention Limits:    NO intubation (DNI)     Level Of Support Discussed With:    Patient     Code Status (Patient has no pulse and is not breathing):    No CPR (Do Not Attempt to Resuscitate)     Medical Interventions (Patient has pulse or is breathing):    Limited Support     Release to patient:    Routine Release       No future appointments.    Additional Instructions for the Follow-ups that You Need to Schedule       Discharge Follow-up with PCP   As directed       Currently Documented PCP:    Leslie Rhodes MD    PCP Phone Number:    [phone]     Follow Up Details: 1 week         Discharge Follow-up with Specialty: regular cardiologist (Dr. Perdomo in [place]) 2 weeks   As directed      Specialty: regular cardiologist (Dr. Perdomo in [place]) 2 weeks                       Abebe Sterling MD  07/20/23      Time Spent on Discharge:  I spent  35 minutes on this discharge activity which included: face-to-face encounter with the patient, reviewing the data in the system, coordination of the care with the nursing staff as well as consultants, documentation, and entering orders.

## 2023-07-20 NOTE — H&P
"    [facility]  HISTORY AND PHYSICAL    Patient Name: Camron Hendrix  : 1950  MRN: [id number]  Primary Care Physician: Leslie Rhodes MD  Date of admission: 2023    Subjective   Subjective     Chief Complaint:  \"Feeling off\"    HPI:  Camron Hendrix is a 73 y.o. male with history of obesity, AAA, CAD, prior CVA, HTN, HLD, A-fib on chronic warfarin, thyroid disease, who presented for evaluation of \"feeling off\".  Patient reports that he was in his usual state of health until noon today when he developed bilaterally blurred vision that is now improving.  Also endorsed right lower extremity weakness that was new.  Denied any visual loss, headaches, URI symptoms, chest pain, shortness of breath, GI or  symptoms.  He did have a fall on Monday after loss of balance.  Daughter was at bedside during evaluation and okay with being updated per patient.    In the ER, patient was afebrile, heart rate 60, blood pressure 174/83, satting 98% on room air.  Labs notable for negative UA, BUN 15, creatinine 1.05, high-sensitivity troponin 13, INR 2.42, CBC within normal limits.  Chest x-ray negative for acute findings, CTA head and neck with chronic moderate to severe stenosis of the right MCA M1 segment that appears essentially unchanged compared to 2022, CT perfusion with prolonged Tmax within the right MCA territory.  CT head negative for acute intracranial findings.  He was given a full dose of aspirin, atorvastatin 80 mg, and admitted for further evaluation.  He was seen by the stroke neurology NP in the ER.        Review of Systems   Constitutional:  Negative for chills and fever.   HENT:  Negative for congestion and rhinorrhea.    Eyes:  Positive for visual disturbance. Negative for photophobia.   Respiratory:  Negative for cough and shortness of breath.    Cardiovascular:  Negative for chest pain and leg swelling.   Gastrointestinal:  Negative for blood in " stool, constipation, diarrhea, nausea and vomiting.   Genitourinary:  Negative for dysuria and hematuria.   Musculoskeletal:  Negative for back pain and gait problem.   Skin:  Negative for rash and wound.   Neurological:  Positive for weakness and numbness.   Psychiatric/Behavioral:  Negative for behavioral problems and confusion.       Personal History     Past Medical History:   Diagnosis Date    AAA (abdominal aortic aneurysm)     Coronary artery disease     CVA (cerebral vascular accident) 2011    Hypertension     Hypothyroidism     PAF (paroxysmal atrial fibrillation)     TIA (transient ischemic attack)              Past Surgical History:   Procedure Laterality Date    CARDIAC CATHETERIZATION N/A 3/29/2019    Procedure: Left Heart Cath;  Surgeon: Andrea Holloway MD;  Location: [facility] CATH INVASIVE LOC[facility];  Service: Cardiovascular    CERVICAL FUSION      c4-c5    CORONARY ANGIOPLASTY WITH STENT PLACEMENT      HERNIA REPAIR      TONSILECTOMY, ADENOIDECTOMY, BILATERAL MYRINGOTOMY AND TUBES         Family History: family history includes Aneurysm in his father; Heart failure in his mother.     Social History:  reports that he quit smoking about 37 years ago. His smoking use included cigarettes. He has a 20.00 pack-year smoking history. He has never used smokeless tobacco. He reports current alcohol use. He reports that he does not use drugs.  Social History     Social History Narrative    . Lives with wife. Retired from [employer]       Medications:  amLODIPine, aspirin, atorvastatin, carvedilol, clopidogrel, furosemide, hydrALAZINE, levothyroxine, losartan, potassium chloride, traZODone, and warfarin    Allergies   Allergen Reactions    Codeine Hallucinations       Objective   Objective     Vital Signs:   Temp:  [97.5 °F (36.4 °C)-98.1 °F (36.7 °C)] 97.5 °F (36.4 °C)  Heart Rate:  [60-61] 61  Resp:  [18] 18  BP: (149-174)/(62-83) 149/62    Physical Exam   Constitutional: Awake, alert, obese  Eyes: PERRLA,  sclerae anicteric, no conjunctival injection  HENT: NCAT, mucous membranes moist  Neck: Supple, no thyromegaly, no lymphadenopathy, trachea midline  Respiratory: Clear to auscultation bilaterally, nonlabored respirations   Cardiovascular: RRR, no murmurs, rubs, or gallops, palpable pedal pulses bilaterally  Gastrointestinal: Positive bowel sounds, soft, nontender, nondistended  Musculoskeletal: No bilateral ankle edema, no clubbing or cyanosis to extremities  Psychiatric: Appropriate affect, cooperative  Neurologic: Oriented x 3, strength symmetric in all extremities, Cranial Nerves grossly intact to confrontation, decrease sensation in the right upper extremity, dysmetria right upper extremity, speech clear  Skin: No rashes      Result Review:  I have personally reviewed the results from the time of this admission to 7/19/2023 20:46 EDT and agree with these findings:  []  Laboratory list / accordion  []  Microbiology  []  Radiology  []  EKG/Telemetry   []  Cardiology/Vascular   []  Pathology  []  Old records  []  Other:  Most notable findings include: as per hpi    LAB RESULTS:      Lab 07/19/23  1532 07/19/23  1530   WBC  --  5.50   HEMOGLOBIN  --  16.7   HEMOGLOBIN, POC 16.0  --    HEMATOCRIT  --  47.1   HEMATOCRIT POC 47  --    PLATELETS  --  231   NEUTROS ABS  --  2.97   IMMATURE GRANS (ABS)  --  0.02   LYMPHS ABS  --  1.73   MONOS ABS  --  0.52   EOS ABS  --  0.23   MCV  --  86.1   PROTIME  --  26.0*  26.5*         Lab 07/19/23  1616 07/19/23  1532   SODIUM 139  --    POTASSIUM 3.9  --    CHLORIDE 103  --    CO2 24.0  --    ANION GAP 12.0  --    BUN 15  --    CREATININE 1.05 1.20   EGFR 75.4 64.3   GLUCOSE 127*  --    CALCIUM 8.4*  --    MAGNESIUM 1.9  --          Lab 07/19/23  1616   TOTAL PROTEIN 5.6*   ALBUMIN 3.5   GLOBULIN 2.1   ALT (SGPT) 25   AST (SGOT) 22   BILIRUBIN 0.3   ALK PHOS 86         Lab 07/19/23  1616 07/19/23  1530   HSTROP T 13  --    PROTIME  --  26.0*  26.5*   INR  --  2.42*  2.3*                  Brief Urine Lab Results  (Last result in the past 365 days)        Color   Clarity   Blood   Leuk Est   Nitrite   Protein   CREAT   Urine HCG        07/19/23 1700 Yellow   Clear   Negative   Negative   Negative   Negative                 Microbiology Results (last 10 days)       ** No results found for the last 240 hours. **            CT Head Without Contrast    Result Date: 7/19/2023  CT HEAD WO CONTRAST Date of Exam: 7/19/2023 3:31 PM EDT Indication: Neuro deficit, acute, stroke suspected. Comparison: Brain MRI 7/30/2021 Technique: Axial CT images were obtained of the head without contrast administration.  Automated exposure control and iterative construction methods were used. Findings: No acute intracranial hemorrhage. No acute large territory infarct. There are scattered subcortical and periventricular white matter hypodensities which are nonspecific and can be seen in the setting of chronic small vessel ischemic change. There is a small chronic linear infarct in the right cerebellum, present on prior brain MRI. There is generalized cerebral volume loss. No extra-axial collections. No midline shift or herniation. Normal size and configuration of the ventricles. Unremarkable appearance of the orbits. There is trace fluid in the bilateral mastoid air cells, nonspecific. The mastoid air cells are clear. No acute or suspicious bony findings.     Impression: Impression: No acute intracranial findings. Electronically Signed: Peter Alcantara  7/19/2023 3:50 PM EDT  Workstation ID: [id number]    CT Angiogram Neck    Result Date: 7/19/2023  CT CEREBRAL PERFUSION W WO CONTRAST, CT ANGIOGRAM NECK, CT ANGIOGRAM HEAD W AI ANALYSIS OF LVO Date of Exam: 7/19/2023 3:32 PM EDT Indication: Neuro deficit, acute stroke suspected.  Comparison: CTA of the head and neck dated 2/21/2022 Technique: Axial CT images of the brain were obtained prior to and after the administration of 115 mL Isovue-370. Core blood volume, core  blood flow, mean transit time, and Tmax images were obtained utilizing the Rapid software protocol. A limited CT angiogram of the head was also performed to measure the blood vessel density. The radiation dose reduction device was turned on for each scan per the ALARA (As Low as Reasonably Achievable) protocol. FINDINGS: Vascular Findings: Chronic, moderate to severe stenosis of the right MCA M1 segment appears essentially unchanged in comparison with 2/21/2022. Single dominant right MCA branch vessel and asymmetrically small cortical vessels are seen throughout the right MCA territory. Atherosclerotic plaque is seen within the right carotid bifurcation and right carotid siphon. The right common carotid, internal carotid, anterior cerebral, vertebral, and posterior cerebral arteries are patent without abrupt cut off or aneurysmal dilation. Atherosclerotic plaque is seen within the left carotid bifurcation and left carotid siphon. The left common carotid, internal carotid, middle cerebral, anterior cerebral, vertebral, and posterior cerebral arteries are patent without abrupt cut off or aneurysmal dilation. Basilar artery appears patent and appears unremarkable. Non-vascular Findings: For description of nonvascular intracranial findings, please refer to the noncontrast head CT performed the same date. No acute abnormality is identified within the visualized soft tissue or bony structures of the neck. The visualized lung apices are clear. CT Perfusion: CBF (<30%) volume: 0 mL Tmax (>6.0s) volume: 21 mL Mismatch volume: 21 mL Mismatch ratio: Infinite     Impression: 1.Chronic, moderate to severe stenosis of the right MCA M1 segment appears essentially unchanged in comparison with 2/21/2022. Single dominant right MCA branch vessel and asymmetrically small cortical vessels are seen throughout the right MCA territory. Findings are essentially unchanged since 3/13/2023. 2.CT perfusion study demonstrates prolonged Tmax  (greater than 6 seconds, 21 mL) within the right MCA territory. This may be related to #1, although acute right MCA territory ischemia cannot be excluded. Recommend correlation with MRI. 3.Atherosclerotic plaque within the bilateral carotid bifurcations, left greater than right. Estimated degree of stenosis within the ICAs is less than 50% bilaterally. The above findings were discussed with Pat Lynn via telephone on 7/19/2023 3:53 PM EDT. Electronically Signed: Anatoly Tamayo  7/19/2023 4:12 PM EDT  Workstation ID: [id number]    XR Chest 1 View    Result Date: 7/19/2023  XR CHEST 1 VW Date of Exam: 7/19/2023 3:54 PM EDT Indication: Weak/Dizzy/AMS triage protocol Comparison: 8/3/2021. Findings: Heart and pulmonary vessels appear within normal limits for technique. There is unchanged mild elevation of the right diaphragm. No acute infiltrates or effusions are identified.     Impression: Impression: No acute process. Electronically Signed: Sravani Vickers MD  7/19/2023 4:13 PM EDT  Workstation ID: [id number]    CT Angiogram Head w AI Analysis of LVO    Result Date: 7/19/2023  CT CEREBRAL PERFUSION W WO CONTRAST, CT ANGIOGRAM NECK, CT ANGIOGRAM HEAD W AI ANALYSIS OF LVO Date of Exam: 7/19/2023 3:32 PM EDT Indication: Neuro deficit, acute stroke suspected.  Comparison: CTA of the head and neck dated 2/21/2022 Technique: Axial CT images of the brain were obtained prior to and after the administration of 115 mL Isovue-370. Core blood volume, core blood flow, mean transit time, and Tmax images were obtained utilizing the Rapid software protocol. A limited CT angiogram of the head was also performed to measure the blood vessel density. The radiation dose reduction device was turned on for each scan per the ALARA (As Low as Reasonably Achievable) protocol. FINDINGS: Vascular Findings: Chronic, moderate to severe stenosis of the right MCA M1 segment appears essentially unchanged in comparison with 2/21/2022. Single dominant  right MCA branch vessel and asymmetrically small cortical vessels are seen throughout the right MCA territory. Atherosclerotic plaque is seen within the right carotid bifurcation and right carotid siphon. The right common carotid, internal carotid, anterior cerebral, vertebral, and posterior cerebral arteries are patent without abrupt cut off or aneurysmal dilation. Atherosclerotic plaque is seen within the left carotid bifurcation and left carotid siphon. The left common carotid, internal carotid, middle cerebral, anterior cerebral, vertebral, and posterior cerebral arteries are patent without abrupt cut off or aneurysmal dilation. Basilar artery appears patent and appears unremarkable. Non-vascular Findings: For description of nonvascular intracranial findings, please refer to the noncontrast head CT performed the same date. No acute abnormality is identified within the visualized soft tissue or bony structures of the neck. The visualized lung apices are clear. CT Perfusion: CBF (<30%) volume: 0 mL Tmax (>6.0s) volume: 21 mL Mismatch volume: 21 mL Mismatch ratio: Infinite     Impression: 1.Chronic, moderate to severe stenosis of the right MCA M1 segment appears essentially unchanged in comparison with 2/21/2022. Single dominant right MCA branch vessel and asymmetrically small cortical vessels are seen throughout the right MCA territory. Findings are essentially unchanged since 3/13/2023. 2.CT perfusion study demonstrates prolonged Tmax (greater than 6 seconds, 21 mL) within the right MCA territory. This may be related to #1, although acute right MCA territory ischemia cannot be excluded. Recommend correlation with MRI. 3.Atherosclerotic plaque within the bilateral carotid bifurcations, left greater than right. Estimated degree of stenosis within the ICAs is less than 50% bilaterally. The above findings were discussed with Pat Lynn via telephone on 7/19/2023 3:53 PM EDT. Electronically Signed: Anatoly Tamayo   7/19/2023 4:12 PM EDT  Workstation ID: [id number]    CT CEREBRAL PERFUSION WITH & WITHOUT CONTRAST    Result Date: 7/19/2023  CT CEREBRAL PERFUSION W WO CONTRAST, CT ANGIOGRAM NECK, CT ANGIOGRAM HEAD W AI ANALYSIS OF LVO Date of Exam: 7/19/2023 3:32 PM EDT Indication: Neuro deficit, acute stroke suspected.  Comparison: CTA of the head and neck dated 2/21/2022 Technique: Axial CT images of the brain were obtained prior to and after the administration of 115 mL Isovue-370. Core blood volume, core blood flow, mean transit time, and Tmax images were obtained utilizing the Rapid software protocol. A limited CT angiogram of the head was also performed to measure the blood vessel density. The radiation dose reduction device was turned on for each scan per the ALARA (As Low as Reasonably Achievable) protocol. FINDINGS: Vascular Findings: Chronic, moderate to severe stenosis of the right MCA M1 segment appears essentially unchanged in comparison with 2/21/2022. Single dominant right MCA branch vessel and asymmetrically small cortical vessels are seen throughout the right MCA territory. Atherosclerotic plaque is seen within the right carotid bifurcation and right carotid siphon. The right common carotid, internal carotid, anterior cerebral, vertebral, and posterior cerebral arteries are patent without abrupt cut off or aneurysmal dilation. Atherosclerotic plaque is seen within the left carotid bifurcation and left carotid siphon. The left common carotid, internal carotid, middle cerebral, anterior cerebral, vertebral, and posterior cerebral arteries are patent without abrupt cut off or aneurysmal dilation. Basilar artery appears patent and appears unremarkable. Non-vascular Findings: For description of nonvascular intracranial findings, please refer to the noncontrast head CT performed the same date. No acute abnormality is identified within the visualized soft tissue or bony structures of the neck. The visualized lung apices  "are clear. CT Perfusion: CBF (<30%) volume: 0 mL Tmax (>6.0s) volume: 21 mL Mismatch volume: 21 mL Mismatch ratio: Infinite     Impression: 1.Chronic, moderate to severe stenosis of the right MCA M1 segment appears essentially unchanged in comparison with 2/21/2022. Single dominant right MCA branch vessel and asymmetrically small cortical vessels are seen throughout the right MCA territory. Findings are essentially unchanged since 3/13/2023. 2.CT perfusion study demonstrates prolonged Tmax (greater than 6 seconds, 21 mL) within the right MCA territory. This may be related to #1, although acute right MCA territory ischemia cannot be excluded. Recommend correlation with MRI. 3.Atherosclerotic plaque within the bilateral carotid bifurcations, left greater than right. Estimated degree of stenosis within the ICAs is less than 50% bilaterally. The above findings were discussed with Pat Lynn via telephone on 7/19/2023 3:53 PM EDT. Electronically Signed: Anatoly Tamayo  7/19/2023 4:12 PM EDT  Workstation ID: [id number]     Results for orders placed during the hospital encounter of 07/30/21    Adult Transthoracic Echo Complete W/ Cont if Necessary Per Protocol    Interpretation Summary  · Moderate left atrial enlargement.  · Left ventricular wall thickness is consistent with mild concentric hypertrophy.  · Normal left ventricular systolic function, estimated EF 55%.  · Calcified aortic valve with mild aortic stenosis, mean gradient 11.8 mmHg.  · Mild mitral vegetation.  · Trace tricuspid regurgitation with normal RVSP.  · Dilated aortic root (4.2 cm).      Assessment & Plan   Assessment & Plan       Stroke      73 y.o. male with history of obesity, AAA, CAD, prior CVA, HTN, HLD, A-fib on chronic warfarin, thyroid disease, who presented for evaluation of \"feeling off\".  Patient reports that he was in his usual state of health until noon today when he developed bilaterally blurred vision that is now improving.  Also " endorsed right lower extremity weakness that was new.    Labs notable for INR 2.42, CBC within normal limits.  Chest x-ray negative for acute findings, CTA head and neck with chronic moderate to severe stenosis of the right MCA M1 segment that appears essentially unchanged compared to February 2022, CT perfusion with prolonged Tmax within the right MCA territory.  CT head negative for acute intracranial findings.  He was given a full dose of aspirin, atorvastatin 80 mg, and admitted for further evaluation.  He was seen by the stroke neurology NP in the ER.    Blurred vision  RUE ataxia  Unsteady gait  RLE weakness  CAD  Prior CVA  HTN  HLD  A-fib on chronic warfarin  Passed bedside dysphagia screening  Status post full dose aspirin in the ER  MRI brain without contrast  Allow for permissive hypertension, goal SBP less than 180  Resume Coreg in the morning; holding other antihypertensives and resume as indicated for blood pressure control to keep systolic less than 180  A1c, lipid panel  PT, OT, speech  TTE pending  EKG pending  Continue aspirin 325 mg daily, Lipitor 80 mg nightly  Stroke to determine timing of Plavix reinitiation  Okay to continue Coumadin per stroke neurology; INR 2.42 on admission; pharmacy to dose  Stroke neuro following    AAA  Recommend outpatient follow-up    Hypothyroidism  TSH pending  Continue Synthroid    Insomnia  Continue trazodone nightly; QTc 411 in 2021    Daughter at bedside and updated on plan of care per patient request    DVT prophylaxis:  warfarin    CODE STATUS:  DNR/DNI; patient stated he wanted to allow a natural death to occur if that happens in the hospital  Medical Intervention Limits: NO intubation (DNI)  Level Of Support Discussed With: Patient  Code Status (Patient has no pulse and is not breathing): No CPR (Do Not Attempt to Resuscitate)  Medical Interventions (Patient has pulse or is breathing): Limited Support  Release to patient: Routine Release      Expected  Discharge  Expected Discharge Date: 7/20/2023; Expected Discharge Time:       This note has been completed as part of a split-shared workflow.     Signature: Electronically signed by Yana Flaherty MD, 07/19/23, 8:40 PM EDT

## 2023-07-20 NOTE — PLAN OF CARE
Goal Outcome Evaluation:  Plan of Care Reviewed With: patient, daughter           Outcome Evaluation: Pt. presents below baseline function w/generalized weakness, balance deficits and visual deficits affecting his ability to safely participate in functional mobility. He performed transfers and ambulated 100' w/front wheeled walker and 100' w/no assistive device, contact guard assist. Activity limited by fatigue. Pt. would benefit from IPPT to address stated deficits.      Anticipated Discharge Disposition (PT): home with assist

## 2023-07-20 NOTE — THERAPY DISCHARGE NOTE
Acute Care - Speech Language Pathology Initial Evaluation/Discharge  [facility]  Cognitive-Communication Evaluation     Patient Name: Camron Hendrix  : 1950  MRN: [id number]  Today's Date: 2023               Admit Date: 2023     Visit Dx:    ICD-10-CM ICD-9-CM   1. Cerebrovascular accident (CVA), unspecified mechanism  I63.9 434.91   2. Ataxia  R27.0 781.3   3. Fall, initial encounter  W19.XXXA E888.9   4. Chronic anticoagulation  Z79.01 V58.61   5. History of ischemic stroke  Z86.73 V12.54   6. 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    Stroke     Past Medical History:   Diagnosis Date    AAA (abdominal aortic aneurysm)     Coronary artery disease     CVA (cerebral vascular accident)     Hypertension     Hypothyroidism     PAF (paroxysmal atrial fibrillation)     TIA (transient ischemic attack)      Past Surgical History:   Procedure Laterality Date    CARDIAC CATHETERIZATION N/A 3/29/2019    Procedure: Left Heart Cath;  Surgeon: Andrea Holloway MD;  Location: [facility] CATH INVASIVE LOCATION;  Service: Cardiovascular    CERVICAL FUSION      c4-c5    CORONARY ANGIOPLASTY WITH STENT PLACEMENT      HERNIA REPAIR      TONSILECTOMY, ADENOIDECTOMY, BILATERAL MYRINGOTOMY AND TUBES         SLP Recommendation and Plan  SLP Diagnosis: functional speech/language skills (23 1300)  SLP Diagnosis Comments: Mild  difficulty w/ short-term memory task; however, at baseline level of function. Pt utilizes compensatory strategies at home. Otherwise, cognitive-communication skills WFL. (07/20/23 1300)     [facility] Criteria for Skilled Therapy Interventions Met: baseline status, no problems identified which require skilled intervention (07/20/23 1300)  Anticipated Discharge Disposition (SLP): home with assist (07/20/23 1300)                                        Plan of Care Reviewed With: patient, daughter (07/20/23 1149)    SLP EVALUATION (last 72 hours)       SLP [facility] Evaluation       Row Name 07/20/23 1300                   Communication Assessment/Intervention    Document Type discharge evaluation/summary  -AC        Subjective Information no complaints  -AC        Patient Observations alert;cooperative  -AC        Patient/Family/Caregiver Comments/Observations Dtr present.  -AC        Patient Effort good  -AC           General Information    Patient Profile Reviewed yes  -AC        Pertinent History Of Current Problem Transient blurred vision, unsteady gait. MRI negative for acute stroke. Hx dementia & previous CVAs per chart.  -AC        Precautions/Limitations, Vision WFL;for purposes of eval  -AC        Precautions/Limitations, Hearing WFL;for purposes of eval  -AC        Patient Level of Education Some college  -        Prior Level of Function-Communication other (see comments)  hx dementia per chart; pt reported some baseline memory difficulty  -AC        Plans/Goals Discussed with patient and family;agreed upon  -        Barriers to Rehab none identified  -        Patient's Goals for Discharge return to home;return to all previous roles/activities  -        Family Goals for Discharge family did not state  -AC           Pain Scale: Numbers Pre/Post-Treatment    Pretreatment Pain Rating 0/10 - no pain  -AC        Posttreatment Pain Rating 0/10 - no pain  -AC           Comprehension Assessment/Intervention     Comprehension Assessment/Intervention Reading Comprehension;Auditory Comprehension  -AC           Auditory Comprehension Assessment/Intervention    Auditory Comprehension (Communication) WFL  -AC        Answers Questions (Communication) WFL;complex;yes/no  -AC        Able to Follow Commands (Communication) WFL;3-step  -AC        Narrative Discourse WFL;conversational level  -AC           Reading Comprehension Assessment/Intervention    Reading Comprehension (Communication) WFL  -AC        Paragraph Level WFL  -AC           Expression Assessment/Intervention    Expression Assessment/Intervention graphic expression;verbal expression  -AC           Verbal Expression Assessment/Intervention    Verbal Expression WFL  -AC        Automatic Speech (Communication) WFL;response to greeting  -AC        Responsive Naming WFL;simple  -AC        Confrontational Naming WFL;high frequency  -AC        Conversational Discourse/Fluency WFL  -AC           Graphic Expression Assessment/Intervention    Graphic Expression WFL;dominant hand  -AC        Biographical Information WFL;name  -AC        Sentence Formulation WFL;complex  -AC           Motor Speech Assessment/Intervention    Motor Speech Function WFL;unfamiliar listener  -AC        Conversational Speech (Communication) WFL;simple  -AC        Speech intelligibility 100%;in quiet environment;in connected speech;with unfamiliar listener  -AC           Cognitive Assessment Intervention- SLP    Orientation Status (Cognition) WFL;person;place;time;situation  -        Memory (Cognitive) mild impairment;short-term;delayed;other (see comments)  immediate recall: 5/5 words; 5-min delay recall: 4/5 words (5/5 w/ min cue)--baseline per pt/family  -        Attention (Cognitive) WFL;sustained  -AC        Thought Organization (Cognitive) WFL;abstract convergent;drawing conclusions  -        Reasoning (Cognitive) WFL;deductive;analogies;mental flexibility  -        Problem Solving  (Cognitive) WFL;temporal  -AC        Functional Math (Cognitive) WFL;money calculation;word problems  -           SLP Evaluation Clinical Impressions    SLP Diagnosis functional speech/language skills  -        SLP Diagnosis Comments Mild difficulty w/ short-term memory task; however, at baseline level of function. Pt utilizes compensatory strategies at home. Otherwise, cognitive-communication skills WFL.  -        [facility] Criteria for Skilled Therapy Interventions Met baseline status;no problems identified which require skilled intervention  -           Recommendations    Anticipated Discharge Disposition (SLP) home with assist  -                  User Key  (r) = Recorded By, (t) = Taken By, (c) = Cosigned By      Initials Name Effective Dates     Bertha Richardson MS CCC-SLP 02/03/23 -                        EDUCATION  The patient has been educated in the following areas:   Cognitive Impairment Communication Impairment.                  Time Calculation:    Time Calculation- SLP       Row Name 07/20/23 1414             Time Calculation- SLP    SLP Start Time 1300  -AC      SLP Received On 07/20/23  -         Untimed Charges    27301-BT Eval Speech and Production w/ Language Minutes 48  -AC         Total Minutes    Untimed Charges Total Minutes 48  -AC       Total Minutes 48  -AC                User Key  (r) = Recorded By, (t) = Taken By, (c) = Cosigned By      Initials Name Provider Type     Bertha Richardson MS CCC-SLP Speech and Language Pathologist                    Therapy Charges for Today       Code Description Service Date Service Provider Modifiers Qty    49341650935 HC ST EVAL SPEECH AND PROD W LANG  3 7/20/2023 Bertha Richardson MS CCC-SLP GN 1                     SLP Discharge Summary  Anticipated Discharge Disposition (SLP): home with assist    MS ERINN TapiaSLP  7/20/2023

## 2023-07-20 NOTE — PROGRESS NOTES
"Pharmacy Consult  -  Warfarin    Camron Hendrix is a  73 y.o. male receiving warfarin therapy  Height - 175.3 cm (69\")  Weight - 129 kg (283 lb 6.4 oz)    Consulting Provider: - Hospitalist  Indication: - AF  Goal INR: - 2-3  Home Regimen:   - 10mg qd    Bridge Therapy: No       Drug-Drug Interactions with current regimen:  ASA: May enhance the anticoagulant effect of warfarin  No new interactions noted    Warfarin Dosing During Admission:    Date  7/19 7/20          INR  2.42 2.63          Dose  10mg (10mg)               Education Provided:  Will follow up with patient 7/20    Discharge Follow up:   Following Provider - Dr. Job Perdomo at [facility]   Follow up time range or appointment - 2-3 days post discharge    Labs:    Results from last 7 days   Lab Units 07/20/23  0308 07/19/23  2104 07/19/23  1532 07/19/23  1530   INR  2.63* 2.50*  --  2.42*  2.3*   HEMOGLOBIN g/dL  --   --   --  16.7   HEMOGLOBIN, POC g/dL  --   --  16.0  --    HEMATOCRIT %  --   --   --  47.1   HEMATOCRIT POC %  --   --  47  --      Results from last 7 days   Lab Units 07/19/23  1616 07/19/23  1532   SODIUM mmol/L 139  --    POTASSIUM mmol/L 3.9  --    CHLORIDE mmol/L 103  --    CO2 mmol/L 24.0  --    BUN mg/dL 15  --    CREATININE mg/dL 1.05 1.20   CALCIUM mg/dL 8.4*  --    BILIRUBIN mg/dL 0.3  --    ALK PHOS U/L 86  --    ALT (SGPT) U/L 25  --    AST (SGOT) U/L 22  --    GLUCOSE mg/dL 127*  --      Current dietary intake: Data deficient (less than 24 hours since admission)    Assessment/Plan:   Continue warfarin 10mg daily for now.  Daily PT/INR.  Monitor for S/Sx of bleeding/clotting.  Pharmacy will continue to follow and adjust dose based on renal function, drug levels, and clinical status.    Thanks,  Aiden Lua, PharmD, BCPS, BCCCP  07/20/23  07:15 EDT  "

## 2023-07-20 NOTE — PROGRESS NOTES
Neurology Note    Patient:  Camron Hendrix    YOB: 1950    REFERRING PHYSICIAN:  Dr. Sterling    CHIEF COMPLAINT:    Blurred vision, right-sided numbness    HISTORY OF PRESENT ILLNESS:   The patient repoirts that he is back to baseline, denies headaches, reports that he has has similar spells in the past. Vitals stable, SBP 140s-180s. Able to eat, walk w/o help.    Past Medical History:  Past Medical History:   Diagnosis Date    AAA (abdominal aortic aneurysm)     Coronary artery disease     CVA (cerebral vascular accident)     Hypertension     Hypothyroidism     PAF (paroxysmal atrial fibrillation)     TIA (transient ischemic attack)        Past Surgical History:  Past Surgical History:   Procedure Laterality Date    CARDIAC CATHETERIZATION N/A 3/29/2019    Procedure: Left Heart Cath;  Surgeon: Andrea Holloway MD;  Location: [facility] CATH INVASIVE LOCATION;  Service: Cardiovascular    CERVICAL FUSION      c4-c5    CORONARY ANGIOPLASTY WITH STENT PLACEMENT      HERNIA REPAIR      TONSILECTOMY, ADENOIDECTOMY, BILATERAL MYRINGOTOMY AND TUBES         Social History:   Social History     Socioeconomic History    Marital status:    Tobacco Use    Smoking status: Former     Packs/day: 1.00     Years: 20.00     Pack years: 20.00     Types: Cigarettes     Quit date: 1985     Years since quittin.8    Smokeless tobacco: Never   Vaping Use    Vaping Use: Never used   Substance and Sexual Activity    Alcohol use: Yes     Comment: occasional     Drug use: No    Sexual activity: Defer        Family History:   Family History   Problem Relation Age of Onset    Heart failure Mother     Aneurysm Father        Medications Prior to Admission:    Prior to Admission medications    Medication Sig Start Date End Date Taking? Authorizing Provider   amLODIPine (NORVASC) 10 MG tablet Take 1 tablet by mouth Daily.   Yes Provider, MD Prabhakar   aspirin 81 MG chewable tablet Chew 1 tablet Daily. 17  Yes  Krysta Bedolla APRN   atorvastatin (Lipitor) 80 MG tablet Take 1 tablet by mouth Every Night.  Patient taking differently: Take 10 mg by mouth Every Night. 2/21/22  Yes Rolando Bird PA-C   carvedilol (COREG) 6.25 MG tablet Take 1 tablet by mouth 2 (Two) Times a Day With Meals. 8/4/21  Yes Suki Doherty DO   hydrALAZINE (APRESOLINE) 50 MG tablet Take 0.5 tablets by mouth 2 (two) times a day. 8/4/21  Yes Suki Doherty DO   levothyroxine (SYNTHROID, LEVOTHROID) 175 MCG tablet 200 mcg. 8/9/21  Yes Prabhakar Busby MD   losartan (COZAAR) 25 MG tablet Take 1 tablet by mouth Daily.   Yes Prabhakar Busby MD   traZODone (DESYREL) 50 MG tablet Take 1 tablet by mouth Every Night.   Yes Prabhakar Busby MD   warfarin (COUMADIN) 10 MG tablet Take 1 tablet by mouth Every Night. Tuesday, Thursday, Saturday, and Sunday   Yes ProviderPrabhakar MD   clopidogrel (PLAVIX) 75 MG tablet Take 1 tablet by mouth Daily.  Patient not taking: Reported on 7/19/2023 8/5/21   Suki Doherty DO   furosemide (LASIX) 20 MG tablet Take 2 tablets by mouth Daily As Needed (edema). 8/4/21   Suki Doherty DO   potassium chloride (K-DUR) 10 MEQ CR tablet Take 1 tablet by mouth Every Morning.  Patient not taking: Reported on 7/19/2023    ProviderPrabhakar MD       Allergies:  Codeine      Review of system  Review of Systems   HENT:  Negative for trouble swallowing.    Eyes:  Negative for visual disturbance.   Musculoskeletal:  Negative for gait problem.   Neurological:  Negative for speech difficulty, weakness, numbness and headaches.   All other systems reviewed and are negative.    Vitals:    07/20/23 1117   BP: 160/79   Pulse: 58   Resp: 18   Temp: 97.6 °F (36.4 °C)   SpO2: 96%       Physical exam  Physical Exam  Eyes:      Extraocular Movements: Extraocular movements intact.      Pupils: Pupils are equal, round, and reactive to light.   Cardiovascular:      Rate and Rhythm: Normal rate and regular  rhythm.   Pulmonary:      Effort: Pulmonary effort is normal.   Neurological:      General: No focal deficit present.      Deep Tendon Reflexes: Babinski sign absent on the right side. Babinski sign absent on the left side.      Comments: Speech clear, VFF, no facial droop, no limb drift.         Lab Results   Component Value Date    WBC 5.50 07/19/2023    HGB 16.0 07/19/2023    HCT 47 07/19/2023    MCV 86.1 07/19/2023     07/19/2023     Lab Results   Component Value Date    GLUCOSE 127 (H) 07/19/2023    BUN 15 07/19/2023    CREATININE 1.05 07/19/2023    EGFRIFNONA 97 08/03/2021    BCR 14.3 07/19/2023    CO2 24.0 07/19/2023    CALCIUM 8.4 (L) 07/19/2023    ALBUMIN 3.5 07/19/2023    AST 22 07/19/2023    ALT 25 07/19/2023     ECG 12 Lead Stroke Evaluation (Order [id number])  Order-Level Documents:    Scan on 7/19/2023 2107 by [employer] [place]: ECG 12-LEAD         Author: -- Service: -- Author Type: --   Filed: Date of Service: Creation Time:   Status: (Other)   Test Reason : Stroke Evaluation  Blood Pressure :   */*   mmHG  Vent. Rate :  64 BPM     Atrial Rate :  64 BPM     P-R Int : 214 ms          QRS Dur :  98 ms      QT Int : 406 ms       P-R-T Axes :  65 -30  43 degrees     QTc Int : 418 ms     Sinus rhythm with 1st degree AV block  Left axis deviation  Inferior infarct (cited on or before 31-JUL-2021)  Abnormal ECG  When compared with ECG of 03-AUG-2021 13:41,  premature ventricular complexes are no longer present             Radiological Studies:   CT Head Without Contrast    Result Date: 7/19/2023  CT HEAD WO CONTRAST Date of Exam: 7/19/2023 3:31 PM EDT Indication: Neuro deficit, acute, stroke suspected. Comparison: Brain MRI 7/30/2021 Technique: Axial CT images were obtained of the head without contrast administration.  Automated exposure control and iterative construction methods were used. Findings: No acute intracranial hemorrhage. No acute large territory infarct. There are scattered subcortical  and periventricular white matter hypodensities which are nonspecific and can be seen in the setting of chronic small vessel ischemic change. There is a small chronic linear infarct in the right cerebellum, present on prior brain MRI. There is generalized cerebral volume loss. No extra-axial collections. No midline shift or herniation. Normal size and configuration of the ventricles. Unremarkable appearance of the orbits. There is trace fluid in the bilateral mastoid air cells, nonspecific. The mastoid air cells are clear. No acute or suspicious bony findings.     Impression: No acute intracranial findings. Electronically Signed: Peter Alcantara  7/19/2023 3:50 PM EDT  Workstation ID: [id number]    CT Angiogram Neck    Result Date: 7/19/2023  CT CEREBRAL PERFUSION W WO CONTRAST, CT ANGIOGRAM NECK, CT ANGIOGRAM HEAD W AI ANALYSIS OF LVO Date of Exam: 7/19/2023 3:32 PM EDT Indication: Neuro deficit, acute stroke suspected.  Comparison: CTA of the head and neck dated 2/21/2022 Technique: Axial CT images of the brain were obtained prior to and after the administration of 115 mL Isovue-370. Core blood volume, core blood flow, mean transit time, and Tmax images were obtained utilizing the Rapid software protocol. A limited CT angiogram of the head was also performed to measure the blood vessel density. The radiation dose reduction device was turned on for each scan per the ALARA (As Low as Reasonably Achievable) protocol. FINDINGS: Vascular Findings: Chronic, moderate to severe stenosis of the right MCA M1 segment appears essentially unchanged in comparison with 2/21/2022. Single dominant right MCA branch vessel and asymmetrically small cortical vessels are seen throughout the right MCA territory. Atherosclerotic plaque is seen within the right carotid bifurcation and right carotid siphon. The right common carotid, internal carotid, anterior cerebral, vertebral, and posterior cerebral arteries are patent without abrupt cut  off or aneurysmal dilation. Atherosclerotic plaque is seen within the left carotid bifurcation and left carotid siphon. The left common carotid, internal carotid, middle cerebral, anterior cerebral, vertebral, and posterior cerebral arteries are patent without abrupt cut off or aneurysmal dilation. Basilar artery appears patent and appears unremarkable. Non-vascular Findings: For description of nonvascular intracranial findings, please refer to the noncontrast head CT performed the same date. No acute abnormality is identified within the visualized soft tissue or bony structures of the neck. The visualized lung apices are clear. CT Perfusion: CBF (<30%) volume: 0 mL Tmax (>6.0s) volume: 21 mL Mismatch volume: 21 mL Mismatch ratio: Infinite     1.Chronic, moderate to severe stenosis of the right MCA M1 segment appears essentially unchanged in comparison with 2/21/2022. Single dominant right MCA branch vessel and asymmetrically small cortical vessels are seen throughout the right MCA territory. Findings are essentially unchanged since 3/13/2023. 2.CT perfusion study demonstrates prolonged Tmax (greater than 6 seconds, 21 mL) within the right MCA territory. This may be related to #1, although acute right MCA territory ischemia cannot be excluded. Recommend correlation with MRI. 3.Atherosclerotic plaque within the bilateral carotid bifurcations, left greater than right. Estimated degree of stenosis within the ICAs is less than 50% bilaterally. The above findings were discussed with Pat Lynn via telephone on 7/19/2023 3:53 PM EDT. Electronically Signed: Anatoly Tamayo  7/19/2023 4:12 PM EDT  Workstation ID: [id number]    MRI Brain Without Contrast    Result Date: 7/20/2023  MRI BRAIN WO CONTRAST Date of Exam: 7/20/2023 7:13 AM EDT Indication: Stroke, follow up.  Comparison: 7/30/2021. Technique:  Routine multiplanar/multisequence sequence images of the brain were obtained without contrast administration. Findings:  There is moderate atrophy. Mild ventricular prominence is compatible with atrophy. There are multiple foci of increased signal intensity in the subcortical and periventricular white matter of both cerebral hemispheres, advanced since prior study. There is no acute mass effect or edema. There are no findings suspicious for acute CVA or hemorrhage.     Impression: Atrophy and evidence of chronic small vessel ischemic insult. No acute process. Electronically Signed: Sravani Vickers MD  7/20/2023 8:31 AM EDT  Workstation ID: [id number]    XR Chest 1 View    Result Date: 7/19/2023  XR CHEST 1 VW Date of Exam: 7/19/2023 3:54 PM EDT Indication: Weak/Dizzy/AMS triage protocol Comparison: 8/3/2021. Findings: Heart and pulmonary vessels appear within normal limits for technique. There is unchanged mild elevation of the right diaphragm. No acute infiltrates or effusions are identified.     Impression: No acute process. Electronically Signed: Sravani Vickers MD  7/19/2023 4:13 PM EDT  Workstation ID: [id number]    CT Angiogram Head w AI Analysis of LVO    Result Date: 7/19/2023  CT CEREBRAL PERFUSION W WO CONTRAST, CT ANGIOGRAM NECK, CT ANGIOGRAM HEAD W AI ANALYSIS OF LVO Date of Exam: 7/19/2023 3:32 PM EDT Indication: Neuro deficit, acute stroke suspected.  Comparison: CTA of the head and neck dated 2/21/2022 Technique: Axial CT images of the brain were obtained prior to and after the administration of 115 mL Isovue-370. Core blood volume, core blood flow, mean transit time, and Tmax images were obtained utilizing the Rapid software protocol. A limited CT angiogram of the head was also performed to measure the blood vessel density. The radiation dose reduction device was turned on for each scan per the ALARA (As Low as Reasonably Achievable) protocol. FINDINGS: Vascular Findings: Chronic, moderate to severe stenosis of the right MCA M1 segment appears essentially unchanged in comparison with 2/21/2022. Single dominant right MCA  branch vessel and asymmetrically small cortical vessels are seen throughout the right MCA territory. Atherosclerotic plaque is seen within the right carotid bifurcation and right carotid siphon. The right common carotid, internal carotid, anterior cerebral, vertebral, and posterior cerebral arteries are patent without abrupt cut off or aneurysmal dilation. Atherosclerotic plaque is seen within the left carotid bifurcation and left carotid siphon. The left common carotid, internal carotid, middle cerebral, anterior cerebral, vertebral, and posterior cerebral arteries are patent without abrupt cut off or aneurysmal dilation. Basilar artery appears patent and appears unremarkable. Non-vascular Findings: For description of nonvascular intracranial findings, please refer to the noncontrast head CT performed the same date. No acute abnormality is identified within the visualized soft tissue or bony structures of the neck. The visualized lung apices are clear. CT Perfusion: CBF (<30%) volume: 0 mL Tmax (>6.0s) volume: 21 mL Mismatch volume: 21 mL Mismatch ratio: Infinite     1.Chronic, moderate to severe stenosis of the right MCA M1 segment appears essentially unchanged in comparison with 2/21/2022. Single dominant right MCA branch vessel and asymmetrically small cortical vessels are seen throughout the right MCA territory. Findings are essentially unchanged since 3/13/2023. 2.CT perfusion study demonstrates prolonged Tmax (greater than 6 seconds, 21 mL) within the right MCA territory. This may be related to #1, although acute right MCA territory ischemia cannot be excluded. Recommend correlation with MRI. 3.Atherosclerotic plaque within the bilateral carotid bifurcations, left greater than right. Estimated degree of stenosis within the ICAs is less than 50% bilaterally. The above findings were discussed with Pat Lynn via telephone on 7/19/2023 3:53 PM EDT. Electronically Signed: Anatoly Tamayo  7/19/2023 4:12 PM  EDT  Workstation ID: [id number]    CT CEREBRAL PERFUSION WITH & WITHOUT CONTRAST    Result Date: 7/19/2023  CT CEREBRAL PERFUSION W WO CONTRAST, CT ANGIOGRAM NECK, CT ANGIOGRAM HEAD W AI ANALYSIS OF LVO Date of Exam: 7/19/2023 3:32 PM EDT Indication: Neuro deficit, acute stroke suspected.  Comparison: CTA of the head and neck dated 2/21/2022 Technique: Axial CT images of the brain were obtained prior to and after the administration of 115 mL Isovue-370. Core blood volume, core blood flow, mean transit time, and Tmax images were obtained utilizing the Rapid software protocol. A limited CT angiogram of the head was also performed to measure the blood vessel density. The radiation dose reduction device was turned on for each scan per the ALARA (As Low as Reasonably Achievable) protocol. FINDINGS: Vascular Findings: Chronic, moderate to severe stenosis of the right MCA M1 segment appears essentially unchanged in comparison with 2/21/2022. Single dominant right MCA branch vessel and asymmetrically small cortical vessels are seen throughout the right MCA territory. Atherosclerotic plaque is seen within the right carotid bifurcation and right carotid siphon. The right common carotid, internal carotid, anterior cerebral, vertebral, and posterior cerebral arteries are patent without abrupt cut off or aneurysmal dilation. Atherosclerotic plaque is seen within the left carotid bifurcation and left carotid siphon. The left common carotid, internal carotid, middle cerebral, anterior cerebral, vertebral, and posterior cerebral arteries are patent without abrupt cut off or aneurysmal dilation. Basilar artery appears patent and appears unremarkable. Non-vascular Findings: For description of nonvascular intracranial findings, please refer to the noncontrast head CT performed the same date. No acute abnormality is identified within the visualized soft tissue or bony structures of the neck. The visualized lung apices are clear. CT  Perfusion: CBF (<30%) volume: 0 mL Tmax (>6.0s) volume: 21 mL Mismatch volume: 21 mL Mismatch ratio: Infinite     1.Chronic, moderate to severe stenosis of the right MCA M1 segment appears essentially unchanged in comparison with 2/21/2022. Single dominant right MCA branch vessel and asymmetrically small cortical vessels are seen throughout the right MCA territory. Findings are essentially unchanged since 3/13/2023. 2.CT perfusion study demonstrates prolonged Tmax (greater than 6 seconds, 21 mL) within the right MCA territory. This may be related to #1, although acute right MCA territory ischemia cannot be excluded. Recommend correlation with MRI. 3.Atherosclerotic plaque within the bilateral carotid bifurcations, left greater than right. Estimated degree of stenosis within the ICAs is less than 50% bilaterally. The above findings were discussed with Pat Lynn via telephone on 7/19/2023 3:53 PM EDT. Electronically Signed: Anatoly Tamayo  7/19/2023 4:12 PM EDT  Workstation ID: [id number]       During this visit the following were done:  Labs Reviewed [x]    Labs Ordered []    Radiology Reports Reviewed [x]    Radiology Ordered []    EKG, echo, and/or stress test reviewed [x]    EEG results reviewed  []    EEG reviewed and interpreted per myself   []    Discussed case with neurointerventionalist or neuroradiologist []    Referring Provider Records Reviewed []    ER Records Reviewed []    Hospital Records Reviewed []    History Obtained From Family []    Radiological images view and Interpreted per myself [x]    Case Discussed with referring provider []     Decision to obtain and request outside records  []        Assessment and Plan     Stroke-like symptoms, resolved. Considerations include TIA, partial seizure, migraine aura. MRI brain negative for acute changes, personally reviewed. Chronic right MCA stenosis, stable (right-sided symptoms not related to right MCA territory). On warfarin and ASA for PAF, INR  2.3.   - Observation on telemetry.   - Normal BP goal.   - Maintain po hydration.   - Continue warfarin home dose.   -  mg.   - High dose statin.   - TTE.   - EEG.   - Stroke clinic F/U in 2-3 months.    I will F/U pending test results. Call for questions, will see prn. Thanks.              Electronically signed by Soren Ham MD on 7/20/2023 at 12:38 EDT

## 2023-07-20 NOTE — PLAN OF CARE
Problem: Fall Injury Risk  Goal: Absence of Fall and Fall-Related Injury  Outcome: Ongoing, Progressing  Intervention: Identify and Manage Contributors  Recent Flowsheet Documentation  Taken 7/19/2023 2000 by Cory Reese RN  Medication Review/Management: medications reviewed  Intervention: Promote Injury-Free Environment  Recent Flowsheet Documentation  Taken 7/20/2023 0400 by Cory Reese RN  Safety Promotion/Fall Prevention:   activity supervised   assistive device/personal items within reach   clutter free environment maintained   lighting adjusted   nonskid shoes/slippers when out of bed   room organization consistent   safety round/check completed  Taken 7/20/2023 0200 by Cory Reese RN  Safety Promotion/Fall Prevention:   activity supervised   assistive device/personal items within reach   clutter free environment maintained   lighting adjusted   nonskid shoes/slippers when out of bed   room organization consistent   safety round/check completed  Taken 7/20/2023 0000 by Cory Reese RN  Safety Promotion/Fall Prevention:   activity supervised   assistive device/personal items within reach   clutter free environment maintained   lighting adjusted   nonskid shoes/slippers when out of bed   room organization consistent   safety round/check completed  Taken 7/19/2023 2200 by Cory Reese RN  Safety Promotion/Fall Prevention:   activity supervised   assistive device/personal items within reach   clutter free environment maintained   lighting adjusted   nonskid shoes/slippers when out of bed   room organization consistent   safety round/check completed  Taken 7/19/2023 2000 by Cory Reese, RN  Safety Promotion/Fall Prevention:   activity supervised   assistive device/personal items within reach   clutter free environment maintained   lighting adjusted   nonskid shoes/slippers when out of bed   room organization consistent   safety round/check completed     Problem: Pain Acute  Goal: Acceptable  Pain Control and Functional Ability  Outcome: Ongoing, Progressing  Intervention: Prevent or Manage Pain  Recent Flowsheet Documentation  Taken 7/19/2023 2000 by Cory Reese, RN  Medication Review/Management: medications reviewed  Intervention: Develop Pain Management Plan  Recent Flowsheet Documentation  Taken 7/19/2023 2314 by Cory Reese, RN  Pain Management Interventions: see MAR

## 2023-07-20 NOTE — PLAN OF CARE
Problem: Fall Injury Risk  Goal: Absence of Fall and Fall-Related Injury  Outcome: Met  Intervention: Identify and Manage Contributors  Recent Flowsheet Documentation  Taken 7/20/2023 1145 by Kasey Bob RN  Medication Review/Management: medications reviewed  Taken 7/20/2023 1000 by Kasey Bob RN  Medication Review/Management: medications reviewed  Intervention: Promote Injury-Free Environment  Recent Flowsheet Documentation  Taken 7/20/2023 1145 by Kasey Bob RN  Safety Promotion/Fall Prevention:   toileting scheduled   safety round/check completed   room organization consistent   nonskid shoes/slippers when out of bed   mobility aid in reach   lighting adjusted   fall prevention program maintained   clutter free environment maintained   assistive device/personal items within reach   activity supervised  Taken 7/20/2023 1000 by Kasey Bob RN  Safety Promotion/Fall Prevention:   toileting scheduled   safety round/check completed   room organization consistent   nonskid shoes/slippers when out of bed   lighting adjusted   fall prevention program maintained   clutter free environment maintained   assistive device/personal items within reach   activity supervised  Taken 7/20/2023 0815 by Kasey Bob RN  Safety Promotion/Fall Prevention:   toileting scheduled   safety round/check completed   room organization consistent   nonskid shoes/slippers when out of bed   lighting adjusted   fall prevention program maintained   clutter free environment maintained   activity supervised   assistive device/personal items within reach     Problem: Adult Inpatient Plan of Care  Goal: Plan of Care Review  Outcome: Met  Goal: Patient-Specific Goal (Individualized)  Outcome: Met  Goal: Absence of Hospital-Acquired Illness or Injury  Outcome: Met  Intervention: Identify and Manage Fall Risk  Recent Flowsheet Documentation  Taken 7/20/2023 1145 by Kasey Bob RN  Safety Promotion/Fall Prevention:    toileting scheduled   safety round/check completed   room organization consistent   nonskid shoes/slippers when out of bed   mobility aid in reach   lighting adjusted   fall prevention program maintained   clutter free environment maintained   assistive device/personal items within reach   activity supervised  Taken 7/20/2023 1000 by Kasey Bob RN  Safety Promotion/Fall Prevention:   toileting scheduled   safety round/check completed   room organization consistent   nonskid shoes/slippers when out of bed   lighting adjusted   fall prevention program maintained   clutter free environment maintained   assistive device/personal items within reach   activity supervised  Taken 7/20/2023 0815 by Kasey Bob RN  Safety Promotion/Fall Prevention:   toileting scheduled   safety round/check completed   room organization consistent   nonskid shoes/slippers when out of bed   lighting adjusted   fall prevention program maintained   clutter free environment maintained   activity supervised   assistive device/personal items within reach  Intervention: Prevent Skin Injury  Recent Flowsheet Documentation  Taken 7/20/2023 1145 by Kasey Bob RN  Body Position: position changed independently  Taken 7/20/2023 1000 by Kasey Bob RN  Body Position: position changed independently  Taken 7/20/2023 0815 by Kasey Bob RN  Body Position: position changed independently  Intervention: Prevent and Manage VTE (Venous Thromboembolism) Risk  Recent Flowsheet Documentation  Taken 7/20/2023 1145 by Kaesy Bob RN  Activity Management: activity encouraged  Taken 7/20/2023 1000 by Kasey Bob RN  Activity Management: activity encouraged  Taken 7/20/2023 0815 by Kasey Bob RN  Activity Management: activity encouraged  Intervention: Prevent Infection  Recent Flowsheet Documentation  Taken 7/20/2023 1145 by Kasey Bob RN  Infection Prevention: environmental surveillance performed  Taken 7/20/2023 1000 by  Kasey Bob, RN  Infection Prevention: environmental surveillance performed  Taken 7/20/2023 0815 by Kasey Bob, RN  Infection Prevention:   environmental surveillance performed   equipment surfaces disinfected  Goal: Optimal Comfort and Wellbeing  Outcome: Met  Intervention: Provide Person-Centered Care  Recent Flowsheet Documentation  Taken 7/20/2023 0815 by Kasey Bob, RN  Trust Relationship/Rapport: care explained  Goal: Readiness for Transition of Care  Outcome: Met     Problem: Pain Acute  Goal: Acceptable Pain Control and Functional Ability  Outcome: Met  Intervention: Prevent or Manage Pain  Recent Flowsheet Documentation  Taken 7/20/2023 1145 by Ksaey Bob, RN  Medication Review/Management: medications reviewed  Taken 7/20/2023 1000 by Kasey Bob, RN  Medication Review/Management: medications reviewed   Goal Outcome Evaluation:

## 2023-07-20 NOTE — CONSULTS
"Diabetes Education  Assessment/Teaching    Patient Name:  Camron Hendrix  YOB: 1950  MRN: [id number]  Admit Date:  7/19/2023      Patient has been scheduled for outpatient diabetes education follow up visit on 8/3/23 at 1:30 pm via phone. Outpatient staff will provide reminder call prior to appointment. Patient was given reminder card with date and time of appointment and our contact information.     Consult received per stroke protocol.  Mr. Hendrix seen at bedside for newly dx'd DM.  He reports he has had prediabetes for 30 years and regularly follows with his PCP.  He has a working meter at home and SMBG PRN.  He states he lost his wife 2 months ago and has been eating mostly convenience foods.  He confirms being active and regularly working out in his garden.  His daughter and son-in-law live nearby for support.    Discussed and taught Mr. Hendrix about type 2 diabetes self-management, risk factors, and importance of blood glucose control to reduce complications. Target blood glucose readings and A1c goals per ADA were reviewed. Reviewed with patient current A1c 6.7% and discussed its significance. Lifestyle changes such as physical activity with MD approval and healthy eating were encouraged. Encouraged pt to monitor blood sugar at home and to call PCP if blood sugar is trending high. Encouraged to keep record of blood glucose readings to take to follow up appointment with PCP. Provided patient with copy of [facility]'s \"What is Diabetes\" handout, \"Blood Glucose Goals\" handout, and \"What is A1c\" handout.  Also provided outpatient class brochure.          Electronically signed by:  Letha Conner RN  07/20/23 16:12 EDT  "

## 2023-07-25 ENCOUNTER — READMISSION MANAGEMENT (OUTPATIENT)
Dept: CALL CENTER | Facility: HOSPITAL | Age: 73
End: 2023-07-25
Payer: MEDICARE

## 2023-07-25 NOTE — OUTREACH NOTE
Stroke Week 1 Survey      Flowsheet Row Responses   [facility] facility patient discharged from? [facility]   Does the patient have one of the following disease processes/diagnoses(primary or secondary)? Stroke   Week 1 attempt successful? No   Unsuccessful attempts Attempt 1            Jaymie Harrington Registered Nurse

## 2023-07-27 LAB
QT INTERVAL: 406 MS
QTC INTERVAL: 418 MS

## 2023-07-28 ENCOUNTER — READMISSION MANAGEMENT (OUTPATIENT)
Dept: CALL CENTER | Facility: HOSPITAL | Age: 73
End: 2023-07-28
Payer: MEDICARE

## 2023-08-03 ENCOUNTER — HOSPITAL ENCOUNTER (OUTPATIENT)
Dept: DIABETES SERVICES | Facility: HOSPITAL | Age: 73
Setting detail: RECURRING SERIES
Discharge: HOME | End: 2023-08-03
Payer: MEDICARE

## 2024-04-24 ENCOUNTER — APPOINTMENT (OUTPATIENT)
Dept: MRI IMAGING | Facility: HOSPITAL | Age: 74
End: 2024-04-24
Payer: MEDICARE

## 2024-04-24 ENCOUNTER — APPOINTMENT (OUTPATIENT)
Dept: CT IMAGING | Facility: HOSPITAL | Age: 74
End: 2024-04-24
Payer: MEDICARE

## 2024-04-24 ENCOUNTER — HOSPITAL ENCOUNTER (INPATIENT)
Facility: HOSPITAL | Age: 74
LOS: 8 days | Discharge: HOME | End: 2024-05-02
Attending: INTERNAL MEDICINE | Admitting: INTERNAL MEDICINE
Payer: MEDICARE

## 2024-04-24 ENCOUNTER — APPOINTMENT (OUTPATIENT)
Dept: GENERAL RADIOLOGY | Facility: HOSPITAL | Age: 74
End: 2024-04-24
Payer: MEDICARE

## 2024-04-24 ENCOUNTER — APPOINTMENT (OUTPATIENT)
Dept: CARDIOLOGY | Facility: HOSPITAL | Age: 74
End: 2024-04-24
Payer: MEDICARE

## 2024-04-24 DIAGNOSIS — I60.9 SAH (SUBARACHNOID HEMORRHAGE): ICD-10-CM

## 2024-04-24 DIAGNOSIS — R47.1 DYSARTHRIA: Primary | ICD-10-CM

## 2024-04-24 DIAGNOSIS — I48.0 PAF (PAROXYSMAL ATRIAL FIBRILLATION): ICD-10-CM

## 2024-04-24 DIAGNOSIS — R13.12 OROPHARYNGEAL DYSPHAGIA: ICD-10-CM

## 2024-04-24 PROBLEM — I61.3 LEFT-SIDED NONTRAUMATIC INTRACEREBRAL HEMORRHAGE OF BRAINSTEM: Status: ACTIVE | Noted: 2024-04-24

## 2024-04-24 PROBLEM — I61.9 HEMORRHAGIC STROKE: Status: ACTIVE | Noted: 2024-04-24

## 2024-04-24 LAB
ANION GAP SERPL CALCULATED.3IONS-SCNC: 7 MMOL/L (ref 5–15)
BASOPHILS # BLD AUTO: 0.02 10*3/MM3 (ref 0–0.2)
BASOPHILS NFR BLD AUTO: 0.3 % (ref 0–1.5)
BUN SERPL-MCNC: 9 MG/DL (ref 8–23)
BUN/CREAT SERPL: 9.6 (ref 7–25)
CALCIUM SPEC-SCNC: 8 MG/DL (ref 8.6–10.5)
CHLORIDE SERPL-SCNC: 106 MMOL/L (ref 98–107)
CO2 SERPL-SCNC: 27 MMOL/L (ref 22–29)
CREAT SERPL-MCNC: 0.94 MG/DL (ref 0.76–1.27)
DEPRECATED RDW RBC AUTO: 41 FL (ref 37–54)
EGFRCR SERPLBLD CKD-EPI 2021: 86.1 ML/MIN/1.73
EOSINOPHIL # BLD AUTO: 0.03 10*3/MM3 (ref 0–0.4)
EOSINOPHIL NFR BLD AUTO: 0.4 % (ref 0.3–6.2)
ERYTHROCYTE [DISTWIDTH] IN BLOOD BY AUTOMATED COUNT: 13.2 % (ref 12.3–15.4)
GLUCOSE BLDC GLUCOMTR-MCNC: 110 MG/DL (ref 70–130)
GLUCOSE BLDC GLUCOMTR-MCNC: 120 MG/DL (ref 70–130)
GLUCOSE BLDC GLUCOMTR-MCNC: 170 MG/DL (ref 70–130)
GLUCOSE SERPL-MCNC: 198 MG/DL (ref 65–99)
HCT VFR BLD AUTO: 43.7 % (ref 37.5–51)
HGB BLD-MCNC: 14.9 G/DL (ref 13–17.7)
IMM GRANULOCYTES # BLD AUTO: 0.02 10*3/MM3 (ref 0–0.05)
IMM GRANULOCYTES NFR BLD AUTO: 0.3 % (ref 0–0.5)
LYMPHOCYTES # BLD AUTO: 1.46 10*3/MM3 (ref 0.7–3.1)
LYMPHOCYTES NFR BLD AUTO: 21.7 % (ref 19.6–45.3)
MAGNESIUM SERPL-MCNC: 1.7 MG/DL (ref 1.6–2.4)
MCH RBC QN AUTO: 29.4 PG (ref 26.6–33)
MCHC RBC AUTO-ENTMCNC: 34.1 G/DL (ref 31.5–35.7)
MCV RBC AUTO: 86.4 FL (ref 79–97)
MONOCYTES # BLD AUTO: 0.41 10*3/MM3 (ref 0.1–0.9)
MONOCYTES NFR BLD AUTO: 6.1 % (ref 5–12)
NEUTROPHILS NFR BLD AUTO: 4.8 10*3/MM3 (ref 1.7–7)
NEUTROPHILS NFR BLD AUTO: 71.2 % (ref 42.7–76)
NRBC BLD AUTO-RTO: 0 /100 WBC (ref 0–0.2)
PHOSPHATE SERPL-MCNC: 1.9 MG/DL (ref 2.5–4.5)
PLATELET # BLD AUTO: 197 10*3/MM3 (ref 140–450)
PMV BLD AUTO: 10.6 FL (ref 6–12)
POTASSIUM SERPL-SCNC: 3.7 MMOL/L (ref 3.5–5.2)
RBC # BLD AUTO: 5.06 10*6/MM3 (ref 4.14–5.8)
SODIUM SERPL-SCNC: 140 MMOL/L (ref 136–145)
T4 FREE SERPL-MCNC: 1.87 NG/DL (ref 0.92–1.68)
TSH SERPL DL<=0.05 MIU/L-ACNC: 1.77 UIU/ML (ref 0.27–4.2)
WBC NRBC COR # BLD AUTO: 6.74 10*3/MM3 (ref 3.4–10.8)

## 2024-04-24 PROCEDURE — C1751 CATH, INF, PER/CENT/MIDLINE: HCPCS

## 2024-04-24 PROCEDURE — 71045 X-RAY EXAM CHEST 1 VIEW: CPT

## 2024-04-24 PROCEDURE — 84100 ASSAY OF PHOSPHORUS: CPT

## 2024-04-24 PROCEDURE — 25010000002 PROTHROMBIN COMPLEX CONC HUMAN 1000 UNITS KIT: Performed by: INTERNAL MEDICINE

## 2024-04-24 PROCEDURE — 99291 CRITICAL CARE FIRST HOUR: CPT | Performed by: INTERNAL MEDICINE

## 2024-04-24 PROCEDURE — 70498 CT ANGIOGRAPHY NECK: CPT

## 2024-04-24 PROCEDURE — 93010 ELECTROCARDIOGRAM REPORT: CPT | Performed by: INTERNAL MEDICINE

## 2024-04-24 PROCEDURE — 92610 EVALUATE SWALLOWING FUNCTION: CPT

## 2024-04-24 PROCEDURE — 70450 CT HEAD/BRAIN W/O DYE: CPT

## 2024-04-24 PROCEDURE — 83735 ASSAY OF MAGNESIUM: CPT

## 2024-04-24 PROCEDURE — 99223 1ST HOSP IP/OBS HIGH 75: CPT | Performed by: NURSE PRACTITIONER

## 2024-04-24 PROCEDURE — 25010000002 NICARDIPINE 2.5 MG/ML SOLUTION 10 ML VIAL: Performed by: NURSE PRACTITIONER

## 2024-04-24 PROCEDURE — 92523 SPEECH SOUND LANG COMPREHEN: CPT

## 2024-04-24 PROCEDURE — 25010000002 PROTHROMBIN COMPLEX CONC HUMAN 500 UNITS KIT: Performed by: INTERNAL MEDICINE

## 2024-04-24 PROCEDURE — A9577 INJ MULTIHANCE: HCPCS | Performed by: INTERNAL MEDICINE

## 2024-04-24 PROCEDURE — C1894 INTRO/SHEATH, NON-LASER: HCPCS

## 2024-04-24 PROCEDURE — 25010000002 ONDANSETRON PER 1 MG: Performed by: NURSE PRACTITIONER

## 2024-04-24 PROCEDURE — 25010000002 MIDAZOLAM PER 1 MG

## 2024-04-24 PROCEDURE — 25510000001 IOPAMIDOL PER 1 ML: Performed by: INTERNAL MEDICINE

## 2024-04-24 PROCEDURE — 02HV33Z INSERTION OF INFUSION DEVICE INTO SUPERIOR VENA CAVA, PERCUTANEOUS APPROACH: ICD-10-PCS | Performed by: INTERNAL MEDICINE

## 2024-04-24 PROCEDURE — 85025 COMPLETE CBC W/AUTO DIFF WBC: CPT

## 2024-04-24 PROCEDURE — 70551 MRI BRAIN STEM W/O DYE: CPT

## 2024-04-24 PROCEDURE — 82948 REAGENT STRIP/BLOOD GLUCOSE: CPT

## 2024-04-24 PROCEDURE — 93005 ELECTROCARDIOGRAM TRACING: CPT | Performed by: INTERNAL MEDICINE

## 2024-04-24 PROCEDURE — 25010000002 PHYTONADIONE 10 MG/ML SOLUTION 1 ML AMPULE: Performed by: NURSE PRACTITIONER

## 2024-04-24 PROCEDURE — 30283B1 TRANSFUSION OF NONAUTOLOGOUS 4-FACTOR PROTHROMBIN COMPLEX CONCENTRATE INTO VEIN, PERCUTANEOUS APPROACH: ICD-10-PCS | Performed by: INTERNAL MEDICINE

## 2024-04-24 PROCEDURE — 70544 MR ANGIOGRAPHY HEAD W/O DYE: CPT

## 2024-04-24 PROCEDURE — 84443 ASSAY THYROID STIM HORMONE: CPT | Performed by: INTERNAL MEDICINE

## 2024-04-24 PROCEDURE — 25810000003 SODIUM CHLORIDE 0.9 % SOLUTION 250 ML FLEX CONT: Performed by: NURSE PRACTITIONER

## 2024-04-24 PROCEDURE — 99222 1ST HOSP IP/OBS MODERATE 55: CPT | Performed by: NEUROLOGICAL SURGERY

## 2024-04-24 PROCEDURE — 25010000002 LEVETRIRACETAM PER 10 MG: Performed by: NURSE PRACTITIONER

## 2024-04-24 PROCEDURE — 84439 ASSAY OF FREE THYROXINE: CPT | Performed by: INTERNAL MEDICINE

## 2024-04-24 PROCEDURE — 70548 MR ANGIOGRAPHY NECK W/DYE: CPT

## 2024-04-24 PROCEDURE — 80048 BASIC METABOLIC PNL TOTAL CA: CPT

## 2024-04-24 PROCEDURE — 70496 CT ANGIOGRAPHY HEAD: CPT

## 2024-04-24 PROCEDURE — 25810000003 SODIUM CHLORIDE 0.9 % SOLUTION: Performed by: INTERNAL MEDICINE

## 2024-04-24 PROCEDURE — 0 GADOBENATE DIMEGLUMINE 529 MG/ML SOLUTION: Performed by: INTERNAL MEDICINE

## 2024-04-24 RX ORDER — MIDAZOLAM HYDROCHLORIDE 1 MG/ML
2 INJECTION INTRAMUSCULAR; INTRAVENOUS ONCE
Status: COMPLETED | OUTPATIENT
Start: 2024-04-24 | End: 2024-04-24

## 2024-04-24 RX ORDER — LEVETIRACETAM 500 MG/5ML
1000 INJECTION, SOLUTION, CONCENTRATE INTRAVENOUS EVERY 12 HOURS SCHEDULED
Status: DISCONTINUED | OUTPATIENT
Start: 2024-04-24 | End: 2024-04-28

## 2024-04-24 RX ORDER — MIDAZOLAM HYDROCHLORIDE 1 MG/ML
INJECTION INTRAMUSCULAR; INTRAVENOUS
Status: COMPLETED
Start: 2024-04-24 | End: 2024-04-24

## 2024-04-24 RX ORDER — ATORVASTATIN CALCIUM 40 MG/1
40 TABLET, FILM COATED ORAL NIGHTLY
Status: DISCONTINUED | OUTPATIENT
Start: 2024-04-24 | End: 2024-05-02 | Stop reason: HOSPADM

## 2024-04-24 RX ORDER — SODIUM CHLORIDE 0.9 % (FLUSH) 0.9 %
10 SYRINGE (ML) INJECTION AS NEEDED
Status: DISCONTINUED | OUTPATIENT
Start: 2024-04-24 | End: 2024-05-02 | Stop reason: HOSPADM

## 2024-04-24 RX ORDER — MAGNESIUM SULFATE HEPTAHYDRATE 40 MG/ML
4 INJECTION, SOLUTION INTRAVENOUS ONCE
Status: COMPLETED | OUTPATIENT
Start: 2024-04-24 | End: 2024-04-25

## 2024-04-24 RX ORDER — SODIUM CHLORIDE 9 MG/ML
40 INJECTION, SOLUTION INTRAVENOUS AS NEEDED
Status: DISCONTINUED | OUTPATIENT
Start: 2024-04-24 | End: 2024-04-26

## 2024-04-24 RX ORDER — SODIUM CHLORIDE 0.9 % (FLUSH) 0.9 %
10 SYRINGE (ML) INJECTION EVERY 12 HOURS SCHEDULED
Status: DISCONTINUED | OUTPATIENT
Start: 2024-04-24 | End: 2024-05-02 | Stop reason: HOSPADM

## 2024-04-24 RX ORDER — SODIUM CHLORIDE 0.9 % (FLUSH) 0.9 %
10 SYRINGE (ML) INJECTION AS NEEDED
Status: DISCONTINUED | OUTPATIENT
Start: 2024-04-24 | End: 2024-04-24

## 2024-04-24 RX ORDER — SODIUM CHLORIDE 9 MG/ML
40 INJECTION, SOLUTION INTRAVENOUS AS NEEDED
Status: DISCONTINUED | OUTPATIENT
Start: 2024-04-24 | End: 2024-05-02 | Stop reason: HOSPADM

## 2024-04-24 RX ORDER — SODIUM CHLORIDE 0.9 % (FLUSH) 0.9 %
10 SYRINGE (ML) INJECTION EVERY 12 HOURS SCHEDULED
Status: DISCONTINUED | OUTPATIENT
Start: 2024-04-24 | End: 2024-04-26

## 2024-04-24 RX ORDER — ONDANSETRON 2 MG/ML
4 INJECTION INTRAMUSCULAR; INTRAVENOUS ONCE
Status: COMPLETED | OUTPATIENT
Start: 2024-04-24 | End: 2024-04-24

## 2024-04-24 RX ORDER — SODIUM CHLORIDE 0.9 % (FLUSH) 0.9 %
20 SYRINGE (ML) INJECTION AS NEEDED
Status: DISCONTINUED | OUTPATIENT
Start: 2024-04-24 | End: 2024-05-02 | Stop reason: HOSPADM

## 2024-04-24 RX ORDER — SODIUM CHLORIDE 9 MG/ML
75 INJECTION, SOLUTION INTRAVENOUS CONTINUOUS
Status: DISCONTINUED | OUTPATIENT
Start: 2024-04-24 | End: 2024-04-26

## 2024-04-24 RX ORDER — LEVETIRACETAM 500 MG/5ML
1000 INJECTION, SOLUTION, CONCENTRATE INTRAVENOUS ONCE
Status: COMPLETED | OUTPATIENT
Start: 2024-04-24 | End: 2024-04-24

## 2024-04-24 RX ADMIN — NICARDIPINE HYDROCHLORIDE 12.5 MG/HR: 25 INJECTION, SOLUTION INTRAVENOUS at 22:07

## 2024-04-24 RX ADMIN — GADOBENATE DIMEGLUMINE 19 ML: 529 INJECTION, SOLUTION INTRAVENOUS at 14:37

## 2024-04-24 RX ADMIN — MIDAZOLAM HYDROCHLORIDE 1 MG: 1 INJECTION INTRAMUSCULAR; INTRAVENOUS at 10:44

## 2024-04-24 RX ADMIN — IOPAMIDOL 85 ML: 755 INJECTION, SOLUTION INTRAVENOUS at 10:46

## 2024-04-24 RX ADMIN — NICARDIPINE HYDROCHLORIDE 12.5 MG/HR: 25 INJECTION, SOLUTION INTRAVENOUS at 17:58

## 2024-04-24 RX ADMIN — NICARDIPINE HYDROCHLORIDE 10 MG/HR: 25 INJECTION, SOLUTION INTRAVENOUS at 11:37

## 2024-04-24 RX ADMIN — LEVETIRACETAM 1000 MG: 100 INJECTION, SOLUTION INTRAVENOUS at 10:45

## 2024-04-24 RX ADMIN — ONDANSETRON 4 MG: 2 INJECTION INTRAMUSCULAR; INTRAVENOUS at 10:45

## 2024-04-24 RX ADMIN — NICARDIPINE HYDROCHLORIDE 10 MG/HR: 25 INJECTION, SOLUTION INTRAVENOUS at 14:32

## 2024-04-24 RX ADMIN — LEVETIRACETAM 1000 MG: 100 INJECTION, SOLUTION INTRAVENOUS at 20:20

## 2024-04-24 RX ADMIN — Medication 2500 UNITS: at 11:42

## 2024-04-24 RX ADMIN — ATORVASTATIN CALCIUM 40 MG: 40 TABLET, FILM COATED ORAL at 20:19

## 2024-04-24 RX ADMIN — Medication 10 ML: at 11:04

## 2024-04-24 RX ADMIN — PHYTONADIONE 10 MG: 10 INJECTION, EMULSION INTRAMUSCULAR; INTRAVENOUS; SUBCUTANEOUS at 10:58

## 2024-04-24 RX ADMIN — SODIUM CHLORIDE 75 ML/HR: 9 INJECTION, SOLUTION INTRAVENOUS at 12:16

## 2024-04-24 RX ADMIN — MIDAZOLAM HYDROCHLORIDE 1 MG: 1 INJECTION, SOLUTION INTRAMUSCULAR; INTRAVENOUS at 10:44

## 2024-04-24 RX ADMIN — NICARDIPINE HYDROCHLORIDE 12.5 MG/HR: 25 INJECTION, SOLUTION INTRAVENOUS at 20:00

## 2024-04-24 RX ADMIN — Medication 10 ML: at 20:20

## 2024-04-24 NOTE — H&P
ICU ADMISSION NOTE    Chief complaint     Subjective     Patient is a 73 y.o. male with Afib on Coumadin, AAA (surveillance with Dr. Perdomo of Cardiology in [place] / previously 4.3 cm on 10/2022 ECHO), CAD, past CVA (data deficit), HTN, T2DM, hyperlipidemia, hypothyroidism, hearing loss, and recent admission at [place] in February for left-sided weakness and left facial tingling. Per record, work-up was negative for new stroke. Additionally admitted at [facility] in July 2023 with visual disturbances and ataxia that resolved. MRI negative however, could not rule out TIA.  He has been having issues with ongoing dizziness for the past few months with work-up ongoing.     Patient presented to [facility] with complaints of a severe headache that started around 0600 this AM. Denies trauma or falls. NIH 0 upon arrival. CTH at OSH revealed SAH overlying the midbrain and ct, per report.  upon arrival and he received IV hydralazine and subsequently started on nicardipine drip for BP control. Additionally received Compazine and Toradol for headache. EKG NSR. INR reportedly 2 (not included in transfer records). Otherwise, labs unremarkable. [facility] Stroke Team was contacted and he is being transferred to [facility] ICU for further work-up and evaluation.     Time spent: 3 minutes  Electronically signed by FADUMO Kapadia, 04/24/24, 11:01 AM EDT.      Review of Systems  Review of Systems   Constitutional:  Negative for fever.   HENT:  Negative for congestion, sinus pain and trouble swallowing.    Eyes:  Positive for visual disturbance.   Respiratory:  Negative for chest tightness, shortness of breath and wheezing.    Cardiovascular:  Negative for chest pain and leg swelling.   Gastrointestinal:  Negative for abdominal pain, nausea and vomiting.   Endocrine: Negative.    Genitourinary:  Negative for dysuria.   Musculoskeletal:  Negative for back pain.   Skin:  Negative for rash.    Allergic/Immunologic: Positive for environmental allergies.   Neurological:  Positive for headaches.   Hematological:  Bruises/bleeds easily.   Psychiatric/Behavioral:  Positive for dysphoric mood.         Home Medications  Medications Prior to Admission   Medication Sig Dispense Refill Last Dose    amLODIPine (NORVASC) 10 MG tablet Take 1 tablet by mouth Daily.       aspirin 81 MG EC tablet Take 2 tablets by mouth Daily. 60 tablet 0     atorvastatin (LIPITOR) 20 MG tablet Take 1 tablet by mouth Every Night. 90 tablet 0     carvedilol (COREG) 6.25 MG tablet Take 1 tablet by mouth 2 (Two) Times a Day With Meals.       furosemide (LASIX) 20 MG tablet Take 2 tablets by mouth Daily As Needed (edema).       hydrALAZINE (APRESOLINE) 50 MG tablet Take 0.5 tablets by mouth 2 (two) times a day.       levothyroxine (SYNTHROID, LEVOTHROID) 175 MCG tablet 200 mcg.       losartan (COZAAR) 25 MG tablet Take 1 tablet by mouth Daily.       Melatonin 5 MG chewable tablet Chew 1 tablet At Night As Needed (insomnia). 30 tablet 0     potassium chloride (K-DUR) 10 MEQ CR tablet Take 1 tablet by mouth Every Morning.       warfarin (COUMADIN) 10 MG tablet Take 1 tablet by mouth Every Night. Tuesday, Thursday, Saturday, and Sunday          History  Past Medical History:   Diagnosis Date    AAA (abdominal aortic aneurysm)     Coronary artery disease     CVA (cerebral vascular accident) 2011    Hypertension     Hypothyroidism     PAF (paroxysmal atrial fibrillation)     TIA (transient ischemic attack)      Past Surgical History:   Procedure Laterality Date    CARDIAC CATHETERIZATION N/A 3/29/2019    Procedure: Left Heart Cath;  Surgeon: Andrea Holloway MD;  Location: [facility] CATH INVASIVE LOC[facility];  Service: Cardiovascular    CERVICAL FUSION      c4-c5    CORONARY ANGIOPLASTY WITH STENT PLACEMENT      HERNIA REPAIR      TONSILECTOMY, ADENOIDECTOMY, BILATERAL MYRINGOTOMY AND TUBES       Family History   Problem Relation Age of Onset     Heart failure Mother     Aneurysm Father      Social History     Tobacco Use    Smoking status: Former     Current packs/day: 0.00     Average packs/day: 1 pack/day for 20.0 years (20.0 ttl pk-yrs)     Types: Cigarettes     Start date: 1965     Quit date: 1985     Years since quittin.6    Smokeless tobacco: Never   Vaping Use    Vaping status: Never Used   Substance Use Topics    Alcohol use: Yes     Comment: occasional     Drug use: No     Medications Prior to Admission   Medication Sig Dispense Refill Last Dose    amLODIPine (NORVASC) 10 MG tablet Take 1 tablet by mouth Daily.       aspirin 81 MG EC tablet Take 2 tablets by mouth Daily. 60 tablet 0     atorvastatin (LIPITOR) 20 MG tablet Take 1 tablet by mouth Every Night. 90 tablet 0     carvedilol (COREG) 6.25 MG tablet Take 1 tablet by mouth 2 (Two) Times a Day With Meals.       furosemide (LASIX) 20 MG tablet Take 2 tablets by mouth Daily As Needed (edema).       hydrALAZINE (APRESOLINE) 50 MG tablet Take 0.5 tablets by mouth 2 (two) times a day.       levothyroxine (SYNTHROID, LEVOTHROID) 175 MCG tablet 200 mcg.       losartan (COZAAR) 25 MG tablet Take 1 tablet by mouth Daily.       Melatonin 5 MG chewable tablet Chew 1 tablet At Night As Needed (insomnia). 30 tablet 0     potassium chloride (K-DUR) 10 MEQ CR tablet Take 1 tablet by mouth Every Morning.       warfarin (COUMADIN) 10 MG tablet Take 1 tablet by mouth Every Night. Tuesday, Thursday, Saturday, and         Allergies:  Codeine      Objective     Vital Signs  There were no vitals taken for this visit.  164/88, 44-55, 16  Physical Exam:  General Appearance:  Obese older gentleman supine in bed in no acute distress   Head:  Atraumatic   Eyes:          Conjunctiva pink.  Pupils are small, right 3 mm, left 2 mm   Ears:     Throat: Oral mucosa moist   Neck: Trachea midline, no carotid bruit   Back:      Lungs:   Symmetric chest expansion without wheeze or rhonchi    Heart:  Regular  rhythm, slow rate, systolic murmur   Abdomen:   Protuberant, obese, bowel sounds present, nontender   Rectal:     Deferred   Extremities:    1-2+ pretibial edema   Pulses: Palpable pedal pulses   Skin: Eczema patches   Lymph nodes: No cervical adenopathy   Neurologic: Eyes are closed, oriented x 3, follows commands, no focal weakness, double vision, no nystagmus, speech fluent       Results Review:   Lab Results (last 24 hours)       Procedure Component Value Units Date/Time    POC Glucose Once [[id number]]  (Abnormal) Collected: 04/24/24 1047    Specimen: Blood Updated: 04/24/24 1055     Glucose 170 mg/dL           Imaging Results (Last 24 Hours)       Procedure Component Value Units Date/Time    CT Angiogram Head w AI Analysis of LVO [[id number]] Resulted: 04/24/24 1032     Updated: 04/24/24 1046    CT Angiogram Neck [[id number]] Resulted: 04/24/24 1032     Updated: 04/24/24 1046    CT Head Without Contrast Stroke Protocol [[id number]] Resulted: 04/24/24 1029     Updated: 04/24/24 1041             PROBLEM LIST    Intracranial hemorrhage, preliminary possible brainstem  Chronic anticoagulation for atrial fibrillation  Hypertension  Hypothyroid  Coronary artery disease    Assessment & Plan     73-year-old gentleman, remote smoker with hypertension, dyslipidemia, diabetes mellitus type 2, coronary artery disease, previous coronary stents, history of atrial fibrillation presenting with severe headache and intracranial bleeding on the outside CT scan.  His initial NIH stroke score at the outside hospital was 0.  He has had some intermittent double vision.  Our imaging does reveal some infratentorial bleeding around the brainstem, reading is still pending.    He is followed by Dr. Perdomo in [place] for coronary disease, dyslipidemia, paroxysmal atrial fibrillation.  He remains on Coumadin.  He is currently sinus bradycardia.  Last echocardiogram here was in 2022 with an EF of 70 to 75%, enlarged ascending aorta, aortic  valve stenosis with a valve area of 1.81.  He does have some lower extremity edema but no evidence of pulmonary edema.  Heart catheterization here in 2019 revealed a normal left main, 30% mid LAD, 40% distal circumflex, 40% mid RCA, 50% distal RCA lesions with an LVEDP of 18, aortic valve gradient was not measured.    He has a known history of diabetes.  He was placed on Ozempic in January of this year.  His A1c is 6.8.    Current blood pressures in the 160s.  Heart rate is slow at 44-55.  He is on carvedilol, hydralazine, losartan as an outpatient.    He does take thyroid replacement.  In January 2024 his TSH was 9.69.    -Urgent reversal of anticoagulation with Kcentra and vitamin K  -Hold beta-blockers/carvedilol  -Sliding scale insulin  -Echocardiogram  -Thyroid replacement  -Continue statin  -PT, OT, speech therapy  -Monitor NIH stroke scale  -Repeat TSH    Given the location of his bleeding he is at risk for further bleeding and neurologic deterioration and requires ongoing neuro critical care monitoring    Rani Lilly MD  04/24/24  11:05 EDT    Time: Critical care 50 min    Please note that portions of this note were completed with a voice recognition program.

## 2024-04-24 NOTE — PLAN OF CARE
Goal Outcome Evaluation:                     Anticipated Discharge Disposition (SLP): inpatient rehabilitation facility    SLP Diagnosis: mild, dysarthria (04/24/24 1515)    SLP Swallowing Diagnosis: R/O pharyngeal dysphagia (04/24/24 1515)

## 2024-04-24 NOTE — THERAPY EVALUATION
Acute Care - Speech Language Pathology   Swallow Initial Evaluation [facility]  Clinical Swallow Evaluation  Cognitive-Communication Evaluation         Patient Name: Camron Hendrix  : 1950  MRN: [id number]  Today's Date: 2024               Admit Date: 2024    Visit Dx:     ICD-10-CM ICD-9-CM   1. Dysarthria  R47.1 784.51   2. 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    Stroke    Hemorrhagic stroke    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     Past Medical History:   Diagnosis Date    AAA (abdominal aortic aneurysm)     Coronary artery disease     CVA (cerebral vascular accident)     Hypertension     Hypothyroidism     PAF (paroxysmal atrial fibrillation)     TIA (transient ischemic attack)      Past Surgical History:   Procedure Laterality Date    CARDIAC CATHETERIZATION N/A 3/29/2019    Procedure: Left Heart Cath;  Surgeon: Andrea Holloway MD;  Location: [facility] CATH INVASIVE LOCATION;  Service: Cardiovascular    CERVICAL FUSION      c4-c5    CORONARY ANGIOPLASTY WITH STENT PLACEMENT      HERNIA REPAIR      TONSILECTOMY, ADENOIDECTOMY, BILATERAL MYRINGOTOMY AND TUBES         SLP Recommendation and Plan  SLP Swallowing Diagnosis: R/O pharyngeal dysphagia (24 1515)  SLP Diet Recommendation: soft to chew textures, chopped, thin liquids (24 1515)  Recommended Precautions and Strategies: small bites of  food and sips of liquid, general aspiration precautions, reflux precautions (04/24/24 1515)  SLP Rec. for Method of Medication Administration: meds whole, with thin liquids, meds crushed, with puree, as tolerated (04/24/24 1515)     Monitor for Signs of Aspiration: yes, notify SLP if any concerns (04/24/24 1515)  Recommended Diagnostics: reassess via clinical swallow evaluation (04/24/24 1515)  Swallow Criteria for Skilled Therapeutic Interventions Met: demonstrates skilled criteria (04/24/24 1515)  Anticipated Discharge Disposition (SLP): inpatient rehabilitation facility (04/24/24 1515)  Rehab Potential/Prognosis, Swallowing: good, to achieve stated therapy goals (04/24/24 1515)     Predicted Duration Therapy Intervention (Days): until discharge (04/24/24 1515)  Oral Care Recommendations: Oral Care BID/PRN, Toothbrush (04/24/24 1515)                                               SWALLOW EVALUATION (Last 72 Hours)       SLP Adult Swallow Evaluation       Row Name 04/24/24 1515                   Rehab Evaluation    Document Type evaluation  -MH        Subjective Information no complaints  -        Patient Observations alert;cooperative  -        Patient/Family/Caregiver Comments/Observations family present  -        Patient Effort good  -MH        Symptoms Noted During/After Treatment none  -MH           General Information    Patient Profile Reviewed yes  -MH        Pertinent History Of Current Problem Adm from OSH w/ SAH overlying midbrain & ct. Hx: a-fib, AAA, CAD, CVA, HTN, T2DM, HLD, hearing loss, dementia, prior CVA  -MH        Current Method of Nutrition NPO  -        Precautions/Limitations, Vision WFL;for purposes of eval  -        Precautions/Limitations, Hearing WFL;for purposes of eval  -        Prior Level of Function-Communication other (see comments)  Most recent SLC eval 7/20/23. Pt w/ mild short term memory deficits; however deficits also noted @ baseline  -        Prior Level of  Function-Swallowing no diet consistency restrictions;other (see comments)  per pt/family report  -        Plans/Goals Discussed with patient;family;agreed upon  [facility]        Barriers to Rehab medically complex  -        Patient's Goals for Discharge patient did not state  -        Family Goals for Discharge family did not state  -           Pain    Additional Documentation Pain Scale: FACES Pre/Post-Treatment (Group)  -           Pain Scale: FACES Pre/Post-Treatment    Pain: FACES Scale, Pretreatment 0-->no hurt  -        Posttreatment Pain Rating 0-->no hurt  -           Oral Motor Structure and Function    Dentition Assessment natural, present and adequate  -        Secretion Management WNL/WFL  -        Mucosal Quality moist, healthy  -           Oral Musculature and Cranial Nerve Assessment    Oral Motor General Assessment generalized oral motor weakness  -           General Eating/Swallowing Observations    Respiratory Support Currently in Use nasal cannula  -        O2 Liters 2L  -        Eating/Swallowing Skills self-fed;fed by SLP  [facility]        Positioning During Eating upright in bed  -        Utensils Used spoon;cup;straw  -        Consistencies Trialed ice chips;thin liquids;pureed;regular textures  -           Clinical Swallow Eval    Oral Prep Phase impaired  -        Oral Residue impaired  -        Esophageal Phase suspected esophageal impairment  -        Clinical Swallow Evaluation Summary Pt w/ delayed throat clear x1 w/ thin liquids via large, sequential sips; u/a to replicate accross multiple additonal trials. No overt s/s of aspiration w/ small/single sips of thin via cup/straw, pureed or regular solid consistenices. Slightly prolonged mastication w/ regulart solid trial & oral residue; able to clear w/ liquid wash. Recommend soft/chopped solid diet w/ thin liquids, small/single sips. SLP will f/u for re-eval to ensure no additional concerns  [facility]           Oral  Prep Concerns    Oral Prep Concerns prolonged mastication  -        Prolonged Mastication regular consistencies  -           Oral Residue Concerns    Oral Residue Concerns diffuse residue throughout oral cavity;other (see comments)  -        Diffuse Residue Throughout Oral Cavity regular consistencies  -           Esophageal Phase Concerns    Esophageal Phase Concerns belching  -        Belching thin  -           SLP Evaluation Clinical Impression    SLP Swallowing Diagnosis R/O pharyngeal dysphagia  -        Functional Impact risk of aspiration/pneumonia  -        Rehab Potential/Prognosis, Swallowing good, to achieve stated therapy goals  -        Swallow Criteria for Skilled Therapeutic Interventions Met demonstrates skilled criteria  -           Recommendations    Predicted Duration Therapy Intervention (Days) until discharge  -        SLP Diet Recommendation soft to chew textures;chopped;thin liquids  -        Recommended Diagnostics reassess via clinical swallow evaluation  -        Recommended Precautions and Strategies small bites of food and sips of liquid;general aspiration precautions;reflux precautions  -        Oral Care Recommendations Oral Care BID/PRN;Toothbrush  -        SLP Rec. for Method of Medication Administration meds whole;with thin liquids;meds crushed;with puree;as tolerated  -        Monitor for Signs of Aspiration yes;notify SLP if any concerns  -        Anticipated Discharge Disposition (SLP) inpatient rehabilitation facility  -                  User Key  (r) = Recorded By, (t) = Taken By, (c) = Cosigned By      Initials Name Effective Dates     Katalina Ham, MS CCC-SLP 05/12/23 -                     EDUCATION  The patient has been educated in the following areas:   Cognitive Impairment Communication Impairment.        SLP GOALS       Row Name 04/24/24 7724             Patient will demonstrate functional speech skills for return to discharge  environment    [place] Independently  -      Progress/Outcomes new goal  -         SLP Diagnostic Treatment     Patient will participate in further assessment in the following areas reading comprehension;graphic expression;cognitive-linguistic  -      Time Frame (Diagnostic) short term goal (STG)  -MH      Progress/Outcomes (Additional Goal 1, SLP) new goal  -MH         Phonation Goal 1 (SLP)    Improve Phonation By Goal 1 (SLP) using loud speech;80%;with minimal cues (75-90%)  -      Time Frame (Phonation Goal 1, SLP) short term goal (STG)  -MH      Progress/Outcomes (Phonation Goal 1, SLP) new goal  -MH         Articulation Goal 1 (SLP)    Improve Articulation Goal 1 (SLP) by over-articulating in connected speech;80%;with minimal cues (75-90%)  -      Time Frame (Articulation Goal 1, SLP) short term goal (STG)  -MH      Progress/Outcomes (Articulation Goal 1, SLP) new goal  -                User Key  (r) = Recorded By, (t) = Taken By, (c) = Cosigned By      Initials Name Provider Type     Katalina Ham MS CCC-SLP Speech and Language Pathologist                       Time Calculation:    Time Calculation- SLP       Row Name 04/24/24 1649             Time Calculation- SLP    SLP Start Time 1515  -MH      SLP Received On 04/24/24  -         Untimed Charges    30411-CF Eval Speech and Production w/ Language Minutes 30  -      14412-IV Eval Oral Pharyng Swallow Minutes 25  -MH         Total Minutes    Untimed Charges Total Minutes 55  -MH       Total Minutes 55  -MH                User Key  (r) = Recorded By, (t) = Taken By, (c) = Cosigned By      Initials Name Provider Type     Katalina Ham MS CCC-SLP Speech and Language Pathologist                    Therapy Charges for Today       Code Description Service Date Service Provider Modifiers Qty    67159366211 HC ST EVAL ORAL PHARYNG SWALLOW 2 4/24/2024 Katalina Ham MS CCC-SLP GN 1    92441039857 HC ST EVAL SPEECH AND PROD W LANG   2 2024 Katalina Ham, MS CCC-SLP GN 1                 Katalina Ham MS CCC-SLP  2024   and Acute Care - Speech Language Pathology Initial Evaluation   [facility]     Patient Name: Camron Hendrix  : 1950  MRN: [id number]  Today's Date: 2024               Admit Date: 2024     Visit Dx:    ICD-10-CM ICD-9-CM   1. Dysarthria  R47.1 784.51   2. 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    Stroke    Hemorrhagic stroke    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     Past Medical History:   Diagnosis Date    AAA (abdominal aortic aneurysm)     Coronary artery disease     CVA (cerebral vascular accident)     Hypertension     Hypothyroidism     PAF (paroxysmal atrial fibrillation)     TIA (transient ischemic attack)      Past Surgical History:   Procedure Laterality Date    CARDIAC CATHETERIZATION N/A 3/29/2019    Procedure: Left Heart Cath;  Surgeon: Andrea Holloway MD;  Location: [facility] INVASIVE LOCATION;  Service: Cardiovascular    CERVICAL FUSION      c4-c5    CORONARY ANGIOPLASTY WITH STENT PLACEMENT      HERNIA REPAIR      TONSILECTOMY, ADENOIDECTOMY, BILATERAL MYRINGOTOMY AND TUBES         SLP Recommendation and Plan  SLP Diagnosis: mild, dysarthria (24 1515)  SLP Diagnosis Comments: Pt presents w/ mild dysarthria per this eval. Expressive/receptive language abilities appear intact @  simple level. Pt noted w/ mild immediate memory deficits, family reports memory deficits @ baseline since prior CVA. SLP will f/u for tx & dx tx as appropriate (04/24/24 1515)        Swallow Criteria for Skilled Therapeutic Interventions Met: demonstrates skilled criteria (04/24/24 1515)  SLC Criteria for Skilled Therapy Interventions Met: yes (04/24/24 1515)  Anticipated Discharge Disposition (SLP): inpatient rehabilitation facility (04/24/24 1515)        Therapy Frequency (SLP SLC): 3 days per week (04/24/24 1515)  Predicted Duration Therapy Intervention (Days): until discharge (04/24/24 1515)  Oral Care Recommendations: Oral Care BID/PRN, Toothbrush (04/24/24 1515)                                 SLP EVALUATION (Last 72 Hours)       SLP SLC Evaluation       Row Name 04/24/24 1515                   General Information    Prior Level of Function-Communication other (see comments)  Most recent SLC eval 7/20/23. Pt w/ mild short term memory deficits; however deficits also noted @ baseline  -        Patient's Goals for Discharge patient did not state  -        Family Goals for Discharge family did not state  -           Comprehension Assessment/Intervention    Comprehension Assessment/Intervention Auditory Comprehension  -           Auditory Comprehension Assessment/Intervention    Auditory Comprehension (Communication) WFL  -           Expression Assessment/Intervention    Expression Assessment/Intervention verbal expression  -           Verbal Expression Assessment/Intervention    Verbal Expression WFL  -           Motor Speech Assessment/Intervention    Motor Speech Function mild impairment  -        Characteristics Consistent with Dysarthria decreased intensity;decreased articulation  -           Cognitive Assessment Intervention- SLP    Cognitive Function (Cognition) other (see comments)  Mild memory deficits, baseline per family  -        Orientation Status (Cognition) awareness of basic  personal information;person;place;time;situation;[facility]  -        Memory (Cognitive) simple;immediate;mild impairment  -        Attention (Cognitive) selective;sustained;WF  -           SLP Evaluation Clinical Impressions    SLP Diagnosis mild;dysarthria  -        SLP Diagnosis Comments Pt presents w/ mild dysarthria per this eval. Expressive/receptive language abilities appear intact @ simple level. Pt noted w/ mild immediate memory deficits, family reports memory deficits @ baseline since prior CVA. SLP will f/u for tx & dx tx as appropriate  -        Rehab Potential/Prognosis good  -        SLC Criteria for Skilled Therapy Interventions Met yes  -        Functional Impact functional impact in ADLs;difficulty in expressing complex messages  -           Recommendations    Therapy Frequency (SLP SLC) 3 days per week  -                  User Key  (r) = Recorded By, (t) = Taken By, (c) = Cosigned By      Initials Name Effective Dates     Katalina Ham, MS CCC-SLP 05/12/23 -                        EDUCATION  The patient has been educated in the following areas:     Dysphagia (Swallowing Impairment) Modified Diet Instruction.           SLP GOALS       Row Name 04/24/24 8737             Patient will demonstrate functional speech skills for return to discharge environment    [place] Independently  -      Progress/Outcomes new goal  -         SLP Diagnostic Treatment     Patient will participate in further assessment in the following areas reading comprehension;graphic expression;cognitive-linguistic  -      Time Frame (Diagnostic) short term goal (STG)  -      Progress/Outcomes (Additional Goal 1, SLP) new goal  -         Phonation Goal 1 (SLP)    Improve Phonation By Goal 1 (SLP) using loud speech;80%;with minimal cues (75-90%)  -      Time Frame (Phonation Goal 1, SLP) short term goal (STG)  -      Progress/Outcomes (Phonation Goal 1, SLP) new goal  -         Articulation Goal  1 (SLP)    Improve Articulation Goal 1 (SLP) by over-articulating in connected speech;80%;with minimal cues (75-90%)  -      Time Frame (Articulation Goal 1, SLP) short term goal (STG)  -      Progress/Outcomes (Articulation Goal 1, SLP) new goal  -                User Key  (r) = Recorded By, (t) = Taken By, (c) = Cosigned By      Initials Name Provider Type     Katalina Ham MS CCC-SLP Speech and Language Pathologist                            Time Calculation:      Time Calculation- SLP       Row Name 04/24/24 1649             Time Calculation- SLP    SLP Start Time 1515  -      SLP Received On 04/24/24  -         Untimed Charges    77562-XS Eval Speech and Production w/ Language Minutes 30  -      92116-NW Eval Oral Pharyng Swallow Minutes 25  -         Total Minutes    Untimed Charges Total Minutes 55  -       Total Minutes 55  -                User Key  (r) = Recorded By, (t) = Taken By, (c) = Cosigned By      Initials Name Provider Type     Katalina Ham, MS CCC-SLP Speech and Language Pathologist                    Therapy Charges for Today       Code Description Service Date Service Provider Modifiers Qty    [id number] HC ST EVAL ORAL PHARYNG SWALLOW 2 4/24/2024 Katalina Ham MS CCC-SLP GN 1    [id number] HC ST EVAL SPEECH AND PROD W LANG  2 4/24/2024 Katalina Ham, MS SWAN-CARLA GN 1                       MS MANUEL Hanson  4/24/2024

## 2024-04-24 NOTE — CONSULTS
Inpatient Neurosurgery Consult  Consult performed by: Rolando Bird PA-C  Consult ordered by: Pat Lynn APRN      Inpatient Neurosurgery Consult  Consult performed by: Rolando Bird PA-C  Consult ordered by: Rani Lilly MD      Referring Provider: Tramaine CH    Patient Care Team:  Leslie Rhodes MD as PCP - General (Family Medicine)  Delmer Perdomo MD as Cardiologist (Cardiology)    Chief Complaint: Headache    Subjective .     History of present illness:       Patient is a nice 73-year-old gentleman known to the neurosurgical practice for being followed secondary to a right MCA stenosis.  This has been stable over many years.  He has not had any issues in some time.  I saw him about a year ago.    Patient presented to [facility] secondary to a sudden onset of headache at 6 AM.  Patient was in normal state of health before then.  Patient was noted to have a posterior fossa bleed on the CT scan and was transferred here for higher level of care.    Upon arrival patient was evaluated by the stroke team and noted to have a GCS of 15 but has received Versed secondary to some rhythmic movements and is now GCS of 14 but will open his eyes quickly to command.  Patient is now starting to develop a little difficulty with visual issues and accommodation    Patient states that he still is suffering with a headache but is able to answer orientation questions.    Patient has a history of A-fib takes Coumadin daily and had INR drawn at [facility] that was 2.1 today.    CTA was done along with CT of the head that revealed this bleed in the PICA distribution/brainstem area but no discrete aneurysm noted.    I called discussed the case with the daughter who is in agreement with current course of action.    Patient seems to be in his right mind and stated that he would wish to be a full code.    Review of Systems   Constitutional:  Negative for activity change, appetite change, chills, fatigue  and fever.   HENT:  Negative for congestion, dental problem, ear pain, hearing loss, sinus pressure and tinnitus.    Eyes:  Positive for visual disturbance. Negative for pain and redness.   Respiratory:  Negative for apnea, cough, shortness of breath and wheezing.    Cardiovascular:  Negative for chest pain, palpitations and leg swelling.   Gastrointestinal:  Negative for abdominal distention, abdominal pain, blood in stool, constipation, diarrhea, nausea and vomiting.   Endocrine: Negative for cold intolerance, heat intolerance and polyuria.   Genitourinary:  Negative for enuresis, frequency and urgency.   Skin:  Negative for color change and rash.   Neurological:  Positive for headaches. Negative for dizziness, tremors, seizures, syncope, speech difficulty, weakness, light-headedness and numbness.   Psychiatric/Behavioral:  Negative for behavioral problems and confusion. The patient is not nervous/anxious.        History  Past Medical History:   Diagnosis Date    AAA (abdominal aortic aneurysm)     Coronary artery disease     CVA (cerebral vascular accident) 2011    Hypertension     Hypothyroidism     PAF (paroxysmal atrial fibrillation)     TIA (transient ischemic attack)    ,   Past Surgical History:   Procedure Laterality Date    CARDIAC CATHETERIZATION N/A 3/29/2019    Procedure: Left Heart Cath;  Surgeon: Andrea Holloway MD;  Location: [facility] CATH INVASIVE LOCATION;  Service: Cardiovascular    CERVICAL FUSION      c4-c5    CORONARY ANGIOPLASTY WITH STENT PLACEMENT      HERNIA REPAIR      TONSILECTOMY, ADENOIDECTOMY, BILATERAL MYRINGOTOMY AND TUBES     ,   Family History   Problem Relation Age of Onset    Heart failure Mother     Aneurysm Father    ,   Social History     Socioeconomic History    Marital status:    Tobacco Use    Smoking status: Former     Current packs/day: 0.00     Average packs/day: 1 pack/day for 20.0 years (20.0 ttl pk-yrs)     Types: Cigarettes     Start date: 9/20/1965     Quit  date: 1985     Years since quittin.6    Smokeless tobacco: Never   Vaping Use    Vaping status: Never Used   Substance and Sexual Activity    Alcohol use: Yes     Comment: occasional     Drug use: No    Sexual activity: Defer     E-cigarette/Vaping    E-cigarette/Vaping Use Never User      E-cigarette/Vaping Substances     E-cigarette/Vaping Devices         ,   Medications Prior to Admission   Medication Sig Dispense Refill Last Dose    amLODIPine (NORVASC) 10 MG tablet Take 1 tablet by mouth Daily.       aspirin 81 MG EC tablet Take 2 tablets by mouth Daily. 60 tablet 0     atorvastatin (LIPITOR) 20 MG tablet Take 1 tablet by mouth Every Night. 90 tablet 0     carvedilol (COREG) 6.25 MG tablet Take 1 tablet by mouth 2 (Two) Times a Day With Meals.       furosemide (LASIX) 20 MG tablet Take 2 tablets by mouth Daily As Needed (edema).       hydrALAZINE (APRESOLINE) 50 MG tablet Take 0.5 tablets by mouth 2 (two) times a day.       levothyroxine (SYNTHROID, LEVOTHROID) 175 MCG tablet 200 mcg.       losartan (COZAAR) 25 MG tablet Take 1 tablet by mouth Daily.       Melatonin 5 MG chewable tablet Chew 1 tablet At Night As Needed (insomnia). 30 tablet 0     potassium chloride (K-DUR) 10 MEQ CR tablet Take 1 tablet by mouth Every Morning.       warfarin (COUMADIN) 10 MG tablet Take 1 tablet by mouth Every Night. Tuesday, Thursday, Saturday, and       , Scheduled Meds:  levETIRAcetam, 1,000 mg, Intravenous, Q12H  prothrombin complex conc human, 2,500 Units, Intravenous, Once  sodium chloride, 10 mL, Intravenous, Q12H  sodium chloride, 10 mL, Intravenous, Q12H   , Continuous Infusions:  niCARdipine, 5-15 mg/hr, Last Rate: 10 mg/hr (24 [id number])  sodium chloride, 75 mL/hr   , PRN Meds:    sodium chloride    sodium chloride    sodium chloride    sodium chloride, and Allergies:  Codeine   SMOKING STATUS: Non-smoker  Objective     Vital Signs      There is no height or weight on file to calculate  BMI.    Physical Exam:  The patient is obese with a thick neck and some sonorous respirations    He will asleep but easily awakens and has no evidence of any orientation difficulties his GCS is 14    Cardiovascular:       Peripheral pulses 2+ at dorsalis pedis and posterior tibialis    Lungs:         Breathing unlabored    Musculoskeletal:            strength is 5 out of 5 bilaterally.        Shoulder abduction is 5 out of 5.         Dorsiflexion is 5/5 Bilaterally       Plantarflexion is 5/5 bilaterally       Hip Flexion 5/5 bilaterally.      Neurologic:          The patient is alert and oriented by 3.          Pupils are equal and reactive to light.  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         There is no evidence of central motor drift. No facial droop.  No difficulty with rapid alternating movements.         Sensation is equal bilaterally with no deficit.           Reflexes:  2+ through out    CRANIAL NERVES:         Cranial nerve II: Visual fields are full to confrontation.       Cranial nerves III, IV and VI: PERRLA DC.  Lateral rectus palsy on the left       cranial nerve V: Facial sensation is intact to light touch.       Cranial nerve VII: Muscles of facial expression revealed no asymmetry.       Cranial nerve VIII: Hearing is intact to finger rub bilaterally.       Cranial nerve IX and X: Palate elevates symmetrically.        Cranial nerve XI: Shoulder shrug is intact.       Cranial nerve XII: Tongue is midline without evidence of Atrophy or fasciculation.      Results Review:   I reviewed the patient's new imaging results and agree with the interpretation.  Discussed with Dr. Horace lundberg I will review these again with Dr. Can.  I do not see nor do  we see any source i.e. aneurysm/AVM for this on the CTA.  There is a bleed on the left side of the brainstem.  It is within the posterior fossa within the prepontine cistern extending down toward the foramen magnum left worse than right.  I personally reviewed and interpreted the findings I see no evidence of large aneurysms    No obvious sign of hydrocephalus.  No dilatation of the ventricles    Assessment & Plan     Diagnosis:  Subarachnoid hemorrhage-likely secondary to anticoagulation use and I CAD  History of ICAD  A-fib chronic anticoagulation Coumadin    PLAN:    We will rescan him in 4 to 6 hours to ensure that there is no extension of the bleed.  If clinically indicated we can repeat the scan sooner.  We will plan on MRI of the brain with and without contrast with MRA given the calcification of the vessels    We may discuss diagnostic catheter angiogram    I personally discussed at the bedside and spent 60 minutes in the care plan looking at films and discussing with the family member options workup etc. and answered all her questions I explained the tenuous nature of this given the patient's age obesity hypertension recent cognitive issues recent stroke and I CAD      Time:  60 minutes    Please note that portions of this note were dictated and scribed by my PA Rolando Bird, I personally saw and examined the patient and discussed the findings reviewed the scans as well at the bedside    I personally viewed and interpreted the MRI MRA findings.  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

## 2024-04-24 NOTE — CONSULTS
Diabetes Education    Patient Name:  Camron Hendrix  YOB: 1950  MRN: [id number]  Admit Date:  4/24/2024    Consult for diabetes education received per stroke protocol. Chart reviewed. Pt was seen at bedside today with 2 daughters present. Permission given for visit.     Patient's current A1C is 6.8%, on ozempic. Patient has a meter but is not currently checking his blood sugar but states he will start checking it a few times a week. Patient's daughter states she thought her father only had prediabetes, educated on pre-diabetes, diabetes and the disease process, types of DM, diagnosis/A1C, monitoring, signs and symptoms of hypoglycemia and hyperglycemia and treatments, and activity and exercise. Patient currently states he is not active. Changing behavior and goal setting relative to diet, exercise and healthy lifestyle was emphasized. Pt advised to consult with PCP for further instructions, discuss A1C result, and POC. Education handouts provided, questions answered and pt. advised to call with any other questions or concerns. Provided patient with copy of [facility]'s Diabetes Basics.    Total time spent reviewing chart, preparing education/materials, providing education at bedside, and coordinating care approx 30 minutes.    Electronically signed by:  Sue Paz RN  04/24/24 15:39 EDT

## 2024-04-24 NOTE — CONSULTS
Stroke Consult Note    Patient Name: Camron Hendrix   MRN: [id number]  Age: 73 y.o.  Sex: male  : 1950    Primary Care Physician: Leslie Rhodes MD  Referring Physician:  [facility]     TIME STROKE TEAM CALLED: 905 EST       TIME PATIENT SEEN: 1024 EST on arrival to [facility]     Handedness: Right  Race:      Chief Complaint/Reason for Consultation: SAH    Subjective .  HPI:     Camron Hendrix is a 73 year old  male with a PMH significant for AAA, CAD, CVA, HTN, HLD, AF with chronic coumadin use, and thyroid disease. He presented to [facility] with complaints of a severe headache. Denies any trauma or falls. CTH completed at OSH reveals SAH . INR and their facility was noted to be 2.1. CTA head and neck were not completed at that time. Encouraged for rapid transfer to [facility] for further workup and evaluation.  and he was provided IV hydralazine. NIH per ED MD examination 0. Labs were noted to be overall unremarkable.    On arrival to [facility] he was taken directly to the CT scanner for contrasted imaging.  systolic on arrival. NIH on my examination 0. He is alert and oriented and able to follow commands. He reports a current 9/10. He does have a left lateral nerve palsy and is unable to cross midline with the left. He does follow commands and he is able to tell me his name and where he is currently. No overt ataxia or dysmetria is noted. While in the CT scanner he experienced an episode of generalized full body tremor and jerking. He was able to talk through the episode, however he was unsure of what was happening at the time. He was administered a loading dose of Keppra as well as a dose of versed. He reports his last dose of Coumadin was last PM, 10mg. He is admitted to the Neuro ICU for further work-up and evaluation.     Last Known Normal Date/Time: 0600am    Review of Systems   Constitutional:  Positive for fatigue.   HENT:  Negative for trouble  swallowing.    Eyes:  Positive for visual disturbance.   Respiratory:  Negative for shortness of breath.    Gastrointestinal:  Positive for nausea and vomiting.   Skin:  Negative for wound.   Neurological:  Positive for headaches. Negative for facial asymmetry, speech difficulty, weakness and numbness.   Psychiatric/Behavioral:  Negative for confusion.         Past Medical History:   Diagnosis Date    AAA (abdominal aortic aneurysm)     Coronary artery disease     CVA (cerebral vascular accident)     Hypertension     Hypothyroidism     PAF (paroxysmal atrial fibrillation)     TIA (transient ischemic attack)      Past Surgical History:   Procedure Laterality Date    CARDIAC CATHETERIZATION N/A 3/29/2019    Procedure: Left Heart Cath;  Surgeon: Andrea Holloway MD;  Location: [facility] CATH INVASIVE LOCATION;  Service: Cardiovascular    CERVICAL FUSION      c4-c5    CORONARY ANGIOPLASTY WITH STENT PLACEMENT      HERNIA REPAIR      TONSILECTOMY, ADENOIDECTOMY, BILATERAL MYRINGOTOMY AND TUBES       Family History   Problem Relation Age of Onset    Heart failure Mother     Aneurysm Father      Social History     Socioeconomic History    Marital status:    Tobacco Use    Smoking status: Former     Current packs/day: 0.00     Average packs/day: 1 pack/day for 20.0 years (20.0 ttl pk-yrs)     Types: Cigarettes     Start date: 1965     Quit date: 1985     Years since quittin.6    Smokeless tobacco: Never   Vaping Use    Vaping status: Never Used   Substance and Sexual Activity    Alcohol use: Yes     Comment: occasional     Drug use: No    Sexual activity: Defer     Allergies   Allergen Reactions    Codeine Hallucinations     Prior to Admission medications    Medication Sig Start Date End Date Taking? Authorizing Provider   amLODIPine (NORVASC) 10 MG tablet Take 1 tablet by mouth Daily.    Provider, MD Prabhakar   aspirin 81 MG EC tablet Take 2 tablets by mouth Daily. 23   Abebe Sterling,  MD   atorvastatin (LIPITOR) 20 MG tablet Take 1 tablet by mouth Every Night. 7/20/23   Abebe Sterling MD   carvedilol (COREG) 6.25 MG tablet Take 1 tablet by mouth 2 (Two) Times a Day With Meals. 8/4/21   Suki Doherty DO   furosemide (LASIX) 20 MG tablet Take 2 tablets by mouth Daily As Needed (edema). 8/4/21   Suki Doherty DO   hydrALAZINE (APRESOLINE) 50 MG tablet Take 0.5 tablets by mouth 2 (two) times a day. 8/4/21   Suki Doherty DO   levothyroxine (SYNTHROID, LEVOTHROID) 175 MCG tablet 200 mcg. 8/9/21   Prabhakar Busby MD   losartan (COZAAR) 25 MG tablet Take 1 tablet by mouth Daily.    Prabhakar Busby MD   Melatonin 5 MG chewable tablet Chew 1 tablet At Night As Needed (insomnia). 7/20/23   Abebe Sterling MD   potassium chloride (K-DUR) 10 MEQ CR tablet Take 1 tablet by mouth Every Morning.    Prabhakar Busby MD   warfarin (COUMADIN) 10 MG tablet Take 1 tablet by mouth Every Night. Tuesday, Thursday, Saturday, and Sunday    Prabhakar Busby MD     Objective     Neurological Exam  Mental Status  Awake and alert. Oriented to person, place, time and situation. Oriented to person, place, and time. Speech is normal. Language is fluent with no aphasia. Fund of knowledge is appropriate for level of education.    Cranial Nerves  CN II: Right visual acuity: Normal. Left visual acuity: Normal. Visual fields full to confrontation.  CN III, IV, VI: Pupils equal round and reactive to light bilaterally. Left eye palsy, unable to track past the midline. .  CN V:  Right: Facial sensation is normal.  Left: Facial sensation is normal on the left.  CN VII:  Right: There is no facial weakness.  Left: There is no facial weakness.  CN VIII: [place] at baseline, however intact bilaterally .  CN IX, X: Palate elevates symmetrically    Motor  Normal muscle bulk throughout. No fasciculations present. Normal muscle tone. Strength is 5/5 throughout all four extremities.    Sensory  Light  touch is normal in upper and lower extremities.     Coordination  Right: Finger-to-nose normal.Left: Finger-to-nose normal.    Gait    Not observed secondary to the acuity of the patients condition .    Physical Exam  Constitutional:       General: He is awake. He is not in acute distress.     Appearance: He is obese. He is ill-appearing.   HENT:      Head: Normocephalic.   Eyes:      Pupils: Pupils are equal, round, and reactive to light.   Cardiovascular:      Rate and Rhythm: Regular rhythm. Bradycardia present.   Pulmonary:      Effort: Pulmonary effort is normal.   Musculoskeletal:         General: Normal range of motion.      Cervical back: Normal range of motion.   Skin:     General: Skin is warm and dry.      Findings: No bruising.   Neurological:      Mental Status: He is alert and oriented to person, place, and time.      Cranial Nerves: Cranial nerve deficit present.      Sensory: No sensory deficit.      Motor: Motor strength is normal.No weakness.   Psychiatric:         Mood and Affect: Mood normal.         Speech: Speech normal.         Behavior: Behavior normal.       Acute Stroke Data    IV Thrombolytic (TPA/Tenecteplase) Inclusion / Exclusion Criteria    Time: 11:22 EDT  Person Administering Scale: FADUMO Restrepo    Inclusion Criteria  [x]   18 years of age or greater   []   Onset of symptoms < 4.5 hours before beginning treatment (stroke onset = time patient was last seen well or without symptoms).   []   Diagnosis of acute ischemic stroke causing measurable disabling deficit (Complete Hemianopia, Any Aphasia, Visual or Sensory Extinction, Any weakness limiting sustained effort against gravity)   []   Any remaining deficit considered potentially disabling in view of patient and practitioner   Exclusion criteria (Do not proceed with Alteplase if any are checked under exclusion criteria)  []   Onset unknown or GREATER than 4.5 hours   [x]   ICH on CT/MRI   []   CT demonstrates hypodensity  representing acute or subacute infarct   []   Significant head trauma or prior stroke in the previous 3 months   []   Symptoms suggestive of subarachnoid hemorrhage   []   History of un-ruptured intracranial aneurysm GREATER than 10 mm   []   Recent intracranial or intraspinal surgery within the last 3 months   []   Arterial puncture at a non-compressible site in the previous 7 days   []   Active internal bleeding   []   Acute bleeding tendency   []   Platelet count LESS than 100,000 for known hematological diseases such as leukemia, thrombocytopenia or chronic cirrhosis   []   Current use of anticoagulant with INR GREATER than 1.7 or PT GREATER than 15 seconds, aPTT GREATER than 40 seconds   []   Heparin received within 48 hours, resulting in abnormally elevated aPTT GREATER than upper limit of normal   []   Current use of direct thrombin inhibitors or direct factor Xa inhibitors in the past 48 hours   []   Elevated blood pressure refractory to treatment (systolic GREATER than 185 mm/Hg or diastolic  GREATER than 110 mm/Hg   []   Suspected infective endocarditis and aortic arch dissection   []   Current use of therapeutic treatment dose of low-molecular-weight heparin (LMWH) within the previous 24 hours   []   Structural GI malignancy or bleed   Relative exclusion for all patients  []   Only minor nondisabling symptoms   []   Pregnancy   []   Seizure at onset with postictal residual neurological impairments   []   Major surgery or previous trauma within past 14 days   []   History of previous spontaneous ICH, intracranial neoplasm, or AV malformation   []   Postpartum (within previous 14 days)   []   Recent GI or urinary tract hemorrhage (within previous 21 days)   []   Recent acute MI (within previous 3 months)   []   History of unruptured intracranial aneurysm LESS than 10 mm   []   History of ruptured intracranial aneurysm   []   Blood glucose LESS than 50 mg/dL (2.7 mmol/L)   []   Dural puncture within the last  7 days   []   Known GREATER than 10 cerebral microbleeds   Additional exclusions for patients with symptoms onset between 3 and 4.5 hours.  []   Age > 80.   []   On any anticoagulants regardless of INR  >>> Warfarin (Coumadin), Heparin, Enoxaparin (Lovenox), fondaparinux (Arixtra), bivalirudin (Angiomax), Argatroban, dabigatran (Pradaxa), rivaroxaban (Xarelto), or apixaban (Eliquis)   []   Severe stroke (NIHSS > 25).   []   History of BOTH diabetes and previous ischemic stroke.   []   The risks and benefits have been discussed with the patient or family related to the administration of IV alteplase for stroke symptoms.   []   I have discussed and reviewed the patient's case and imaging with the attending prior to IV Thrombolytic (TPA/Tenecteplase).   NA Time Thrombolytic administered       Hospital Meds:  Scheduled- levETIRAcetam, 1,000 mg, Intravenous, Q12H  phytonadione (AQUA-MEPHYTON, VITAMIN K) 10 mg in sodium chloride 0.9 % 50 mL IVPB, 10 mg, Intravenous, Once  prothrombin complex conc human, 2,500 Units, Intravenous, Once  sodium chloride, 10 mL, Intravenous, Q12H  sodium chloride, 10 mL, Intravenous, Q12H      Infusions- niCARdipine, 5-15 mg/hr  sodium chloride, 75 mL/hr       PRNs-   sodium chloride    sodium chloride    sodium chloride    sodium chloride    Functional Status Prior to Current Stroke/[place] Score: 0    NIH Stroke Scale  Time: 11:22 EDT  Person Administering Scale: FADUMO Restrepo    1a  Level of consciousness: 0=alert; keenly responsive   1b. LOC questions:  0=Performs both tasks correctly   1c. LOC commands: 0=Performs both tasks correctly   2.  Best Gaze: 1=partial gaze palsy   3.  Visual: 0=No visual loss   4. Facial Palsy: 0=Normal symmetric movement   5a.  Motor left arm: 0=No drift, limb holds 90 (or 45) degrees for full 10 seconds   5b.  Motor right arm: 0=No drift, limb holds 90 (or 45) degrees for full 10 seconds   6a. motor left le=No drift, limb holds 90 (or 45) degrees  for full 10 seconds   6b  Motor right le=No drift, limb holds 90 (or 45) degrees for full 10 seconds   7. Limb Ataxia: 0=Absent   8.  Sensory: 0=Normal; no sensory loss   9. Best Language:  0=No aphasia, normal   10. Dysarthria: 0=Normal   11. Extinction and Inattention: 0=No abnormality    Total:   1     Mckeon and Calero Grade:  The Mckeon and Calero grade for this patient is: Grade 2: Moderate to severe headache, stiff neck, no neurologic deficit except cranial nerve palsy at 11:37 EDT on 24.    Results Reviewed:  I have personally reviewed current lab, radiology, and data and agree with results.    OSH labs:  WBC 5.0  Hemoglobin 15.2  Hematocrit 43.8  Platelets 190  Sodium 140  Potassium 4.2  Glucose 137  Creatinine 1.0  AST 26  ALT 44  PT 21.2  INR 2.1  PTT 37.3    CT Head Without Contrast Stroke Protocol    Result Date: 2024  CT HEAD WO CONTRAST STROKE PROTOCOL, CT ANGIOGRAM HEAD W AI ANALYSIS OF LVO, CT ANGIOGRAM NECK Date of Exam: 2024 10:29 AM EDT Indication: Neuro deficit, acute, stroke suspected. Comparison: MRI 2023. Technique: Axial CT images of the brain were obtained prior to and after the administration of 115 mL Isovue-370. CTA of the head and neck were performed after the administration of iodinated contrast.  Reconstructed coronal and sagittal images were also  obtained. A 3-D volume rendered image was created for interpretation. Automated exposure control and iterative reconstruction methods were used.   Findings: CT head: There is prominent acute subarachnoid hemorrhage seen within the posterior fossa, extending along the left prepontine cistern down towards the level of the foramen magnum and cranially towards the interpeduncular cistern and left ambient cistern. There is no evidence of intraventricular or intraparenchymal hemorrhage. The brain demonstrates generalized volume loss as well as typical white matter changes of senescence. The ventricles are normal in size and  configuration. The orbits are normal. The paranasal sinuses are clear. CT ANGIOGRAM: The lung apices are clear. Evaluation of the neck soft tissues demonstrates no pathologic cervical adenopathy or unexpected aerodigestive tract lesion. The osseous structures demonstrate no evidence of acute fracture or aggressive osseous lesion, with multilevel spondylosis changes present. Patent aortic arch with three-vessel branching. The visualized subclavian arteries are patent bilaterally. The ICA origins demonstrate some mild calcific atherosclerotic change with 0% narrowing present bilaterally by NASCET criteria. The vertebral arteries are normal in course and caliber bilaterally. Intracranially, the carotid siphons demonstrate calcific atherosclerotic changes with some associated mild narrowing of the supraclinoid segments. The anterior cerebral arteries demonstrate some multifocal moderate atherosclerotic narrowing without evidence of large vessel occlusion or aneurysm. The right middle cerebral artery demonstrates stable abnormal appearance including long segment severe narrowing and subsequent occlusion of the right M1 segment middle cerebral artery with collateral filling of proximal M2 and distal M3 branches similar to prior. The left middle cerebral artery also demonstrates some unchanged moderate atherosclerotic disease. The vertebral arteries demonstrate mild atherosclerotic narrowing without definite aneurysm or focal severe stenosis. The basilar artery is patent. The posterior cerebral arteries demonstrate some unchanged multifocal moderate atherosclerotic narrowing.     Impression: Impression: CT head demonstrates prominent subarachnoid hemorrhage within the posterior fossa layering within the prepontine cistern extending down to the foramen magnum and cranially to the level of the midbrain. No additional intracranial hemorrhage is evident and  there are typical moderate age-related changes as above. CT angiogram  demonstrates no definite etiologic aneurysm, with findings potentially secondary to benign perimesencephalic hemorrhage. 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 Findings discussed with the stroke team by Stewart Delcid via phone at 11:07 a.m. 2024 Electronically Signed: Kameron Delcid MD  2024 11:14 AM EDT  Workstation ID: [id number]    CT Head Without Contrast    Result Date: 2024  [id number] [place] [address] Phone: [phone] Fax: [phone] Name: AVEL GUTIERREZ Exam Date: 2024 : 1950 Age 73 years Gender: M Accession: [id number] MRN: [id number] Physician: CATALINA ALEMAN Facility: [facility] Facility HSV: Outpatient Exam: CT BRAIN W/O HEAD CT HISTORY: Headache. TECHNIQUE: Multiple axial CT images were performed from the foramen magnum to the vertex without enhancement. FINDINGS: The ventricles are normal in size. There is subarachnoid hemorrhage overlying the midbrain and ct. No masses are identified. No extra-axial fluid is seen. The sinuses are normal. IMPRESSION: Subarachnoid hemorrhage overlying the midbrain and ct. Films reviewed , interpreted and dictated by Dr. Andrei Downing Transcribed by Anil Brady PA-C. Dictated By:  Andrei Navarro Transcribed By: Andrei Downing Transcribed On: 2024 8:51 AM Electronically signed by: Andrei Navarro 2024 Thank you for referring AVEL GUTIERREZ to [facility]. Legally authenticated by ED GRIMALDO 2024 08:51:17     Assessment/Plan:    73 year old  male with multiple vascular risk factors who presented to OSH with complaints of severe headache. CTH at OSH shows posterior SAH. No trauma or falls noted. He is transferred to [facility] Neuro ICU for further work-up and evaluation. STAT CTH, CTA H/N  completed on arrival. INR noted to be 2.1. NIH on arrival 1. No aneurysm noted on CTA.      Antiplatelet PTA: ASA  Anticoagulant PTA: Coumadin      Subarachnoid Hemorrhage   -Hemorrhagic orderset  -Bedside dysphagia screening prior to any PO intake  -Goal SBP <140, utilize cardene as needed, overall management per ICU medicine team   -STAT CT, CTA H/N on arrival  -Keppra 1000mg BID, starting STAT  -Versed 1mg now for possible seizure activity   -Vitamin K reversal, discussed with pharmacy   -EEG for any recurrent seizure activity   -CTH repeat 1530  -Hold all antiplatelet and anticoagulant medications   -Lipitor 40mg nightly   -FLP and A1c in AM   -TTE, routine  -AM labs  -Repeat INR in AM   -Likely needs to have angiogram during this admission to assess vasculature  -MRI brain w/wo contrast  -MRA head/neck w/wo contrast     Daughter called and updated on transfer as well as the patients current plan of care and his condition.     Stroke Neurology will continue to follow along. Plan discussed with ICU medicine team as well as Dr. Redd and Neurosurgery Service. Thanks for the consult in the care of the patient. Please call with any questions or concerns.     Pat Lynn, APRN  April 24, 2024  1100am EDT

## 2024-04-25 ENCOUNTER — APPOINTMENT (OUTPATIENT)
Dept: CARDIOLOGY | Facility: HOSPITAL | Age: 74
End: 2024-04-25
Payer: MEDICARE

## 2024-04-25 ENCOUNTER — APPOINTMENT (OUTPATIENT)
Dept: CT IMAGING | Facility: HOSPITAL | Age: 74
End: 2024-04-25
Payer: MEDICARE

## 2024-04-25 PROBLEM — I61.9 HEMORRHAGIC STROKE: Status: RESOLVED | Noted: 2024-04-24 | Resolved: 2024-04-25

## 2024-04-25 PROBLEM — I61.3 LEFT-SIDED NONTRAUMATIC INTRACEREBRAL HEMORRHAGE OF BRAINSTEM: Status: RESOLVED | Noted: 2024-04-24 | Resolved: 2024-04-25

## 2024-04-25 PROBLEM — I60.9 SAH (SUBARACHNOID HEMORRHAGE): Status: ACTIVE | Noted: 2024-04-25

## 2024-04-25 PROBLEM — I63.9 STROKE: Status: RESOLVED | Noted: 2023-07-19 | Resolved: 2024-04-25

## 2024-04-25 LAB
CHOLEST SERPL-MCNC: 94 MG/DL (ref 0–200)
DEPRECATED RDW RBC AUTO: 40.5 FL (ref 37–54)
ERYTHROCYTE [DISTWIDTH] IN BLOOD BY AUTOMATED COUNT: 13.1 % (ref 12.3–15.4)
GLUCOSE BLDC GLUCOMTR-MCNC: 128 MG/DL (ref 70–130)
GLUCOSE BLDC GLUCOMTR-MCNC: 132 MG/DL (ref 70–130)
HBA1C MFR BLD: 5.8 % (ref 4.8–5.6)
HCT VFR BLD AUTO: 43.1 % (ref 37.5–51)
HDLC SERPL-MCNC: 33 MG/DL (ref 40–60)
HGB BLD-MCNC: 14.6 G/DL (ref 13–17.7)
INR PPP: 1.15 (ref 0.89–1.12)
LDLC SERPL CALC-MCNC: 42 MG/DL (ref 0–100)
LDLC/HDLC SERPL: 1.24 {RATIO}
MAGNESIUM SERPL-MCNC: 1.7 MG/DL (ref 1.6–2.4)
MAGNESIUM SERPL-MCNC: 2.3 MG/DL (ref 1.6–2.4)
MCH RBC QN AUTO: 29.2 PG (ref 26.6–33)
MCHC RBC AUTO-ENTMCNC: 33.9 G/DL (ref 31.5–35.7)
MCV RBC AUTO: 86.2 FL (ref 79–97)
PHOSPHATE SERPL-MCNC: 2.4 MG/DL (ref 2.5–4.5)
PLATELET # BLD AUTO: 201 10*3/MM3 (ref 140–450)
PMV BLD AUTO: 10.6 FL (ref 6–12)
PROTHROMBIN TIME: 14.8 SECONDS (ref 12.2–14.5)
QT INTERVAL: 390 MS
QTC INTERVAL: 432 MS
RBC # BLD AUTO: 5 10*6/MM3 (ref 4.14–5.8)
TRIGL SERPL-MCNC: 100 MG/DL (ref 0–150)
VLDLC SERPL-MCNC: 19 MG/DL (ref 5–40)
WBC NRBC COR # BLD AUTO: 8.18 10*3/MM3 (ref 3.4–10.8)

## 2024-04-25 PROCEDURE — 25810000003 SODIUM CHLORIDE 0.9 % SOLUTION 250 ML FLEX CONT: Performed by: NURSE PRACTITIONER

## 2024-04-25 PROCEDURE — 85027 COMPLETE CBC AUTOMATED: CPT | Performed by: INTERNAL MEDICINE

## 2024-04-25 PROCEDURE — 80061 LIPID PANEL: CPT | Performed by: INTERNAL MEDICINE

## 2024-04-25 PROCEDURE — 94799 UNLISTED PULMONARY SVC/PX: CPT

## 2024-04-25 PROCEDURE — 25010000002 NICARDIPINE 2.5 MG/ML SOLUTION 10 ML VIAL: Performed by: NURSE PRACTITIONER

## 2024-04-25 PROCEDURE — 25010000002 MAGNESIUM SULFATE 4 GM/100ML SOLUTION: Performed by: INTERNAL MEDICINE

## 2024-04-25 PROCEDURE — 99233 SBSQ HOSP IP/OBS HIGH 50: CPT | Performed by: INTERNAL MEDICINE

## 2024-04-25 PROCEDURE — 83735 ASSAY OF MAGNESIUM: CPT | Performed by: INTERNAL MEDICINE

## 2024-04-25 PROCEDURE — 97162 PT EVAL MOD COMPLEX 30 MIN: CPT

## 2024-04-25 PROCEDURE — 99233 SBSQ HOSP IP/OBS HIGH 50: CPT | Performed by: STUDENT IN AN ORGANIZED HEALTH CARE EDUCATION/TRAINING PROGRAM

## 2024-04-25 PROCEDURE — 70450 CT HEAD/BRAIN W/O DYE: CPT

## 2024-04-25 PROCEDURE — 84100 ASSAY OF PHOSPHORUS: CPT | Performed by: INTERNAL MEDICINE

## 2024-04-25 PROCEDURE — 83036 HEMOGLOBIN GLYCOSYLATED A1C: CPT | Performed by: INTERNAL MEDICINE

## 2024-04-25 PROCEDURE — 92610 EVALUATE SWALLOWING FUNCTION: CPT

## 2024-04-25 PROCEDURE — 97166 OT EVAL MOD COMPLEX 45 MIN: CPT

## 2024-04-25 PROCEDURE — 94660 CPAP INITIATION&MGMT: CPT

## 2024-04-25 PROCEDURE — 85610 PROTHROMBIN TIME: CPT | Performed by: INTERNAL MEDICINE

## 2024-04-25 PROCEDURE — 25010000002 LEVETRIRACETAM PER 10 MG: Performed by: NURSE PRACTITIONER

## 2024-04-25 PROCEDURE — 82948 REAGENT STRIP/BLOOD GLUCOSE: CPT

## 2024-04-25 PROCEDURE — 25810000003 SODIUM CHLORIDE 0.9 % SOLUTION: Performed by: INTERNAL MEDICINE

## 2024-04-25 RX ORDER — ASPIRIN 81 MG/1
81 TABLET ORAL DAILY
COMMUNITY
End: 2024-05-02 | Stop reason: HOSPADM

## 2024-04-25 RX ORDER — LOSARTAN POTASSIUM 50 MG/1
50 TABLET ORAL
Status: DISCONTINUED | OUTPATIENT
Start: 2024-04-25 | End: 2024-04-26

## 2024-04-25 RX ORDER — CARVEDILOL 12.5 MG/1
12.5 TABLET ORAL 2 TIMES DAILY WITH MEALS
COMMUNITY
End: 2024-05-02 | Stop reason: HOSPADM

## 2024-04-25 RX ORDER — ACETAMINOPHEN 325 MG/1
650 TABLET ORAL EVERY 6 HOURS PRN
Status: DISCONTINUED | OUTPATIENT
Start: 2024-04-25 | End: 2024-05-02 | Stop reason: HOSPADM

## 2024-04-25 RX ORDER — CETIRIZINE HYDROCHLORIDE 10 MG/1
10 TABLET ORAL DAILY
Status: DISCONTINUED | OUTPATIENT
Start: 2024-04-25 | End: 2024-05-02 | Stop reason: HOSPADM

## 2024-04-25 RX ORDER — HYDRALAZINE HYDROCHLORIDE 50 MG/1
50 TABLET, FILM COATED ORAL EVERY 8 HOURS SCHEDULED
Status: DISCONTINUED | OUTPATIENT
Start: 2024-04-25 | End: 2024-04-27

## 2024-04-25 RX ORDER — MAGNESIUM SULFATE HEPTAHYDRATE 40 MG/ML
4 INJECTION, SOLUTION INTRAVENOUS ONCE
Status: DISCONTINUED | OUTPATIENT
Start: 2024-04-25 | End: 2024-04-27

## 2024-04-25 RX ORDER — HYDRALAZINE HYDROCHLORIDE 50 MG/1
50 TABLET, FILM COATED ORAL 2 TIMES DAILY
COMMUNITY
End: 2024-05-02 | Stop reason: HOSPADM

## 2024-04-25 RX ORDER — BUMETANIDE 1 MG/1
1 TABLET ORAL DAILY
Status: ON HOLD | COMMUNITY
End: 2024-05-02

## 2024-04-25 RX ADMIN — HYDRALAZINE HYDROCHLORIDE 50 MG: 50 TABLET ORAL at 14:25

## 2024-04-25 RX ADMIN — ATORVASTATIN CALCIUM 40 MG: 40 TABLET, FILM COATED ORAL at 20:03

## 2024-04-25 RX ADMIN — NICARDIPINE HYDROCHLORIDE 15 MG/HR: 25 INJECTION, SOLUTION INTRAVENOUS at 07:39

## 2024-04-25 RX ADMIN — NICARDIPINE HYDROCHLORIDE 12.5 MG/HR: 25 INJECTION, SOLUTION INTRAVENOUS at 12:43

## 2024-04-25 RX ADMIN — SODIUM CHLORIDE 75 ML/HR: 9 INJECTION, SOLUTION INTRAVENOUS at 18:14

## 2024-04-25 RX ADMIN — NICARDIPINE HYDROCHLORIDE 10 MG/HR: 25 INJECTION, SOLUTION INTRAVENOUS at 18:23

## 2024-04-25 RX ADMIN — NICARDIPINE HYDROCHLORIDE 15 MG/HR: 25 INJECTION, SOLUTION INTRAVENOUS at 02:49

## 2024-04-25 RX ADMIN — NICARDIPINE HYDROCHLORIDE 15 MG/HR: 25 INJECTION, SOLUTION INTRAVENOUS at 05:08

## 2024-04-25 RX ADMIN — LEVETIRACETAM 1000 MG: 100 INJECTION, SOLUTION INTRAVENOUS at 20:13

## 2024-04-25 RX ADMIN — LEVETIRACETAM 1000 MG: 100 INJECTION, SOLUTION INTRAVENOUS at 09:00

## 2024-04-25 RX ADMIN — HYDRALAZINE HYDROCHLORIDE 50 MG: 50 TABLET ORAL at 21:58

## 2024-04-25 RX ADMIN — LOSARTAN POTASSIUM 50 MG: 50 TABLET, FILM COATED ORAL at 10:09

## 2024-04-25 RX ADMIN — ACETAMINOPHEN 650 MG: 325 TABLET ORAL at 03:05

## 2024-04-25 RX ADMIN — SODIUM CHLORIDE 75 ML/HR: 9 INJECTION, SOLUTION INTRAVENOUS at 02:49

## 2024-04-25 RX ADMIN — CETIRIZINE HYDROCHLORIDE 10 MG: 10 TABLET, FILM COATED ORAL at 10:08

## 2024-04-25 RX ADMIN — NICARDIPINE HYDROCHLORIDE 7.5 MG/HR: 25 INJECTION, SOLUTION INTRAVENOUS at 21:58

## 2024-04-25 RX ADMIN — NICARDIPINE HYDROCHLORIDE 12.5 MG/HR: 25 INJECTION, SOLUTION INTRAVENOUS at 00:07

## 2024-04-25 RX ADMIN — MAGNESIUM SULFATE IN WATER FOR 4 G: 40 INJECTION INTRAVENOUS at 00:06

## 2024-04-25 RX ADMIN — Medication 10 ML: at 20:12

## 2024-04-25 RX ADMIN — POTASSIUM & SODIUM PHOSPHATES POWDER PACK 280-160-250 MG 2 PACKET: 280-160-250 PACK at 00:06

## 2024-04-25 RX ADMIN — NICARDIPINE HYDROCHLORIDE 12.5 MG/HR: 25 INJECTION, SOLUTION INTRAVENOUS at 15:40

## 2024-04-25 RX ADMIN — NICARDIPINE HYDROCHLORIDE 15 MG/HR: 25 INJECTION, SOLUTION INTRAVENOUS at 10:59

## 2024-04-25 NOTE — CONSULTS
"Diabetes Education    Patient Name:  Camron Hendrix  YOB: 1950  MRN: [id number]  Admit Date:  4/24/2024    Follow Up Visit:     Patient has been scheduled for outpatient diabetes education follow up visit on May 9th at 2:30pm via phone. Outpatient staff will provide reminder call prior to appointment. Patient was given reminder card with date and time of appointment and our contact information.     Provided patient with copy of [employer]'s \"What is Diabetes\" handout, \"Blood Glucose Goals\" handout, and \"What is A1c\" handout. 15 minutes in the care and education of this patient.    Electronically signed by:  Sue Paz RN  04/25/24 10:35 EDT  "

## 2024-04-25 NOTE — PLAN OF CARE
Goal Outcome Evaluation:  Plan of Care Reviewed With: patient           Outcome Evaluation: OT eval complete. Pt presents w/ deficits in vision and balance with episode of decreased alertness/dizziness and LOB requiring Mod Ax2 to recover. RN notified and VSS throughout episode. Recommend cont skilled IPOT POC to promote return to PLOF. Recommend pt DC to IP rehab based on current level of performance, however will monitor progress closely.      Anticipated Discharge Disposition (OT): inpatient rehabilitation facility

## 2024-04-25 NOTE — THERAPY RE-EVALUATION
Acute Care - Speech Language Pathology   Swallow Re-Evaluation [facility]  Clinical Swallow Re-Evaluation      Patient Name: Camron Hendrix  : 1950  MRN: [id number]  Today's Date: 2024               Admit Date: 2024    Visit Dx:     ICD-10-CM ICD-9-CM   1. Dysarthria  R47.1 784.51   2. 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    SAH (subarachnoid hemorrhage)     Past Medical History:   Diagnosis Date    AAA (abdominal aortic aneurysm)     Coronary artery disease     CVA (cerebral vascular accident)     Hypertension     Hypothyroidism     PAF (paroxysmal atrial fibrillation)     TIA (transient ischemic attack)      Past Surgical History:   Procedure Laterality Date    CARDIAC CATHETERIZATION N/A 3/29/2019    Procedure: Left Heart Cath;  Surgeon: Andrea Holloway MD;  Location: [facility] CATH INVASIVE LOCATION;  Service: Cardiovascular    CERVICAL FUSION      c4-c5    CORONARY ANGIOPLASTY WITH STENT PLACEMENT      HERNIA REPAIR      TONSILECTOMY, ADENOIDECTOMY, BILATERAL MYRINGOTOMY AND TUBES         SLP Recommendation and Plan  SLP Swallowing Diagnosis: suspected pharyngeal dysphagia (24 1510)  SLP Diet Recommendation: soft to chew textures, chopped, no mixed consistencies, nectar thick liquids (24 1510)  Recommended Precautions and Strategies: general aspiration precautions (24 1510)  SLP Rec. for Method of Medication  Administration: meds whole, meds crushed, with thick liquids, with puree, as tolerated (04/25/24 1510)     Monitor for Signs of Aspiration: yes, notify SLP if any concerns (04/25/24 1510)     Swallow Criteria for Skilled Therapeutic Interventions Met: demonstrates skilled criteria (04/25/24 1510)  Anticipated Discharge Disposition (SLP): inpatient rehabilitation facility (04/25/24 1510)  Rehab Potential/Prognosis, Swallowing: good, to achieve stated therapy goals (04/25/24 1510)     Predicted Duration Therapy Intervention (Days): until discharge (04/25/24 1510)  Oral Care Recommendations: Oral Care BID/PRN, Toothbrush (04/25/24 1510)                                        Plan of Care Reviewed With: patient      SWALLOW EVALUATION (Last 72 Hours)       SLP Adult Swallow Evaluation       Row Name 04/25/24 1510 04/24/24 1515                Rehab Evaluation    Document Type re-evaluation  - evaluation  -       Subjective Information no complaints  -MP no complaints  -       Patient Observations alert;cooperative  - alert;cooperative  -       Patient/Family/Caregiver Comments/Observations No family present  -MP family present  -       Patient Effort good  -MP good  -MH       Symptoms Noted During/After Treatment -- none  -MH          General Information    Patient Profile Reviewed yes  -MP yes  -MH       Pertinent History Of Current Problem SAH overlying midbrain & ct. Hx dementia, prior CVA  -MP Adm from OSH w/ SAH overlying midbrain & ct. Hx: a-fib, AAA, CAD, CVA, HTN, T2DM, HLD, hearing loss, dementia, prior CVA  -MH       Current Method of Nutrition soft to chew textures;thin liquids  -MP NPO  -MH       Precautions/Limitations, Vision vision impairment, left  -MP WFL;for purposes of eval  -MH       Precautions/Limitations, Hearing WFL;for purposes of eval  -MP WFL;for purposes of eval  -MH       Prior Level of Function-Communication cognitive-linguistic impairment;other (see comments)  dementia per  chart  -MP other (see comments)  Most recent [facility] eval 7/20/23. Pt w/ mild short term memory deficits; however deficits also noted @ baseline  -       Prior Level of Function-Swallowing no diet consistency restrictions  - no diet consistency restrictions;other (see comments)  per pt/family report  -       Plans/Goals Discussed with patient;agreed upon  - patient;family;agreed upon  -       Barriers to Rehab cognitive status  - medically complex  -       Patient's Goals for Discharge patient did not state  - patient did not state  -       Family Goals for Discharge -- family did not state  -          Pain    Additional Documentation Pain Scale: FACES Pre/Post-Treatment (Group)  - Pain Scale: FACES Pre/Post-Treatment (Group)  -          Pain Scale: FACES Pre/Post-Treatment    Pain: FACES Scale, Pretreatment 0-->no hurt  - 0-->no hurt  -       Posttreatment Pain Rating 0-->no hurt  - 0-->no hurt  -          Oral Motor Structure and Function    Dentition Assessment natural, present and adequate  - natural, present and adequate  -       Secretion Management WNL/WFL  - WNL/WFL  -       Mucosal Quality moist, healthy  - moist, healthy  -          Oral Musculature and Cranial Nerve Assessment    Oral Motor General Assessment generalized oral motor weakness  - generalized oral motor weakness  -          General Eating/Swallowing Observations    Respiratory Support Currently in Use -- nasal cannula  -       O2 Liters -- 2L  -       Eating/Swallowing Skills self-fed;fed by SLP  -MP self-fed;fed by SLP  -       Positioning During Eating upright in chair  - upright in bed  -       Utensils Used spoon;cup;straw  - spoon;cup;straw  -       Consistencies Trialed thin liquids;nectar/syrup-thick liquids;pureed;regular textures  - ice chips;thin liquids;pureed;regular textures  -          Clinical Swallow Eval    Oral Prep Phase -- impaired  -       Oral Residue --  impaired  -       Pharyngeal Phase suspected pharyngeal impairment  -MP --       Esophageal Phase -- suspected esophageal impairment  -       Clinical Swallow Evaluation Summary RN reported family noted coughing w/ lunch- pt confirmed w/ SLP. Cough noted w/ liquid wash following solid trial. None noted w/ nectar-thick across trials/ as liquid wash. Rec modify diet to soft/no mixed, nectar-thick and will plan for FEES tomorrow to further assess.  -MP Pt w/ delayed throat clear x1 w/ thin liquids via large, sequential sips; u/a to replicate accross multiple additonal trials. No overt s/s of aspiration w/ small/single sips of thin via cup/straw, pureed or regular solid consistenices. Slightly prolonged mastication w/ regulart solid trial & oral residue; able to clear w/ liquid wash. Recommend soft/chopped solid diet w/ thin liquids, small/single sips. SLP will f/u for re-eval to ensure no additional concerns  -          Oral Prep Concerns    Oral Prep Concerns -- prolonged mastication  -       Prolonged Mastication -- regular consistencies  -          Oral Residue Concerns    Oral Residue Concerns -- diffuse residue throughout oral cavity;other (see comments)  -       Diffuse Residue Throughout Oral Cavity -- regular consistencies  -          Pharyngeal Phase Concerns    Pharyngeal Phase Concerns cough  -MP --       Cough thin  -MP --       Pharyngeal Phase Concerns, Comment as liquid wash following solid  -MP --          Esophageal Phase Concerns    Esophageal Phase Concerns -- belching  -       Belching -- thin  -          SLP Evaluation Clinical Impression    SLP Swallowing Diagnosis suspected pharyngeal dysphagia  - R/O pharyngeal dysphagia  -       Functional Impact risk of aspiration/pneumonia  - risk of aspiration/pneumonia  -       Rehab Potential/Prognosis, Swallowing good, to achieve stated therapy goals  - good, to achieve stated therapy goals  -       Swallow Criteria for  Skilled Therapeutic Interventions Met demonstrates skilled criteria  - demonstrates skilled criteria  -          Recommendations    Predicted Duration Therapy Intervention (Days) until discharge  - until discharge  -       SLP Diet Recommendation soft to chew textures;chopped;no mixed consistencies;nectar thick liquids  - soft to chew textures;chopped;thin liquids  -       Recommended Diagnostics -- reassess via clinical swallow evaluation  -       Recommended Precautions and Strategies general aspiration precautions  - small bites of food and sips of liquid;general aspiration precautions;reflux precautions  -       Oral Care Recommendations Oral Care BID/PRN;Toothbrush  - Oral Care BID/PRN;Toothbrush  -       SLP Rec. for Method of Medication Administration meds whole;meds crushed;with thick liquids;with puree;as tolerated  - meds whole;with thin liquids;meds crushed;with puree;as tolerated  -       Monitor for Signs of Aspiration yes;notify SLP if any concerns  - yes;notify SLP if any concerns  -       Anticipated Discharge Disposition (SLP) inpatient rehabilitation facility  - inpatient rehabilitation facility  -                 User Key  (r) = Recorded By, (t) = Taken By, (c) = Cosigned By      Initials Name Effective Dates     Steven Forrester, MS CCC-SLP 12/28/21 -      Katalina Ham MS CCC-SLP 05/12/23 -                     EDUCATION  The patient has been educated in the following areas:   Dysphagia (Swallowing Impairment) Modified Diet Instruction.        SLP GOALS       Row Name 04/24/24 4833             Patient will demonstrate functional speech skills for return to discharge environment    [place] [facility]  -      Progress/Outcomes new goal  -         SLP Diagnostic Treatment     Patient will participate in further assessment in the following areas reading comprehension;graphic expression;cognitive-linguistic  -      Time Frame (Diagnostic)  short term goal (STG)  -MH      Progress/Outcomes (Additional Goal 1, SLP) new goal  -         Phonation Goal 1 (SLP)    Improve Phonation By Goal 1 (SLP) using loud speech;80%;with minimal cues (75-90%)  -MH      Time Frame (Phonation Goal 1, SLP) short term goal (STG)  -MH      Progress/Outcomes (Phonation Goal 1, SLP) new goal  -         Articulation Goal 1 (SLP)    Improve Articulation Goal 1 (SLP) by over-articulating in connected speech;80%;with minimal cues (75-90%)  -      Time Frame (Articulation Goal 1, SLP) short term goal (STG)  -MH      Progress/Outcomes (Articulation Goal 1, SLP) new goal  -                User Key  (r) = Recorded By, (t) = Taken By, (c) = Cosigned By      Initials Name Provider Type    Katalina Rose MS CCC-SLP Speech and Language Pathologist                       Time Calculation:    Time Calculation- SLP       Row Name 04/25/24 1541             Time Calculation- SLP    SLP Start Time 1510  -MP      SLP Received On 04/25/24  -MP         Untimed Charges    [id number] Eval Oral Pharyng Swallow Minutes 40  -MP         Total Minutes    Untimed Charges Total Minutes 40  -MP       Total Minutes 40  -MP                User Key  (r) = Recorded By, (t) = Taken By, (c) = Cosigned By      Initials Name Provider Type    Steven Thorne MS CCC-SLP Speech and Language Pathologist                    Therapy Charges for Today       Code Description Service Date Service Provider Modifiers Qty    [id number] HC ST EVAL ORAL PHARYNG SWALLOW 3 4/25/2024 Steven Forrester MS CCC-SLP GN 1                 Steven Vizcaino MS CCC-CARLA  4/25/2024

## 2024-04-25 NOTE — THERAPY EVALUATION
Patient Name: Camron Hendrix  : 1950    MRN: [id number]                              Today's Date: 2024       Admit Date: 2024    Visit Dx:     ICD-10-CM ICD-9-CM   1. Dysarthria  R47.1 784.51   2. 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    SAH (subarachnoid hemorrhage)     Past Medical History:   Diagnosis Date    AAA (abdominal aortic aneurysm)     Coronary artery disease     CVA (cerebral vascular accident)     Hypertension     Hypothyroidism     PAF (paroxysmal atrial fibrillation)     TIA (transient ischemic attack)      Past Surgical History:   Procedure Laterality Date    CARDIAC CATHETERIZATION N/A 3/29/2019    Procedure: Left Heart Cath;  Surgeon: Andrea Holloway MD;  Location:  [facility] CATH INVASIVE LOCATION;  Service: Cardiovascular    CERVICAL FUSION      c4-c5    CORONARY ANGIOPLASTY WITH STENT PLACEMENT      HERNIA REPAIR      TONSILECTOMY, ADENOIDECTOMY, BILATERAL MYRINGOTOMY AND TUBES        General Information       Row Name 24 0901          OT Time and Intention    Document Type evaluation  -CS     Mode of Treatment occupational therapy  -CS       Row Name 24 0901          General Information    Patient Profile Reviewed yes  -CS     Prior Level of Function independent:;all household mobility;ADL's  Own RW and STC, not using PTA  -CS     Existing Precautions/Restrictions fall;other (see comments)  diplopia, L  eye visual tracking deficits  -     Barriers to Rehab medically complex;visual deficit  -CS       Row Name 04/25/24 0901          Living Environment    People in Home friend(s)  -CS       Row Name 04/25/24 0901          Home Main Entrance    Number of Stairs, Main Entrance two  -CS       Row Name 04/25/24 0901          Cognition    Orientation Status (Cognition) oriented x 4  -CS       Row Name 04/25/24 0901          Safety Issues, Functional Mobility    Impairments Affecting Function (Mobility) balance;endurance/activity tolerance;visual/perceptual;strength;postural/trunk control  -               User Key  (r) = Recorded By, (t) = Taken By, (c) = Cosigned By      Initials Name Provider Type     Kecia Bob OT Occupational Therapist                     Mobility/ADL's       Row Name 04/25/24 0903          Bed Mobility    Bed Mobility supine-sit  -     Supine-Sit [place] (Bed Mobility) minimum assist (75% patient effort);verbal cues  -     Assistive Device (Bed Mobility) bed rails;head of bed elevated  -       Row Name 04/25/24 0903          Transfers    Transfers sit-stand transfer;stand-sit transfer  -       Row Name 04/25/24 0903          Sit-Stand Transfer    Sit-Stand [place] (Transfers) contact guard;verbal cues  -       Row Name 04/25/24 0903          Stand-Sit Transfer    Stand-Sit [place] (Transfers) contact guard;verbal cues  -       Row Name 04/25/24 0903          Functional Mobility    Functional Mobility- Ind. Level 2 person assist required;moderate assist (50% patient effort)  -     Functional Mobility-Distance (Feet) --  household distance  -     Functional Mobility- Comment Pt with episode of R lateral LOB w/ c/o dizziness/light headedness, VSS, RN notified, returned to seated position, no noted changes in neuro status  -CS       Row Name 04/25/24 0903          Activities of Daily Living    BADL Assessment/Intervention lower body dressing;feeding  -       Row  Name 04/25/24 0903          Lower Body Dressing Assessment/Training    [place] Level (Lower Body Dressing) don;socks;maximum assist (25% patient effort)  -CS     Position (Lower Body Dressing) supine  -CS       Row Name 04/25/24 0903          Self-Feeding Assessment/Training    [place] Level (Feeding) liquids to mouth;scoop food and bring to mouth;set up  -CS     Position (Self-Feeding) sitting up in bed  -CS               User Key  (r) = Recorded By, (t) = Taken By, (c) = Cosigned By      Initials Name Provider Type    CS Kecia Bob, MEAGAN Occupational Therapist                   Obj/Interventions       Row Name 04/25/24 0916          Sensory Assessment (Somatosensory)    Sensory Assessment (Somatosensory) UE sensation intact  -       Row Name 04/25/24 0916          Vision Assessment/Intervention    Visual Impairment/Limitations diplopia  MD recommend eye patch to patient, educated on don/doff/alertnating  -     Visual Motor Impairment visual tracking, left  L eye does not track to L  -CS       Row Name 04/25/24 0916          Range of Motion Comprehensive    General Range of Motion bilateral upper extremity ROM WFL  -       Row Name 04/25/24 0916          Strength Comprehensive (MMT)    Comment, General Manual Muscle Testing (MMT) Assessment BUE grossly WFL  -CS       Row Name 04/25/24 0916          Motor Skills    Motor Skills coordination  -     Coordination bilateral;upper extremity;finger to nose;WFL  -       Row Name 04/25/24 0916          Balance    Balance Assessment sitting static balance;sitting dynamic balance;standing dynamic balance;standing static balance  -CS     Static Sitting Balance standby assist  -CS     Dynamic Sitting Balance standby assist  -CS     Position, Sitting Balance sitting edge of bed  -     Static Standing Balance contact guard  -     Dynamic Standing Balance moderate assist  -CS     Position/Device Used, Standing Balance supported  -CS     Balance  Interventions sitting;standing;static;dynamic;dynamic reaching;occupation based/functional task;weight shifting activity  -CS               User Key  (r) = Recorded By, (t) = Taken By, (c) = Cosigned By      Initials Name Provider Type    Kecia Angela OT Occupational Therapist                   Goals/Plan       Row Name 04/25/24 0928          Transfer Goal 1 (OT)    Activity/Assistive Device (Transfer Goal 1, OT) sit-to-stand/stand-to-sit;toilet  -CS     [place] Level/Cues Needed (Transfer Goal 1, OT) standby assist  -CS     Time Frame (Transfer Goal 1, OT) long term goal (LTG);10 days  -CS     Progress/Outcome (Transfer Goal 1, OT) goal ongoing  -CS       Row Name 04/25/24 0928          Dressing Goal 1 (OT)    Activity/Device (Dressing Goal 1, OT) lower body dressing  -CS     [place]/Cues Needed (Dressing Goal 1, OT) minimum assist (75% or more patient effort)  -CS     Time Frame (Dressing Goal 1, OT) long term goal (LTG);10 days  -CS     Progress/Outcome (Dressing Goal 1, OT) goal ongoing  -CS       Row Name 04/25/24 0928          Grooming Goal 1 (OT)    Activity/Device (Grooming Goal 1, OT) hair care;oral care;wash face, hands  -CS     [place] (Grooming Goal 1, OT) standby assist  -CS     Time Frame (Grooming Goal 1, OT) long term goal (LTG);10 days  -CS     Strategies/Barriers (Grooming Goal 1, OT) supported standing  -CS     Progress/Outcome (Grooming Goal 1, OT) goal ongoing  -CS       Row Name 04/25/24 0928          Therapy Assessment/Plan (OT)    Planned Therapy Interventions (OT) activity tolerance training;adaptive equipment training;BADL retraining;functional balance retraining;occupation/activity based interventions;ROM/therapeutic exercise;strengthening exercise;transfer/mobility retraining  -CS               User Key  (r) = Recorded By, (t) = Taken By, (c) = Cosigned By      Initials Name Provider Type    Kecia Angela OT Occupational Therapist                   Clinical  Impression       Row Name 04/25/24 0917          Pain Assessment    Pretreatment Pain Rating 0/10 - no pain  -CS     Posttreatment Pain Rating 0/10 - no pain  -CS       Row Name 04/25/24 0917          Plan of Care Review    Plan of Care Reviewed With patient  -CS     Outcome Evaluation OT eval complete. Pt presents w/ deficits in vision and balance with episode of decreased alertness/dizziness and LOB requiring Mod Ax2 to recover. RN notified and VSS throughout episode. Recommend cont skilled IPOT POC to promote return to PLOF. Recommend pt DC to IP rehab based on current level of performance, however will monitor progress closely.  -CS       Row Name 04/25/24 0917          Therapy Assessment/Plan (OT)    Patient/Family Therapy Goal Statement (OT) Return to PLOF  -CS     Rehab Potential (OT) good, to achieve stated therapy goals  -CS     Criteria for Skilled Therapeutic Interventions Met (OT) yes;skilled treatment is necessary  -CS     Therapy Frequency (OT) daily  -CS       Row Name 04/25/24 0917          Therapy Plan Review/Discharge Plan (OT)    Anticipated Discharge Disposition (OT) inpatient rehabilitation facility  -CS       Row Name 04/25/24 0917          Vital Signs    Pre Systolic BP Rehab 141  -CS     Pre Treatment Diastolic BP 73  -CS     Post Systolic BP Rehab 133  -CS     Post Treatment Diastolic BP 68  -CS     Pretreatment Heart Rate (beats/min) 71  -CS     Posttreatment Heart Rate (beats/min) 68  -CS     Pre SpO2 (%) 93  -CS     O2 Delivery Pre Treatment room air  -CS     Post SpO2 (%) 95  -CS     O2 Delivery Post Treatment room air  -CS     Pre Patient Position Supine  -CS     Intra Patient Position Standing  -CS     Post Patient Position Sitting  -CS       Row Name 04/25/24 0917          Positioning and Restraints    Pre-Treatment Position in bed  -CS     Post Treatment Position chair  -CS     In Chair notified nsg;reclined;call light within reach;encouraged to call for assist;exit alarm on;RUE  elevated;LUE elevated;with family/caregiver;waffle cushion;on mechanical lift sling;legs elevated;heels elevated  -               User Key  (r) = Recorded By, (t) = Taken By, (c) = Cosigned By      Initials Name Provider Type    Kecia Angela OT Occupational Therapist                   Outcome Measures       Row Name 04/25/24 0930          How much help from another is currently needed...    Putting on and taking off regular lower body clothing? 2  -CS     Bathing (including washing, rinsing, and drying) 2  -CS     Toileting (which includes using toilet bed pan or urinal) 2  -CS     Putting on and taking off regular upper body clothing 2  -CS     Taking care of personal grooming (such as brushing teeth) 3  -CS     Eating meals 3  -CS     AM-PAC 6 Clicks Score (OT) 14  -       Row Name 04/25/24 0930          Modified [place] Scale    Pre-Stroke Modified [place] Scale 6 - Unable to determine (UTD) from the medical record documentation  -     Modified [place] Scale 3 - Moderate disability.  Requiring some help, but able to walk without assistance.  -       Row Name 04/25/24 0930          Functional Assessment    Outcome Measure Options AM-PAC 6 Clicks Daily Activity (OT);Modified [place]  -               User Key  (r) = Recorded By, (t) = Taken By, (c) = Cosigned By      Initials Name Provider Type    Kecia Angela OT Occupational Therapist                    Occupational Therapy Education       Title: PT OT SLP Therapies (In Progress)       Topic: Occupational Therapy (In Progress)       Point: ADL training (In Progress)       Description:   Instruct learner(s) on proper safety adaptation and remediation techniques during self care or transfers.   Instruct in proper use of assistive devices.                  Learning Progress Summary             Patient Acceptance, E, NR by  at 4/25/2024 0953                         Point: Home exercise program (Not Started)       Description:   Instruct learner(s)  on appropriate technique for monitoring, assisting and/or progressing therapeutic exercises/activities.                  Learner Progress:  Not documented in this visit.              Point: Precautions (In Progress)       Description:   Instruct learner(s) on prescribed precautions during self-care and functional transfers.                  Learning Progress Summary             Patient Acceptance, E, NR by  at 4/25/2024 0936                         Point: Body mechanics (In Progress)       Description:   Instruct learner(s) on proper positioning and spine alignment during self-care, functional mobility activities and/or exercises.                  Learning Progress Summary             Patient Acceptance, E, NR by  at 4/25/2024 0936                                         User Key       Initials Effective Dates Name Provider Type Discipline     09/02/21 -  Kecia Bob, OT Occupational Therapist OT                  OT Recommendation and Plan  Planned Therapy Interventions (OT): activity tolerance training, adaptive equipment training, BADL retraining, functional balance retraining, occupation/activity based interventions, ROM/therapeutic exercise, strengthening exercise, transfer/mobility retraining  Therapy Frequency (OT): daily  Plan of Care Review  Plan of Care Reviewed With: patient  Outcome Evaluation: OT eval complete. Pt presents w/ deficits in vision and balance with episode of decreased alertness/dizziness and LOB requiring Mod Ax2 to recover. RN notified and VSS throughout episode. Recommend cont skilled IPOT POC to promote return to PLOF. Recommend pt DC to IP rehab based on current level of performance, however will monitor progress closely.     Time Calculation:   Evaluation Complexity (OT)  Review Occupational Profile/Medical/Therapy History Complexity: expanded/moderate complexity  Assessment, Occupational Performance/Identification of Deficit Complexity: 5 or more performance  deficits  Clinical Decision Making Complexity (OT): detailed assessment/moderate complexity  Overall Complexity of Evaluation (OT): moderate complexity     Time Calculation- OT       Row Name 04/25/24 0937             Time Calculation- OT    OT Start Time 0815  -CS      OT Received On 04/25/24  -CS      OT Goal Re-Cert Due Date 05/05/24  -CS         Untimed Charges    OT Eval/Re-eval Minutes 46  -CS         Total Minutes    Untimed Charges Total Minutes 46  -CS       Total Minutes 46  -CS                User Key  (r) = Recorded By, (t) = Taken By, (c) = Cosigned By      Initials Name Provider Type    CS Kecia Bob OT Occupational Therapist                  Therapy Charges for Today       Code Description Service Date Service Provider Modifiers Qty    [id number] HC OT EVAL MOD COMPLEXITY 4 4/25/2024 Kecia Bob OT GO 1                 Kecia Bob OT  4/25/2024

## 2024-04-25 NOTE — PLAN OF CARE
Goal Outcome Evaluation:  Plan of Care Reviewed With: patient           Outcome Evaluation: PT eval completed. Pt presents below baseline function d/t dizziness and deficits in vision, strength, balance, and activity tolerance. Pt ambulated 30 ft w/ R HHA, [place] x2 that regressed to modA x2 d/t dizziness and LOB. BP stable and RN notified and aware. Pt would benefit from skilled IP PT. Rec IRF at d/c.      Anticipated Discharge Disposition (PT): inpatient rehabilitation facility

## 2024-04-25 NOTE — PROGRESS NOTES
"Critical Care Note     LOS: 1 day   Patient Care Team:  Leslie Rhodes MD as PCP - General (Family Medicine)  Delmer Perdomo MD as Cardiologist (Cardiology)    Chief Complaint/Reason for visit:     Subarachnoid hemorrhage  Hypertension  Diabetes mellitus type 2  Hyperlipidemia  History of paroxysmal atrial fibrillation  Coronary artery disease  Hypothyroid    Subjective     Interval History:     Headache is a little improved today.  He has no weakness or numbness.  He still has double vision.  He is maxed on Cardene 15 mg/h for hypertension.  He is complaining of nasal congestion.    Review of Systems:    All systems were reviewed and negative except as noted in subjective.    Medical history, surgical history, social history, family history reviewed    Objective     Intake/Output:    Intake/Output Summary (Last 24 hours) at 4/25/2024 1431  Last data filed at 4/25/2024 1100  Gross per 24 hour   Intake 4566.8 ml   Output 2375 ml   Net 2191.8 ml       Nutrition:  Diet: Cardiac; Healthy Heart (2-3 Na+); Texture: Soft to Chew (NDD 3); Soft to Chew: Chopped Meat; Fluid Consistency: Thin (IDDSI 0)    Infusions:  niCARdipine, 5-15 mg/hr, Last Rate: 12.5 mg/hr (04/25/24 1243)  sodium chloride, 75 mL/hr, Last Rate: 75 mL/hr (04/25/24 0249)        Mechanical Ventilator Settings:                                                Telemetry: Sinus rhythm             Vital Signs  Blood pressure 156/72, pulse 71, temperature 98.2 °F (36.8 °C), temperature source Oral, resp. rate 20, height 175.3 cm (69\"), weight 131 kg (288 lb 5.8 oz), SpO2 93%.    Physical Exam:  General Appearance:  Well-developed older gentleman sitting semiupright in bed   Head:  No visible trauma   Eyes:          Disconjugate gaze   Ears:     Throat: Oral mucosa moist   Neck: Stiffness   Back:      Lungs:   Symmetric chest expansion without wheeze or rhonchi    Heart:  Regular rhythm, S1, S2 auscultated   Abdomen:   Bowel sounds present, soft, " "nontender   Rectal:   Deferred   Extremities: No pretibial edema or cyanosis   Pulses:    Skin: Warm and dry without rash   Lymph nodes: No cervical adenopathy   Neurologic: Awake, oriented,  equal, speech fluent, no motor drift.  He has a disconjugate gaze      Results Review:     I reviewed the patient's new clinical results.   Results from last 7 days   Lab Units 04/24/24 2025   SODIUM mmol/L 140   POTASSIUM mmol/L 3.7   CHLORIDE mmol/L 106   CO2 mmol/L 27.0   BUN mg/dL 9   CREATININE mg/dL 0.94   CALCIUM mg/dL 8.0*   GLUCOSE mg/dL 198*     Results from last 7 days   Lab Units 04/25/24  0811 04/24/24 2025   WBC 10*3/mm3 8.18 6.74   HEMOGLOBIN g/dL 14.6 14.9   HEMATOCRIT % 43.1 43.7   PLATELETS 10*3/mm3 201 197         No results found for: \"BLOODCX\"  No results found for: \"URINECX\"    I reviewed the patient's new imaging including images and reports.    CT HEAD WO CONTRAST    Date of Exam: 4/25/2024 4:54 AM EDT    Indication: SAH.    Comparison: 1 day prior.    Technique: Axial CT images were obtained of the head without contrast administration.  Automated exposure control and iterative construction methods were used.      Findings:  Expected evolving appearance of previously noted posterior fossa subarachnoid hemorrhage most confluent within the prepontine cistern extending towards the left cerebellopontine angle. There is no evidence of new hemorrhage or increasing mass effect. The   ventricles are unchanged. Some scattered areas of hyperdensity overlying the occipital lobes is favored artifactual, given symmetry and absence of associated signal abnormality on recent MRI. The orbits are normal. The paranasal sinuses are clear. The  calvarium is intact.   Impression:     Impression:  Expected evolution of previously noted posterior fossa subarachnoid hemorrhage as above. No evidence of new hemorrhage or increasing mass effect.        Electronically Signed: Kameron Delcid MD   4/25/2024 8:40 AM EDT "       All medications reviewed.   atorvastatin, 40 mg, Oral, Nightly  cetirizine, 10 mg, Oral, Daily  hydrALAZINE, 50 mg, Oral, Q8H  levETIRAcetam, 1,000 mg, Intravenous, Q12H  losartan, 50 mg, Oral, Q24H  magnesium sulfate, 4 g, Intravenous, Once  pharmacy consult - MTM, , Does not apply, Daily  sodium chloride, 10 mL, Intravenous, Q12H  sodium chloride, 10 mL, Intravenous, Q12H  sodium chloride, 10 mL, Intravenous, Q12H          Assessment & Plan       SAH (subarachnoid hemorrhage)    Hypothyroidism    Essential hypertension    PAF (paroxysmal atrial fibrillation)    73-year-old gentleman, remote smoker with hypertension, dyslipidemia, diabetes, coronary disease, previous coronary stents, paroxysmal atrial fibrillation, on anticoagulation, currently in sinus rhythm, presenting on April 24 with severe headache and found to have a subarachnoid hemorrhage.  NIH stroke score was a 0.  However he has diplopia and a disconjugate gaze today.  He remains in sinus rhythm, sinus bradycardia.  He has an aortic valve stenosis murmur.  Anticoagulation was reversed and imaging was no worse this morning.    #1 subarachnoid hemorrhage  -Coumadin reversed with Kcentra and vitamin K  -CT of the head this morning stable  -Double vision, diplopia  -Patch 1 eye and alternate  -Monitor NIH stroke scale  -MRI/MRA of the brain with no evidence of aneurysm, therefore this is presumed idiopathic focal subarachnoid hemorrhage from anticoagulation    #2 history of coronary artery disease, paroxysmal atrial fibrillation  -Followed by Dr. Perdomo in [place]  -Currently in sinus rhythm  Echocardiogram 2022, EF 70 to 75%, aortic valve stenosis with valve area 1.81 cm²-  -2019 left heart catheterization, normal left main, 30% mid LAD, 40% distal circumflex, 40% mid RCA, 50% distal RCA with LVEDP of 18  -Repeat echo pending  -Aspirin on hold  -Continue Lipitor    #3 diabetes mellitus type 2  -A1c 6.8, 3 months ago, 5.8 today  -Ozempic started  January 2024  -Glucoses 120-200  -Sliding scale insulin, low-dose    #4 hypertension  -Currently on Cardene 15 mg/h  -Restart losartan, increase to 100 mg daily  -Restart hydralazine 50 mg every 8 hours  -Heart rate is in the 60s so I am holding off on carvedilol  -Will add amlodipine if hydralazine in fact    #5 hypothyroid  -Continue thyroid replacement  -TSH 1.77, free T41.87      VTE Prophylaxis:SCDS    Stress Ulcer Prophylaxis:none    Rani Lilly MD  04/25/24  14:31 EDT      Time: Critical care 35 min  I personally provided care to this critically ill patient as documented above.  Critical care time does not include time spent on separately billed procedures.  None of my critical care time was concurrent with other critical care providers.

## 2024-04-25 NOTE — PROGRESS NOTES
Stroke Neurology Progress Note     Subjective     This patient was seen in follow-up for: subarachnoid hemorrhage around the left brainstem  Present for the encounter were: self, patient    Subjective:  Admitted overnight. Neurologic exam notable for left lateral rectus palsy, diplopia, disconjugate gaze, and mild expressive aphasia.  Repeat CT head stable.  Blood pressure at goal.  Patient denies questions or concerns.     Objective      Temp:  [97.7 °F (36.5 °C)-98.3 °F (36.8 °C)] 98.2 °F (36.8 °C)  Heart Rate:  [64-92] 71  Resp:  [16-20] 20  BP: (114-178)/(44-95) 156/72            Objective    Physical Exam:  General Appearance: Alert, sitting in chair   HEENT: anicteric sclera, no scleral injection, right eye patch   Lungs: respirations appear comfortable, no obvious increased work of breathing  Extremities: No cyanosis or fingernail clubbing   Skin: No rashes in exposed skin areas     Neurological Examination:   Mental status: Alert and oriented. Mild expressive aphasia. No dysarthria. Able to name and repeat.  Cranial Nerves: Visual fields intact. Left lateral rectus (CN 6) palsy. Diplopia. Dysconjugate gaze. Face symmetric.    Sensory: Normal sensory exam to light touch.  Motor: Normal tone. Absent pronator drift.  Strength:  LUE: 5/5 biceps, triceps,   LLE: 5/5 hip flexion/extension  RUE: 5/5 biceps, triceps,   RLE:  5/5 hip flexion/extension  Cerebellar: Finger-to-nose intact. Heel-to-shin intact. Rapid alternating movements are intact.       Labs:    Lab Results   Component Value Date    HGBA1C 5.80 (H) 04/25/2024      Lab Results   Component Value Date    CHOL 94 04/25/2024    CHLPL 183 08/08/2022    TRIG 100 04/25/2024    HDL 33 (L) 04/25/2024    LDL 42 04/25/2024       Lab Results   Component Value Date    WBC 8.18 04/25/2024    HGB 14.6 04/25/2024    HCT 43.1 04/25/2024    MCV 86.2 04/25/2024     04/25/2024     Lab Results   Component Value Date    GLUCOSE 198 (H) 04/24/2024    BUN 9  "04/24/2024    CREATININE 0.94 04/24/2024    EGFRIFNONA 97 08/03/2021    BCR 9.6 04/24/2024    CO2 27.0 04/24/2024    CALCIUM 8.0 (L) 04/24/2024    ALBUMIN 3.5 07/19/2023    AST 22 07/19/2023    ALT 25 07/19/2023       Results from last 7 days   Lab Units 04/24/24 2025   SODIUM mmol/L 140   POTASSIUM mmol/L 3.7   CHLORIDE mmol/L 106   CO2 mmol/L 27.0   BUN mg/dL 9   CREATININE mg/dL 0.94   CALCIUM mg/dL 8.0*   GLUCOSE mg/dL 198*       No results found for: \"BLOODCX\"  UA          7/19/2023    17:00   Urinalysis   Specific Gravity, UA 1.027    Ketones, UA Negative    Blood, UA Negative    Leukocytes, UA Negative    Nitrite, UA Negative        Results Review:      All images and reports were personally reviewed and I agree with the interpretations except as noted below.      CT Head Without Contrast 4/25/2024  Impression: Expected evolution of previously noted posterior fossa subarachnoid hemorrhage as above. No evidence of new hemorrhage or increasing mass effect.       CT Head Without Contrast 4/24/2024  Impression: Similar volume and distribution of subarachnoid hemorrhage detailed above. No hydrocephalus or new areas of acute hemorrhage. Distribution of blood, lack of traumatic or vascular etiology on previous imaging raises possibility of nonaneurysmal perimesencephalic subarachnoid hemorrhage.     MRI Angiogram Head and Neck 4/24/2024  Impression: Stable vascular findings from comparison CT angiogram, without evidence of etiologic aneurysm. Redemonstrated abnormal appearance of the right MCA as above. Normal MR angiogram of the neck.     MRI Brain Without Contrast 4/24/2024  Impression: Redemonstrated layering subarachnoid hemorrhage within the posterior fossa including the left prepontine cistern, cerebellopontine angle and foramen magnum. No evidence of increased hemorrhage adjacent brain parenchymal edema or associated infarct.     CT Head Without Contrast 4/24/2024  Impression: CT head demonstrates prominent " subarachnoid hemorrhage within the posterior fossa layering within the prepontine cistern extending down to the foramen magnum and cranially to the level of the midbrain. No additional intracranial hemorrhage is evident and  there are typical moderate age-related changes as above.     CT Angiogram Head and Neck 4/24/2024  Impression: CT angiogram demonstrates no definite etiologic aneurysm, with findings potentially secondary to benign perimesencephalic hemorrhage. 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           Assessment/Plan     Assessment:    # Midline subarachnoid hemorrhage around left brainstem: spontaneous secondary to warfarin use  # Warfarin reversal with K-centra and Vitamin K  # Atrial Fibrillation    - Hold Warfarin for 8-12 weeks pending reabsorption of bleed   - Restart aspirin in 1-2 weeks pending stability of bleed   - Patient is at increased risk of ischemic stroke while not on anticoagulation but aware that risk would outweigh benefit at this time  - Continue keppra 1000 mg BID for witnessed seizure   - low threshold for EEG if recurrent seizure  - Neurosurgery evaluated, no surgical intervention recommended   - Neuro-checks per unit protocol while inpatient  - Blood pressure goal: -160 please  - DVT prophylaxis: SCD, consider enoxaparin 4/26  - PT OT recommend IRF   - Follow up in stroke neurology clinic   - Patient will also need follow-up in neuro-ophthalmology clinic with Dr. Schroeder for vision therapies ([facility] in [place], [place])       Patient education: call 911 or present to emergency department with any stroke symptom, including unilateral face, arm, or leg weakness, numbness, or paresthesias, unilateral facial droop, speech deficits, dizziness with nausea, vomiting, nystagmus, and incoordination,  visual deficits, or severe onset headache.    Stroke neurology will follow. Please call with questions.     Dunia Redd MD  [facility] STROKE NEURO  04/25/24  15:36 EDT

## 2024-04-25 NOTE — CASE MANAGEMENT/SOCIAL WORK
Continued Stay Note  [facility]     Patient Name: Camron Hendrix  MRN: [id number]  Today's Date: 4/25/2024    Admit Date: 4/24/2024    Plan: IDP   Discharge Plan       Row Name 04/25/24 1442       Plan    Plan Comments CORI was notified by April at  that the patient will have a bed tomorrow on the Stroke Unit. I discussed this with the RN as we were not anticipating a bed this quickly. RN will discuss with MD and consulted MDs. Per RN, patient not medically ready for discharge. CM updated April. April stated that she will check on the patient tomorrow and notify CORI Ellison, of the plan should the patient be ready over the weekend. CM following.    Final Discharge Disposition Code 62 - inpatient rehab facility                   Discharge Codes    No documentation.                 Expected Discharge Date and Time       Expected Discharge Date Expected Discharge Time    Apr 27, 2024               Nkechi Meyers RN

## 2024-04-25 NOTE — PLAN OF CARE
Goal Outcome Evaluation:  Plan of Care Reviewed With: patient                  Anticipated Discharge Disposition (SLP): inpatient rehabilitation facility          SLP Swallowing Diagnosis: suspected pharyngeal dysphagia (04/25/24 4992)

## 2024-04-25 NOTE — CASE MANAGEMENT/SOCIAL WORK
Discharge Planning Assessment  [facility]     Patient Name: Camron Hendrix  MRN: [id number]  Today's Date: 4/25/2024    Admit Date: 4/24/2024    Plan: IDP   Discharge Needs Assessment       Row Name 04/25/24 1113       Living Environment    People in Home other (see comments)  Praful: Kaela Cleary    Current Living Arrangements home    Potentially Unsafe Housing Conditions none    In the past 12 months has the electric, gas, oil, or water company threatened to shut off services in your home? No    Primary Care Provided by self    Provides Primary Care For no one    Family Caregiver if Needed child(kory), adult    Family Caregiver Names Heena Adan (Daughter)  [phone] (Mobile)    Quality of Family Relationships helpful;involved;supportive    Able to Return to Prior Arrangements yes       Resource/Environmental Concerns    Resource/Environmental Concerns none    Transportation Concerns none       Transportation Needs    In the past 12 months, has lack of transportation kept you from medical appointments or from getting medications? no    In the past 12 months, has lack of transportation kept you from meetings, work, or from getting things needed for daily living? No       Food Insecurity    Within the past 12 months, you worried that your food would run out before you got the money to buy more. Never true    Within the past 12 months, the food you bought just didn't last and you didn't have money to get more. Never true       Transition Planning    Patient/Family Anticipates Transition to inpatient rehabilitation facility    Patient/Family Anticipated Services at Transition ;rehabilitation services;skilled nursing       Discharge Needs Assessment    Readmission Within the Last 30 Days no previous admission in last 30 days    Equipment Currently Used at Home cpap;glucometer    Concerns to be Addressed denies needs/concerns at this time    Anticipated Changes Related to Illness none    Equipment  Needed After Discharge none    Current Discharge Risk chronically ill                   Discharge Plan       Row Name 04/25/24 1107       Plan    Plan IDP    Patient/Family in Agreement with Plan yes    Plan Comments I spoke with the patient at the bedside. He stated that he lives in [facility] in a house with a renter. Renter's name is Kaela Cleary. He stated that he is independent, able to drive, and does not need medical equipment at home. He confirmed his insurance is Medicare A&B and his PCP is Leslie Rhodes. He would like to go to [facility] at discharge if possible. He plans for his daughter to transport him when he is medically able to discharge. CM has made the referral to April at [facility]. CM following.    Final Discharge Disposition Code 62 - inpatient rehab facility                  Continued Care and Services - Admitted Since 4/24/2024    No active coordination exists for this encounter.          Demographic Summary    No documentation.                  Functional Status       Row Name 04/25/24 1112       Functional Status    Usual Activity Tolerance good    Current Activity Tolerance other (see comments)  see therapy notes       Physical Activity    On average, how many days per week do you engage in moderate to strenuous exercise (like a brisk walk)? 0 days    On average, how many minutes do you engage in exercise at this level? 0 min    Number of minutes of exercise per week 0       Assessment of Health Literacy    How often do you have someone help you read hospital materials? Never    How often do you have problems learning about your medical condition because of difficulty understanding written information? Never    How often do you have a problem understanding what is told to you about your medical condition? Never    How confident are you filling out medical forms by yourself? Quite a bit    Health Literacy Good       Functional Status, IADL    Medications independent    Meal Preparation  independent    Housekeeping independent    Laundry independent    Shopping independent       Mental Status    General Appearance WDL WDL       Mental Status Summary    Recent Changes in Mental Status/Cognitive Functioning no changes       Employment/    Employment Status retired    Employment/ Comments                    Psychosocial    No documentation.                  Abuse/Neglect    No documentation.                  Legal    No documentation.                  Substance Abuse    No documentation.                  Patient Forms    No documentation.                     Nkechi Meyers RN

## 2024-04-25 NOTE — THERAPY EVALUATION
Patient Name: Camron Hendrix  : 1950    MRN: [id number]                              Today's Date: 2024       Admit Date: 2024    Visit Dx:     ICD-10-CM ICD-9-CM   1. Dysarthria  R47.1 784.51   2. 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    SAH (subarachnoid hemorrhage)     Past Medical History:   Diagnosis Date    AAA (abdominal aortic aneurysm)     Coronary artery disease     CVA (cerebral vascular accident)     Hypertension     Hypothyroidism     PAF (paroxysmal atrial fibrillation)     TIA (transient ischemic attack)      Past Surgical History:   Procedure Laterality Date    CARDIAC CATHETERIZATION N/A 3/29/2019    Procedure: Left Heart Cath;  Surgeon: Andrea Holloway MD;  Location: [facility] CATH INVASIVE LOCATION;  Service: Cardiovascular    CERVICAL FUSION      c4-c5    CORONARY ANGIOPLASTY WITH STENT PLACEMENT      HERNIA REPAIR      TONSILECTOMY, ADENOIDECTOMY, BILATERAL MYRINGOTOMY AND TUBES        General Information       Row Name 24 1146          Physical Therapy Time and Intention    Document Type evaluation  -     Mode of Treatment physical therapy  -       Row Name 24 1146          General Information    Patient Profile Reviewed yes  -LH     Prior Level of Function independent:;all household mobility;community mobility;gait;transfer;bed mobility;ADL's  Own RW and STC, not using PTA  -     Existing  Precautions/Restrictions fall;other (see comments)  diplopia, L eye visual tracking deficits  -     Barriers to Rehab medically complex;visual deficit  -[facility] Name 04/25/24 1146          Living Environment    People in Home other (see comments)  roommate  -       Row Name 04/25/24 1146          Home Main Entrance    Number of Stairs, Main Entrance two  -     Stair Railings, Main Entrance none  -       Row Name 04/25/24 1146          Stairs Within Home, Primary    Stairs, Within Home, Primary Bedroom and bathroom on primary level. Flight of stairs down to basement  -     Number of Stairs, Within Home, Primary twelve  -       Row Name 04/25/24 1146          Cognition    Orientation Status (Cognition) oriented x 4  -[facility] Name 04/25/24 1146          Safety Issues, Functional Mobility    Safety Issues Affecting Function (Mobility) awareness of need for assistance;insight into deficits/self-awareness;safety precaution awareness;safety precautions follow-through/compliance;judgment  -     Impairments Affecting Function (Mobility) balance;endurance/activity tolerance;visual/perceptual;strength;postural/trunk control  -               User Key  (r) = Recorded By, (t) = Taken By, (c) = Cosigned By      Initials Name Provider Type     Georgina Lucas PT Physical Therapist                   Mobility       [facility] Name 04/25/24 1149          Bed Mobility    Bed Mobility supine-sit  -     Supine-Sit [place] (Bed Mobility) minimum assist (75% patient effort);verbal cues  -     Assistive Device (Bed Mobility) bed rails;head of bed elevated  -     Comment, (Bed Mobility) Denied dizziness in sitting. Eye patch applied on L eye  -[facility] Name 04/25/24 1149          Transfers    Comment, (Transfers) VCs for hand placement and sequencing. Denied dizziness in standing  -[facility] Name 04/25/24 1149          Sit-Stand Transfer    Sit-Stand [place] (Transfers) contact guard;verbal cues   -     Comment, (Sit-Stand Transfer) STS from EOB. No unsteadiness noted  -       Row Name 04/25/24 1149          Gait/Stairs (Locomotion)    [place] Level (Gait) minimum assist (75% patient effort);moderate assist (50% patient effort);2 person assist;verbal cues;1 person to manage equipment  -     Assistive Device (Gait) other (see comments)  R HHA  -     Patient was able to Ambulate yes  -     Distance in Feet (Gait) 30  -     Deviations/Abnormal Patterns (Gait) bilateral deviations;[place] decreased;gait speed decreased;stride length decreased  -     Bilateral Gait Deviations forward flexed posture;heel strike decreased  -     Comment, (Gait/Stairs) Pt demo step through gait pattern w/ decreased speed. VCs for upright posture and forward gaze. 1 LOB w/ dizziness noted that required modA x2 to correct. Brought chair up from behind for pt to sit. BP stable at 144/72. RN notified and aware. Further distance limited by dizziness  -               User Key  (r) = Recorded By, (t) = Taken By, (c) = Cosigned By      Initials Name Provider Type     Georgina Lucas PT Physical Therapist                   Obj/Interventions       Row Name 04/25/24 1154          Range of Motion Comprehensive    General Range of Motion bilateral lower extremity ROM WFL  -[facility] Name 04/25/24 1154          Strength Comprehensive (MMT)    General Manual Muscle Testing (MMT) Assessment lower extremity strength deficits identified  -     Comment, General Manual Muscle Testing (MMT) Assessment B LEs grossly 5/5  -[facility] Name 04/25/24 1154          Balance    Balance Assessment sitting static balance;sitting dynamic balance;sit to stand dynamic balance;standing static balance;standing dynamic balance  -     Static Sitting Balance standby assist  -     Dynamic Sitting Balance standby assist  -     Position, Sitting Balance unsupported;sitting edge of bed  -     Sit to Stand Dynamic Balance contact  guard;verbal cues  -     Static Standing Balance contact guard  -     Dynamic Standing Balance moderate assist  -     Position/Device Used, Standing Balance supported  -     Balance Interventions sitting;standing;sit to stand;supported;static;dynamic  -LH       Row Name 04/25/24 1159          Sensory Assessment (Somatosensory)    Sensory Assessment (Somatosensory) LE sensation intact  -               User Key  (r) = Recorded By, (t) = Taken By, (c) = Cosigned By      Initials Name Provider Type     Georgina Lucas, PT Physical Therapist                   Goals/Plan       Row Name 04/25/24 1157          Bed Mobility Goal 1 (PT)    Activity/Assistive Device (Bed Mobility Goal 1, PT) sit to supine/supine to sit  -     [place] Level/Cues Needed (Bed Mobility Goal 1, PT) independent  -     Time Frame (Bed Mobility Goal 1, PT) long term goal (LTG);10 days  -LH       Row Name 04/25/24 4849          Transfer Goal 1 (PT)    Activity/Assistive Device (Transfer Goal 1, PT) sit-to-stand/stand-to-sit;bed-to-chair/chair-to-bed  -     [place] Level/Cues Needed (Transfer Goal 1, PT) modified independence  -     Time Frame (Transfer Goal 1, PT) long term goal (LTG);10 days  -LH       Row Name 04/25/24 1819          Gait Training Goal 1 (PT)    Activity/Assistive Device (Gait Training Goal 1, PT) gait (walking locomotion);assistive device use  -     [place] Level (Gait Training Goal 1, PT) standby assist  -     Distance (Gait Training Goal 1, PT) 150 ft  -     Time Frame (Gait Training Goal 1, PT) long term goal (LTG);10 days  -LH       Row Name 04/25/24 6853          Therapy Assessment/Plan (PT)    Planned Therapy Interventions (PT) balance training;bed mobility training;gait training;home exercise program;neuromuscular re-education;patient/family education;postural re-education;ROM (range of motion);strengthening;stretching;transfer training  -               User Key  (r) = Recorded By,  (t) = Taken By, (c) = Cosigned By      Initials Name Provider Type     Georgina Lucas, PT Physical Therapist                   Clinical Impression       Row Name 04/25/24 1155          Pain    Pretreatment Pain Rating 0/10 - no pain  -     Posttreatment Pain Rating 0/10 - no pain  -[facility] Name 04/25/24 1155          Plan of Care Review    Plan of Care Reviewed With patient  -     Outcome Evaluation PT eval completed. Pt presents below baseline function d/t dizziness and deficits in vision, strength, balance, and activity tolerance. Pt ambulated 30 ft w/ R HHA, [place] x2 that regressed to modA x2 d/t dizziness and LOB. BP stable and RN notified and aware. Pt would benefit from skilled IP PT. Rec IRF at d/c.  -[facility] Name 04/25/24 1153          Therapy Assessment/Plan (PT)    Patient/Family Therapy Goals Statement (PT) return to [facility]  -     Rehab Potential (PT) good, to achieve stated therapy goals  -     Criteria for Skilled Interventions Met (PT) yes;meets criteria;skilled treatment is necessary  -     Therapy Frequency (PT) daily  -       Row Name 04/25/24 1155          Vital Signs    Pre Systolic BP Rehab 145  -LH     Pre Treatment Diastolic BP 74  -LH     Intra Systolic BP Rehab 144  -LH     Intra Treatment Diastolic BP 72  -LH     Post Systolic BP Rehab 133  -LH     Post Treatment Diastolic BP 68  -LH     Pretreatment Heart Rate (beats/min) 73  -LH     Posttreatment Heart Rate (beats/min) 71  -LH     Pre SpO2 (%) 93  -LH     O2 Delivery Pre Treatment room air  -     O2 Delivery Intra Treatment room air  -     Post SpO2 (%) 95  -LH     O2 Delivery Post Treatment room air  -     Pre Patient Position Supine  -     Intra Patient Position Standing  -LH     Post Patient Position Sitting  -       Row Name 04/25/24 1155          Positioning and Restraints    Pre-Treatment Position in bed  -LH     Post Treatment Position chair  -     In Chair notified nsg;reclined;sitting;call light  within reach;encouraged to call for assist;exit alarm on;with family/caregiver;waffle cushion;on mechanical lift sling;RUE elevated;LUE elevated;legs elevated;heels elevated  -               User Key  (r) = Recorded By, (t) = Taken By, (c) = Cosigned By      Initials Name Provider Type     Georgina Lucas PT Physical Therapist                   Outcome Measures       [facility] Name 04/25/24 1200          How much help from another person do you currently need...    Turning from your back to your side while in flat bed without using bedrails? 3  -LH     Moving from lying on back to sitting on the side of a flat bed without bedrails? 3  -LH     Moving to and from a bed to a chair (including a wheelchair)? 3  -LH     Standing up from a chair using your arms (e.g., wheelchair, bedside chair)? 3  -LH     Climbing 3-5 steps with a railing? 2  -LH     To walk in hospital room? 3  -     AM-PAC 6 Clicks Score (PT) 17  -     Highest Level of Mobility Goal 5 --> Static standing  -[facility] Name 04/25/24 1200 04/25/24 0930       Modified [place] Scale    Pre-Stroke Modified [place] Scale 6 - Unable to determine (UTD) from the medical record documentation  - 6 - Unable to determine (UTD) from the medical record documentation  -    Modified [place] Scale 3 - Moderate disability.  Requiring some help, but able to walk without assistance.  - 3 - Moderate disability.  Requiring some help, but able to walk without assistance.  -[facility] Name 04/25/24 1200 04/25/24 0930       Functional Assessment    Outcome Measure Options AM-PAC 6 Clicks Basic Mobility (PT);Modified Rachel  - AM-PAC 6 Clicks Daily Activity (OT);Modified [place]  -CS              User Key  (r) = Recorded By, (t) = Taken By, (c) = Cosigned By      Initials Name Provider Type    Kecia Angela OT Occupational Therapist     Georgina Lucas, PT Physical Therapist                                 Physical Therapy Education       Title: PT OT SLP  Therapies (In Progress)       Topic: Physical Therapy (In Progress)       Point: Mobility training (Done)       Learning Progress Summary             Patient Acceptance, E, VU,NR by  at 4/25/2024 1200                         Point: Home exercise program (Not Started)       Learner Progress:  Not documented in this visit.              Point: Body mechanics (Done)       Learning Progress Summary             Patient Acceptance, E, VU,NR by  at 4/25/2024 1200                         Point: Precautions (Done)       Learning Progress Summary             Patient Acceptance, E, VU,NR by  at 4/25/2024 1200                                         User Key       Initials Effective Dates Name Provider Type Discipline     09/21/23 -  Georgina Lucas, PT Physical Therapist PT                  PT Recommendation and Plan  Planned Therapy Interventions (PT): balance training, bed mobility training, gait training, home exercise program, neuromuscular re-education, patient/family education, postural re-education, ROM (range of motion), strengthening, stretching, transfer training  Plan of Care Reviewed With: patient  Outcome Evaluation: PT eval completed. Pt presents below baseline function d/t dizziness and deficits in vision, strength, balance, and activity tolerance. Pt ambulated 30 ft w/ R HHA, [place] x2 that regressed to modA x2 d/t dizziness and LOB. BP stable and RN notified and aware. Pt would benefit from skilled IP PT. Rec IRF at d/c.     Time Calculation:   PT Evaluation Complexity  History, PT Evaluation Complexity: 3 or more personal factors and/or comorbidities  Examination of Body Systems (PT Eval Complexity): total of 4 or more elements  Clinical Presentation (PT Evaluation Complexity): evolving  Clinical Decision Making (PT Evaluation Complexity): moderate complexity  Overall Complexity (PT Evaluation Complexity): moderate complexity     PT Charges       Row Name 04/25/24 1201             Time Calculation     Start Time 0820  -      PT Received On 04/25/24  -      PT Goal Re-Cert Due Date 05/05/24  -         Untimed Charges    PT Eval/Re-eval Minutes 46  -LH         Total Minutes    Untimed Charges Total Minutes 46  -LH       Total Minutes 46  -LH                User Key  (r) = Recorded By, (t) = Taken By, (c) = Cosigned By      Initials Name Provider Type     Georgina Lucas, PT Physical Therapist                  Therapy Charges for Today       Code Description Service Date Service Provider Modifiers Qty    [id number] HC PT EVAL MOD COMPLEXITY 4 4/25/2024 Georgina Lucas, PT GP 1            PT G-Codes  Outcome Measure Options: AM-PAC 6 Clicks Basic Mobility (PT), Modified [place]  AM-PAC 6 Clicks Score (PT): 17  AM-PAC 6 Clicks Score (OT): 14  Modified [place] Scale: 3 - Moderate disability.  Requiring some help, but able to walk without assistance.  PT Discharge Summary  Anticipated Discharge Disposition (PT): inpatient rehabilitation facility    Georgina Lucas PT  4/25/2024

## 2024-04-26 ENCOUNTER — APPOINTMENT (OUTPATIENT)
Dept: CARDIOLOGY | Facility: HOSPITAL | Age: 74
End: 2024-04-26
Payer: MEDICARE

## 2024-04-26 ENCOUNTER — ANCILLARY PROCEDURE (OUTPATIENT)
Dept: SPEECH THERAPY | Facility: HOSPITAL | Age: 74
End: 2024-04-26
Payer: MEDICARE

## 2024-04-26 LAB
ANION GAP SERPL CALCULATED.3IONS-SCNC: 11 MMOL/L (ref 5–15)
ASCENDING AORTA: 4.4 CM
BH CV ECHO MEAS - AO MAX PG: 27.7 MMHG
BH CV ECHO MEAS - AO MEAN PG: 15 MMHG
BH CV ECHO MEAS - AO ROOT DIAM: 4.4 CM
BH CV ECHO MEAS - AO V2 MAX: 263 CM/SEC
BH CV ECHO MEAS - AO V2 VTI: 47.7 CM
BH CV ECHO MEAS - AVA(I,D): 1.55 CM2
BH CV ECHO MEAS - EDV(CUBED): 132.7 ML
BH CV ECHO MEAS - EDV(MOD-SP2): 101 ML
BH CV ECHO MEAS - EDV(MOD-SP4): 107 ML
BH CV ECHO MEAS - EF(MOD-BP): 65.8 %
BH CV ECHO MEAS - EF(MOD-SP2): 66.6 %
BH CV ECHO MEAS - EF(MOD-SP4): 65.1 %
BH CV ECHO MEAS - ESV(CUBED): 38.2 ML
BH CV ECHO MEAS - ESV(MOD-SP2): 33.7 ML
BH CV ECHO MEAS - ESV(MOD-SP4): 37.3 ML
BH CV ECHO MEAS - FS: 34 %
BH CV ECHO MEAS - IVS/LVPW: 1.33 CM
BH CV ECHO MEAS - IVSD: 1.6 CM
BH CV ECHO MEAS - LA DIMENSION: 4.3 CM
BH CV ECHO MEAS - LAT PEAK E' VEL: 7.8 CM/SEC
BH CV ECHO MEAS - LV MASS(C)D: 300.4 GRAMS
BH CV ECHO MEAS - LV MAX PG: 5.3 MMHG
BH CV ECHO MEAS - LV MEAN PG: 2.6 MMHG
BH CV ECHO MEAS - LV V1 MAX: 115 CM/SEC
BH CV ECHO MEAS - LV V1 VTI: 21.3 CM
BH CV ECHO MEAS - LVIDD: 5.1 CM
BH CV ECHO MEAS - LVIDS: 3.4 CM
BH CV ECHO MEAS - LVOT AREA: 3.5 CM2
BH CV ECHO MEAS - LVOT DIAM: 2.1 CM
BH CV ECHO MEAS - LVPWD: 1.2 CM
BH CV ECHO MEAS - MED PEAK E' VEL: 7 CM/SEC
BH CV ECHO MEAS - MV A MAX VEL: 130.5 CM/SEC
BH CV ECHO MEAS - MV DEC SLOPE: 354 CM/SEC2
BH CV ECHO MEAS - MV DEC TIME: 0.28 SEC
BH CV ECHO MEAS - MV E MAX VEL: 77.2 CM/SEC
BH CV ECHO MEAS - MV E/A: 0.59
BH CV ECHO MEAS - MV P1/2T: 76.5 MSEC
BH CV ECHO MEAS - MVA(P1/2T): 2.9 CM2
BH CV ECHO MEAS - PA ACC TIME: 0.1 SEC
BH CV ECHO MEAS - PA V2 MAX: 147 CM/SEC
BH CV ECHO MEAS - PAPD(PI EDV): 2 MMHG
BH CV ECHO MEAS - PI END-D VEL: 73.9 CM/SEC
BH CV ECHO MEAS - RAP SYSTOLE: 8 MMHG
BH CV ECHO MEAS - SV(LVOT): 73.8 ML
BH CV ECHO MEAS - SV(MOD-SP2): 67.3 ML
BH CV ECHO MEAS - SV(MOD-SP4): 69.7 ML
BH CV ECHO MEAS - TAPSE (>1.6): 2.48 CM
BH CV ECHO MEASUREMENTS AVERAGE E/E' RATIO: 10.43
BH CV ECHO SHUNT ASSESSMENT PERFORMED (HIDDEN SCRIPTING): 1
BH CV VAS BP LEFT ARM: NORMAL MMHG
BH CV XLRA - RV BASE: 4.2 CM
BH CV XLRA - RV LENGTH: 8.5 CM
BH CV XLRA - RV MID: 3.6 CM
BH CV XLRA - TDI S': 18.2 CM/SEC
BUN SERPL-MCNC: 7 MG/DL (ref 8–23)
BUN/CREAT SERPL: 9.1 (ref 7–25)
CALCIUM SPEC-SCNC: 7.9 MG/DL (ref 8.6–10.5)
CHLORIDE SERPL-SCNC: 106 MMOL/L (ref 98–107)
CO2 SERPL-SCNC: 24 MMOL/L (ref 22–29)
CREAT SERPL-MCNC: 0.77 MG/DL (ref 0.76–1.27)
EGFRCR SERPLBLD CKD-EPI 2021: 95.1 ML/MIN/1.73
GLUCOSE BLDC GLUCOMTR-MCNC: 126 MG/DL (ref 70–130)
GLUCOSE BLDC GLUCOMTR-MCNC: 131 MG/DL (ref 70–130)
GLUCOSE BLDC GLUCOMTR-MCNC: 150 MG/DL (ref 70–130)
GLUCOSE SERPL-MCNC: 153 MG/DL (ref 65–99)
IVRT: 97 MS
LEFT ATRIUM VOLUME INDEX: 26.9 ML/M2
LV EF 2D ECHO EST: 65 %
MAGNESIUM SERPL-MCNC: 2.1 MG/DL (ref 1.6–2.4)
POTASSIUM SERPL-SCNC: 3.8 MMOL/L (ref 3.5–5.2)
SODIUM SERPL-SCNC: 141 MMOL/L (ref 136–145)

## 2024-04-26 PROCEDURE — 99233 SBSQ HOSP IP/OBS HIGH 50: CPT | Performed by: NURSE PRACTITIONER

## 2024-04-26 PROCEDURE — 25810000003 SODIUM CHLORIDE 0.9 % SOLUTION 250 ML FLEX CONT: Performed by: STUDENT IN AN ORGANIZED HEALTH CARE EDUCATION/TRAINING PROGRAM

## 2024-04-26 PROCEDURE — 92612 ENDOSCOPY SWALLOW (FEES) VID: CPT

## 2024-04-26 PROCEDURE — 99231 SBSQ HOSP IP/OBS SF/LOW 25: CPT | Performed by: PHYSICIAN ASSISTANT

## 2024-04-26 PROCEDURE — 82948 REAGENT STRIP/BLOOD GLUCOSE: CPT

## 2024-04-26 PROCEDURE — 25010000002 HEPARIN (PORCINE) PER 1000 UNITS: Performed by: INTERNAL MEDICINE

## 2024-04-26 PROCEDURE — 25010000002 LEVETRIRACETAM PER 10 MG: Performed by: NURSE PRACTITIONER

## 2024-04-26 PROCEDURE — 83735 ASSAY OF MAGNESIUM: CPT | Performed by: INTERNAL MEDICINE

## 2024-04-26 PROCEDURE — 99233 SBSQ HOSP IP/OBS HIGH 50: CPT | Performed by: INTERNAL MEDICINE

## 2024-04-26 PROCEDURE — 25010000002 NICARDIPINE 2.5 MG/ML SOLUTION 10 ML VIAL: Performed by: NURSE PRACTITIONER

## 2024-04-26 PROCEDURE — 25810000003 SODIUM CHLORIDE 0.9 % SOLUTION: Performed by: INTERNAL MEDICINE

## 2024-04-26 PROCEDURE — 80048 BASIC METABOLIC PNL TOTAL CA: CPT | Performed by: NURSE PRACTITIONER

## 2024-04-26 PROCEDURE — 93306 TTE W/DOPPLER COMPLETE: CPT | Performed by: INTERNAL MEDICINE

## 2024-04-26 PROCEDURE — 93306 TTE W/DOPPLER COMPLETE: CPT

## 2024-04-26 PROCEDURE — 25010000002 NICARDIPINE 2.5 MG/ML SOLUTION 10 ML VIAL: Performed by: STUDENT IN AN ORGANIZED HEALTH CARE EDUCATION/TRAINING PROGRAM

## 2024-04-26 PROCEDURE — 25810000003 SODIUM CHLORIDE 0.9 % SOLUTION 250 ML FLEX CONT: Performed by: NURSE PRACTITIONER

## 2024-04-26 RX ORDER — LOSARTAN POTASSIUM 50 MG/1
100 TABLET ORAL
Status: DISCONTINUED | OUTPATIENT
Start: 2024-04-27 | End: 2024-05-02 | Stop reason: HOSPADM

## 2024-04-26 RX ORDER — AMLODIPINE BESYLATE 5 MG/1
5 TABLET ORAL
Status: DISCONTINUED | OUTPATIENT
Start: 2024-04-26 | End: 2024-04-26

## 2024-04-26 RX ORDER — FLUCONAZOLE 100 MG/1
100 TABLET ORAL EVERY 24 HOURS
Qty: 5 TABLET | Refills: 0 | Status: COMPLETED | OUTPATIENT
Start: 2024-04-26 | End: 2024-04-30

## 2024-04-26 RX ORDER — MIRTAZAPINE 15 MG/1
7.5 TABLET, FILM COATED ORAL NIGHTLY
Status: DISCONTINUED | OUTPATIENT
Start: 2024-04-26 | End: 2024-05-02 | Stop reason: HOSPADM

## 2024-04-26 RX ORDER — LEVOTHYROXINE SODIUM 0.1 MG/1
200 TABLET ORAL
Status: DISCONTINUED | OUTPATIENT
Start: 2024-04-27 | End: 2024-05-02 | Stop reason: HOSPADM

## 2024-04-26 RX ORDER — AMLODIPINE BESYLATE 10 MG/1
10 TABLET ORAL
Status: DISCONTINUED | OUTPATIENT
Start: 2024-04-27 | End: 2024-05-02 | Stop reason: HOSPADM

## 2024-04-26 RX ORDER — AMLODIPINE BESYLATE 5 MG/1
5 TABLET ORAL ONCE
Status: COMPLETED | OUTPATIENT
Start: 2024-04-26 | End: 2024-04-26

## 2024-04-26 RX ORDER — LOSARTAN POTASSIUM 50 MG/1
50 TABLET ORAL ONCE
Status: COMPLETED | OUTPATIENT
Start: 2024-04-26 | End: 2024-04-26

## 2024-04-26 RX ORDER — HEPARIN SODIUM 5000 [USP'U]/ML
5000 INJECTION, SOLUTION INTRAVENOUS; SUBCUTANEOUS EVERY 8 HOURS SCHEDULED
Status: DISCONTINUED | OUTPATIENT
Start: 2024-04-26 | End: 2024-05-02 | Stop reason: HOSPADM

## 2024-04-26 RX ADMIN — SODIUM CHLORIDE 75 ML/HR: 9 INJECTION, SOLUTION INTRAVENOUS at 09:24

## 2024-04-26 RX ADMIN — NICARDIPINE HYDROCHLORIDE 5 MG/HR: 25 INJECTION, SOLUTION INTRAVENOUS at 16:30

## 2024-04-26 RX ADMIN — LEVETIRACETAM 1000 MG: 100 INJECTION, SOLUTION INTRAVENOUS at 20:32

## 2024-04-26 RX ADMIN — NICARDIPINE HYDROCHLORIDE 10 MG/HR: 25 INJECTION, SOLUTION INTRAVENOUS at 23:34

## 2024-04-26 RX ADMIN — HEPARIN SODIUM 5000 UNITS: 5000 INJECTION INTRAVENOUS; SUBCUTANEOUS at 14:06

## 2024-04-26 RX ADMIN — NICARDIPINE HYDROCHLORIDE 10 MG/HR: 25 INJECTION, SOLUTION INTRAVENOUS at 01:27

## 2024-04-26 RX ADMIN — CETIRIZINE HYDROCHLORIDE 10 MG: 10 TABLET, FILM COATED ORAL at 09:25

## 2024-04-26 RX ADMIN — Medication 10 ML: at 09:25

## 2024-04-26 RX ADMIN — HYDRALAZINE HYDROCHLORIDE 50 MG: 50 TABLET ORAL at 06:02

## 2024-04-26 RX ADMIN — LOSARTAN POTASSIUM 50 MG: 50 TABLET, FILM COATED ORAL at 09:25

## 2024-04-26 RX ADMIN — LEVETIRACETAM 1000 MG: 100 INJECTION, SOLUTION INTRAVENOUS at 09:25

## 2024-04-26 RX ADMIN — NICARDIPINE HYDROCHLORIDE 7.5 MG/HR: 25 INJECTION, SOLUTION INTRAVENOUS at 04:32

## 2024-04-26 RX ADMIN — FLUCONAZOLE 100 MG: 200 TABLET ORAL at 10:33

## 2024-04-26 RX ADMIN — LOSARTAN POTASSIUM 50 MG: 50 TABLET, FILM COATED ORAL at 10:33

## 2024-04-26 RX ADMIN — ATORVASTATIN CALCIUM 40 MG: 40 TABLET, FILM COATED ORAL at 20:32

## 2024-04-26 RX ADMIN — HYDRALAZINE HYDROCHLORIDE 50 MG: 50 TABLET ORAL at 14:06

## 2024-04-26 RX ADMIN — AMLODIPINE BESYLATE 5 MG: 5 TABLET ORAL at 10:33

## 2024-04-26 RX ADMIN — AMLODIPINE BESYLATE 5 MG: 5 TABLET ORAL at 15:17

## 2024-04-26 RX ADMIN — Medication 10 ML: at 20:32

## 2024-04-26 RX ADMIN — MIRTAZAPINE 7.5 MG: 15 TABLET, FILM COATED ORAL at 20:32

## 2024-04-26 RX ADMIN — HEPARIN SODIUM 5000 UNITS: 5000 INJECTION INTRAVENOUS; SUBCUTANEOUS at 22:21

## 2024-04-26 RX ADMIN — NICARDIPINE HYDROCHLORIDE 7.5 MG/HR: 25 INJECTION, SOLUTION INTRAVENOUS at 20:15

## 2024-04-26 RX ADMIN — HYDRALAZINE HYDROCHLORIDE 50 MG: 50 TABLET ORAL at 22:21

## 2024-04-26 RX ADMIN — NICARDIPINE HYDROCHLORIDE 7.5 MG/HR: 25 INJECTION, SOLUTION INTRAVENOUS at 09:24

## 2024-04-26 NOTE — CASE MANAGEMENT/SOCIAL WORK
Continued Stay Note  [facility]     Patient Name: Camron Hendrix  MRN: [id number]  Today's Date: 4/26/2024    Admit Date: 4/24/2024    Plan: [facility] acute   Discharge Plan       Row Name 04/26/24 1208       Plan    Plan [facility] acute    Patient/Family in Agreement with Plan yes    Plan Comments Spoke with April at [facility]; they can accept patient once BP stable and off Cardene gtt for 24 hours.  Anticipate discharge to [facility] acute rehab on Sunday, at the earliest.  Patient states family can transport to [facility] when ready to discharge.  CM will continue to follow.                     Discharge Codes    No documentation.                 Expected Discharge Date and Time       Expected Discharge Date Expected Discharge Time    Apr 28, 2024               Terra Zuniga RN

## 2024-04-26 NOTE — PLAN OF CARE
Goal Outcome Evaluation:  Plan of Care Reviewed With: patient, daughter        Progress: improving         Anticipated Discharge Disposition (SLP): inpatient rehabilitation facility          SLP Swallowing Diagnosis: mild, oral dysphagia, moderate, pharyngeal dysphagia (04/26/24 5003)

## 2024-04-26 NOTE — PROGRESS NOTES
HOD# : 2    No events last night  Patient looks a lot better today more conversant speech has cleared up.  No slur at all.    Patient with left eye patch on.  Still with lateral rectus weakness in the left eye.     Feeling better but having worsening swelling secondary to discontinuation of Lasix.    SAH (subarachnoid hemorrhage)    Hypothyroidism    Essential hypertension    Coronary artery disease involving native coronary artery of native heart without angina pectoris    PAF (paroxysmal atrial fibrillation)      Temp:  [97.7 °F (36.5 °C)-98.7 °F (37.1 °C)] 98 °F (36.7 °C)  Heart Rate:  [64-86] 75  Resp:  [16-22] 18  BP: (126-163)/(64-94) 156/75  I/O last 3 completed shifts:  In: 6767.3 [I.V.:6767.3]  Out: 3775 [Urine:3775]  No intake/output data recorded.  Vital signs were reviewed and documented in the chart      EXAM   Body mass index is 42.58 kg/m².      Patient appeared in good neurologic function with normal comprehension   CN grossly intact  Moves all extremities to command  Speech more articulate today    Left eye with patch on    DIAGNOSIS  Chronic anticoagulation  Hypertension of intracranial hemorrhage      PLAN    Patient at neurologic baseline per family but still having left eye weakness.  Patient is going to see neuro-ophthalmology for therapy on the left eye.    From a neurosurgical perspective he is okay for rehabilitation.  We would encourage ongoing holding of the Lasix at least through the weekend secondary to risk of vasospasm.  I think that risk is very low however.     Could vascular neurology their opinion as well.    No role for neurosurgical intervention at this time.  MRA/CTA are stable compared to prior CT a's

## 2024-04-26 NOTE — MBS/VFSS/FEES
Acute Care - Speech Language Pathology   Swallow Initial Evaluation [facility]  Fiberoptic Endoscopic Evaluation of Swallowing (FEES)       Patient Name: Camron Hendrix  : 1950  MRN: [id number]  Today's Date: 2024               Admit Date: 2024    Visit Dx:     ICD-10-CM ICD-9-CM   1. Dysarthria  R47.1 784.51   2. 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    SAH (subarachnoid hemorrhage)     Past Medical History:   Diagnosis Date    AAA (abdominal aortic aneurysm)     Coronary artery disease     CVA (cerebral vascular accident)     Hypertension     Hypothyroidism     PAF (paroxysmal atrial fibrillation)     TIA (transient ischemic attack)      Past Surgical History:   Procedure Laterality Date    CARDIAC CATHETERIZATION N/A 3/29/2019    Procedure: Left Heart Cath;  Surgeon: Andrea Holloway MD;  Location: [facility] CATH INVASIVE LOCATION;  Service: Cardiovascular    CERVICAL FUSION      c4-c5    CORONARY ANGIOPLASTY WITH STENT PLACEMENT      HERNIA REPAIR      TONSILECTOMY, ADENOIDECTOMY, BILATERAL MYRINGOTOMY AND TUBES         SLP Recommendation and Plan  SLP Swallowing Diagnosis: mild, oral dysphagia, moderate, pharyngeal dysphagia (24 0830)  SLP Diet Recommendation: soft to chew textures, chopped, no mixed consistencies, nectar thick liquids (24 0830)  Recommended Precautions and Strategies: upright posture during/after  eating, multiple swallows per bite of food, multiple swallows per sip of liquid, general aspiration precautions, reflux precautions (04/26/24 0830)  SLP Rec. for Method of Medication Administration: meds whole, meds crushed, with thick liquids, with puree (04/26/24 0830)     Monitor for Signs of Aspiration: yes, notify SLP if any concerns (04/26/24 0830)     Swallow Criteria for Skilled Therapeutic Interventions Met: demonstrates skilled criteria (04/26/24 0830)  Anticipated Discharge Disposition (SLP): inpatient rehabilitation facility (04/26/24 0830)  Rehab Potential/Prognosis, Swallowing: good, to achieve stated therapy goals (04/26/24 0830)  Therapy Frequency (Swallow): 5 days per week (04/26/24 0830)  Predicted Duration Therapy Intervention (Days): until discharge (04/26/24 0830)  Oral Care Recommendations: Oral Care BID/PRN, Suction toothbrush (04/26/24 0830)                                        Plan of Care Reviewed With: patient, daughter  Progress: improving      SWALLOW EVALUATION (Last 72 Hours)       SLP Adult Swallow Evaluation       Row Name 04/26/24 0830 04/25/24 1510 04/24/24 1515             Rehab Evaluation    Document Type evaluation  -RS re-evaluation  -MP evaluation  -      Subjective Information no complaints  -RS no complaints  -MP no complaints  -      Patient Observations alert;cooperative;agree to therapy  -RS alert;cooperative  -MP alert;cooperative  -      Patient/Family/Caregiver Comments/Observations Misti hodgson present  -RS No family present  -MP family present  -MH      Patient Effort good  -RS good  -MP good  -MH      Symptoms Noted During/After Treatment none  -RS -- none  -MH         General Information    Patient Profile Reviewed yes  -RS yes  -MP yes  -MH      Pertinent History Of Current Problem -- SAH overlying midbrain & ct. Hx dementia, prior CVA  -MP Adm from OSH w/ SAH overlying midbrain & ct. Hx: a-fib, AAA, CAD, CVA, HTN, T2DM, HLD, hearing loss,  dementia, prior CVA  -      Current Method of Nutrition -- soft to chew textures;thin liquids  - NPO  -      Precautions/Limitations, Vision -- vision impairment, left  -MP WFL;for purposes of eval  -      Precautions/Limitations, Hearing -- WFL;for purposes of eval  - WFL;for purposes of eval  -      Prior Level of Function-Communication -- cognitive-linguistic impairment;other (see comments)  dementia per chart  - other (see comments)  Most recent [facility] eval 7/20/23. Pt w/ mild short term memory deficits; however deficits also noted @ baseline  -      Prior Level of Function-Swallowing -- no diet consistency restrictions  - no diet consistency restrictions;other (see comments)  per pt/family report  -      Plans/Goals Discussed with -- patient;agreed upon  - patient;family;agreed upon  -      Barriers to Rehab -- cognitive status  - medically complex  -      Patient's Goals for Discharge -- patient did not state  - patient did not state  -      Family Goals for Discharge -- -- family did not state  -         Pain    Additional Documentation Pain Scale: FACES Pre/Post-Treatment (Group)  -RS Pain Scale: FACES Pre/Post-Treatment (Group)  -MP Pain Scale: FACES Pre/Post-Treatment (Group)  -         Pain Scale: FACES Pre/Post-Treatment    Pain: FACES Scale, Pretreatment 0-->no hurt  -RS 0-->no hurt  -MP 0-->no hurt  -MH      Posttreatment Pain Rating 0-->no hurt  -RS 0-->no hurt  -MP 0-->no hurt  -         Oral Motor Structure and Function    Dentition Assessment -- natural, present and adequate  - natural, present and adequate  -      Secretion Management -- WNL/WFL  -MP WNL/WFL  -      Mucosal Quality -- moist, healthy  - moist, healthy  -         Oral Musculature and Cranial Nerve Assessment    Oral Motor General Assessment -- generalized oral motor weakness  - generalized oral motor weakness  -         General Eating/Swallowing Observations    Respiratory Support  Currently in Use -- -- nasal cannula  -      O2 Liters -- -- 2L  -      Eating/Swallowing Skills -- self-fed;fed by SLP  - self-fed;fed by [facility]      Positioning During Eating -- upright in chair  - upright in bed  -      Utensils Used -- spoon;cup;straw  - spoon;cup;straw  -      Consistencies Trialed -- thin liquids;nectar/syrup-thick liquids;pureed;regular textures  - ice chips;thin liquids;pureed;regular textures  -         Clinical Swallow Eval    Oral Prep Phase -- -- impaired  -      Oral Residue -- -- impaired  -      Pharyngeal Phase -- suspected pharyngeal impairment  - --      Esophageal Phase -- -- suspected esophageal impairment  -      Clinical Swallow Evaluation Summary -- RN reported family noted coughing w/ lunch- pt confirmed w/ SLP. Cough noted w/ liquid wash following solid trial. None noted w/ nectar-thick across trials/ as liquid wash. Rec modify diet to soft/no mixed, nectar-thick and will plan for FEES tomorrow to further assess.  -MP Pt w/ delayed throat clear x1 w/ thin liquids via large, sequential sips; u/a to replicate accross multiple additonal trials. No overt s/s of aspiration w/ small/single sips of thin via cup/straw, pureed or regular solid consistenices. Slightly prolonged mastication w/ regulart solid trial & oral residue; able to clear w/ liquid wash. Recommend soft/chopped solid diet w/ thin liquids, small/single sips. SLP will f/u for re-eval to ensure no additional concerns  -         Oral Prep Concerns    Oral Prep Concerns -- -- prolonged mastication  -      Prolonged Mastication -- -- regular consistencies  [facility]         Oral Residue Concerns    Oral Residue Concerns -- -- diffuse residue throughout oral cavity;other (see comments)  -      Diffuse Residue Throughout Oral Cavity -- -- regular consistencies  -         Pharyngeal Phase Concerns    Pharyngeal Phase Concerns -- cough  - --      Cough -- thin  -MP --      Pharyngeal Phase  Concerns, Comment -- as liquid wash following solid  -MP --         Esophageal Phase Concerns    Esophageal Phase Concerns -- -- belching  -      Belching -- -- thin  -MH         Fiberoptic Endoscopic Evaluation of Swallowing (FEES)    Risks/Benefits Reviewed risks/benefits explained;patient;family;agreed to eval  -RS -- --      Nasal Entry right:  -RS -- --      Scope serial number/identification [id number]  -RS -- --         Anatomy and Physiology    Anatomic Considerations edema;erythema;false vocal folds;arytenoids  -RS -- --      Comment thrush appearing presentation of tongue base. RN alerted and aware  -RS -- --      Velopharyngeal WFL  -RS -- --      Base of Tongue symmetrical  -RS -- --      Epiglottis WFL  -RS -- --      Laryngeal Function Breathing symmetrical  -RS -- --      Laryngeal Function Phonation symmetrical  -RS -- --      Laryngeal Function to Breath Hold TVF contact;TVF/FVF/Arytenoid  -RS -- --      Secretion Rating Scale (Anatoly english alHarris 1996) 0- normal, no visible secretions  -RS -- --      Ice Chips elicited swallow  -RS -- --      Spontaneous Swallow frequency adequate  -RS -- --      Sensory sensed scope  -RS -- --      Utensils Used Spoon;Cup;Straw  -RS -- --      Consistencies Trialed ice chips;thin liquids;nectar-thick liquids;mixed consistency;regular textures  -RS -- --         FEES Interpretation    Oral Phase prespill of liquids into pharynx  -RS -- --         Initiation of Pharyngeal Swallow    Initiation of Pharyngeal Swallow bolus in valleculae  -RS -- --      Pharyngeal Phase impaired pharyngeal phase of swallowing  -RS -- --      Aspiration During the Swallow thin liquids;secondary to delayed swallow initiation or mistiming;secondary to reduced laryngeal elevation;secondary to reduced vestibular closure  -RS -- --      Response to Aspiration No  -RS -- --      No spontaneous response to aspiration with effective subglottic clearance with cue (see comments)  -RS -- --      Krys's  Scale thin:;8-->Level 8;nectar:;pudding/puree:;regular textures:;1-->Level 1  -RS -- --      Residue all consistencies tested;pyriform sinuses;posterior pharyngeal wall;secondary to reduced posterior pharyngeal wall stripping;secondary to reduced hyolaryngeal excursion  -RS -- --      Response to Residue cleared residue;with cued swallow  -RS -- --      Attempted Compensatory Maneuvers bolus size;bolus presentation style;chin tuck  -RS -- --      Response to Attempted Compensatory Maneuvers did not prevent aspiration;other (see comments)  worsened aspiration  -RS -- --      FEES Summary Very narrow pharyngeal space. Edema and erythema at arytenoids and false cords. + ventricular contraction. Good glottic closure observed at midline. Mild oral and moderate pharyngeal impairment. Silent aspiration of thins during the swallow cleared with cued coughs. Chin tuck head posture results in significantly increased aspiration. No other pen/asp w any tested consistency. Mild pyriform residue (worse with thinner viscosities) increases aspiration risk, residue is cleared with a cued swallow. Pt may advance to soft/whole solids but should continue nectar liquids, adding an additional swallow with each bite/sip. PO meds whole or crushed in pudding/puree or nectar liquids. Lengthy education w pt and daughter including review of FEES on monitor following study. D/w RN.  -RS -- --         SLP Evaluation Clinical Impression    SLP Swallowing Diagnosis mild;oral dysphagia;moderate;pharyngeal dysphagia  -RS suspected pharyngeal dysphagia  -MP R/O pharyngeal dysphagia  -      Functional Impact risk of aspiration/pneumonia  -RS risk of aspiration/pneumonia  -MP risk of aspiration/pneumonia  -      Rehab Potential/Prognosis, Swallowing good, to achieve stated therapy goals  -RS good, to achieve stated therapy goals  -MP good, to achieve stated therapy goals  -      Swallow Criteria for Skilled Therapeutic Interventions Met  demonstrates skilled criteria  -RS demonstrates skilled criteria  - demonstrates skilled criteria  -         Recommendations    Therapy Frequency (Swallow) 5 days per week  -RS -- --      Predicted Duration Therapy Intervention (Days) until discharge  -RS until discharge  - until discharge  -      SLP Diet Recommendation soft to chew textures;chopped;no mixed consistencies;nectar thick liquids  -RS soft to chew textures;chopped;no mixed consistencies;nectar thick liquids  -MP soft to chew textures;chopped;thin liquids  -      Recommended Diagnostics -- -- reassess via clinical swallow evaluation  -      Recommended Precautions and Strategies upright posture during/after eating;multiple swallows per bite of food;multiple swallows per sip of liquid;general aspiration precautions;reflux precautions  - general aspiration precautions  - small bites of food and sips of liquid;general aspiration precautions;reflux precautions  -      Oral Care Recommendations Oral Care BID/PRN;Suction toothbrush  -RS Oral Care BID/PRN;Toothbrush  - Oral Care BID/PRN;Toothbrush  -      SLP Rec. for Method of Medication Administration meds whole;meds crushed;with thick liquids;with puree  -RS meds whole;meds crushed;with thick liquids;with puree;as tolerated  - meds whole;with thin liquids;meds crushed;with puree;as tolerated  -      Monitor for Signs of Aspiration yes;notify SLP if any concerns  -RS yes;notify SLP if any concerns  - yes;notify SLP if any concerns  -      Anticipated Discharge Disposition (SLP) inpatient rehabilitation facility  -RS inpatient rehabilitation facility  - inpatient rehabilitation facility  -         Swallow Goals (SLP)    Swallow LTGs Patient will demonstrate functional swallow for  - -- --      Swallow STGs diet tolerance goal selection (SLP);lingual strengthening goal selection (SLP);pharyngeal strengthening exercise goal selection (SLP)  - -- --      Diet Tolerance Goal  Selection (SLP) Patient will tolerate trials of  -RS -- --      Lingual Strengthening Goal Selection (SLP) lingual strengthening, SLP goal 1  -RS -- --      Pharyngeal Strengthening Exercise Goal Selection (SLP) pharyngeal strengthening exercise, SLP goal 1  -RS -- --         (LTG) Patient will demonstrate functional swallow for    Diet Texture (Demonstrate functional swallow) regular textures  -RS -- --      Liquid viscosity (Demonstrate functional swallow) thin liquids  -RS -- --      [place] (Demonstrate functional swallow) independently (over 90% accuracy)  -RS -- --      Time Frame (Demonstrate functional swallow) by discharge  -RS -- --      Progress/Outcomes (Demonstrate functional swallow) new goal  -RS -- --         (STG) Patient will tolerate trials of    Consistencies Trialed (Tolerate trials) soft to chew (whole) textures;nectar/ mildly thick liquids  -RS -- --      Desired Outcome (Tolerate trials) without signs/symptoms of aspiration;without signs of distress  -RS -- --      [place] (Tolerate trials) independently (over 90% accuracy)  -RS -- --      Time Frame (Tolerate trials) 1 week  -RS -- --      Progress/Outcomes (Tolerate trials) new goal  -RS -- --         (STG) Lingual Strengthening Goal 1 (SLP)    Activity (Lingual Strengthening Goal 1, SLP) increase tongue back strength  -RS -- --      Increase Tongue Back Strength lingual resistance exercises  -RS -- --      [place]/Accuracy (Lingual Strengthening Goal 1, SLP) with minimal cues (75-90% accuracy)  -RS -- --      Time Frame (Lingual Strengthening Goal 1, SLP) 1 week  -RS -- --      Progress/Outcomes (Lingual Strengthening Goal 1, SLP) new goal  -RS -- --         (STG) Pharyngeal Strengthening Exercise Goal 1 (SLP)    Activity (Pharyngeal Strengthening Goal 1, SLP) increase timing;increase anterior movement of the hyolaryngeal complex;increase closure at entrance to airway/closure of airway at glottis;increase squeeze/positive  pressure generation  -RS -- --      Increase Timing prepping - 3 second prep or suck swallow or 3-step swallow  -RS -- --      Increase Anterior Movement of the Hyolaryngeal Complex falsetto;Mendelsohn  -RS -- --      Increase Closure at Entrance to Airway/Closure of Airway at Glottis super-supraglottic swallow  -RS -- --      Increase Squeeze/Positive Pressure Generation hard effortful swallow  -RS -- --      [place]/Accuracy (Pharyngeal Strengthening Goal 1, SLP) with minimal cues (75-90% accuracy)  -RS -- --      Time Frame (Pharyngeal Strengthening Goal 1, SLP) 1 week  -RS -- --      Progress/Outcomes (Pharyngeal Strengthening Goal 1, SLP) new goal  -RS -- --                User Key  (r) = Recorded By, (t) = Taken By, (c) = Cosigned By      Initials Name Effective Dates    MP Steven Forrester, MS CCC-SLP 12/28/21 -     Katalina Rose MS [facility]-SLP 05/12/23 -     Felton Schmidt MS CCC-SLP 09/14/23 -                     EDUCATION  The patient has been educated in the following areas:   Dysphagia (Swallowing Impairment) Modified Diet Instruction.        SLP GOALS       Row Name 04/26/24 0830 04/24/24 1165          (LTG) Patient will demonstrate functional swallow for    Diet Texture (Demonstrate functional swallow) regular textures  -RS --     Liquid viscosity (Demonstrate functional swallow) thin liquids  -RS --     [place] (Demonstrate functional swallow) independently (over 90% accuracy)  -RS --     Time Frame (Demonstrate functional swallow) by discharge  -RS --     Progress/Outcomes (Demonstrate functional swallow) new goal  -RS --        (STG) Patient will tolerate trials of    Consistencies Trialed (Tolerate trials) soft to chew (whole) textures;nectar/ mildly thick liquids  -RS --     Desired Outcome (Tolerate trials) without signs/symptoms of aspiration;without signs of distress  -RS --     [place] (Tolerate trials) independently (over 90% accuracy)  -RS --     Time Frame (Tolerate  trials) 1 week  -RS --     Progress/Outcomes (Tolerate trials) new goal  -RS --        (STG) Lingual Strengthening Goal 1 (SLP)    Activity (Lingual Strengthening Goal 1, SLP) increase tongue back strength  -RS --     Increase Tongue Back Strength lingual resistance exercises  -RS --     Coffee/Accuracy (Lingual Strengthening Goal 1, SLP) with minimal cues (75-90% accuracy)  -RS --     Time Frame (Lingual Strengthening Goal 1, SLP) 1 week  -RS --     Progress/Outcomes (Lingual Strengthening Goal 1, SLP) new goal  -RS --        (STG) Pharyngeal Strengthening Exercise Goal 1 (SLP)    Activity (Pharyngeal Strengthening Goal 1, SLP) increase timing;increase anterior movement of the hyolaryngeal complex;increase closure at entrance to airway/closure of airway at glottis;increase squeeze/positive pressure generation  -RS --     Increase Timing prepping - 3 second prep or suck swallow or 3-step swallow  -RS --     Increase Anterior Movement of the Hyolaryngeal Complex falsetto;Mendelsohn  -RS --     Increase Closure at Entrance to Airway/Closure of Airway at Glottis super-supraglottic swallow  -RS --     Increase Squeeze/Positive Pressure Generation hard effortful swallow  -RS --     [place]offee/Accuracy (Pharyngeal Strengthening Goal 1, SLP) with minimal cues (75-90% accuracy)  -RS --     Time Frame (Pharyngeal Strengthening Goal 1, SLP) 1 week  -RS --     Progress/Outcomes (Pharyngeal Strengthening Goal 1, SLP) new goal  -RS --        Patient will demonstrate functional speech skills for return to discharge environment    Coffee -- Independently  -     Progress/Outcomes -- new goal  -        SLP Diagnostic Treatment     Patient will participate in further assessment in the following areas -- reading comprehension;graphic expression;cognitive-linguistic  -     Time Frame (Diagnostic) -- short term goal (STG)  -     Progress/Outcomes (Additional Goal 1, SLP) -- new goal  -        Phonation Goal 1  (SLP)    Improve Phonation By Goal 1 (SLP) -- using loud speech;80%;with minimal cues (75-90%)  -     Time Frame (Phonation Goal 1, SLP) -- short term goal (STG)  -     Progress/Outcomes (Phonation Goal 1, SLP) -- new goal  -        Articulation Goal 1 (SLP)    Improve Articulation Goal 1 (SLP) -- by over-articulating in connected speech;80%;with minimal cues (75-90%)  -     Time Frame (Articulation Goal 1, SLP) -- short term goal (STG)  -     Progress/Outcomes (Articulation Goal 1, SLP) -- new goal  -               User Key  (r) = Recorded By, (t) = Taken By, (c) = Cosigned By      Initials Name Provider Type    Katalina Rose MS CCC-SLP Speech and Language Pathologist    Felton Schmidt MS CCC-SLP Speech and Language Pathologist                       Time Calculation:    Time Calculation- SLP       Row Name 04/26/24 1033             Time Calculation- SLP    SLP Start Time 0830  -RS      SLP Received On 04/26/24  -RS         Untimed Charges    86915-[id number] Fiberoptic Endo Eval Swallow Minutes 111  -RS         Total Minutes    Untimed Charges Total Minutes 111  -RS       Total Minutes 111  -RS                User Key  (r) = Recorded By, (t) = Taken By, (c) = Cosigned By      Initials Name Provider Type    Felton Schmidt MS CCC-SLP Speech and Language Pathologist                    Therapy Charges for Today       Code Description Service Date Service Provider Modifiers Qty    [id number] HC ST FIBEROPTIC ENDO EVAL SWALL 7 4/26/2024 Felton Bacon MS CCC-SLP GN 1                 MS MANUEL Oconnell  4/26/2024

## 2024-04-26 NOTE — PROGRESS NOTES
"Critical Care Note     LOS: 2 days   Patient Care Team:  Leslie Rhodes MD as PCP - General (Family Medicine)  Delmer Perdomo MD as Cardiologist (Cardiology)    Chief Complaint/Reason for visit:     Subarachnoid hemorrhage  Hypertension  Diabetes mellitus type 2  Hyperlipidemia  History of paroxysmal atrial fibrillation  Coronary artery disease  Hypothyroid    Subjective     Interval History:     More alert today.  Had a restless night.  Requesting a sleeping pill.  Takes Remeron at home.  Passed his fees with nectar thick liquids.  They did see thrush in his posterior pharynx.  Continues to have diplopia.  Denies nausea or vomiting.  Remains on Cardene 7.5 mg/h for hypertension.    Review of Systems:    All systems were reviewed and negative except as noted in subjective.    Medical history, surgical history, social history, family history reviewed    Objective     Intake/Output:    Intake/Output Summary (Last 24 hours) at 4/26/2024 1011  Last data filed at 4/26/2024 0600  Gross per 24 hour   Intake 3733.5 ml   Output 1800 ml   Net 1933.5 ml       Nutrition:  Diet: Cardiac, Diabetic; Healthy Heart (2-3 Na+); Consistent Carbohydrate; No Mixed Consistencies; Texture: Soft to Chew (NDD 3); Soft to Chew: Chopped Meat; Fluid Consistency: Nectar Thick    Infusions:  niCARdipine, 5-15 mg/hr, Last Rate: 7.5 mg/hr (04/26/24 0924)          Telemetry: Sinus rhythm             Vital Signs  Blood pressure 156/75, pulse 75, temperature 98 °F (36.7 °C), temperature source Oral, resp. rate 18, height 175.3 cm (69\"), weight 131 kg (288 lb), SpO2 94%.    Physical Exam:  General Appearance:  Well-developed older gentleman sitting semiupright in bed, no respiratory distress   Head:  No visible trauma   Eyes:          Disconjugate gaze   Ears:     Throat: Oral mucosa moist   Neck: Stiffness   Back:      Lungs:   Symmetric chest expansion without wheeze or rhonchi    Heart:  Regular rhythm, S1, S2 auscultated   Abdomen:   " "Bowel sounds present, soft, nontender   Rectal:   Deferred   Extremities: No pretibial edema or cyanosis   Pulses:    Skin: Warm and dry without rash   Lymph nodes: No cervical adenopathy   Neurologic: Awake, oriented,  equal, speech fluent, no motor drift.  He has a disconjugate gaze    Interval: baseline  1a. Level of Consciousness: 0-->Alert, keenly responsive  1b. LOC Questions: 0-->Answers both questions correctly  1c. LOC Commands: 0-->Performs both tasks correctly  2. Best Gaze: 1-->Partial gaze palsy, gaze is abnormal in one or both eyes, but forced deviation or total gaze paresis is not present  3. Visual: 0-->No visual loss  4. Facial Palsy: 0-->Normal symmetrical movements  5a. Motor Arm, Left: 0-->No drift, limb holds 90 (or 45) degrees for full 10 secs  5b. Motor Arm, Right: 0-->No drift, limb holds 90 (or 45) degrees for full 10 secs  6a. Motor Leg, Left: 0-->No drift, leg holds 30 degree position for full 5 secs  6b. Motor Leg, Right: 0-->No drift, leg holds 30 degree position for full 5 secs  7. Limb Ataxia: 0-->Absent  8. Sensory: 0-->Normal, no sensory loss  9. Best Language: 0-->No aphasia, normal  10. Dysarthria: 0-->Normal  11. Extinction and Inattention (formerly Neglect): 0-->No abnormality    Total (NIH Stroke Scale): 1   Results Review:     I reviewed the patient's new clinical results.   Results from last 7 days   Lab Units 04/26/24  0627 04/24/24 2025   SODIUM mmol/L 141 140   POTASSIUM mmol/L 3.8 3.7   CHLORIDE mmol/L 106 106   CO2 mmol/L 24.0 27.0   BUN mg/dL 7* 9   CREATININE mg/dL 0.77 0.94   CALCIUM mg/dL 7.9* 8.0*   GLUCOSE mg/dL 153* 198*     Results from last 7 days   Lab Units 04/25/24  0811 04/24/24 2025   WBC 10*3/mm3 8.18 6.74   HEMOGLOBIN g/dL 14.6 14.9   HEMATOCRIT % 43.1 43.7   PLATELETS 10*3/mm3 201 197         No results found for: \"BLOODCX\"  No results found for: \"URINECX\"    I reviewed the patient's new imaging including images and reports.    CT HEAD WO " CONTRAST    Date of Exam: 4/25/2024 4:54 AM EDT    Indication: SAH.    Comparison: 1 day prior.    Technique: Axial CT images were obtained of the head without contrast administration.  Automated exposure control and iterative construction methods were used.      Findings:  Expected evolving appearance of previously noted posterior fossa subarachnoid hemorrhage most confluent within the prepontine cistern extending towards the left cerebellopontine angle. There is no evidence of new hemorrhage or increasing mass effect. The   ventricles are unchanged. Some scattered areas of hyperdensity overlying the occipital lobes is favored artifactual, given symmetry and absence of associated signal abnormality on recent MRI. The orbits are normal. The paranasal sinuses are clear. The  calvarium is intact.   Impression:     Impression:  Expected evolution of previously noted posterior fossa subarachnoid hemorrhage as above. No evidence of new hemorrhage or increasing mass effect.        Electronically Signed: Kameron Delcid MD   4/25/2024 8:40 AM EDT       All medications reviewed.   amLODIPine, 5 mg, Oral, Q24H  atorvastatin, 40 mg, Oral, Nightly  cetirizine, 10 mg, Oral, Daily  fluconazole, 100 mg, Oral, Q24H  heparin (porcine), 5,000 Units, Subcutaneous, Q8H  hydrALAZINE, 50 mg, Oral, Q8H  levETIRAcetam, 1,000 mg, Intravenous, Q12H  [START ON 4/27/2024] losartan, 100 mg, Oral, Q24H  magnesium sulfate, 4 g, Intravenous, Once  mirtazapine, 7.5 mg, Oral, Nightly  sodium chloride, 10 mL, Intravenous, Q12H          Assessment & Plan       SAH (subarachnoid hemorrhage)    Hypothyroidism    Essential hypertension    Coronary artery disease involving native coronary artery of native heart without angina pectoris    PAF (paroxysmal atrial fibrillation)    73-year-old gentleman, remote smoker with hypertension, dyslipidemia, diabetes, coronary disease, previous coronary stents, paroxysmal atrial fibrillation, on anticoagulation,  currently in sinus rhythm, presenting on April 24 with severe headache and found to have a subarachnoid hemorrhage.  NIH stroke score was a 0.  However he has diplopia and a disconjugate gaze giving him an NIH stroke scale of 1.  He remains in sinus rhythm, sinus bradycardia.  He has an aortic valve stenosis murmur.  Anticoagulation was reversed and imaging was stable yesterday.    #1 subarachnoid hemorrhage  -Coumadin reversed with Kcentra and vitamin K  -CT of the head posterior fossa subarachnoid hemorrhage within the prepontine cistern toward the left cerebellopontine angle  -Double vision, diplopia  -Patch 1 eye and alternate  -Monitor NIH stroke scale  -MRI/MRA of the brain with no evidence of aneurysm, therefore this is presumed idiopathic focal subarachnoid hemorrhage from anticoagulation  -Passed his fees with nectar thick      #2 history of coronary artery disease, paroxysmal atrial fibrillation  -Followed by Dr. Perdomo in [place]  -Currently in sinus rhythm  Echocardiogram 2022, EF 70 to 75%, aortic valve stenosis with valve area 1.81 cm²-  -2019 left heart catheterization, normal left main, 30% mid LAD, 40% distal circumflex, 40% mid RCA, 50% distal RCA with LVEDP of 18  -Repeat echo pending  -Aspirin on hold  -Continue Lipitor    #3 diabetes mellitus type 2  -A1c 6.8, 3 months ago, 5.8 today  -Ozempic started January 2024  -Glucoses 130-150  -Sliding scale insulin, low-dose    #4 hypertension  -Currently on Cardene 7.5 mg/h  -Restart losartan, increase to 100 mg daily  -Restart hydralazine 50 mg every 8 hours  -Heart rate is in the 60s so I am holding off on carvedilol  -Will add amlodipine     #5 hypothyroid  -Continue thyroid replacement  -TSH 1.77, free T41.87    #6 oral candidiasis  -Seen on his fees assessment  -Diflucan 100 mg x 5 days    #7 insomnia  Restart home Remeron 7.5 mg nightly-      VTE Prophylaxis:SCDS    Stress Ulcer Prophylaxis:none    Rani Lilly MD  04/26/24  10:11  EDT      Time: Critical care 35 min  I personally provided care to this critically ill patient as documented above.  Critical care time does not include time spent on separately billed procedures.  None of my critical care time was concurrent with other critical care providers.

## 2024-04-26 NOTE — PROGRESS NOTES
-patient went to OR on 9/16 for ORIF of the right clavicle fracture  -nonweightbearing right upper extremity  -pain control   -follow-up with orthopedics as an outpatient Stroke Neurology Progress Note     Subjective     This patient was seen in follow-up for: subarachnoid hemorrhage around the left brainstem    Subjective:  Resting comfortably in the chair. No complaints this morning.   Continues with Cardene for BP management. Left lateral palsy and diplopia continues.     Objective      Temp:  [97.7 °F (36.5 °C)-98.7 °F (37.1 °C)] 98 °F (36.7 °C)  Heart Rate:  [64-86] 75  Resp:  [16-22] 18  BP: (126-163)/(66-94) 156/75    Objective    Physical Exam:  General Appearance: Alert, sitting in chair   HEENT: anicteric sclera, no scleral injection, right eye patch in place  Lungs: respirations appear comfortable, no obvious increased work of breathing  Extremities: No cyanosis or fingernail clubbing   Skin: No rashes in exposed skin areas     Neurological Examination:   Mental status: Alert and oriented. Very Mild expressive aphasia. No dysarthria. Able to name and repeat.  Cranial Nerves: Visual fields intact. Left lateral rectus (CN 6) palsy. Diplopia. Dysconjugate gaze. Face symmetric.    Sensory: Normal sensory exam to light touch.  Motor: Normal tone. Absent pronator drift.  Strength:  LUE: 5/5 biceps, triceps,   LLE: 5/5 hip flexion/extension  RUE: 5/5 biceps, triceps,   RLE:  5/5 hip flexion/extension  Cerebellar: Finger-to-nose intact. Heel-to-shin intact. Rapid alternating movements are intact.     Labs:    Lab Results   Component Value Date    HGBA1C 5.80 (H) 04/25/2024      Lab Results   Component Value Date    CHOL 94 04/25/2024    CHLPL 183 08/08/2022    TRIG 100 04/25/2024    HDL 33 (L) 04/25/2024    LDL 42 04/25/2024       Lab Results   Component Value Date    WBC 8.18 04/25/2024    HGB 14.6 04/25/2024    HCT 43.1 04/25/2024    MCV 86.2 04/25/2024     04/25/2024     Lab Results   Component Value Date    GLUCOSE 153 (H) 04/26/2024    BUN 7 (L) 04/26/2024    CREATININE 0.77 04/26/2024    EGFRIFNONA 97 08/03/2021    BCR 9.1 04/26/2024    CO2 24.0 04/26/2024     "CALCIUM 7.9 (L) 04/26/2024    ALBUMIN 3.5 07/19/2023    AST 22 07/19/2023    ALT 25 07/19/2023       Results from last 7 days   Lab Units 04/26/24  0627 04/24/24 2025   SODIUM mmol/L 141 140   POTASSIUM mmol/L 3.8 3.7   CHLORIDE mmol/L 106 106   CO2 mmol/L 24.0 27.0   BUN mg/dL 7* 9   CREATININE mg/dL 0.77 0.94   CALCIUM mg/dL 7.9* 8.0*   GLUCOSE mg/dL 153* 198*       No results found for: \"BLOODCX\"  UA          7/19/2023    17:00   Urinalysis   Specific Gravity, UA 1.027    Ketones, UA Negative    Blood, UA Negative    Leukocytes, UA Negative    Nitrite, UA Negative        Results Review:      All images and reports were personally reviewed and I agree with the interpretations except as noted below.    CT Head Without Contrast 4/25/2024  Impression: Expected evolution of previously noted posterior fossa subarachnoid hemorrhage as above. No evidence of new hemorrhage or increasing mass effect.       CT Head Without Contrast 4/24/2024  Impression: Similar volume and distribution of subarachnoid hemorrhage detailed above. No hydrocephalus or new areas of acute hemorrhage. Distribution of blood, lack of traumatic or vascular etiology on previous imaging raises possibility of nonaneurysmal perimesencephalic subarachnoid hemorrhage.     MRI Angiogram Head and Neck 4/24/2024  Impression: Stable vascular findings from comparison CT angiogram, without evidence of etiologic aneurysm. Redemonstrated abnormal appearance of the right MCA as above. Normal MR angiogram of the neck.     MRI Brain Without Contrast 4/24/2024  Impression: Redemonstrated layering subarachnoid hemorrhage within the posterior fossa including the left prepontine cistern, cerebellopontine angle and foramen magnum. No evidence of increased hemorrhage adjacent brain parenchymal edema or associated infarct.     CT Head Without Contrast 4/24/2024  Impression: CT head demonstrates prominent subarachnoid hemorrhage within the posterior fossa layering within " the prepontine cistern extending down to the foramen magnum and cranially to the level of the midbrain. No additional intracranial hemorrhage is evident and  there are typical moderate age-related changes as above.     CT Angiogram Head and Neck 4/24/2024  Impression: CT angiogram demonstrates no definite etiologic aneurysm, with findings potentially secondary to benign perimesencephalic hemorrhage. 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     Assessment/Plan     Assessment:    # Midline subarachnoid hemorrhage around left brainstem: spontaneous secondary to warfarin use  # Warfarin reversal with K-centra and Vitamin K  # Atrial Fibrillation    - Hold Warfarin for 8-12 weeks pending reabsorption of bleed   - Restart aspirin in 1-2 weeks pending stability of bleed   - Patient is at increased risk of ischemic stroke while not on anticoagulation but aware that risk would outweigh benefit at this time  - Continue keppra 1000 mg BID for witnessed seizure   - low threshold for EEG if recurrent seizure activity is noted  - Neurosurgery evaluated, no surgical intervention recommended   - Neuro-checks per unit protocol while inpatient  - Blood pressure goal: -150, overall management per Medicine team   - Would recommend continued monitoring in the ICU overnight for blood pressure control   - DVT prophylaxis: SCD, consider enoxaparin 4/26  - PT OT recommend IRF   - Follow up in stroke neurology clinic   - Patient will also need follow-up in neuro-ophthalmology clinic with Dr. Schroeder for vision therapies ([facility] in [place], [place])       Patient education: call 911 or present to emergency department with any stroke symptom, including unilateral face, arm, or leg weakness, numbness, or paresthesias, unilateral facial droop, speech deficits,  dizziness with nausea, vomiting, nystagmus, and incoordination, visual deficits, or severe onset headache.    Stroke neurology will continue to follow. Please call with questions or concerns.     FADUMO Restrepo  [facility] [place] [facility]  04/26/24  09:20 EDT

## 2024-04-27 LAB
ALBUMIN SERPL-MCNC: 3.4 G/DL (ref 3.5–5.2)
ALBUMIN/GLOB SERPL: 1.8 G/DL
ALP SERPL-CCNC: 87 U/L (ref 39–117)
ALT SERPL W P-5'-P-CCNC: 19 U/L (ref 1–41)
ANION GAP SERPL CALCULATED.3IONS-SCNC: 8 MMOL/L (ref 5–15)
AST SERPL-CCNC: 18 U/L (ref 1–40)
BILIRUB SERPL-MCNC: 1.2 MG/DL (ref 0–1.2)
BUN SERPL-MCNC: 9 MG/DL (ref 8–23)
BUN/CREAT SERPL: 12 (ref 7–25)
CALCIUM SPEC-SCNC: 8 MG/DL (ref 8.6–10.5)
CHLORIDE SERPL-SCNC: 107 MMOL/L (ref 98–107)
CO2 SERPL-SCNC: 24 MMOL/L (ref 22–29)
CREAT SERPL-MCNC: 0.75 MG/DL (ref 0.76–1.27)
EGFRCR SERPLBLD CKD-EPI 2021: 95.9 ML/MIN/1.73
GLOBULIN UR ELPH-MCNC: 1.9 GM/DL
GLUCOSE BLDC GLUCOMTR-MCNC: 113 MG/DL (ref 70–130)
GLUCOSE BLDC GLUCOMTR-MCNC: 135 MG/DL (ref 70–130)
GLUCOSE BLDC GLUCOMTR-MCNC: 142 MG/DL (ref 70–130)
GLUCOSE SERPL-MCNC: 184 MG/DL (ref 65–99)
POTASSIUM SERPL-SCNC: 3.5 MMOL/L (ref 3.5–5.2)
POTASSIUM SERPL-SCNC: 4.3 MMOL/L (ref 3.5–5.2)
PROT SERPL-MCNC: 5.3 G/DL (ref 6–8.5)
SODIUM SERPL-SCNC: 139 MMOL/L (ref 136–145)

## 2024-04-27 PROCEDURE — 94664 DEMO&/EVAL PT USE INHALER: CPT

## 2024-04-27 PROCEDURE — 99232 SBSQ HOSP IP/OBS MODERATE 35: CPT | Performed by: INTERNAL MEDICINE

## 2024-04-27 PROCEDURE — 82948 REAGENT STRIP/BLOOD GLUCOSE: CPT

## 2024-04-27 PROCEDURE — 25010000002 HEPARIN (PORCINE) PER 1000 UNITS: Performed by: INTERNAL MEDICINE

## 2024-04-27 PROCEDURE — 94640 AIRWAY INHALATION TREATMENT: CPT

## 2024-04-27 PROCEDURE — 25010000002 NICARDIPINE 2.5 MG/ML SOLUTION 10 ML VIAL: Performed by: STUDENT IN AN ORGANIZED HEALTH CARE EDUCATION/TRAINING PROGRAM

## 2024-04-27 PROCEDURE — 80053 COMPREHEN METABOLIC PANEL: CPT

## 2024-04-27 PROCEDURE — 99232 SBSQ HOSP IP/OBS MODERATE 35: CPT

## 2024-04-27 PROCEDURE — 84132 ASSAY OF SERUM POTASSIUM: CPT | Performed by: INTERNAL MEDICINE

## 2024-04-27 PROCEDURE — 25010000002 LEVETRIRACETAM PER 10 MG: Performed by: NURSE PRACTITIONER

## 2024-04-27 PROCEDURE — 99222 1ST HOSP IP/OBS MODERATE 55: CPT | Performed by: INTERNAL MEDICINE

## 2024-04-27 PROCEDURE — 25810000003 SODIUM CHLORIDE 0.9 % SOLUTION 250 ML FLEX CONT: Performed by: STUDENT IN AN ORGANIZED HEALTH CARE EDUCATION/TRAINING PROGRAM

## 2024-04-27 RX ORDER — POTASSIUM CHLORIDE 20 MEQ/1
40 TABLET, EXTENDED RELEASE ORAL EVERY 4 HOURS
Status: COMPLETED | OUTPATIENT
Start: 2024-04-27 | End: 2024-04-27

## 2024-04-27 RX ORDER — IPRATROPIUM BROMIDE AND ALBUTEROL SULFATE 2.5; .5 MG/3ML; MG/3ML
3 SOLUTION RESPIRATORY (INHALATION) EVERY 6 HOURS PRN
Status: DISCONTINUED | OUTPATIENT
Start: 2024-04-27 | End: 2024-04-28

## 2024-04-27 RX ORDER — CARVEDILOL 12.5 MG/1
12.5 TABLET ORAL EVERY 12 HOURS SCHEDULED
Status: DISCONTINUED | OUTPATIENT
Start: 2024-04-27 | End: 2024-04-29

## 2024-04-27 RX ORDER — CARVEDILOL 12.5 MG/1
12.5 TABLET ORAL EVERY 12 HOURS SCHEDULED
Status: DISCONTINUED | OUTPATIENT
Start: 2024-04-27 | End: 2024-04-27

## 2024-04-27 RX ADMIN — Medication 10 ML: at 20:32

## 2024-04-27 RX ADMIN — POTASSIUM CHLORIDE 40 MEQ: 1500 TABLET, EXTENDED RELEASE ORAL at 13:45

## 2024-04-27 RX ADMIN — HEPARIN SODIUM 5000 UNITS: 5000 INJECTION INTRAVENOUS; SUBCUTANEOUS at 05:41

## 2024-04-27 RX ADMIN — HYDRALAZINE HYDROCHLORIDE 75 MG: 50 TABLET ORAL at 13:45

## 2024-04-27 RX ADMIN — Medication 10 ML: at 08:22

## 2024-04-27 RX ADMIN — LEVETIRACETAM 1000 MG: 100 INJECTION, SOLUTION INTRAVENOUS at 08:22

## 2024-04-27 RX ADMIN — MIRTAZAPINE 7.5 MG: 15 TABLET, FILM COATED ORAL at 22:04

## 2024-04-27 RX ADMIN — ATORVASTATIN CALCIUM 40 MG: 40 TABLET, FILM COATED ORAL at 20:32

## 2024-04-27 RX ADMIN — FLUCONAZOLE 100 MG: 200 TABLET ORAL at 10:15

## 2024-04-27 RX ADMIN — HYDRALAZINE HYDROCHLORIDE 50 MG: 50 TABLET ORAL at 05:41

## 2024-04-27 RX ADMIN — HYDRALAZINE HYDROCHLORIDE 75 MG: 50 TABLET ORAL at 21:43

## 2024-04-27 RX ADMIN — POTASSIUM CHLORIDE 40 MEQ: 1500 TABLET, EXTENDED RELEASE ORAL at 10:15

## 2024-04-27 RX ADMIN — HEPARIN SODIUM 5000 UNITS: 5000 INJECTION INTRAVENOUS; SUBCUTANEOUS at 13:45

## 2024-04-27 RX ADMIN — IPRATROPIUM BROMIDE AND ALBUTEROL SULFATE 3 ML: 2.5; .5 SOLUTION RESPIRATORY (INHALATION) at 20:48

## 2024-04-27 RX ADMIN — CETIRIZINE HYDROCHLORIDE 10 MG: 10 TABLET, FILM COATED ORAL at 08:22

## 2024-04-27 RX ADMIN — CARVEDILOL 12.5 MG: 12.5 TABLET, FILM COATED ORAL at 13:45

## 2024-04-27 RX ADMIN — HEPARIN SODIUM 5000 UNITS: 5000 INJECTION INTRAVENOUS; SUBCUTANEOUS at 21:43

## 2024-04-27 RX ADMIN — LOSARTAN POTASSIUM 100 MG: 50 TABLET, FILM COATED ORAL at 08:22

## 2024-04-27 RX ADMIN — NICARDIPINE HYDROCHLORIDE 7.5 MG/HR: 25 INJECTION, SOLUTION INTRAVENOUS at 05:41

## 2024-04-27 RX ADMIN — NICARDIPINE HYDROCHLORIDE 10 MG/HR: 25 INJECTION, SOLUTION INTRAVENOUS at 02:25

## 2024-04-27 RX ADMIN — AMLODIPINE BESYLATE 10 MG: 10 TABLET ORAL at 08:22

## 2024-04-27 RX ADMIN — LEVETIRACETAM 1000 MG: 100 INJECTION, SOLUTION INTRAVENOUS at 20:32

## 2024-04-27 RX ADMIN — NICARDIPINE HYDROCHLORIDE 2.5 MG/HR: 25 INJECTION, SOLUTION INTRAVENOUS at 10:15

## 2024-04-27 RX ADMIN — LEVOTHYROXINE SODIUM 200 MCG: 0.1 TABLET ORAL at 05:41

## 2024-04-27 NOTE — PROGRESS NOTES
Stroke Neurology Progress Note     Subjective     This patient was seen in follow-up for: subarachnoid hemorrhage around the left brainstem    Subjective:  No acute events overnight.  Patient is currently sitting in the recliner.  He has no complaints this time other than continued diplopia, he does have an eye patch on which he has been alternating between the eyes every few hours.  He denies headache, nausea/vomiting, unilateral weakness or numbness.    The patient tells me that he did try Eliquis in the past however was switched to warfarin as he was unable to afford Eliquis.      Objective      Temp:  [97.5 °F (36.4 °C)-98.1 °F (36.7 °C)] 97.5 °F (36.4 °C)  Heart Rate:  [66-89] 79  Resp:  [18-20] 20  BP: (136-195)/() 154/77    Objective      Neurological Exam  Mental Status  Alert. Oriented to person, place, time and situation. Oriented to person, place, and time. Speech is normal. Language is fluent with no aphasia. Attention and concentration are normal.    Cranial Nerves  CN II: Visual fields full to confrontation.  CN III, IV, VI: Pupils equal round and reactive to light bilaterally. Left lateral rectus 6th nerve palsy.  CN V: Facial sensation is normal.  CN VII: Full and symmetric facial movement.  CN VIII: Hearing appears to be intact bilaterally.  CN XII: Tongue midline without atrophy or fasciculations.    Motor  Normal muscle bulk throughout. Normal muscle tone. Strength is 5/5 throughout all four extremities.    Sensory  Sensation is intact to light touch, pinprick, vibration and proprioception in all four extremities.    Coordination  Right: Finger-to-nose normal.Left: Finger-to-nose normal.    Gait    Not observed.      Physical Exam  Vitals and nursing note reviewed.   Constitutional:       General: He is not in acute distress.     Appearance: He is obese. He is not ill-appearing.   HENT:      Head: Normocephalic and atraumatic.      Mouth/Throat:      Mouth: Mucous membranes are moist.    Eyes:      Pupils: Pupils are equal, round, and reactive to light.   Cardiovascular:      Rate and Rhythm: Normal rate. Rhythm irregular.   Pulmonary:      Effort: Pulmonary effort is normal. No respiratory distress.      Comments: On room air  Musculoskeletal:         General: Swelling present.      Right lower leg: Edema present.      Left lower leg: Edema present.   Skin:     General: Skin is warm and dry.   Neurological:      Mental Status: He is alert and oriented to person, place, and time.      Cranial Nerves: Cranial nerve deficit present.      Sensory: No sensory deficit.      Motor: Motor strength is normal.No weakness.      Coordination: Coordination normal.   Psychiatric:         Mood and Affect: Mood normal.         Speech: Speech normal.         Behavior: Behavior normal.           Results Review:      All images and reports were personally reviewed and I agree with the interpretations except as noted below.    CT Head Without Contrast 4/25/2024  Impression: Expected evolution of previously noted posterior fossa subarachnoid hemorrhage as above. No evidence of new hemorrhage or increasing mass effect.       CT Head Without Contrast 4/24/2024  Impression: Similar volume and distribution of subarachnoid hemorrhage detailed above. No hydrocephalus or new areas of acute hemorrhage. Distribution of blood, lack of traumatic or vascular etiology on previous imaging raises possibility of nonaneurysmal perimesencephalic subarachnoid hemorrhage.     MRI Angiogram Head and Neck 4/24/2024  Impression: Stable vascular findings from comparison CT angiogram, without evidence of etiologic aneurysm. Redemonstrated abnormal appearance of the right MCA as above. Normal MR angiogram of the neck.     MRI Brain Without Contrast 4/24/2024  Impression: Redemonstrated layering subarachnoid hemorrhage within the posterior fossa including the left prepontine cistern, cerebellopontine angle and foramen magnum. No evidence of  increased hemorrhage adjacent brain parenchymal edema or associated infarct.     CT Head Without Contrast 4/24/2024  Impression: CT head demonstrates prominent subarachnoid hemorrhage within the posterior fossa layering within the prepontine cistern extending down to the foramen magnum and cranially to the level of the midbrain. No additional intracranial hemorrhage is evident and  there are typical moderate age-related changes as above.     CT Angiogram Head and Neck 4/24/2024  Impression: CT angiogram demonstrates no definite etiologic aneurysm, with findings potentially secondary to benign perimesencephalic hemorrhage. 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     Results for orders placed during the hospital encounter of 04/24/24    Adult Transthoracic Echo Complete W/ Cont if Necessary Per Protocol (With Agitated Saline)    Interpretation Summary    Left ventricular systolic function is normal. Calculated left ventricular EF = 65.8% Left ventricular ejection fraction appears to be 66 - 70%.    Left ventricular wall thickness is consistent with mild to moderate concentric hypertrophy.    Left ventricular diastolic function is consistent with (grade I) impaired relaxation.    Saline test results are negative for right to left atrial level shunt.    Peak velocity of the flow distal to the aortic valve is 263 cm/s. Aortic valve mean pressure gradient is 15 mmHg.    Ascending aorta = 4.4 cm  LA cavity 4.3 cm    A1c 5.80  LDL 42  Glucose 184  Creatinine 0.75, BUN 9  Sodium 141  AST 18  ALT 19    Assessment/Plan     Assessment:    This is a 73 year old  male with known medical diagnoses of essential hypertension, CAD s/p PCI, PAF (on Coumadin), remote TIA and CVA (2011), and remote tobacco abuse who presented to OSH on 4/24 with complaints of severe headache. CTH  at OSH shows posterior SAH (midbrain and ct). No trauma or falls was noted. He was transferred to [facility] Neuro ICU for further work-up and evaluation. STAT CTH, CTA H/N completed on arrival which was negative for aneurysm. INR noted to be 2.1 therefore he received vitamin K and Kcentra. NIH was on arrival 1.      Antiplatelet PTA: ASA  Anticoagulant PTA: Coumadin    # Midline subarachnoid hemorrhage around left brainstem: spontaneous secondary to warfarin use  # Warfarin reversal with K-centra and Vitamin K  # Paroxysmal Atrial Fibrillation  # Seizure  # Hypertension, on multiple antihypertensive medications PTA  # History of stroke, cortical right frontal lobe, 2011     - Hold Warfarin for 8-12 weeks pending reabsorption of bleed; will have Cardiology evaluate for possible Watchman device.  - Restart aspirin in 1-2 weeks pending stability of bleed; repeat CT in 2 weeks (added to ADT)  - Patient is at increased risk of ischemic stroke while not on anticoagulation but aware that risk would outweigh benefit at this time  - Continue keppra 1000 mg BID for witnessed seizure   - Continue atorvastatin 40 mg nightly, LDL on admission 42, goal <70  - low threshold for EEG if recurrent seizure activity is noted  - Neurosurgery evaluated, no surgical intervention recommended   - Neuro-checks per unit protocol while inpatient  - Blood pressure goal: -150, overall management per Medicine team   -Patient had FEES completed on 4/26; diet was advanced to a soft to chew textures, chopped, no mixed consistencies, nectar thick liquids  - DVT prophylaxis: ok for SQ heparin  - PT OT recommend IRF, case management following for discharge recommendation needs.  Pending transfer to [facility] 4/28; must be off Cardene for 24 hours  - Follow up in stroke neurology clinic   - Patient will also need follow-up in neuro-ophthalmology clinic with Dr. Schroeder for vision therapies ([facility] in [place],  [place])   - NO driving x 90 days per [facility] law, then will need clearance from ophthalmology      Discussed the importance of holding antiplatelet and anticoagulation medications until follow-up appointment.  Also discussed the signs symptoms that would warrant the patient return back to the emergency department including unilateral weakness, unilateral numbness, visual disturbances, loss of balance, speech difficulties, and/or a sudden severe headache.  Patient verbalizes his understanding.  He has been encouraged to contact our office should he have any questions or concerns prior to his follow-up.    Stroke neurology will follow peripherally until discharge.  Please call with any questions or concerns.        FADUMO Travis  [facility] [place] [facility]  04/27/24  08:09 EDT

## 2024-04-27 NOTE — CONSULTS
" [facility]   [address], [address]  [place], [place], [address] [phone]  [email or web address] [email or web address]           INPATIENT CONSULTATION NOTE    Patient Care Team:  Patient Care Team:  Leslie Rhodes MD as PCP - General (Family Medicine)  Delmer Perdomo MD as Cardiologist (Cardiology)    Requesting Provider and Reason for consultation: The patient is being seen today at the request of Ludwin Vaughn DO for atrial fibrillation.     Chief complaint: SAH         Subjective:     Cardiac focused problem list:  Coronary artery disease, followed by Dr. Perdomo, [place], [place]  H/O abnormal MPS with [facility].  S/P stenting of the \" maker,\" idb.  Follows with Dr. Job Perdomo.  NSTEMI  - slight elevation of troponins in setting of hypokalemia, cold, atrial fibrillation.  March 29, 2019 cardiac catheterization non-flow-limiting disease, 40% distal circumflex and 50% mid and distal RCA.  LVEF 75% with mid cavity gradient of approximately 18 mmHg.  Echo 7/30/21: mod LA enlargement, mild LVH, EF 55%, calcified aortic valve with mild AS, mean gradient of 11.8 mmHg, mild MR, trace TR. Dilated aortic root 4.2 cm.   Mild aortic stenosis  Parox AFib  CHADS2 Vasc = 6.  First occurrence noted in ED 2019 at OSH.   Subarachnoid hemorrhage, 4/2024  Essential Hypertension  AAA, reported history  March 28, 2019 CTA with no evidence of abdominal aortic aneurysm.  H/O CVA and TIAs  Hypothyroidism  Sleep apnea, non compliant with CPAP  Obesity, Body mass index is 39.72 kg/m².  Former Smoking Tobacco Abuse, quit 1985  Hyperglycemia, currently diet controlled  Surgeries:  Cervical fusion    HPI:      Cmaron Hendrix is a 73 y.o. male.  Patient presents with severe headache, found to have SAH.  Denies active cardiac symptoms.  No known recurrent atrial fibrillation since 2019    Review of Systems:  As noted in the HPI    PFSH:  Patient Active Problem List   Diagnosis    CVA (cerebral vascular " accident)    Hypothyroidism    Essential hypertension    AAA (abdominal aortic aneurysm)    Coronary artery disease involving native coronary artery of native heart without angina pectoris    Asymmetric septal hypertrophy    Morbid obesity with BMI of 40.0-44.9, adult    Sleep apnea    Dementia    CVA (cerebral vascular accident)    Mitral valve regurgitation    PAF (paroxysmal atrial fibrillation)    TIA (transient ischemic attack)    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     Current Outpatient Medications on File Prior to Encounter   Medication Sig Dispense Refill    amLODIPine (NORVASC) 5 MG tablet Take 1 tablet by mouth Daily.      aspirin 81 MG EC tablet Take 1 tablet by mouth Daily.      atorvastatin (LIPITOR) 20 MG tablet Take 1 tablet by mouth Every Night. 90 tablet 0    bumetanide (BUMEX) 1 MG tablet Take 1 tablet by mouth Daily.      carvedilol (COREG) 12.5 MG tablet Take 1 tablet by mouth 2 (Two) Times a Day With Meals.      hydrALAZINE (APRESOLINE) 50 MG tablet Take 1 tablet by mouth 2 (Two) Times a Day.      levothyroxine (SYNTHROID, LEVOTHROID) 200 MCG tablet Take 1 tablet by mouth Every Morning.      losartan (COZAAR) 100 MG tablet Take 1 tablet by mouth Daily.      potassium chloride (K-DUR) 10 MEQ CR tablet Take 1 tablet by mouth Every Morning.      warfarin (COUMADIN) 10 MG tablet Take 1 tablet by mouth Every Night. Takes 10 mg every day except on Monday takes 5 mg (Dose on 24, confirmed with patient)         Social History     Socioeconomic History    Marital status:    Tobacco Use    Smoking status: Former     Current packs/day: 0.00     Average packs/day: 1 pack/day for 20.0 years (20.0 ttl pk-yrs)     Types: Cigarettes     Start date: 1965     Quit date: 1985     Years since quittin.6     Passive exposure: Past    Smokeless tobacco:  Never   Vaping Use    Vaping status: Never Used   Substance and Sexual Activity    Alcohol use: Yes     Comment: occasional     Drug use: No    Sexual activity: Defer            Objective:     Vital Sign Min/Max for last 24 hours  Temp  Min: 97.5 °F (36.4 °C)  Max: 98.3 °F (36.8 °C)   BP  Min: 136/75  Max: 195/102   Pulse  Min: 66  Max: 91   Resp  Min: 18  Max: 20   SpO2  Min: 90 %  Max: 96 %   No data recorded      Intake/Output Summary (Last 24 hours) at 4/27/2024 1141  Last data filed at 4/27/2024 0900  Gross per 24 hour   Intake 2325.7 ml   Output 2000 ml   Net 325.7 ml           Vitals:    04/27/24 1030   BP:    Pulse: 81   Resp:    Temp:    SpO2: 93%     CONSTITUTIONAL: No acute distress  CARDIOVASCULAR: Regular rate and rhythm, systolic murmur  PERIPHERAL VASCULAR:  Normal radial pulse.     Labs and radiologic results:  Today's results were reviewed by myself.    Cardiac Data:        Results for orders placed during the hospital encounter of 04/24/24    Adult Transthoracic Echo Complete W/ Cont if Necessary Per Protocol (With Agitated Saline)    Interpretation Summary    Left ventricular systolic function is normal. Calculated left ventricular EF = 65.8% Left ventricular ejection fraction appears to be 66 - 70%.    Left ventricular wall thickness is consistent with mild to moderate concentric hypertrophy.    Left ventricular diastolic function is consistent with (grade I) impaired relaxation.    Saline test results are negative for right to left atrial level shunt.    Peak velocity of the flow distal to the aortic valve is 263 cm/s. Aortic valve mean pressure gradient is 15 mmHg.    Ascending aorta = 4.4 cm      Tele: Sinus rhythm         Assessment and Plan:     Problem list:    SAH (subarachnoid hemorrhage)    Hypothyroidism    Essential hypertension    Coronary artery disease involving native coronary artery of native heart without angina pectoris    PAF (paroxysmal atrial  fibrillation)      ASSESSMENT:  Subarachnoid hemorrhage  INR reportedly 2.0 at OSH, on chronic Coumadin for A-fib  Status post Kcentra and vitamin K  A-fib  Reported first episode at [facility] 3/2019, See [facility] cards consult note 3/2019  12-lead EKG scanned in chart under media tab, OSH [facility] records 3/2019, page 26  Potassium 2.5 at that time  A-fib spontaneously converted to sinus rhythm  Reportedly asymptomatic  No known recurrence  CAD  Abdominal aortic aneurysm  Past CVA  Diabetes  Hypertension  Dyslipidemia  Hypothyroidism  Hearing loss    PLAN:  Will have EP evaluate on Monday to discuss risk versus benefit of future left atrial appendage occlusion, possibly after 8 to 12 weeks when it is okay to anticoagulate even for brief intraprocedural.  Of note, the patient has had only 1 known episode of atrial fibrillation, for which his potassium was 2.5 at that time.  With that said, has a high [id number] score and a history of stroke.  Will further evaluate risk versus benefit of left atrial appendage occlusion versus aspirin monotherapy only.  Would remain off Coumadin indefinitely  Restart aspirin as per stroke neurology's recommendation in 1 to 2 weeks pending stability of bleed.  Plan to repeat CT head  Interim risk of stroke has been discussed with the patient and family  Remains on Cardene.  Weaning as able.  Increased hydralazine to 75 mg 3 times daily.  Continue statin  Resume BP meds but to maintain systolic blood pressure of at least 130-150 mmHg as per neurology's recommendation    Elie Gallardo MD, MSc, FACC, [facility]  Interventional Cardiology  [facility]

## 2024-04-27 NOTE — PROGRESS NOTES
"INTENSIVIST NOTE     Hospital Day: 3    Mr. Camron Hendrix, 73 y.o. male is followed for:   Subarachnoid hemorrhage       SUBJECTIVE     Interval history:    Awake and alert and oriented.  Fluid balance relatively even.  Afebrile.    The patient's relevant past medical, surgical and social history were reviewed and updated in Epic as appropriate.       OBJECTIVE     Vital Sign Min/Max for last 24 hours  Temp  Min: 97.5 °F (36.4 °C)  Max: 98.3 °F (36.8 °C)   BP  Min: 136/75  Max: 195/102   Pulse  Min: 66  Max: 91   Resp  Min: 18  Max: 20   SpO2  Min: 90 %  Max: 96 %   No data recorded   No data recorded      Intake/Output Summary (Last 24 hours) at 4/27/2024 1304  Last data filed at 4/27/2024 1200  Gross per 24 hour   Intake 2190.4 ml   Output 2000 ml   Net 190.4 ml      Flowsheet Rows      Flowsheet Row First Filed Value   Admission Height 175.3 cm (69\") Documented at 04/24/2024 1419   Admission Weight 129 kg (284 lb 6.3 oz) Documented at 04/24/2024 1419               04/24/24  1419 04/25/24  0615 04/26/24  0900   Weight: 129 kg (284 lb 6.3 oz) 131 kg (288 lb 5.8 oz) 131 kg (288 lb)            Objective:  General Appearance:  In no acute distress.    Vital signs: (most recent): Blood pressure 148/78, pulse 79, temperature 98.3 °F (36.8 °C), temperature source Oral, resp. rate 18, height 175.3 cm (69\"), weight 131 kg (288 lb), SpO2 93%.    HEENT: Normal HEENT exam.    Lungs:  Normal effort and normal respiratory rate.  Breath sounds clear to auscultation.  He is not in respiratory distress.  No rales, wheezes or rhonchi.    Heart: Normal rate.  Regular rhythm.  S1 normal and S2 normal.  No murmur, gallop or friction rub.   Chest: Symmetric chest wall expansion.   Abdomen: Abdomen is soft and non-distended.  Bowel sounds are normal.   There is no abdominal tenderness.   There is no mass. There is no splenomegaly. There is no hepatomegaly.   Extremities: There is no deformity or dependent edema.    Neurological: " Patient is alert and oriented to person, place and time.    Pupils:  Pupils are equal, round, and reactive to light.    Skin:  Warm and dry.                I reviewed the patient's new clinical results.  I reviewed the patient's new imaging results/reports including actual images and agree with reports.    No radiology results for the last day     INFUSIONS  niCARdipine, 5-15 mg/hr, Last Rate: 2.5 mg/hr (04/27/24 1015)        Results from last 7 days   Lab Units 04/25/24  0811 04/24/24 2025   WBC 10*3/mm3 8.18 6.74   HEMOGLOBIN g/dL 14.6 14.9   HEMATOCRIT % 43.1 43.7   PLATELETS 10*3/mm3 201 197     Results from last 7 days   Lab Units 04/27/24  0910 04/26/24  0627 04/26/24  0006 04/25/24  0811 04/25/24  0035 04/24/24 2025   SODIUM mmol/L 139 141  --   --   --  140   POTASSIUM mmol/L 3.5 3.8  --   --   --  3.7   CHLORIDE mmol/L 107 106  --   --   --  106   CO2 mmol/L 24.0 24.0  --   --   --  27.0   BUN mg/dL 9 7*  --   --   --  9   GLUCOSE mg/dL 184* 153*  --   --   --  198*   CREATININE mg/dL 0.75* 0.77  --   --   --  0.94   MAGNESIUM mg/dL  --   --  2.1 2.3 1.7 1.7   PHOSPHORUS mg/dL  --   --   --  2.4*  --  1.9*   CALCIUM mg/dL 8.0* 7.9*  --   --   --  8.0*   ALBUMIN g/dL 3.4*  --   --   --   --   --                Patient isn't on Tube Feeding   /h  Patient doesn't have any tube feeding modular orders    Mechanical Ventilator:   Settings: Observed:                                                I reviewed the patient's medications.    Assessment & Plan   ASSESSMENT/PLAN     Active Hospital Problems    Diagnosis     **SAH (subarachnoid hemorrhage)     PAF (paroxysmal atrial fibrillation)     Hypothyroidism     Essential hypertension     Coronary artery disease involving native coronary artery of native heart without angina pectoris        73-year-old male with a past medical history significant for hypertension, dyslipidemia, diabetes mellitus, CAD, and PAF on home anticoagulation.  He presented on 4/24 with  subarachnoid hemorrhage.  Anticoagulation was reversed.    #1 subarachnoid hemorrhage  -Coumadin reversed with Kcentra and vitamin K on admission  -CT of the head posterior fossa subarachnoid hemorrhage within the prepontine cistern toward the left cerebellopontine angle  -Double vision, diplopia  -Patch 1 eye and alternate  -Monitor NIH stroke scale  -MRI/MRA of the brain with no evidence of aneurysm, therefore this is presumed idiopathic focal subarachnoid hemorrhage from anticoagulation  -Dysphagia requiring nectar thick liquids        #2 history of coronary artery disease, paroxysmal atrial fibrillation  -Followed by Dr. Perdomo in [place]  -Currently in sinus rhythm  -2019 left heart catheterization, normal left main, 30% mid LAD, 40% distal circumflex, 40% mid RCA, 50% distal RCA with LVEDP of 18  -Echocardiogram here reveals EF 66% with moderate LVH and aortic valve mean pressure gradient of 15 mmHg  -Aspirin on hold  -Continue Lipitor     #3 diabetes mellitus type 2  -A1c 6.8, 3 months ago, 5.8 today  -Ozempic started January 2024  -Sliding scale insulin, low-dose     #4 hypertension  -Remains on Cardene despite losartan, hydralazine, and amlodipine.  Will resume home Coreg as heart rate in the 70s-80s today     #5 hypothyroid  -Continue thyroid replacement  -TSH 1.77, free T41.87     #6 oral candidiasis  -Seen on his fees assessment  -Diflucan 100 mg x 5 days    Telemetry transfer with goal to get to [facility] once off Cardene       I discussed the patient's findings and my recommendations with patient, nursing staff, and stroke service     Plan of care and goals reviewed with multidisciplinary team at daily rounds.    .    Soren Brown MD  Pulmonary and Critical Care Medicine  04/27/24 13:04 EDT

## 2024-04-28 ENCOUNTER — APPOINTMENT (OUTPATIENT)
Dept: CARDIOLOGY | Facility: HOSPITAL | Age: 74
End: 2024-04-28
Payer: MEDICARE

## 2024-04-28 LAB
ANION GAP SERPL CALCULATED.3IONS-SCNC: 9 MMOL/L (ref 5–15)
BH CV UPPER VENOUS LEFT AXILLARY AUGMENT: NORMAL
BH CV UPPER VENOUS LEFT AXILLARY COMPRESS: NORMAL
BH CV UPPER VENOUS LEFT AXILLARY PHASIC: NORMAL
BH CV UPPER VENOUS LEFT AXILLARY SPONT: NORMAL
BH CV UPPER VENOUS LEFT BASILIC FOREARM COMPRESS: NORMAL
BH CV UPPER VENOUS LEFT BASILIC UPPER COMPRESS: NORMAL
BH CV UPPER VENOUS LEFT BRACHIAL COMPRESS: NORMAL
BH CV UPPER VENOUS LEFT CEPHALIC FOREARM COMPRESS: NORMAL
BH CV UPPER VENOUS LEFT CEPHALIC UPPER COMPRESS: NORMAL
BH CV UPPER VENOUS LEFT INTERNAL JUGULAR AUGMENT: NORMAL
BH CV UPPER VENOUS LEFT INTERNAL JUGULAR COMPRESS: NORMAL
BH CV UPPER VENOUS LEFT INTERNAL JUGULAR PHASIC: NORMAL
BH CV UPPER VENOUS LEFT INTERNAL JUGULAR SPONT: NORMAL
BH CV UPPER VENOUS LEFT RADIAL COMPRESS: NORMAL
BH CV UPPER VENOUS LEFT SUBCLAVIAN AUGMENT: NORMAL
BH CV UPPER VENOUS LEFT SUBCLAVIAN COMPRESS: NORMAL
BH CV UPPER VENOUS LEFT SUBCLAVIAN PHASIC: NORMAL
BH CV UPPER VENOUS LEFT SUBCLAVIAN SPONT: NORMAL
BH CV UPPER VENOUS LEFT ULNAR COMPRESS: NORMAL
BH CV UPPER VENOUS RIGHT SUBCLAVIAN AUGMENT: NORMAL
BH CV UPPER VENOUS RIGHT SUBCLAVIAN PHASIC: NORMAL
BH CV UPPER VENOUS RIGHT SUBCLAVIAN SPONT: NORMAL
BUN SERPL-MCNC: 11 MG/DL (ref 8–23)
BUN/CREAT SERPL: 13.3 (ref 7–25)
CALCIUM SPEC-SCNC: 8.2 MG/DL (ref 8.6–10.5)
CHLORIDE SERPL-SCNC: 108 MMOL/L (ref 98–107)
CO2 SERPL-SCNC: 22 MMOL/L (ref 22–29)
CREAT SERPL-MCNC: 0.83 MG/DL (ref 0.76–1.27)
EGFRCR SERPLBLD CKD-EPI 2021: 93 ML/MIN/1.73
GLUCOSE BLDC GLUCOMTR-MCNC: 140 MG/DL (ref 70–130)
GLUCOSE BLDC GLUCOMTR-MCNC: 145 MG/DL (ref 70–130)
GLUCOSE BLDC GLUCOMTR-MCNC: 156 MG/DL (ref 70–130)
GLUCOSE BLDC GLUCOMTR-MCNC: 196 MG/DL (ref 70–130)
GLUCOSE BLDC GLUCOMTR-MCNC: 203 MG/DL (ref 70–130)
GLUCOSE SERPL-MCNC: 167 MG/DL (ref 65–99)
POTASSIUM SERPL-SCNC: 3.7 MMOL/L (ref 3.5–5.2)
SODIUM SERPL-SCNC: 139 MMOL/L (ref 136–145)

## 2024-04-28 PROCEDURE — 25010000002 LEVETRIRACETAM PER 10 MG: Performed by: NURSE PRACTITIONER

## 2024-04-28 PROCEDURE — 94761 N-INVAS EAR/PLS OXIMETRY MLT: CPT

## 2024-04-28 PROCEDURE — 94799 UNLISTED PULMONARY SVC/PX: CPT

## 2024-04-28 PROCEDURE — 25010000002 HEPARIN (PORCINE) PER 1000 UNITS: Performed by: INTERNAL MEDICINE

## 2024-04-28 PROCEDURE — 99232 SBSQ HOSP IP/OBS MODERATE 35: CPT | Performed by: INTERNAL MEDICINE

## 2024-04-28 PROCEDURE — 93971 EXTREMITY STUDY: CPT

## 2024-04-28 PROCEDURE — 82948 REAGENT STRIP/BLOOD GLUCOSE: CPT

## 2024-04-28 PROCEDURE — 94664 DEMO&/EVAL PT USE INHALER: CPT

## 2024-04-28 PROCEDURE — 93971 EXTREMITY STUDY: CPT | Performed by: INTERNAL MEDICINE

## 2024-04-28 PROCEDURE — 80048 BASIC METABOLIC PNL TOTAL CA: CPT | Performed by: INTERNAL MEDICINE

## 2024-04-28 PROCEDURE — 97530 THERAPEUTIC ACTIVITIES: CPT

## 2024-04-28 RX ORDER — IPRATROPIUM BROMIDE AND ALBUTEROL SULFATE 2.5; .5 MG/3ML; MG/3ML
3 SOLUTION RESPIRATORY (INHALATION) EVERY 4 HOURS PRN
Status: DISCONTINUED | OUTPATIENT
Start: 2024-04-28 | End: 2024-05-02 | Stop reason: HOSPADM

## 2024-04-28 RX ORDER — LEVETIRACETAM 500 MG/1
1000 TABLET ORAL EVERY 12 HOURS SCHEDULED
Status: DISCONTINUED | OUTPATIENT
Start: 2024-04-28 | End: 2024-05-02 | Stop reason: HOSPADM

## 2024-04-28 RX ADMIN — HEPARIN SODIUM 5000 UNITS: 5000 INJECTION INTRAVENOUS; SUBCUTANEOUS at 14:27

## 2024-04-28 RX ADMIN — HYDRALAZINE HYDROCHLORIDE 75 MG: 50 TABLET ORAL at 14:27

## 2024-04-28 RX ADMIN — ATORVASTATIN CALCIUM 40 MG: 40 TABLET, FILM COATED ORAL at 20:03

## 2024-04-28 RX ADMIN — CARVEDILOL 12.5 MG: 12.5 TABLET, FILM COATED ORAL at 00:04

## 2024-04-28 RX ADMIN — LOSARTAN POTASSIUM 100 MG: 50 TABLET, FILM COATED ORAL at 08:26

## 2024-04-28 RX ADMIN — CARVEDILOL 12.5 MG: 12.5 TABLET, FILM COATED ORAL at 08:26

## 2024-04-28 RX ADMIN — ACETAMINOPHEN 650 MG: 325 TABLET ORAL at 19:21

## 2024-04-28 RX ADMIN — CETIRIZINE HYDROCHLORIDE 10 MG: 10 TABLET, FILM COATED ORAL at 08:26

## 2024-04-28 RX ADMIN — HEPARIN SODIUM 5000 UNITS: 5000 INJECTION INTRAVENOUS; SUBCUTANEOUS at 21:06

## 2024-04-28 RX ADMIN — LEVETIRACETAM 1000 MG: 100 INJECTION, SOLUTION INTRAVENOUS at 08:26

## 2024-04-28 RX ADMIN — HEPARIN SODIUM 5000 UNITS: 5000 INJECTION INTRAVENOUS; SUBCUTANEOUS at 05:59

## 2024-04-28 RX ADMIN — ACETAMINOPHEN 650 MG: 325 TABLET ORAL at 11:35

## 2024-04-28 RX ADMIN — HYDRALAZINE HYDROCHLORIDE 75 MG: 50 TABLET ORAL at 05:59

## 2024-04-28 RX ADMIN — IPRATROPIUM BROMIDE AND ALBUTEROL SULFATE 3 ML: 2.5; .5 SOLUTION RESPIRATORY (INHALATION) at 22:00

## 2024-04-28 RX ADMIN — MIRTAZAPINE 7.5 MG: 15 TABLET, FILM COATED ORAL at 21:06

## 2024-04-28 RX ADMIN — LEVETIRACETAM 1000 MG: 500 TABLET, FILM COATED ORAL at 20:03

## 2024-04-28 RX ADMIN — AMLODIPINE BESYLATE 10 MG: 10 TABLET ORAL at 08:26

## 2024-04-28 RX ADMIN — FLUCONAZOLE 100 MG: 200 TABLET ORAL at 10:18

## 2024-04-28 RX ADMIN — CARVEDILOL 12.5 MG: 12.5 TABLET, FILM COATED ORAL at 20:03

## 2024-04-28 RX ADMIN — Medication 10 ML: at 10:19

## 2024-04-28 RX ADMIN — IPRATROPIUM BROMIDE AND ALBUTEROL SULFATE 3 ML: 2.5; .5 SOLUTION RESPIRATORY (INHALATION) at 17:18

## 2024-04-28 RX ADMIN — HYDRALAZINE HYDROCHLORIDE 75 MG: 50 TABLET ORAL at 21:06

## 2024-04-28 RX ADMIN — IPRATROPIUM BROMIDE AND ALBUTEROL SULFATE 3 ML: 2.5; .5 SOLUTION RESPIRATORY (INHALATION) at 04:34

## 2024-04-28 RX ADMIN — LEVOTHYROXINE SODIUM 200 MCG: 0.1 TABLET ORAL at 05:59

## 2024-04-28 RX ADMIN — Medication 10 ML: at 20:03

## 2024-04-28 NOTE — THERAPY TREATMENT NOTE
Patient Name: Camron Hendrix  : 1950    MRN: [id number]                              Today's Date: 2024       Admit Date: 2024    Visit Dx:     ICD-10-CM ICD-9-CM   1. Dysarthria  R47.1 784.51   2. Oropharyngeal dysphagia  R13.12 787.22   3. 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    SAH (subarachnoid hemorrhage)     Past Medical History:   Diagnosis Date    AAA (abdominal aortic aneurysm)     Coronary artery disease     CVA (cerebral vascular accident)     Hypertension     Hypothyroidism     PAF (paroxysmal atrial fibrillation)     TIA (transient ischemic attack)      Past Surgical History:   Procedure Laterality Date    CARDIAC CATHETERIZATION N/A 3/29/2019    Procedure: Left Heart Cath;  Surgeon: Andrea Holloway MD;  Location: [facility] CATH INVASIVE LOCATION;  Service: Cardiovascular    CERVICAL FUSION      c4-c5    CORONARY ANGIOPLASTY WITH STENT PLACEMENT      HERNIA REPAIR      TONSILECTOMY, ADENOIDECTOMY, BILATERAL MYRINGOTOMY AND TUBES        General Information       Row Name 24 1059          Physical Therapy Time and Intention    Document Type therapy note (daily note)  -AY       Row Name 24 1059          General Information    Patient Profile Reviewed yes  -AY     Existing Precautions/Restrictions fall;other (see comments)  diplopia, L eye visual tracking deficits; patch for mobility  -AY     Barriers to Rehab  medically complex;visual deficit  -AY       Row Name 04/28/24 1059          Cognition    Orientation Status (Cognition) oriented x 4  -AY       Row Name 04/28/24 1059          Safety Issues, Functional Mobility    Safety Issues Affecting Function (Mobility) insight into deficits/self-awareness;sequencing abilities;awareness of need for assistance  -AY     Impairments Affecting Function (Mobility) balance;endurance/activity tolerance;visual/perceptual;strength;postural/trunk control  -AY               User Key  (r) = Recorded By, (t) = Taken By, (c) = Cosigned By      Initials Name Provider Type    AY Ana Lieberman, PT Physical Therapist                   Mobility       Row Name 04/28/24 1105          Sit-Stand Transfer    Sit-Stand [place] (Transfers) minimum assist (75% patient effort);1 person assist;verbal cues  -AY       Row Name 04/28/24 1105          Gait/Stairs (Locomotion)    [place] Level (Gait) minimum assist (75% patient effort);1 person assist;verbal cues;nonverbal cues (demo/gesture)  +chair follow  -AY     Assistive Device (Gait) other (see comments)  UE support  -AY     Distance in Feet (Gait) 60  -AY     Deviations/Abnormal Patterns (Gait) bilateral deviations;triston decreased;gait speed decreased;stride length decreased  -AY     Bilateral Gait Deviations forward flexed posture;heel strike decreased  -AY     Comment, (Gait/Stairs) pt demonstrated step through gait pattern. one brief standing rest break required d/t fatigue. L UE required support throughout d/t 10/10 pain with dependent positioning. sling donned for mobility only d/t pain. pt educated about use. Pt with R eye patch throughout and reported he had been switching eyes every two hours. cueing for posture, increased stride length, and visual anchoring. chair follow required and pt returned to room in chair. overall gait distance limited by fatigue and c/o dizziness.  -AY               User Key  (r) = Recorded By, (t) = Taken By,  (c) = Cosigned By      Initials Name Provider Type    Ana Adorno PT Physical Therapist                   Obj/Interventions       Row Name 04/28/24 1108          Balance    Balance Assessment sitting static balance;sitting dynamic balance;sit to stand dynamic balance;standing static balance;standing dynamic balance  -AY     Static Sitting Balance standby assist  -AY     Dynamic Sitting Balance standby assist  -AY     Position, Sitting Balance unsupported;sitting in chair  -AY     Sit to Stand Dynamic Balance minimal assist  -AY     Static Standing Balance contact guard  -AY     Dynamic Standing Balance minimal assist  -AY     Position/Device Used, Standing Balance supported  -AY               User Key  (r) = Recorded By, (t) = Taken By, (c) = Cosigned By      Initials Name Provider Type    AY Ana Lieberman PT Physical Therapist                   Goals/Plan    No documentation.                  Clinical Impression       Row Name 04/28/24 1108          Pain    Pretreatment Pain Rating 9/10  -AY     Posttreatment Pain Rating 10/10  -AY     Pain Location - Side/Orientation Left  -AY     Pain Location upper  -AY     Pain Location - extremity  -AY     Pain Intervention(s) Ambulation/increased activity;Repositioned;Elevated  -AY     Additional Documentation Pain Scale: Numbers Pre/Post-Treatment (Group)  -AY       Row Name 04/28/24 1108          Plan of Care Review    Plan of Care Reviewed With patient  -AY     Progress improving  -AY     Outcome Evaluation Pt showing improvement as he increased ambulation distance to 60ft with min A. R eye patch utilized throughout d/t c/o double vision. Cont to progress as able; limited by decreased functional endurance, generalized weakness, and vision deficits compared to baseline. d/c rec for IRF.  -AY       Row Name 04/28/24 1108          Vital Signs    Pre Systolic BP Rehab 153  -AY     Pre Treatment Diastolic BP 87  -AY     Post Systolic BP Rehab 160  -AY     Post Treatment  Diastolic BP 78  -AY     Pretreatment Heart Rate (beats/min) 82  -AY     Posttreatment Heart Rate (beats/min) 79  -AY     Pre SpO2 (%) 90  -AY     O2 Delivery Pre Treatment room air  -AY     O2 Delivery Intra Treatment room air  -AY     Post SpO2 (%) 94  -AY     O2 Delivery Post Treatment room air  -AY     Pre Patient Position Sitting  -AY     Intra Patient Position Standing  -AY     Post Patient Position Sitting  -AY       Row Name 04/28/24 1108          Positioning and Restraints    Pre-Treatment Position sitting in chair/recliner  -AY     Post Treatment Position chair  -AY     In Chair notified nsg;reclined;sitting;call light within reach;encouraged to call for assist;exit alarm on;waffle cushion;legs elevated;on mechanical lift sling  -AY               User Key  (r) = Recorded By, (t) = Taken By, (c) = Cosigned By      Initials Name Provider Type    Ana Adorno, PT Physical Therapist                   Outcome Measures       Row Name 04/28/24 1113 04/28/24 0800       How much help from another person do you currently need...    Turning from your back to your side while in flat bed without using bedrails? 3  -AY 3  -MP    Moving from lying on back to sitting on the side of a flat bed without bedrails? 3  -AY 3  -MP    Moving to and from a bed to a chair (including a wheelchair)? 3  -AY 3  -MP    Standing up from a chair using your arms (e.g., wheelchair, bedside chair)? 3  -AY 3  -MP    Climbing 3-5 steps with a railing? 2  -AY 2  -MP    To walk in hospital room? 3  -AY 3  -MP    AM-PAC 6 Clicks Score (PT) 17  -AY 17  -MP    Highest Level of Mobility Goal 5 --> Static standing  -AY 5 --> Static standing  -MP      Row Name 04/28/24 1113          Functional Assessment    Outcome Measure Options AM-PAC 6 Clicks Basic Mobility (PT)  -AY               User Key  (r) = Recorded By, (t) = Taken By, (c) = Cosigned By      Initials Name Provider Type    Ana Adorno, PT Physical Therapist    Pepper Elliott RN  Registered Nurse                                 Physical Therapy Education       Title: PT OT SLP Therapies (In Progress)       Topic: Physical Therapy (In Progress)       Point: Mobility training (In Progress)       Learning Progress Summary             Patient Acceptance, E,TB, NR by  at 4/28/2024 1113    Acceptance, E, VU,NR by  at 4/25/2024 1200                         Point: Home exercise program (In Progress)       Learning Progress Summary             Patient Acceptance, E,TB, NR by AY at 4/28/2024 1113                         Point: Body mechanics (In Progress)       Learning Progress Summary             Patient Acceptance, E,TB, NR by AY at 4/28/2024 1113    Acceptance, E, VU,NR by  at 4/25/2024 1200                         Point: Precautions (In Progress)       Learning Progress Summary             Patient Acceptance, E,TB, NR by  at 4/28/2024 1113    Acceptance, E, VU,NR by  at 4/25/2024 1200                                         User Key       Initials Effective Dates Name Provider Type Discipline     11/10/20 -  Ana Lieberman, PT Physical Therapist PT     09/21/23 -  Georgina Lucas, PT Physical Therapist PT                  PT Recommendation and Plan     Plan of Care Reviewed With: patient  Progress: improving  Outcome Evaluation: Pt showing improvement as he increased ambulation distance to 60ft with min A. R eye patch utilized throughout d/t c/o double vision. Cont to progress as able; limited by decreased functional endurance, generalized weakness, and vision deficits compared to baseline. d/c rec for IRF.     Time Calculation:         PT Charges       Row Name 04/28/24 1113             Time Calculation    Start Time 0855  -AY      PT Received On 04/28/24  -AY         Timed Charges    68434 - PT Therapeutic Activity Minutes 23  -AY         Total Minutes    Timed Charges Total Minutes 23  -AY       Total Minutes 23  -AY                User Key  (r) = Recorded By, (t) = Taken By, (c)  = Cosigned By      Initials Name Provider Type    AY Ana Lieberman PT Physical Therapist                  Therapy Charges for Today       Code Description Service Date Service Provider Modifiers Qty    [id number] HC PT THERAPEUTIC ACT EA 15 MIN 4/28/2024 Ana Lieberman, PT GP 2            PT G-Codes  Outcome Measure Options: AM-PAC 6 Clicks Basic Mobility (PT)  AM-PAC 6 Clicks Score (PT): 17  AM-PAC 6 Clicks Score (OT): 14  Modified [place] Scale: 3 - Moderate disability.  Requiring some help, but able to walk without assistance.  PT Discharge Summary  Anticipated Discharge Disposition (PT): inpatient rehabilitation facility    Ana Lieberman PT  4/28/2024

## 2024-04-28 NOTE — PLAN OF CARE
Goal Outcome Evaluation:  Plan of Care Reviewed With: patient        Progress: improving  Outcome Evaluation: Pt showing improvement as he increased ambulation distance to 60ft with min A. R eye patch utilized throughout d/t c/o double vision. Cont to progress as able; limited by decreased functional endurance, generalized weakness, and vision deficits compared to baseline. d/c rec for IRF.      Anticipated Discharge Disposition (PT): inpatient rehabilitation facility

## 2024-04-28 NOTE — PROGRESS NOTES
"INTENSIVIST NOTE     Hospital Day: 4    Mr. Camron Alejoty, 73 y.o. male is followed for:   Subarachnoid hemorrhage       SUBJECTIVE     Interval history:    Awake alert and oriented.  Afebrile.  Main complaint is left elbow pain that developed overnight.  This is sensitive to movement and pressure.    The patient's relevant past medical, surgical and social history were reviewed and updated in Epic as appropriate.       OBJECTIVE     Vital Sign Min/Max for last 24 hours  Temp  Min: 97.3 °F (36.3 °C)  Max: 98.7 °F (37.1 °C)   BP  Min: 103/87  Max: 169/79   Pulse  Min: 65  Max: 92   Resp  Min: 18  Max: 20   SpO2  Min: 92 %  Max: 95 %   No data recorded   Weight  Min: 131 kg (288 lb)  Max: 131 kg (288 lb)      Intake/Output Summary (Last 24 hours) at 4/28/2024 1149  Last data filed at 4/28/2024 0600  Gross per 24 hour   Intake 144.5 ml   Output 975 ml   Net -830.5 ml      Flowsheet Rows      Flowsheet Row First Filed Value   Admission Height 175.3 cm (69\") Documented at 04/24/2024 1419   Admission Weight 129 kg (284 lb 6.3 oz) Documented at 04/24/2024 1419               04/25/24  0615 04/26/24  0900 04/28/24  1138   Weight: 131 kg (288 lb 5.8 oz) 131 kg (288 lb) 131 kg (288 lb)            Objective:  General Appearance:  In no acute distress.    Vital signs: (most recent): Blood pressure 157/92, pulse 72, temperature 98.7 °F (37.1 °C), temperature source Oral, resp. rate 20, height 175.3 cm (69\"), weight 131 kg (288 lb), SpO2 93%.    HEENT: Normal HEENT exam.    Lungs:  Normal effort and normal respiratory rate.  Breath sounds clear to auscultation.  He is not in respiratory distress.  No rales, wheezes or rhonchi.    Heart: Normal rate.  Regular rhythm.  S1 normal and S2 normal.  No murmur, gallop or friction rub.   Chest: Symmetric chest wall expansion.   Abdomen: Abdomen is soft and non-distended.  Bowel sounds are normal.   There is no abdominal tenderness.   There is no mass. There is no splenomegaly. There is no " hepatomegaly.   Extremities: There is no deformity or dependent edema.  (Upper extremity edema left greater than right but no significant erythema or purulence of left elbow/arm)  Neurological: Patient is alert and oriented to person, place and time.    Pupils:  Pupils are equal, round, and reactive to light.    Skin:  Warm and dry.                I reviewed the patient's new clinical results.  I reviewed the patient's new imaging results/reports including actual images and agree with reports.    No radiology results for the last day     INFUSIONS  niCARdipine, 5-15 mg/hr, Last Rate: Stopped (04/27/24 1530)        Results from last 7 days   Lab Units 04/25/24  0811 04/24/24 2025   WBC 10*3/mm3 8.18 6.74   HEMOGLOBIN g/dL 14.6 14.9   HEMATOCRIT % 43.1 43.7   PLATELETS 10*3/mm3 201 197     Results from last 7 days   Lab Units 04/28/24  0233 04/27/24 2002 04/27/24  0910 04/26/24  0627 04/26/24  0006 04/25/24  0811 04/25/24  0035 04/24/24 2025   SODIUM mmol/L 139  --  139 141  --   --   --  140   POTASSIUM mmol/L 3.7 4.3 3.5 3.8  --   --   --  3.7   CHLORIDE mmol/L 108*  --  107 106  --   --   --  106   CO2 mmol/L 22.0  --  24.0 24.0  --   --   --  27.0   BUN mg/dL 11  --  9 7*  --   --   --  9   GLUCOSE mg/dL 167*  --  184* 153*  --   --   --  198*   CREATININE mg/dL 0.83  --  0.75* 0.77  --   --   --  0.94   MAGNESIUM mg/dL  --   --   --   --  2.1 2.3 1.7 1.7   PHOSPHORUS mg/dL  --   --   --   --   --  2.4*  --  1.9*   CALCIUM mg/dL 8.2*  --  8.0* 7.9*  --   --   --  8.0*   ALBUMIN g/dL  --   --  3.4*  --   --   --   --   --                Patient isn't on Tube Feeding   /h  Patient doesn't have any tube feeding modular orders    Mechanical Ventilator:   Settings: Observed:                                                I reviewed the patient's medications.    Assessment & Plan   ASSESSMENT/PLAN     Active Hospital Problems    Diagnosis     **SAH (subarachnoid hemorrhage)     PAF (paroxysmal atrial fibrillation)      Hypothyroidism     Essential hypertension     Coronary artery disease involving native coronary artery of native heart without angina pectoris        73-year-old male with a past medical history significant for hypertension, dyslipidemia, diabetes mellitus, CAD, and PAF on home anticoagulation.  He presented on 4/24 with subarachnoid hemorrhage.  Anticoagulation was reversed.    Continues to improve neurologically.  Main complaint today is left elbow pain that started overnight.  This is worse with movement and pressure.    #1 subarachnoid hemorrhage  -Coumadin reversed with Kcentra and vitamin K on admission  -CT of the head posterior fossa subarachnoid hemorrhage within the prepontine cistern toward the left cerebellopontine angle  -Double vision, diplopia  -Patch 1 eye and alternate  -MRI/MRA of the brain with no evidence of aneurysm, therefore this is presumed idiopathic focal subarachnoid hemorrhage from anticoagulation    #2 history of coronary artery disease, paroxysmal atrial fibrillation  -Followed by Dr. Perdomo in [place]  -Currently in sinus rhythm  -2019 left heart catheterization, normal left main, 30% mid LAD, 40% distal circumflex, 40% mid RCA, 50% distal RCA with LVEDP of 18  -Echocardiogram here reveals EF 66% with moderate LVH and aortic valve mean pressure gradient of 15 mmHg  -Aspirin on hold  -Continue Lipitor     #3 diabetes mellitus type 2  -A1c 6.8, 3 months ago, 5.8 today  -Ozempic started January 2024  -Sliding scale insulin, low-dose     #4 hypertension  -Off nicardipine drip on losartan, hydralazine, and amlodipine as well as Coreg.     #5 hypothyroid  -Continue thyroid replacement  -TSH 1.77, free T41.87     #6 oral candidiasis  -Seen on his fees assessment  -Diflucan 100 mg x 5 days    Telemetry transfer  Left upper extremity Doppler due to left arm pain.       I discussed the patient's findings and my recommendations with patient, nursing staff, and stroke service     Plan of  care and goals reviewed with multidisciplinary team at daily rounds.    .    Soren Brown MD  Pulmonary and Critical Care Medicine  04/28/24 11:49 EDT

## 2024-04-28 NOTE — PROGRESS NOTES
[facility] Cardiology    Inpatient Progress Note      Chief Complaint/Reason for service:    A-fib         Subjective:       Denies chest pain, palpitations.  No A-fib on monitor.  Off Cardene    Past medical, surgical, social and family history reviewed in the patient's electronic medical record.         Objective:          Medications:    Current Facility-Administered Medications:     acetaminophen (TYLENOL) tablet 650 mg, 650 mg, Oral, Q6H PRN, Camilo Means APRN, 650 mg at 04/25/24 0305    amLODIPine (NORVASC) tablet 10 mg, 10 mg, Oral, Q24H, Rani Lilyl MD, 10 mg at 04/28/24 0826    atorvastatin (LIPITOR) tablet 40 mg, 40 mg, Oral, Nightly, Pat Lynn APRN, 40 mg at 04/27/24 2032    Calcium Replacement - Follow Nurse / BPA Driven Protocol, , Does not apply, PRN, Camilo Means, APRN    carvedilol (COREG) tablet 12.5 mg, 12.5 mg, Oral, Q12H, Soren Brown MD, 12.5 mg at 04/28/24 0826    cetirizine (zyrTEC) tablet 10 mg, 10 mg, Oral, Daily, Rani Lilly MD, 10 mg at 04/28/24 0826    fluconazole (DIFLUCAN) tablet 100 mg, 100 mg, Oral, Q24H, Rani Lilly MD, 100 mg at 04/27/24 1015    heparin (porcine) 5000 UNIT/ML injection 5,000 Units, 5,000 Units, Subcutaneous, Q8H, Rani Lilly MD, 5,000 Units at 04/28/24 0559    hydrALAZINE (APRESOLINE) tablet 75 mg, 75 mg, Oral, Q8H, Elie Gallardo MD, 75 mg at 04/28/24 0559    ipratropium-albuterol (DUO-NEB) nebulizer solution 3 mL, 3 mL, Nebulization, Q6H PRN, Tiffany Palafox DNP, APRN, 3 mL at 04/28/24 0434    levETIRAcetam (KEPPRA) injection 1,000 mg, 1,000 mg, Intravenous, Q12H, Pat Lynn, APRN, 1,000 mg at 04/28/24 0826    levothyroxine (SYNTHROID, LEVOTHROID) tablet 200 mcg, 200 mcg, Oral, Q AM, Rani Lilly MD, 200 mcg at 04/28/24 0559    losartan (COZAAR) tablet 100 mg, 100 mg, Oral, Q24H, Rani Lilly MD, 100 mg at 04/28/24 0826    Magnesium  Cardiology Dose Replacement - Follow Nurse / BPA Driven Protocol, , Does not apply, PRNClary Nicholas J, APRN    mirtazapine (REMERON) tablet 7.5 mg, 7.5 mg, Oral, Nightly, Rani Lilly MD, 7.5 mg at 04/27/24 2204    niCARdipine (CARDENE) 25mg in 250mL NS infusion, 5-15 mg/hr, Intravenous, Titrated, Dunia Redd MD, Stopped at 04/27/24 1530    Phosphorus Replacement - Follow Nurse / BPA Driven Protocol, , Does not apply, PRNClary Nicholas J, APRN    Potassium Replacement - Follow Nurse / BPA Driven Protocol, , Does not apply, Clary LAURENT Nicholas J, APRN    sodium chloride 0.9 % flush 10 mL, 10 mL, Intravenous, Q12H, Rani Lilly MD, 10 mL at 04/27/24 2032    sodium chloride 0.9 % flush 10 mL, 10 mL, Intravenous, PRN, Rani Lilly MD    sodium chloride 0.9 % flush 20 mL, 20 mL, Intravenous, PRN, Rani Lilly MD    sodium chloride 0.9 % infusion 40 mL, 40 mL, Intravenous, PRN, Pat Lynn APRN    Vital Sign Min/Max for last 24 hours  Temp  Min: 97.3 °F (36.3 °C)  Max: 98.3 °F (36.8 °C)   BP  Min: 103/87  Max: 169/79   Pulse  Min: 65  Max: 92   Resp  Min: 18  Max: 20   SpO2  Min: 92 %  Max: 95 %   No data recorded      Intake/Output Summary (Last 24 hours) at 4/28/2024 0936  Last data filed at 4/28/2024 0600  Gross per 24 hour   Intake 144.5 ml   Output 975 ml   Net -830.5 ml           CONSTITUTIONAL: No acute distress      Labs/studies:  Available lab and imaging results were reviewed by myself today    Results for orders placed during the hospital encounter of 04/24/24    Adult Transthoracic Echo Complete W/ Cont if Necessary Per Protocol (With Agitated Saline)    Interpretation Summary    Left ventricular systolic function is normal. Calculated left ventricular EF = 65.8% Left ventricular ejection fraction appears to be 66 - 70%.    Left ventricular wall thickness is consistent with mild to moderate concentric hypertrophy.    Left ventricular diastolic  function is consistent with (grade I) impaired relaxation.    Saline test results are negative for right to left atrial level shunt.    Peak velocity of the flow distal to the aortic valve is 263 cm/s. Aortic valve mean pressure gradient is 15 mmHg.    Ascending aorta = 4.4 cm      Tele: Sinus rhythm         Assessment/Plan:         SAH (subarachnoid hemorrhage)    Hypothyroidism    Essential hypertension    Coronary artery disease involving native coronary artery of native heart without angina pectoris    PAF (paroxysmal atrial fibrillation)       ASSESSMENT:  Subarachnoid hemorrhage  INR reportedly 2.0 at OSH, on chronic Coumadin for A-fib  Status post Kcentra and vitamin K  A-fib  Reported first episode at [place] 3/2019, See [facility] cards consult note 3/2019  12-lead EKG scanned in chart under media tab, OSH [place] records 3/2019, page 26  Potassium 2.5 at that time  A-fib spontaneously converted to sinus rhythm  Reportedly asymptomatic  No known recurrence  CAD  Abdominal aortic aneurysm  Past CVA  Diabetes  Hypertension  Dyslipidemia  Hypothyroidism  Hearing loss    PLAN:  Okay for discharge from a cardiac standpoint.  Will continue the discussion of left atrial appendage occlusion/Watchman as an outpatient if discharged.  Would not take place any sooner than 8 to 12 weeks (not until neurology gives an okay for short-term intraprocedural anticoagulation)  Continue aspirin  14-day heart monitor upon discharge.  If discharged over the weekend, we will call the patient to have it placed.  Cardiology office will call patient about next steps regarding follow-up possibly with EP or to just follow-up with his primary cardiologist, Dr. Perdomo, in [place], [place].  Cardiology to see on an as-needed basis.  Please feel free to call with any questions or concerns in the interim.      Elie Gallardo MD, MSc, FACC, [facility]  Interventional Cardiology  [facility]

## 2024-04-29 ENCOUNTER — APPOINTMENT (OUTPATIENT)
Dept: GENERAL RADIOLOGY | Facility: HOSPITAL | Age: 74
End: 2024-04-29
Payer: MEDICARE

## 2024-04-29 PROBLEM — B37.0 ORAL CANDIDIASIS: Status: ACTIVE | Noted: 2024-04-29

## 2024-04-29 PROBLEM — R56.9 SEIZURE: Status: ACTIVE | Noted: 2024-04-29

## 2024-04-29 PROBLEM — E11.9 TYPE 2 DIABETES MELLITUS: Status: ACTIVE | Noted: 2024-04-29

## 2024-04-29 LAB
ANION GAP SERPL CALCULATED.3IONS-SCNC: 10 MMOL/L (ref 5–15)
BASOPHILS # BLD AUTO: 0.03 10*3/MM3 (ref 0–0.2)
BASOPHILS NFR BLD AUTO: 0.4 % (ref 0–1.5)
BUN SERPL-MCNC: 14 MG/DL (ref 8–23)
BUN/CREAT SERPL: 15.1 (ref 7–25)
CALCIUM SPEC-SCNC: 8.3 MG/DL (ref 8.6–10.5)
CHLORIDE SERPL-SCNC: 103 MMOL/L (ref 98–107)
CO2 SERPL-SCNC: 25 MMOL/L (ref 22–29)
CREAT SERPL-MCNC: 0.93 MG/DL (ref 0.76–1.27)
DEPRECATED RDW RBC AUTO: 42.9 FL (ref 37–54)
EGFRCR SERPLBLD CKD-EPI 2021: 87.2 ML/MIN/1.73
EOSINOPHIL # BLD AUTO: 0.04 10*3/MM3 (ref 0–0.4)
EOSINOPHIL NFR BLD AUTO: 0.5 % (ref 0.3–6.2)
ERYTHROCYTE [DISTWIDTH] IN BLOOD BY AUTOMATED COUNT: 13.4 % (ref 12.3–15.4)
GLUCOSE BLDC GLUCOMTR-MCNC: 136 MG/DL (ref 70–130)
GLUCOSE BLDC GLUCOMTR-MCNC: 149 MG/DL (ref 70–130)
GLUCOSE BLDC GLUCOMTR-MCNC: 158 MG/DL (ref 70–130)
GLUCOSE BLDC GLUCOMTR-MCNC: 159 MG/DL (ref 70–130)
GLUCOSE SERPL-MCNC: 167 MG/DL (ref 65–99)
HCT VFR BLD AUTO: 41.3 % (ref 37.5–51)
HGB BLD-MCNC: 14 G/DL (ref 13–17.7)
IMM GRANULOCYTES # BLD AUTO: 0.03 10*3/MM3 (ref 0–0.05)
IMM GRANULOCYTES NFR BLD AUTO: 0.4 % (ref 0–0.5)
LYMPHOCYTES # BLD AUTO: 1.35 10*3/MM3 (ref 0.7–3.1)
LYMPHOCYTES NFR BLD AUTO: 16.4 % (ref 19.6–45.3)
MCH RBC QN AUTO: 29.7 PG (ref 26.6–33)
MCHC RBC AUTO-ENTMCNC: 33.9 G/DL (ref 31.5–35.7)
MCV RBC AUTO: 87.7 FL (ref 79–97)
MONOCYTES # BLD AUTO: 0.85 10*3/MM3 (ref 0.1–0.9)
MONOCYTES NFR BLD AUTO: 10.3 % (ref 5–12)
NEUTROPHILS NFR BLD AUTO: 5.93 10*3/MM3 (ref 1.7–7)
NEUTROPHILS NFR BLD AUTO: 72 % (ref 42.7–76)
NRBC BLD AUTO-RTO: 0 /100 WBC (ref 0–0.2)
PLATELET # BLD AUTO: 190 10*3/MM3 (ref 140–450)
PMV BLD AUTO: 11.4 FL (ref 6–12)
POTASSIUM SERPL-SCNC: 3.3 MMOL/L (ref 3.5–5.2)
POTASSIUM SERPL-SCNC: 3.9 MMOL/L (ref 3.5–5.2)
RBC # BLD AUTO: 4.71 10*6/MM3 (ref 4.14–5.8)
SODIUM SERPL-SCNC: 138 MMOL/L (ref 136–145)
WBC NRBC COR # BLD AUTO: 8.23 10*3/MM3 (ref 3.4–10.8)

## 2024-04-29 PROCEDURE — 94761 N-INVAS EAR/PLS OXIMETRY MLT: CPT

## 2024-04-29 PROCEDURE — 94799 UNLISTED PULMONARY SVC/PX: CPT

## 2024-04-29 PROCEDURE — 99233 SBSQ HOSP IP/OBS HIGH 50: CPT | Performed by: INTERNAL MEDICINE

## 2024-04-29 PROCEDURE — 25010000002 HEPARIN (PORCINE) PER 1000 UNITS: Performed by: INTERNAL MEDICINE

## 2024-04-29 PROCEDURE — 80048 BASIC METABOLIC PNL TOTAL CA: CPT | Performed by: INTERNAL MEDICINE

## 2024-04-29 PROCEDURE — 85025 COMPLETE CBC W/AUTO DIFF WBC: CPT | Performed by: INTERNAL MEDICINE

## 2024-04-29 PROCEDURE — 84132 ASSAY OF SERUM POTASSIUM: CPT | Performed by: INTERNAL MEDICINE

## 2024-04-29 PROCEDURE — 92526 ORAL FUNCTION THERAPY: CPT

## 2024-04-29 PROCEDURE — 71045 X-RAY EXAM CHEST 1 VIEW: CPT

## 2024-04-29 PROCEDURE — 97116 GAIT TRAINING THERAPY: CPT

## 2024-04-29 PROCEDURE — 97530 THERAPEUTIC ACTIVITIES: CPT

## 2024-04-29 PROCEDURE — 25010000002 BUMETANIDE PER 0.5 MG: Performed by: INTERNAL MEDICINE

## 2024-04-29 PROCEDURE — 92507 TX SP LANG VOICE COMM INDIV: CPT

## 2024-04-29 PROCEDURE — 82948 REAGENT STRIP/BLOOD GLUCOSE: CPT

## 2024-04-29 RX ORDER — CARVEDILOL 12.5 MG/1
25 TABLET ORAL EVERY 12 HOURS SCHEDULED
Status: DISCONTINUED | OUTPATIENT
Start: 2024-04-29 | End: 2024-05-02 | Stop reason: HOSPADM

## 2024-04-29 RX ORDER — BUMETANIDE 0.25 MG/ML
2 INJECTION INTRAMUSCULAR; INTRAVENOUS ONCE
Status: COMPLETED | OUTPATIENT
Start: 2024-04-29 | End: 2024-04-29

## 2024-04-29 RX ORDER — POTASSIUM CHLORIDE 750 MG/1
40 CAPSULE, EXTENDED RELEASE ORAL EVERY 4 HOURS
Status: COMPLETED | OUTPATIENT
Start: 2024-04-29 | End: 2024-04-29

## 2024-04-29 RX ADMIN — BUMETANIDE 2 MG: 0.25 INJECTION INTRAMUSCULAR; INTRAVENOUS at 00:51

## 2024-04-29 RX ADMIN — ACETAMINOPHEN 650 MG: 325 TABLET ORAL at 02:07

## 2024-04-29 RX ADMIN — MIRTAZAPINE 7.5 MG: 15 TABLET, FILM COATED ORAL at 20:42

## 2024-04-29 RX ADMIN — CARVEDILOL 25 MG: 12.5 TABLET, FILM COATED ORAL at 20:42

## 2024-04-29 RX ADMIN — AMLODIPINE BESYLATE 10 MG: 10 TABLET ORAL at 09:43

## 2024-04-29 RX ADMIN — HEPARIN SODIUM 5000 UNITS: 5000 INJECTION INTRAVENOUS; SUBCUTANEOUS at 13:35

## 2024-04-29 RX ADMIN — LEVETIRACETAM 1000 MG: 500 TABLET, FILM COATED ORAL at 20:41

## 2024-04-29 RX ADMIN — HEPARIN SODIUM 5000 UNITS: 5000 INJECTION INTRAVENOUS; SUBCUTANEOUS at 20:43

## 2024-04-29 RX ADMIN — HYDRALAZINE HYDROCHLORIDE 75 MG: 50 TABLET ORAL at 20:43

## 2024-04-29 RX ADMIN — FLUCONAZOLE 100 MG: 200 TABLET ORAL at 09:44

## 2024-04-29 RX ADMIN — CARVEDILOL 12.5 MG: 12.5 TABLET, FILM COATED ORAL at 09:43

## 2024-04-29 RX ADMIN — HYDRALAZINE HYDROCHLORIDE 75 MG: 50 TABLET ORAL at 13:35

## 2024-04-29 RX ADMIN — IPRATROPIUM BROMIDE AND ALBUTEROL SULFATE 3 ML: 2.5; .5 SOLUTION RESPIRATORY (INHALATION) at 16:49

## 2024-04-29 RX ADMIN — LOSARTAN POTASSIUM 100 MG: 50 TABLET, FILM COATED ORAL at 09:43

## 2024-04-29 RX ADMIN — Medication 10 ML: at 20:44

## 2024-04-29 RX ADMIN — HYDRALAZINE HYDROCHLORIDE 75 MG: 50 TABLET ORAL at 06:17

## 2024-04-29 RX ADMIN — LEVOTHYROXINE SODIUM 200 MCG: 0.1 TABLET ORAL at 06:17

## 2024-04-29 RX ADMIN — POTASSIUM CHLORIDE 40 MEQ: 750 CAPSULE, EXTENDED RELEASE ORAL at 09:43

## 2024-04-29 RX ADMIN — HEPARIN SODIUM 5000 UNITS: 5000 INJECTION INTRAVENOUS; SUBCUTANEOUS at 06:17

## 2024-04-29 RX ADMIN — Medication 10 ML: at 09:49

## 2024-04-29 RX ADMIN — LEVETIRACETAM 1000 MG: 500 TABLET, FILM COATED ORAL at 09:43

## 2024-04-29 RX ADMIN — ATORVASTATIN CALCIUM 40 MG: 40 TABLET, FILM COATED ORAL at 20:41

## 2024-04-29 RX ADMIN — CETIRIZINE HYDROCHLORIDE 10 MG: 10 TABLET, FILM COATED ORAL at 09:43

## 2024-04-29 RX ADMIN — POTASSIUM CHLORIDE 40 MEQ: 750 CAPSULE, EXTENDED RELEASE ORAL at 17:32

## 2024-04-29 NOTE — THERAPY TREATMENT NOTE
Patient Name: Camron Hendrix  : 1950    MRN: [id number]                              Today's Date: 2024       Admit Date: 2024    Visit Dx:     ICD-10-CM ICD-9-CM   1. Dysarthria  R47.1 784.51   2. Oropharyngeal dysphagia  R13.12 787.22   3. 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    SAH (subarachnoid hemorrhage)    Type 2 diabetes mellitus    Oral candidiasis    Seizure     Past Medical History:   Diagnosis Date    AAA (abdominal aortic aneurysm)     Coronary artery disease     CVA (cerebral vascular accident)     Hypertension     Hypothyroidism     PAF (paroxysmal atrial fibrillation)     TIA (transient ischemic attack)      Past Surgical History:   Procedure Laterality Date    CARDIAC CATHETERIZATION N/A 3/29/2019    Procedure: Left Heart Cath;  Surgeon: Andrea Holloway MD;  Location: [facility] CATH INVASIVE LOCATION;  Service: Cardiovascular    CERVICAL FUSION      c4-c5    CORONARY ANGIOPLASTY WITH STENT PLACEMENT      HERNIA REPAIR      TONSILECTOMY, ADENOIDECTOMY, BILATERAL MYRINGOTOMY AND TUBES        General Information       Row Name 24 1406          OT Time and Intention    Document Type therapy note (daily note)  -CS     Mode of Treatment occupational therapy  -CS       Row Name 24 1406          General Information    Existing Precautions/Restrictions fall;other (see comments)  diplopia, L eye visual tracking  deficits; patch for mobility; follow closely w/ chair for functional mobility, LUE sling for comfort w/ mobility  -CS     Barriers to Rehab medically complex;visual deficit  -CS       Row Name 04/29/24 1406          Cognition    Orientation Status (Cognition) oriented x 3  -CS       Row Name 04/29/24 [facility]          Safety Issues, Functional Mobility    Impairments Affecting Function (Mobility) balance;endurance/activity tolerance;visual/perceptual;strength;postural/trunk control  -CS               User Key  (r) = Recorded By, (t) = Taken By, (c) = Cosigned By      Initials Name Provider Type    CS Kecia Bob, MEAGAN Occupational Therapist                     Mobility/ADL's       Row Name 04/29/24 1406          Transfers    Transfers sit-stand transfer;stand-sit transfer  -CS       Row Name 04/29/24 1406          Sit-Stand Transfer    Sit-Stand [place] (Transfers) minimum assist (75% patient effort);verbal cues  -       Row Name 04/29/24 1406          Stand-Sit Transfer    Stand-Sit [place] (Transfers) minimum assist (75% patient effort);verbal cues  -CS       Row Name 04/29/24 [email or web address]          Activities of Daily Living    BADL Assessment/Intervention bathing;upper body dressing  -CS       Row Name 04/29/24 [email or web address]          Lower Body Dressing Assessment/Training    [place] Level (Lower Body Dressing) don;socks;dependent (less than 25% patient effort)  -CS     Position (Lower Body Dressing) supported sitting  -CS       Row Name 04/29/24 [email or web address]          Bathing Assessment/Intervention    [place] Level (Bathing) lower body;dependent (less than 25% patient effort)  -CS     Position (Bathing) supported sitting  -CS       Row Name 04/29/24 [facility]          Upper Body Dressing Assessment/Training    [place] Level (Upper Body Dressing) don;doff;maximum assist (25% patient effort)  -CS     Position (Upper Body Dressing) supported sitting  -CS     Comment, (Upper Body Dressing) sling for comfort  -CS                User Key  (r) = Recorded By, (t) = Taken By, (c) = Cosigned By      Initials Name Provider Type    CS Kecia Bob OT Occupational Therapist                   Obj/Interventions       Row Name 04/29/24 1407          Balance    Balance Assessment sitting static balance;sitting dynamic balance;standing static balance;standing dynamic balance  -CS     Static Sitting Balance standby assist  -CS     Dynamic Sitting Balance contact guard  -CS     Position, Sitting Balance sitting in chair  -CS     Static Standing Balance minimal assist  -CS     Dynamic Standing Balance minimal assist  -CS     Position/Device Used, Standing Balance supported  -CS     Balance Interventions sitting;standing;static;dynamic;dynamic reaching;occupation based/functional task;weight shifting activity  -CS               User Key  (r) = Recorded By, (t) = Taken By, (c) = Cosigned By      Initials Name Provider Type     Kecia Bob OT Occupational Therapist                   Goals/Plan    No documentation.                  Clinical Impression       Row Name 04/29/24 1408          Pain Assessment    Pain Intervention(s) Repositioned;Ambulation/increased activity  -     Additional Documentation Pain Scale: FACES Pre/Post-Treatment (Group)  -CS       Row Name 04/29/24 1408          Pain Scale: FACES Pre/Post-Treatment    Pain: FACES Scale, Pretreatment 4-->hurts little more  -CS     Posttreatment Pain Rating 4-->hurts little more  -CS     Pain Location - Side/Orientation Left  -CS     Pain Location upper  -CS     Pain Location - extremity  -CS     Pre/Posttreatment Pain Comment tolerated  -CS       Row Name 04/29/24 1408          Plan of Care Review    Plan of Care Reviewed With patient  -CS     Outcome Evaluation Pt conts to require significantly increased assist for all ADL performance from baseline w/ noted deficits in balance and functional endurance warranting cont skilled IPOT POC to promote return to PLOF. Recommend pt DC to IP  rehab.  -CS       Row Name 04/29/24 1408          Therapy Assessment/Plan (OT)    Therapy Frequency (OT) daily  -CS       Row Name 04/29/24 1408          Therapy Plan Review/Discharge Plan (OT)    Anticipated Discharge Disposition (OT) inpatient rehabilitation facility  -CS       Row Name 04/29/24 1408          Vital Signs    Pre Systolic BP Rehab 171  -CS     Pre Treatment Diastolic BP 82  -CS     Post Systolic BP Rehab 155  -CS     Post Treatment Diastolic BP 92  -CS     Pretreatment Heart Rate (beats/min) 78  -CS     Posttreatment Heart Rate (beats/min) 79  -CS     Pre SpO2 (%) 92  -CS     O2 Delivery Pre Treatment room air  -CS     Post SpO2 (%) 94  -CS     O2 Delivery Post Treatment room air  -CS     Pre Patient Position Sitting  -CS     Intra Patient Position Standing  -CS     Post Patient Position Sitting  -CS       Row Name 04/29/24 1408          Positioning and Restraints    Pre-Treatment Position sitting in chair/recliner  -CS     Post Treatment Position chair  -CS     In Chair notified nsg;sitting;encouraged to call for assist;call light within reach;waffle cushion;with PT;with family/caregiver;with nsg  -CS               User Key  (r) = Recorded By, (t) = Taken By, (c) = Cosigned By      Initials Name Provider Type    CS Kecia Bob, OT Occupational Therapist                   Outcome Measures       Row Name 04/29/24 1409          How much help from another is currently needed...    Putting on and taking off regular lower body clothing? 2  -CS     Bathing (including washing, rinsing, and drying) 2  -CS     Toileting (which includes using toilet bed pan or urinal) 2  -CS     Putting on and taking off regular upper body clothing 2  -CS     Taking care of personal grooming (such as brushing teeth) 3  -CS     Eating meals 3  -CS     AM-PAC 6 Clicks Score (OT) 14  -CS       Row Name 04/29/24 1211          How much help from another person do you currently need...    Turning from your back to your side  while in flat bed without using bedrails? 3  -LH     Moving from lying on back to sitting on the side of a flat bed without bedrails? 3  -LH     Moving to and from a bed to a chair (including a wheelchair)? 3  -LH     Standing up from a chair using your arms (e.g., wheelchair, bedside chair)? 3  -LH     Climbing 3-5 steps with a railing? 2  -LH     To walk in hospital room? 3  -     AM-PAC 6 Clicks Score (PT) 17  -LH     Highest Level of Mobility Goal 5 --> Static standing  -       Row Name 04/29/24 1409          Modified Hitchcock Scale    Modified Hitchcock Scale 3 - Moderate disability.  Requiring some help, but able to walk without assistance.  -       Row Name 04/29/24 1409 04/29/24 1211       Functional Assessment    Outcome Measure Options AM-PAC 6 Clicks Daily Activity (OT)  - AM-PAC 6 Clicks Basic Mobility (PT)  -              User Key  (r) = Recorded By, (t) = Taken By, (c) = Cosigned By      Initials Name Provider Type     Kecia Bob OT Occupational Therapist     Georgina Lucas, PT Physical Therapist                    Occupational Therapy Education       Title: PT OT SLP Therapies (In Progress)       Topic: Occupational Therapy (In Progress)       Point: ADL training (In Progress)       Description:   Instruct learner(s) on proper safety adaptation and remediation techniques during self care or transfers.   Instruct in proper use of assistive devices.                  Learning Progress Summary             Patient Acceptance, E, NR by CS at 4/29/2024 1409    Acceptance, E, NR by CS at 4/25/2024 0936                         Point: Home exercise program (In Progress)       Description:   Instruct learner(s) on appropriate technique for monitoring, assisting and/or progressing therapeutic exercises/activities.                  Learning Progress Summary             Patient Acceptance, E, NR by  at 4/29/2024 1409                         Point: Precautions (In Progress)       Description:    Instruct learner(s) on prescribed precautions during self-care and functional transfers.                  Learning Progress Summary             Patient Acceptance, E, NR by  at 4/29/2024 1409    Acceptance, E, NR by  at 4/25/2024 0936                         Point: Body mechanics (In Progress)       Description:   Instruct learner(s) on proper positioning and spine alignment during self-care, functional mobility activities and/or exercises.                  Learning Progress Summary             Patient Acceptance, E, NR by  at 4/29/2024 1409    Acceptance, E, NR by  at 4/25/2024 0936                                         User Key       Initials Effective Dates Name Provider Type Discipline     09/02/21 -  Kecia Bob, OT Occupational Therapist OT                  OT Recommendation and Plan  Planned Therapy Interventions (OT): activity tolerance training, adaptive equipment training, BADL retraining, functional balance retraining, occupation/activity based interventions, ROM/therapeutic exercise, strengthening exercise, transfer/mobility retraining  Therapy Frequency (OT): daily  Plan of Care Review  Plan of Care Reviewed With: patient  Outcome Evaluation: Pt conts to require significantly increased assist for all ADL performance from baseline w/ noted deficits in balance and functional endurance warranting cont skilled IPOT POC to promote return to PLOF. Recommend pt DC to IP rehab.     Time Calculation:   Evaluation Complexity (OT)  Review Occupational Profile/Medical/Therapy History Complexity: expanded/moderate complexity  Assessment, Occupational Performance/Identification of Deficit Complexity: 5 or more performance deficits  Clinical Decision Making Complexity (OT): detailed assessment/moderate complexity  Overall Complexity of Evaluation (OT): moderate complexity     Time Calculation- OT       Row Name 04/29/24 1410 04/29/24 1212          Time Calculation- OT    OT Start Time 0915  - --      OT Received On 04/29/24  -CS --     OT Goal Re-Cert Due Date 05/05/24  -CS --        Timed Charges    11911 - Gait Training Minutes  -- 10  -LH     90508 - OT Therapeutic Activity Minutes 5  -CS --     12410 - OT Self Care/Mgmt Minutes 5  -CS --        Total Minutes    Timed Charges Total Minutes 10  -CS 10  -LH      Total Minutes 10  -CS 10  -LH               User Key  (r) = Recorded By, (t) = Taken By, (c) = Cosigned By      Initials Name Provider Type    CS Kecia Bob OT Occupational Therapist     Georgina Lucas, KARLY Physical Therapist                  Therapy Charges for Today       Code Description Service Date Service Provider Modifiers Qty    91295975704 HC OT THERAPEUTIC ACT EA 15 MIN 4/29/2024 Kecia Bob OT GO 1                 Kecia Bob OT  4/29/2024

## 2024-04-29 NOTE — PLAN OF CARE
Goal Outcome Evaluation:  Plan of Care Reviewed With: patient, daughter                  Anticipated Discharge Disposition (SLP): inpatient rehabilitation facility    SLP Diagnosis: mild, dysarthria (04/29/24 1145)  SLP Diagnosis Comments: Pt presents w/ mild dysarthria per this eval. Expressive/receptive language abilities appear intact @ simple level. Pt noted w/ mild immediate memory deficits, family reports memory deficits @ baseline since prior CVA. SLP will f/u for tx & dx tx as appropriate (04/29/24 1145)  SLP Swallowing Diagnosis: mild, oral dysphagia, moderate, pharyngeal dysphagia (04/29/24 1145)  Treatment Assessment (SLP): improved, dysarthria, no clinical signs of, aspiration (04/29/24 1145)     Plan for Continued Treatment (SLP): continue treatment per plan of care (04/29/24 1145)

## 2024-04-29 NOTE — THERAPY TREATMENT NOTE
Acute Care - Speech Language Pathology   Swallow Treatment Note [facility]     Patient Name: Camron Hendrix  : 1950  MRN: [id number]  Today's Date: 2024               Admit Date: 2024    Visit Dx:     ICD-10-CM ICD-9-CM   1. Dysarthria  R47.1 784.51   2. Oropharyngeal dysphagia  R13.12 787.22   3. 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    SAH (subarachnoid hemorrhage)    Type 2 diabetes mellitus    Oral candidiasis    Seizure     Past Medical History:   Diagnosis Date    AAA (abdominal aortic aneurysm)     Coronary artery disease     CVA (cerebral vascular accident)     Hypertension     Hypothyroidism     PAF (paroxysmal atrial fibrillation)     TIA (transient ischemic attack)      Past Surgical History:   Procedure Laterality Date    CARDIAC CATHETERIZATION N/A 3/29/2019    Procedure: Left Heart Cath;  Surgeon: Andrea Holloway MD;  Location: [facility] INVASIVE LOCATION;  Service: Cardiovascular    CERVICAL FUSION      c4-c5    CORONARY ANGIOPLASTY WITH STENT PLACEMENT      HERNIA REPAIR      TONSILECTOMY, ADENOIDECTOMY, BILATERAL MYRINGOTOMY AND TUBES         SLP Recommendation and Plan  SLP Swallowing Diagnosis: mild, oral dysphagia, moderate, pharyngeal dysphagia (24 1145)  SLP Diet Recommendation: soft to chew textures, chopped, no mixed consistencies, nectar thick liquids (24 1145)  Recommended Precautions and  Strategies: upright posture during/after eating, multiple swallows per bite of food, multiple swallows per sip of liquid, general aspiration precautions, reflux precautions (04/29/24 1145)  SLP Rec. for Method of Medication Administration: meds whole, meds crushed, with thick liquids, with puree (04/29/24 1145)     Monitor for Signs of Aspiration: yes, notify SLP if any concerns (04/29/24 1145)  Recommended Diagnostics: FEES (04/29/24 1145)  Swallow Criteria for Skilled Therapeutic Interventions Met: demonstrates skilled criteria (04/29/24 1145)  Anticipated Discharge Disposition (SLP): inpatient rehabilitation facility (04/29/24 1145)  Rehab Potential/Prognosis, Swallowing: good, to achieve stated therapy goals (04/29/24 1145)  Therapy Frequency (Swallow): 5 days per week (04/29/24 1145)  Predicted Duration Therapy Intervention (Days): until discharge (04/29/24 1145)  Oral Care Recommendations: Oral Care BID/PRN, Suction toothbrush (04/29/24 1145)        Daily Summary of Progress (SLP): progress toward functional goals is good (04/29/24 1145)               Treatment Assessment (SLP): improved, dysarthria, no clinical signs of, aspiration (04/29/24 1145)     Plan for Continued Treatment (SLP): continue treatment per plan of care (04/29/24 1145)                SWALLOW EVALUATION (Last 72 Hours)       SLP Adult Swallow Evaluation       Row Name 04/29/24 1145                   SLP Evaluation Clinical Impression    SLP Swallowing Diagnosis mild;oral dysphagia;moderate;pharyngeal dysphagia  -        Functional Impact risk of aspiration/pneumonia  -        Rehab Potential/Prognosis, Swallowing good, to achieve stated therapy goals  -        Swallow Criteria for Skilled Therapeutic Interventions Met demonstrates skilled criteria  -           Recommendations    Therapy Frequency (Swallow) 5 days per week  -        SLP Diet Recommendation soft to chew textures;chopped;no mixed consistencies;nectar thick liquids   -        Recommended Diagnostics FEES  -        Recommended Precautions and Strategies upright posture during/after eating;multiple swallows per bite of food;multiple swallows per sip of liquid;general aspiration precautions;reflux precautions  -        Oral Care Recommendations Oral Care BID/PRN;Suction toothbrush  -        SLP Rec. for Method of Medication Administration meds whole;meds crushed;with thick liquids;with puree  -        Monitor for Signs of Aspiration yes;notify SLP if any concerns  -                  User Key  (r) = Recorded By, (t) = Taken By, (c) = Cosigned By      Initials Name Effective Dates     Melony Castañeda, MS CCC-SLP 06/16/21 -                     EDUCATION  The patient has been educated in the following areas:   Home Exercise Program (HEP) Dysphagia (Swallowing Impairment) Oral Care/Hydration Modified Diet Instruction.        SLP GOALS       Row Name 04/29/24 1145             (LTG) Patient will demonstrate functional swallow for    Diet Texture (Demonstrate functional swallow) regular textures  -      Liquid viscosity (Demonstrate functional swallow) thin liquids  -      [place] (Demonstrate functional swallow) independently (over 90% accuracy)  -      Time Frame (Demonstrate functional swallow) by discharge  -      Progress/Outcomes (Demonstrate functional swallow) continuing progress toward goal  -      Comment (Demonstrate functional swallow) No s/s of aspiration with thin liquids by spoon or cup sips, cough x 1 with thin liquids by straw  -         (STG) Patient will tolerate trials of    Consistencies Trialed (Tolerate trials) soft to chew (whole) textures;nectar/ mildly thick liquids  -      Desired Outcome (Tolerate trials) without signs/symptoms of aspiration;without signs of distress  -      [place] (Tolerate trials) independently (over 90% accuracy)  -      Time Frame (Tolerate trials) 1 week  -      Progress/Outcomes (Tolerate  trials) good progress toward goal  -CH      Comment (Tolerate trials) No s/s of aspiration  -CH         (STG) Lingual Strengthening Goal 1 (SLP)    Activity (Lingual Strengthening Goal 1, SLP) increase tongue back strength  -CH      Increase Tongue Back Strength lingual resistance exercises  -CH      [place]/Accuracy (Lingual Strengthening Goal 1, SLP) with minimal cues (75-90% accuracy)  -CH      Time Frame (Lingual Strengthening Goal 1, SLP) 1 week  -CH      Progress/Outcomes (Lingual Strengthening Goal 1, SLP) continuing progress toward goal  -CH      Comment (Lingual Strengthening Goal 1, SLP) completed all.  -CH         (STG) Pharyngeal Strengthening Exercise Goal 1 (SLP)    Activity (Pharyngeal Strengthening Goal 1, SLP) increase timing;increase anterior movement of the hyolaryngeal complex;increase closure at entrance to airway/closure of airway at glottis;increase squeeze/positive pressure generation  -CH      Increase Timing prepping - 3 second prep or suck swallow or 3-step swallow  -CH      Increase Anterior Movement of the Hyolaryngeal Complex falsetto;Mendelsohn  -CH      Increase Closure at Entrance to Airway/Closure of Airway at Glottis super-supraglottic swallow  -CH      Increase Squeeze/Positive Pressure Generation hard effortful swallow  -CH      [place]/Accuracy (Pharyngeal Strengthening Goal 1, SLP) with minimal cues (75-90% accuracy)  -CH      Time Frame (Pharyngeal Strengthening Goal 1, SLP) 1 week  -CH      Progress/Outcomes (Pharyngeal Strengthening Goal 1, SLP) continuing progress toward goal  -CH         Patient will demonstrate functional speech skills for return to discharge environment    [place] Independently  -CH      Progress/Outcomes new goal  -CH         SLP Diagnostic Treatment     Patient will participate in further assessment in the following areas reading comprehension;graphic expression;cognitive-linguistic  -CH      Time Frame (Diagnostic) short term goal (STG)   -CH      Progress/Outcomes (Additional Goal 1, SLP) goal met  -CH         Phonation Goal 1 (SLP)    Improve Phonation By Goal 1 (SLP) using loud speech;80%;with minimal cues (75-90%)  -CH      Time Frame (Phonation Goal 1, SLP) short term goal (STG)  -CH      Progress/Outcomes (Phonation Goal 1, SLP) goal met  -CH         Articulation Goal 1 (SLP)    Improve Articulation Goal 1 (SLP) by over-articulating in connected speech;80%;with minimal cues (75-90%)  -CH      Time Frame (Articulation Goal 1, SLP) short term goal (STG)  -CH      Progress/Outcomes (Articulation Goal 1, SLP) goal no longer appropriate  -CH      Comment (Articulation Goal 1, SLP) dysarthria resolved. Baseline per dtr  -CH                User Key  (r) = Recorded By, (t) = Taken By, (c) = Cosigned By      Initials Name Provider Type    Melony Etienne MS CCC-SLP Speech and Language Pathologist                       Time Calculation:    Time Calculation- SLP       Row Name 24 1558             Time Calculation- SLP    SLP Start Time 1145  -CH      SLP Received On 24  -         Untimed Charges    36444-YU Treatment/ST Modification Prosth Aug Alter  30  -CH      62854-PI Treatment Swallow Minutes 40  -CH         Total Minutes    Untimed Charges Total Minutes 70  -CH       Total Minutes 70  -CH                User Key  (r) = Recorded By, (t) = Taken By, (c) = Cosigned By      Initials Name Provider Type    Melony Etienne MS CCC-SLP Speech and Language Pathologist                    Therapy Charges for Today       Code Description Service Date Service Provider Modifiers Qty    73743381341 HC ST TREATMENT SWALLOW 3 2024 Melony Castañeda MS CCC-SLP GN 1    [id number] HC ST TREATMENT SPEECH 2 2024 Melony Castañeda MS CCC-SLP GN 1                 Melony Castañeda MS CCC-SLP  2024   and Acute Care - Speech Language Pathology Treatment Note   [place]     Patient Name: Camron Hendrix  : 1950  MRN:  [id number]  Today's Date: 4/29/2024               Admit Date: 4/24/2024     Visit Dx:    ICD-10-CM ICD-9-CM   1. Dysarthria  R47.1 784.51   2. Oropharyngeal dysphagia  R13.12 787.22   3. 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    SAH (subarachnoid hemorrhage)    Type 2 diabetes mellitus    Oral candidiasis    Seizure     Past Medical History:   Diagnosis Date    AAA (abdominal aortic aneurysm)     Coronary artery disease     CVA (cerebral vascular accident) 2011    Hypertension     Hypothyroidism     PAF (paroxysmal atrial fibrillation)     TIA (transient ischemic attack)      Past Surgical History:   Procedure Laterality Date    CARDIAC CATHETERIZATION N/A 3/29/2019    Procedure: Left Heart Cath;  Surgeon: Andrea Holloway MD;  Location:  [facility] INVAS[facility] LOCATION;  Service: Cardiovascular    CERVICAL FUSION      c4-c5    CORONARY ANGIOPLASTY WITH STENT PLACEMENT      HERNIA REPAIR      TONSILECTOMY, ADENOIDECTOMY, BILATERAL MYRINGOTOMY AND TUBES         SLP Recommendation and Plan  SLP Diagnosis: mild, dysarthria (04/29/24 1145)  SLP Diagnosis Comments: Pt presents w/ mild dysarthria per this eval. Expressive/receptive language abilities appear intact @ simple level. Pt noted w/ mild immediate memory deficits, family reports memory deficits @ baseline since prior CVA. SLP will f/u for tx & dx tx as appropriate (04/29/24 1145)        Swallow Criteria  for Skilled Therapeutic Interventions Met: demonstrates skilled criteria (04/29/24 1145)  SLC Criteria for Skilled Therapy Interventions Met: yes (04/29/24 1145)  Anticipated Discharge Disposition (SLP): inpatient rehabilitation facility (04/29/24 1145)     Therapy Frequency (Swallow): 5 days per week (04/29/24 1145)  Therapy Frequency (SLP SLC): 3 days per week (04/29/24 1145)  Predicted Duration Therapy Intervention (Days): until discharge (04/29/24 1145)  Oral Care Recommendations: Oral Care BID/PRN, Suction toothbrush (04/29/24 1145)     Daily Summary of Progress (SLP): progress toward functional goals is good (04/29/24 1145)           Treatment Assessment (SLP): improved, dysarthria, no clinical signs of, aspiration (04/29/24 1145)     Plan for Continued Treatment (SLP): continue treatment per plan of care (04/29/24 1145)         SLP EVALUATION (Last 72 Hours)       SLP SLC Evaluation       Row Name 04/29/24 1145                   Communication Assessment/Intervention    Document Type therapy note (daily note)  -CH        Subjective Information no complaints  -CH        Patient Observations alert;cooperative;agree to therapy  -        Patient/Family/Caregiver Comments/Observations dtr present  -        Patient Effort good  -CH        Symptoms Noted During/After Treatment none  -CH           General Information    Patient Profile Reviewed yes  -           Pain    Additional Documentation Pain Scale: FACES Pre/Post-Treatment (Group)  -           Pain Scale: Numbers Pre/Post-Treatment    Pretreatment Pain Rating 0/10 - no pain  -CH        Posttreatment Pain Rating 0/10 - no pain  -           Pain Scale: FACES Pre/Post-Treatment    Pain: FACES Scale, Pretreatment 0-->no hurt  -CH        Posttreatment Pain Rating 0-->no hurt  -CH           SLP Evaluation Clinical Impressions    SLP Diagnosis mild;dysarthria  -CH        SLP Diagnosis Comments Pt presents w/ mild dysarthria per this eval.  Expressive/receptive language abilities appear intact @ simple level. Pt noted w/ mild immediate memory deficits, family reports memory deficits @ baseline since prior CVA. SLP will f/u for tx & dx tx as appropriate  -        Rehab Potential/Prognosis good  -        SLC Criteria for Skilled Therapy Interventions Met yes  -CH        Functional Impact functional impact in ADLs;difficulty in expressing complex messages  -           SLP Treatment Clinical Impressions    Treatment Assessment (SLP) improved;dysarthria;no clinical signs of;aspiration  -        Daily Summary of Progress (SLP) progress toward functional goals is good  -        Plan for Continued Treatment (SLP) continue treatment per plan of care  -CH        Care Plan Review evaluation/treatment results reviewed;care plan/treatment goals reviewed;risks/benefits reviewed  -        Care Plan Review, Other Participant(s) daughter  -           Recommendations    Therapy Frequency (SLP SLC) 3 days per week  -        Predicted Duration Therapy Intervention (Days) until discharge  -        Anticipated Discharge Disposition (SLP) inpatient rehabilitation facility  -                  User Key  (r) = Recorded By, (t) = Taken By, (c) = Cosigned By      Initials Name Effective Dates     Mleony Castañeda, MS CCC-SLP 06/16/21 -                        EDUCATION  The patient has been educated in the following areas:     Cognitive Impairment Communication Impairment.           SLP GOALS       Row Name 04/29/24 1145             (LTG) Patient will demonstrate functional swallow for    Diet Texture (Demonstrate functional swallow) regular textures  -      Liquid viscosity (Demonstrate functional swallow) thin liquids  -      [place] (Demonstrate functional swallow) independently (over 90% accuracy)  -      Time Frame (Demonstrate functional swallow) by discharge  -      Progress/Outcomes (Demonstrate functional swallow) continuing progress  toward goal  -CH      Comment (Demonstrate functional swallow) No s/s of aspiration with thin liquids by spoon or cup sips, cough x 1 with thin liquids by straw  -CH         (STG) Patient will tolerate trials of    Consistencies Trialed (Tolerate trials) soft to chew (whole) textures;nectar/ mildly thick liquids  -CH      Desired Outcome (Tolerate trials) without signs/symptoms of aspiration;without signs of distress  -CH      [place] (Tolerate trials) independently (over 90% accuracy)  -CH      Time Frame (Tolerate trials) 1 week  -CH      Progress/Outcomes (Tolerate trials) good progress toward goal  -CH      Comment (Tolerate trials) No s/s of aspiration  -CH         (STG) Lingual Strengthening Goal 1 (SLP)    Activity (Lingual Strengthening Goal 1, SLP) increase tongue back strength  -CH      Increase Tongue Back Strength lingual resistance exercises  -CH      [place]/Accuracy (Lingual Strengthening Goal 1, SLP) with minimal cues (75-90% accuracy)  -CH      Time Frame (Lingual Strengthening Goal 1, SLP) 1 week  -CH      Progress/Outcomes (Lingual Strengthening Goal 1, SLP) continuing progress toward goal  -CH      Comment (Lingual Strengthening Goal 1, SLP) completed all.  -CH         (STG) Pharyngeal Strengthening Exercise Goal 1 (SLP)    Activity (Pharyngeal Strengthening Goal 1, SLP) increase timing;increase anterior movement of the hyolaryngeal complex;increase closure at entrance to airway/closure of airway at glottis;increase squeeze/positive pressure generation  -CH      Increase Timing prepping - 3 second prep or suck swallow or 3-step swallow  -CH      Increase Anterior Movement of the Hyolaryngeal Complex falsetto;Mendelsohn  -CH      Increase Closure at Entrance to Airway/Closure of Airway at Glottis super-supraglottic swallow  -CH      Increase Squeeze/Positive Pressure Generation hard effortful swallow  -CH      [place]/Accuracy (Pharyngeal Strengthening Goal 1, SLP) with minimal cues  (75-90% accuracy)  -CH      Time Frame (Pharyngeal Strengthening Goal 1, SLP) 1 week  -CH      Progress/Outcomes (Pharyngeal Strengthening Goal 1, SLP) continuing progress toward goal  -CH         Patient will demonstrate functional speech skills for return to discharge environment    [place] Independently  -CH      Progress/Outcomes new goal  -CH         SLP Diagnostic Treatment     Patient will participate in further assessment in the following areas reading comprehension;graphic expression;cognitive-linguistic  -CH      Time Frame (Diagnostic) short term goal (STG)  -CH      Progress/Outcomes (Additional Goal 1, SLP) goal met  -CH         Phonation Goal 1 (SLP)    Improve Phonation By Goal 1 (SLP) using loud speech;80%;with minimal cues (75-90%)  -CH      Time Frame (Phonation Goal 1, SLP) short term goal (STG)  -CH      Progress/Outcomes (Phonation Goal 1, SLP) goal met  -CH         Articulation Goal 1 (SLP)    Improve Articulation Goal 1 (SLP) by over-articulating in connected speech;80%;with minimal cues (75-90%)  -CH      Time Frame (Articulation Goal 1, SLP) short term goal (STG)  -CH      Progress/Outcomes (Articulation Goal 1, SLP) goal no longer appropriate  -CH      Comment (Articulation Goal 1, SLP) dysarthria resolved. Baseline per dtr  -CH                User Key  (r) = Recorded By, (t) = Taken By, (c) = Cosigned By      Initials Name Provider Type    Melony Etienne MS CCC-SLP Speech and Language Pathologist                            Time Calculation:      Time Calculation- SLP       Row Name 04/29/24 1558             Time Calculation- SLP    SLP Start Time 1145  -CH      SLP Received On 04/29/24  -CH         Untimed Charges    44557-MO Treatment/ST Modification Prosth Aug Alter  30  -CH      70917-OE Treatment Swallow Minutes 40  -CH         Total Minutes    Untimed Charges Total Minutes 70  -CH       Total Minutes 70  -CH                User Key  (r) = Recorded By, (t) = Taken By, (c) =  Cosigned By      Initials Name Provider Type     Melony Castañeda MS CCC-SLP Speech and Language Pathologist                    Therapy Charges for Today       Code Description Service Date Service Provider Modifiers Qty    34176638713 HC ST TREATMENT SWALLOW 3 4/29/2024 Melony Castañeda MS CCC-SLP GN 1    [id number]  ST TREATMENT SPEECH 2 4/29/2024 Melony Castañeda MS CCC-SLP GN 1                       Melony Castañeda MS CCC-CARLA  4/29/2024

## 2024-04-29 NOTE — PROGRESS NOTES
"INTENSIVIST   PROGRESS NOTE     Hospital:  LOS: 5 days     Chief Complaint: Hemorrhagic stroke     Subjective   S     Interval History: No acute issues overnight.  Sitting upright in chair this morning at time of assessment.  Hemodynamically stable, though blood pressure slightly above desired goal.  He is alert and conversational.  Worked with PT/OT this morning and was unsteady, reported presyncope at time of assessment but denies presyncope, syncope, chest pain, palpitations, vision changes, headache, nausea, vomiting at time of personal evaluation.    The patient's relevant past medical, surgical and social history were reviewed and updated in Epic as appropriate.      Objective   O     Intake/Ouptut 24 hrs (7:00AM - 6:59 AM)  Intake & Output (last 3 days)         04/26 0701 04/27 0700 04/27 0701 04/28 0700 04/28 0701 04/29 0700 04/29 0701 04/30 0700    P.O. 800  620     I.V. (mL/kg) 2064.2 (15.8) 350.4 (2.7)      Total Intake(mL/kg) 2864.2 (21.9) 350.4 (2.7) 620 (4.7)     Urine (mL/kg/hr) 1700 (0.5) 1275 (0.4) 1450 (0.5)     Stool 0       Total Output 1700 1275 1450     Net +1164.2 -924.6 -830             Urine Unmeasured Occurrence 2 x 1 x      Stool Unmeasured Occurrence 2 x        Respiratory Support: Room air    Physical Examination:  Vital Signs: Blood pressure 155/69, pulse 81, temperature 97.9 °F (36.6 °C), temperature source Oral, resp. rate 22, height 175.3 cm (69\"), weight 131 kg (288 lb), SpO2 93%.    General: The patient appears in no acute distress. Alert, cooperative and interactive.  Chest: Clear to auscultation bilaterally, No wheezing, rhonchi, or rales. Normal work of breathing. Equal chest rise.  Cardiac: Regular rhythm, normal rate, S1S2 auscultated. No murmurs, rubs or gallops.   Extremities: Nonpitting lower extremity edema. No clubbing or cyanosis.  Skin: No rashes, open wounds, or bruising. Warm, dry, well-perfused.  Neuro: Drowsy but arousable.  Alert and oriented x3.  Mood stable.  " Motor power grossly intact bilaterally. Sensation intact. Speech fluent, understandable.    Lines, Drains & Airways       Active LDAs       Name Placement date Placement time Site Days    PICC Triple Lumen 04/24/24 Right Basilic 04/24/24  1245  Basilic  4        Results from last 7 days   Lab Units 04/29/24  0446 04/25/24  0811 04/24/24 2025   WBC 10*3/mm3 8.23 8.18 6.74   HEMOGLOBIN g/dL 14.0 14.6 14.9   MCV fL 87.7 86.2 86.4   PLATELETS 10*3/mm3 190 201 197     Results from last 7 days   Lab Units 04/29/24  0446 04/28/24  0233 04/27/24 2002 04/27/24  0910 04/26/24  0627 04/26/24  0006 04/25/24  0811 04/25/24  0035 04/24/24 2025   SODIUM mmol/L 138 139  --  139   < >  --   --   --  140   POTASSIUM mmol/L 3.3* 3.7 4.3 3.5   < >  --   --   --  3.7   CO2 mmol/L 25.0 22.0  --  24.0   < >  --   --   --  27.0   CREATININE mg/dL 0.93 0.83  --  0.75*   < >  --   --   --  0.94   GLUCOSE mg/dL 167* 167*  --  184*   < >  --   --   --  198*   MAGNESIUM mg/dL  --   --   --   --   --  2.1 2.3 1.7 1.7   PHOSPHORUS mg/dL  --   --   --   --   --   --  2.4*  --  1.9*    < > = values in this interval not displayed.     Estimated Creatinine Clearance: 96.3 mL/min (by C-G formula based on SCr of 0.93 mg/dL).  Results from last 7 days   Lab Units 04/27/24  0910   ALK PHOS U/L 87   BILIRUBIN mg/dL 1.2   ALT (SGPT) U/L 19   AST (SGOT) U/L 18     Images:  XR Chest 1 View    Result Date: 4/29/2024  Impression: Mild left basilar atelectasis. Electronically Signed: Tommie Coates MD  4/29/2024 1:16 AM EDT  Workstation ID: [id number]     Results: Reviewed.  - I reviewed the patient's new laboratory and imaging results.  - I independently reviewed the patient's new images.    Medications: Reviewed.    Assessment & Plan    A / P     Active Hospital Problems:  Active Hospital Problems    Diagnosis  POA    **SAH (subarachnoid hemorrhage) [I60.9]  Yes    Type 2 diabetes mellitus [E11.9]  Yes    Oral candidiasis [B37.0]  Unknown    Seizure [R56.9]   Clinically Undetermined    PAF (paroxysmal atrial fibrillation) [I48.0]  Yes    Hypothyroidism [E03.9]  Yes    Essential hypertension [I10]  Yes    Coronary artery disease involving native coronary artery of native heart without angina pectoris [I25.10]  Yes      Resolved Hospital Problems    Diagnosis Date Resolved POA    Hemorrhagic stroke [I61.9] 04/25/2024 Yes    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 [I61.3] 04/25/2024 Yes     Patient Hendrix is a 73M with past medical history significant for hypertension, dyslipidemia, diabetes mellitus, CAD, CVA (2011) and PAF (on coumadin). He presented to OSH with a severe headache on 4/24/2024 and subsequently transferred to [facility] ([place], [place]) after imaging consistent subarachnoid hemorrhage.  Anticoagulation was reversed with Kcentra and vitamin K.  Throughout hospitalization, he has continued to improve from a neurologic standpoint.     #Subarachnoid hemorrhage  #Seizure  -NS evaluated // no intervention   -Coumadin reversed with Kcentra and vitamin K on admission. Per stroke team will hold warfarin 8-12x weeks pending reabsorption of bleed. Restart ASA 1-2x weeks pending stability of bleed  -CTH with posterior fossa subarachnoid hemorrhage within the prepontine cistern toward the left cerebellopontine angle. Repeat imaging per stroke/neurology team. Tentatively planning on repeat CT head in 2x weeks  -MRI/MRA of the brain with no evidence of aneurysm, therefore this is presumed idiopathic focal subarachnoid hemorrhage from anticoagulation  -Ongoing visual symptoms. Alternating eye patch during hospitalization. Will follow-up with neuro-ophthalmology following DC  -Continue Keppra      #Coronary artery disease  #Paroxysmal atrial fibrillation  -Patient follows with cardiology as an outpatient and has been during hospitalization as well. Per documentation considering left atrial appendage occlusion/watchman; 8-12x weeks following DC  (pending clearance of intraprocedural anticoagulation)  -Currently in sinus rhythm  -2019 left heart catheterization, normal left main, 30% mid LAD, 40% distal circumflex, 40% mid RCA, 50% distal RCA with LVEDP of 18  -ECHO with EF 66% with moderate LVH and aortic valve mean pressure gradient of 15 mmHg  -ASA on hold as above   -Continue statin  -Fourteen day heart monitor at hospital discharge      #Diabetes, Type II   -A1c 6.8 3x months ago. 5.8 this admission   -Ozempic started January 2024  -Sliding scale insulin, low-dose. Assessing/adjusting daily as needed      #HTN   -Continue losartan, hydralazine, amlodipine and Coreg     #Hypothyroid  -Continue Synthroid     #Oral candidiasis  -Seen on his fees assessment  -Diflucan 100 mg x 5 days    PT/OT following. Will likely need outpatient rehab    F-PO (per speech recommendations)   A-NA  S-NA  T-SQH  H-Head of bed greater than 30 degrees  U-NA  G-FSBS per unit protocol, correction dose insulin  S-NA  B-Will monitor daily and provide regimen if indicated  I-PICC (4/24)  D-NA    Advance Directives:   Code Status and Medical Interventions:   Ordered at: 04/24/24 1058     Code Status (Patient has no pulse and is not breathing):    CPR (Attempt to Resuscitate)     Medical Interventions (Patient has pulse or is breathing):    Full Support     High level of risk due to severe exacerbation of chronic illness and illness with threat to life or bodily function.    I conducted multidisciplinary rounds in the plan of care was discussed with the multidisciplinary team at that time. In attendance at multidisciplinary rounds was clinical pharmacist, dietitian, nursing staff and case management.    I discussed the patient's findings and my recommendations with patient, nursing staff, and consulting provider.     -- Saran Smith MD  Pulmonary/Critical Care

## 2024-04-29 NOTE — PLAN OF CARE
Goal Outcome Evaluation:  Plan of Care Reviewed With: patient           Outcome Evaluation: Pt conts to require significantly increased assist for all ADL performance from baseline w/ noted deficits in balance and functional endurance warranting cont skilled IPOT POC to promote return to PLOF. Recommend pt DC to IP rehab.      Anticipated Discharge Disposition (OT): inpatient rehabilitation facility

## 2024-04-29 NOTE — THERAPY TREATMENT NOTE
Patient Name: Camron Hendrix  : 1950    MRN: [id number]                              Today's Date: 2024       Admit Date: 2024    Visit Dx:     ICD-10-CM ICD-9-CM   1. Dysarthria  R47.1 784.51   2. Oropharyngeal dysphagia  R13.12 787.22   3. 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    SAH (subarachnoid hemorrhage)    Type 2 diabetes mellitus    Oral candidiasis    Seizure     Past Medical History:   Diagnosis Date    AAA (abdominal aortic aneurysm)     Coronary artery disease     CVA (cerebral vascular accident)     Hypertension     Hypothyroidism     PAF (paroxysmal atrial fibrillation)     TIA (transient ischemic attack)      Past Surgical History:   Procedure Laterality Date    CARDIAC CATHETERIZATION N/A 3/29/2019    Procedure: Left Heart Cath;  Surgeon: Andrea Holloway MD;  Location:  [facility];  Service: Cardiovascular    CERVICAL FUSION      c4-c5    CORONARY ANGIOPLASTY WITH STENT PLACEMENT      HERNIA REPAIR      TONSILECTOMY, ADENOIDECTOMY, BILATERAL MYRINGOTOMY AND TUBES        General Information       Row Name 24 1200          Physical Therapy Time and Intention    Document Type therapy note (daily note)  -     Mode of Treatment physical therapy  -       Row Name 24 1200          General Information    Patient Profile Reviewed yes  -     Existing Precautions/Restrictions fall;other (see  comments)  diplopia, L eye visual tracking deficits; patch for mobility  -     Barriers to Rehab medically complex;visual deficit  -       Row Name 04/29/24 1200          Cognition    Orientation Status (Cognition) oriented x 4  -[facility] Name 04/29/24 1200          Safety Issues, Functional Mobility    Safety Issues Affecting Function (Mobility) insight into deficits/self-awareness;safety precaution awareness;safety precautions follow-through/compliance;sequencing abilities;judgment  -     Impairments Affecting Function (Mobility) balance;endurance/activity tolerance;visual/perceptual;strength;postural/trunk control  -               User Key  (r) = Recorded By, (t) = Taken By, (c) = Cosigned By      Initials Name Provider Type     Georgina Lucas PT Physical Therapist                   Mobility       [facility] Name 04/29/24 1201          Bed Mobility    Comment, (Bed Mobility) UIC w/ OT. Sling donned on L UE for comfort  -[facility] Name 04/29/24 1201          Transfers    Comment, (Transfers) VCs for sequencing. Denied dizziness in standing  -[facility] Name 04/29/24 1201          Sit-Stand Transfer    Sit-Stand [place] (Transfers) minimum assist (75% patient effort);1 person assist;verbal cues  -     Assistive Device (Sit-Stand Transfers) other (see comments)  UE support  -     Comment, (Sit-Stand Transfer) STS from chair  -[facility] Name 04/29/24 1201          Gait/Stairs (Locomotion)    [place] Level (Gait) minimum assist (75% patient effort);moderate assist (50% patient effort);1 person assist;verbal cues;1 person to manage equipment  -     Assistive Device (Gait) other (see comments)  UE support  -     Patient was able to Ambulate yes  -     Distance in Feet (Gait) 15  -LH     Deviations/Abnormal Patterns (Gait) bilateral deviations;triston decreased;gait speed decreased;stride length decreased  -     Bilateral Gait Deviations forward flexed posture;heel strike decreased   -     Comment, (Gait/Stairs) Pt w/ L eye patch donned for mobility. Pt demo step through gait pattern w/ decreased speed and increased forward flexed posture. VCs for upright posture and sequencing. Pt became lightheaded and required modA to steady and for WC to be brought up from behind. BP stable at 175/82. RN notified. Further distance limited by lightheadedness  -               User Key  (r) = Recorded By, (t) = Taken By, (c) = Cosigned By      Initials Name Provider Type     Georgina Lucas PT Physical Therapist                   Obj/Interventions       [facility] Name 04/29/24 1206          Balance    Balance Assessment sitting static balance;sitting dynamic balance;sit to stand dynamic balance;standing static balance;standing dynamic balance  -     Static Sitting Balance standby assist  -     Dynamic Sitting Balance standby assist  -     Position, Sitting Balance unsupported;sitting in chair  -     Sit to Stand Dynamic Balance minimal assist  -     Static Standing Balance contact guard  -     Dynamic Standing Balance minimal assist;moderate assist;verbal cues  -     Position/Device Used, Standing Balance supported;other (see comments)  UE support on tele  -     Balance Interventions sitting;standing;sit to stand;static;supported;dynamic  -     Comment, Balance modA to steady during ambulation d/t lightheadedness  -               User Key  (r) = Recorded By, (t) = Taken By, (c) = Cosigned By      Initials Name Provider Type     Georgina Lucas PT Physical Therapist                   Goals/Plan    No documentation.                  Clinical Impression       Row Name 04/29/24 1207          Pain    Additional Documentation Pain Scale: FACES Pre/Post-Treatment (Group)  -LH       Row Name 04/29/24 1207          Pain Scale: FACES Pre/Post-Treatment    Pain: FACES Scale, Pretreatment 2-->hurts little bit  -     Posttreatment Pain Rating 4-->hurts little more  -     Pain Location -  Side/Orientation Left  -     Pain Location generalized  -     Pain Location - shoulder  -LH       Row Name 04/29/24 1207          Plan of Care Review    Plan of Care Reviewed With patient  -     Progress no change  -     Outcome Evaluation Pt continues to present below baseline function d/t dizziness and deficits in strength, balance, and activity tolerance. Pt ambulated 15 ft w/ R UE support, Howard x1 that regressed to modA x1 d/t lightheadedness and required WC to be brought up from behind. BP stable in sitting w/ RN notified. Pt would cont to benefit from skilled IP PT. Cont to rec IRF at d/c.  -       Row Name 04/29/24 1207          Vital Signs    Pre Systolic BP Rehab 171  -LH     Pre Treatment Diastolic BP 82  -LH     Intra Systolic BP Rehab 175  -LH     Intra Treatment Diastolic BP 82  -LH     Post Systolic BP Rehab 155  -LH     Post Treatment Diastolic BP 69  -LH     Pretreatment Heart Rate (beats/min) 77  -LH     Posttreatment Heart Rate (beats/min) 81  -LH     Pre SpO2 (%) 93  -LH     O2 Delivery Pre Treatment room air  -     O2 Delivery Intra Treatment room air  -     Post SpO2 (%) 94  -LH     O2 Delivery Post Treatment room air  -     Pre Patient Position Sitting  -     Intra Patient Position Standing  -     Post Patient Position Sitting  -       Row Name 04/29/24 1207          Positioning and Restraints    Pre-Treatment Position sitting in chair/recliner  -     Post Treatment Position wheelchair  -     In Wheelchair notified nsg;sitting;call light within reach;encouraged to call for assist;exit alarm on;legs elevated;with brace  -               User Key  (r) = Recorded By, (t) = Taken By, (c) = Cosigned By      Initials Name Provider Type     Georgina Lucas, PT Physical Therapist                   Outcome Measures       Row Name 04/29/24 1211          How much help from another person do you currently need...    Turning from your back to your side while in flat bed without  using bedrails? 3  -LH     Moving from lying on back to sitting on the side of a flat bed without bedrails? 3  -LH     Moving to and from a bed to a chair (including a wheelchair)? 3  -LH     Standing up from a chair using your arms (e.g., wheelchair, bedside chair)? 3  -LH     Climbing 3-5 steps with a railing? 2  -LH     To walk in hospital room? 3  -LH     AM-PAC 6 Clicks Score (PT) 17  -     Highest Level of Mobility Goal 5 --> Static standing  -       Row Name 04/29/24 1211          Functional Assessment    Outcome Measure Options AM-PAC 6 Clicks Basic Mobility (PT)  -               User Key  (r) = Recorded By, (t) = Taken By, (c) = Cosigned By      Initials Name Provider Type     Georgina Lucas, PT Physical Therapist                                 Physical Therapy Education       Title: PT OT SLP Therapies (In Progress)       Topic: Physical Therapy (In Progress)       Point: Mobility training (In Progress)       Learning Progress Summary             Patient Acceptance, E, NR by  at 4/29/2024 1212    Acceptance, E,TB, NR by  at 4/28/2024 1113    Acceptance, E, VU,NR by  at 4/25/2024 1200                         Point: Home exercise program (In Progress)       Learning Progress Summary             Patient Acceptance, E,TB, NR by  at 4/28/2024 1113                         Point: Body mechanics (In Progress)       Learning Progress Summary             Patient Acceptance, E, NR by  at 4/29/2024 1212    Acceptance, E,TB, NR by  at 4/28/2024 1113    Acceptance, E, VU,NR by  at 4/25/2024 1200                         Point: Precautions (In Progress)       Learning Progress Summary             Patient Acceptance, E, NR by  at 4/29/2024 1212    Acceptance, E,TB, NR by  at 4/28/2024 1113    Acceptance, E, VU,NR by  at 4/25/2024 1200                                         User Key       Initials Effective Dates Name Provider Type [facility] 11/10/20 -  Ana Lieberman, PT Physical  Therapist PT     09/21/23 -  Georgina Lucas, PT Physical Therapist PT                  PT Recommendation and Plan  Planned Therapy Interventions (PT): balance training, bed mobility training, gait training, home exercise program, neuromuscular re-education, patient/family education, postural re-education, ROM (range of motion), strengthening, stretching, transfer training  Plan of Care Reviewed With: patient  Progress: no change  Outcome Evaluation: Pt continues to present below baseline function d/t dizziness and deficits in strength, balance, and activity tolerance. Pt ambulated 15 ft w/ R UE support, Howard x1 that regressed to modA x1 d/t lightheadedness and required WC to be brought up from behind. BP stable in sitting w/ RN notified. Pt would cont to benefit from skilled IP PT. Cont to rec IRF at d/c.     Time Calculation:   PT Evaluation Complexity  History, PT Evaluation Complexity: 3 or more personal factors and/or comorbidities  Examination of Body Systems (PT Eval Complexity): total of 4 or more elements  Clinical Presentation (PT Evaluation Complexity): evolving  Clinical Decision Making (PT Evaluation Complexity): moderate complexity  Overall Complexity (PT Evaluation Complexity): moderate complexity     PT Charges       Row Name 04/29/24 1212             Time Calculation    Start Time 0932  -      PT Received On 04/29/24  -      PT Goal Re-Cert Due Date 05/05/24  -         Timed Charges    36914 - Gait Training Minutes  10  -LH         Total Minutes    Timed Charges Total Minutes 10  -       Total Minutes 10  -LH                User Key  (r) = Recorded By, (t) = Taken By, (c) = Cosigned By      Initials Name Provider Type     Georgina Lucas, PT Physical Therapist                  Therapy Charges for Today       Code Description Service Date Service Provider Modifiers Qty    05998724548 HC GAIT TRAINING EA 15 MIN 4/29/2024 Georgina Lucas, PT GP 1            PT G-Codes  Outcome Measure  Options: AM-PAC 6 Clicks Basic Mobility (PT)  AM-PAC 6 Clicks Score (PT): 17  AM-PAC 6 Clicks Score (OT): 14  Modified Rachel Scale: 3 - Moderate disability.  Requiring some help, but able to walk without assistance.  PT Discharge Summary  Anticipated Discharge Disposition (PT): inpatient rehabilitation facility    Georgina Lucas, PT  4/29/2024

## 2024-04-29 NOTE — CASE MANAGEMENT/SOCIAL WORK
Continued Stay Note  [facility]     Patient Name: Camron Hendrix  MRN: [id number]  Today's Date: 4/29/2024    Admit Date: 4/24/2024    Plan: [facility] Inpatient Acute Rehab   Discharge Plan       Row Name 04/29/24 1129       Plan    Plan [facility]patient [facility] Rehab    Patient/Family in Agreement with Plan yes    Plan Comments I spoke with the patient at the bedside and April from  at the bedside. Patient still wants to go to  at discharge. April can have a bed for the patient tomorrow. I discussed this with the MD. He does not think the patient will be ready by tomorrow. He would like to plan for the patient to go to  on Wednesday. CM updated April. Daughter at bedside stated that she or her sister can transport the patient when he is ready to leave the hospital. CM following.    Final Discharge Disposition Code 62 - inpatient rehab facility                   Discharge Codes    No documentation.                 Expected Discharge Date and Time       Expected Discharge Date Expected Discharge Time    Apr 29, 2024               Nkechi Meyers RN

## 2024-04-29 NOTE — PLAN OF CARE
Goal Outcome Evaluation:  Plan of Care Reviewed With: patient        Progress: no change  Outcome Evaluation: Pt continues to present below baseline function d/t dizziness and deficits in strength, balance, and activity tolerance. Pt ambulated 15 ft w/ R UE support, Howard x1 that regressed to modA x1 d/t lightheadedness and required WC to be brought up from behind. BP stable in sitting w/ RN notified. Pt would cont to benefit from skilled IP PT. Cont to rec IRF at d/c.      Anticipated Discharge Disposition (PT): inpatient rehabilitation facility

## 2024-04-30 ENCOUNTER — ANCILLARY PROCEDURE (OUTPATIENT)
Dept: SPEECH THERAPY | Facility: HOSPITAL | Age: 74
End: 2024-04-30
Payer: MEDICARE

## 2024-04-30 LAB
GLUCOSE BLDC GLUCOMTR-MCNC: 147 MG/DL (ref 70–130)
GLUCOSE BLDC GLUCOMTR-MCNC: 158 MG/DL (ref 70–130)
GLUCOSE BLDC GLUCOMTR-MCNC: 159 MG/DL (ref 70–130)
GLUCOSE BLDC GLUCOMTR-MCNC: 197 MG/DL (ref 70–130)
GLUCOSE BLDC GLUCOMTR-MCNC: 93 MG/DL (ref 70–130)

## 2024-04-30 PROCEDURE — 94664 DEMO&/EVAL PT USE INHALER: CPT

## 2024-04-30 PROCEDURE — 97116 GAIT TRAINING THERAPY: CPT

## 2024-04-30 PROCEDURE — 25010000002 HEPARIN (PORCINE) PER 1000 UNITS: Performed by: INTERNAL MEDICINE

## 2024-04-30 PROCEDURE — 82948 REAGENT STRIP/BLOOD GLUCOSE: CPT

## 2024-04-30 PROCEDURE — 92612 ENDOSCOPY SWALLOW (FEES) VID: CPT

## 2024-04-30 PROCEDURE — 94799 UNLISTED PULMONARY SVC/PX: CPT

## 2024-04-30 PROCEDURE — 99232 SBSQ HOSP IP/OBS MODERATE 35: CPT | Performed by: HOSPITALIST

## 2024-04-30 PROCEDURE — 97110 THERAPEUTIC EXERCISES: CPT

## 2024-04-30 RX ORDER — HYDRALAZINE HYDROCHLORIDE 50 MG/1
100 TABLET, FILM COATED ORAL EVERY 8 HOURS SCHEDULED
Status: DISCONTINUED | OUTPATIENT
Start: 2024-04-30 | End: 2024-05-02 | Stop reason: HOSPADM

## 2024-04-30 RX ADMIN — FLUCONAZOLE 100 MG: 200 TABLET ORAL at 09:00

## 2024-04-30 RX ADMIN — HEPARIN SODIUM 5000 UNITS: 5000 INJECTION INTRAVENOUS; SUBCUTANEOUS at 05:24

## 2024-04-30 RX ADMIN — HEPARIN SODIUM 5000 UNITS: 5000 INJECTION INTRAVENOUS; SUBCUTANEOUS at 20:36

## 2024-04-30 RX ADMIN — IPRATROPIUM BROMIDE AND ALBUTEROL SULFATE 3 ML: 2.5; .5 SOLUTION RESPIRATORY (INHALATION) at 20:57

## 2024-04-30 RX ADMIN — CETIRIZINE HYDROCHLORIDE 10 MG: 10 TABLET, FILM COATED ORAL at 09:00

## 2024-04-30 RX ADMIN — HEPARIN SODIUM 5000 UNITS: 5000 INJECTION INTRAVENOUS; SUBCUTANEOUS at 13:09

## 2024-04-30 RX ADMIN — IPRATROPIUM BROMIDE AND ALBUTEROL SULFATE 3 ML: 2.5; .5 SOLUTION RESPIRATORY (INHALATION) at 08:37

## 2024-04-30 RX ADMIN — Medication 10 ML: at 09:01

## 2024-04-30 RX ADMIN — HYDRALAZINE HYDROCHLORIDE 100 MG: 50 TABLET ORAL at 13:09

## 2024-04-30 RX ADMIN — AMLODIPINE BESYLATE 10 MG: 10 TABLET ORAL at 09:01

## 2024-04-30 RX ADMIN — HYDRALAZINE HYDROCHLORIDE 75 MG: 50 TABLET ORAL at 05:24

## 2024-04-30 RX ADMIN — LEVETIRACETAM 1000 MG: 500 TABLET, FILM COATED ORAL at 09:00

## 2024-04-30 RX ADMIN — CARVEDILOL 25 MG: 12.5 TABLET, FILM COATED ORAL at 09:00

## 2024-04-30 RX ADMIN — IPRATROPIUM BROMIDE AND ALBUTEROL SULFATE 3 ML: 2.5; .5 SOLUTION RESPIRATORY (INHALATION) at 01:28

## 2024-04-30 RX ADMIN — LEVOTHYROXINE SODIUM 200 MCG: 0.1 TABLET ORAL at 05:24

## 2024-04-30 RX ADMIN — LOSARTAN POTASSIUM 100 MG: 50 TABLET, FILM COATED ORAL at 09:00

## 2024-04-30 RX ADMIN — IPRATROPIUM BROMIDE AND ALBUTEROL SULFATE 3 ML: 2.5; .5 SOLUTION RESPIRATORY (INHALATION) at 12:19

## 2024-04-30 NOTE — PLAN OF CARE
Goal Outcome Evaluation:  Plan of Care Reviewed With: patient                  Anticipated Discharge Disposition (SLP): inpatient rehabilitation facility          SLP Swallowing Diagnosis: severe, pharyngeal dysphagia (04/30/24 3116)

## 2024-04-30 NOTE — PROGRESS NOTES
[facility] Medicine Services  PROGRESS NOTE    Patient Name: Camron Hendrix  : 1950  MRN: [id number]    Date of Admission: 2024  Primary Care Physician: Leslie Rhodes MD    Subjective   Subjective     CC: SAH    HPI: No issues. Up in chair. Denies dyspnea. No palpitations. Continued double vision.       Objective   Objective     Vital Signs:   Temp:  [97.7 °F (36.5 °C)-99 °F (37.2 °C)] 98.3 °F (36.8 °C)  Heart Rate:  [69-84] 77  Resp:  [17-24] 20  BP: (148-169)/(65-90) 161/65     Physical Exam:  NAD, alert and oriented  OP clear, dry MM  L eye patch in place  RRR  CTAB  +BS, soft, obese  DELUNA  Speech clear    Results Reviewed:  LAB RESULTS:      Lab 24  0811 24   WBC 8.23 8.18 6.74   HEMOGLOBIN 14.0 14.6 14.9   HEMATOCRIT 41.3 43.1 43.7   PLATELETS 190 201 197   NEUTROS ABS 5.93  --  4.80   IMMATURE GRANS (ABS) 0.03  --  0.02   LYMPHS ABS 1.35  --  1.46   MONOS ABS 0.85  --  0.41   EOS ABS 0.04  --  0.03   MCV 87.7 86.2 86.4   PROTIME  --  14.8*  --          Lab 24  2149 246 24  0233 24  0910 24  0627 24  0006 24  0811 24  0035 24   SODIUM  --  138 139  --  139 141  --   --   --  140   POTASSIUM 3.9 3.3* 3.7 4.3 3.5 3.8  --   --   --  3.7   CHLORIDE  --  103 108*  --  107 106  --   --   --  106   CO2  --  25.0 22.0  --  24.0 24.0  --   --   --  27.0   ANION GAP  --  10.0 9.0  --  8.0 11.0  --   --   --  7.0   BUN  --  14 11  --  9 7*  --   --   --  9   CREATININE  --  0.93 0.83  --  0.75* 0.77  --   --   --  0.94   EGFR  --  87.2 93.0  --  95.9 95.1  --   --   --  86.1   GLUCOSE  --  167* 167*  --  184* 153*  --   --   --  198*   CALCIUM  --  8.3* 8.2*  --  8.0* 7.9*  --   --   --  8.0*   MAGNESIUM  --   --   --   --   --   --  2.1 2.3 1.7 1.7   PHOSPHORUS  --   --   --   --   --   --   --  2.4*  --  1.9*   HEMOGLOBIN A1C  --   --   --   --   --   --   --  5.80*   --   --    TSH  --   --   --   --   --   --   --   --   --  1.770         Lab 04/27/24  0910   TOTAL PROTEIN 5.3*   ALBUMIN 3.4*   GLOBULIN 1.9   ALT (SGPT) 19   AST (SGOT) 18   BILIRUBIN 1.2   ALK PHOS 87         Lab 04/25/24  0811   PROTIME 14.8*   INR 1.15*         Lab 04/25/24  0811   CHOLESTEROL 94   LDL CHOL 42   HDL CHOL 33*   TRIGLYCERIDES 100             Brief Urine Lab Results  (Last result in the past 365 days)        Color   Clarity   Blood   Leuk Est   Nitrite   Protein   CREAT   Urine HCG        07/19/23 1700 Yellow   Clear   Negative   Negative   Negative   Negative                   Microbiology Results Abnormal       None            XR Chest 1 View    Result Date: 4/29/2024  XR CHEST 1 VW Date of Exam: 4/29/2024 12:58 AM EDT Indication: SOA/wheezing Comparison: Chest radiograph dated April 24, 2024 Findings: There is a right-sided peripherally inserted catheter with the tip in the super vena cava. The heart is enlarged. The pulmonary vascular markings are normal. There is discoid airspace disease in the left base which could be atelectasis.     Impression: Impression: Mild left basilar atelectasis. Electronically Signed: Tommie Coates MD  4/29/2024 1:16 AM EDT  Workstation ID: [id number]    Duplex Venous Upper Extremity - Left CAR    Result Date: 4/28/2024    The left upper extremity venous duplex scan is negative for evidence of a DVT and SVT.      Results for orders placed during the hospital encounter of 04/24/24    Adult Transthoracic Echo Complete W/ Cont if Necessary Per Protocol (With Agitated Saline)    Interpretation Summary    Left ventricular systolic function is normal. Calculated left ventricular EF = 65.8% Left ventricular ejection fraction appears to be 66 - 70%.    Left ventricular wall thickness is consistent with mild to moderate concentric hypertrophy.    Left ventricular diastolic function is consistent with (grade I) impaired relaxation.    Saline test results are negative for  right to left atrial level shunt.    Peak velocity of the flow distal to the aortic valve is 263 cm/s. Aortic valve mean pressure gradient is 15 mmHg.    Ascending aorta = 4.4 cm      Current medications:  Scheduled Meds:amLODIPine, 10 mg, Oral, Q24H  atorvastatin, 40 mg, Oral, Nightly  carvedilol, 25 mg, Oral, Q12H  cetirizine, 10 mg, Oral, Daily  fluconazole, 100 mg, Oral, Q24H  heparin (porcine), 5,000 Units, Subcutaneous, Q8H  hydrALAZINE, 100 mg, Oral, Q8H  levETIRAcetam, 1,000 mg, Oral, Q12H  levothyroxine, 200 mcg, Oral, Q AM  losartan, 100 mg, Oral, Q24H  mirtazapine, 7.5 mg, Oral, Nightly  sodium chloride, 10 mL, Intravenous, Q12H      Continuous Infusions:   PRN Meds:.  acetaminophen    Calcium Replacement - Follow Nurse / BPA Driven Protocol    ipratropium-albuterol    Magnesium Cardiology Dose Replacement - Follow Nurse / BPA Driven Protocol    Phosphorus Replacement - Follow Nurse / BPA Driven Protocol    Potassium Replacement - Follow Nurse / BPA Driven Protocol    sodium chloride    sodium chloride    sodium chloride    Assessment & Plan   Assessment & Plan     Active Hospital Problems    Diagnosis  POA    **SAH (subarachnoid hemorrhage) [I60.9]  Yes    Type 2 diabetes mellitus [E11.9]  Yes    Oral candidiasis [B37.0]  Unknown    Seizure [R56.9]  Clinically Undetermined    PAF (paroxysmal atrial fibrillation) [I48.0]  Yes    Hypothyroidism [E03.9]  Yes    Essential hypertension [I10]  Yes    Coronary artery disease involving native coronary artery of native heart without angina pectoris [I25.10]  Yes      Resolved Hospital Problems    Diagnosis Date Resolved POA    Hemorrhagic stroke [I61.9] 04/25/2024 Yes    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 He presented to [facility]  with a severe headache on 4/24/2024 and subsequently transferred to [facility] ([place], [place]) after imaging consistent subarachnoid hemorrhage.  Anticoagulation was reversed with Kcentra and vitamin K.  Throughout hospitalization, he has continued to improve from a neurologic standpoint.     SAH  Seizure  -coumadin reversed with Kcentra/Vitamin K, restart ASA only 1-2 weeks, pending stability  -repeat imaging ~ 2 weeks   -MRI/MRA without aneurysm, stroke team indicating idiopathic focal SAH  -ongoing visual symptoms, with need neuro-ophthalmology following DC  -Keppra    Hx of CAD  PAF  -cardiology following, will evaluate in 8-12 weeks for consideration of STEFANO occlusion  -[facility] 2019 with 30% mid LAD, 40% distal circumflex, 40% mid RCA  -ECHO with normal EF  -ASA on hold, resume at 1-2 weeks per neurology  -statin  -outpatient cardiac monitoring at DC    DM  -SSI    HTN  -on norvasc/losartan/coreg/hydralazine    Oral candidiasis  -diflucan    Hypothyroidism  Expected Discharge Location and Transportation: Rehab  Expected Discharge   Expected Discharge Date: 5/1/2024; Expected Discharge Time:      DVT prophylaxis:  Medical and mechanical DVT prophylaxis orders are present.         AM-PAC 6 Clicks Score (PT): 17 (04/29/24 1211)    CODE STATUS:   Code Status and Medical Interventions:   Ordered at: 04/24/24 1058     Code Status (Patient has no pulse and is not breathing):    CPR (Attempt to Resuscitate)     Medical Interventions (Patient has pulse or is breathing):    Full Support       Live Nye MD  04/30/24

## 2024-04-30 NOTE — THERAPY TREATMENT NOTE
Patient Name: Camron Hendrix  : 1950    MRN: [id number]                              Today's Date: 2024       Admit Date: 2024    Visit Dx:     ICD-10-CM ICD-9-CM   1. Dysarthria  R47.1 784.51   2. Oropharyngeal dysphagia  R13.12 787.22   3. 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    SAH (subarachnoid hemorrhage)    Type 2 diabetes mellitus    Oral candidiasis    Seizure     Past Medical History:   Diagnosis Date    AAA (abdominal aortic aneurysm)     Coronary artery disease     CVA (cerebral vascular accident)     Hypertension     Hypothyroidism     PAF (paroxysmal atrial fibrillation)     TIA (transient ischemic attack)      Past Surgical History:   Procedure Laterality Date    CARDIAC CATHETERIZATION N/A 3/29/2019    Procedure: Left Heart Cath;  Surgeon: Andrea Holloway MD;  Location: [facility] CATH INVASIVE LOCATION;  Service: Cardiovascular    CERVICAL FUSION      c4-c5    CORONARY ANGIOPLASTY WITH STENT PLACEMENT      HERNIA REPAIR      TONSILECTOMY, ADENOIDECTOMY, BILATERAL MYRINGOTOMY AND TUBES        General Information       Row Name 24 1453          Physical Therapy Time and Intention    Document Type therapy note (daily note)  -SS     Mode of Treatment physical therapy  -SS       Row Name 24 1453          General Information    Patient Profile Reviewed yes  -SS     Existing Precautions/Restrictions fall;other (see  comments)  diplopia, L eye visual tracking deficits; patch for mobility, LUE sling for comfort w/ mobility  -     Barriers to Rehab medically complex;visual deficit  -       Row Name 04/30/24 1453          Cognition    Orientation Status (Cognition) oriented x 3  -SS       Row Name 04/30/24 1453          Safety Issues, Functional Mobility    Safety Issues Affecting Function (Mobility) awareness of need for assistance;insight into deficits/self-awareness;judgment;positioning of assistive device;problem-solving;safety precaution awareness;sequencing abilities;safety precautions follow-through/compliance  -     Impairments Affecting Function (Mobility) balance;endurance/activity tolerance;visual/perceptual;strength;postural/trunk control  -               User Key  (r) = Recorded By, (t) = Taken By, (c) = Cosigned By      Initials Name Provider Type     Reema Croft, PT Physical Therapist                   Mobility       Row Name 04/30/24 1455          Bed Mobility    Comment, (Bed Mobility) up in chair  -       Row Name 04/30/24 1455          Sit-Stand Transfer    Sit-Stand [place] (Transfers) verbal cues;moderate assist (50% patient effort);minimum assist (75% patient effort)  progressed w/trials  -     Assistive Device (Sit-Stand Transfers) walker, front-wheeled  -     Comment, (Sit-Stand Transfer) VC for hand placement, appropriate alignment, lowering with eccentric control  -       Row Name 04/30/24 1455          Gait/Stairs (Locomotion)    [place] Level (Gait) minimum assist (75% patient effort);verbal cues;1 person assist;1 person to manage equipment  -     Assistive Device (Gait) walker, front-wheeled  -     Patient was able to Ambulate yes  -SS     Distance in Feet (Gait) 50  -SS     Deviations/Abnormal Patterns (Gait) bilateral deviations;triston decreased;gait speed decreased;stride length decreased;base of support, wide;weight shifting decreased  -     Bilateral Gait  Deviations forward flexed posture;heel strike decreased  -     Comment, (Gait/Stairs) Pt. ambulated with a step through gait pattern w/eye patch donned on L eye. Pt. declined use of sling. VC for upright posture, decreased WB through BUE. Activity limited by fatigue. Close chair follow.  -               User Key  (r) = Recorded By, (t) = Taken By, (c) = Cosigned By      Initials Name Provider Type     Reema Croft PT Physical Therapist                   Obj/Interventions       Row Name 04/30/24 1459          Motor Skills    Therapeutic Exercise hip;knee;ankle  -       Row Name 04/30/24 1459          Hip (Therapeutic Exercise)    Hip (Therapeutic Exercise) isometric exercises;strengthening exercise  -     Hip Isometrics (Therapeutic Exercise) bilateral;gluteal sets;10 repetitions  -     Hip Strengthening (Therapeutic Exercise) bilateral;aBduction;aDduction;heel slides;marching while seated;10 repetitions  -       Row Name 04/30/24 1459          Knee (Therapeutic Exercise)    Knee (Therapeutic Exercise) isometric exercises;strengthening exercise  -     Knee Isometrics (Therapeutic Exercise) bilateral;quad sets;10 repetitions  -     Knee Strengthening (Therapeutic Exercise) bilateral;SLR (straight leg raise);LAQ (long arc quad);10 repetitions  -       Row Name 04/30/24 1459          Ankle (Therapeutic Exercise)    Ankle (Therapeutic Exercise) AROM (active range of motion)  -     Ankle AROM (Therapeutic Exercise) bilateral;dorsiflexion;plantarflexion;10 repetitions  -       Row Name 04/30/24 1459          Balance    Balance Assessment sitting static balance;sitting dynamic balance;sit to stand dynamic balance;standing static balance;standing dynamic balance  -     Static Sitting Balance standby assist  -     Dynamic Sitting Balance contact guard  -     Position, Sitting Balance unsupported;sitting in chair  -     Sit to Stand Dynamic Balance moderate assist;minimal assist  -      Static Standing Balance minimal assist  -SS     Dynamic Standing Balance minimal assist  -     Position/Device Used, Standing Balance supported;walker, front-wheeled  -     Balance Interventions sitting;standing;sit to stand;supported;static;dynamic  -               User Key  (r) = Recorded By, (t) = Taken By, (c) = Cosigned By      Initials Name Provider Type     Reema Croft, PT Physical Therapist                   Goals/Plan    No documentation.                  Clinical Impression       Row Name 04/30/24 1505          Pain    Pretreatment Pain Rating 0/10 - no pain  -SS     Posttreatment Pain Rating 0/10 - no pain  -SS     Pre/Posttreatment Pain Comment some pain and soreness w/coughing  -     Pain Intervention(s) Ambulation/increased activity;Repositioned  -     Additional Documentation Pain Scale: Numbers Pre/Post-Treatment (Group)  -       Row Name 04/30/24 1509          Plan of Care Review    Plan of Care Reviewed With patient  -SS     Progress improving  -     Outcome Evaluation Pt. continues to present below baseline function w/generalized weakness, visual impairments and decreased functional endurance affecting his ability to safely participate in functional mobility. He performed transfers and ambulated 50' w/front wheeled walker, min assist of 1 + close chair follow. Activity limited by fatigue. Pt. tolerated ther-ex well. Continue IPPT POC to progress as tolerated.  -       Row Name 04/30/24 1505          Therapy Assessment/Plan (PT)    Rehab Potential (PT) good, to achieve stated therapy goals  -     Criteria for Skilled Interventions Met (PT) yes;meets criteria;skilled treatment is necessary  -     Therapy Frequency (PT) daily  -       Row Name 04/30/24 1503          Vital Signs    Pre Systolic BP Rehab 146  -SS     Pre Treatment Diastolic BP 72  -SS     Post Systolic BP Rehab 146  -SS     Post Treatment Diastolic BP 66  -SS     Pretreatment Heart Rate (beats/min) 75  -SS      Posttreatment Heart Rate (beats/min) 79  -SS     Pre SpO2 (%) 92  -SS     O2 Delivery Pre Treatment room air  -SS     Post SpO2 (%) 92  -SS     O2 Delivery Post Treatment room air  -SS     Pre Patient Position Sitting  -SS     Post Patient Position Sitting  -SS       Row Name 04/30/24 1503          Positioning and Restraints    Post Treatment Position chair  -SS     In Chair notified nsg;reclined;call light within reach;encouraged to call for assist;exit alarm on;waffle cushion;legs elevated  -               User Key  (r) = Recorded By, (t) = Taken By, (c) = Cosigned By      Initials Name Provider Type    Reema West PT Physical Therapist                   Outcome Measures       Row Name 04/30/24 1508          How much help from another person do you currently need...    Turning from your back to your side while in flat bed without using bedrails? 3  -SS     Moving from lying on back to sitting on the side of a flat bed without bedrails? 3  -SS     Moving to and from a bed to a chair (including a wheelchair)? 3  -SS     Standing up from a chair using your arms (e.g., wheelchair, bedside chair)? 3  -SS     Climbing 3-5 steps with a railing? 2  -SS     To walk in hospital room? 3  -SS     AM-PAC 6 Clicks Score (PT) 17  -SS     Highest Level of Mobility Goal 5 --> Static standing  -SS       Row Name 04/30/24 1508          Functional Assessment    Outcome Measure Options AM-PAC 6 Clicks Basic Mobility (PT)  -SS               User Key  (r) = Recorded By, (t) = Taken By, (c) = Cosigned By      Initials Name Provider Type    Reema West PT Physical Therapist                                 Physical Therapy Education       Title: PT OT SLP Therapies (In Progress)       Topic: Physical Therapy (Done)       Point: Mobility training (Done)       Learning Progress Summary             Patient Eager, E, YULI,DU,NR by  at 4/30/2024 1508    Comment: Reviewed safety/technique w/transfers, ambulation, HEP, PT POC     Acceptance, E, NR by  at 4/29/2024 1212    Acceptance, E,TB, NR by  at 4/28/2024 1113    Acceptance, E, VU,NR by  at 4/25/2024 1200                         Point: Home exercise program (Done)       Learning Progress Summary             Patient Eager, E, VU,DU,NR by  at 4/30/2024 1508    Comment: Reviewed safety/technique w/transfers, ambulation, HEP, PT POC    Acceptance, E,TB, NR by  at 4/28/2024 1113                         Point: Body mechanics (Done)       Learning Progress Summary             Patient Eager, E, VU,DU,NR by  at 4/30/2024 1508    Comment: Reviewed safety/technique w/transfers, ambulation, HEP, PT POC    Acceptance, E, NR by  at 4/29/2024 1212    Acceptance, E,TB, NR by  at 4/28/2024 1113    Acceptance, E, VU,NR by  at 4/25/2024 1200                         Point: Precautions (Done)       Learning Progress Summary             Patient Eager, E, VU,DU,NR by  at 4/30/2024 1508    Comment: Reviewed safety/technique w/transfers, ambulation, HEP, PT POC    Acceptance, E, NR by  at 4/29/2024 1212    Acceptance, E,TB, NR by  at 4/28/2024 1113    Acceptance, E, VU,NR by  at 4/25/2024 1200                                         User Key       Initials Effective Dates Name Provider Type Discipline     11/10/20 -  Ana Lieberman, PT Physical Therapist PT     06/01/21 -  Reema Croft, PT Physical Therapist PT     09/21/23 -  Georgina Lucas, PT Physical Therapist PT                  PT Recommendation and Plan     Plan of Care Reviewed With: patient  Progress: improving  Outcome Evaluation: Pt. continues to present below baseline function w/generalized weakness, visual impairments and decreased functional endurance affecting his ability to safely participate in functional mobility. He performed transfers and ambulated 50' w/front wheeled walker, min assist of 1 + close chair follow. Activity limited by fatigue. Pt. tolerated ther-ex well. Continue IPPT POC to progress as  tolerated.     Time Calculation:         PT Charges       Row Name 04/30/24 1509             Time Calculation    Start Time 1423  -SS      PT Received On 04/30/24  -SS         Timed Charges    05922 - PT Therapeutic Exercise Minutes 10  -SS      20537 - Gait Training Minutes  10  -SS      61349 - PT Therapeutic Activity Minutes 3  -SS         Total Minutes    Timed Charges Total Minutes 23  -SS       Total Minutes 23  -SS                User Key  (r) = Recorded By, (t) = Taken By, (c) = Cosigned By      Initials Name Provider Type     Reema Croft, KARLY Physical Therapist                  Therapy Charges for Today       Code Description Service Date Service Provider Modifiers Qty    06231657481 HC PT THER PROC EA 15 MIN 4/30/2024 Reema Croft, PT GP 1    02739462097 HC GAIT TRAINING EA 15 MIN 4/30/2024 Reema Croft, PT GP 1    22722515404 HC PT THER SUPP EA 15 MIN 4/30/2024 Reema Croft, PT GP 2            PT G-Codes  Outcome Measure Options: AM-PAC 6 Clicks Basic Mobility (PT)  AM-PAC 6 Clicks Score (PT): 17  AM-PAC 6 Clicks Score (OT): 14  Modified [place] Scale: 3 - Moderate disability.  Requiring some help, but able to walk without assistance.  PT Discharge Summary  Anticipated Discharge Disposition (PT): inpatient rehabilitation facility    Reema Croft PT  4/30/2024

## 2024-04-30 NOTE — PLAN OF CARE
Goal Outcome Evaluation:  Plan of Care Reviewed With: patient        Progress: improving  Outcome Evaluation: Pt. continues to present below baseline function w/generalized weakness, visual impairments and decreased functional endurance affecting his ability to safely participate in functional mobility. He performed transfers and ambulated 50' w/front wheeled walker, min assist of 1 + close chair follow. Activity limited by fatigue. Pt. tolerated ther-ex well. Continue IPPT POC to progress as tolerated.      Anticipated Discharge Disposition (PT): inpatient rehabilitation facility

## 2024-04-30 NOTE — MBS/VFSS/FEES
Acute Care - Speech Language Pathology   Swallow Re-Evaluation [facility]  Fiberoptic Endoscopic Evaluation of Swallowing (FEES)      Patient Name: Camron Hendrix  : 1950  MRN: [id number]  Today's Date: 2024               Admit Date: 2024    Visit Dx:     ICD-10-CM ICD-9-CM   1. Dysarthria  R47.1 784.51   2. Oropharyngeal dysphagia  R13.12 787.22   3. 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    SAH (subarachnoid hemorrhage)    Type 2 diabetes mellitus    Oral candidiasis    Seizure     Past Medical History:   Diagnosis Date    AAA (abdominal aortic aneurysm)     Coronary artery disease     CVA (cerebral vascular accident)     Hypertension     Hypothyroidism     PAF (paroxysmal atrial fibrillation)     TIA (transient ischemic attack)      Past Surgical History:   Procedure Laterality Date    CARDIAC CATHETERIZATION N/A 3/29/2019    Procedure: Left Heart Cath;  Surgeon: Andrea Hollowya MD;  Location: [facility] INVASIVE LOCATION;  Service: Cardiovascular    CERVICAL FUSION      c4-c5    CORONARY ANGIOPLASTY WITH STENT PLACEMENT      HERNIA REPAIR      TONSILECTOMY, ADENOIDECTOMY, BILATERAL MYRINGOTOMY AND TUBES         SLP Recommendation and Plan  SLP Swallowing Diagnosis: severe, pharyngeal dysphagia (24 1340)  SLP Diet Recommendation: NPO, temporary alternate methods of nutrition/hydration, other (see comments) (if opt for  comfort diet, consider regular/thin) (04/30/24 1340)     SLP Rec. for Method of Medication Administration: meds via alternate route (if comfort, crush in pudding/puree) (04/30/24 1340)     Monitor for Signs of Aspiration: yes, notify SLP if any concerns (04/30/24 1340)     Swallow Criteria for Skilled Therapeutic Interventions Met: demonstrates skilled criteria (04/30/24 1340)  Anticipated Discharge Disposition (SLP): inpatient rehabilitation facility (04/30/24 1340)  Rehab Potential/Prognosis, Swallowing: good, to achieve stated therapy goals (04/30/24 1340)  Therapy Frequency (Swallow): 5 days per week (04/30/24 1340)  Predicted Duration Therapy Intervention (Days): until discharge (04/30/24 1340)  Oral Care Recommendations: Oral Care BID/PRN, Suction toothbrush (04/30/24 1340)                                        Plan of Care Reviewed With: patient      SWALLOW EVALUATION (Last 72 Hours)       SLP Adult Swallow Evaluation       Row Name 04/30/24 1340 04/29/24 1145                Rehab Evaluation    Document Type re-evaluation  -MP --       Subjective Information no complaints  -MP --       Patient Observations alert;cooperative  -MP --       Patient/Family/Caregiver Comments/Observations No family present  -MP --       Patient Effort good  -MP --          General Information    Patient Profile Reviewed yes  -MP --       Pertinent History Of Current Problem SAH overlying midbrain & ct. Hx dementia, prior CVA. Last FEES w/ recs for nectar-thick. Worsening CXR @ [facility] & new wheeziness. Repeat FEES today.  -MP --       Current Method of Nutrition soft to chew textures;nectar/syrup-thick liquids  -MP --          Pain    Additional Documentation Pain Scale: FACES Pre/Post-Treatment (Group)  -MP --          Pain Scale: FACES Pre/Post-Treatment    Pain: FACES Scale, Pretreatment 0-->no hurt  -MP --       Posttreatment Pain Rating 0-->no hurt  -MP --          Fiberoptic Endoscopic Evaluation of Swallowing (FEES)     Risks/Benefits Reviewed risks/benefits explained;patient;agreed to eval  -MP --       Nasal Entry right:  -MP --       Scope serial number/identification [id number]  -MP --          Anatomy and Physiology    Anatomic Considerations edema;arytenoids  -MP --       Velopharyngeal WFL  -MP --       Base of Tongue symmetrical  -MP --       Epiglottis WFL  -MP --       Laryngeal Function Breathing symmetrical  -MP --       Laryngeal Function Phonation symmetrical  -MP --       Laryngeal Function to Breath Hold TVF/FVF/Arytenoid;sustained closure  -MP --       Secretion Rating Scale (Anatoly et alHarris 1996) 0- normal, no visible secretions  -MP --       Ice Chips DNA  -MP --       Spontaneous Swallow frequency reduced  -MP --       Sensory sensed scope  -MP --       Utensils Used Spoon;Cup  -MP --       Consistencies Trialed thin liquids;nectar-thick liquids;honey-thick liquids;pudding/puree;regular textures  -MP --          FEES Interpretation    Oral Phase WFL  -MP --          Initiation of Pharyngeal Swallow    Initiation of Pharyngeal Swallow bolus in pyriform sinuses  -MP --       Pharyngeal Phase impaired pharyngeal phase of swallowing  -MP --       Penetration After the Swallow thin liquids;nectar-thick liquids;honey-thick liquids;pudding/puree;regular textures;secondary to residue;in pyriform sinuses  -MP --       Aspiration After the Swallow thin liquids;nectar-thick liquids;honey-thick liquids;pudding/puree;regular textures;secondary to residue;in pyriform sinuses;in laryngeal vestibule  -MP --       Response to Aspiration No  -MP --       Residue all consistencies tested;pyriform sinuses;secondary to reduced laryngeal elevation;secondary to reduced hyolaryngeal excursion  -MP --       Response to Residue unable to clear residue;with cued swallow;with compensatory maneuver (see comments)  -MP --       Attempted Compensatory Maneuvers bolus size;bolus presentation style;additional subsequent swallow;multiple  swallows;effortful (hard swallow)  - --       Response to Attempted Compensatory Maneuvers did not prevent penetration;did not prevent aspiration;reduced residue  - --       FEES Summary Severe pharyngeal dysphagia. Moderate pyriform sinus residue across consistencies that did not fully clear and spilled over posterior commissure. Pt coughing @ baseline & t/o eval-- cough never appeared to be directly related to aspiration. U/a to clear residue or prevent penetration/aspiration w/ compensations. Safest rec @ this time is NPO/temporary alternate route of nutrition/hydration medication. However, pt reports he would not want feeding tube. Notified RN/MD. If opt for comfort diet, consider regular/thin, as no significant difference/amt of aspiration across consistencies.  -MP --          SLP Evaluation Clinical Impression    SLP Swallowing Diagnosis severe;pharyngeal dysphagia  -MP mild;oral dysphagia;moderate;pharyngeal dysphagia  -       Functional Impact risk of aspiration/pneumonia;risk of malnutrition;risk of dehydration  - risk of aspiration/pneumonia  -       Rehab Potential/Prognosis, Swallowing good, to achieve stated therapy goals  - good, to achieve stated therapy goals  -       Swallow Criteria for Skilled Therapeutic Interventions Met demonstrates skilled criteria  -MP demonstrates skilled criteria  -          Recommendations    Therapy Frequency (Swallow) 5 days per week  - 5 days per week  -       Predicted Duration Therapy Intervention (Days) until discharge  - --       SLP Diet Recommendation NPO;temporary alternate methods of nutrition/hydration;other (see comments)  if opt for comfort diet, consider regular/thin  -MP soft to chew textures;chopped;no mixed consistencies;nectar thick liquids  -       Recommended Diagnostics -- FEES  -       Recommended Precautions and Strategies -- upright posture during/after eating;multiple swallows per bite of food;multiple swallows per sip  of liquid;general aspiration precautions;reflux precautions  -       Oral Care Recommendations Oral Care BID/PRN;Suction toothbrush  - Oral Care BID/PRN;Suction toothbrush  -       SLP Rec. for Method of Medication Administration meds via alternate route  if comfort, crush in pudding/puree  -MP meds whole;meds crushed;with thick liquids;with puree  -       Monitor for Signs of Aspiration yes;notify SLP if any concerns  -MP yes;notify SLP if any concerns  -CH       Anticipated Discharge Disposition (SLP) inpatient rehabilitation facility  - --                 User Key  (r) = Recorded By, (t) = Taken By, (c) = Cosigned By      Initials Name Effective Dates    MP Steven Forrester, MS CCC-SLP 12/28/21 -      Melony Castañeda MS CCC-SLP 06/16/21 -                     EDUCATION  The patient has been educated in the following areas:   Dysphagia (Swallowing Impairment) NPO rationale.        SLP GOALS       Row Name 04/30/24 1340 04/29/24 1145          (LTG) Patient will demonstrate functional swallow for    Diet Texture (Demonstrate functional swallow) regular textures  - regular textures  -     Liquid viscosity (Demonstrate functional swallow) thin liquids  - thin liquids  -     [place] (Demonstrate functional swallow) independently (over 90% accuracy)  -MP independently (over 90% accuracy)  -     Time Frame (Demonstrate functional swallow) by discharge  -MP by discharge  -     Progress/Outcomes (Demonstrate functional swallow) goal ongoing  - continuing progress toward goal  -     Comment (Demonstrate functional swallow) -- No s/s of aspiration with thin liquids by spoon or cup sips, cough x 1 with thin liquids by straw  -        (STG) Patient will tolerate trials of    Consistencies Trialed (Tolerate trials) soft to chew (whole) textures;nectar/ mildly thick liquids  - soft to chew (whole) textures;nectar/ mildly thick liquids  -     Desired Outcome (Tolerate trials) --  without signs/symptoms of aspiration;without signs of distress  -CH     [place] (Tolerate trials) -- independently (over 90% accuracy)  -CH     Time Frame (Tolerate trials) -- 1 week  -CH     Progress/Outcomes (Tolerate trials) goal no longer appropriate  -MP good progress toward goal  -CH     Comment (Tolerate trials) -- No s/s of aspiration  -CH        (STG) Patient will tolerate therapeutic trials of    Consistencies Trialed (Tolerate therapeutic trials) thin liquids  -MP --     Desired Outcome (Tolerate therapeutic trials) without signs/symptoms of aspiration;without signs of distress  -MP --     [place] (Tolerate therapeutic trials) with minimal cues (75-90% accuracy)  -MP --     Time Frame (Tolerate therapeutic trials) 1 week  -MP --     Progress/Outcomes (Tolerate therapeutic trials) new goal  -MP --        (STG) Lingual Strengthening Goal 1 (SLP)    Activity (Lingual Strengthening Goal 1, SLP) increase tongue back strength  -MP increase tongue back strength  -CH     Increase Tongue Back Strength lingual resistance exercises  -MP lingual resistance exercises  -CH     [place]/Accuracy (Lingual Strengthening Goal 1, SLP) with minimal cues (75-90% accuracy)  -MP with minimal cues (75-90% accuracy)  -CH     Time Frame (Lingual Strengthening Goal 1, SLP) 1 week  -MP 1 week  -CH     Progress/Outcomes (Lingual Strengthening Goal 1, SLP) goal ongoing  -MP continuing progress toward goal  -CH     Comment (Lingual Strengthening Goal 1, SLP) -- completed all.  -CH        (STG) Pharyngeal Strengthening Exercise Goal 1 (SLP)    Activity (Pharyngeal Strengthening Goal 1, SLP) increase superior movement of the hyolaryngeal complex;increase anterior movement of the hyolaryngeal complex;increase closure at entrance to airway/closure of airway at glottis;increase squeeze/positive pressure generation  -MP increase timing;increase anterior movement of the hyolaryngeal complex;increase closure at entrance to  airway/closure of airway at glottis;increase squeeze/positive pressure generation  -CH     Increase Timing -- prepping - 3 second prep or suck swallow or 3-step swallow  -CH     Increase Superior Movement of the Hyolaryngeal Complex effortful pitch glide (falsetto + pharyngeal squeeze)  -MP --     Increase Anterior Movement of the Hyolaryngeal Complex chin tuck against resistance (CTAR)  -MP falsetto;Mendelsohn  -CH     Increase Closure at Entrance to Airway/Closure of Airway at Glottis supraglottic swallow  -MP super-supraglottic swallow  -CH     Increase Squeeze/Positive Pressure Generation hard effortful swallow  -MP hard effortful swallow  -CH     [place]/Accuracy (Pharyngeal Strengthening Goal 1, SLP) with minimal cues (75-90% accuracy)  -MP with minimal cues (75-90% accuracy)  -CH     Time Frame (Pharyngeal Strengthening Goal 1, SLP) 1 week  -MP 1 week  -CH     Progress/Outcomes (Pharyngeal Strengthening Goal 1, SLP) goal revised this date  - continuing progress toward goal  -CH        Patient will demonstrate functional speech skills for return to discharge environment    [place] -- Independently  -     Progress/Outcomes -- new goal  -        SLP Diagnostic Treatment     Patient will participate in further assessment in the following areas -- reading comprehension;graphic expression;cognitive-linguistic  -     Time Frame (Diagnostic) -- short term goal (STG)  -CH     Progress/Outcomes (Additional Goal 1, SLP) -- goal met  -CH        Phonation Goal 1 (SLP)    Improve Phonation By Goal 1 (SLP) -- using loud speech;80%;with minimal cues (75-90%)  -     Time Frame (Phonation Goal 1, SLP) -- short term goal (STG)  -CH     Progress/Outcomes (Phonation Goal 1, SLP) -- goal met  -CH        Articulation Goal 1 (SLP)    Improve Articulation Goal 1 (SLP) -- by over-articulating in connected speech;80%;with minimal cues (75-90%)  -     Time Frame (Articulation Goal 1, SLP) -- short term goal (STG)   -CH     Progress/Outcomes (Articulation Goal 1, SLP) -- goal no longer appropriate  -CH     Comment (Articulation Goal 1, SLP) -- dysarthria resolved. Baseline per dtr  -CH               User Key  (r) = Recorded By, (t) = Taken By, (c) = Cosigned By      Initials Name Provider Type    Steven Thorne MS CCC-SLP Speech and Language Pathologist    Melony Etienne MS CCC-SLP Speech and Language Pathologist                       Time Calculation:    Time Calculation- SLP       Row Name 04/30/24 1453             Time Calculation- SLP    SLP Start Time 1340  -MP      SLP Received On 04/30/24  -MP         Untimed Charges    34180-HR Fiberoptic Endo Eval Swallow Minutes 85  -MP         Total Minutes    Untimed Charges Total Minutes 85  -MP       Total Minutes 85  -MP                User Key  (r) = Recorded By, (t) = Taken By, (c) = Cosigned By      Initials Name Provider Type    Steven Thorne MS CCC-SLP Speech and Language Pathologist                    Therapy Charges for Today       Code Description Service Date Service Provider Modifiers Qty    [id number]  ST FIBEROPTIC ENDO EVAL SWALL 6 4/30/2024 Steven Forrester MS CCC-SLP GN 1                 Steven Vizcaino MS CCC-CARLA  4/30/2024

## 2024-04-30 NOTE — PROGRESS NOTES
Brief cardiology update note  -Spoke with EP, seems like a good candidate for left atrial appendage occlusion/watchman in the future  -Our office will call him to schedule an appointment to discuss further once he is discharged  -Recommend 14-day heart monitor upon discharge.  Order placed in the discharge navigator  -Also recommend follow-up with his primary cardiologist, Dr. Perdomo, in [place], [place], after discharge in 6 to 8 weeks

## 2024-05-01 ENCOUNTER — APPOINTMENT (OUTPATIENT)
Dept: GENERAL RADIOLOGY | Facility: HOSPITAL | Age: 74
End: 2024-05-01
Payer: MEDICARE

## 2024-05-01 ENCOUNTER — TELEPHONE (OUTPATIENT)
Dept: CARDIOLOGY | Facility: CLINIC | Age: 74
End: 2024-05-01
Payer: MEDICARE

## 2024-05-01 PROBLEM — R13.10 DYSPHAGIA: Status: ACTIVE | Noted: 2024-05-01

## 2024-05-01 LAB
GLUCOSE BLDC GLUCOMTR-MCNC: 148 MG/DL (ref 70–130)
GLUCOSE BLDC GLUCOMTR-MCNC: 157 MG/DL (ref 70–130)
GLUCOSE BLDC GLUCOMTR-MCNC: 163 MG/DL (ref 70–130)
GLUCOSE BLDC GLUCOMTR-MCNC: 171 MG/DL (ref 70–130)
GLUCOSE BLDC GLUCOMTR-MCNC: 176 MG/DL (ref 70–130)

## 2024-05-01 PROCEDURE — 82948 REAGENT STRIP/BLOOD GLUCOSE: CPT

## 2024-05-01 PROCEDURE — 94799 UNLISTED PULMONARY SVC/PX: CPT

## 2024-05-01 PROCEDURE — 99232 SBSQ HOSP IP/OBS MODERATE 35: CPT | Performed by: INTERNAL MEDICINE

## 2024-05-01 PROCEDURE — 97110 THERAPEUTIC EXERCISES: CPT

## 2024-05-01 PROCEDURE — 25010000002 HYDRALAZINE PER 20 MG: Performed by: INTERNAL MEDICINE

## 2024-05-01 PROCEDURE — 25010000002 HEPARIN (PORCINE) PER 1000 UNITS: Performed by: INTERNAL MEDICINE

## 2024-05-01 PROCEDURE — 63710000001 INSULIN REGULAR HUMAN PER 5 UNITS: Performed by: INTERNAL MEDICINE

## 2024-05-01 PROCEDURE — 74018 RADEX ABDOMEN 1 VIEW: CPT

## 2024-05-01 PROCEDURE — 97116 GAIT TRAINING THERAPY: CPT

## 2024-05-01 PROCEDURE — 97530 THERAPEUTIC ACTIVITIES: CPT

## 2024-05-01 PROCEDURE — 97535 SELF CARE MNGMENT TRAINING: CPT

## 2024-05-01 RX ORDER — NICOTINE POLACRILEX 4 MG
15 LOZENGE BUCCAL
Status: DISCONTINUED | OUTPATIENT
Start: 2024-05-01 | End: 2024-05-02 | Stop reason: HOSPADM

## 2024-05-01 RX ORDER — DEXTROSE MONOHYDRATE 25 G/50ML
25 INJECTION, SOLUTION INTRAVENOUS
Status: DISCONTINUED | OUTPATIENT
Start: 2024-05-01 | End: 2024-05-02 | Stop reason: HOSPADM

## 2024-05-01 RX ORDER — IBUPROFEN 600 MG/1
1 TABLET ORAL
Status: DISCONTINUED | OUTPATIENT
Start: 2024-05-01 | End: 2024-05-02 | Stop reason: HOSPADM

## 2024-05-01 RX ORDER — HYDRALAZINE HYDROCHLORIDE 20 MG/ML
10 INJECTION INTRAMUSCULAR; INTRAVENOUS ONCE
Status: COMPLETED | OUTPATIENT
Start: 2024-05-01 | End: 2024-05-01

## 2024-05-01 RX ADMIN — INSULIN HUMAN 2 UNITS: 100 INJECTION, SOLUTION PARENTERAL at 18:15

## 2024-05-01 RX ADMIN — CARVEDILOL 25 MG: 12.5 TABLET, FILM COATED ORAL at 22:14

## 2024-05-01 RX ADMIN — LEVETIRACETAM 1000 MG: 500 TABLET, FILM COATED ORAL at 16:05

## 2024-05-01 RX ADMIN — HEPARIN SODIUM 5000 UNITS: 5000 INJECTION INTRAVENOUS; SUBCUTANEOUS at 09:02

## 2024-05-01 RX ADMIN — CARVEDILOL 25 MG: 12.5 TABLET, FILM COATED ORAL at 13:30

## 2024-05-01 RX ADMIN — LEVETIRACETAM 1000 MG: 500 TABLET, FILM COATED ORAL at 22:14

## 2024-05-01 RX ADMIN — IPRATROPIUM BROMIDE AND ALBUTEROL SULFATE 3 ML: 2.5; .5 SOLUTION RESPIRATORY (INHALATION) at 11:00

## 2024-05-01 RX ADMIN — IPRATROPIUM BROMIDE AND ALBUTEROL SULFATE 3 ML: 2.5; .5 SOLUTION RESPIRATORY (INHALATION) at 03:05

## 2024-05-01 RX ADMIN — ATORVASTATIN CALCIUM 40 MG: 40 TABLET, FILM COATED ORAL at 22:14

## 2024-05-01 RX ADMIN — HYDRALAZINE HYDROCHLORIDE 10 MG: 20 INJECTION INTRAMUSCULAR; INTRAVENOUS at 10:12

## 2024-05-01 RX ADMIN — HEPARIN SODIUM 5000 UNITS: 5000 INJECTION INTRAVENOUS; SUBCUTANEOUS at 16:05

## 2024-05-01 RX ADMIN — HEPARIN SODIUM 5000 UNITS: 5000 INJECTION INTRAVENOUS; SUBCUTANEOUS at 22:23

## 2024-05-01 RX ADMIN — HYDRALAZINE HYDROCHLORIDE 100 MG: 50 TABLET ORAL at 16:05

## 2024-05-01 RX ADMIN — HYDRALAZINE HYDROCHLORIDE 100 MG: 50 TABLET ORAL at 22:24

## 2024-05-01 RX ADMIN — MIRTAZAPINE 7.5 MG: 15 TABLET, FILM COATED ORAL at 22:14

## 2024-05-01 RX ADMIN — AMLODIPINE BESYLATE 10 MG: 10 TABLET ORAL at 13:30

## 2024-05-01 RX ADMIN — CETIRIZINE HYDROCHLORIDE 10 MG: 10 TABLET, FILM COATED ORAL at 13:30

## 2024-05-01 RX ADMIN — Medication 10 ML: at 13:31

## 2024-05-01 RX ADMIN — Medication 10 ML: at 22:24

## 2024-05-01 RX ADMIN — IPRATROPIUM BROMIDE AND ALBUTEROL SULFATE 3 ML: 2.5; .5 SOLUTION RESPIRATORY (INHALATION) at 19:26

## 2024-05-01 RX ADMIN — LOSARTAN POTASSIUM 100 MG: 50 TABLET, FILM COATED ORAL at 13:30

## 2024-05-01 NOTE — PLAN OF CARE
Goal Outcome Evaluation:  Plan of Care Reviewed With: patient        Progress: improving  Outcome Evaluation: Pt required less assist for standing and ambulation, however continue to be limited by dizziness. Pt will continue to benefit from PT to address aforementioned deficits. Continue to rec IRF upon dc.

## 2024-05-01 NOTE — CONSULTS
Clinical Nutrition     Patient Name: Camron Hendrix  YOB: 1950  MRN: [id number]  Date of Encounter: 05/01/24 14:07 EDT  Admission date: 4/24/2024  Reason for Visit: Consult, EN, LOS    Assessment   Nutrition Assessment   Admission Diagnosis:  Hemorrhagic stroke [I61.9]    Problem List:    SAH (subarachnoid hemorrhage)    Hypothyroidism    Essential hypertension    Coronary artery disease involving native coronary artery of native heart without angina pectoris    PAF (paroxysmal atrial fibrillation)    Type 2 diabetes mellitus    Oral candidiasis    Seizure    Dysphagia      PMH:   He  has a past medical history of AAA (abdominal aortic aneurysm), Coronary artery disease, CVA (cerebral vascular accident) (2011), Hypertension, Hypothyroidism, PAF (paroxysmal atrial fibrillation), and TIA (transient ischemic attack).    PSH:  He  has a past surgical history that includes Coronary angioplasty with stent; Cervical fusion; Cardiac catheterization (N/A, 3/29/2019); Tonsilectomy, adenoidectomy, bilateral myringotomy and tubes; and Hernia repair.    Applicable Nutrition History:   05/01/24: NGT placement     Labs    Labs Reviewed: Yes    Results from last 7 days   Lab Units 04/29/24  2149 04/29/24  0446 04/28/24  0233 04/27/24 2002 04/27/24  0910 04/26/24  0627 04/26/24  0006 04/25/24  0811 04/25/24  0035 04/24/24 2025   GLUCOSE mg/dL  --  167* 167*  --  184*   < >  --   --   --  198*   BUN mg/dL  --  14 11  --  9   < >  --   --   --  9   CREATININE mg/dL  --  0.93 0.83  --  0.75*   < >  --   --   --  0.94   SODIUM mmol/L  --  138 139  --  139   < >  --   --   --  140   CHLORIDE mmol/L  --  103 108*  --  107   < >  --   --   --  106   POTASSIUM mmol/L 3.9 3.3* 3.7   < > 3.5   < >  --   --   --  3.7   PHOSPHORUS mg/dL  --   --   --   --   --   --   --  2.4*  --  1.9*   MAGNESIUM mg/dL  --   --   --   --   --   --  2.1 2.3 1.7 1.7   ALT (SGPT) U/L  --   --   --   --  19  --   --   --   --    "--     < > = values in this interval not displayed.       Results from last 7 days   Lab Units 04/27/24  0910 04/25/24  0811   ALBUMIN g/dL 3.4*  --    CHOLESTEROL mg/dL  --  94   TRIGLYCERIDES mg/dL  --  100       Results from last 7 days   Lab Units 05/01/24  1133 05/01/24  0727 05/01/24  0611 04/30/24  2320 04/30/24  1958 04/30/24  1727   GLUCOSE mg/dL 163* 148* 157* 93 158* 159*     Lab Results   Lab Value Date/Time    HGBA1C 5.80 (H) 04/25/2024 0811    HGBA1C 6.8 (H) 01/22/2024 1339    HGBA1C 6.70 (H) 07/19/2023 1530    HGBA1C 6.9 (H) 05/05/2023 1058     Medications    Medications Reviewed: yes  Insulin, mirtazapine, zofran    Intake/Ouptut 24 hrs (0701 - 0700)   I&O's Reviewed: yes      Anthropometrics     Height: Height: 175.3 cm (69\")  Last Filed Weight: Weight: 131 kg (288 lb) (04/28/24 1138)  Method: Weight Method: Bed scale  BMI: BMI (Calculated): 42.5    UBW:    Weight      Weight (kg) Weight (lbs) Weight Method   3/13/2023 127.007 kg  280 lb     7/19/2023 128.549 kg  283 lb 6.4 oz  Stated     124.739 kg  275 lb     4/24/2024 129 kg  284 lb 6.3 oz  Bed scale    4/25/2024 130.8 kg  288 lb 5.8 oz  Bed scale    4/26/2024 130.636 kg  288 lb     4/28/2024 130.636 kg  288 lb       Weight change: Patient reported 30# weight loss since starting ozempic in Jan. Unable to assess true weight loss 2/2 fluid status.    Nutrition Focused Physical Exam     Date: 05/01/24     Patient meets criteria for malnutrition diagnosis, see MSA note.     Subjective   Reported/Observed/Food/Nutrition Related History:     05/01/24  Spoke w/ patient and daughter at bedside who reported patient has lost around 30# since starting Ozempic this past Jan.     Needs Assessment   Date: 05/01/24     Height used:Height: 175.3 cm (69\")  Weights used: 131kg CBW / 70.7kg IBW    Estimated Calorie needs: ~2150  Kcal/day  Method:  MSJ 1.2 = 2466  Method:  Kcals/KG 14-18 = 5925-1777    Estimated Protein needs: ~87g PRO/day  Method: 1.2g/Kg " IBW    Estimated Fluid needs: ~ ml/day   Per clinical status    Current Nutrition Prescription     PO: NPO Diet NPO Type: Tube Feeding  Oral Nutrition Supplement: n/a  Intake: N/A    Assessment & Plan   Nutrition Diagnosis   Date: 05/01/24  Updated:   Problem Swallowing difficulty    Etiology SAH   Signs/Symptoms NPO per SLP/need for EN   Status: New    Goal:   Nutrition to support treatment and Initiate EN, Establish EN tolerance, Tolerate EN at goal, Deliver estimated needs      Nutrition Intervention      Follow treatment progress, Care plan reviewed, Nutrition support order placed    EN recommendations:  Isosource 1.5 @ 65ml/hr + 50ml/hr FWF  Initiate @ 15ml/hr, increase 10ml q6h as tolerated until goal of 65ml/hr is reached  1430ml TV/d  2145 kcal (16 kcal/kg CBW; 100% est needs)  97g pro (1.4g/kg IBW; 111% est needs)  21.7g fiber  1087ml TF + 1100ml FW = 2187ml/d    Monitoring/Evaluation:   Per protocol, I&O, Pertinent labs, EN delivery/tolerance, Weight, Symptoms, POC/GOC, Swallow function    Anita Ulloa RD  Time Spent: 30min

## 2024-05-01 NOTE — THERAPY TREATMENT NOTE
Patient Name: Camron Hendrix  : 1950    MRN: [id number]                              Today's Date: 2024       Admit Date: 2024    Visit Dx:     ICD-10-CM ICD-9-CM   1. Dysarthria  R47.1 784.51   2. Oropharyngeal dysphagia  R13.12 787.22   3. SAH (subarachnoid hemorrhage)  I60.9 430   4. 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    SAH (subarachnoid hemorrhage)    Type 2 diabetes mellitus    Oral candidiasis    Seizure     Past Medical History:   Diagnosis Date    AAA (abdominal aortic aneurysm)     Coronary artery disease     CVA (cerebral vascular accident)     Hypertension     Hypothyroidism     PAF (paroxysmal atrial fibrillation)     TIA (transient ischemic attack)      Past Surgical History:   Procedure Laterality Date    CARDIAC CATHETERIZATION N/A 3/29/2019    Procedure: Left Heart Cath;  Surgeon: Andrea Holloway MD;  Location: [facility] CATH INVASIVE LOCATION;  Service: Cardiovascular    CERVICAL FUSION      c4-c5    CORONARY ANGIOPLASTY WITH STENT PLACEMENT      HERNIA REPAIR      TONSILECTOMY, ADENOIDECTOMY, BILATERAL MYRINGOTOMY AND TUBES        General Information       Row Name 24 0949          OT Time and Intention    Document Type therapy note (daily note)  -CS     Mode of Treatment occupational therapy  -CS       Row Name 24 0949          General Information    Patient Profile Reviewed yes  -CS      Existing Precautions/Restrictions fall;other (see comments)  diplopia, L eye visual tracking deficits; patch for mobility  -CS     Barriers to Rehab medically complex;visual deficit  -CS       Row Name 05/01/24 0949          Cognition    Orientation Status (Cognition) oriented x 3  -CS       Row Name 05/01/24 0949          Safety Issues, Functional Mobility    Safety Issues Affecting Function (Mobility) insight into deficits/self-awareness;safety precaution awareness;safety precautions follow-through/compliance;awareness of need for assistance  -CS     Impairments Affecting Function (Mobility) balance;endurance/activity tolerance;visual/perceptual;strength  -CS               User Key  (r) = Recorded By, (t) = Taken By, (c) = Cosigned By      Initials Name Provider Type    CS Ravin Healy, OT Occupational Therapist                     Mobility/ADL's       Row Name 05/01/24 0950          Bed Mobility    Comment, (Bed Mobility) UIC, returned to chair  -       Row Name 05/01/24 0950          Transfers    Transfers sit-stand transfer;stand-sit transfer  -CS     Comment, (Transfers) STS x 6 throughout session with fading cues for improved hand placement/sequencing, no dizziness reported with mobility this date  -CS       Row Name 05/01/24 0950          Sit-Stand Transfer    Sit-Stand [place] (Transfers) contact guard;verbal cues  -CS     Assistive Device (Sit-Stand Transfers) walker, front-wheeled  -CS       Row Name 05/01/24 0950          Stand-Sit Transfer    Stand-Sit [place] (Transfers) contact guard;verbal cues  -CS     Assistive Device (Stand-Sit Transfers) walker, front-wheeled  -CS       Row Name 05/01/24 0950          Functional Mobility    Functional Mobility- Comment Pt with slight veer to the L this date, cues for improved posture and proximity to RW  -CS     Patient was able to Ambulate yes  -CS       Row Name 05/01/24 0950          Activities of Daily Living    BADL Assessment/Intervention  lower body dressing;grooming;feeding;toileting;upper body dressing  -CS       Row Name 05/01/24 0950          Lower Body Dressing Assessment/Training    Jewell Level (Lower Body Dressing) don;socks;dependent (less than 25% patient effort)  -CS     Position (Lower Body Dressing) supported standing  -CS     Comment, (Lower Body Dressing) Pt now with BUE strength/coordination WFL for AE training  -CS       Row Name 05/01/24 0950          Self-Feeding Assessment/Training    Comment, (Feeding) NPO this date  -CS       Row Name 05/01/24 0950          Upper Body Dressing Assessment/Training    Jewell Level (Upper Body Dressing) don;pajama/robe  -CS       Row Name 05/01/24 0950          Grooming Assessment/Training    Jewell Level (Grooming) grooming skills;hair care, combing/brushing;wash face, hands;contact guard assist  -CS     Comment, (Grooming) cues and education for improved safety at sinkside w/ RW placement, able to locate all grooming tools in R/L visual field  -CS       Row Name 05/01/24 0950          Toileting Assessment/Training    Jewell Level (Toileting) adjust/manage clothing;perform perineal hygiene;contact guard assist  -CS     Position (Toileting) unsupported standing  -CS     Comment, (Toileting) CGA for standig urination, LOB during clothing adjustments, cues for use of grab bar for stabilization  -CS               User Key  (r) = Recorded By, (t) = Taken By, (c) = Cosigned By      Initials Name Provider Type    CS Ravin Healy, OT Occupational Therapist                   Obj/Interventions       Row Name 05/01/24 1002          Vision Assessment/Intervention    Vision Assessment Comment Pt verbalizes good understanding of patch schedule/rotation, remains effective  -CS       Row Name 05/01/24 1002          Strength Comprehensive (MMT)    General Manual Muscle Testing (MMT) Assessment upper extremity strength deficits identified  -CS     Comment, General Manual Muscle Testing  (MMT) Assessment LUE now grossly 4+/5  -CS       Row Name 05/01/24 1002          Shoulder (Therapeutic Exercise)    Shoulder (Therapeutic Exercise) strengthening exercise  -CS     Shoulder Strengthening (Therapeutic Exercise) bilateral;flexion;extension;resisted diagonal exercise;2 sets  -CS       Row Name 05/01/24 1002          Elbow/Forearm (Therapeutic Exercise)    Elbow/Forearm (Therapeutic Exercise) strengthening exercise  -CS     Elbow/Forearm Strengthening (Therapeutic Exercise) bilateral;flexion;extension;2 sets  -CS       Row Name 05/01/24 1002          Motor Skills    Motor Skills functional endurance;motor control/coordination interventions  -     Functional Endurance SOA throughout, O2 sats ranged from 89-92% on AR  -CS     Motor Control/Coordination Interventions gross motor coordination activities;occupation/activity based treatment;therapeutic exercise/ROM  -     Therapeutic Exercise shoulder;elbow/forearm  -CS       Row Name 05/01/24 1002          Balance    Balance Assessment sitting static balance;sitting dynamic balance;standing static balance;standing dynamic balance  -     Static Sitting Balance standby assist  -     Dynamic Sitting Balance contact guard  -CS     Position, Sitting Balance unsupported;sitting in chair  -     Static Standing Balance contact guard  -CS     Dynamic Standing Balance minimal assist  -CS     Position/Device Used, Standing Balance unsupported  -CS     Balance Interventions standing;sit to stand;sitting;occupation based/functional task;UE activity with balance activity  -     Comment, Balance LUE targeting drills in supported standing at RW, one LOB during clothing management at toilet - education for improved safety and grab bar use for stabilization  -               User Key  (r) = Recorded By, (t) = Taken By, (c) = Cosigned By      Initials Name Provider Type    Ravin Shanks OT Occupational Therapist                   Goals/Plan    No  documentation.                  Clinical Impression       Row Name 05/01/24 1004          Pain Assessment    Additional Documentation Pain Scale: FACES Pre/Post-Treatment (Group)  -       Row Name 05/01/24 1004          Pain Scale: FACES Pre/Post-Treatment    Pain: FACES Scale, Pretreatment 0-->no hurt  -CS     Posttreatment Pain Rating 0-->no hurt  -CS       Row Name 05/01/24 1004          Plan of Care Review    Plan of Care Reviewed With patient  -CS     Progress no change  -CS     Outcome Evaluation Pt requiring less assist for ADL related mobility and demonstrates LUE strength of 4+/5. One LOB while toileting, continued education on improved safety w/ RW, and gives excellent effort with ther-ex. Verbalized good understanding of eye patch wear schedule/rotation. Remains below baseline for ADL completion, will cont IPOT per POC as tolerated. Rec d/c to IRF for best functional outcomes.  -       Row Name 05/01/24 1004          Therapy Plan Review/Discharge Plan (OT)    Anticipated Discharge Disposition (OT) inpatient rehabilitation facility  -       Row Name 05/01/24 1004          Vital Signs    Pre Systolic BP Rehab --  RN cleared for tx  -CS     O2 Delivery Pre Treatment room air  -CS     O2 Delivery Intra Treatment room air  -CS     O2 Delivery Post Treatment room air  -CS     Pre Patient Position Sitting  -CS     Intra Patient Position Standing  -CS     Post Patient Position Sitting  -CS       Row Name 05/01/24 1004          Positioning and Restraints    Pre-Treatment Position sitting in chair/recliner  -CS     Post Treatment Position chair  -CS     In Chair notified nsg;reclined;sitting;call light within reach;exit alarm on;legs elevated;with family/caregiver  -CS               User Key  (r) = Recorded By, (t) = Taken By, (c) = Cosigned By      Initials Name Provider Type    Ravin Shanks, OT Occupational Therapist                   Outcome Measures       Row Name 05/01/24 1017          How much  help from another is currently needed...    Putting on and taking off regular lower body clothing? 2  -CS     Bathing (including washing, rinsing, and drying) 2  -CS     Toileting (which includes using toilet bed pan or urinal) 2  -CS     Taking care of personal grooming (such as brushing teeth) 3  -CS     Eating meals 3  -CS       Row Name 05/01/24 1017          Modified Vance Scale    Modified Rachel Scale 3 - Moderate disability.  Requiring some help, but able to walk without assistance.  -CS       Row Name 05/01/24 1017          Functional Assessment    Outcome Measure Options AM-PAC 6 Clicks Daily Activity (OT);Modified Vance  -CS               User Key  (r) = Recorded By, (t) = Taken By, (c) = Cosigned By      Initials Name Provider Type    CS Ravin Healy OT Occupational Therapist                    Occupational Therapy Education       Title: PT OT SLP Therapies (Done)       Topic: Occupational Therapy (Done)       Point: ADL training (Done)       Description:   Instruct learner(s) on proper safety adaptation and remediation techniques during self care or transfers.   Instruct in proper use of assistive devices.                  Learning Progress Summary             Patient Acceptance, E,D, DU,VU by  at 5/1/2024 1017    Acceptance, E, NR by [facility] at 4/29/2024 1409    Acceptance, E, NR by [facility] at 4/25/2024 0936   Family Acceptance, E,D, DU,VU by  at 5/1/2024 1017                         Point: Home exercise program (Done)       Description:   Instruct learner(s) on appropriate technique for monitoring, assisting and/or progressing therapeutic exercises/activities.                  Learning Progress Summary             Patient Acceptance, E,D, DU,VU by  at 5/1/2024 1017    Acceptance, E, NR by [facility] at 4/29/2024 1409   Family Acceptance, E,D, DU,VU by  at 5/1/2024 1017                         Point: Precautions (Done)       Description:   Instruct learner(s) on prescribed precautions during self-care  and functional transfers.                  Learning Progress Summary             Patient Acceptance, E,D, DU,VU by CS at 5/1/2024 1017    Acceptance, E, NR by 1 at 4/29/2024 1409    Acceptance, E, NR by CS1 at 4/25/2024 0936   Family Acceptance, E,D, DU,VU by CS at 5/1/2024 1017                         Point: Body mechanics (Done)       Description:   Instruct learner(s) on proper positioning and spine alignment during self-care, functional mobility activities and/or exercises.                  Learning Progress Summary             Patient Acceptance, E,D, DU,VU by CS at 5/1/2024 1017    Acceptance, E, NR by CS1 at 4/29/2024 1409    Acceptance, E, NR by CS1 at 4/25/2024 0936   Family Acceptance, E,D, DU,VU by CS at 5/1/2024 1017                                         User Key       Initials Effective Dates Name Provider Type Discipline    [facility] 09/02/21 -  Kecia Bob, OT Occupational Therapist OT     06/16/21 -  Ravin Healy OT Occupational Therapist OT                  OT Recommendation and Plan     Plan of Care Review  Plan of Care Reviewed With: patient  Progress: no change  Outcome Evaluation: Pt requiring less assist for ADL related mobility and demonstrates LUE strength of 4+/5. One LOB while toileting, continued education on improved safety w/ RW, and gives excellent effort with ther-ex. Verbalized good understanding of eye patch wear schedule/rotation. Remains below baseline for ADL completion, will cont IPOT per POC as tolerated. Rec d/c to IRF for best functional outcomes.     Time Calculation:         Time Calculation- OT       Row Name 05/01/24 1017             Time Calculation- OT    OT Start Time 0934  -      OT Received On 05/01/24  -      OT Goal Re-Cert Due Date 05/05/24  -         Timed Charges    69995 - OT Therapeutic Exercise Minutes 8  -CS      98627 - OT Therapeutic Activity Minutes 22  -CS      04749 - OT Self Care/Mgmt Minutes 10  -CS         Total Minutes    Timed Charges  Total Minutes 40  -CS       Total Minutes 40  -CS                User Key  (r) = Recorded By, (t) = Taken By, (c) = Cosigned By      Initials Name Provider Type    CS Ravin Healy OT Occupational Therapist                  Therapy Charges for Today       Code Description Service Date Service Provider Modifiers Qty    [id number]  OT THER PROC EA 15 MIN 5/1/2024 Ravin Healy OT GO 1    [id number]  OT THERAPEUTIC ACT EA 15 MIN 5/1/2024 Ravin Healy OT GO 1    [id number]  OT SELF CARE/MGMT/TRAIN EA 15 MIN 5/1/2024 Ravin Healy OT GO 1                 Ravin Healy OT  5/1/2024

## 2024-05-01 NOTE — PROGRESS NOTES
[facility] Medicine Services  PROGRESS NOTE    Patient Name: Camron Hendrix  : 1950  MRN: [id number]    Date of Admission: 2024  Primary Care Physician: Leslie Rhodes MD    Subjective   Subjective     CC:  SAH    HPI:  No acute events.  States that he is currently.  Daughter at bedside - ok to speak in front of her.  He is frustrated with stay regarding poor sleep and n.p.o. status.  Reviewed speech therapy's recommendations.  Reviewed Keofeed.  Reviewed ongoing speech therapy at rehab and hopeful that the Keofeed will be able to be discontinued as he progresses.  Patient now agreeable to keofeed.  Answered all questions to the best my ability.      Objective   Objective     Vital Signs:   Temp:  [97.7 °F (36.5 °C)-98.4 °F (36.9 °C)] 98.1 °F (36.7 °C)  Heart Rate:  [71-84] 80  Resp:  [16-21] 20  BP: (146-183)/(66-88) 175/80     Physical Exam:  Constitutional: No acute distress, awake, alert  HENT: NCAT, mucous membranes moist; right eye patch  Respiratory: coarse with occ rhonchi  Cardiovascular: RRR, no murmurs, rubs, or gallops  Gastrointestinal: Positive bowel sounds, soft, nontender, nondistended  Musculoskeletal: trace bilateral ankle edema  Psychiatric:  cooperative  Neurologic: Oriented x 3, strength symmetric in all extremities, Cranial Nerves grossly intact to confrontation, speech clear  Skin: No rashes      Results Reviewed:  LAB RESULTS:      Lab 24  0811 24   WBC 8.23 8.18 6.74   HEMOGLOBIN 14.0 14.6 14.9   HEMATOCRIT 41.3 43.1 43.7   PLATELETS 190 201 197   NEUTROS ABS 5.93  --  4.80   IMMATURE GRANS (ABS) 0.03  --  0.02   LYMPHS ABS 1.35  --  1.46   MONOS ABS 0.85  --  0.41   EOS ABS 0.04  --  0.03   MCV 87.7 86.2 86.4   PROTIME  --  14.8*  --          Lab 24  2149 246 24  0233 24  0910 24  0627 24  0006 24  0811 24  0035 24   SODIUM  --  138 139   --  139 141  --   --   --  140   POTASSIUM 3.9 3.3* 3.7 4.3 3.5 3.8  --   --   --  3.7   CHLORIDE  --  103 108*  --  107 106  --   --   --  106   CO2  --  25.0 22.0  --  24.0 24.0  --   --   --  27.0   ANION GAP  --  10.0 9.0  --  8.0 11.0  --   --   --  7.0   BUN  --  14 11  --  9 7*  --   --   --  9   CREATININE  --  0.93 0.83  --  0.75* 0.77  --   --   --  0.94   EGFR  --  87.2 93.0  --  95.9 95.1  --   --   --  86.1   GLUCOSE  --  167* 167*  --  184* 153*  --   --   --  198*   CALCIUM  --  8.3* 8.2*  --  8.0* 7.9*  --   --   --  8.0*   MAGNESIUM  --   --   --   --   --   --  2.1 2.3 1.7 1.7   PHOSPHORUS  --   --   --   --   --   --   --  2.4*  --  1.9*   HEMOGLOBIN A1C  --   --   --   --   --   --   --  5.80*  --   --    TSH  --   --   --   --   --   --   --   --   --  1.770         Lab 04/27/24  0910   TOTAL PROTEIN 5.3*   ALBUMIN 3.4*   GLOBULIN 1.9   ALT (SGPT) 19   AST (SGOT) 18   BILIRUBIN 1.2   ALK PHOS 87         Lab 04/25/24  0811   PROTIME 14.8*   INR 1.15*         Lab 04/25/24  0811   CHOLESTEROL 94   LDL CHOL 42   HDL CHOL 33*   TRIGLYCERIDES 100             Brief Urine Lab Results  (Last result in the past 365 days)        Color   Clarity   Blood   Leuk Est   Nitrite   Protein   CREAT   Urine HCG        07/19/23 1700 Yellow   Clear   Negative   Negative   Negative   Negative                   Microbiology Results Abnormal       None            SLP FEES - Fiberoptic Endo Eval Swallow    Result Date: 4/30/2024  This procedure was auto-finalized with no dictation required.     Results for orders placed during the hospital encounter of 04/24/24    Adult Transthoracic Echo Complete W/ Cont if Necessary Per Protocol (With Agitated Saline)    Interpretation Summary    Left ventricular systolic function is normal. Calculated left ventricular EF = 65.8% Left ventricular ejection fraction appears to be 66 - 70%.    Left ventricular wall thickness is consistent with mild to moderate concentric hypertrophy.     Left ventricular diastolic function is consistent with (grade I) impaired relaxation.    Saline test results are negative for right to left atrial level shunt.    Peak velocity of the flow distal to the aortic valve is 263 cm/s. Aortic valve mean pressure gradient is 15 mmHg.    Ascending aorta = 4.4 cm      Current medications:  Scheduled Meds:amLODIPine, 10 mg, Oral, Q24H  atorvastatin, 40 mg, Oral, Nightly  carvedilol, 25 mg, Oral, Q12H  cetirizine, 10 mg, Oral, Daily  heparin (porcine), 5,000 Units, Subcutaneous, Q8H  hydrALAZINE, 100 mg, Oral, Q8H  insulin regular, 2-7 Units, Subcutaneous, Q6H  levETIRAcetam, 1,000 mg, Oral, Q12H  levothyroxine, 200 mcg, Oral, Q AM  losartan, 100 mg, Oral, Q24H  mirtazapine, 7.5 mg, Oral, Nightly  sodium chloride, 10 mL, Intravenous, Q12H      Continuous Infusions:   PRN Meds:.  acetaminophen    Calcium Replacement - Follow Nurse / BPA Driven Protocol    dextrose    dextrose    glucagon (human recombinant)    ipratropium-albuterol    Magnesium Cardiology Dose Replacement - Follow Nurse / BPA Driven Protocol    Phosphorus Replacement - Follow Nurse / BPA Driven Protocol    Potassium Replacement - Follow Nurse / BPA Driven Protocol    sodium chloride    sodium chloride    sodium chloride    Assessment & Plan   Assessment & Plan     Active Hospital Problems    Diagnosis  POA    **SAH (subarachnoid hemorrhage) [I60.9]  Yes    Type 2 diabetes mellitus [E11.9]  Yes    Oral candidiasis [B37.0]  Unknown    Seizure [R56.9]  Clinically Undetermined    PAF (paroxysmal atrial fibrillation) [I48.0]  Yes    Hypothyroidism [E03.9]  Yes    Essential hypertension [I10]  Yes    Coronary artery disease involving native coronary artery of native heart without angina pectoris [I25.10]  Yes      Resolved Hospital Problems    Diagnosis Date Resolved POA    Hemorrhagic stroke [I61.9] 04/25/2024 Yes    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 He presented to [facility] with a severe headache on 4/24/2024 and subsequently transferred to [facility] ([place], [place]) after imaging consistent subarachnoid hemorrhage.  Anticoagulation was reversed with Kcentra and vitamin K.  Throughout hospitalization, he has continued to improve from a neurologic standpoint. He was evaluated by speech therapy. [facility] 4/30 with NPO recs following this. Keofeed ordered.      SAH - spontaneous secondary to warfarin use   Seizure  -MRI/MRA without aneurysm, stroke team indicating idiopathic focal SAH  - Neurosurgery with no surgical intervention recommended  -coumadin reversed with Kcentra/Vitamin K -- hold warfarin for 8-12 weeks  - restart ASA only 1-2 weeks, pending stability  -repeat imaging ~ 2 weeks   - keppra 1000 mg BID started   -Normal blood pressure goals 130-150  -Recommend follow-up with neuro optometry clinic with Dr. Schroeder   - No driving x 90 days     Dysphagia   - Failed MBS 4/30 and NPO recommended  - Keofeed ordered  - Continue speech therapy at rehab      Hx of CAD  PAF  -cardiology consulted, will evaluate in 8-12 weeks for consideration of STEFANO occlusion; recommend 14 days monitor on DC; outpatient follow up with Dr. Perdomo in [facility] 2019 with 30% mid LAD, 40% distal circumflex, 40% mid RCA  -ECHO with normal EF  -ASA on hold, resume at 1-2 weeks per neurology  -statin  -outpatient cardiac monitoring at DC     DM  -SSI     HTN  -on norvasc/losartan/coreg/hydralazine  - Monitor BP. Currently on max doses of these medications      Oral candidiasis  -diflucan     Hypothyroidism  - synthroid     Expected Discharge Location and Transportation: [facility]  Expected Discharge   Expected Discharge Date: 5/1/2024; Expected Discharge Time:      DVT prophylaxis:  Medical and mechanical DVT  prophylaxis orders are present.         AM-PAC 6 Clicks Score (PT): 17 (05/01/24 1113)    CODE STATUS:   Code Status and Medical Interventions:   Ordered at: 04/24/24 1058     Code Status (Patient has no pulse and is not breathing):    CPR (Attempt to Resuscitate)     Medical Interventions (Patient has pulse or is breathing):    Full Support       Ruthie Macdnoald, DO  05/01/24

## 2024-05-01 NOTE — THERAPY TREATMENT NOTE
Patient Name: Camron Hendrix  : 1950    MRN: [id number]                              Today's Date: 2024       Admit Date: 2024    Visit Dx:     ICD-10-CM ICD-9-CM   1. Dysarthria  R47.1 784.51   2. Oropharyngeal dysphagia  R13.12 787.22   3. SAH (subarachnoid hemorrhage)  I60.9 430   4. 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    SAH (subarachnoid hemorrhage)    Type 2 diabetes mellitus    Oral candidiasis    Seizure     Past Medical History:   Diagnosis Date    AAA (abdominal aortic aneurysm)     Coronary artery disease     CVA (cerebral vascular accident)     Hypertension     Hypothyroidism     PAF (paroxysmal atrial fibrillation)     TIA (transient ischemic attack)      Past Surgical History:   Procedure Laterality Date    CARDIAC CATHETERIZATION N/A 3/29/2019    Procedure: Left Heart Cath;  Surgeon: Andrea Holloway MD;  Location: [facility] CATH INVASIVE LOCATION;  Service: Cardiovascular    CERVICAL FUSION      c4-c5    CORONARY ANGIOPLASTY WITH STENT PLACEMENT      HERNIA REPAIR      TONSILECTOMY, ADENOIDECTOMY, BILATERAL MYRINGOTOMY AND TUBES        General Information       Row Name 24 1058          Physical Therapy Time and Intention    Document Type therapy note (daily note)  -KR     Mode of Treatment physical therapy;individual therapy  -KR       Row Name 24 1055          General Information    Patient  Profile Reviewed yes  -KR     Existing Precautions/Restrictions fall;other (see comments)  diplopia, L eye visual tracking deficits; patch for mobility  -KR     Barriers to Rehab medically complex;visual deficit  -KR       Row Name 05/01/24 1058          Cognition    Orientation Status (Cognition) oriented x 3  -KR       Row Name 05/01/24 1058          Safety Issues, Functional Mobility    Safety Issues Affecting Function (Mobility) insight into deficits/self-awareness;safety precaution awareness;safety precautions follow-through/compliance;awareness of need for assistance  -KR     Impairments Affecting Function (Mobility) balance;endurance/activity tolerance;visual/perceptual;strength  -KR               User Key  (r) = Recorded By, (t) = Taken By, (c) = Cosigned By      Initials Name Provider Type    KR Irma Gaffney, PT Physical Therapist                   Mobility       Row Name 05/01/24 1100          Sit-Stand Transfer    Sit-Stand [place] (Transfers) contact guard;verbal cues  -KR     Assistive Device (Sit-Stand Transfers) walker, front-wheeled  -KR     Comment, (Sit-Stand Transfer) first stand from chair, pt with increased dizziness requiring return to sit. /80. Pt performed 2 more STS during session for mobility and addt'l 5x STS with cues for UE placement/controlled lowering.  -KR       Row Name 05/01/24 1100          Gait/Stairs (Locomotion)    [place] Level (Gait) contact guard  -KR     Assistive Device (Gait) walker, front-wheeled  -KR     Distance in Feet (Gait) 40  40' x 2  -KR     Deviations/Abnormal Patterns (Gait) bilateral deviations;triston decreased;gait speed decreased;stride length decreased;base of support, wide;weight shifting decreased  -KR     Bilateral Gait Deviations forward flexed posture;heel strike decreased  -KR     Comment, (Gait/Stairs) cues for upright posture, no LOB. Standing rest break at FDC point. Minor dizziness reported with ambulation. /96  following ambulation, nsg notified.  -KR               User Key  (r) = Recorded By, (t) = Taken By, (c) = Cosigned By      Initials Name Provider Type    Irma Reyes PT Physical Therapist                   Obj/Interventions       Row Name 05/01/24 1111          Motor Skills    Therapeutic Exercise hip;knee  -KR       Row Name 05/01/24 1111          Hip (Therapeutic Exercise)    Hip (Therapeutic Exercise) strengthening exercise  -KR     Hip Strengthening (Therapeutic Exercise) 10 repetitions;bilateral;marching while standing  -KR       Row Name 05/01/24 1111          Knee (Therapeutic Exercise)    Knee Isometrics (Therapeutic Exercise) 10 repetitions;left;quad sets;3 second hold  -KR       Row Name 05/01/24 1111          Balance    Balance Assessment sitting static balance;sitting dynamic balance;standing static balance;standing dynamic balance  -KR     Static Sitting Balance standby assist  -KR     Dynamic Sitting Balance standby assist  -KR     Position, Sitting Balance unsupported;sitting in chair  -KR     Static Standing Balance contact guard  -KR     Dynamic Standing Balance contact guard  -KR     Position/Device Used, Standing Balance supported;walker, front-wheeled  -KR     Balance Interventions sitting;standing;sit to stand;supported;static;dynamic  -KR               User Key  (r) = Recorded By, (t) = Taken By, (c) = Cosigned By      Initials Name Provider Type    Irma Reyes PT Physical Therapist                   Goals/Plan    No documentation.                  Clinical Impression       Row Name 05/01/24 1112          Pain    Pretreatment Pain Rating 0/10 - no pain  -KR     Posttreatment Pain Rating 0/10 - no pain  -KR       Row Name 05/01/24 1112          Plan of Care Review    Plan of Care Reviewed With patient  -KR     Progress improving  -KR     Outcome Evaluation Pt required less assist for standing and ambulation, however continue to be limited by dizziness. Pt will continue to benefit  from PT to address aforementioned deficits. Continue to rec IRF upon dc.  -KR       Row Name 05/01/24 1112          Vital Signs    Pre Systolic BP Rehab 175  -KR     Pre Treatment Diastolic BP 80  -KR     Post Systolic BP Rehab 199  -KR     Post Treatment Diastolic BP 96  -KR     Intra SpO2 (%) 90  -KR     O2 Delivery Intra Treatment room air  -KR     Post SpO2 (%) 92  -KR     O2 Delivery Post Treatment room air  -KR     Pre Patient Position Sitting  -KR     Intra Patient Position Standing  -KR     Post Patient Position Sitting  -KR       Row Name 05/01/24 1112          Positioning and Restraints    Pre-Treatment Position sitting in chair/recliner  -KR     Post Treatment Position chair  -KR     In Chair notified nsg;reclined;call light within reach;encouraged to call for assist;exit alarm on;with family/caregiver;waffle cushion;legs elevated  -KR               User Key  (r) = Recorded By, (t) = Taken By, (c) = Cosigned By      Initials Name Provider Type    Irma Reyes, PT Physical Therapist                   Outcome Measures       Row Name 05/01/24 1113          How much help from another person do you currently need...    Turning from your back to your side while in flat bed without using bedrails? 3  -KR     Moving from lying on back to sitting on the side of a flat bed without bedrails? 3  -KR     Moving to and from a bed to a chair (including a wheelchair)? 3  -KR     Standing up from a chair using your arms (e.g., wheelchair, bedside chair)? 3  -KR     Climbing 3-5 steps with a railing? 2  -KR     To walk in hospital room? 3  -KR     AM-PAC 6 Clicks Score (PT) 17  -KR     Highest Level of Mobility Goal 5 --> Static standing  -KR       Row Name 05/01/24 1017          Modified [place] Scale    Modified [place] Scale 3 - Moderate disability.  Requiring some help, but able to walk without assistance.  -CS       Row Name 05/01/24 1017          Functional Assessment    Outcome Measure Options AM-PAC 6 Clicks  Daily Activity (OT);Modified [place]  -CS               User Key  (r) = Recorded By, (t) = Taken By, (c) = Cosigned By      Initials Name Provider Type    Ravin Shanks, OT Occupational Therapist    Irma Reyes, PT Physical Therapist                                 Physical Therapy Education       Title: PT OT SLP Therapies (Done)       Topic: Physical Therapy (Done)       Point: Mobility training (Done)       Learning Progress Summary             Patient Acceptance, E, VU by KR at 5/1/2024 1113    Eager, E, VU,DU,NR by  at 4/30/2024 1508    Comment: Reviewed safety/technique w/transfers, ambulation, HEP, PT POC    Acceptance, E, NR by  at 4/29/2024 1212    Acceptance, E,TB, NR by  at 4/28/2024 1113    Acceptance, E, VU,NR by  at 4/25/2024 1200                         Point: Home exercise program (Done)       Learning Progress Summary             Patient Acceptance, E, VU by KR at 5/1/2024 1113    Eager, E, VU,DU,NR by  at 4/30/2024 1508    Comment: Reviewed safety/technique w/transfers, ambulation, HEP, PT POC    Acceptance, E,TB, NR by  at 4/28/2024 1113                         Point: Body mechanics (Done)       Learning Progress Summary             Patient Acceptance, E, VU by KR at 5/1/2024 1113    Eager, E, VU,DU,NR by  at 4/30/2024 1508    Comment: Reviewed safety/technique w/transfers, ambulation, HEP, PT POC    Acceptance, E, NR by  at 4/29/2024 1212    Acceptance, E,TB, NR by INGA at 4/28/2024 1113    Acceptance, E, VU,NR by  at 4/25/2024 1200                         Point: Precautions (Done)       Learning Progress Summary             Patient Acceptance, E, VU by KR at 5/1/2024 1113    Eager, E, VU,DU,NR by  at 4/30/2024 1508    Comment: Reviewed safety/technique w/transfers, ambulation, HEP, PT POC    Acceptance, E, NR by  at 4/29/2024 1212    Acceptance, E,TB, NR by INGA at 4/28/2024 1113    Acceptance, E, VU,NR by  at 4/25/2024 1200                                          User Key       Initials Effective Dates Name Provider Type Discipline    AY 11/10/20 -  Ana Lieberman, PT Physical Therapist PT    SS 06/01/21 -  Reema Croft, PT Physical Therapist PT    KR 12/30/22 -  Irma Gaffney, PT Physical Therapist PT     09/21/23 -  Georgina Lucas, PT Physical Therapist PT                  PT Recommendation and Plan     Plan of Care Reviewed With: patient  Progress: improving  Outcome Evaluation: Pt required less assist for standing and ambulation, however continue to be limited by dizziness. Pt will continue to benefit from PT to address aforementioned deficits. Continue to rec IRF upon dc.     Time Calculation:         PT Charges       Row Name 05/01/24 1115             Time Calculation    Start Time 1025  -KR      PT Received On 05/01/24  -KR         Timed Charges    47549 - PT Therapeutic Exercise Minutes 8  -KR      67808 - Gait Training Minutes  10  -KR      06927 - PT Therapeutic Activity Minutes 5  -KR         Total Minutes    Timed Charges Total Minutes 23  -KR       Total Minutes 23  -KR                User Key  (r) = Recorded By, (t) = Taken By, (c) = Cosigned By      Initials Name Provider Type    KR Irma Gaffney, PT Physical Therapist                  Therapy Charges for Today       Code Description Service Date Service Provider Modifiers Qty    90077147650 HC PT THER PROC EA 15 MIN 5/1/2024 Irma Gaffney, PT GP 1    56606237418 HC GAIT TRAINING EA 15 MIN 5/1/2024 Irma Gaffney, PT GP 1            PT G-Codes  Outcome Measure Options: AM-PAC 6 Clicks Daily Activity (OT), Modified [place]  AM-PAC 6 Clicks Score (PT): 17  AM-PAC 6 Clicks Score (OT): 14  Modified [place] Scale: 3 - Moderate disability.  Requiring some help, but able to walk without assistance.       Irma Gaffney, KARLY  5/1/2024

## 2024-05-01 NOTE — PLAN OF CARE
Goal Outcome Evaluation:  Plan of Care Reviewed With: patient        Progress: no change  Outcome Evaluation: Pt requiring less assist for ADL related mobility and demonstrates LUE strength of 4+/5. One LOB while toileting, continued education on improved safety w/ RW, and gives excellent effort with ther-ex. Verbalized good understanding of eye patch wear schedule/rotation. Remains below baseline for ADL completion, will cont IPOT per POC as tolerated. Rec d/c to IRF for best functional outcomes.      Anticipated Discharge Disposition (OT): inpatient rehabilitation facility

## 2024-05-01 NOTE — TELEPHONE ENCOUNTER
----- Message from Jumana SALCIDO sent at 4/30/2024  4:53 PM EDT -----  Jumana, Please let the patient know that I spoke with Dr. Cooney with EP and he agrees that we should move forward with a left atrial appendage occlusion ([place]).  He still needs to recover more from his stroke before it is done.  We will have him meet EP first in the office.  Thanks    Yana Alfonso, I placed the referral.  Thanks

## 2024-05-01 NOTE — CASE MANAGEMENT/SOCIAL WORK
Continued Stay Note  [facility]     Patient Name: Camron Hendrix  MRN: [id number]  Today's Date: 5/1/2024    Admit Date: 4/24/2024    Plan: [facility]   Discharge Plan       Row Name 05/01/24 1307       Plan    Plan [facility]    Patient/Family in Agreement with Plan yes    Plan Comments Mr. Gooden has a bed at [facility] when medically ready. Patient is getting a keofeed placed today and initiating tube feeds. If tolerates, plan is to discharge to [facility] tomorrow 5/2. CM will follow up on discharge plan.    Final Discharge Disposition Code 62 - inpatient rehab facility                   Discharge Codes    No documentation.                 Expected Discharge Date and Time       Expected Discharge Date Expected Discharge Time    May 1, 2024               Flory Mckay RN

## 2024-05-02 ENCOUNTER — APPOINTMENT (OUTPATIENT)
Dept: GENERAL RADIOLOGY | Facility: HOSPITAL | Age: 74
End: 2024-05-02
Payer: MEDICARE

## 2024-05-02 ENCOUNTER — READMISSION MANAGEMENT (OUTPATIENT)
Dept: CALL CENTER | Facility: HOSPITAL | Age: 74
End: 2024-05-02
Payer: MEDICARE

## 2024-05-02 VITALS
DIASTOLIC BLOOD PRESSURE: 62 MMHG | BODY MASS INDEX: 42.65 KG/M2 | OXYGEN SATURATION: 91 % | WEIGHT: 288 LBS | SYSTOLIC BLOOD PRESSURE: 125 MMHG | HEIGHT: 69 IN | HEART RATE: 60 BPM | RESPIRATION RATE: 18 BRPM | TEMPERATURE: 97.9 F

## 2024-05-02 LAB
BASOPHILS # BLD AUTO: 0.03 10*3/MM3 (ref 0–0.2)
BASOPHILS NFR BLD AUTO: 0.4 % (ref 0–1.5)
DEPRECATED RDW RBC AUTO: 41.8 FL (ref 37–54)
EOSINOPHIL # BLD AUTO: 0.03 10*3/MM3 (ref 0–0.4)
EOSINOPHIL NFR BLD AUTO: 0.4 % (ref 0.3–6.2)
ERYTHROCYTE [DISTWIDTH] IN BLOOD BY AUTOMATED COUNT: 13.4 % (ref 12.3–15.4)
GLUCOSE BLDC GLUCOMTR-MCNC: 181 MG/DL (ref 70–130)
GLUCOSE BLDC GLUCOMTR-MCNC: 188 MG/DL (ref 70–130)
GLUCOSE BLDC GLUCOMTR-MCNC: 193 MG/DL (ref 70–130)
HCT VFR BLD AUTO: 37.4 % (ref 37.5–51)
HGB BLD-MCNC: 12.9 G/DL (ref 13–17.7)
IMM GRANULOCYTES # BLD AUTO: 0.03 10*3/MM3 (ref 0–0.05)
IMM GRANULOCYTES NFR BLD AUTO: 0.4 % (ref 0–0.5)
LYMPHOCYTES # BLD AUTO: 1.1 10*3/MM3 (ref 0.7–3.1)
LYMPHOCYTES NFR BLD AUTO: 14.6 % (ref 19.6–45.3)
MCH RBC QN AUTO: 29.5 PG (ref 26.6–33)
MCHC RBC AUTO-ENTMCNC: 34.5 G/DL (ref 31.5–35.7)
MCV RBC AUTO: 85.4 FL (ref 79–97)
MONOCYTES # BLD AUTO: 1.03 10*3/MM3 (ref 0.1–0.9)
MONOCYTES NFR BLD AUTO: 13.6 % (ref 5–12)
NEUTROPHILS NFR BLD AUTO: 5.34 10*3/MM3 (ref 1.7–7)
NEUTROPHILS NFR BLD AUTO: 70.6 % (ref 42.7–76)
NRBC BLD AUTO-RTO: 0 /100 WBC (ref 0–0.2)
PLATELET # BLD AUTO: 221 10*3/MM3 (ref 140–450)
PMV BLD AUTO: 10.8 FL (ref 6–12)
RBC # BLD AUTO: 4.38 10*6/MM3 (ref 4.14–5.8)
WBC NRBC COR # BLD AUTO: 7.56 10*3/MM3 (ref 3.4–10.8)

## 2024-05-02 PROCEDURE — 94799 UNLISTED PULMONARY SVC/PX: CPT

## 2024-05-02 PROCEDURE — 85025 COMPLETE CBC W/AUTO DIFF WBC: CPT | Performed by: INTERNAL MEDICINE

## 2024-05-02 PROCEDURE — 63710000001 INSULIN REGULAR HUMAN PER 5 UNITS: Performed by: INTERNAL MEDICINE

## 2024-05-02 PROCEDURE — 74018 RADEX ABDOMEN 1 VIEW: CPT

## 2024-05-02 PROCEDURE — 99239 HOSP IP/OBS DSCHRG MGMT >30: CPT | Performed by: INTERNAL MEDICINE

## 2024-05-02 PROCEDURE — 82948 REAGENT STRIP/BLOOD GLUCOSE: CPT

## 2024-05-02 PROCEDURE — 71045 X-RAY EXAM CHEST 1 VIEW: CPT

## 2024-05-02 PROCEDURE — 25010000002 HEPARIN (PORCINE) PER 1000 UNITS: Performed by: INTERNAL MEDICINE

## 2024-05-02 RX ORDER — LEVETIRACETAM 1000 MG/1
1000 TABLET ORAL EVERY 12 HOURS SCHEDULED
Start: 2024-05-02 | End: 2024-05-02

## 2024-05-02 RX ORDER — MIRTAZAPINE 7.5 MG/1
7.5 TABLET, FILM COATED ORAL NIGHTLY
Start: 2024-05-02

## 2024-05-02 RX ORDER — BUMETANIDE 1 MG/1
1 TABLET ORAL DAILY
Start: 2024-05-02

## 2024-05-02 RX ORDER — ATORVASTATIN CALCIUM 40 MG/1
40 TABLET, FILM COATED ORAL NIGHTLY
Start: 2024-05-02 | End: 2024-05-02

## 2024-05-02 RX ORDER — HYDRALAZINE HYDROCHLORIDE 100 MG/1
100 TABLET, FILM COATED ORAL EVERY 8 HOURS SCHEDULED
Start: 2024-05-02

## 2024-05-02 RX ORDER — HYDRALAZINE HYDROCHLORIDE 100 MG/1
100 TABLET, FILM COATED ORAL EVERY 8 HOURS SCHEDULED
Start: 2024-05-02 | End: 2024-05-02

## 2024-05-02 RX ORDER — CETIRIZINE HYDROCHLORIDE 10 MG/1
10 TABLET ORAL DAILY
Start: 2024-05-02 | End: 2024-06-01

## 2024-05-02 RX ORDER — AMLODIPINE BESYLATE 10 MG/1
10 TABLET ORAL
Start: 2024-05-03 | End: 2024-05-02

## 2024-05-02 RX ORDER — LOSARTAN POTASSIUM 100 MG/1
100 TABLET ORAL DAILY
Start: 2024-05-02

## 2024-05-02 RX ORDER — LEVOTHYROXINE SODIUM 0.2 MG/1
200 TABLET ORAL
Start: 2024-05-02

## 2024-05-02 RX ORDER — AMLODIPINE BESYLATE 10 MG/1
10 TABLET ORAL
Start: 2024-05-03

## 2024-05-02 RX ORDER — MIRTAZAPINE 7.5 MG/1
7.5 TABLET, FILM COATED ORAL NIGHTLY
Start: 2024-05-02 | End: 2024-05-02

## 2024-05-02 RX ORDER — CETIRIZINE HYDROCHLORIDE 10 MG/1
10 TABLET ORAL DAILY
Start: 2024-05-03 | End: 2024-05-02

## 2024-05-02 RX ORDER — ATORVASTATIN CALCIUM 40 MG/1
40 TABLET, FILM COATED ORAL NIGHTLY
Start: 2024-05-02 | End: 2024-05-05 | Stop reason: HOSPADM

## 2024-05-02 RX ORDER — CARVEDILOL 25 MG/1
25 TABLET ORAL EVERY 12 HOURS SCHEDULED
Start: 2024-05-02

## 2024-05-02 RX ORDER — LEVETIRACETAM 1000 MG/1
1000 TABLET ORAL EVERY 12 HOURS SCHEDULED
Start: 2024-05-02

## 2024-05-02 RX ORDER — CARVEDILOL 25 MG/1
25 TABLET ORAL EVERY 12 HOURS SCHEDULED
Start: 2024-05-02 | End: 2024-05-02

## 2024-05-02 RX ORDER — CETIRIZINE HYDROCHLORIDE 10 MG/1
10 TABLET ORAL DAILY
Start: 2024-05-03 | End: 2024-05-05 | Stop reason: HOSPADM

## 2024-05-02 RX ADMIN — LEVETIRACETAM 1000 MG: 500 TABLET, FILM COATED ORAL at 08:56

## 2024-05-02 RX ADMIN — PHENOL 1 SPRAY: 1.5 LIQUID ORAL at 05:47

## 2024-05-02 RX ADMIN — HEPARIN SODIUM 5000 UNITS: 5000 INJECTION INTRAVENOUS; SUBCUTANEOUS at 05:53

## 2024-05-02 RX ADMIN — PHENOL 1 SPRAY: 1.5 LIQUID ORAL at 08:54

## 2024-05-02 RX ADMIN — IPRATROPIUM BROMIDE AND ALBUTEROL SULFATE 3 ML: 2.5; .5 SOLUTION RESPIRATORY (INHALATION) at 06:43

## 2024-05-02 RX ADMIN — CARVEDILOL 25 MG: 12.5 TABLET, FILM COATED ORAL at 08:55

## 2024-05-02 RX ADMIN — INSULIN HUMAN 2 UNITS: 100 INJECTION, SOLUTION PARENTERAL at 05:53

## 2024-05-02 RX ADMIN — HYDRALAZINE HYDROCHLORIDE 100 MG: 50 TABLET ORAL at 05:46

## 2024-05-02 RX ADMIN — AMLODIPINE BESYLATE 10 MG: 10 TABLET ORAL at 08:55

## 2024-05-02 RX ADMIN — PHENOL 1 SPRAY: 1.5 LIQUID ORAL at 02:47

## 2024-05-02 RX ADMIN — Medication 10 ML: at 08:56

## 2024-05-02 RX ADMIN — CETIRIZINE HYDROCHLORIDE 10 MG: 10 TABLET, FILM COATED ORAL at 08:56

## 2024-05-02 RX ADMIN — LEVOTHYROXINE SODIUM 200 MCG: 0.1 TABLET ORAL at 05:46

## 2024-05-02 RX ADMIN — INSULIN HUMAN 2 UNITS: 100 INJECTION, SOLUTION PARENTERAL at 12:06

## 2024-05-02 RX ADMIN — IPRATROPIUM BROMIDE AND ALBUTEROL SULFATE 3 ML: 2.5; .5 SOLUTION RESPIRATORY (INHALATION) at 12:27

## 2024-05-02 RX ADMIN — LOSARTAN POTASSIUM 100 MG: 50 TABLET, FILM COATED ORAL at 08:56

## 2024-05-02 RX ADMIN — INSULIN HUMAN 2 UNITS: 100 INJECTION, SOLUTION PARENTERAL at 00:22

## 2024-05-02 NOTE — PROGRESS NOTES
"                  Clinical Nutrition     Patient Name: Camron Hendrix  YOB: 1950  MRN: [id number]  Date of Encounter: 05/02/24 09:55 EDT  Admission date: 4/24/2024  Reason for Visit: Follow-up protocol, EN    Assessment   Nutrition Assessment   Admission Diagnosis:  Hemorrhagic stroke [I61.9]    Problem List:    SAH (subarachnoid hemorrhage)    Hypothyroidism    Essential hypertension    Coronary artery disease involving native coronary artery of native heart without angina pectoris    PAF (paroxysmal atrial fibrillation)    Type 2 diabetes mellitus    Oral candidiasis    Seizure    Dysphagia      PMH:   He  has a past medical history of AAA (abdominal aortic aneurysm), Coronary artery disease, CVA (cerebral vascular accident) (2011), Hypertension, Hypothyroidism, PAF (paroxysmal atrial fibrillation), and TIA (transient ischemic attack).    PSH:  He  has a past surgical history that includes Coronary angioplasty with stent; Cervical fusion; Cardiac catheterization (N/A, 3/29/2019); Tonsilectomy, adenoidectomy, bilateral myringotomy and tubes; and Hernia repair.    Applicable Nutrition History:   05/01/24: NGT placement     Labs    Labs Reviewed: Yes  Glu 181    Medications    Medications Reviewed: yes  Insulin, mirtazapine, zofran    Intake/Ouptut 24 hrs (0701 - 0700)   I&O's Reviewed: yes  LBM noted today, 5/2 x2    Anthropometrics     Height: Height: 175.3 cm (69\")  Last Filed Weight: Weight: 131 kg (288 lb) (04/28/24 1138)  Method: Weight Method: Bed scale  BMI: BMI (Calculated): 42.5    UBW:    Weight      Weight (kg) Weight (lbs) Weight Method   3/13/2023 127.007 kg  280 lb     7/19/2023 128.549 kg  283 lb 6.4 oz  Stated     124.739 kg  275 lb     4/24/2024 129 kg  284 lb 6.3 oz  Bed scale    4/25/2024 130.8 kg  288 lb 5.8 oz  Bed scale    4/26/2024 130.636 kg  288 lb     4/28/2024 130.636 kg  288 lb       Weight change: Patient reported 30# weight loss since starting ozempic in Jan. Unable to " "assess true weight loss 2/2 fluid status.    Nutrition Focused Physical Exam     Date: 24     Patient meets criteria for malnutrition diagnosis, see MSA note.     Subjective   Reported/Observed/Food/Nutrition Related History:     24  Patient tolerating TF well per family at bedside. No c/o GI symptoms.    24  Spoke w/ patient and daughter at bedside who reported patient has lost around 30# since starting Ozempic this past .     Needs Assessment   Date: 24     Height used:Height: 175.3 cm (69\")  Weights used: 131kg CBW / 70.7kg IBW    Estimated Calorie needs: ~2150  Kcal/day  Method:  MSJ 1.2 = 2466  Method:  Kcals/KG 14-18 = 0220-6344    Estimated Protein needs: ~87g PRO/day  Method: 1.2g/Kg IBW    Estimated Fluid needs: ~ ml/day   Per clinical status    Current Nutrition Prescription     PO: NPO Diet NPO Type: Tube Feeding  Oral Nutrition Supplement: n/a  Intake: N/A    EN: IsoSource 1.5  Goal Rate:  Water Flushes:   Modular: None  Route: NG  Tube: Small bore    At goal over: 22Hrs/day    Rx will supply:   Goal Volume 1430  mL/day     Flush Volume 1100 mL/day     Energy 2145 Kcal/day 100 % Est Need   Protein 97 g/day 111 % Est Need   Fiber 21.7 g/day     Water in  EN 1087 mL     Total Water 2187 mL     Meet DRI Yes        --------------------------------------------------------------------------  Product/Rate verified at bedside: Yes  Infusing Rate at time of visit: 35/50    Average Delivery from [place] Day: just initiated yesterday afternoon  Volume 93 mL/day   % Goal Vol.   Flush Volume 250 mL/day     Energy  Kcal/day  % Est Need   Protein  g/day  % Est Need   Fiber  g/day     Water in  EN  mL     Total Water  mL     Meet DRI No          Assessment & Plan   Nutrition Diagnosis   Date: 24  Updated: 24  Problem Swallowing difficulty    Etiology SAH   Signs/Symptoms NPO per SLP/need for EN   Status: Ongoing; EN to titrate to goal    Goal:   Nutrition to support treatment " and Establish EN tolerance, Tolerate EN at goal, Deliver estimated needs      Nutrition Intervention      Follow treatment progress, Care plan reviewed    EN recommendations:  Continue increasing Isosource 1.5 10ml q6h as tolerated until goal of 65ml/hr is reached + 50ml/hr FWF  1430ml TV/d  2145 kcal (16 kcal/kg CBW; 100% est needs)  97g pro (1.4g/kg IBW; 111% est needs)  21.7g fiber  1087ml TF + 1100ml FW = 2187ml/d    Monitoring/Evaluation:   Per protocol, I&O, Pertinent labs, EN delivery/tolerance, Weight, Symptoms, POC/GOC, Swallow function    Anita Ulloa RD  Time Spent: 30min

## 2024-05-02 NOTE — OUTREACH NOTE
Prep Survey      Flowsheet Row Responses   [facility] facility patient discharged from? [facility]   Is LACE score < 7 ? No   Eligibility Not Eligible   What are the reasons patient is not eligible? [facility]  [[facility]]   Does the patient have one of the following disease processes/diagnoses(primary or secondary)? Stroke   Prep survey completed? Yes            Vivian TAYLOR - Registered Nurse

## 2024-05-02 NOTE — DISCHARGE SUMMARY
[facility] Medicine Services  DISCHARGE SUMMARY    Patient Name: Camron Hendrix  : 1950  MRN: [id number]    Date of Admission: 2024 10:22 AM  Date of Discharge:  24  Primary Care Physician: Leslie Rhodes MD    Consults       Date and Time Order Name Status Description    2024  8:24 AM Inpatient Cardiology Consult Completed     2024 11:15 AM Inpatient Neurosurgery Consult Completed     2024 10:58 AM Inpatient Neurosurgery Consult Completed             Hospital Course     Presenting Problem: SAH    Active Hospital Problems    Diagnosis  POA    **SAH (subarachnoid hemorrhage) [I60.9]  Yes    Dysphagia [R13.10]  No    Type 2 diabetes mellitus [E11.9]  Yes    Oral candidiasis [B37.0]  No    Seizure [R56.9]  Yes    PAF (paroxysmal atrial fibrillation) [I48.0]  Yes    Hypothyroidism [E03.9]  Yes    Essential hypertension [I10]  Yes    Coronary artery disease involving native coronary artery of native heart without angina pectoris [I25.10]  Yes      Resolved Hospital Problems    Diagnosis Date Resolved POA    Hemorrhagic stroke [I61.9] 2024 Yes    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 [I61.3] 2024 Yes          Hospital Course:  Camron Hendrix is a 73M with past medical history significant for hypertension, dyslipidemia, diabetes mellitus, CAD, CVA () and PAF (on coumadin). He presented to OSH with a severe headache on 2024 and subsequently transferred to [facility] ([place], [place]) after imaging consistent subarachnoid hemorrhage.  Anticoagulation was reversed with Kcentra and vitamin K.  Throughout hospitalization, he has continued to improve from a neurologic standpoint. He was evaluated by speech therapy. MBS  with NPO recs following this. Keofeed ordered.      SAH - spontaneous secondary to warfarin use   Seizure  - MRI/MRA without aneurysm, stroke team indicating idiopathic focal SAH  -  Neurosurgery evaluated with no surgical intervention recommended  -coumadin reversed with Kcentra/Vitamin K   -- hold warfarin for 8-12 weeks  - restart ASA only 1-2 weeks, pending stability  -repeat imaging ~ 2 weeks -- ordered per neurology   - keppra 1000 mg BID started   -Normal blood pressure goals 130-150  -Recommend follow-up with neuro optometry clinic with Dr. Schroeder   - No driving x 90 days   -Follow up stroke neurology 2-4 weeks      Dysphagia   - Failed MBS 4/30 and NPO recommended  - Keofeed placed 5/1. Currently tolerating tube feeds. Recommend continued speech therapy at rehab     Congestion  - CXR with no acute findings; CBC with normal WBC   - Zyrtec and nasal spray     Hx of CAD  PAF  -cardiology consulted, will evaluate in 8-12 weeks for consideration of STEFANO occlusion; recommend 14 days monitor on DC; outpatient follow up with Dr. Perdomo in [place]  - EP follow up ordered    -[facility] 2019 with 30% mid LAD, 40% distal circumflex, 40% mid RCA  -ECHO with normal EF  -ASA on hold, resume at 1-2 weeks per neurology  -statin  -outpatient cardiac monitoring at DC     DM  -SSI     HTN  -on norvasc/losartan/coreg/hydralazine  - Monitor BP. Currently on max doses of these medications. BP currently stable. Recommend close monitoring      Oral candidiasis  - completed diflucan     Hypothyroidism  - synthroid     Discharge Follow Up Recommendations for outpatient labs/diagnostics:  PCP Dr. Rhodes 1 week   Stroke Neurology 2-4 weeks   Cardiology EP Dr. Cooney 2-4 weeks     Day of Discharge     HPI:   No acute events. Some congestion. CXR obtained and negative. Normal WBC. Reviewed plans for Dc and he is agreeable. Answered all questions to the best of my ability.         Vital Signs:   Temp:  [97.8 °F (36.6 °C)-99.1 °F (37.3 °C)] 99.1 °F (37.3 °C)  Heart Rate:  [67-86] 75  Resp:  [18-20] 18  BP: (138-178)/(59-90) 143/74  Flow (L/min):  [2] 2      Physical Exam:  Constitutional: No acute distress, awake, alert;  up in the chair  HENT: NCAT, mucous membranes moist  Respiratory: diminished; poor effort.   Cardiovascular: RRR, no murmurs, rubs, or gallops  Gastrointestinal: Positive bowel sounds, soft, nontender, nondistended  Musculoskeletal: +1 bilateral ankle edema  Psychiatric: Appropriate affect, cooperative  Neurologic:  strength symmetric in all extremities, Cranial Nerves grossly intact to confrontation, speech clear  Skin: No rashes      Pertinent  and/or Most Recent Results     LAB RESULTS:      Lab 05/02/24  0906 04/29/24  0446   WBC 7.56 8.23   HEMOGLOBIN 12.9* 14.0   HEMATOCRIT 37.4* 41.3   PLATELETS 221 190   NEUTROS ABS 5.34 5.93   IMMATURE GRANS (ABS) 0.03 0.03   LYMPHS ABS 1.10 1.35   MONOS ABS 1.03* 0.85   EOS ABS 0.03 0.04   MCV 85.4 87.7         Lab 04/29/24  2149 04/29/24 0446 04/28/24  0233 04/27/24 2002 04/27/24  0910 04/26/24  0627 04/26/24  0627 04/26/24  0006   SODIUM  --  138 139  --  139  --  141  --    POTASSIUM 3.9 3.3* 3.7 4.3 3.5   < > 3.8  --    CHLORIDE  --  103 108*  --  107  --  106  --    CO2  --  25.0 22.0  --  24.0  --  24.0  --    ANION GAP  --  10.0 9.0  --  8.0  --  11.0  --    BUN  --  14 11  --  9  --  7*  --    CREATININE  --  0.93 0.83  --  0.75*  --  0.77  --    EGFR  --  87.2 93.0  --  95.9  --  95.1  --    GLUCOSE  --  167* 167*  --  184*  --  153*  --    CALCIUM  --  8.3* 8.2*  --  8.0*  --  7.9*  --    MAGNESIUM  --   --   --   --   --   --   --  2.1    < > = values in this interval not displayed.         Lab 04/27/24  0910   TOTAL PROTEIN 5.3*   ALBUMIN 3.4*   GLOBULIN 1.9   ALT (SGPT) 19   AST (SGOT) 18   BILIRUBIN 1.2   ALK PHOS 87                     Brief Urine Lab Results  (Last result in the past 365 days)        Color   Clarity   Blood   Leuk Est   Nitrite   Protein   CREAT   Urine HCG        07/19/23 1700 Yellow   Clear   Negative   Negative   Negative   Negative                 Microbiology Results (last 10 days)       ** No results found for the last 240 hours. **             XR Chest 1 View    Result Date: 5/2/2024  XR CHEST 1 VW Date of Exam: 5/2/2024 8:44 AM EDT Indication: new cough Comparison: 4/29/2024 Findings: Since the prior study, the PICC line has flipped up into the right internal jugular vein where it forms a loop, the tip directed back down inferiorly, but still within the internal jugular vein. Total length of the PICC line that appears to be within the  internal jugular vein is approximately 10 cm. Feeding tube is now seen passing at least to the left hemidiaphragm, not well visualized below this level due to image technique and dense appearance of the upper abdomen. Heart is normal in size. Vasculature is cephalized. There is mild, improving left basilar atelectasis. No pneumothorax is seen.     Impression: 1. PICC line tip has flipped into the right internal jugular vein. Findings were discussed with the patient's 3F nurse at 9:05 a.m. 5/2/2024. 2. Mild improving left basilar atelectasis. Electronically Signed: Live Swift MD  5/2/2024 9:06 AM EDT  Workstation ID: [id number]    XR Abdomen KUB    Result Date: 5/2/2024  XR ABDOMEN KUB Date of Exam: 5/2/2024 1:01 AM EDT Indication: keofeed tube placement. Comparison: KUB dated 5/1/2024 at 1251 hours Findings: There is a feeding tube with the tip in the antrum the stomach. The gas pattern is nonspecific. There is bilateral basilar atelectasis.     Impression: Feeding tube is in the stomach Electronically Signed: Tommie Coates MD  5/2/2024 1:33 AM EDT  Workstation ID: [id number]    XR Abdomen KUB    Result Date: 5/1/2024  XR ABDOMEN KUB Date of Exam: 5/1/2024 12:39 PM EDT Indication: [place] feed placment Comparison: 4/20/2024 chest radiograph. No recent comparison KUB. Findings: Feeding tube is seen with its tip in the distal stomach. Upper abdominal bowel gas pattern is nonobstructive. There is mild left basilar discoid atelectasis as on recent chest radiograph.     Impression: Feeding tube tip in the distal stomach.  Electronically Signed: Live Swift MD  5/1/2024 1:11 PM EDT  Workstation ID: [id number]    SLP FEES - Fiberoptic Endo Eval Swallow    Result Date: 4/30/2024  This procedure was auto-finalized with no dictation required.    XR Chest 1 View    Result Date: 4/29/2024  XR CHEST 1 VW Date of Exam: 4/29/2024 12:58 AM EDT Indication: SOA/wheezing Comparison: Chest radiograph dated April 24, 2024 Findings: There is a right-sided peripherally inserted catheter with the tip in the super vena cava. The heart is enlarged. The pulmonary vascular markings are normal. There is discoid airspace disease in the left base which could be atelectasis.     Impression: Mild left basilar atelectasis. Electronically Signed: Tommie Coates MD  4/29/2024 1:16 AM EDT  Workstation ID: [id number]    Duplex Venous Upper Extremity - Left CAR    Result Date: 4/28/2024    The left upper extremity venous duplex scan is negative for evidence of a DVT and SVT.     Adult Transthoracic Echo Complete W/ Cont if Necessary Per Protocol (With Agitated Saline)    Result Date: 4/26/2024    Left ventricular systolic function is normal. Calculated left ventricular EF = 65.8% Left ventricular ejection fraction appears to be 66 - 70%.   Left ventricular wall thickness is consistent with mild to moderate concentric hypertrophy.   Left ventricular diastolic function is consistent with (grade I) impaired relaxation.   Saline test results are negative for right to left atrial level shunt.   Peak velocity of the flow distal to the aortic valve is 263 cm/s. Aortic valve mean pressure gradient is 15 mmHg.   Ascending aorta = 4.4 cm     SLP FEES - Fiberoptic Endo Eval Swallow    Result Date: 4/26/2024  This procedure was auto-finalized with no dictation required.    CT Head Without Contrast    Result Date: 4/25/2024  CT HEAD WO CONTRAST Date of Exam: 4/25/2024 4:54 AM EDT Indication: SAH. Comparison: 1 day prior. Technique: Axial CT images were obtained of the head without  contrast administration.  Automated exposure control and iterative construction methods were used. Findings: Expected evolving appearance of previously noted posterior fossa subarachnoid hemorrhage most confluent within the prepontine cistern extending towards the left cerebellopontine angle. There is no evidence of new hemorrhage or increasing mass effect. The  ventricles are unchanged. Some scattered areas of hyperdensity overlying the occipital lobes is favored artifactual, given symmetry and absence of associated signal abnormality on recent MRI. The orbits are normal. The paranasal sinuses are clear. The calvarium is intact.     Impression: Expected evolution of previously noted posterior fossa subarachnoid hemorrhage as above. No evidence of new hemorrhage or increasing mass effect. Electronically Signed: Kameron Delcid MD  4/25/2024 8:40 AM EDT  Workstation ID: [id number]    CT Head Without Contrast    Result Date: 4/24/2024  CT HEAD WO CONTRAST Date of Exam: 4/24/2024 3:34 PM EDT Indication: Stroke, hemorrhagic. Comparison: Head CT 4/24/2024 Technique: Axial CT images were obtained of the head without contrast administration.  Automated exposure control and iterative construction methods were used. Findings: Redemonstrated hyperdense subarachnoid blood within the interpeduncular cistern, left paramesencephalic cistern, left prepontine cistern, cerebellopontine angle extending caudally towards foramen magnum. Distribution and volume grossly unchanged from previous CT comparison. No hydrocephalus. No large territory loss of gray-white differentiation. No new areas of acute hemorrhage. Mild global parenchymal volume loss. Minimal periventricular hypodensities similar to prior suggesting sequelae of chronic microvascular ischemic change. Distal vertebral artery and carotid atherosclerotic disease. Symmetric orbits. Trace mastoid fluid. Minimal sinus mucosal thickening without obstruction or air-fluid level.  Negative for depressed skull fracture.     Impression: Similar volume and distribution of subarachnoid hemorrhage detailed above. No hydrocephalus or new areas of acute hemorrhage. Distribution of blood, lack of traumatic or vascular etiology on previous imaging raises possibility of nonaneurysmal perimesencephalic subarachnoid hemorrhage. Electronically Signed: Delmer Benítez MD  4/24/2024 5:00 PM EDT  Workstation ID: [id number]    MRI Angiogram Neck With Contrast    Result Date: 4/24/2024  MRI ANGIOGRAM NECK W CONTRAST Date of Exam: 4/24/2024 2:36 PM EDT Indication: Stroke, follow up.  Comparison: None available. Technique:  Routine 3-D time-of-flight gradient echo imaging was obtained of the neck after the uneventful administration of 19 mL Multihance. Findings: Patent aortic arch with three-vessel branching. The visualized subclavian arteries are patent. The ICA origins are patent bilaterally with 0% stenosis by NASCET criteria. The vertebral arteries are normal in course and caliber bilaterally.     Impression: Normal MR angiogram of the neck. Electronically Signed: Kameron Delcid MD  4/24/2024 3:06 PM EDT  Workstation ID: [id number]    MRI Brain Without Contrast    Result Date: 4/24/2024  MRI ANGIOGRAM HEAD WO CONTRAST, MRI BRAIN WO CONTRAST Date of Exam: 4/24/2024 2:03 PM EDT Indication: Stroke, hemorrhagic.  Comparison: None available. Technique:  Routine 3-D time-of-flight gradient echo imaging was obtained of the head without contrast administration. Multiplanar multisequence MRI of the brain performed without IV contrast. Findings: MRI brain: No acute infarct is present on diffusion weighted sequences. Midline structures are normal and the craniocervical junction redemonstrates signal abnormality correlating with known subarachnoid hemorrhage layering within the posterior fossa, most conspicuously within the prepontine cistern, extending down to the level of the foramen magnum. The adjacent brainstem  demonstrates no significant edema. Age-related changes are present with minimal volume loss and mild typical T2 hyperintense subcortical and pontine white matter changes, nonspecific but favored to reflect sequela of chronic microvascular ischemia. The ventricles are normal in size and configuration. The orbits are normal. The paranasal sinuses are clear. MR angiogram: The carotid siphons demonstrate expected flow related signal. The anterior cerebral arteries are normal. The right middle cerebral artery demonstrates abnormal appearance consistent with known severe stenosis and likely chronic occlusion of  the distal M1 segment with solitary filling anterior temporal M2 branch present and collateral filling of more distal branches similar to prior. There is also some long segment moderate narrowing of the left M1 segment MCA. The vertebral arteries demonstrate expected flow related signal without evidence of aneurysm or stenosis. The basilar artery is patent. The posterior cerebral arteries appear normal.     Impression: Redemonstrated layering subarachnoid hemorrhage within the posterior fossa including the left prepontine cistern, cerebellopontine angle and foramen magnum. No evidence of increased hemorrhage adjacent brain parenchymal edema or associated infarct. Stable vascular findings from comparison CT angiogram, without evidence of etiologic aneurysm. Redemonstrated abnormal appearance of the right MCA as above. Electronically Signed: Kameron Delcid MD  4/24/2024 2:49 PM EDT  Workstation ID: [id number]    MRI Angiogram Head Without Contrast    Result Date: 4/24/2024  MRI ANGIOGRAM HEAD WO CONTRAST, MRI BRAIN WO CONTRAST Date of Exam: 4/24/2024 2:03 PM EDT Indication: Stroke, hemorrhagic.  Comparison: None available. Technique:  Routine 3-D time-of-flight gradient echo imaging was obtained of the head without contrast administration. Multiplanar multisequence MRI of the brain performed without IV contrast.  Findings: MRI brain: No acute infarct is present on diffusion weighted sequences. Midline structures are normal and the craniocervical junction redemonstrates signal abnormality correlating with known subarachnoid hemorrhage layering within the posterior fossa, most conspicuously within the prepontine cistern, extending down to the level of the foramen magnum. The adjacent brainstem demonstrates no significant edema. Age-related changes are present with minimal volume loss and mild typical T2 hyperintense subcortical and pontine white matter changes, nonspecific but favored to reflect sequela of chronic microvascular ischemia. The ventricles are normal in size and configuration. The orbits are normal. The paranasal sinuses are clear. MR angiogram: The carotid siphons demonstrate expected flow related signal. The anterior cerebral arteries are normal. The right middle cerebral artery demonstrates abnormal appearance consistent with known severe stenosis and likely chronic occlusion of  the distal M1 segment with solitary filling anterior temporal M2 branch present and collateral filling of more distal branches similar to prior. There is also some long segment moderate narrowing of the left M1 segment MCA. The vertebral arteries demonstrate expected flow related signal without evidence of aneurysm or stenosis. The basilar artery is patent. The posterior cerebral arteries appear normal.     Impression: Redemonstrated layering subarachnoid hemorrhage within the posterior fossa including the left prepontine cistern, cerebellopontine angle and foramen magnum. No evidence of increased hemorrhage adjacent brain parenchymal edema or associated infarct. Stable vascular findings from comparison CT angiogram, without evidence of etiologic aneurysm. Redemonstrated abnormal appearance of the right MCA as above. Electronically Signed: Kameron Delcid MD  4/24/2024 2:49 PM EDT  Workstation ID: [id number]    XR Chest 1 View    Result  Date: 4/24/2024  XR CHEST 1 VW Date of Exam: 4/24/2024 11:25 AM EDT Indication: acute cva, h/oCAD Comparison: Chest radiograph 7/19/2023. Findings: Cardiomediastinal silhouette is mildly enlarged, unchanged. Similar right mild right hemidiaphragm elevation. Similar scattered subsegmental atelectasis/scarring. No focal consolidation or overt pulmonary edema. No pleural effusion or pneumothorax. Osseous structures are unchanged.     Impression: No evidence of acute cardiopulmonary disease. Electronically Signed: Pasha Fermin MD  4/24/2024 11:50 AM EDT  Workstation ID: [id number]    CT Head Without Contrast Stroke Protocol    Result Date: 4/24/2024  CT HEAD WO CONTRAST STROKE PROTOCOL, CT ANGIOGRAM HEAD W AI ANALYSIS OF LVO, CT ANGIOGRAM NECK Date of Exam: 4/24/2024 10:29 AM EDT Indication: Neuro deficit, acute, stroke suspected. Comparison: MRI 7/20/2023. Technique: Axial CT images of the brain were obtained prior to and after the administration of 115 mL Isovue-370. CTA of the head and neck were performed after the administration of iodinated contrast.  Reconstructed coronal and sagittal images were also  obtained. A 3-D volume rendered image was created for interpretation. Automated exposure control and iterative reconstruction methods were used.   Findings: CT head: There is prominent acute subarachnoid hemorrhage seen within the posterior fossa, extending along the left prepontine cistern down towards the level of the foramen magnum and cranially towards the interpeduncular cistern and left ambient cistern. There is no evidence of intraventricular or intraparenchymal hemorrhage. The brain demonstrates generalized volume loss as well as typical white matter changes of senescence. The ventricles are normal in size and configuration. The orbits are normal. The paranasal sinuses are clear. CT ANGIOGRAM: The lung apices are clear. Evaluation of the neck soft tissues demonstrates no pathologic cervical adenopathy or  unexpected aerodigestive tract lesion. The osseous structures demonstrate no evidence of acute fracture or aggressive osseous lesion, with multilevel spondylosis changes present. Patent aortic arch with three-vessel branching. The visualized subclavian arteries are patent bilaterally. The ICA origins demonstrate some mild calcific atherosclerotic change with 0% narrowing present bilaterally by NASCET criteria. The vertebral arteries are normal in course and caliber bilaterally. Intracranially, the carotid siphons demonstrate calcific atherosclerotic changes with some associated mild narrowing of the supraclinoid segments. The anterior cerebral arteries demonstrate some multifocal moderate atherosclerotic narrowing without evidence of large vessel occlusion or aneurysm. The right middle cerebral artery demonstrates stable abnormal appearance including long segment severe narrowing and subsequent occlusion of the right M1 segment middle cerebral artery with collateral filling of proximal M2 and distal M3 branches similar to prior. The left middle cerebral artery also demonstrates some unchanged moderate atherosclerotic disease. The vertebral arteries demonstrate mild atherosclerotic narrowing without definite aneurysm or focal severe stenosis. The basilar artery is patent. The posterior cerebral arteries demonstrate some unchanged multifocal moderate atherosclerotic narrowing.     Impression: CT head demonstrates prominent subarachnoid hemorrhage within the posterior fossa layering within the prepontine cistern extending down to the foramen magnum and cranially to the level of the midbrain. No additional intracranial hemorrhage is evident and  there are typical moderate age-related changes as above. CT angiogram demonstrates no definite etiologic aneurysm, with findings potentially secondary to benign perimesencephalic hemorrhage. 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 Findings discussed with the stroke team by Stewart Delcid via phone at 11:07 a.m. 4/24/2024 Electronically Signed: Kameron Delcid MD  4/24/2024 11:14 AM EDT  Workstation ID: [id number]    CT Angiogram Head w AI Analysis of LVO    Result Date: 4/24/2024  CT HEAD WO CONTRAST STROKE PROTOCOL, CT ANGIOGRAM HEAD W AI ANALYSIS OF LVO, CT ANGIOGRAM NECK Date of Exam: 4/24/2024 10:29 AM EDT Indication: Neuro deficit, acute, stroke suspected. Comparison: MRI 7/20/2023. Technique: Axial CT images of the brain were obtained prior to and after the administration of 115 mL Isovue-370. CTA of the head and neck were performed after the administration of iodinated contrast.  Reconstructed coronal and sagittal images were also  obtained. A 3-D volume rendered image was created for interpretation. Automated exposure control and iterative reconstruction methods were used.   Findings: CT head: There is prominent acute subarachnoid hemorrhage seen within the posterior fossa, extending along the left prepontine cistern down towards the level of the foramen magnum and cranially towards the interpeduncular cistern and left ambient cistern. There is no evidence of intraventricular or intraparenchymal hemorrhage. The brain demonstrates generalized volume loss as well as typical white matter changes of senescence. The ventricles are normal in size and configuration. The orbits are normal. The paranasal sinuses are clear. CT ANGIOGRAM: The lung apices are clear. Evaluation of the neck soft tissues demonstrates no pathologic cervical adenopathy or unexpected aerodigestive tract lesion. The osseous structures demonstrate no evidence of acute fracture or aggressive osseous lesion, with multilevel spondylosis changes present. Patent aortic arch with three-vessel branching. The visualized subclavian arteries are patent bilaterally. The ICA origins  demonstrate some mild calcific atherosclerotic change with 0% narrowing present bilaterally by NASCET criteria. The vertebral arteries are normal in course and caliber bilaterally. Intracranially, the carotid siphons demonstrate calcific atherosclerotic changes with some associated mild narrowing of the supraclinoid segments. The anterior cerebral arteries demonstrate some multifocal moderate atherosclerotic narrowing without evidence of large vessel occlusion or aneurysm. The right middle cerebral artery demonstrates stable abnormal appearance including long segment severe narrowing and subsequent occlusion of the right M1 segment middle cerebral artery with collateral filling of proximal M2 and distal M3 branches similar to prior. The left middle cerebral artery also demonstrates some unchanged moderate atherosclerotic disease. The vertebral arteries demonstrate mild atherosclerotic narrowing without definite aneurysm or focal severe stenosis. The basilar artery is patent. The posterior cerebral arteries demonstrate some unchanged multifocal moderate atherosclerotic narrowing.     Impression: CT head demonstrates prominent subarachnoid hemorrhage within the posterior fossa layering within the prepontine cistern extending down to the foramen magnum and cranially to the level of the midbrain. No additional intracranial hemorrhage is evident and  there are typical moderate age-related changes as above. CT angiogram demonstrates no definite etiologic aneurysm, with findings potentially secondary to benign perimesencephalic hemorrhage. 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 Findings discussed with the stroke team by Stewart Delcid via phone at 11:07 a.m. 4/24/2024 Electronically Signed: Kameron Delcid MD  4/24/2024 11:14 AM EDT  Workstation ID: [id number]    CT Angiogram  Neck    Result Date: 4/24/2024  CT HEAD WO CONTRAST STROKE PROTOCOL, CT ANGIOGRAM HEAD W AI ANALYSIS OF LVO, CT ANGIOGRAM NECK Date of Exam: 4/24/2024 10:29 AM EDT Indication: Neuro deficit, acute, stroke suspected. Comparison: MRI 7/20/2023. Technique: Axial CT images of the brain were obtained prior to and after the administration of 115 mL Isovue-370. CTA of the head and neck were performed after the administration of iodinated contrast.  Reconstructed coronal and sagittal images were also  obtained. A 3-D volume rendered image was created for interpretation. Automated exposure control and iterative reconstruction methods were used.   Findings: CT head: There is prominent acute subarachnoid hemorrhage seen within the posterior fossa, extending along the left prepontine cistern down towards the level of the foramen magnum and cranially towards the interpeduncular cistern and left ambient cistern. There is no evidence of intraventricular or intraparenchymal hemorrhage. The brain demonstrates generalized volume loss as well as typical white matter changes of senescence. The ventricles are normal in size and configuration. The orbits are normal. The paranasal sinuses are clear. CT ANGIOGRAM: The lung apices are clear. Evaluation of the neck soft tissues demonstrates no pathologic cervical adenopathy or unexpected aerodigestive tract lesion. The osseous structures demonstrate no evidence of acute fracture or aggressive osseous lesion, with multilevel spondylosis changes present. Patent aortic arch with three-vessel branching. The visualized subclavian arteries are patent bilaterally. The ICA origins demonstrate some mild calcific atherosclerotic change with 0% narrowing present bilaterally by NASCET criteria. The vertebral arteries are normal in course and caliber bilaterally. Intracranially, the carotid siphons demonstrate calcific atherosclerotic changes with some associated mild narrowing of the supraclinoid  segments. The anterior cerebral arteries demonstrate some multifocal moderate atherosclerotic narrowing without evidence of large vessel occlusion or aneurysm. The right middle cerebral artery demonstrates stable abnormal appearance including long segment severe narrowing and subsequent occlusion of the right M1 segment middle cerebral artery with collateral filling of proximal M2 and distal M3 branches similar to prior. The left middle cerebral artery also demonstrates some unchanged moderate atherosclerotic disease. The vertebral arteries demonstrate mild atherosclerotic narrowing without definite aneurysm or focal severe stenosis. The basilar artery is patent. The posterior cerebral arteries demonstrate some unchanged multifocal moderate atherosclerotic narrowing.     Impression: CT head demonstrates prominent subarachnoid hemorrhage within the posterior fossa layering within the prepontine cistern extending down to the foramen magnum and cranially to the level of the midbrain. No additional intracranial hemorrhage is evident and  there are typical moderate age-related changes as above. CT angiogram demonstrates no definite etiologic aneurysm, with findings potentially secondary to benign perimesencephalic hemorrhage. 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 Findings discussed with the stroke team by Stewart Delcid via phone at 11:07 a.m. 2024 Electronically Signed: Kameron Delcid MD  2024 11:14 AM EDT  Workstation ID: [id number]    CT Head Without Contrast    Result Date: 2024  [id number] [place] [address] Phone: [phone] Fax: [phone] Name: AVEL GUTIERREZ Exam Date: 2024 : 1950 Age 73 years Gender: M Accession: [id number] MRN: [id number] Physician: CATALINA ALEMAN Facility: [facility]  Facility HSV: Outpatient Exam: CT BRAIN W/O HEAD CT HISTORY: Headache. TECHNIQUE: Multiple axial CT images were performed from the foramen magnum to the vertex without enhancement. FINDINGS: The ventricles are normal in size. There is subarachnoid hemorrhage overlying the midbrain and ct. No masses are identified. No extra-axial fluid is seen. The sinuses are normal. IMPRESSION: Subarachnoid hemorrhage overlying the midbrain and ct. Films reviewed , interpreted and dictated by Dr. Andrei Downing Transcribed by Anil Brady PA-C. Dictated By:  Andrei Navarro Transcribed By: Andrei Downing Transcribed On: 4/24/2024 8:51 AM Electronically signed by: Andrei Navarro 4/24/2024 Thank you for referring AVEL GUTIERREZ to [facility]. Legally authenticated by ED GRIMALDO 2024-04-24 08:51:17     Results for orders placed during the hospital encounter of 04/24/24    Duplex Venous Upper Extremity - Left CAR    Interpretation Summary    The left upper extremity venous duplex scan is negative for evidence of a DVT and SVT.      Results for orders placed during the hospital encounter of 04/24/24    Duplex Venous Upper Extremity - Left CAR    Interpretation Summary    The left upper extremity venous duplex scan is negative for evidence of a DVT and SVT.      Results for orders placed during the hospital encounter of 04/24/24    Adult Transthoracic Echo Complete W/ Cont if Necessary Per Protocol (With Agitated Saline)    Interpretation Summary    Left ventricular systolic function is normal. Calculated left ventricular EF = 65.8% Left ventricular ejection fraction appears to be 66 - 70%.    Left ventricular wall thickness is consistent with mild to moderate concentric hypertrophy.    Left ventricular diastolic function is consistent with (grade I) impaired relaxation.    Saline test results are negative for right to left atrial level shunt.    Peak velocity of the flow distal to the aortic valve is  263 cm/s. Aortic valve mean pressure gradient is 15 mmHg.    Ascending aorta = 4.4 cm      Plan for Follow-up of Pending Labs/Results:     Discharge Details        Discharge Medications        New Medications        Instructions Start Date   cetirizine 10 MG tablet  Commonly known as: zyrTEC   10 mg, Oral, Daily      cetirizine 10 MG tablet  Commonly known as: zyrTEC   10 mg, Per G Tube, Daily   Start Date: May 3, 2024     levETIRAcetam 1000 MG tablet  Commonly known as: KEPPRA   1,000 mg, Nasogastric, Every 12 Hours Scheduled      mirtazapine 7.5 MG tablet  Commonly known as: REMERON   7.5 mg, Nasogastric, Nightly             Changes to Medications        Instructions Start Date   amLODIPine 10 MG tablet  Commonly known as: NORVASC  What changed:   medication strength  how much to take  how to take this  when to take this   10 mg, Per G Tube, Every 24 Hours Scheduled   Start Date: May 3, 2024     atorvastatin 40 MG tablet  Commonly known as: LIPITOR  What changed:   medication strength  how much to take  how to take this   40 mg, Per G Tube, Nightly      bumetanide 1 MG tablet  Commonly known as: BUMEX  What changed: how to take this   1 mg, Per G Tube, Daily      carvedilol 25 MG tablet  Commonly known as: COREG  What changed:   medication strength  how much to take  how to take this  when to take this   25 mg, Per G Tube, Every 12 Hours Scheduled      hydrALAZINE 100 MG tablet  Commonly known as: APRESOLINE  What changed:   medication strength  how much to take  how to take this  when to take this   100 mg, Per G Tube, Every 8 Hours Scheduled      levothyroxine 200 MCG tablet  Commonly known as: SYNTHROID, LEVOTHROID  What changed: how to take this   200 mcg, Per G Tube, Every Early Morning      losartan 100 MG tablet  Commonly known as: COZAAR  What changed: how to take this   100 mg, Per G Tube, Daily             Stop These Medications      aspirin 81 MG EC tablet     potassium chloride 10 MEQ CR tablet      warfarin 10 MG tablet  Commonly known as: COUMADIN              Allergies   Allergen Reactions    Codeine Hallucinations         Discharge Disposition:  Home or Self Care    Diet:  Hospital:  Diet Order   Procedures    NPO Diet NPO Type: Tube Feeding       Diet Instructions       Diet: Tube Feeding; Continuous; Isosource 1.5; goal 65 ml/hr; free water 50 ml every 1 hr      Discharge Diet: Tube Feeding    Feeding Type: Continuous    Formula & Rate: Isosource 1.5; goal 65 ml/hr; free water 50 ml every 1 hr             Activity:  Activity Instructions       Activity as Tolerated              Restrictions or Other Recommendations:         CODE STATUS:    Code Status and Medical Interventions:   Ordered at: 04/24/24 1058     Code Status (Patient has no pulse and is not breathing):    CPR (Attempt to Resuscitate)     Medical Interventions (Patient has pulse or is breathing):    Full Support       Future Appointments   Date Time Provider Department Center   5/9/2024  2:30 PM [facility]       Additional Instructions for the Follow-ups that You Need to Schedule       CT Head Without Contrast   May 02, 2024      Please schedule for 2 weeks - same day as neurology follow-up.    STEREOTACTIC GUIDANCE PROTOCOL?: No   Release to patient: Routine Release        Discharge Follow-up with PCP   As directed       Currently Documented PCP:    Leslie Rhodes MD    PCP Phone Number:    [phone]     Follow Up Details: PCP Dr. Rhodes 1 week                      Ruthie Macdonald DO  05/02/24      Time Spent on Discharge:  I spent  35  minutes on this discharge activity which included: face-to-face encounter with the patient, reviewing the data in the system, coordination of the care with the nursing staff as well as consultants, documentation, and entering orders.

## 2024-05-02 NOTE — DISCHARGE PLACEMENT REQUEST
"Avel Gutierrez (73 y.o. Male)       Date of Birth   1950    Social Security Number       Address   [address] [place] [address]    Home Phone   [phone]    MRN   [id number]       [facility]    Marital Status                               Admission Date   4/24/24    Admission Type   Elective    Admitting Provider   Ruthie Macdonald DO    Attending Provider   Ruthie Macdonald DO    Department, Room/Bed   [address], S325/1       Discharge Date       Discharge Disposition   Home or Self Care    Discharge Destination                                 Attending Provider: Ruthie Macdonald DO    Allergies: Codeine    Isolation: None   Infection: None   Code Status: CPR    Ht: 175.3 cm (69\")   Wt: 131 kg (288 lb)    Admission Cmt: None   Principal Problem: SAH (subarachnoid hemorrhage) [I60.9]                   Active Insurance as of 4/24/2024       Primary Coverage       Payor Plan Insurance Group Employer/Plan Group    MEDICARE MEDICARE A & B        Payor Plan Address Payor Plan Phone Number Payor Plan Fax Number Effective Dates     [id number] [phone]  5/1/2014 - None Entered    [facility] [id number]         Subscriber Name Subscriber Birth Date Member ID       AVEL GUTIERREZ 1950 [id number]               Secondary Coverage       Payor Plan Insurance Group Employer/Plan Group    [employer] [place] [employer] [place] [employer]        Payor Plan Address Payor Plan Phone Number Payor Plan Fax Number Effective Dates    [address] [employer] [place] [employer] [address] [phone]  6/1/2016 - None Entered    [facility] [id number]         Subscriber Name Subscriber Birth Date Member ID       AVEL GUTIERREZ 1950 [id number]                     Emergency Contacts        (Rel.) Home Phone Work Phone Mobile Phone    Heena Adan (Daughter) [phone] -- [phone]    VargheseMisti (Daughter) [phone] -- [phone]                 Discharge Summary        Ruthie Macdonald DO " at 24 1058              [facility] Medicine Services  DISCHARGE SUMMARY    Patient Name: Camron Hendrix  : 1950  MRN: [id number]    Date of Admission: 2024 10:22 AM  Date of Discharge:  24  Primary Care Physician: Leslie Rhodes MD    Consults       Date and Time Order Name Status Description    2024  8:24 AM Inpatient Cardiology Consult Completed     2024 11:15 AM Inpatient Neurosurgery Consult Completed     2024 10:58 AM Inpatient Neurosurgery Consult Completed             Hospital Course     Presenting Problem: SAH    Active Hospital Problems    Diagnosis  POA    **SAH (subarachnoid hemorrhage) [I60.9]  Yes    Dysphagia [R13.10]  No    Type 2 diabetes mellitus [E11.9]  Yes    Oral candidiasis [B37.0]  No    Seizure [R56.9]  Yes    PAF (paroxysmal atrial fibrillation) [I48.0]  Yes    Hypothyroidism [E03.9]  Yes    Essential hypertension [I10]  Yes    Coronary artery disease involving native coronary artery of native heart without angina pectoris [I25.10]  Yes      Resolved Hospital Problems    Diagnosis Date Resolved POA    Hemorrhagic stroke [I61.9] 2024 Yes    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 [I61.3] 2024 Yes          Hospital Course:  Camron Hendrix is a 73M with past medical history significant for hypertension, dyslipidemia, diabetes mellitus, CAD, CVA () and PAF (on coumadin). He presented to OSH with a severe headache on 2024 and subsequently transferred to [facility] ([place], [place]) after imaging consistent subarachnoid hemorrhage.  Anticoagulation was reversed with Kcentra and vitamin K.  Throughout hospitalization, he has continued to improve from a neurologic standpoint. He was evaluated by speech therapy. MBS  with NPO recs following this. Keofeed ordered.      SAH - spontaneous secondary to warfarin use   Seizure  - MRI/MRA without aneurysm, stroke team indicating  idiopathic focal SAH  - Neurosurgery evaluated with no surgical intervention recommended  -coumadin reversed with Kcentra/Vitamin K   -- hold warfarin for 8-12 weeks  - restart ASA only 1-2 weeks, pending stability  -repeat imaging ~ 2 weeks -- ordered per neurology   - keppra 1000 mg BID started   -Normal blood pressure goals 130-150  -Recommend follow-up with neuro optometry clinic with Dr. Schroeder   - No driving x 90 days   -Follow up stroke neurology 2-4 weeks      Dysphagia   - Failed MBS 4/30 and NPO recommended  - Keofeed placed 5/1. Currently tolerating tube feeds. Recommend continued speech therapy at rehab     Congestion  - CXR with no acute findings; CBC with normal WBC   - Zyrtec and nasal spray     Hx of CAD  PAF  -cardiology consulted, will evaluate in 8-12 weeks for consideration of STEFANO occlusion; recommend 14 days monitor on DC; outpatient follow up with Dr. Perdomo in [place]  - EP follow up ordered    -[facility] 2019 with 30% mid LAD, 40% distal circumflex, 40% mid RCA  -ECHO with normal EF  -ASA on hold, resume at 1-2 weeks per neurology  -statin  -outpatient cardiac monitoring at DC     DM  -SSI     HTN  -on norvasc/losartan/coreg/hydralazine  - Monitor BP. Currently on max doses of these medications. BP currently stable. Recommend close monitoring      Oral candidiasis  - completed diflucan     Hypothyroidism  - synthroid     Discharge Follow Up Recommendations for outpatient labs/diagnostics:  PCP Dr. Rhodes 1 week   Stroke Neurology 2-4 weeks   Cardiology EP Dr. Cooney 2-4 weeks     Day of Discharge     HPI:   No acute events. Some congestion. CXR obtained and negative. Normal WBC. Reviewed plans for Dc and he is agreeable. Answered all questions to the best of my ability.         Vital Signs:   Temp:  [97.8 °F (36.6 °C)-99.1 °F (37.3 °C)] 99.1 °F (37.3 °C)  Heart Rate:  [67-86] 75  Resp:  [18-20] 18  BP: (138-178)/(59-90) 143/74  Flow (L/min):  [2] 2      Physical Exam:  Constitutional: No acute  distress, awake, alert; up in the chair  HENT: NCAT, mucous membranes moist  Respiratory: diminished; poor effort.   Cardiovascular: RRR, no murmurs, rubs, or gallops  Gastrointestinal: Positive bowel sounds, soft, nontender, nondistended  Musculoskeletal: +1 bilateral ankle edema  Psychiatric: Appropriate affect, cooperative  Neurologic:  strength symmetric in all extremities, Cranial Nerves grossly intact to confrontation, speech clear  Skin: No rashes      Pertinent  and/or Most Recent Results     LAB RESULTS:      Lab 05/02/24  0906 04/29/24  0446   WBC 7.56 8.23   HEMOGLOBIN 12.9* 14.0   HEMATOCRIT 37.4* 41.3   PLATELETS 221 190   NEUTROS ABS 5.34 5.93   IMMATURE GRANS (ABS) 0.03 0.03   LYMPHS ABS 1.10 1.35   MONOS ABS 1.03* 0.85   EOS ABS 0.03 0.04   MCV 85.4 87.7         Lab 04/29/24  2149 04/29/24  0446 04/28/24  0233 04/27/24 2002 04/27/24  0910 04/26/24  0627 04/26/24  0627 04/26/24  0006   SODIUM  --  138 139  --  139  --  141  --    POTASSIUM 3.9 3.3* 3.7 4.3 3.5   < > 3.8  --    CHLORIDE  --  103 108*  --  107  --  106  --    CO2  --  25.0 22.0  --  24.0  --  24.0  --    ANION GAP  --  10.0 9.0  --  8.0  --  11.0  --    BUN  --  14 11  --  9  --  7*  --    CREATININE  --  0.93 0.83  --  0.75*  --  0.77  --    EGFR  --  87.2 93.0  --  95.9  --  95.1  --    GLUCOSE  --  167* 167*  --  184*  --  153*  --    CALCIUM  --  8.3* 8.2*  --  8.0*  --  7.9*  --    MAGNESIUM  --   --   --   --   --   --   --  2.1    < > = values in this interval not displayed.         Lab 04/27/24  0910   TOTAL PROTEIN 5.3*   ALBUMIN 3.4*   GLOBULIN 1.9   ALT (SGPT) 19   AST (SGOT) 18   BILIRUBIN 1.2   ALK PHOS 87                     Brief Urine Lab Results  (Last result in the past 365 days)        Color   Clarity   Blood   Leuk Est   Nitrite   Protein   CREAT   Urine HCG        07/19/23 1700 Yellow   Clear   Negative   Negative   Negative   Negative                 Microbiology Results (last 10 days)       ** No results found  for the last 240 hours. **            XR Chest 1 View    Result Date: 5/2/2024  XR CHEST 1 VW Date of Exam: 5/2/2024 8:44 AM EDT Indication: new cough Comparison: 4/29/2024 Findings: Since the prior study, the PICC line has flipped up into the right internal jugular vein where it forms a loop, the tip directed back down inferiorly, but still within the internal jugular vein. Total length of the PICC line that appears to be within the  internal jugular vein is approximately 10 cm. Feeding tube is now seen passing at least to the left hemidiaphragm, not well visualized below this level due to image technique and dense appearance of the upper abdomen. Heart is normal in size. Vasculature is cephalized. There is mild, improving left basilar atelectasis. No pneumothorax is seen.     Impression: 1. PICC line tip has flipped into the right internal jugular vein. Findings were discussed with the patient's 3F nurse at 9:05 a.m. 5/2/2024. 2. Mild improving left basilar atelectasis. Electronically Signed: Live Swift MD  5/2/2024 9:06 AM EDT  Workstation ID: [id number]    XR Abdomen KUB    Result Date: 5/2/2024  XR ABDOMEN KUB Date of Exam: 5/2/2024 1:01 AM EDT Indication: keofeed tube placement. Comparison: KUB dated 5/1/2024 at 1251 hours Findings: There is a feeding tube with the tip in the antrum the stomach. The gas pattern is nonspecific. There is bilateral basilar atelectasis.     Impression: Feeding tube is in the stomach Electronically Signed: Tommie Coates MD  5/2/2024 1:33 AM EDT  Workstation ID: [id number]    XR Abdomen KUB    Result Date: 5/1/2024  XR ABDOMEN KUB Date of Exam: 5/1/2024 12:39 PM EDT Indication: [place] feed placment Comparison: 4/20/2024 chest radiograph. No recent comparison KUB. Findings: Feeding tube is seen with its tip in the distal stomach. Upper abdominal bowel gas pattern is nonobstructive. There is mild left basilar discoid atelectasis as on recent chest radiograph.     Impression: Feeding tube tip  in the distal stomach. Electronically Signed: Live Swift MD  5/1/2024 1:11 PM EDT  Workstation ID: [id number]    SLP FEES - Fiberoptic Endo Eval Swallow    Result Date: 4/30/2024  This procedure was auto-finalized with no dictation required.    XR Chest 1 View    Result Date: 4/29/2024  XR CHEST 1 VW Date of Exam: 4/29/2024 12:58 AM EDT Indication: SOA/wheezing Comparison: Chest radiograph dated April 24, 2024 Findings: There is a right-sided peripherally inserted catheter with the tip in the super vena cava. The heart is enlarged. The pulmonary vascular markings are normal. There is discoid airspace disease in the left base which could be atelectasis.     Impression: Mild left basilar atelectasis. Electronically Signed: Tommie Coates MD  4/29/2024 1:16 AM EDT  Workstation ID: [id number]    Duplex Venous Upper Extremity - Left CAR    Result Date: 4/28/2024    The left upper extremity venous duplex scan is negative for evidence of a DVT and SVT.     Adult Transthoracic Echo Complete W/ Cont if Necessary Per Protocol (With Agitated Saline)    Result Date: 4/26/2024    Left ventricular systolic function is normal. Calculated left ventricular EF = 65.8% Left ventricular ejection fraction appears to be 66 - 70%.   Left ventricular wall thickness is consistent with mild to moderate concentric hypertrophy.   Left ventricular diastolic function is consistent with (grade I) impaired relaxation.   Saline test results are negative for right to left atrial level shunt.   Peak velocity of the flow distal to the aortic valve is 263 cm/s. Aortic valve mean pressure gradient is 15 mmHg.   Ascending aorta = 4.4 cm     SLP FEES - Fiberoptic Endo Eval Swallow    Result Date: 4/26/2024  This procedure was auto-finalized with no dictation required.    CT Head Without Contrast    Result Date: 4/25/2024  CT HEAD WO CONTRAST Date of Exam: 4/25/2024 4:54 AM EDT Indication: SAH. Comparison: 1 day prior. Technique: Axial CT images were obtained of  the head without contrast administration.  Automated exposure control and iterative construction methods were used. Findings: Expected evolving appearance of previously noted posterior fossa subarachnoid hemorrhage most confluent within the prepontine cistern extending towards the left cerebellopontine angle. There is no evidence of new hemorrhage or increasing mass effect. The  ventricles are unchanged. Some scattered areas of hyperdensity overlying the occipital lobes is favored artifactual, given symmetry and absence of associated signal abnormality on recent MRI. The orbits are normal. The paranasal sinuses are clear. The calvarium is intact.     Impression: Expected evolution of previously noted posterior fossa subarachnoid hemorrhage as above. No evidence of new hemorrhage or increasing mass effect. Electronically Signed: Kameron Delcid MD  4/25/2024 8:40 AM EDT  Workstation ID: [id number]    CT Head Without Contrast    Result Date: 4/24/2024  CT HEAD WO CONTRAST Date of Exam: 4/24/2024 3:34 PM EDT Indication: Stroke, hemorrhagic. Comparison: Head CT 4/24/2024 Technique: Axial CT images were obtained of the head without contrast administration.  Automated exposure control and iterative construction methods were used. Findings: Redemonstrated hyperdense subarachnoid blood within the interpeduncular cistern, left paramesencephalic cistern, left prepontine cistern, cerebellopontine angle extending caudally towards foramen magnum. Distribution and volume grossly unchanged from previous CT comparison. No hydrocephalus. No large territory loss of gray-white differentiation. No new areas of acute hemorrhage. Mild global parenchymal volume loss. Minimal periventricular hypodensities similar to prior suggesting sequelae of chronic microvascular ischemic change. Distal vertebral artery and carotid atherosclerotic disease. Symmetric orbits. Trace mastoid fluid. Minimal sinus mucosal thickening without obstruction or  air-fluid level. Negative for depressed skull fracture.     Impression: Similar volume and distribution of subarachnoid hemorrhage detailed above. No hydrocephalus or new areas of acute hemorrhage. Distribution of blood, lack of traumatic or vascular etiology on previous imaging raises possibility of nonaneurysmal perimesencephalic subarachnoid hemorrhage. Electronically Signed: Delmer Benítez MD  4/24/2024 5:00 PM EDT  Workstation ID: [id number]    MRI Angiogram Neck With Contrast    Result Date: 4/24/2024  MRI ANGIOGRAM NECK W CONTRAST Date of Exam: 4/24/2024 2:36 PM EDT Indication: Stroke, follow up.  Comparison: None available. Technique:  Routine 3-D time-of-flight gradient echo imaging was obtained of the neck after the uneventful administration of 19 mL Multihance. Findings: Patent aortic arch with three-vessel branching. The visualized subclavian arteries are patent. The ICA origins are patent bilaterally with 0% stenosis by NASCET criteria. The vertebral arteries are normal in course and caliber bilaterally.     Impression: Normal MR angiogram of the neck. Electronically Signed: Kameron Delcid MD  4/24/2024 3:06 PM EDT  Workstation ID: [id number]    MRI Brain Without Contrast    Result Date: 4/24/2024  MRI ANGIOGRAM HEAD WO CONTRAST, MRI BRAIN WO CONTRAST Date of Exam: 4/24/2024 2:03 PM EDT Indication: Stroke, hemorrhagic.  Comparison: None available. Technique:  Routine 3-D time-of-flight gradient echo imaging was obtained of the head without contrast administration. Multiplanar multisequence MRI of the brain performed without IV contrast. Findings: MRI brain: No acute infarct is present on diffusion weighted sequences. Midline structures are normal and the craniocervical junction redemonstrates signal abnormality correlating with known subarachnoid hemorrhage layering within the posterior fossa, most conspicuously within the prepontine cistern, extending down to the level of the foramen magnum. The adjacent  brainstem demonstrates no significant edema. Age-related changes are present with minimal volume loss and mild typical T2 hyperintense subcortical and pontine white matter changes, nonspecific but favored to reflect sequela of chronic microvascular ischemia. The ventricles are normal in size and configuration. The orbits are normal. The paranasal sinuses are clear. MR angiogram: The carotid siphons demonstrate expected flow related signal. The anterior cerebral arteries are normal. The right middle cerebral artery demonstrates abnormal appearance consistent with known severe stenosis and likely chronic occlusion of  the distal M1 segment with solitary filling anterior temporal M2 branch present and collateral filling of more distal branches similar to prior. There is also some long segment moderate narrowing of the left M1 segment MCA. The vertebral arteries demonstrate expected flow related signal without evidence of aneurysm or stenosis. The basilar artery is patent. The posterior cerebral arteries appear normal.     Impression: Redemonstrated layering subarachnoid hemorrhage within the posterior fossa including the left prepontine cistern, cerebellopontine angle and foramen magnum. No evidence of increased hemorrhage adjacent brain parenchymal edema or associated infarct. Stable vascular findings from comparison CT angiogram, without evidence of etiologic aneurysm. Redemonstrated abnormal appearance of the right MCA as above. Electronically Signed: Kameron Delcid MD  4/24/2024 2:49 PM EDT  Workstation ID: [id number]    MRI Angiogram Head Without Contrast    Result Date: 4/24/2024  MRI ANGIOGRAM HEAD WO CONTRAST, MRI BRAIN WO CONTRAST Date of Exam: 4/24/2024 2:03 PM EDT Indication: Stroke, hemorrhagic.  Comparison: None available. Technique:  Routine 3-D time-of-flight gradient echo imaging was obtained of the head without contrast administration. Multiplanar multisequence MRI of the brain performed without IV  contrast. Findings: MRI brain: No acute infarct is present on diffusion weighted sequences. Midline structures are normal and the craniocervical junction redemonstrates signal abnormality correlating with known subarachnoid hemorrhage layering within the posterior fossa, most conspicuously within the prepontine cistern, extending down to the level of the foramen magnum. The adjacent brainstem demonstrates no significant edema. Age-related changes are present with minimal volume loss and mild typical T2 hyperintense subcortical and pontine white matter changes, nonspecific but favored to reflect sequela of chronic microvascular ischemia. The ventricles are normal in size and configuration. The orbits are normal. The paranasal sinuses are clear. MR angiogram: The carotid siphons demonstrate expected flow related signal. The anterior cerebral arteries are normal. The right middle cerebral artery demonstrates abnormal appearance consistent with known severe stenosis and likely chronic occlusion of  the distal M1 segment with solitary filling anterior temporal M2 branch present and collateral filling of more distal branches similar to prior. There is also some long segment moderate narrowing of the left M1 segment MCA. The vertebral arteries demonstrate expected flow related signal without evidence of aneurysm or stenosis. The basilar artery is patent. The posterior cerebral arteries appear normal.     Impression: Redemonstrated layering subarachnoid hemorrhage within the posterior fossa including the left prepontine cistern, cerebellopontine angle and foramen magnum. No evidence of increased hemorrhage adjacent brain parenchymal edema or associated infarct. Stable vascular findings from comparison CT angiogram, without evidence of etiologic aneurysm. Redemonstrated abnormal appearance of the right MCA as above. Electronically Signed: Kameron Delcid MD  4/24/2024 2:49 PM EDT  Workstation ID: [id number]    XR Chest 1  View    Result Date: 4/24/2024  XR CHEST 1 VW Date of Exam: 4/24/2024 11:25 AM EDT Indication: acute cva, h/oCAD Comparison: Chest radiograph 7/19/2023. Findings: Cardiomediastinal silhouette is mildly enlarged, unchanged. Similar right mild right hemidiaphragm elevation. Similar scattered subsegmental atelectasis/scarring. No focal consolidation or overt pulmonary edema. No pleural effusion or pneumothorax. Osseous structures are unchanged.     Impression: No evidence of acute cardiopulmonary disease. Electronically Signed: Pasha Fermin MD  4/24/2024 11:50 AM EDT  Workstation ID: [id number]    CT Head Without Contrast Stroke Protocol    Result Date: 4/24/2024  CT HEAD WO CONTRAST STROKE PROTOCOL, CT ANGIOGRAM HEAD W AI ANALYSIS OF LVO, CT ANGIOGRAM NECK Date of Exam: 4/24/2024 10:29 AM EDT Indication: Neuro deficit, acute, stroke suspected. Comparison: MRI 7/20/2023. Technique: Axial CT images of the brain were obtained prior to and after the administration of 115 mL Isovue-370. CTA of the head and neck were performed after the administration of iodinated contrast.  Reconstructed coronal and sagittal images were also  obtained. A 3-D volume rendered image was created for interpretation. Automated exposure control and iterative reconstruction methods were used.   Findings: CT head: There is prominent acute subarachnoid hemorrhage seen within the posterior fossa, extending along the left prepontine cistern down towards the level of the foramen magnum and cranially towards the interpeduncular cistern and left ambient cistern. There is no evidence of intraventricular or intraparenchymal hemorrhage. The brain demonstrates generalized volume loss as well as typical white matter changes of senescence. The ventricles are normal in size and configuration. The orbits are normal. The paranasal sinuses are clear. CT ANGIOGRAM: The lung apices are clear. Evaluation of the neck soft tissues demonstrates no pathologic cervical  adenopathy or unexpected aerodigestive tract lesion. The osseous structures demonstrate no evidence of acute fracture or aggressive osseous lesion, with multilevel spondylosis changes present. Patent aortic arch with three-vessel branching. The visualized subclavian arteries are patent bilaterally. The ICA origins demonstrate some mild calcific atherosclerotic change with 0% narrowing present bilaterally by NASCET criteria. The vertebral arteries are normal in course and caliber bilaterally. Intracranially, the carotid siphons demonstrate calcific atherosclerotic changes with some associated mild narrowing of the supraclinoid segments. The anterior cerebral arteries demonstrate some multifocal moderate atherosclerotic narrowing without evidence of large vessel occlusion or aneurysm. The right middle cerebral artery demonstrates stable abnormal appearance including long segment severe narrowing and subsequent occlusion of the right M1 segment middle cerebral artery with collateral filling of proximal M2 and distal M3 branches similar to prior. The left middle cerebral artery also demonstrates some unchanged moderate atherosclerotic disease. The vertebral arteries demonstrate mild atherosclerotic narrowing without definite aneurysm or focal severe stenosis. The basilar artery is patent. The posterior cerebral arteries demonstrate some unchanged multifocal moderate atherosclerotic narrowing.     Impression: CT head demonstrates prominent subarachnoid hemorrhage within the posterior fossa layering within the prepontine cistern extending down to the foramen magnum and cranially to the level of the midbrain. No additional intracranial hemorrhage is evident and  there are typical moderate age-related changes as above. CT angiogram demonstrates no definite etiologic aneurysm, with findings potentially secondary to benign perimesencephalic hemorrhage. 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 Findings discussed with the stroke team by Stewart Delcid via phone at 11:07 a.m. 4/24/2024 Electronically Signed: Kameron Delcid MD  4/24/2024 11:14 AM EDT  Workstation ID: [id number]    CT Angiogram Head w AI Analysis of LVO    Result Date: 4/24/2024  CT HEAD WO CONTRAST STROKE PROTOCOL, CT ANGIOGRAM HEAD W AI ANALYSIS OF LVO, CT ANGIOGRAM NECK Date of Exam: 4/24/2024 10:29 AM EDT Indication: Neuro deficit, acute, stroke suspected. Comparison: MRI 7/20/2023. Technique: Axial CT images of the brain were obtained prior to and after the administration of 115 mL Isovue-370. CTA of the head and neck were performed after the administration of iodinated contrast.  Reconstructed coronal and sagittal images were also  obtained. A 3-D volume rendered image was created for interpretation. Automated exposure control and iterative reconstruction methods were used.   Findings: CT head: There is prominent acute subarachnoid hemorrhage seen within the posterior fossa, extending along the left prepontine cistern down towards the level of the foramen magnum and cranially towards the interpeduncular cistern and left ambient cistern. There is no evidence of intraventricular or intraparenchymal hemorrhage. The brain demonstrates generalized volume loss as well as typical white matter changes of senescence. The ventricles are normal in size and configuration. The orbits are normal. The paranasal sinuses are clear. CT ANGIOGRAM: The lung apices are clear. Evaluation of the neck soft tissues demonstrates no pathologic cervical adenopathy or unexpected aerodigestive tract lesion. The osseous structures demonstrate no evidence of acute fracture or aggressive osseous lesion, with multilevel spondylosis changes present. Patent aortic arch with three-vessel branching. The visualized subclavian arteries are patent bilaterally. The  ICA origins demonstrate some mild calcific atherosclerotic change with 0% narrowing present bilaterally by NASCET criteria. The vertebral arteries are normal in course and caliber bilaterally. Intracranially, the carotid siphons demonstrate calcific atherosclerotic changes with some associated mild narrowing of the supraclinoid segments. The anterior cerebral arteries demonstrate some multifocal moderate atherosclerotic narrowing without evidence of large vessel occlusion or aneurysm. The right middle cerebral artery demonstrates stable abnormal appearance including long segment severe narrowing and subsequent occlusion of the right M1 segment middle cerebral artery with collateral filling of proximal M2 and distal M3 branches similar to prior. The left middle cerebral artery also demonstrates some unchanged moderate atherosclerotic disease. The vertebral arteries demonstrate mild atherosclerotic narrowing without definite aneurysm or focal severe stenosis. The basilar artery is patent. The posterior cerebral arteries demonstrate some unchanged multifocal moderate atherosclerotic narrowing.     Impression: CT head demonstrates prominent subarachnoid hemorrhage within the posterior fossa layering within the prepontine cistern extending down to the foramen magnum and cranially to the level of the midbrain. No additional intracranial hemorrhage is evident and  there are typical moderate age-related changes as above. CT angiogram demonstrates no definite etiologic aneurysm, with findings potentially secondary to benign perimesencephalic hemorrhage. 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 Findings discussed with the stroke team by Stewart Delcid via phone at 11:07 a.m. 4/24/2024 Electronically Signed: Kameron Delcid MD  4/24/2024 11:14 AM EDT  Workstation ID:  [id number]    CT Angiogram Neck    Result Date: 4/24/2024  CT HEAD WO CONTRAST STROKE PROTOCOL, CT ANGIOGRAM HEAD W AI ANALYSIS OF LVO, CT ANGIOGRAM NECK Date of Exam: 4/24/2024 10:29 AM EDT Indication: Neuro deficit, acute, stroke suspected. Comparison: MRI 7/20/2023. Technique: Axial CT images of the brain were obtained prior to and after the administration of 115 mL Isovue-370. CTA of the head and neck were performed after the administration of iodinated contrast.  Reconstructed coronal and sagittal images were also  obtained. A 3-D volume rendered image was created for interpretation. Automated exposure control and iterative reconstruction methods were used.   Findings: CT head: There is prominent acute subarachnoid hemorrhage seen within the posterior fossa, extending along the left prepontine cistern down towards the level of the foramen magnum and cranially towards the interpeduncular cistern and left ambient cistern. There is no evidence of intraventricular or intraparenchymal hemorrhage. The brain demonstrates generalized volume loss as well as typical white matter changes of senescence. The ventricles are normal in size and configuration. The orbits are normal. The paranasal sinuses are clear. CT ANGIOGRAM: The lung apices are clear. Evaluation of the neck soft tissues demonstrates no pathologic cervical adenopathy or unexpected aerodigestive tract lesion. The osseous structures demonstrate no evidence of acute fracture or aggressive osseous lesion, with multilevel spondylosis changes present. Patent aortic arch with three-vessel branching. The visualized subclavian arteries are patent bilaterally. The ICA origins demonstrate some mild calcific atherosclerotic change with 0% narrowing present bilaterally by NASCET criteria. The vertebral arteries are normal in course and caliber bilaterally. Intracranially, the carotid siphons demonstrate calcific atherosclerotic changes with some associated mild narrowing  of the supraclinoid segments. The anterior cerebral arteries demonstrate some multifocal moderate atherosclerotic narrowing without evidence of large vessel occlusion or aneurysm. The right middle cerebral artery demonstrates stable abnormal appearance including long segment severe narrowing and subsequent occlusion of the right M1 segment middle cerebral artery with collateral filling of proximal M2 and distal M3 branches similar to prior. The left middle cerebral artery also demonstrates some unchanged moderate atherosclerotic disease. The vertebral arteries demonstrate mild atherosclerotic narrowing without definite aneurysm or focal severe stenosis. The basilar artery is patent. The posterior cerebral arteries demonstrate some unchanged multifocal moderate atherosclerotic narrowing.     Impression: CT head demonstrates prominent subarachnoid hemorrhage within the posterior fossa layering within the prepontine cistern extending down to the foramen magnum and cranially to the level of the midbrain. No additional intracranial hemorrhage is evident and  there are typical moderate age-related changes as above. CT angiogram demonstrates no definite etiologic aneurysm, with findings potentially secondary to benign perimesencephalic hemorrhage. 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 Findings discussed with the stroke team by Stewart Delcid via phone at 11:07 a.m. 2024 Electronically Signed: Kameron Delcid MD  2024 11:14 AM EDT  Workstation ID: [id number]    CT Head Without Contrast    Result Date: 2024  [place] [address] Phone: [phone] Fax: [phone] Name: AVEL GUTIERREZ Exam Date: 2024 : 1950 Age 73 years Gender: M Accession: [id number] MRN: [id number] Physician: [facility]  [facility] Facility: [facility] Facility HSV: Outpatient Exam: CT BRAIN W/O HEAD CT HISTORY: Headache. TECHNIQUE: Multiple axial CT images were performed from the foramen magnum to the vertex without enhancement. FINDINGS: The ventricles are normal in size. There is subarachnoid hemorrhage overlying the midbrain and ct. No masses are identified. No extra-axial fluid is seen. The sinuses are normal. IMPRESSION: Subarachnoid hemorrhage overlying the midbrain and ct. Films reviewed , interpreted and dictated by Dr. Andrei Downing Transcribed by Anil Brady PA-C. Dictated By:  Andrei Navarro Transcribed By: Andrei Downing Transcribed On: 4/24/2024 8:51 AM Electronically signed by: Andrei Navarro 4/24/2024 Thank you for referring AVEL GUTIERREZ to [facility]. Legally authenticated by ED GRIMALDO 2024-04-24 08:51:17     Results for orders placed during the hospital encounter of 04/24/24    Duplex Venous Upper Extremity - Left CAR    Interpretation Summary    The left upper extremity venous duplex scan is negative for evidence of a DVT and SVT.      Results for orders placed during the hospital encounter of 04/24/24    Duplex Venous Upper Extremity - Left CAR    Interpretation Summary    The left upper extremity venous duplex scan is negative for evidence of a DVT and SVT.      Results for orders placed during the hospital encounter of 04/24/24    Adult Transthoracic Echo Complete W/ Cont if Necessary Per Protocol (With Agitated Saline)    Interpretation Summary    Left ventricular systolic function is normal. Calculated left ventricular EF = 65.8% Left ventricular ejection fraction appears to be 66 - 70%.    Left ventricular wall thickness is consistent with mild to moderate concentric hypertrophy.    Left ventricular diastolic function is consistent with (grade I) impaired relaxation.    Saline test results are negative for right to left atrial level shunt.    Peak velocity of the flow distal  to the aortic valve is 263 cm/s. Aortic valve mean pressure gradient is 15 mmHg.    Ascending aorta = 4.4 cm      Plan for Follow-up of Pending Labs/Results:     Discharge Details        Discharge Medications        New Medications        Instructions Start Date   cetirizine 10 MG tablet  Commonly known as: zyrTEC   10 mg, Oral, Daily      cetirizine 10 MG tablet  Commonly known as: zyrTEC   10 mg, Per G Tube, Daily   Start Date: May 3, 2024     levETIRAcetam 1000 MG tablet  Commonly known as: KEPPRA   1,000 mg, Nasogastric, Every 12 Hours Scheduled      mirtazapine 7.5 MG tablet  Commonly known as: REMERON   7.5 mg, Nasogastric, Nightly             Changes to Medications        Instructions Start Date   amLODIPine 10 MG tablet  Commonly known as: NORVASC  What changed:   medication strength  how much to take  how to take this  when to take this   10 mg, Per G Tube, Every 24 Hours Scheduled   Start Date: May 3, 2024     atorvastatin 40 MG tablet  Commonly known as: LIPITOR  What changed:   medication strength  how much to take  how to take this   40 mg, Per G Tube, Nightly      bumetanide 1 MG tablet  Commonly known as: BUMEX  What changed: how to take this   1 mg, Per G Tube, Daily      carvedilol 25 MG tablet  Commonly known as: COREG  What changed:   medication strength  how much to take  how to take this  when to take this   25 mg, Per G Tube, Every 12 Hours Scheduled      hydrALAZINE 100 MG tablet  Commonly known as: APRESOLINE  What changed:   medication strength  how much to take  how to take this  when to take this   100 mg, Per G Tube, Every 8 Hours Scheduled      levothyroxine 200 MCG tablet  Commonly known as: SYNTHROID, LEVOTHROID  What changed: how to take this   200 mcg, Per G Tube, Every Early Morning      losartan 100 MG tablet  Commonly known as: COZAAR  What changed: how to take this   100 mg, Per G Tube, Daily             Stop These Medications      aspirin 81 MG EC tablet     potassium chloride  10 MEQ CR tablet     warfarin 10 MG tablet  Commonly known as: COUMADIN              Allergies   Allergen Reactions    Codeine Hallucinations         Discharge Disposition:  Home or Self Care    Diet:  Hospital:  Diet Order   Procedures    NPO Diet NPO Type: Tube Feeding       Diet Instructions       Diet: Tube Feeding; Continuous; Isosource 1.5; goal 65 ml/hr; free water 50 ml every 1 hr      Discharge Diet: Tube Feeding    Feeding Type: Continuous    Formula & Rate: Isosource 1.5; goal 65 ml/hr; free water 50 ml every 1 hr             Activity:  Activity Instructions       Activity as Tolerated              Restrictions or Other Recommendations:         CODE STATUS:    Code Status and Medical Interventions:   Ordered at: 04/24/24 1058     Code Status (Patient has no pulse and is not breathing):    CPR (Attempt to Resuscitate)     Medical Interventions (Patient has pulse or is breathing):    Full Support       Future Appointments   Date Time Provider Department Center   5/9/2024  2:30 PM [facility]       Additional Instructions for the Follow-ups that You Need to Schedule       CT Head Without Contrast   May 02, 2024      Please schedule for 2 weeks - same day as neurology follow-up.    STEREOTACTIC GUIDANCE PROTOCOL?: No   Release to patient: Routine Release        Discharge Follow-up with PCP   As directed       Currently Documented PCP:    Leslie Rhodes MD    PCP Phone Number:    [phone]     Follow Up Details: PCP Dr. Rhodes 1 week                      Ruthie Macdonald DO  05/02/24      Time Spent on Discharge:  I spent  35  minutes on this discharge activity which included: face-to-face encounter with the patient, reviewing the data in the system, coordination of the care with the nursing staff as well as consultants, documentation, and entering orders.            Electronically signed by Ruthie Macdonald DO at 05/02/24 6717

## 2024-05-02 NOTE — CASE MANAGEMENT/SOCIAL WORK
Case Management Discharge Note      Final Note: Mr. Hendrix has a rehab bed at [facility] CVA2 unit today if medically ready. Confirmed with April with facility. Updated Mr. Hendrix and Daughter and they are agreeable with discharge plan. Daughter will be transporting him to [facility] by private vehicle. Please call report to [phone]. If unable to reach the nurse, call the house supervisor at [phone]. CM will fax the discharge summary.         Selected Continued Care - Admitted Since 4/24/2024       Destination Coordination complete.      Service Provider Selected Services Address Phone Fax Patient Preferred    [facility] [address] [id number] [phone] --                         Final Discharge Disposition Code: 62 - inpatient rehab facility

## 2024-05-03 ENCOUNTER — HOSPITAL ENCOUNTER (INPATIENT)
Facility: HOSPITAL | Age: 74
LOS: 1 days | Discharge: NURSING, REHAB OR LONG-TERM CARE | DRG: 190 | End: 2024-05-05
Attending: EMERGENCY MEDICINE | Admitting: INTERNAL MEDICINE
Payer: MEDICARE

## 2024-05-03 ENCOUNTER — APPOINTMENT (OUTPATIENT)
Dept: GENERAL RADIOLOGY | Facility: HOSPITAL | Age: 74
DRG: 190 | End: 2024-05-03
Payer: MEDICARE

## 2024-05-03 ENCOUNTER — APPOINTMENT (OUTPATIENT)
Dept: CT IMAGING | Facility: HOSPITAL | Age: 74
DRG: 190 | End: 2024-05-03
Payer: MEDICARE

## 2024-05-03 DIAGNOSIS — J96.01 ACUTE RESPIRATORY FAILURE WITH HYPOXIA: ICD-10-CM

## 2024-05-03 DIAGNOSIS — Z86.73 HISTORY OF STROKE: ICD-10-CM

## 2024-05-03 DIAGNOSIS — Z02.89 REFERRED BY HEALTH CARE PROFESSIONAL: ICD-10-CM

## 2024-05-03 DIAGNOSIS — R79.89 ELEVATED LFTS: ICD-10-CM

## 2024-05-03 DIAGNOSIS — R13.10 DYSPHAGIA, UNSPECIFIED TYPE: Primary | ICD-10-CM

## 2024-05-03 PROBLEM — J40 BRONCHITIS: Status: ACTIVE | Noted: 2024-05-03

## 2024-05-03 PROBLEM — J96.21 ACUTE ON CHRONIC RESPIRATORY FAILURE WITH HYPOXIA: Status: ACTIVE | Noted: 2024-05-03

## 2024-05-03 PROBLEM — R74.8 ELEVATED LIVER ENZYMES: Status: ACTIVE | Noted: 2024-05-03

## 2024-05-03 PROBLEM — R09.02 HYPOXIA: Status: ACTIVE | Noted: 2024-05-03

## 2024-05-03 LAB
ALBUMIN SERPL-MCNC: 3.2 G/DL (ref 3.5–5.2)
ALBUMIN/GLOB SERPL: 1 G/DL
ALP SERPL-CCNC: 150 U/L (ref 39–117)
ALT SERPL W P-5'-P-CCNC: 222 U/L (ref 1–41)
ANION GAP SERPL CALCULATED.3IONS-SCNC: 13 MMOL/L (ref 5–15)
ARTERIAL PATENCY WRIST A: ABNORMAL
AST SERPL-CCNC: 169 U/L (ref 1–40)
ATMOSPHERIC PRESS: ABNORMAL MM[HG]
B PARAPERT DNA SPEC QL NAA+PROBE: NOT DETECTED
B PERT DNA SPEC QL NAA+PROBE: NOT DETECTED
BACTERIA UR QL AUTO: ABNORMAL /HPF
BASE EXCESS BLDA CALC-SCNC: 4.3 MMOL/L (ref 0–2)
BASOPHILS # BLD AUTO: 0.04 10*3/MM3 (ref 0–0.2)
BASOPHILS NFR BLD AUTO: 0.5 % (ref 0–1.5)
BDY SITE: ABNORMAL
BILIRUB SERPL-MCNC: 0.9 MG/DL (ref 0–1.2)
BILIRUB UR QL STRIP: ABNORMAL
BODY TEMPERATURE: 37
BUN SERPL-MCNC: 27 MG/DL (ref 8–23)
BUN/CREAT SERPL: 23.9 (ref 7–25)
C PNEUM DNA NPH QL NAA+NON-PROBE: NOT DETECTED
CALCIUM SPEC-SCNC: 8.8 MG/DL (ref 8.6–10.5)
CHLORIDE SERPL-SCNC: 104 MMOL/L (ref 98–107)
CLARITY UR: CLEAR
CO2 BLDA-SCNC: 29.6 MMOL/L (ref 22–33)
CO2 SERPL-SCNC: 25 MMOL/L (ref 22–29)
COHGB MFR BLD: 1.1 % (ref 0–2)
COLOR UR: ABNORMAL
CREAT SERPL-MCNC: 1.13 MG/DL (ref 0.76–1.27)
D-LACTATE SERPL-SCNC: 1.5 MMOL/L (ref 0.5–2)
DEPRECATED RDW RBC AUTO: 45.3 FL (ref 37–54)
EGFRCR SERPLBLD CKD-EPI 2021: 69.1 ML/MIN/1.73
EOSINOPHIL # BLD AUTO: 0.05 10*3/MM3 (ref 0–0.4)
EOSINOPHIL NFR BLD AUTO: 0.6 % (ref 0.3–6.2)
EPAP: 0
ERYTHROCYTE [DISTWIDTH] IN BLOOD BY AUTOMATED COUNT: 13.5 % (ref 12.3–15.4)
FLUAV RNA RESP QL NAA+PROBE: NOT DETECTED
FLUAV SUBTYP SPEC NAA+PROBE: NOT DETECTED
FLUBV RNA ISLT QL NAA+PROBE: NOT DETECTED
FLUBV RNA RESP QL NAA+PROBE: NOT DETECTED
GEN 5 2HR TROPONIN T REFLEX: 149 NG/L
GLOBULIN UR ELPH-MCNC: 3.2 GM/DL
GLUCOSE SERPL-MCNC: 162 MG/DL (ref 65–99)
GLUCOSE UR STRIP-MCNC: NEGATIVE MG/DL
HADV DNA SPEC NAA+PROBE: NOT DETECTED
HAV IGM SERPL QL IA: NORMAL
HBV CORE IGM SERPL QL IA: NORMAL
HBV SURFACE AG SERPL QL IA: NORMAL
HCO3 BLDA-SCNC: 28.4 MMOL/L (ref 20–26)
HCOV 229E RNA SPEC QL NAA+PROBE: NOT DETECTED
HCOV HKU1 RNA SPEC QL NAA+PROBE: NOT DETECTED
HCOV NL63 RNA SPEC QL NAA+PROBE: NOT DETECTED
HCOV OC43 RNA SPEC QL NAA+PROBE: NOT DETECTED
HCT VFR BLD AUTO: 41.9 % (ref 37.5–51)
HCT VFR BLD CALC: 40.2 % (ref 38–51)
HCV AB SER QL: NORMAL
HGB BLD-MCNC: 13.6 G/DL (ref 13–17.7)
HGB BLDA-MCNC: 13.1 G/DL (ref 13.5–17.5)
HGB UR QL STRIP.AUTO: NEGATIVE
HMPV RNA NPH QL NAA+NON-PROBE: NOT DETECTED
HOLD SPECIMEN: NORMAL
HPIV1 RNA ISLT QL NAA+PROBE: NOT DETECTED
HPIV2 RNA SPEC QL NAA+PROBE: NOT DETECTED
HPIV3 RNA NPH QL NAA+PROBE: NOT DETECTED
HPIV4 P GENE NPH QL NAA+PROBE: NOT DETECTED
HYALINE CASTS UR QL AUTO: ABNORMAL /LPF
IMM GRANULOCYTES # BLD AUTO: 0.09 10*3/MM3 (ref 0–0.05)
IMM GRANULOCYTES NFR BLD AUTO: 1.2 % (ref 0–0.5)
INHALED O2 CONCENTRATION: 36 %
INR PPP: 1.18 (ref 0.89–1.12)
IPAP: 0
KETONES UR QL STRIP: NEGATIVE
LEUKOCYTE ESTERASE UR QL STRIP.AUTO: NEGATIVE
LYMPHOCYTES # BLD AUTO: 1.43 10*3/MM3 (ref 0.7–3.1)
LYMPHOCYTES NFR BLD AUTO: 18.5 % (ref 19.6–45.3)
M PNEUMO IGG SER IA-ACNC: NOT DETECTED
MCH RBC QN AUTO: 29.7 PG (ref 26.6–33)
MCHC RBC AUTO-ENTMCNC: 32.5 G/DL (ref 31.5–35.7)
MCV RBC AUTO: 91.5 FL (ref 79–97)
METHGB BLD QL: ABNORMAL
MODALITY: ABNORMAL
MONOCYTES # BLD AUTO: 0.89 10*3/MM3 (ref 0.1–0.9)
MONOCYTES NFR BLD AUTO: 11.5 % (ref 5–12)
NEUTROPHILS NFR BLD AUTO: 5.22 10*3/MM3 (ref 1.7–7)
NEUTROPHILS NFR BLD AUTO: 67.7 % (ref 42.7–76)
NITRITE UR QL STRIP: NEGATIVE
NRBC BLD AUTO-RTO: 0 /100 WBC (ref 0–0.2)
NT-PROBNP SERPL-MCNC: 1833 PG/ML (ref 0–900)
OXYHGB MFR BLDV: 93.6 % (ref 94–99)
PAW @ PEAK INSP FLOW SETTING VENT: 0 CMH2O
PCO2 BLDA: 39.8 MM HG (ref 35–45)
PCO2 TEMP ADJ BLD: 39.8 MM HG (ref 35–48)
PH BLDA: 7.46 PH UNITS (ref 7.35–7.45)
PH UR STRIP.AUTO: 5.5 [PH] (ref 5–8)
PH, TEMP CORRECTED: 7.46 PH UNITS
PLATELET # BLD AUTO: 256 10*3/MM3 (ref 140–450)
PMV BLD AUTO: 11.1 FL (ref 6–12)
PO2 BLDA: 67.3 MM HG (ref 83–108)
PO2 TEMP ADJ BLD: 67.3 MM HG (ref 83–108)
POTASSIUM SERPL-SCNC: 3.8 MMOL/L (ref 3.5–5.2)
PROCALCITONIN SERPL-MCNC: 0.16 NG/ML (ref 0–0.25)
PROT SERPL-MCNC: 6.4 G/DL (ref 6–8.5)
PROT UR QL STRIP: ABNORMAL
PROTHROMBIN TIME: 15.1 SECONDS (ref 12.2–14.5)
QT INTERVAL: 370 MS
QT INTERVAL: 376 MS
QTC INTERVAL: 432 MS
QTC INTERVAL: 441 MS
RBC # BLD AUTO: 4.58 10*6/MM3 (ref 4.14–5.8)
RBC # UR STRIP: ABNORMAL /HPF
REF LAB TEST METHOD: ABNORMAL
RHINOVIRUS RNA SPEC NAA+PROBE: NOT DETECTED
RSV RNA NPH QL NAA+NON-PROBE: NOT DETECTED
RSV RNA RESP QL NAA+PROBE: NOT DETECTED
SARS-COV-2 RNA NPH QL NAA+NON-PROBE: NOT DETECTED
SARS-COV-2 RNA RESP QL NAA+PROBE: NOT DETECTED
SODIUM SERPL-SCNC: 142 MMOL/L (ref 136–145)
SP GR UR STRIP: 1.02 (ref 1–1.03)
SQUAMOUS #/AREA URNS HPF: ABNORMAL /HPF
TOTAL RATE: 0 BREATHS/MINUTE
TROPONIN T DELTA: -7 NG/L
TROPONIN T SERPL HS-MCNC: 156 NG/L
UROBILINOGEN UR QL STRIP: ABNORMAL
WBC # UR STRIP: ABNORMAL /HPF
WBC NRBC COR # BLD AUTO: 7.72 10*3/MM3 (ref 3.4–10.8)
WHOLE BLOOD HOLD COAG: NORMAL
WHOLE BLOOD HOLD SPECIMEN: NORMAL

## 2024-05-03 PROCEDURE — 70450 CT HEAD/BRAIN W/O DYE: CPT

## 2024-05-03 PROCEDURE — 71275 CT ANGIOGRAPHY CHEST: CPT

## 2024-05-03 PROCEDURE — 85610 PROTHROMBIN TIME: CPT | Performed by: NURSE PRACTITIONER

## 2024-05-03 PROCEDURE — 99222 1ST HOSP IP/OBS MODERATE 55: CPT | Performed by: NURSE PRACTITIONER

## 2024-05-03 PROCEDURE — 87637 SARSCOV2&INF A&B&RSV AMP PRB: CPT | Performed by: NURSE PRACTITIONER

## 2024-05-03 PROCEDURE — 82805 BLOOD GASES W/O2 SATURATION: CPT

## 2024-05-03 PROCEDURE — 87040 BLOOD CULTURE FOR BACTERIA: CPT | Performed by: NURSE PRACTITIONER

## 2024-05-03 PROCEDURE — 36600 WITHDRAWAL OF ARTERIAL BLOOD: CPT

## 2024-05-03 PROCEDURE — 93005 ELECTROCARDIOGRAM TRACING: CPT | Performed by: NURSE PRACTITIONER

## 2024-05-03 PROCEDURE — 81001 URINALYSIS AUTO W/SCOPE: CPT | Performed by: NURSE PRACTITIONER

## 2024-05-03 PROCEDURE — 0202U NFCT DS 22 TRGT SARS-COV-2: CPT | Performed by: NURSE PRACTITIONER

## 2024-05-03 PROCEDURE — 94761 N-INVAS EAR/PLS OXIMETRY MLT: CPT

## 2024-05-03 PROCEDURE — 80074 ACUTE HEPATITIS PANEL: CPT | Performed by: NURSE PRACTITIONER

## 2024-05-03 PROCEDURE — G0378 HOSPITAL OBSERVATION PER HR: HCPCS

## 2024-05-03 PROCEDURE — 80053 COMPREHEN METABOLIC PANEL: CPT | Performed by: EMERGENCY MEDICINE

## 2024-05-03 PROCEDURE — 83050 HGB METHEMOGLOBIN QUAN: CPT

## 2024-05-03 PROCEDURE — 85025 COMPLETE CBC W/AUTO DIFF WBC: CPT | Performed by: EMERGENCY MEDICINE

## 2024-05-03 PROCEDURE — 94640 AIRWAY INHALATION TREATMENT: CPT

## 2024-05-03 PROCEDURE — 93005 ELECTROCARDIOGRAM TRACING: CPT | Performed by: EMERGENCY MEDICINE

## 2024-05-03 PROCEDURE — 83880 ASSAY OF NATRIURETIC PEPTIDE: CPT | Performed by: EMERGENCY MEDICINE

## 2024-05-03 PROCEDURE — 99285 EMERGENCY DEPT VISIT HI MDM: CPT

## 2024-05-03 PROCEDURE — 83605 ASSAY OF LACTIC ACID: CPT | Performed by: NURSE PRACTITIONER

## 2024-05-03 PROCEDURE — 25010000002 LABETALOL 5 MG/ML SOLUTION: Performed by: NURSE PRACTITIONER

## 2024-05-03 PROCEDURE — 94799 UNLISTED PULMONARY SVC/PX: CPT

## 2024-05-03 PROCEDURE — 71045 X-RAY EXAM CHEST 1 VIEW: CPT

## 2024-05-03 PROCEDURE — 74177 CT ABD & PELVIS W/CONTRAST: CPT

## 2024-05-03 PROCEDURE — 25510000001 IOPAMIDOL PER 1 ML: Performed by: EMERGENCY MEDICINE

## 2024-05-03 PROCEDURE — 84145 PROCALCITONIN (PCT): CPT | Performed by: NURSE PRACTITIONER

## 2024-05-03 PROCEDURE — 82375 ASSAY CARBOXYHB QUANT: CPT

## 2024-05-03 PROCEDURE — 36415 COLL VENOUS BLD VENIPUNCTURE: CPT

## 2024-05-03 PROCEDURE — 84484 ASSAY OF TROPONIN QUANT: CPT | Performed by: EMERGENCY MEDICINE

## 2024-05-03 RX ORDER — ACETAMINOPHEN 160 MG/5ML
650 SOLUTION ORAL EVERY 4 HOURS PRN
Status: DISCONTINUED | OUTPATIENT
Start: 2024-05-03 | End: 2024-05-05 | Stop reason: HOSPADM

## 2024-05-03 RX ORDER — SODIUM CHLORIDE 9 MG/ML
40 INJECTION, SOLUTION INTRAVENOUS AS NEEDED
Status: DISCONTINUED | OUTPATIENT
Start: 2024-05-03 | End: 2024-05-05 | Stop reason: HOSPADM

## 2024-05-03 RX ORDER — NICOTINE POLACRILEX 4 MG
15 LOZENGE BUCCAL
Status: DISCONTINUED | OUTPATIENT
Start: 2024-05-03 | End: 2024-05-04 | Stop reason: SDUPTHER

## 2024-05-03 RX ORDER — AMOXICILLIN 250 MG
2 CAPSULE ORAL 2 TIMES DAILY PRN
Status: DISCONTINUED | OUTPATIENT
Start: 2024-05-03 | End: 2024-05-05 | Stop reason: HOSPADM

## 2024-05-03 RX ORDER — ACETAMINOPHEN 325 MG/1
650 TABLET ORAL EVERY 4 HOURS PRN
Status: DISCONTINUED | OUTPATIENT
Start: 2024-05-03 | End: 2024-05-05 | Stop reason: HOSPADM

## 2024-05-03 RX ORDER — SODIUM CHLORIDE 0.9 % (FLUSH) 0.9 %
10 SYRINGE (ML) INJECTION AS NEEDED
Status: DISCONTINUED | OUTPATIENT
Start: 2024-05-03 | End: 2024-05-05 | Stop reason: HOSPADM

## 2024-05-03 RX ORDER — BUMETANIDE 1 MG/1
1 TABLET ORAL DAILY
Status: DISCONTINUED | OUTPATIENT
Start: 2024-05-04 | End: 2024-05-05 | Stop reason: HOSPADM

## 2024-05-03 RX ORDER — BISACODYL 10 MG
10 SUPPOSITORY, RECTAL RECTAL DAILY PRN
Status: DISCONTINUED | OUTPATIENT
Start: 2024-05-03 | End: 2024-05-05 | Stop reason: HOSPADM

## 2024-05-03 RX ORDER — METHYLPREDNISOLONE SODIUM SUCCINATE 40 MG/ML
40 INJECTION, POWDER, LYOPHILIZED, FOR SOLUTION INTRAMUSCULAR; INTRAVENOUS EVERY 8 HOURS SCHEDULED
Status: DISCONTINUED | OUTPATIENT
Start: 2024-05-03 | End: 2024-05-04

## 2024-05-03 RX ORDER — AMLODIPINE BESYLATE 10 MG/1
10 TABLET ORAL
Status: DISCONTINUED | OUTPATIENT
Start: 2024-05-04 | End: 2024-05-05 | Stop reason: HOSPADM

## 2024-05-03 RX ORDER — CETIRIZINE HYDROCHLORIDE 10 MG/1
10 TABLET ORAL DAILY
Status: DISCONTINUED | OUTPATIENT
Start: 2024-05-04 | End: 2024-05-05 | Stop reason: HOSPADM

## 2024-05-03 RX ORDER — LEVOTHYROXINE SODIUM 0.1 MG/1
200 TABLET ORAL
Status: DISCONTINUED | OUTPATIENT
Start: 2024-05-04 | End: 2024-05-05 | Stop reason: HOSPADM

## 2024-05-03 RX ORDER — BISACODYL 5 MG/1
5 TABLET, DELAYED RELEASE ORAL DAILY PRN
Status: DISCONTINUED | OUTPATIENT
Start: 2024-05-03 | End: 2024-05-04 | Stop reason: ALTCHOICE

## 2024-05-03 RX ORDER — IPRATROPIUM BROMIDE AND ALBUTEROL SULFATE 2.5; .5 MG/3ML; MG/3ML
3 SOLUTION RESPIRATORY (INHALATION) ONCE
Status: COMPLETED | OUTPATIENT
Start: 2024-05-03 | End: 2024-05-03

## 2024-05-03 RX ORDER — LABETALOL HYDROCHLORIDE 5 MG/ML
10 INJECTION, SOLUTION INTRAVENOUS ONCE
Qty: 20 ML | Refills: 0 | Status: COMPLETED | OUTPATIENT
Start: 2024-05-03 | End: 2024-05-03

## 2024-05-03 RX ORDER — MIRTAZAPINE 15 MG/1
7.5 TABLET, FILM COATED ORAL NIGHTLY
Status: DISCONTINUED | OUTPATIENT
Start: 2024-05-03 | End: 2024-05-05 | Stop reason: HOSPADM

## 2024-05-03 RX ORDER — CARVEDILOL 12.5 MG/1
25 TABLET ORAL EVERY 12 HOURS SCHEDULED
Status: DISCONTINUED | OUTPATIENT
Start: 2024-05-03 | End: 2024-05-05 | Stop reason: HOSPADM

## 2024-05-03 RX ORDER — LOSARTAN POTASSIUM 50 MG/1
100 TABLET ORAL DAILY
Status: DISCONTINUED | OUTPATIENT
Start: 2024-05-04 | End: 2024-05-05 | Stop reason: HOSPADM

## 2024-05-03 RX ORDER — POLYETHYLENE GLYCOL 3350 17 G/17G
17 POWDER, FOR SOLUTION ORAL DAILY PRN
Status: DISCONTINUED | OUTPATIENT
Start: 2024-05-03 | End: 2024-05-05 | Stop reason: HOSPADM

## 2024-05-03 RX ORDER — IBUPROFEN 600 MG/1
1 TABLET ORAL
Status: DISCONTINUED | OUTPATIENT
Start: 2024-05-03 | End: 2024-05-05 | Stop reason: HOSPADM

## 2024-05-03 RX ORDER — SODIUM CHLORIDE 0.9 % (FLUSH) 0.9 %
10 SYRINGE (ML) INJECTION EVERY 12 HOURS SCHEDULED
Status: DISCONTINUED | OUTPATIENT
Start: 2024-05-03 | End: 2024-05-05 | Stop reason: HOSPADM

## 2024-05-03 RX ORDER — LEVETIRACETAM 500 MG/5ML
1000 INJECTION, SOLUTION, CONCENTRATE INTRAVENOUS EVERY 12 HOURS SCHEDULED
Status: DISCONTINUED | OUTPATIENT
Start: 2024-05-03 | End: 2024-05-04

## 2024-05-03 RX ORDER — IPRATROPIUM BROMIDE AND ALBUTEROL SULFATE 2.5; .5 MG/3ML; MG/3ML
3 SOLUTION RESPIRATORY (INHALATION)
Status: DISCONTINUED | OUTPATIENT
Start: 2024-05-03 | End: 2024-05-05 | Stop reason: HOSPADM

## 2024-05-03 RX ORDER — ACETAMINOPHEN 650 MG/1
650 SUPPOSITORY RECTAL EVERY 4 HOURS PRN
Status: DISCONTINUED | OUTPATIENT
Start: 2024-05-03 | End: 2024-05-05 | Stop reason: HOSPADM

## 2024-05-03 RX ORDER — DEXTROSE MONOHYDRATE 25 G/50ML
25 INJECTION, SOLUTION INTRAVENOUS
Status: DISCONTINUED | OUTPATIENT
Start: 2024-05-03 | End: 2024-05-05 | Stop reason: HOSPADM

## 2024-05-03 RX ORDER — HYDRALAZINE HYDROCHLORIDE 50 MG/1
100 TABLET, FILM COATED ORAL EVERY 8 HOURS SCHEDULED
Status: DISCONTINUED | OUTPATIENT
Start: 2024-05-03 | End: 2024-05-05 | Stop reason: HOSPADM

## 2024-05-03 RX ADMIN — IPRATROPIUM BROMIDE AND ALBUTEROL SULFATE 3 ML: 2.5; .5 SOLUTION RESPIRATORY (INHALATION) at 18:34

## 2024-05-03 RX ADMIN — IOPAMIDOL 80 ML: 755 INJECTION, SOLUTION INTRAVENOUS at 17:28

## 2024-05-03 RX ADMIN — LABETALOL HYDROCHLORIDE 10 MG: 5 INJECTION INTRAVENOUS at 20:30

## 2024-05-03 RX ADMIN — IPRATROPIUM BROMIDE AND ALBUTEROL SULFATE 3 ML: 2.5; .5 SOLUTION RESPIRATORY (INHALATION) at 23:59

## 2024-05-04 ENCOUNTER — APPOINTMENT (OUTPATIENT)
Dept: GENERAL RADIOLOGY | Facility: HOSPITAL | Age: 74
DRG: 190 | End: 2024-05-04
Payer: MEDICARE

## 2024-05-04 LAB
ALBUMIN SERPL-MCNC: 3 G/DL (ref 3.5–5.2)
ALBUMIN/GLOB SERPL: 0.9 G/DL
ALP SERPL-CCNC: 161 U/L (ref 39–117)
ALT SERPL W P-5'-P-CCNC: 303 U/L (ref 1–41)
ANION GAP SERPL CALCULATED.3IONS-SCNC: 12 MMOL/L (ref 5–15)
AST SERPL-CCNC: 239 U/L (ref 1–40)
BASOPHILS # BLD AUTO: 0.04 10*3/MM3 (ref 0–0.2)
BASOPHILS NFR BLD AUTO: 0.5 % (ref 0–1.5)
BILIRUB SERPL-MCNC: 0.9 MG/DL (ref 0–1.2)
BUN SERPL-MCNC: 24 MG/DL (ref 8–23)
BUN/CREAT SERPL: 23.3 (ref 7–25)
CALCIUM SPEC-SCNC: 8.6 MG/DL (ref 8.6–10.5)
CHLORIDE SERPL-SCNC: 108 MMOL/L (ref 98–107)
CO2 SERPL-SCNC: 26 MMOL/L (ref 22–29)
CREAT SERPL-MCNC: 1.03 MG/DL (ref 0.76–1.27)
DEPRECATED RDW RBC AUTO: 44.5 FL (ref 37–54)
EGFRCR SERPLBLD CKD-EPI 2021: 77.2 ML/MIN/1.73
EOSINOPHIL # BLD AUTO: 0.02 10*3/MM3 (ref 0–0.4)
EOSINOPHIL NFR BLD AUTO: 0.2 % (ref 0.3–6.2)
ERYTHROCYTE [DISTWIDTH] IN BLOOD BY AUTOMATED COUNT: 13.4 % (ref 12.3–15.4)
GLOBULIN UR ELPH-MCNC: 3.2 GM/DL
GLUCOSE BLDC GLUCOMTR-MCNC: 160 MG/DL (ref 70–130)
GLUCOSE BLDC GLUCOMTR-MCNC: 182 MG/DL (ref 70–130)
GLUCOSE BLDC GLUCOMTR-MCNC: 192 MG/DL (ref 70–130)
GLUCOSE BLDC GLUCOMTR-MCNC: 205 MG/DL (ref 70–130)
GLUCOSE BLDC GLUCOMTR-MCNC: 206 MG/DL (ref 70–130)
GLUCOSE SERPL-MCNC: 201 MG/DL (ref 65–99)
HCT VFR BLD AUTO: 39.6 % (ref 37.5–51)
HGB BLD-MCNC: 12.9 G/DL (ref 13–17.7)
IMM GRANULOCYTES # BLD AUTO: 0.11 10*3/MM3 (ref 0–0.05)
IMM GRANULOCYTES NFR BLD AUTO: 1.3 % (ref 0–0.5)
LYMPHOCYTES # BLD AUTO: 0.88 10*3/MM3 (ref 0.7–3.1)
LYMPHOCYTES NFR BLD AUTO: 10.6 % (ref 19.6–45.3)
MAGNESIUM SERPL-MCNC: 2.2 MG/DL (ref 1.6–2.4)
MCH RBC QN AUTO: 29.3 PG (ref 26.6–33)
MCHC RBC AUTO-ENTMCNC: 32.6 G/DL (ref 31.5–35.7)
MCV RBC AUTO: 90 FL (ref 79–97)
MONOCYTES # BLD AUTO: 0.38 10*3/MM3 (ref 0.1–0.9)
MONOCYTES NFR BLD AUTO: 4.6 % (ref 5–12)
NEUTROPHILS NFR BLD AUTO: 6.85 10*3/MM3 (ref 1.7–7)
NEUTROPHILS NFR BLD AUTO: 82.8 % (ref 42.7–76)
NRBC BLD AUTO-RTO: 0 /100 WBC (ref 0–0.2)
PHOSPHATE SERPL-MCNC: 2.9 MG/DL (ref 2.5–4.5)
PLATELET # BLD AUTO: 246 10*3/MM3 (ref 140–450)
PMV BLD AUTO: 11.4 FL (ref 6–12)
POTASSIUM SERPL-SCNC: 3.5 MMOL/L (ref 3.5–5.2)
PROT SERPL-MCNC: 6.2 G/DL (ref 6–8.5)
RBC # BLD AUTO: 4.4 10*6/MM3 (ref 4.14–5.8)
SODIUM SERPL-SCNC: 146 MMOL/L (ref 136–145)
TROPONIN T SERPL HS-MCNC: 116 NG/L
WBC NRBC COR # BLD AUTO: 8.28 10*3/MM3 (ref 3.4–10.8)

## 2024-05-04 PROCEDURE — 25010000002 LEVETRIRACETAM PER 10 MG: Performed by: NURSE PRACTITIONER

## 2024-05-04 PROCEDURE — 82948 REAGENT STRIP/BLOOD GLUCOSE: CPT

## 2024-05-04 PROCEDURE — 97162 PT EVAL MOD COMPLEX 30 MIN: CPT

## 2024-05-04 PROCEDURE — 97165 OT EVAL LOW COMPLEX 30 MIN: CPT | Performed by: OCCUPATIONAL THERAPIST

## 2024-05-04 PROCEDURE — 63710000001 INSULIN REGULAR HUMAN PER 5 UNITS: Performed by: INTERNAL MEDICINE

## 2024-05-04 PROCEDURE — 99233 SBSQ HOSP IP/OBS HIGH 50: CPT | Performed by: INTERNAL MEDICINE

## 2024-05-04 PROCEDURE — 84484 ASSAY OF TROPONIN QUANT: CPT | Performed by: NURSE PRACTITIONER

## 2024-05-04 PROCEDURE — 83735 ASSAY OF MAGNESIUM: CPT | Performed by: NURSE PRACTITIONER

## 2024-05-04 PROCEDURE — 94799 UNLISTED PULMONARY SVC/PX: CPT

## 2024-05-04 PROCEDURE — 74018 RADEX ABDOMEN 1 VIEW: CPT

## 2024-05-04 PROCEDURE — 85025 COMPLETE CBC W/AUTO DIFF WBC: CPT | Performed by: NURSE PRACTITIONER

## 2024-05-04 PROCEDURE — 25010000002 METHYLPREDNISOLONE PER 40 MG: Performed by: NURSE PRACTITIONER

## 2024-05-04 PROCEDURE — 94761 N-INVAS EAR/PLS OXIMETRY MLT: CPT

## 2024-05-04 PROCEDURE — 80053 COMPREHEN METABOLIC PANEL: CPT | Performed by: NURSE PRACTITIONER

## 2024-05-04 PROCEDURE — 84100 ASSAY OF PHOSPHORUS: CPT | Performed by: NURSE PRACTITIONER

## 2024-05-04 RX ORDER — LEVETIRACETAM 500 MG/1
1000 TABLET ORAL EVERY 12 HOURS SCHEDULED
Status: DISCONTINUED | OUTPATIENT
Start: 2024-05-04 | End: 2024-05-05 | Stop reason: HOSPADM

## 2024-05-04 RX ORDER — ATORVASTATIN CALCIUM 40 MG/1
40 TABLET, FILM COATED ORAL NIGHTLY
Status: DISCONTINUED | OUTPATIENT
Start: 2024-05-04 | End: 2024-05-05 | Stop reason: HOSPADM

## 2024-05-04 RX ORDER — NICOTINE POLACRILEX 4 MG
15 LOZENGE BUCCAL
Status: DISCONTINUED | OUTPATIENT
Start: 2024-05-04 | End: 2024-05-05 | Stop reason: HOSPADM

## 2024-05-04 RX ORDER — LORAZEPAM 0.5 MG/1
0.5 TABLET ORAL EVERY 8 HOURS PRN
Status: DISCONTINUED | OUTPATIENT
Start: 2024-05-04 | End: 2024-05-04

## 2024-05-04 RX ORDER — DEXTROSE MONOHYDRATE 25 G/50ML
25 INJECTION, SOLUTION INTRAVENOUS
Status: DISCONTINUED | OUTPATIENT
Start: 2024-05-04 | End: 2024-05-05 | Stop reason: HOSPADM

## 2024-05-04 RX ORDER — LORAZEPAM 0.5 MG/1
0.5 TABLET ORAL EVERY 8 HOURS PRN
Status: DISCONTINUED | OUTPATIENT
Start: 2024-05-04 | End: 2024-05-05 | Stop reason: HOSPADM

## 2024-05-04 RX ORDER — BUSPIRONE HYDROCHLORIDE 5 MG/1
5 TABLET ORAL 3 TIMES DAILY
Status: DISCONTINUED | OUTPATIENT
Start: 2024-05-04 | End: 2024-05-05 | Stop reason: HOSPADM

## 2024-05-04 RX ORDER — PREDNISONE 20 MG/1
40 TABLET ORAL
Status: DISCONTINUED | OUTPATIENT
Start: 2024-05-05 | End: 2024-05-05 | Stop reason: HOSPADM

## 2024-05-04 RX ORDER — PREDNISONE 20 MG/1
40 TABLET ORAL
Status: DISCONTINUED | OUTPATIENT
Start: 2024-05-05 | End: 2024-05-04

## 2024-05-04 RX ADMIN — BUSPIRONE HYDROCHLORIDE 5 MG: 5 TABLET ORAL at 21:31

## 2024-05-04 RX ADMIN — LEVETIRACETAM 1000 MG: 100 INJECTION, SOLUTION INTRAVENOUS at 08:54

## 2024-05-04 RX ADMIN — LEVETIRACETAM 1000 MG: 100 INJECTION, SOLUTION INTRAVENOUS at 00:15

## 2024-05-04 RX ADMIN — IPRATROPIUM BROMIDE AND ALBUTEROL SULFATE 3 ML: 2.5; .5 SOLUTION RESPIRATORY (INHALATION) at 15:39

## 2024-05-04 RX ADMIN — CARVEDILOL 25 MG: 12.5 TABLET, FILM COATED ORAL at 08:54

## 2024-05-04 RX ADMIN — HYDRALAZINE HYDROCHLORIDE 100 MG: 50 TABLET ORAL at 21:30

## 2024-05-04 RX ADMIN — BUMETANIDE 1 MG: 1 TABLET ORAL at 08:54

## 2024-05-04 RX ADMIN — METHYLPREDNISOLONE SODIUM SUCCINATE 40 MG: 40 INJECTION, POWDER, FOR SOLUTION INTRAMUSCULAR; INTRAVENOUS at 00:14

## 2024-05-04 RX ADMIN — AMLODIPINE BESYLATE 10 MG: 10 TABLET ORAL at 08:54

## 2024-05-04 RX ADMIN — LOSARTAN POTASSIUM 100 MG: 50 TABLET, FILM COATED ORAL at 08:54

## 2024-05-04 RX ADMIN — Medication 10 ML: at 21:31

## 2024-05-04 RX ADMIN — INSULIN HUMAN 2 UNITS: 100 INJECTION, SOLUTION PARENTERAL at 18:10

## 2024-05-04 RX ADMIN — CETIRIZINE HYDROCHLORIDE 10 MG: 10 TABLET, FILM COATED ORAL at 08:55

## 2024-05-04 RX ADMIN — LEVETIRACETAM 1000 MG: 500 TABLET, FILM COATED ORAL at 21:31

## 2024-05-04 RX ADMIN — HYDRALAZINE HYDROCHLORIDE 100 MG: 50 TABLET ORAL at 15:31

## 2024-05-04 RX ADMIN — IPRATROPIUM BROMIDE AND ALBUTEROL SULFATE 3 ML: 2.5; .5 SOLUTION RESPIRATORY (INHALATION) at 07:08

## 2024-05-04 RX ADMIN — IPRATROPIUM BROMIDE AND ALBUTEROL SULFATE 3 ML: 2.5; .5 SOLUTION RESPIRATORY (INHALATION) at 11:43

## 2024-05-04 RX ADMIN — METHYLPREDNISOLONE SODIUM SUCCINATE 40 MG: 40 INJECTION, POWDER, FOR SOLUTION INTRAMUSCULAR; INTRAVENOUS at 06:08

## 2024-05-04 RX ADMIN — LORAZEPAM 0.5 MG: 0.5 TABLET ORAL at 17:27

## 2024-05-04 RX ADMIN — LEVOTHYROXINE SODIUM 200 MCG: 0.1 TABLET ORAL at 05:28

## 2024-05-04 RX ADMIN — HYDRALAZINE HYDROCHLORIDE 100 MG: 50 TABLET ORAL at 05:28

## 2024-05-04 RX ADMIN — IPRATROPIUM BROMIDE AND ALBUTEROL SULFATE 3 ML: 2.5; .5 SOLUTION RESPIRATORY (INHALATION) at 23:28

## 2024-05-04 RX ADMIN — IPRATROPIUM BROMIDE AND ALBUTEROL SULFATE 3 ML: 2.5; .5 SOLUTION RESPIRATORY (INHALATION) at 03:51

## 2024-05-04 RX ADMIN — MIRTAZAPINE 7.5 MG: 15 TABLET, FILM COATED ORAL at 21:30

## 2024-05-04 RX ADMIN — Medication 10 ML: at 04:37

## 2024-05-04 RX ADMIN — CARVEDILOL 25 MG: 12.5 TABLET, FILM COATED ORAL at 21:31

## 2024-05-04 RX ADMIN — IPRATROPIUM BROMIDE AND ALBUTEROL SULFATE 3 ML: 2.5; .5 SOLUTION RESPIRATORY (INHALATION) at 19:11

## 2024-05-05 VITALS
SYSTOLIC BLOOD PRESSURE: 157 MMHG | TEMPERATURE: 97.7 F | DIASTOLIC BLOOD PRESSURE: 77 MMHG | WEIGHT: 284.39 LBS | HEART RATE: 56 BPM | BODY MASS INDEX: 42.12 KG/M2 | HEIGHT: 69 IN | OXYGEN SATURATION: 92 % | RESPIRATION RATE: 16 BRPM

## 2024-05-05 PROBLEM — J96.21 ACUTE ON CHRONIC RESPIRATORY FAILURE WITH HYPOXIA: Status: RESOLVED | Noted: 2024-05-03 | Resolved: 2024-05-05

## 2024-05-05 PROBLEM — R09.02 HYPOXIA: Status: RESOLVED | Noted: 2024-05-03 | Resolved: 2024-05-05

## 2024-05-05 LAB
ALBUMIN SERPL-MCNC: 3 G/DL (ref 3.5–5.2)
ALBUMIN/GLOB SERPL: 1 G/DL
ALP SERPL-CCNC: 146 U/L (ref 39–117)
ALT SERPL W P-5'-P-CCNC: 311 U/L (ref 1–41)
ANION GAP SERPL CALCULATED.3IONS-SCNC: 8 MMOL/L (ref 5–15)
ANION GAP SERPL CALCULATED.3IONS-SCNC: 9 MMOL/L (ref 5–15)
AST SERPL-CCNC: 169 U/L (ref 1–40)
BILIRUB SERPL-MCNC: 0.6 MG/DL (ref 0–1.2)
BUN SERPL-MCNC: 30 MG/DL (ref 8–23)
BUN SERPL-MCNC: 30 MG/DL (ref 8–23)
BUN/CREAT SERPL: 25.6 (ref 7–25)
BUN/CREAT SERPL: 29.1 (ref 7–25)
CALCIUM SPEC-SCNC: 8.5 MG/DL (ref 8.6–10.5)
CALCIUM SPEC-SCNC: 8.7 MG/DL (ref 8.6–10.5)
CHLORIDE SERPL-SCNC: 107 MMOL/L (ref 98–107)
CHLORIDE SERPL-SCNC: 108 MMOL/L (ref 98–107)
CO2 SERPL-SCNC: 30 MMOL/L (ref 22–29)
CO2 SERPL-SCNC: 30 MMOL/L (ref 22–29)
CREAT SERPL-MCNC: 1.03 MG/DL (ref 0.76–1.27)
CREAT SERPL-MCNC: 1.17 MG/DL (ref 0.76–1.27)
CRP SERPL-MCNC: 7.26 MG/DL (ref 0–0.5)
EGFRCR SERPLBLD CKD-EPI 2021: 66.2 ML/MIN/1.73
EGFRCR SERPLBLD CKD-EPI 2021: 77.2 ML/MIN/1.73
GLOBULIN UR ELPH-MCNC: 3 GM/DL
GLUCOSE BLDC GLUCOMTR-MCNC: 177 MG/DL (ref 70–130)
GLUCOSE BLDC GLUCOMTR-MCNC: 188 MG/DL (ref 70–130)
GLUCOSE SERPL-MCNC: 178 MG/DL (ref 65–99)
GLUCOSE SERPL-MCNC: 216 MG/DL (ref 65–99)
MAGNESIUM SERPL-MCNC: 2.4 MG/DL (ref 1.6–2.4)
PHOSPHATE SERPL-MCNC: 3.3 MG/DL (ref 2.5–4.5)
POTASSIUM SERPL-SCNC: 3.5 MMOL/L (ref 3.5–5.2)
POTASSIUM SERPL-SCNC: 4.2 MMOL/L (ref 3.5–5.2)
PREALB SERPL-MCNC: 11.5 MG/DL (ref 20–40)
PROT SERPL-MCNC: 6 G/DL (ref 6–8.5)
SODIUM SERPL-SCNC: 145 MMOL/L (ref 136–145)
SODIUM SERPL-SCNC: 147 MMOL/L (ref 136–145)

## 2024-05-05 PROCEDURE — 84100 ASSAY OF PHOSPHORUS: CPT

## 2024-05-05 PROCEDURE — 94761 N-INVAS EAR/PLS OXIMETRY MLT: CPT

## 2024-05-05 PROCEDURE — 84134 ASSAY OF PREALBUMIN: CPT

## 2024-05-05 PROCEDURE — 63710000001 PREDNISONE PER 1 MG: Performed by: INTERNAL MEDICINE

## 2024-05-05 PROCEDURE — 83735 ASSAY OF MAGNESIUM: CPT

## 2024-05-05 PROCEDURE — 80053 COMPREHEN METABOLIC PANEL: CPT | Performed by: INTERNAL MEDICINE

## 2024-05-05 PROCEDURE — 94799 UNLISTED PULMONARY SVC/PX: CPT

## 2024-05-05 PROCEDURE — 82948 REAGENT STRIP/BLOOD GLUCOSE: CPT

## 2024-05-05 PROCEDURE — 86140 C-REACTIVE PROTEIN: CPT

## 2024-05-05 PROCEDURE — 63710000001 INSULIN REGULAR HUMAN PER 5 UNITS: Performed by: INTERNAL MEDICINE

## 2024-05-05 PROCEDURE — 99239 HOSP IP/OBS DSCHRG MGMT >30: CPT | Performed by: INTERNAL MEDICINE

## 2024-05-05 RX ORDER — PREDNISONE 20 MG/1
40 TABLET ORAL
Qty: 6 TABLET | Refills: 0 | Status: SHIPPED | OUTPATIENT
Start: 2024-05-06 | End: 2024-05-05

## 2024-05-05 RX ORDER — PREDNISONE 20 MG/1
40 TABLET ORAL
Qty: 6 TABLET | Refills: 0 | Status: SHIPPED | OUTPATIENT
Start: 2024-05-06 | End: 2024-05-09

## 2024-05-05 RX ADMIN — IPRATROPIUM BROMIDE AND ALBUTEROL SULFATE 3 ML: 2.5; .5 SOLUTION RESPIRATORY (INHALATION) at 03:05

## 2024-05-05 RX ADMIN — BUMETANIDE 1 MG: 1 TABLET ORAL at 08:15

## 2024-05-05 RX ADMIN — CARVEDILOL 25 MG: 12.5 TABLET, FILM COATED ORAL at 08:05

## 2024-05-05 RX ADMIN — Medication 10 ML: at 08:06

## 2024-05-05 RX ADMIN — LEVOTHYROXINE SODIUM 200 MCG: 0.1 TABLET ORAL at 05:35

## 2024-05-05 RX ADMIN — HYDRALAZINE HYDROCHLORIDE 100 MG: 50 TABLET ORAL at 05:34

## 2024-05-05 RX ADMIN — LOSARTAN POTASSIUM 100 MG: 50 TABLET, FILM COATED ORAL at 08:05

## 2024-05-05 RX ADMIN — BUSPIRONE HYDROCHLORIDE 5 MG: 5 TABLET ORAL at 08:05

## 2024-05-05 RX ADMIN — IPRATROPIUM BROMIDE AND ALBUTEROL SULFATE 3 ML: 2.5; .5 SOLUTION RESPIRATORY (INHALATION) at 10:53

## 2024-05-05 RX ADMIN — IPRATROPIUM BROMIDE AND ALBUTEROL SULFATE 3 ML: 2.5; .5 SOLUTION RESPIRATORY (INHALATION) at 06:58

## 2024-05-05 RX ADMIN — AMLODIPINE BESYLATE 10 MG: 10 TABLET ORAL at 08:05

## 2024-05-05 RX ADMIN — CETIRIZINE HYDROCHLORIDE 10 MG: 10 TABLET, FILM COATED ORAL at 08:05

## 2024-05-05 RX ADMIN — PREDNISONE 40 MG: 20 TABLET ORAL at 08:05

## 2024-05-05 RX ADMIN — INSULIN HUMAN 2 UNITS: 100 INJECTION, SOLUTION PARENTERAL at 11:47

## 2024-05-05 RX ADMIN — INSULIN HUMAN 2 UNITS: 100 INJECTION, SOLUTION PARENTERAL at 05:35

## 2024-05-05 RX ADMIN — INSULIN HUMAN 2 UNITS: 100 INJECTION, SOLUTION PARENTERAL at 00:00

## 2024-05-05 RX ADMIN — LEVETIRACETAM 1000 MG: 500 TABLET, FILM COATED ORAL at 08:04

## 2024-05-08 LAB
BACTERIA SPEC AEROBE CULT: NORMAL
BACTERIA SPEC AEROBE CULT: NORMAL

## 2024-05-31 PROBLEM — I35.0 AORTIC STENOSIS, MILD: Status: ACTIVE | Noted: 2024-05-31

## 2024-05-31 PROBLEM — R74.8 ELEVATED LIVER ENZYMES: Status: RESOLVED | Noted: 2024-05-03 | Resolved: 2024-05-31

## 2024-05-31 PROBLEM — B37.0 ORAL CANDIDIASIS: Status: RESOLVED | Noted: 2024-04-29 | Resolved: 2024-05-31

## 2024-05-31 PROBLEM — R13.10 DYSPHAGIA: Status: RESOLVED | Noted: 2024-05-01 | Resolved: 2024-05-31

## 2024-05-31 PROBLEM — R79.89 ELEVATED TROPONIN: Status: RESOLVED | Noted: 2019-03-28 | Resolved: 2024-05-31

## 2024-05-31 PROBLEM — J40 BRONCHITIS: Status: RESOLVED | Noted: 2024-05-03 | Resolved: 2024-05-31

## 2024-06-14 ENCOUNTER — TELEPHONE (OUTPATIENT)
Dept: CARDIOLOGY | Facility: CLINIC | Age: 74
End: 2024-06-14
Payer: MEDICARE

## 2024-06-14 NOTE — TELEPHONE ENCOUNTER
----- Message from Elie Gallardo sent at 6/14/2024  2:27 PM EDT -----  Please let the patient know his heart monitor showed occasional extra heartbeats but no recurrent atrial fibrillation like what was seen during his hospital stay here at [facility].  Continue follow-up with his cardiologist, in [place], Dr. Perdomo  ----- Message -----  From: Elie Gallardo MD  Sent: 6/14/2024   1:47 PM EDT  To: Elie Gallardo MD

## 2025-02-18 ENCOUNTER — APPOINTMENT (OUTPATIENT)
Dept: GENERAL RADIOLOGY | Facility: HOSPITAL | Age: 75
End: 2025-02-18
Payer: MEDICARE

## 2025-02-18 ENCOUNTER — APPOINTMENT (OUTPATIENT)
Dept: PULMONOLOGY | Facility: HOSPITAL | Age: 75
End: 2025-02-18
Payer: MEDICARE

## 2025-02-18 ENCOUNTER — HOSPITAL ENCOUNTER (INPATIENT)
Facility: HOSPITAL | Age: 75
LOS: 10 days | Discharge: NURSING, REHAB OR LONG-TERM CARE | End: 2025-02-28
Attending: INTERNAL MEDICINE | Admitting: THORACIC SURGERY (CARDIOTHORACIC VASCULAR SURGERY)
Payer: MEDICARE

## 2025-02-18 ENCOUNTER — APPOINTMENT (OUTPATIENT)
Dept: CT IMAGING | Facility: HOSPITAL | Age: 75
End: 2025-02-18
Payer: MEDICARE

## 2025-02-18 DIAGNOSIS — I25.110 CORONARY ARTERY DISEASE INVOLVING NATIVE CORONARY ARTERY OF NATIVE HEART WITH UNSTABLE ANGINA PECTORIS: Primary | ICD-10-CM

## 2025-02-18 DIAGNOSIS — I21.02 STEMI INVOLVING LEFT ANTERIOR DESCENDING CORONARY ARTERY: ICD-10-CM

## 2025-02-18 DIAGNOSIS — I50.21 ACUTE SYSTOLIC CHF (CONGESTIVE HEART FAILURE): ICD-10-CM

## 2025-02-18 LAB
ABO GROUP BLD: NORMAL
ABO GROUP BLD: NORMAL
ACT BLD: 250 SECONDS (ref 82–152)
ACT BLD: 256 SECONDS (ref 82–152)
ALBUMIN SERPL-MCNC: 3.7 G/DL (ref 3.5–5.2)
ALBUMIN/GLOB SERPL: 1.7 G/DL
ALP SERPL-CCNC: 100 U/L (ref 39–117)
ALT SERPL W P-5'-P-CCNC: 61 U/L (ref 1–41)
AMPHET+METHAMPHET UR QL: NEGATIVE
AMPHETAMINES UR QL: NEGATIVE
ANION GAP SERPL CALCULATED.3IONS-SCNC: 11 MMOL/L (ref 5–15)
ANION GAP SERPL CALCULATED.3IONS-SCNC: 5 MMOL/L (ref 5–15)
APTT PPP: >200 SECONDS (ref 60–90)
AST SERPL-CCNC: 276 U/L (ref 1–40)
BARBITURATES UR QL SCN: NEGATIVE
BASOPHILS # BLD AUTO: 0.03 10*3/MM3 (ref 0–0.2)
BASOPHILS NFR BLD AUTO: 0.3 % (ref 0–1.5)
BENZODIAZ UR QL SCN: NEGATIVE
BILIRUB SERPL-MCNC: 0.6 MG/DL (ref 0–1.2)
BLD GP AB SCN SERPL QL: NEGATIVE
BUN SERPL-MCNC: 23 MG/DL (ref 8–23)
BUN SERPL-MCNC: 24 MG/DL (ref 8–23)
BUN/CREAT SERPL: 14.8 (ref 7–25)
BUN/CREAT SERPL: 16.1 (ref 7–25)
BUPRENORPHINE SERPL-MCNC: NEGATIVE NG/ML
CALCIUM SPEC-SCNC: 8.2 MG/DL (ref 8.6–10.5)
CALCIUM SPEC-SCNC: 8.6 MG/DL (ref 8.6–10.5)
CANNABINOIDS SERPL QL: NEGATIVE
CHLORIDE SERPL-SCNC: 105 MMOL/L (ref 98–107)
CHLORIDE SERPL-SCNC: 106 MMOL/L (ref 98–107)
CO2 SERPL-SCNC: 25 MMOL/L (ref 22–29)
CO2 SERPL-SCNC: 29 MMOL/L (ref 22–29)
COCAINE UR QL: NEGATIVE
CREAT BLDA-MCNC: 1.6 MG/DL (ref 0.6–1.3)
CREAT SERPL-MCNC: 1.43 MG/DL (ref 0.76–1.27)
CREAT SERPL-MCNC: 1.62 MG/DL (ref 0.76–1.27)
DEPRECATED RDW RBC AUTO: 41.1 FL (ref 37–54)
EGFRCR SERPLBLD CKD-EPI 2021: 44.5 ML/MIN/1.73
EGFRCR SERPLBLD CKD-EPI 2021: 51.7 ML/MIN/1.73
EOSINOPHIL # BLD AUTO: 0.11 10*3/MM3 (ref 0–0.4)
EOSINOPHIL NFR BLD AUTO: 1.1 % (ref 0.3–6.2)
ERYTHROCYTE [DISTWIDTH] IN BLOOD BY AUTOMATED COUNT: 13.2 % (ref 12.3–15.4)
FENTANYL UR-MCNC: NEGATIVE NG/ML
FLUAV RNA RESP QL NAA+PROBE: NOT DETECTED
FLUBV RNA RESP QL NAA+PROBE: NOT DETECTED
GLOBULIN UR ELPH-MCNC: 2.2 GM/DL
GLUCOSE BLDC GLUCOMTR-MCNC: 176 MG/DL (ref 70–130)
GLUCOSE SERPL-MCNC: 180 MG/DL (ref 65–99)
GLUCOSE SERPL-MCNC: 181 MG/DL (ref 65–99)
HCT VFR BLD AUTO: 44.7 % (ref 37.5–51)
HGB BLD-MCNC: 14.9 G/DL (ref 13–17.7)
IMM GRANULOCYTES # BLD AUTO: 0.04 10*3/MM3 (ref 0–0.05)
IMM GRANULOCYTES NFR BLD AUTO: 0.4 % (ref 0–0.5)
INR PPP: 1.34 (ref 0.89–1.12)
LYMPHOCYTES # BLD AUTO: 1.38 10*3/MM3 (ref 0.7–3.1)
LYMPHOCYTES NFR BLD AUTO: 14.1 % (ref 19.6–45.3)
MAGNESIUM SERPL-MCNC: 1.8 MG/DL (ref 1.6–2.4)
MCH RBC QN AUTO: 28.6 PG (ref 26.6–33)
MCHC RBC AUTO-ENTMCNC: 33.3 G/DL (ref 31.5–35.7)
MCV RBC AUTO: 85.8 FL (ref 79–97)
METHADONE UR QL SCN: NEGATIVE
MONOCYTES # BLD AUTO: 0.43 10*3/MM3 (ref 0.1–0.9)
MONOCYTES NFR BLD AUTO: 4.4 % (ref 5–12)
NEUTROPHILS NFR BLD AUTO: 7.81 10*3/MM3 (ref 1.7–7)
NEUTROPHILS NFR BLD AUTO: 79.7 % (ref 42.7–76)
NRBC BLD AUTO-RTO: 0 /100 WBC (ref 0–0.2)
OPIATES UR QL: NEGATIVE
OXYCODONE UR QL SCN: NEGATIVE
PA ADP PRP-ACNC: 84 PRU
PCP UR QL SCN: NEGATIVE
PLATELET # BLD AUTO: 180 10*3/MM3 (ref 140–450)
PMV BLD AUTO: 10.9 FL (ref 6–12)
POTASSIUM SERPL-SCNC: 3.7 MMOL/L (ref 3.5–5.2)
POTASSIUM SERPL-SCNC: 3.9 MMOL/L (ref 3.5–5.2)
PROT SERPL-MCNC: 5.9 G/DL (ref 6–8.5)
PROTHROMBIN TIME: 16.7 SECONDS (ref 12.2–14.5)
RBC # BLD AUTO: 5.21 10*6/MM3 (ref 4.14–5.8)
RH BLD: POSITIVE
RH BLD: POSITIVE
RSV RNA RESP QL NAA+PROBE: NOT DETECTED
SARS-COV-2 RNA RESP QL NAA+PROBE: NOT DETECTED
SODIUM SERPL-SCNC: 140 MMOL/L (ref 136–145)
SODIUM SERPL-SCNC: 141 MMOL/L (ref 136–145)
T&S EXPIRATION DATE: NORMAL
TRICYCLICS UR QL SCN: NEGATIVE
UFH PPP CHRO-ACNC: 0.31 IU/ML (ref 0.3–0.7)
UFH PPP CHRO-ACNC: >1.1 IU/ML (ref 0.3–0.7)
WBC NRBC COR # BLD AUTO: 9.8 10*3/MM3 (ref 3.4–10.8)

## 2025-02-18 PROCEDURE — 25010000002 NICARDIPINE 2.5 MG/ML SOLUTION: Performed by: INTERNAL MEDICINE

## 2025-02-18 PROCEDURE — 92978 ENDOLUMINL IVUS OCT C 1ST: CPT | Performed by: INTERNAL MEDICINE

## 2025-02-18 PROCEDURE — 71250 CT THORAX DX C-: CPT

## 2025-02-18 PROCEDURE — 93458 L HRT ARTERY/VENTRICLE ANGIO: CPT | Performed by: INTERNAL MEDICINE

## 2025-02-18 PROCEDURE — B2111ZZ FLUOROSCOPY OF MULTIPLE CORONARY ARTERIES USING LOW OSMOLAR CONTRAST: ICD-10-PCS | Performed by: INTERNAL MEDICINE

## 2025-02-18 PROCEDURE — 99233 SBSQ HOSP IP/OBS HIGH 50: CPT | Performed by: INTERNAL MEDICINE

## 2025-02-18 PROCEDURE — 86901 BLOOD TYPING SEROLOGIC RH(D): CPT | Performed by: PHYSICIAN ASSISTANT

## 2025-02-18 PROCEDURE — 85576 BLOOD PLATELET AGGREGATION: CPT | Performed by: PHYSICIAN ASSISTANT

## 2025-02-18 PROCEDURE — 82948 REAGENT STRIP/BLOOD GLUCOSE: CPT

## 2025-02-18 PROCEDURE — C1753 CATH, INTRAVAS ULTRASOUND: HCPCS | Performed by: INTERNAL MEDICINE

## 2025-02-18 PROCEDURE — 86901 BLOOD TYPING SEROLOGIC RH(D): CPT

## 2025-02-18 PROCEDURE — 83735 ASSAY OF MAGNESIUM: CPT | Performed by: INTERNAL MEDICINE

## 2025-02-18 PROCEDURE — 02703ZZ DILATION OF CORONARY ARTERY, ONE ARTERY, PERCUTANEOUS APPROACH: ICD-10-PCS | Performed by: INTERNAL MEDICINE

## 2025-02-18 PROCEDURE — 25010000002 NITROGLYCERIN 200 MCG/ML SOLUTION: Performed by: INTERNAL MEDICINE

## 2025-02-18 PROCEDURE — 85025 COMPLETE CBC W/AUTO DIFF WBC: CPT | Performed by: INTERNAL MEDICINE

## 2025-02-18 PROCEDURE — 80053 COMPREHEN METABOLIC PANEL: CPT | Performed by: PHYSICIAN ASSISTANT

## 2025-02-18 PROCEDURE — 25010000002 FUROSEMIDE PER 20 MG: Performed by: PHYSICIAN ASSISTANT

## 2025-02-18 PROCEDURE — 92941 PRQ TRLML REVSC TOT OCCL AMI: CPT | Performed by: INTERNAL MEDICINE

## 2025-02-18 PROCEDURE — 80307 DRUG TEST PRSMV CHEM ANLYZR: CPT | Performed by: PHYSICIAN ASSISTANT

## 2025-02-18 PROCEDURE — 36556 INSERT NON-TUNNEL CV CATH: CPT | Performed by: INTERNAL MEDICINE

## 2025-02-18 PROCEDURE — 25010000002 HEPARIN (PORCINE) PER 1000 UNITS: Performed by: INTERNAL MEDICINE

## 2025-02-18 PROCEDURE — 93005 ELECTROCARDIOGRAM TRACING: CPT | Performed by: INTERNAL MEDICINE

## 2025-02-18 PROCEDURE — 93010 ELECTROCARDIOGRAM REPORT: CPT | Performed by: INTERNAL MEDICINE

## 2025-02-18 PROCEDURE — C1894 INTRO/SHEATH, NON-LASER: HCPCS | Performed by: INTERNAL MEDICINE

## 2025-02-18 PROCEDURE — 85347 COAGULATION TIME ACTIVATED: CPT

## 2025-02-18 PROCEDURE — C1769 GUIDE WIRE: HCPCS | Performed by: INTERNAL MEDICINE

## 2025-02-18 PROCEDURE — 25010000002 LIDOCAINE 1 % SOLUTION: Performed by: INTERNAL MEDICINE

## 2025-02-18 PROCEDURE — 63710000001 INSULIN LISPRO (HUMAN) PER 5 UNITS

## 2025-02-18 PROCEDURE — 25810000003 SODIUM CHLORIDE 0.9 % SOLUTION 250 ML FLEX CONT: Performed by: INTERNAL MEDICINE

## 2025-02-18 PROCEDURE — 25010000002 ONDANSETRON PER 1 MG: Performed by: INTERNAL MEDICINE

## 2025-02-18 PROCEDURE — 25010000002 HYDROMORPHONE 1 MG/ML SOLUTION: Performed by: INTERNAL MEDICINE

## 2025-02-18 PROCEDURE — 85730 THROMBOPLASTIN TIME PARTIAL: CPT | Performed by: INTERNAL MEDICINE

## 2025-02-18 PROCEDURE — C1725 CATH, TRANSLUMIN NON-LASER: HCPCS | Performed by: INTERNAL MEDICINE

## 2025-02-18 PROCEDURE — 86850 RBC ANTIBODY SCREEN: CPT | Performed by: PHYSICIAN ASSISTANT

## 2025-02-18 PROCEDURE — 4A023N7 MEASUREMENT OF CARDIAC SAMPLING AND PRESSURE, LEFT HEART, PERCUTANEOUS APPROACH: ICD-10-PCS | Performed by: INTERNAL MEDICINE

## 2025-02-18 PROCEDURE — 25010000002 CANGRELOR TETRASODIUM 50 MG RECONSTITUTED SOLUTION 1 EACH VIAL: Performed by: INTERNAL MEDICINE

## 2025-02-18 PROCEDURE — C1887 CATHETER, GUIDING: HCPCS | Performed by: INTERNAL MEDICINE

## 2025-02-18 PROCEDURE — 25010000002 FENTANYL CITRATE (PF) 50 MCG/ML SOLUTION: Performed by: INTERNAL MEDICINE

## 2025-02-18 PROCEDURE — 99223 1ST HOSP IP/OBS HIGH 75: CPT | Performed by: THORACIC SURGERY (CARDIOTHORACIC VASCULAR SURGERY)

## 2025-02-18 PROCEDURE — 85610 PROTHROMBIN TIME: CPT | Performed by: INTERNAL MEDICINE

## 2025-02-18 PROCEDURE — 85520 HEPARIN ASSAY: CPT

## 2025-02-18 PROCEDURE — 25010000002 FUROSEMIDE PER 20 MG: Performed by: INTERNAL MEDICINE

## 2025-02-18 PROCEDURE — 85520 HEPARIN ASSAY: CPT | Performed by: INTERNAL MEDICINE

## 2025-02-18 PROCEDURE — 25010000002 MIDAZOLAM PER 1 MG: Performed by: INTERNAL MEDICINE

## 2025-02-18 PROCEDURE — 86923 COMPATIBILITY TEST ELECTRIC: CPT

## 2025-02-18 PROCEDURE — C9460 INJECTION, CANGRELOR: HCPCS | Performed by: INTERNAL MEDICINE

## 2025-02-18 PROCEDURE — 82565 ASSAY OF CREATININE: CPT

## 2025-02-18 PROCEDURE — B221Z2Z COMPUTERIZED TOMOGRAPHY (CT SCAN) OF MULTIPLE CORONARY ARTERIES USING INTRAVASCULAR OPTICAL COHERENCE: ICD-10-PCS | Performed by: INTERNAL MEDICINE

## 2025-02-18 PROCEDURE — 87637 SARSCOV2&INF A&B&RSV AMP PRB: CPT

## 2025-02-18 PROCEDURE — 99223 1ST HOSP IP/OBS HIGH 75: CPT | Performed by: INTERNAL MEDICINE

## 2025-02-18 PROCEDURE — 86900 BLOOD TYPING SEROLOGIC ABO: CPT

## 2025-02-18 PROCEDURE — 25010000002 HEPARIN (PORCINE) 25000-0.45 UT/250ML-% SOLUTION: Performed by: INTERNAL MEDICINE

## 2025-02-18 PROCEDURE — 94010 BREATHING CAPACITY TEST: CPT

## 2025-02-18 PROCEDURE — 25810000003 SODIUM CHLORIDE 0.9 % SOLUTION: Performed by: INTERNAL MEDICINE

## 2025-02-18 PROCEDURE — 86900 BLOOD TYPING SEROLOGIC ABO: CPT | Performed by: PHYSICIAN ASSISTANT

## 2025-02-18 PROCEDURE — 25510000001 IOPAMIDOL PER 1 ML: Performed by: INTERNAL MEDICINE

## 2025-02-18 PROCEDURE — 71045 X-RAY EXAM CHEST 1 VIEW: CPT

## 2025-02-18 RX ORDER — ASPIRIN 325 MG
TABLET ORAL
Status: DISCONTINUED | OUTPATIENT
Start: 2025-02-18 | End: 2025-02-18 | Stop reason: HOSPADM

## 2025-02-18 RX ORDER — ALUMINA, MAGNESIA, AND SIMETHICONE 2400; 2400; 240 MG/30ML; MG/30ML; MG/30ML
15 SUSPENSION ORAL EVERY 6 HOURS PRN
Status: DISCONTINUED | OUTPATIENT
Start: 2025-02-18 | End: 2025-02-20

## 2025-02-18 RX ORDER — FENTANYL CITRATE 50 UG/ML
INJECTION, SOLUTION INTRAMUSCULAR; INTRAVENOUS
Status: DISCONTINUED | OUTPATIENT
Start: 2025-02-18 | End: 2025-02-18 | Stop reason: HOSPADM

## 2025-02-18 RX ORDER — ASPIRIN 81 MG/1
81 TABLET, CHEWABLE ORAL DAILY
Status: DISCONTINUED | OUTPATIENT
Start: 2025-02-19 | End: 2025-02-20

## 2025-02-18 RX ORDER — NITROGLYCERIN 20 MG/100ML
5-200 INJECTION INTRAVENOUS
Status: DISCONTINUED | OUTPATIENT
Start: 2025-02-18 | End: 2025-02-22

## 2025-02-18 RX ORDER — SODIUM CHLORIDE 9 MG/ML
40 INJECTION, SOLUTION INTRAVENOUS AS NEEDED
Status: DISCONTINUED | OUTPATIENT
Start: 2025-02-18 | End: 2025-02-20

## 2025-02-18 RX ORDER — CHLORHEXIDINE GLUCONATE ORAL RINSE 1.2 MG/ML
15 SOLUTION DENTAL EVERY 12 HOURS
Status: DISPENSED | OUTPATIENT
Start: 2025-02-18 | End: 2025-02-19

## 2025-02-18 RX ORDER — ROSUVASTATIN CALCIUM 20 MG/1
40 TABLET, COATED ORAL NIGHTLY
Status: DISCONTINUED | OUTPATIENT
Start: 2025-02-18 | End: 2025-02-28 | Stop reason: HOSPADM

## 2025-02-18 RX ORDER — HEPARIN SODIUM 1000 [USP'U]/ML
4000 INJECTION, SOLUTION INTRAVENOUS; SUBCUTANEOUS AS NEEDED
Status: DISCONTINUED | OUTPATIENT
Start: 2025-02-18 | End: 2025-02-18

## 2025-02-18 RX ORDER — DEXTROSE MONOHYDRATE 25 G/50ML
25 INJECTION, SOLUTION INTRAVENOUS
Status: DISCONTINUED | OUTPATIENT
Start: 2025-02-18 | End: 2025-02-20

## 2025-02-18 RX ORDER — IBUPROFEN 600 MG/1
1 TABLET ORAL
Status: DISCONTINUED | OUTPATIENT
Start: 2025-02-18 | End: 2025-02-20

## 2025-02-18 RX ORDER — LEVETIRACETAM 500 MG/1
1000 TABLET ORAL EVERY 12 HOURS SCHEDULED
Status: DISCONTINUED | OUTPATIENT
Start: 2025-02-18 | End: 2025-02-18

## 2025-02-18 RX ORDER — ASPIRIN 81 MG/1
81 TABLET ORAL DAILY
Status: DISCONTINUED | OUTPATIENT
Start: 2025-02-19 | End: 2025-02-18

## 2025-02-18 RX ORDER — NICOTINE POLACRILEX 4 MG
15 LOZENGE BUCCAL
Status: DISCONTINUED | OUTPATIENT
Start: 2025-02-18 | End: 2025-02-20

## 2025-02-18 RX ORDER — HEPARIN SODIUM 1000 [USP'U]/ML
INJECTION, SOLUTION INTRAVENOUS; SUBCUTANEOUS
Status: DISCONTINUED | OUTPATIENT
Start: 2025-02-18 | End: 2025-02-18 | Stop reason: HOSPADM

## 2025-02-18 RX ORDER — CHLORHEXIDINE GLUCONATE 500 MG/1
CLOTH TOPICAL EVERY 12 HOURS PRN
Status: DISCONTINUED | OUTPATIENT
Start: 2025-02-18 | End: 2025-02-20

## 2025-02-18 RX ORDER — NITROGLYCERIN 0.4 MG/1
0.4 TABLET SUBLINGUAL
Status: DISCONTINUED | OUTPATIENT
Start: 2025-02-18 | End: 2025-02-20

## 2025-02-18 RX ORDER — SODIUM CHLORIDE 0.9 % (FLUSH) 0.9 %
10 SYRINGE (ML) INJECTION AS NEEDED
Status: DISCONTINUED | OUTPATIENT
Start: 2025-02-18 | End: 2025-02-20

## 2025-02-18 RX ORDER — LEVOTHYROXINE SODIUM 100 UG/1
200 TABLET ORAL
Status: DISCONTINUED | OUTPATIENT
Start: 2025-02-19 | End: 2025-02-20

## 2025-02-18 RX ORDER — LIDOCAINE HYDROCHLORIDE 10 MG/ML
INJECTION, SOLUTION INFILTRATION; PERINEURAL
Status: DISCONTINUED | OUTPATIENT
Start: 2025-02-18 | End: 2025-02-18 | Stop reason: HOSPADM

## 2025-02-18 RX ORDER — IPRATROPIUM BROMIDE AND ALBUTEROL SULFATE 2.5; .5 MG/3ML; MG/3ML
3 SOLUTION RESPIRATORY (INHALATION) EVERY 4 HOURS PRN
Status: DISCONTINUED | OUTPATIENT
Start: 2025-02-18 | End: 2025-02-20

## 2025-02-18 RX ORDER — ONDANSETRON 4 MG/1
4 TABLET, ORALLY DISINTEGRATING ORAL EVERY 6 HOURS PRN
Status: DISCONTINUED | OUTPATIENT
Start: 2025-02-18 | End: 2025-02-20

## 2025-02-18 RX ORDER — HEPARIN SODIUM 1000 [USP'U]/ML
2000 INJECTION, SOLUTION INTRAVENOUS; SUBCUTANEOUS AS NEEDED
Status: DISCONTINUED | OUTPATIENT
Start: 2025-02-18 | End: 2025-02-18

## 2025-02-18 RX ORDER — HEPARIN SODIUM 10000 [USP'U]/100ML
17 INJECTION, SOLUTION INTRAVENOUS
Status: DISCONTINUED | OUTPATIENT
Start: 2025-02-18 | End: 2025-02-20

## 2025-02-18 RX ORDER — INSULIN LISPRO 100 [IU]/ML
2-9 INJECTION, SOLUTION INTRAVENOUS; SUBCUTANEOUS
Status: DISCONTINUED | OUTPATIENT
Start: 2025-02-18 | End: 2025-02-20

## 2025-02-18 RX ORDER — HEPARIN SODIUM 1000 [USP'U]/ML
4000 INJECTION, SOLUTION INTRAVENOUS; SUBCUTANEOUS ONCE
Status: DISCONTINUED | OUTPATIENT
Start: 2025-02-18 | End: 2025-02-18

## 2025-02-18 RX ORDER — FUROSEMIDE 10 MG/ML
40 INJECTION INTRAMUSCULAR; INTRAVENOUS ONCE
Status: COMPLETED | OUTPATIENT
Start: 2025-02-18 | End: 2025-02-18

## 2025-02-18 RX ORDER — NITROGLYCERIN 20 MG/100ML
INJECTION INTRAVENOUS
Status: COMPLETED | OUTPATIENT
Start: 2025-02-18 | End: 2025-02-18

## 2025-02-18 RX ORDER — SODIUM CHLORIDE 0.9 % (FLUSH) 0.9 %
10 SYRINGE (ML) INJECTION EVERY 12 HOURS SCHEDULED
Status: DISCONTINUED | OUTPATIENT
Start: 2025-02-18 | End: 2025-02-20

## 2025-02-18 RX ORDER — ACETAMINOPHEN 325 MG/1
650 TABLET ORAL EVERY 4 HOURS PRN
Status: DISCONTINUED | OUTPATIENT
Start: 2025-02-18 | End: 2025-02-20

## 2025-02-18 RX ORDER — MORPHINE SULFATE 2 MG/ML
2 INJECTION, SOLUTION INTRAMUSCULAR; INTRAVENOUS
Status: DISCONTINUED | OUTPATIENT
Start: 2025-02-18 | End: 2025-02-18

## 2025-02-18 RX ORDER — ONDANSETRON 2 MG/ML
4 INJECTION INTRAMUSCULAR; INTRAVENOUS EVERY 6 HOURS PRN
Status: DISCONTINUED | OUTPATIENT
Start: 2025-02-18 | End: 2025-02-20

## 2025-02-18 RX ORDER — NALOXONE HCL 0.4 MG/ML
0.4 VIAL (ML) INJECTION
Status: DISCONTINUED | OUTPATIENT
Start: 2025-02-18 | End: 2025-02-20

## 2025-02-18 RX ORDER — FUROSEMIDE 10 MG/ML
INJECTION INTRAMUSCULAR; INTRAVENOUS
Status: DISCONTINUED | OUTPATIENT
Start: 2025-02-18 | End: 2025-02-18 | Stop reason: HOSPADM

## 2025-02-18 RX ORDER — MIDAZOLAM HYDROCHLORIDE 1 MG/ML
INJECTION, SOLUTION INTRAMUSCULAR; INTRAVENOUS
Status: DISCONTINUED | OUTPATIENT
Start: 2025-02-18 | End: 2025-02-18 | Stop reason: HOSPADM

## 2025-02-18 RX ORDER — IOPAMIDOL 755 MG/ML
INJECTION, SOLUTION INTRAVASCULAR
Status: DISCONTINUED | OUTPATIENT
Start: 2025-02-18 | End: 2025-02-18 | Stop reason: HOSPADM

## 2025-02-18 RX ADMIN — ROSUVASTATIN 40 MG: 20 TABLET, FILM COATED ORAL at 21:08

## 2025-02-18 RX ADMIN — ONDANSETRON 4 MG: 2 INJECTION INTRAMUSCULAR; INTRAVENOUS at 17:01

## 2025-02-18 RX ADMIN — FUROSEMIDE 40 MG: 10 INJECTION, SOLUTION INTRAMUSCULAR; INTRAVENOUS at 21:08

## 2025-02-18 RX ADMIN — HYDROMORPHONE HYDROCHLORIDE 1 MG: 1 INJECTION, SOLUTION INTRAMUSCULAR; INTRAVENOUS; SUBCUTANEOUS at 16:58

## 2025-02-18 RX ADMIN — HEPARIN SODIUM 9 UNITS/KG/HR: 10000 INJECTION, SOLUTION INTRAVENOUS at 23:55

## 2025-02-18 RX ADMIN — INSULIN LISPRO 2 UNITS: 100 INJECTION, SOLUTION INTRAVENOUS; SUBCUTANEOUS at 21:08

## 2025-02-18 RX ADMIN — MUPIROCIN 1 APPLICATION: 20 OINTMENT TOPICAL at 17:30

## 2025-02-18 RX ADMIN — Medication 10 ML: at 21:09

## 2025-02-18 RX ADMIN — INSULIN LISPRO 2 UNITS: 100 INJECTION, SOLUTION INTRAVENOUS; SUBCUTANEOUS at 17:30

## 2025-02-18 RX ADMIN — HYDROMORPHONE HYDROCHLORIDE 1 MG: 1 INJECTION, SOLUTION INTRAMUSCULAR; INTRAVENOUS; SUBCUTANEOUS at 23:52

## 2025-02-18 NOTE — Clinical Note
POC Creatinine = 1.6 (mg/dL). POC Creatinine was drawn at 14:54 EST. POC Creatinine result was completed at 14:58 EST.

## 2025-02-18 NOTE — Clinical Note
First balloon inflation max pressure = 12 humera. First balloon inflation duration = 15 seconds. Second inflation of balloon - Max pressure = 12 humera. 2nd Inflation of balloon - Duration = 10 seconds. Third inflation of balloon - Max pressure = 12 humera. 3rd Inflation of balloon - Duration = 10 seconds.

## 2025-02-18 NOTE — Clinical Note
A 6 fr sheath was successfully inserted using micropuncture technique with ultrasound guidance into the right femoral vein. Sheath insertion delayed. Patient arrived without peripheral IV access

## 2025-02-18 NOTE — Clinical Note
A 6 fr sheath was successfully inserted with ultrasound guidance into the right radial artery. Sheath insertion delayed. Patient arrived without peripheral IV access

## 2025-02-18 NOTE — H&P
[facility]  Cardiology H&P Note  Psychiatric CATH LAB          Patient Identification:  Camron Hendrix      [id number]  74 y.o.        male  1950       Date of Admission:  02/18/25    Chief complaint: Chest pain    PCP: Leslie Rhodes MD  Primary cardiologist: Job Perdomo in [place]    History of Present Illness:     74-year-old gentleman with a prior history of CAD and stent to the LAD in years past follows with Dr. Job Perdomo, last cath showed patent LAD stent, also has a history of subarachnoid hemorrhage, approximately fibrillation on warfarin, AAA, hypertension, hyperlipidemia.  Crushing substernal chest pain rating to bilateral shoulders and jaw occurred around 2 and half hours prior to arrival via [place] [facility], they found significant 3 mm anterior ST elevation V2 through V4 with slight inferior ST depression.  Upon arrival patient was still having 10/10 chest pain.    Past History:  Past Medical History:   Diagnosis Date    AAA (abdominal aortic aneurysm)     Coronary artery disease     CVA (cerebral vascular accident) 2011    Hypertension     Hypothyroidism     PAF (paroxysmal atrial fibrillation)     TIA (transient ischemic attack)      Past Surgical History:   Procedure Laterality Date    CARDIAC CATHETERIZATION N/A 3/29/2019    Procedure: Left Heart Cath;  Surgeon: Andrea Holloawy MD;  Location: [facility] CATH INVASIVE LOCATION;  Service: Cardiovascular    CERVICAL FUSION      c4-c5    CORONARY ANGIOPLASTY WITH STENT PLACEMENT      HERNIA REPAIR      TONSILECTOMY, ADENOIDECTOMY, BILATERAL MYRINGOTOMY AND TUBES       Allergies   Allergen Reactions    Codeine Hallucinations     Social History     Socioeconomic History    Marital status:    Tobacco Use    Smoking status: Former     Current packs/day: 0.00     Average packs/day: 1 pack/day for 20.0 years (20.0 ttl pk-yrs)     Types: Cigarettes     Start date: 9/20/1965     Quit date:  1985     Years since quittin.4     Passive exposure: Past    Smokeless tobacco: Never   Vaping Use    Vaping status: Never Used   Substance and Sexual Activity    Alcohol use: Yes     Comment: occasional     Drug use: No    Sexual activity: Defer     Family History   Problem Relation Age of Onset    Heart failure Mother     Aneurysm Father      Medications:  Medications Prior to Admission   Medication Sig Dispense Refill Last Dose/Taking    amLODIPine (NORVASC) 10 MG tablet Administer 1 tablet per G tube Daily.       bumetanide (BUMEX) 1 MG tablet Administer 1 tablet per G tube Daily.       carvedilol (COREG) 25 MG tablet Administer 1 tablet per G tube Every 12 (Twelve) Hours.       cetirizine (zyrTEC) 10 MG tablet Take 1 tablet by mouth Daily for 30 days.       hydrALAZINE (APRESOLINE) 100 MG tablet Administer 1 tablet per G tube Every 8 (Eight) Hours.       levETIRAcetam (KEPPRA) 1000 MG tablet 1 tablet by Nasogastric route Every 12 (Twelve) Hours.       levothyroxine (SYNTHROID, LEVOTHROID) 200 MCG tablet Administer 1 tablet per G tube Every Morning.       losartan (COZAAR) 100 MG tablet Administer 1 tablet per G tube Daily.       mirtazapine (REMERON) 7.5 MG tablet 1 tablet by Nasogastric route Every Night.        Current medications:     Current IV drips:  cangrelor 50 mg in 250 ml (200 mcg/ml) IV infusion, , Last Rate: 4 mcg/kg/min (25 1535)  nitroglycerin, , Last Rate: 30 mcg/min (25 1548)        Review of Systems:    Constitutional no fever,  no weight loss   Skin no rash   Otolaryngeal no difficulty swallowing   Cardiovascular See HPI   Pulmonary no cough, no sputum production   Gastrointestinal no constipation, no diarrhea   Genitourinary no dysuria, no hematuria   Hematologic no easy bruisability, no abnormal bleeding   Musculoskeletal no muscle pain   Neurologic no dizziness, no falls     Physical exam:    BP (!) 159/135   Pulse 70   Resp 16   Wt 116 kg (255 lb)   SpO2 97%    "BMI 37.66 kg/m²  Body mass index is 37.66 kg/m².   Oxygen saturation   SpO2  Min: 97 %  Max: 98 %    General Appearance:   well developed  well nourished, mild distress normal sinus rhythm  HENT:   oropharynx moist  lips not cyanotic  Neck:  thyroid not enlarged  supple  Respiratory:  no respiratory distress  normal breath sounds  no rales  Cardiovascular:  no jugular venous distention  regular rhythm  apical impulse normal  S1 normal, S2 normal  no S3, no S4   no murmur  no rub, no thrill  carotid pulses normal; no bruit  pedal pulses normal  lower extremity edema: none    Gastrointestinal:   bowel sounds normal  non-tender  no hepatomegaly, no splenomegaly  Musculoskeletal:  no clubbing of fingers.   normocephalic, head atraumatic  Skin:   warm, dry  Psychiatric:  judgement and insight appropriate  normal mood and affect    Cardiographics:     ECG  (personally reviewed) elevation with inferior depression   Telemetry:  (personally reviewed) normal sinus   ECHO:   Results for orders placed during the hospital encounter of 04/24/24    Adult Transthoracic Echo Complete W/ Cont if Necessary Per Protocol (With Agitated Saline)    Interpretation Summary    Left ventricular systolic function is normal. Calculated left ventricular EF = 65.8% Left ventricular ejection fraction appears to be 66 - 70%.    Left ventricular wall thickness is consistent with mild to moderate concentric hypertrophy.    Left ventricular diastolic function is consistent with (grade I) impaired relaxation.    Saline test results are negative for right to left atrial level shunt.    Peak velocity of the flow distal to the aortic valve is 263 cm/s. Aortic valve mean pressure gradient is 15 mmHg.    Ascending aorta = 4.4 cm       CATH: 2019 images reviewed.  Moderate nonobstructive disease at that time   CARDIOLITE:      Lab Review:           Invalid input(s): \"PLATELETCT\"            CrCl cannot be calculated (Patient's most recent lab result is older " "than the maximum 30 days allowed.).     Lab Results   Component Value Date    CHOL 94 04/25/2024    TRIG 100 04/25/2024    HDL 33 (L) 04/25/2024    LDL 42 04/25/2024     (H) 05/05/2024     (H) 05/05/2024             Lab Results   Component Value Date    TSH 1.770 04/24/2024        Lab Results   Component Value Date    HGBA1C 6 (H) 12/31/2024           No results found for: \"DIGOXIN\"   No results found for: \"DDIMERQUANT\"     Imaging:  All pertinent imaging studies were personally reviewed.    Assessment:      STEMI involving left anterior descending coronary artery    CVA (cerebral vascular accident)    Essential hypertension    Coronary artery disease involving native coronary artery of native heart without angina pectoris    Morbid obesity with BMI of 40.0-44.9, adult    Sleep apnea    PAF (paroxysmal atrial fibrillation)    Type 2 diabetes mellitus    Acute systolic CHF (congestive heart failure)          Recommendations:  1.  Anterior STEMI:  Emergent cardiac catheterization revealed 100% mid LAD stenosis with mixed heavy eccentric calcification and plaque erosion with thrombus present.  Balloon angioplasty carried out.  Stent was not deployed as the significant heavy calcification and calcific nodules prevented advancement of NC balloons therefore atherectomy would be necessary.  Patient has concomitant 90% proximal circumflex and 99% subtotal mid RCA, he is diabetic and has LV systolic dysfunction therefore I consulted with Dr. Hernandez of CT surgery who agrees patient is a good candidate for CABG  Cangrelor was started during the intervention and will be continued until time of surgery.  Continue aspirin  Nitroglycerin drip initiated  Morphine for chest pain    Patient was given aspirin 325 mg, but no oral P2Y12 inhibitor  Check echocardiogram    2.  Acute systolic heart failure:  LVEF on left ventriculogram appears to be 35-40% with severe distal anterior, apical and distal inferior " hypokinesis  Patient is short of breath with pulmonary edema consistent with Killip class III heart failure symptoms on initial presentation.  Continue preload and afterload reduction with parenteral nitrates  Lasix 40 mg IV given during catheterization continue daily for net negative fluid balance  Slowly initiate GDMT depending on time of CABG    3.  CKD stage IIIb:  Initial creatinine 1.6 prior to cardiac catheterization  Monitor with labs daily  IV Lasix given    4.  Type 2 diabetes:  Per intensivist team    5.  Primary hypertension:  Patient will be on nitroglycerin drip for acute heart failure titrate for goal blood pressure less than 130/80  If renal function is stable I would add back carvedilol and ARB 2/19/2025    6.  Mixed hyperlipidemia:  Start rosuvastatin 40 mg daily, check fasting lipid panel in the morning    Discussed with patient's nurse, Dr. Hernandez, and patient's family        Carlo Barragan MD  2/18/2025  15:54 EST

## 2025-02-18 NOTE — Clinical Note
First balloon inflation max pressure = 12 humera. First balloon inflation duration = 20 seconds. Second inflation of balloon - Max pressure = 12 humera. 2nd Inflation of balloon - Duration = 20 seconds. Third inflation of balloon - Max pressure = 14 humera. 3rd Inflation of balloon - Duration = 15 seconds. Fourth inflation of balloon - Max pressure = 14 humera. 4th Inflation of balloon - Duration = 15 seconds.

## 2025-02-18 NOTE — CONSULTS
CTS H&P     Patient Care Team:  Leslie Rhodes MD as PCP - General (Family Medicine)  Delmer Perdomo MD as Cardiologist (Cardiology)  Referring MD:    Carlo Barragan MD       Reason for consultation: Coronary disease/CABG    HPI  Patient is a 74 y.o. male with a history of abdominal aortic aneurysm, CVA, hypertension, hypothyroidism, paroxysmal atrial fibrillation and a known history of coronary artery disease having had previous coronary stents to his LAD in the past.  Transferred here from outside facility with a diagnosis of STEMI.  Upon transfer he was  evaluated by cardiology and taken promptly to the cardiac catheterization lab.  He was treated for an anterior STEMI and received balloon angioplasty to the mid LAD.  Stent was not deployed is secondary to heavy calcification.  He was found to have significant coronary artery disease.  Patient was placed on aspirin therapy and IV nitroglycerin.  He was treated for an acute systolic heart failure.  Echocardiogram showed his ejection fraction of 35 to 40%.  He received some gentle diuresis.  CT surgical evaluation recommended for possible CABG.        Review of Systems  Pertinent items are noted in HPI.        History  Past Medical History:   Diagnosis Date    AAA (abdominal aortic aneurysm)     Coronary artery disease     CVA (cerebral vascular accident) 2011    Hypertension     Hypothyroidism     PAF (paroxysmal atrial fibrillation)     TIA (transient ischemic attack)      Past Surgical History:   Procedure Laterality Date    CARDIAC CATHETERIZATION N/A 3/29/2019    Procedure: Left Heart Cath;  Surgeon: Andrea Holloway MD;  Location: [facility] CATH INVASIVE LOCATION;  Service: Cardiovascular    CERVICAL FUSION      c4-c5    CORONARY ANGIOPLASTY WITH STENT PLACEMENT      HERNIA REPAIR      TONSILECTOMY, ADENOIDECTOMY, BILATERAL MYRINGOTOMY AND TUBES       Family History   Problem Relation Age of Onset    Heart failure Mother     Aneurysm  Father      Social History     Tobacco Use    Smoking status: Former     Current packs/day: 0.00     Average packs/day: 1 pack/day for 20.0 years (20.0 ttl pk-yrs)     Types: Cigarettes     Start date: 1965     Quit date: 1985     Years since quittin.4     Passive exposure: Past    Smokeless tobacco: Never   Vaping Use    Vaping status: Never Used   Substance Use Topics    Alcohol use: Yes     Comment: occasional     Drug use: No     Medications Prior to Admission   Medication Sig Dispense Refill Last Dose/Taking    amLODIPine (NORVASC) 10 MG tablet Administer 1 tablet per G tube Daily.       bumetanide (BUMEX) 1 MG tablet Administer 1 tablet per G tube Daily.       carvedilol (COREG) 25 MG tablet Administer 1 tablet per G tube Every 12 (Twelve) Hours.       cetirizine (zyrTEC) 10 MG tablet Take 1 tablet by mouth Daily for 30 days.       hydrALAZINE (APRESOLINE) 100 MG tablet Administer 1 tablet per G tube Every 8 (Eight) Hours.       levETIRAcetam (KEPPRA) 1000 MG tablet 1 tablet by Nasogastric route Every 12 (Twelve) Hours.       levothyroxine (SYNTHROID, LEVOTHROID) 200 MCG tablet Administer 1 tablet per G tube Every Morning.       losartan (COZAAR) 100 MG tablet Administer 1 tablet per G tube Daily.       mirtazapine (REMERON) 7.5 MG tablet 1 tablet by Nasogastric route Every Night.        Allergies:  Codeine    Objective    Vital Signs  Heart Rate:  [67-74] 74  Resp:  [14-16] 14  BP: (133-164)/() 133/83    Physical Exam:  General Appearance: Well-developed well-nourished  Head: Atraumatic normocephalic  Neck: Supple without mass or bruit noted  Lungs: Distant breath sounds but clear to auscultation.  Unlabored on room air  Heart: Regular rate and rhythm.  Distant heart tones but no murmur rub or gallop.  Abdomen: Soft, obese, nontender, positive bowel sounds no masses bruits noted.  Extremities: Warm to the touch with 2+ pitting edema bilateral lower extremities below the knee  Pulses:  Palpable peripheral pulses in upper lower extremities.  He did have a positive Barbeau exam on the left upper extremity  Skin: Warm and dry without any significant rash or lesions  Neurologic: Grossly intact.  Alert and oriented x 3.  No focal deficit.  Moves all extremities.          Data Review:  Cardiac catheterization   100% mid LAD stenosis status post balloon angioplasty.  90% proximal circumflex 99% subtotal mid RCA occlusion    Carotid Duplex pending  Echo: Pending          Assessment:    Severe three-vessel coronary disease  COPD on as needed albuterol at home  Paroxysmal atrial fibrillation previously treated with Coumadin.  Stopped in April of last year secondary to subarachnoid hemorrhage      STEMI involving left anterior descending coronary artery    CVA (cerebral vascular accident)    Essential hypertension    Coronary artery disease involving native coronary artery of native heart without angina pectoris    Morbid obesity with BMI of 40.0-44.9, adult    Sleep apnea    PAF (paroxysmal atrial fibrillation)    Type 2 diabetes mellitus    Acute systolic CHF (congestive heart failure)        Plan:  Preoperative workup underway.  We will order an echocardiogram, carotid duplex scanning.  Vein mapping, radial artery mapping, PFTs and a CT of the chest without contrast.      MIKI Marshall  02/18/25  16:12 EST      Agree with assessment and plan as documented by PA above.    74M presented with h/o CAD s/p PCI, DM, CVA, Afib, presented with chest pain EKG c/w STEMI, emergent demonstrated acute LAD occlusion now s/p balloon angiolplasty with residual 70% stenosis, multivessel disease including 90%Cx and 99% RCA, LVEF 35%, LVEDP 37mmHg.  CTS consulted for consideration of CABG, on eval he is resting comfortably in bed accompanied by daughters, no chest pain at this time, on cangrelor and nitroglycerin gtts. He remains hemodynamically stable without ongoing pain, would likely benefit from diuresis and  medical optimization prior to surgery given his CHF and depressed LVEF. We discussed the risk of surgery including but not limited to bleeding, infection, injury to major organs or vessels, chronic heart failure, arrhythmias, need for pacemaker, prolonged ventilation, renal failure, stroke, risk of anesthesia, risk of sternal wound or bone healing problems, reoperation, and/or death. Further risk stratification to be done once preoperative studies concluded. Will continue to follow along closely, plan for surgery in the coming days.

## 2025-02-18 NOTE — Clinical Note
First balloon inflation max pressure = 14 humera. First balloon inflation duration = 20 seconds. Second inflation of balloon - Max pressure = 14 humera. 2nd Inflation of balloon - Duration = 20 seconds.

## 2025-02-18 NOTE — PROGRESS NOTES
Critical Care Note     LOS: 0 days   Patient Care Team:  Leslie Rhodes MD as PCP - General (Family Medicine)  Delmer Perdomo MD as Cardiologist (Cardiology)    Chief Complaint/Reason for visit:      STEMI, 100% LAD status post balloon angioplasty, 90% proximal circumflex, 99% mid RCA    Acute systolic heart failure    Chronic kidney disease stage IIIb  Diabetes mellitus type 2  Hypertension  History of PAF      Subjective     Interval History:     Camron Hendrix is a 74 year-old male with CAD s/p stent to LAD, CKD stage IIIb, T2DM, hypothyroidism, HTN, PAF, sleep apnea, hyperlipidemia, and history of stroke that presented to [facility] today with chest pain. He was found to be having an anterior STEMI and was transferred to [facility] where he underwent LHC with Dr. Barragan. Noted to have 100% mid LAD stenosis s/p angioplasty / stent not deployed as significant heavy calcification and calcific nodules prevented advancement of balloons. Additionally, patient found to have 90% proximal circ and 99% subtotal mid RCA with acute systolic heart failure (LVEF 35-40%). Dr. Hernandez was consulted who agreed patient to be good candidate for CABG.     Patient given ASA, 40 mg IV Lasix, and initiated on nitro drip and Cangrelor.     He is being admitted to the ICU for further care until surgical revascularization can be performed.     Time spent:3 minutes  Electronically signed by FADUMO Kapadia, 02/18/25, 4:46 PM EST.    Patient arrived in the ICU.  He is complaining of chest pain 3 out of 10.  He is on cangrelor 0.75 mcg/kg/min, nitroglycerin 50 mcg/min.  Chest pain is dull and in the lower part of the sternum.  It does not radiate.  He denies nausea.  He reports that he quit smoking 25 years ago.  He is retired from [employer].  He reports that his diabetes is diet controlled.  He has had 2 strokes, 2 TIAs and 1 brain bleed.  He did have a subarachnoid hemorrhage May 2024 when he was on Coumadin for  paroxysmal atrial fibrillation.  He was empirically placed on Keppra.  That was stopped in August.  He mostly had dysphagia as a symptom which improved.  He reports no sequelae from any of his neurologic events.    History taken from: patient    Review of Systems:    All systems were reviewed and negative except as noted in subjective.  He currently denies headache, weakness or numbness.  He does feel some urinary urgency.  Chest pain as noted above.    Medical history, surgical history, social history, family history reviewed    Objective     Intake/Output:    Intake/Output Summary (Last 24 hours) at 2/18/2025 1712  Last data filed at 2/18/2025 1645  Gross per 24 hour   Intake --   Output 1000 ml   Net -1000 ml       Nutrition:  Diet: Cardiac, Diabetic; Healthy Heart (2-3 Na+); Consistent Carbohydrate; Fluid Consistency: Thin (IDDSI 0)  NPO Diet NPO Type: Sips with Meds    Infusions:  cangrelor 50 mg in 250 ml (200 mcg/ml) IV infusion, 0.75 mcg/kg/min  heparin, 1,000 Units/hr, Last Rate: Stopped (02/18/25 1635)  nitroglycerin, 5-200 mcg/min, Last Rate: 50 mcg/min (02/18/25 1709)  Pharmacy to Dose Heparin,         Mechanical Ventilator Settings:                                                Telemetry: Sinus rhythm             Vital Signs  Blood pressure 140/79, pulse 68, temperature 98.4 °F (36.9 °C), temperature source Axillary, resp. rate 18, weight 116 kg (255 lb), SpO2 94%.    Physical Exam:  General Appearance:  Overweight older gentleman supine in bed in no acute distress   Head:  Atraumatic   Eyes:          Pupils small, equal, reactive.  Conjunctiva pink   Ears:     Throat: Oral mucosa moist   Neck: Trachea midline, no carotid bruit   Back:      Lungs:   Symmetric chest expansion without wheeze or rhonchi    Heart:  Regular rhythm, frequent early beats.  S1, S2 auscultated, systolic murmur   Abdomen:   Protuberant, bowel sounds present, nontender   Rectal:   Deferred   Extremities: 1+ pretibial edema, left  "greater than right.  Right wrist T band   Pulses:    Skin: Cool and dry   Lymph nodes: No cervical adenopathy   Neurologic: Alert, oriented, mildly hard of hearing.  Face symmetric.  Speech fluent.  Left hand  strong.      Results Review:     I reviewed the patient's new clinical results.   Results from last 7 days   Lab Units 02/18/25  1457   CREATININE mg/dL 1.60*           Invalid input(s): \"NEUTOPHILPCT\"      Lab Results   Component Value Date    BLOODCX No growth at 5 days 05/03/2024     No results found for: \"URINECX\"    I reviewed the patient's new imaging including images and reports.      Conclusion left heart catheterization February 18, 2025         Mid % occlusion (mixed thrombotic/calcific/fibrotic stenosis), status post balloon angioplasty with a 2.5 NC balloon at nominal pressure.    Mid circumflex 90%, mid RCA 99% subtotal occlusion with left-to-right collaterals    Discussed with CT surgery, given patient' coronary anatomy, LV systolic dysfunction and diabetic status, CABG is ideal.    Cangrelor started during the intervention and will be continued until time of surgery    IV Lasix for diuresis along with IV nitroglycerin    LVEF 35 to 40% with severe distal anterior, apical and distal inferior hypokinesis    Killip class III acute systolic heart failure present on arrival.  With LVEDP 37 mmHg    LV pressures (S/D/E) : 127/26/37 mmHg    Moderate systolic dysfunction.    There is moderate left ventricular systolic dysfunction.     Plan discussed with Dr. Hernandez as well as the patient and his daughter at bedside  All medications reviewed.   [START ON 2/19/2025] aspirin, 81 mg, Oral, Daily  [START ON 2/19/2025] ceFAZolin, 2,000 mg, Intravenous, Once  chlorhexidine, 15 mL, Mouth/Throat, Q12H  heparin (porcine), 4,000 Units, Intravenous, Once  insulin lispro, 2-9 Units, Subcutaneous, 4x Daily AC & at Bedtime  [START ON 2/19/2025] levothyroxine, 200 mcg, Oral, Q AM  mupirocin, 1 Application, " Each Nare, Q12H  rosuvastatin, 40 mg, Oral, Nightly  sodium chloride, 10 mL, Intravenous, Q12H          Assessment & Plan       STEMI involving left anterior descending coronary artery    CVA (cerebral vascular accident)    Essential hypertension    Coronary artery disease involving native coronary artery of native heart without angina pectoris    Morbid obesity with BMI of 40.0-44.9, adult    Sleep apnea    PAF (paroxysmal atrial fibrillation)    Type 2 diabetes mellitus    Acute systolic CHF (congestive heart failure)      74-year-old gentleman, remote smoker (quit 25 years), with hypertension, hypothyroid, dyslipidemia, diabetes, obesity, subarachnoid hemorrhage May 2024, 2 previous strokes without sequelae, paroxysmal atrial fibrillation presenting with chest pain and ST elevation.  Heart catheterization revealed severe 3 vessel disease with reduced EF 35 to 40%.  He was placed on cangrelor and nitroglycerin drips and brought to the ICU.  He is being assessed for CABG.  He still has some 3 out of 10 chest pain.  He has baseline creatinine in December 2024 was 1.02 with a GFR of 77.  His hemoglobin A1c at that time was 6 and he reports that he was diet controlled.    PLAN:    Monitor rhythm  Check electrolytes  Continue nitroglycerin drip  Cangrelor per cardiology  Evaluation for CABG, possibly left atrial appendage ligation with previous history of atrial fibrillation and subarachnoid hemorrhage    Sliding scale insulin  Hemoglobin A1c    Not sure he can perform a spirometry maneuver, previous CTA of the chest May 2024 revealed only minimal COPD changes    Recheck potassium tonight post diuretics given in the Cath Lab      VTE Prophylaxis: dual antiplatelet therapy    Stress Ulcer Prophylaxis: none    Rani Lilly MD  02/18/25  17:12 EST      Time: Critical care 40 min  I personally provided care to this critically ill patient as documented above.  Critical care time does not include time spent on  separately billed procedures.  None of my critical care time was concurrent with other critical care providers.

## 2025-02-19 ENCOUNTER — ANESTHESIA EVENT (OUTPATIENT)
Dept: PERIOP | Facility: HOSPITAL | Age: 75
End: 2025-02-19
Payer: MEDICARE

## 2025-02-19 ENCOUNTER — APPOINTMENT (OUTPATIENT)
Dept: CARDIOLOGY | Facility: HOSPITAL | Age: 75
End: 2025-02-19
Payer: MEDICARE

## 2025-02-19 ENCOUNTER — APPOINTMENT (OUTPATIENT)
Dept: GENERAL RADIOLOGY | Facility: HOSPITAL | Age: 75
End: 2025-02-19
Payer: MEDICARE

## 2025-02-19 PROBLEM — I25.110 CORONARY ARTERY DISEASE INVOLVING NATIVE CORONARY ARTERY OF NATIVE HEART WITH UNSTABLE ANGINA PECTORIS: Status: ACTIVE | Noted: 2025-02-18

## 2025-02-19 LAB
ANION GAP SERPL CALCULATED.3IONS-SCNC: 10 MMOL/L (ref 5–15)
ARTERIAL PATENCY WRIST A: ABNORMAL
ASCENDING AORTA: 4.7 CM
ATMOSPHERIC PRESS: ABNORMAL MM[HG]
AV MEAN PRESS GRAD SYS DOP V1V2: 14 MMHG
AV VMAX SYS DOP: 277 CM/SEC
BASE EXCESS BLDA CALC-SCNC: 4.3 MMOL/L (ref 0–2)
BASOPHILS # BLD AUTO: 0.02 10*3/MM3 (ref 0–0.2)
BASOPHILS NFR BLD AUTO: 0.2 % (ref 0–1.5)
BDY SITE: ABNORMAL
BH CV ECHO LEFT VENTRICLE GLOBAL LONGITUDINAL STRAIN: -14.5 %
BH CV ECHO MEAS - AO MAX PG: 30.7 MMHG
BH CV ECHO MEAS - AO ROOT AREA (BSA CORRECTED): 1.9 CM2
BH CV ECHO MEAS - AO ROOT DIAM: 4.4 CM
BH CV ECHO MEAS - AO V2 VTI: 57.6 CM
BH CV ECHO MEAS - AVA(I,D): 1.36 CM2
BH CV ECHO MEAS - EDV(CUBED): 100.5 ML
BH CV ECHO MEAS - EDV(MOD-SP2): 128 ML
BH CV ECHO MEAS - EDV(MOD-SP4): 158 ML
BH CV ECHO MEAS - EF(MOD-SP2): 45.2 %
BH CV ECHO MEAS - EF(MOD-SP4): 45.5 %
BH CV ECHO MEAS - ESV(CUBED): 54.9 ML
BH CV ECHO MEAS - ESV(MOD-SP2): 70.1 ML
BH CV ECHO MEAS - ESV(MOD-SP4): 86.1 ML
BH CV ECHO MEAS - FS: 18.3 %
BH CV ECHO MEAS - IVS/LVPW: 1.07 CM
BH CV ECHO MEAS - IVSD: 1.6 CM
BH CV ECHO MEAS - LA DIMENSION: 4.4 CM
BH CV ECHO MEAS - LAT PEAK E' VEL: 7.2 CM/SEC
BH CV ECHO MEAS - LV DIASTOLIC VOL/BSA (35-75): 69 CM2
BH CV ECHO MEAS - LV MASS(C)D: 304.2 GRAMS
BH CV ECHO MEAS - LV MAX PG: 5 MMHG
BH CV ECHO MEAS - LV MEAN PG: 3 MMHG
BH CV ECHO MEAS - LV SYSTOLIC VOL/BSA (12-30): 37.6 CM2
BH CV ECHO MEAS - LV V1 MAX: 111.5 CM/SEC
BH CV ECHO MEAS - LV V1 VTI: 25 CM
BH CV ECHO MEAS - LVIDD: 4.7 CM
BH CV ECHO MEAS - LVIDS: 3.8 CM
BH CV ECHO MEAS - LVOT AREA: 3.1 CM2
BH CV ECHO MEAS - LVOT DIAM: 2 CM
BH CV ECHO MEAS - LVPWD: 1.5 CM
BH CV ECHO MEAS - MED PEAK E' VEL: 6.6 CM/SEC
BH CV ECHO MEAS - MV A MAX VEL: 137 CM/SEC
BH CV ECHO MEAS - MV DEC SLOPE: 630 CM/SEC2
BH CV ECHO MEAS - MV DEC TIME: 0.18 SEC
BH CV ECHO MEAS - MV E MAX VEL: 90.7 CM/SEC
BH CV ECHO MEAS - MV E/A: 0.66
BH CV ECHO MEAS - MV MAX PG: 10.3 MMHG
BH CV ECHO MEAS - MV MEAN PG: 3.7 MMHG
BH CV ECHO MEAS - MV P1/2T: 48.4 MSEC
BH CV ECHO MEAS - MV V2 VTI: 33.3 CM
BH CV ECHO MEAS - MVA(P1/2T): 4.6 CM2
BH CV ECHO MEAS - MVA(VTI): 2.35 CM2
BH CV ECHO MEAS - SV(LVOT): 78.4 ML
BH CV ECHO MEAS - SV(MOD-SP2): 57.9 ML
BH CV ECHO MEAS - SV(MOD-SP4): 71.9 ML
BH CV ECHO MEAS - SVI(LVOT): 34.2 ML/M2
BH CV ECHO MEAS - SVI(MOD-SP2): 25.3 ML/M2
BH CV ECHO MEAS - SVI(MOD-SP4): 31.4 ML/M2
BH CV ECHO MEAS - TAPSE (>1.6): 2.8 CM
BH CV ECHO MEASUREMENTS AVERAGE E/E' RATIO: 13.14
BH CV UPPER DUPLEX LEFT AXILLARY ART PSV: 77.9 CM/S
BH CV UPPER DUPLEX LEFT BRACHIAL ART PSV: 90.9 CM/S
BH CV UPPER DUPLEX LEFT RADIAL ART PSV: 85.2 CM/S
BH CV UPPER DUPLEX LEFT SUBCLAVIAN  ART PSV: 204.5 CM/S
BH CV UPPER DUPLEX LEFT ULNAR ARTERY PSV: 80 CM/S
BH CV UPPER DUPLEX RIGHT AXILLARY ARTERY PSV: 89.3 CM/S
BH CV UPPER DUPLEX RIGHT BRACHIAL ART PSV: 82.1 CM/S
BH CV UPPER DUPLEX RIGHT RADIAL ART PSV: 110.3 CM/S
BH CV UPPER DUPLEX RIGHT SUBCLAVIAN ART PSV: 208 CM/S
BH CV UPPER DUPLEX RIGHT ULNAR ART PSV: 88.3 CM/S
BH CV VAS UPPER ART LT RADIAL ART DIAMETER FOREARM DIST: 0.33 CM
BH CV VAS UPPER ART LT RADIAL ART DIAMETER FOREARM MID: 0.46 CM
BH CV VAS UPPER ART LT RADIAL ART DIAMETER FOREARM PROX: 0.45 CM
BH CV VAS UPPER ART RT RADIAL ART DIAMETER FOREARM DIST: 0.26 CM
BH CV VAS UPPER ART RT RADIAL ART DIAMETER FOREARM MID: 0.45 CM
BH CV VAS UPPER ART RT RADIAL ART DIAMETER FOREARM PROX: 0.55 CM
BH CV XLRA - RV BASE: 3.7 CM
BH CV XLRA - RV LENGTH: 7.7 CM
BH CV XLRA - RV MID: 2.6 CM
BH CV XLRA - TDI S': 17.2 CM/SEC
BH CV XLRA MEAS - DIST GSV CALF DIST LEFT: 0.34 CM
BH CV XLRA MEAS - DIST GSV CALF DIST RIGHT: 0.34 CM
BH CV XLRA MEAS - DIST GSV THIGH DIST LEFT: 0.48 CM
BH CV XLRA MEAS - DIST GSV THIGH DIST RIGHT: 0.45 CM
BH CV XLRA MEAS - DIST LSV CALF DIST LEFT: 0.24 CM
BH CV XLRA MEAS - DIST LSV CALF DIST RIGHT: 0.25 CM
BH CV XLRA MEAS - GSV KNEE DIST LEFT: 0.49 CM
BH CV XLRA MEAS - GSV KNEE DIST RIGHT: 0.42 CM
BH CV XLRA MEAS - GSV ORIGIN DIST LEFT: 1.04 CM
BH CV XLRA MEAS - GSV ORIGIN DIST RIGHT: 0.71 CM
BH CV XLRA MEAS - MID GSV CALF LEFT: 0.36 CM
BH CV XLRA MEAS - MID GSV CALF RIGHT: 0.35 CM
BH CV XLRA MEAS - MID GSV THIGH  LEFT: 0.43 CM
BH CV XLRA MEAS - MID GSV THIGH  RIGHT: 0.39 CM
BH CV XLRA MEAS - MID LSV CALF DIST LEFT: 0.26 CM
BH CV XLRA MEAS - MID LSV CALF DIST RIGHT: 0.26 CM
BH CV XLRA MEAS - PROX GSV CALF DIST LEFT: 0.42 CM
BH CV XLRA MEAS - PROX GSV CALF DIST RIGHT: 0.36 CM
BH CV XLRA MEAS - PROX GSV THIGH  LEFT: 0.58 CM
BH CV XLRA MEAS - PROX GSV THIGH  RIGHT: 0.5 CM
BH CV XLRA MEAS - PROX LSV CALF DIST LEFT: 0.18 CM
BH CV XLRA MEAS - PROX LSV CALF DIST RIGHT: 0.29 CM
BH CV XLRA MEAS LEFT DIST CCA EDV: 9.5 CM/SEC
BH CV XLRA MEAS LEFT DIST CCA PSV: 74.7 CM/SEC
BH CV XLRA MEAS LEFT DIST ICA EDV: 25.8 CM/SEC
BH CV XLRA MEAS LEFT DIST ICA PSV: 59 CM/SEC
BH CV XLRA MEAS LEFT ICA/CCA RATIO: 1.01
BH CV XLRA MEAS LEFT MID CCA EDV: 14.8 CM/SEC
BH CV XLRA MEAS LEFT MID CCA PSV: 86.2 CM/SEC
BH CV XLRA MEAS LEFT MID ICA EDV: 26.4 CM/SEC
BH CV XLRA MEAS LEFT MID ICA PSV: 75.8 CM/SEC
BH CV XLRA MEAS LEFT PROX CCA EDV: 15.9 CM/SEC
BH CV XLRA MEAS LEFT PROX CCA PSV: 109.3 CM/SEC
BH CV XLRA MEAS LEFT PROX ECA EDV: 10.6 CM/SEC
BH CV XLRA MEAS LEFT PROX ECA PSV: 144.9 CM/SEC
BH CV XLRA MEAS LEFT PROX ICA EDV: 14 CM/SEC
BH CV XLRA MEAS LEFT PROX ICA PSV: 70.8 CM/SEC
BH CV XLRA MEAS LEFT PROX SCLA PSV: 195.4 CM/SEC
BH CV XLRA MEAS LEFT VERTEBRAL A EDV: 14.7 CM/SEC
BH CV XLRA MEAS LEFT VERTEBRAL A PSV: 42 CM/SEC
BH CV XLRA MEAS RIGHT DIST CCA EDV: 9.1 CM/SEC
BH CV XLRA MEAS RIGHT DIST CCA PSV: 54.1 CM/SEC
BH CV XLRA MEAS RIGHT DIST ICA EDV: 18.7 CM/SEC
BH CV XLRA MEAS RIGHT DIST ICA PSV: 41 CM/SEC
BH CV XLRA MEAS RIGHT ICA/CCA RATIO: 0.84
BH CV XLRA MEAS RIGHT MID CCA EDV: 8.6 CM/SEC
BH CV XLRA MEAS RIGHT MID CCA PSV: 68.9 CM/SEC
BH CV XLRA MEAS RIGHT MID ICA EDV: 18.3 CM/SEC
BH CV XLRA MEAS RIGHT MID ICA PSV: 45.6 CM/SEC
BH CV XLRA MEAS RIGHT PROX CCA EDV: 9.7 CM/SEC
BH CV XLRA MEAS RIGHT PROX CCA PSV: 89.8 CM/SEC
BH CV XLRA MEAS RIGHT PROX ECA EDV: 0.43 CM/SEC
BH CV XLRA MEAS RIGHT PROX ECA PSV: 81.8 CM/SEC
BH CV XLRA MEAS RIGHT PROX ICA EDV: 17.4 CM/SEC
BH CV XLRA MEAS RIGHT PROX ICA PSV: 44.3 CM/SEC
BH CV XLRA MEAS RIGHT PROX SCLA PSV: 183.7 CM/SEC
BH CV XLRA MEAS RIGHT VERTEBRAL A EDV: 14.3 CM/SEC
BH CV XLRA MEAS RIGHT VERTEBRAL A PSV: 42.2 CM/SEC
BODY TEMPERATURE: 37
BUN SERPL-MCNC: 22 MG/DL (ref 8–23)
BUN/CREAT SERPL: 14.7 (ref 7–25)
CALCIUM SPEC-SCNC: 8.5 MG/DL (ref 8.6–10.5)
CHLORIDE SERPL-SCNC: 105 MMOL/L (ref 98–107)
CHOLEST SERPL-MCNC: 105 MG/DL (ref 0–200)
CO2 BLDA-SCNC: 30.7 MMOL/L (ref 22–33)
CO2 SERPL-SCNC: 29 MMOL/L (ref 22–29)
COHGB MFR BLD: 0.8 % (ref 0–2)
CREAT SERPL-MCNC: 1.5 MG/DL (ref 0.76–1.27)
DEPRECATED RDW RBC AUTO: 41.5 FL (ref 37–54)
EGFRCR SERPLBLD CKD-EPI 2021: 48.9 ML/MIN/1.73
EOSINOPHIL # BLD AUTO: 0.02 10*3/MM3 (ref 0–0.4)
EOSINOPHIL NFR BLD AUTO: 0.2 % (ref 0.3–6.2)
EPAP: 0
ERYTHROCYTE [DISTWIDTH] IN BLOOD BY AUTOMATED COUNT: 13.3 % (ref 12.3–15.4)
GEN 5 1HR TROPONIN T REFLEX: ABNORMAL NG/L
GLUCOSE BLDC GLUCOMTR-MCNC: 162 MG/DL (ref 70–130)
GLUCOSE BLDC GLUCOMTR-MCNC: 164 MG/DL (ref 70–130)
GLUCOSE BLDC GLUCOMTR-MCNC: 176 MG/DL (ref 70–130)
GLUCOSE BLDC GLUCOMTR-MCNC: 176 MG/DL (ref 70–130)
GLUCOSE BLDC GLUCOMTR-MCNC: 190 MG/DL (ref 70–130)
GLUCOSE SERPL-MCNC: 178 MG/DL (ref 65–99)
HBA1C MFR BLD: 6.3 % (ref 4.8–5.6)
HCO3 BLDA-SCNC: 29.3 MMOL/L (ref 20–26)
HCT VFR BLD AUTO: 44 % (ref 37.5–51)
HCT VFR BLD CALC: 41.4 % (ref 38–51)
HDLC SERPL-MCNC: 38 MG/DL (ref 40–60)
HGB BLD-MCNC: 14.7 G/DL (ref 13–17.7)
HGB BLDA-MCNC: 13.5 G/DL (ref 13.5–17.5)
IMM GRANULOCYTES # BLD AUTO: 0.03 10*3/MM3 (ref 0–0.05)
IMM GRANULOCYTES NFR BLD AUTO: 0.3 % (ref 0–0.5)
INHALED O2 CONCENTRATION: 44 %
IPAP: 0
LDLC SERPL CALC-MCNC: 50 MG/DL (ref 0–100)
LDLC/HDLC SERPL: 1.3 {RATIO}
LEFT ARM BP: NORMAL MMHG
LEFT ATRIUM VOLUME INDEX: 22.7 ML/M2
LV EF BIPLANE MOD: 44 %
LYMPHOCYTES # BLD AUTO: 1.18 10*3/MM3 (ref 0.7–3.1)
LYMPHOCYTES NFR BLD AUTO: 12.8 % (ref 19.6–45.3)
MCH RBC QN AUTO: 28.5 PG (ref 26.6–33)
MCHC RBC AUTO-ENTMCNC: 33.4 G/DL (ref 31.5–35.7)
MCV RBC AUTO: 85.4 FL (ref 79–97)
METHGB BLD QL: 0.4 % (ref 0–1.5)
MODALITY: ABNORMAL
MONOCYTES # BLD AUTO: 0.76 10*3/MM3 (ref 0.1–0.9)
MONOCYTES NFR BLD AUTO: 8.2 % (ref 5–12)
NEUTROPHILS NFR BLD AUTO: 7.24 10*3/MM3 (ref 1.7–7)
NEUTROPHILS NFR BLD AUTO: 78.3 % (ref 42.7–76)
NRBC BLD AUTO-RTO: 0 /100 WBC (ref 0–0.2)
OXYHGB MFR BLDV: 96.6 % (ref 94–99)
PA ADP PRP-ACNC: 144 PRU
PAW @ PEAK INSP FLOW SETTING VENT: 0 CMH2O
PCO2 BLDA: 44.4 MM HG (ref 35–45)
PCO2 TEMP ADJ BLD: 44.4 MM HG (ref 35–48)
PH BLDA: 7.43 PH UNITS (ref 7.35–7.45)
PH, TEMP CORRECTED: 7.43 PH UNITS
PLATELET # BLD AUTO: 176 10*3/MM3 (ref 140–450)
PMV BLD AUTO: 10.8 FL (ref 6–12)
PO2 BLDA: 111 MM HG (ref 83–108)
PO2 TEMP ADJ BLD: 111 MM HG (ref 83–108)
POTASSIUM SERPL-SCNC: 4 MMOL/L (ref 3.5–5.2)
RBC # BLD AUTO: 5.15 10*6/MM3 (ref 4.14–5.8)
SODIUM SERPL-SCNC: 144 MMOL/L (ref 136–145)
TOTAL RATE: 0 BREATHS/MINUTE
TRIGL SERPL-MCNC: 88 MG/DL (ref 0–150)
TROPONIN T % DELTA: ABNORMAL
TROPONIN T NUMERIC DELTA: ABNORMAL
TROPONIN T SERPL HS-MCNC: ABNORMAL NG/L
TROPONIN T SERPL HS-MCNC: ABNORMAL NG/L
UFH PPP CHRO-ACNC: 0.1 IU/ML (ref 0.3–0.7)
UFH PPP CHRO-ACNC: 0.1 IU/ML (ref 0.3–0.7)
UFH PPP CHRO-ACNC: 0.34 IU/ML (ref 0.3–0.7)
VENTILATOR MODE: ABNORMAL
VLDLC SERPL-MCNC: 17 MG/DL (ref 5–40)
WBC NRBC COR # BLD AUTO: 9.25 10*3/MM3 (ref 3.4–10.8)

## 2025-02-19 PROCEDURE — 84484 ASSAY OF TROPONIN QUANT: CPT | Performed by: INTERNAL MEDICINE

## 2025-02-19 PROCEDURE — 36600 WITHDRAWAL OF ARTERIAL BLOOD: CPT

## 2025-02-19 PROCEDURE — 71045 X-RAY EXAM CHEST 1 VIEW: CPT

## 2025-02-19 PROCEDURE — 82375 ASSAY CARBOXYHB QUANT: CPT

## 2025-02-19 PROCEDURE — 80061 LIPID PANEL: CPT | Performed by: INTERNAL MEDICINE

## 2025-02-19 PROCEDURE — 93356 MYOCRD STRAIN IMG SPCKL TRCK: CPT | Performed by: INTERNAL MEDICINE

## 2025-02-19 PROCEDURE — 93930 UPPER EXTREMITY STUDY: CPT | Performed by: INTERNAL MEDICINE

## 2025-02-19 PROCEDURE — 85576 BLOOD PLATELET AGGREGATION: CPT | Performed by: PHYSICIAN ASSISTANT

## 2025-02-19 PROCEDURE — 93010 ELECTROCARDIOGRAM REPORT: CPT | Performed by: INTERNAL MEDICINE

## 2025-02-19 PROCEDURE — 82948 REAGENT STRIP/BLOOD GLUCOSE: CPT

## 2025-02-19 PROCEDURE — 25010000002 FUROSEMIDE PER 20 MG: Performed by: THORACIC SURGERY (CARDIOTHORACIC VASCULAR SURGERY)

## 2025-02-19 PROCEDURE — 25010000002 HYDROMORPHONE 1 MG/ML SOLUTION: Performed by: INTERNAL MEDICINE

## 2025-02-19 PROCEDURE — 93005 ELECTROCARDIOGRAM TRACING: CPT

## 2025-02-19 PROCEDURE — 25010000002 HEPARIN (PORCINE) 25000-0.45 UT/250ML-% SOLUTION

## 2025-02-19 PROCEDURE — 80048 BASIC METABOLIC PNL TOTAL CA: CPT | Performed by: INTERNAL MEDICINE

## 2025-02-19 PROCEDURE — 84484 ASSAY OF TROPONIN QUANT: CPT

## 2025-02-19 PROCEDURE — 93306 TTE W/DOPPLER COMPLETE: CPT | Performed by: INTERNAL MEDICINE

## 2025-02-19 PROCEDURE — 83036 HEMOGLOBIN GLYCOSYLATED A1C: CPT | Performed by: INTERNAL MEDICINE

## 2025-02-19 PROCEDURE — 25010000002 FUROSEMIDE PER 20 MG: Performed by: PHYSICIAN ASSISTANT

## 2025-02-19 PROCEDURE — 83050 HGB METHEMOGLOBIN QUAN: CPT

## 2025-02-19 PROCEDURE — 63710000001 INSULIN LISPRO (HUMAN) PER 5 UNITS

## 2025-02-19 PROCEDURE — 93005 ELECTROCARDIOGRAM TRACING: CPT | Performed by: INTERNAL MEDICINE

## 2025-02-19 PROCEDURE — 99232 SBSQ HOSP IP/OBS MODERATE 35: CPT | Performed by: PHYSICIAN ASSISTANT

## 2025-02-19 PROCEDURE — 99232 SBSQ HOSP IP/OBS MODERATE 35: CPT | Performed by: INTERNAL MEDICINE

## 2025-02-19 PROCEDURE — 85520 HEPARIN ASSAY: CPT

## 2025-02-19 PROCEDURE — 93880 EXTRACRANIAL BILAT STUDY: CPT

## 2025-02-19 PROCEDURE — 93970 EXTREMITY STUDY: CPT

## 2025-02-19 PROCEDURE — 94010 BREATHING CAPACITY TEST: CPT | Performed by: INTERNAL MEDICINE

## 2025-02-19 PROCEDURE — 99024 POSTOP FOLLOW-UP VISIT: CPT | Performed by: PHYSICIAN ASSISTANT

## 2025-02-19 PROCEDURE — 85025 COMPLETE CBC W/AUTO DIFF WBC: CPT | Performed by: INTERNAL MEDICINE

## 2025-02-19 PROCEDURE — 82805 BLOOD GASES W/O2 SATURATION: CPT

## 2025-02-19 PROCEDURE — 93306 TTE W/DOPPLER COMPLETE: CPT

## 2025-02-19 PROCEDURE — 93356 MYOCRD STRAIN IMG SPCKL TRCK: CPT

## 2025-02-19 PROCEDURE — 93880 EXTRACRANIAL BILAT STUDY: CPT | Performed by: INTERNAL MEDICINE

## 2025-02-19 PROCEDURE — 25010000002 CEFAZOLIN PER 500 MG: Performed by: PHYSICIAN ASSISTANT

## 2025-02-19 PROCEDURE — 25010000002 HEPARIN (PORCINE) PER 1000 UNITS

## 2025-02-19 PROCEDURE — 93930 UPPER EXTREMITY STUDY: CPT

## 2025-02-19 PROCEDURE — 93970 EXTREMITY STUDY: CPT | Performed by: INTERNAL MEDICINE

## 2025-02-19 PROCEDURE — 25010000002 NITROGLYCERIN 200 MCG/ML SOLUTION: Performed by: INTERNAL MEDICINE

## 2025-02-19 RX ORDER — FUROSEMIDE 10 MG/ML
40 INJECTION INTRAMUSCULAR; INTRAVENOUS ONCE
Status: COMPLETED | OUTPATIENT
Start: 2025-02-19 | End: 2025-02-19

## 2025-02-19 RX ORDER — CHLORHEXIDINE GLUCONATE ORAL RINSE 1.2 MG/ML
15 SOLUTION DENTAL EVERY 12 HOURS
Status: COMPLETED | OUTPATIENT
Start: 2025-02-19 | End: 2025-02-20

## 2025-02-19 RX ORDER — HEPARIN SODIUM 1000 [USP'U]/ML
4000 INJECTION, SOLUTION INTRAVENOUS; SUBCUTANEOUS ONCE
Status: COMPLETED | OUTPATIENT
Start: 2025-02-19 | End: 2025-02-19

## 2025-02-19 RX ORDER — FUROSEMIDE 10 MG/ML
40 INJECTION INTRAMUSCULAR; INTRAVENOUS ONCE
Status: DISCONTINUED | OUTPATIENT
Start: 2025-02-19 | End: 2025-02-19

## 2025-02-19 RX ADMIN — INSULIN LISPRO 2 UNITS: 100 INJECTION, SOLUTION INTRAVENOUS; SUBCUTANEOUS at 07:34

## 2025-02-19 RX ADMIN — NITROGLYCERIN 60 MCG/MIN: 20 INJECTION INTRAVENOUS at 10:00

## 2025-02-19 RX ADMIN — ROSUVASTATIN 40 MG: 20 TABLET, FILM COATED ORAL at 20:48

## 2025-02-19 RX ADMIN — HYDROMORPHONE HYDROCHLORIDE 1 MG: 1 INJECTION, SOLUTION INTRAMUSCULAR; INTRAVENOUS; SUBCUTANEOUS at 17:59

## 2025-02-19 RX ADMIN — NITROGLYCERIN 100 MCG/MIN: 20 INJECTION INTRAVENOUS at 21:26

## 2025-02-19 RX ADMIN — Medication 10 ML: at 20:49

## 2025-02-19 RX ADMIN — LEVOTHYROXINE SODIUM 200 MCG: 0.1 TABLET ORAL at 06:26

## 2025-02-19 RX ADMIN — INSULIN LISPRO 2 UNITS: 100 INJECTION, SOLUTION INTRAVENOUS; SUBCUTANEOUS at 17:21

## 2025-02-19 RX ADMIN — HEPARIN SODIUM 15 UNITS/KG/HR: 10000 INJECTION, SOLUTION INTRAVENOUS at 17:21

## 2025-02-19 RX ADMIN — FUROSEMIDE 40 MG: 10 INJECTION, SOLUTION INTRAMUSCULAR; INTRAVENOUS at 15:31

## 2025-02-19 RX ADMIN — HYDROMORPHONE HYDROCHLORIDE 1 MG: 1 INJECTION, SOLUTION INTRAMUSCULAR; INTRAVENOUS; SUBCUTANEOUS at 15:12

## 2025-02-19 RX ADMIN — HEPARIN SODIUM 4000 UNITS: 1000 INJECTION INTRAVENOUS; SUBCUTANEOUS at 15:16

## 2025-02-19 RX ADMIN — HYDROMORPHONE HYDROCHLORIDE 1 MG: 1 INJECTION, SOLUTION INTRAMUSCULAR; INTRAVENOUS; SUBCUTANEOUS at 07:03

## 2025-02-19 RX ADMIN — CHLORHEXIDINE GLUCONATE 15 ML: 1.2 SOLUTION ORAL at 06:29

## 2025-02-19 RX ADMIN — SODIUM CHLORIDE 2000 MG: 900 INJECTION INTRAVENOUS at 00:50

## 2025-02-19 RX ADMIN — HEPARIN SODIUM 4000 UNITS: 1000 INJECTION INTRAVENOUS; SUBCUTANEOUS at 06:30

## 2025-02-19 RX ADMIN — HYDROMORPHONE HYDROCHLORIDE 1 MG: 1 INJECTION, SOLUTION INTRAMUSCULAR; INTRAVENOUS; SUBCUTANEOUS at 10:00

## 2025-02-19 RX ADMIN — MUPIROCIN 1 APPLICATION: 20 OINTMENT TOPICAL at 06:33

## 2025-02-19 RX ADMIN — INSULIN LISPRO 2 UNITS: 100 INJECTION, SOLUTION INTRAVENOUS; SUBCUTANEOUS at 11:41

## 2025-02-19 RX ADMIN — MUPIROCIN 1 APPLICATION: 20 OINTMENT TOPICAL at 15:16

## 2025-02-19 RX ADMIN — CHLORHEXIDINE GLUCONATE 15 ML: 1.2 SOLUTION ORAL at 15:16

## 2025-02-19 RX ADMIN — FUROSEMIDE 40 MG: 10 INJECTION, SOLUTION INTRAMUSCULAR; INTRAVENOUS at 07:33

## 2025-02-19 RX ADMIN — ASPIRIN 81 MG 81 MG: 81 TABLET ORAL at 07:34

## 2025-02-19 NOTE — PROGRESS NOTES
Pharmacy to Dose Heparin Infusion Note    Camron Hendrix is a 74 y.o. male receiving heparin infusion.     Therapy for (VTE/Cardiac): STEMI w/ restenosis, pending CABG (Cardiac)  19,000 units of heparin given in cath lab   Patient Weight: 116 kg  Initial Bolus (Y/N): No  Any Bolus (Y/N): Yes     Signs or Symptoms of Bleeding: Initial coag panel elevated (antiXa >1.10, PTT > 200) but have since shown resolution. No current S/Sx bleeding per RN and chart review.     Cardiac or Other (Not VTE)  Initial rate: 12 units/kg/hr (Max 1,000 units/hr)   Anti-Xa Bolus   Dose Infusion Hold   Time Infusion Rate Change (units/kg/hr) Repeat  Anti-Xa    < 0.11 50 units/kg  (4000 units Max) None Increase by  3 units/kg/hr 6 hours   0.11- 0.19 25 units/kg  (2000 units Max) None Increase by  2 units/kg/hr 6 hours   0.2 - 0.29 0 None Increase by  1 units/kg/hr 6 hours   0.3 - 0.5 0 None No Change 6 hours (after 2 consecutive levels in range check qAM)   0.51 - 0.6 0 None Decrease by  1 units/kg/hr 6 hours   0.61 - 0.8 0 30 minutes Decrease by  2 units/kg/hr 6 hours   0.81 - 1 0 60 minutes Decrease by  3 units/kg/hr 6 hours   >1 0 Hold  After Anti-Xa less than 0.5 decrease previous rate by  4 units/kg/hr  Every 2 hours until Anti-Xa  less than 0.5 then when infusion restarts in 6 hours     Results from last 7 days   Lab Units 02/19/25  0519 02/18/25  1734   INR   --  1.34*   HEMOGLOBIN g/dL 14.7 14.9   HEMATOCRIT % 44.0 44.7   PLATELETS 10*3/mm3 176 180       Date   Time   Anti-Xa Current Rate (units/kg/hr) Bolus   (units) Rate Change   (units/kg/hr) New Rate (units/kg/hr) Repeat  Anti-Xa Comments /  Pump Check    2/18 1819 >1.1 Not started  -- 0 0 2000 AXa > 1.1. Holding for now and rechecking in 2 hours.     2/18 20:31 0.31 --new-- --- +9 9 06:00 S/w DM Pham. Will start UFH gtt 2h s/p TR band removal (22:00). UFH to start @ midnight.   2/19 0519 0.10 9 4000 +3 12 1200 [address] [facility]    2/19 1151 0.10 12 4000 +3 15 2000 JEMMA CHAIDEZ                                                                                                                                                                                               Peter Sandra, PharmD  2/19/2025  14:15 EST

## 2025-02-19 NOTE — PROGRESS NOTES
Critical Care Note     LOS: 1 day   Patient Care Team:  Leslie Rhodes MD as PCP - General (Family Medicine)  Delmer Perdomo MD as Cardiologist (Cardiology)    Chief Complaint/Reason for visit:      STEMI, 100% LAD status post balloon angioplasty, 90% proximal circumflex, 99% mid RCA    Acute systolic heart failure    Chronic kidney disease stage IIIb  Diabetes mellitus type 2  Hypertension  History of PAF      Subjective     Interval History:     This morning he complained of 10 out of 10 chest pain and was medicated with Dilaudid.  Nitroglycerin drip was increased to 60 mcg/min.  He remains on a heparin drip.  He currently does not have nausea.  Pain does not radiate to his jaw or left arm.  He is maintaining sinus rhythm.  He is on 4 L nasal cannula with a saturation of 92%.  Urine output yesterday with diuretics 3.9 L.  Creatinine increased slightly to 1.5.    Review of Systems:    All systems were reviewed and negative except as noted in subjective.  He currently denies headache, weakness or numbness.  He does feel some urinary urgency.  Chest pain as noted above.    Medical history, surgical history, social history, family history reviewed    Objective     Intake/Output:    Intake/Output Summary (Last 24 hours) at 2/19/2025 1153  Last data filed at 2/19/2025 0709  Gross per 24 hour   Intake 907 ml   Output 3900 ml   Net -2993 ml       Nutrition:  NPO Diet NPO Type: Sips with Meds  Diet: Cardiac, Diabetic; Healthy Heart (2-3 Na+); Consistent Carbohydrate; Fluid Consistency: Thin (IDDSI 0)    Infusions:  cangrelor 50 mg in 250 ml (200 mcg/ml) IV infusion, 0.75 mcg/kg/min, Last Rate: Stopped (02/19/25 0007)  heparin, 12 Units/kg/hr, Last Rate: 12 Units/kg/hr (02/19/25 0633)  nitroglycerin, 5-200 mcg/min, Last Rate: 60 mcg/min (02/19/25 1000)  Pharmacy to Dose Heparin,         Mechanical Ventilator Settings:                                                Telemetry: Sinus rhythm             Vital  "Signs  Blood pressure 127/78, pulse 73, temperature 99 °F (37.2 °C), temperature source Bladder, resp. rate 18, weight 116 kg (255 lb), SpO2 92%.    Physical Exam:  General Appearance:  Overweight older gentleman supine in bed in no acute distress   Head:  Atraumatic   Eyes:          Pupils small, equal, reactive.  Conjunctiva pink   Ears:     Throat: Oral mucosa moist   Neck: Trachea midline, no carotid bruit   Back:      Lungs:   Symmetric chest expansion without wheeze or rhonchi    Heart:  Regular rhythm, frequent early beats.  S1, S2 auscultated, systolic murmur   Abdomen:   Protuberant, bowel sounds present, nontender   Rectal:   Deferred   Extremities: Trace pretibial edema, left greater than right.  Right radial cath site with some mild ecchymosis, no bleeding   Pulses:    Skin: Cool and dry   Lymph nodes: No cervical adenopathy   Neurologic: Alert, oriented, mildly hard of hearing.  Face symmetric.  Speech fluent.   symmetric      Results Review:     I reviewed the patient's new clinical results.   Results from last 7 days   Lab Units 02/19/25  0519 02/18/25 2120 02/18/25  1734   SODIUM mmol/L 144 140 141   POTASSIUM mmol/L 4.0 3.9 3.7   CHLORIDE mmol/L 105 106 105   CO2 mmol/L 29.0 29.0 25.0   BUN mg/dL 22 24* 23   CREATININE mg/dL 1.50* 1.62* 1.43*   CALCIUM mg/dL 8.5* 8.2* 8.6   BILIRUBIN mg/dL  --   --  0.6   ALK PHOS U/L  --   --  100   ALT (SGPT) U/L  --   --  61*   AST (SGOT) U/L  --   --  276*   GLUCOSE mg/dL 178* 180* 181*     Results from last 7 days   Lab Units 02/19/25  0519 02/18/25  1734   WBC 10*3/mm3 9.25 9.80   HEMOGLOBIN g/dL 14.7 14.9   HEMATOCRIT % 44.0 44.7   PLATELETS 10*3/mm3 176 180         Lab Results   Component Value Date    BLOODCX No growth at 5 days 05/03/2024     No results found for: \"URINECX\"    I reviewed the patient's new imaging including images and reports.    Impression: CT chest  1. Cardiomegaly with minimal interstitial pulmonary edema.   2. Mild emphysema. " Emphysema on CT is an independent risk factor for lung cancer. Low dose lung cancer screening should be considered if patient qualifies based on smoking history or if not already enrolled in a screening program.   3. Aortic valve leaflet calcifications with fusiform aneurysmal dilation of the ascending thoracic aorta measuring up to 4.5 cm. Severe coronary disease.   4. Trace pericardial fluid containing intrinsic high density, hemopericardium is possible.   5. Bilateral striated nephrogram suspicious for possible acute tubular necrosis or pyelonephritis. Correlate with urinalysis.   6. Small sliding hiatal hernia with fluid noted to the level of the upper third thoracic esophagus. Findings suggest gastroesophageal reflux.         Electronically Signed: Delmer Benítez MD    2/18/2025 10:00 PM EST     Conclusion left heart catheterization February 18, 2025         Mid % occlusion (mixed thrombotic/calcific/fibrotic stenosis), status post balloon angioplasty with a 2.5 NC balloon at nominal pressure.    Mid circumflex 90%, mid RCA 99% subtotal occlusion with left-to-right collaterals    Discussed with CT surgery, given patient' coronary anatomy, LV systolic dysfunction and diabetic status, CABG is ideal.    Cangrelor started during the intervention and will be continued until time of surgery    IV Lasix for diuresis along with IV nitroglycerin    LVEF 35 to 40% with severe distal anterior, apical and distal inferior hypokinesis    Killip class III acute systolic heart failure present on arrival.  With LVEDP 37 mmHg    LV pressures (S/D/E) : 127/26/37 mmHg    Moderate systolic dysfunction.    There is moderate left ventricular systolic dysfunction.       All medications reviewed.   aspirin, 81 mg, Oral, Daily  chlorhexidine, 15 mL, Mouth/Throat, Q12H  insulin lispro, 2-9 Units, Subcutaneous, 4x Daily AC & at Bedtime  levothyroxine, 200 mcg, Oral, Q AM  pharmacy consult - MTM, , Not Applicable,  Daily  rosuvastatin, 40 mg, Oral, Nightly  sodium chloride, 10 mL, Intravenous, Q12H          Assessment & Plan       STEMI involving left anterior descending coronary artery    CVA (cerebral vascular accident)    Essential hypertension    Coronary artery disease involving native coronary artery of native heart without angina pectoris    Morbid obesity with BMI of 40.0-44.9, adult    Sleep apnea    PAF (paroxysmal atrial fibrillation)    Type 2 diabetes mellitus    Acute systolic CHF (congestive heart failure)    Coronary artery disease involving native coronary artery of native heart with unstable angina pectoris      74-year-old gentleman, remote smoker (quit 25 years), with hypertension, hypothyroid, dyslipidemia, diabetes, obesity, subarachnoid hemorrhage May 2024, 2 previous strokes without sequelae, paroxysmal atrial fibrillation presenting with chest pain and ST elevation.  Heart catheterization revealed severe 3 vessel disease with reduced EF 35 to 40%.  100% mid LAD lesion balloon angioplastied.  He was placed on cangrelor and nitroglycerin drips and brought to the ICU.  He is being assessed for CABG. CT of the chest revealed cardiomegaly with minimal interstitial pulmonary edema and mild emphysema.  He had calcified aortic valve leaflets and a ascending thoracic aorta aneurysm measuring 4.5 cm.  Small sliding hiatal hernia was also noted.  Echo yesterday revealed an EF of 41 to 45%.  He had moderate mitral regurgitation, no mention of his aortic valve, but ascending aorta measured 4.7 cm.  He did have chest pain this morning that improved with Dilaudid and increasing his nitroglycerin drip.    PLAN:    Monitor rhythm  Replace electrolytes as needed  Continue nitroglycerin drip  Cangrelor stopped and heparin initiated  Evaluation for CABG, possibly left atrial appendage ligation with previous history of atrial fibrillation and subarachnoid hemorrhage, ascending thoracic aorta mildly dilated  Carotid  duplex    Sliding scale insulin  Hemoglobin A1c    Not sure he can perform a spirometry maneuver, previous CTA of the chest May 2024 revealed only minimal COPD changes    Diurese as needed  Bronchodilators as heart rate allows    Surgery assessing      VTE Prophylaxis: dual antiplatelet therapy    Stress Ulcer Prophylaxis: none    Rani Lilly MD  02/19/25  11:53 EST      Time: Critical care 30 min  I personally provided care to this critically ill patient as documented above.  Critical care time does not include time spent on separately billed procedures.  None of my critical care time was concurrent with other critical care providers.

## 2025-02-19 NOTE — CONSULTS
Diabetes Education    Patient Name:  Camron Hendrix  YOB: 1950  MRN: [id number]  Admit Date:  2/18/2025        Reviewed chart for diabetes education.  Noted history of diabetes.  Noted A1c of 6.3%.  Noted previous A1c of 6.8% in January of 2024.  Spoke to Mr. Hendrix at bedside this afternoon.  He stated that he follows up with primary care provider for diabetes care every 3 months.  He states that he has a fingerstick glucometer and checks his blood sugar at least once a week.  He states it usually runs around 130.  He states that he takes Ozempic when his blood sugar is high.  Reviewed with him that Ozempic is not insulin. Explained that it is a shot, but it is designed to be given once weekly.  Explained that if he gives it when his blood sugar is high like insulin in will not likely lower blood sugar like insulin that is given in the hospital does.  He states that he only was giving if his blood sugar was high, so it was rare.  Explained the way that Ozempic works and that if he is hoping to experience some weight loss and lowering of A1c related to Ozempic, he needs to take it once weekly.  Also explained that his A1c is in a good range, below what is recommended goal by ADA, and if he does not want to take Ozempic, he could talk to provider about whether he needs to be taking it.  Reviewed the What is Diabetes handout with him, talked about basic physiology of diabetes.  Reviewed low blood sugar and how to treat if less than 70 with rule of 15.  Reviewed high blood sugar and importance of drinking plenty of water and getting regular exercise.  He states that he drinks mostly water and he states that he just started PT 2 times weekly due to dizziness.  Encouraged him to try to drink goal of 64 oz of water daily unless otherwise instructed.  He was given our contact information and encouraged to call our outpatient office if he has any future questions or diabetes education needs.  Thank you for  this referral.       Electronically signed by:  Shi Lazcano RN  02/19/25 16:12 EST

## 2025-02-19 NOTE — PROGRESS NOTES
Pharmacy to Dose Heparin Infusion Note    Camron Hendrix is a 74 y.o. male receiving heparin infusion.     Therapy for (VTE/Cardiac): STEMI w/ restenosis, pending CABG (Cardiac)  19,000 units of heparin given in cath lab   Patient Weight: 116 kg  Initial Bolus (Y/N): No  Any Bolus (Y/N): Yes     Signs or Symptoms of Bleeding: Initial coag panel elevated (antiXa >1.10, PTT > 200) but have since shown resolution. No current S/Sx bleeding per RN and chart review.     Cardiac or Other (Not VTE)  Initial rate: 12 units/kg/hr (Max 1,000 units/hr)   Anti-Xa Bolus   Dose Infusion Hold   Time Infusion Rate Change (units/kg/hr) Repeat  Anti-Xa    < 0.11 50 units/kg  (4000 units Max) None Increase by  3 units/kg/hr 6 hours   0.11- 0.19 25 units/kg  (2000 units Max) None Increase by  2 units/kg/hr 6 hours   0.2 - 0.29 0 None Increase by  1 units/kg/hr 6 hours   0.3 - 0.5 0 None No Change 6 hours (after 2 consecutive levels in range check qAM)   0.51 - 0.6 0 None Decrease by  1 units/kg/hr 6 hours   0.61 - 0.8 0 30 minutes Decrease by  2 units/kg/hr 6 hours   0.81 - 1 0 60 minutes Decrease by  3 units/kg/hr 6 hours   >1 0 Hold  After Anti-Xa less than 0.5 decrease previous rate by  4 units/kg/hr  Every 2 hours until Anti-Xa  less than 0.5 then when infusion restarts in 6 hours     Results from last 7 days   Lab Units 02/18/25  1734   INR  1.34*   HEMOGLOBIN g/dL 14.9   HEMATOCRIT % 44.7   PLATELETS 10*3/mm3 180       Date   Time   Anti-Xa Current Rate (units/kg/hr) Bolus   (units) Rate Change   (units/kg/hr) New Rate (units/kg/hr) Repeat  Anti-Xa Comments /  Pump Check    2/18 1819 >1.1 Not started  -- 0 0 2000 AXa > 1.1. Holding for now and rechecking in 2 hours.     2/18 20:31 0.31 --new-- --- +9 9 06:00 S/w DM Pham. Will start UFH gtt 2h s/p TR band removal (22:00). UFH to start @ midnight.                                                                                                                                                                                                                       No initial bolus. Since repeat anti-Xa is borderline ~0.30, will initiate heparin infusion at standard rate of 9 units/kg/hr opposed to a potential reduced rate of -2 (7 units/kg/hr) based on the severity of clinical prognosis.    Mack Reynolds PharmD, BCPS, [facility]  21:14 EST   02/18/25

## 2025-02-19 NOTE — PROGRESS NOTES
CTS Progress Note      Chief Complaint: Coronary disease/STEMI      Subjective  Lying in bed.  Conversant.  Pleasant.  Reportedly had 10 out of 10 chest pain earlier this morning.  Nitroglycerin IV increased per protocol.  Currently on examination patient says chest pain is 1 out of 10.  Undergoing transthoracic echocardiogram at this time.  Blood pressures remain stable.  On IV nitroglycerin and IV heparin  Serial troponins have been ordered first of 3 was noted to be elevated at greater than 10,000, repeat has been drawn now    Objective    Physical Exam:   Vital Signs   Temp:  [98.4 °F (36.9 °C)-98.9 °F (37.2 °C)] 98.7 °F (37.1 °C)  Heart Rate:  [41-87] 69  Resp:  [14-20] 18  BP: ()/() 124/77   GEN: NAD   CV: Regular rate and rhythm    RESP: Unlabored      EXT: Warm with continued 2+ pitting edema     Results     Results from last 7 days   Lab Units 02/19/25  0519   WBC 10*3/mm3 9.25   HEMOGLOBIN g/dL 14.7   HEMATOCRIT % 44.0   PLATELETS 10*3/mm3 176     Results from last 7 days   Lab Units 02/19/25  0519   SODIUM mmol/L 144   POTASSIUM mmol/L 4.0   CHLORIDE mmol/L 105   CO2 mmol/L 29.0   BUN mg/dL 22   CREATININE mg/dL 1.50*   GLUCOSE mg/dL 178*   CALCIUM mg/dL 8.5*     Results from last 7 days   Lab Units 02/18/25  1734   INR  1.34*   APTT seconds >200.0*       Cardiac catheterization:    Mid % occlusion (mixed thrombotic/calcific/fibrotic stenosis), status post balloon angioplasty with a 2.5 NC balloon at nominal pressure.    Mid circumflex 90%, mid RCA 99% subtotal occlusion with left-to-right collaterals    Discussed with CT surgery, given patient' coronary anatomy, LV systolic dysfunction and diabetic status, CABG is ideal.    Cangrelor started during the intervention and will be continued until time of surgery    IV Lasix for diuresis along with IV nitroglycerin    LVEF 35 to 40% with severe distal anterior, apical and distal inferior hypokinesis    Killip class III acute systolic  heart failure present on arrival.  With LVEDP 37 mmHg    LV pressures (S/D/E) : 127/26/37 mmHg    Moderate systolic dysfunction.    There is moderate left ventricular systolic dysfunction.     CT scan chest:  IMPRESSION:  Impression:  1. Cardiomegaly with minimal interstitial pulmonary edema.  2. Mild emphysema. Emphysema on CT is an independent risk factor for lung cancer. Low dose lung cancer screening should be considered if patient qualifies based on smoking history or if not already enrolled in a screening program.  3. Aortic valve leaflet calcifications with fusiform aneurysmal dilation of the ascending thoracic aorta measuring up to 4.5 cm. Severe coronary disease.  4. Trace pericardial fluid containing intrinsic high density, hemopericardium is possible.  5. Bilateral striated nephrogram suspicious for possible acute tubular necrosis or pyelonephritis. Correlate with urinalysis.  6. Small sliding hiatal hernia with fluid noted to the level of the upper third thoracic esophagus. Findings suggest gastroesophageal reflux.          Assessment & Plan   Severe multivessel coronary disease  Fusiform aneurysmal dilatation of the ascending thoracic aorta 4.5 cm noted on CT scan with calcification of aortic leaflets      STEMI involving left anterior descending coronary artery    CVA (cerebral vascular accident)    Essential hypertension    Coronary artery disease involving native coronary artery of native heart without angina pectoris    Morbid obesity with BMI of 40.0-44.9, adult    Sleep apnea    PAF (paroxysmal atrial fibrillation)    Type 2 diabetes mellitus    Acute systolic CHF (congestive heart failure)        Plan   Echocardiogram, carotid duplex upper and lower extremity ultrasounds have all been ordered and are awaiting completion  PFTs have been completed, results pending  Continue IV heparin and IV nitroglycerin    MIKI Marshall  02/19/25  07:29 EST

## 2025-02-19 NOTE — ANESTHESIA PREPROCEDURE EVALUATION
Anesthesia Evaluation     Patient summary reviewed and Nursing notes reviewed   NPO Solid Status: > 8 hours  NPO Liquid Status: > 2 hours           Airway   Mallampati: IV  TM distance: >3 FB  Neck ROM: limited  Difficult intubation highly probable  Dental      Pulmonary     breath sounds clear to auscultation  (+) ,sleep apnea  Cardiovascular     Rhythm: irregular  Rate: normal    (+) hypertension, valvular problems/murmurs AS, past MI  1-7 days, CAD, dysrhythmias Atrial Fib, angina, CHF       Neuro/Psych  (+) TIA, CVA, psychiatric history, dementia  GI/Hepatic/Renal/Endo    (+) obesity, diabetes mellitus, thyroid problem hypothyroidism    Musculoskeletal     Abdominal    Substance History      OB/GYN          Other        ROS/Med Hx Other:  Left ventricular systolic function is mildly decreased. Calculated left ventricular EF = 44% Left ventricular ejection fraction appears to be 41 - 45%.  ·  Left ventricular wall thickness is consistent with moderate concentric hypertrophy.  ·  The following left ventricular wall segments are akinetic: mid anterior, apical anterior, apical septal and apex. C/w with LAD territory infarct.  ·  Left ventricular diastolic function is consistent with (grade Ia w/high LAP) impaired relaxation.  ·  Mild aortic valve stenosis is present.  ·  Peak velocity of the flow distal to the aortic valve is 277 cm/s. Aortic valve mean pressure gradient is 14 mmHg.  ·  The aortic root measures 4.4 cm. The aortic root (corrected for BSA) measures 1.9 cm. Mild dilation of the ascending aorta is present.  ·  Mild to moderate mitral valve regurgitation.                  Anesthesia Plan    ASA 4     general, [place] and CVL     (JESSY)  intravenous induction   Postoperative Plan: Expected vent after surgery  Anesthetic plan, risks, benefits, and alternatives have been provided, discussed and informed consent has been obtained with: patient.    Plan discussed with CRNA.    CODE STATUS:    Code Status  (Patient has no pulse and is not breathing): CPR (Attempt to Resuscitate)  Medical Interventions (Patient has pulse or is breathing): Full Support

## 2025-02-19 NOTE — PROGRESS NOTES
[facility] at [facility]  PROGRESS NOTE    Date of Admission: 2/18/2025  Date of Service: 02/19/25    Primary Care Physician: Leslie Rhodes MD    Chief Complaint: f/u anterior STEMI   Problem List:   STEMI involving left anterior descending coronary artery    CVA (cerebral vascular accident)    Essential hypertension    Coronary artery disease involving native coronary artery of native heart without angina pectoris    Morbid obesity with BMI of 40.0-44.9, adult    Sleep apnea    PAF (paroxysmal atrial fibrillation)    Type 2 diabetes mellitus    Acute systolic CHF (congestive heart failure)      Subjective      Patient reports episode of severe chest pain again this morning, resolved after IV Dilaudid and increased Nitro gtt. He is currently comfortable at rest.       Objective   Vitals: /78 (BP Location: Left arm, Patient Position: Lying)   Pulse 73   Temp 99 °F (37.2 °C) (Bladder)   Resp 18   Wt 116 kg (255 lb)   SpO2 92%   BMI 37.66 kg/m²     Physical Exam:  GENERAL: Alert, cooperative, in no acute distress.   HEENT: Normocephalic, no jugular venous distention  HEART: Regular rhythm, normal rate, and no murmurs, gallops, or rubs.   LUNGS: No wheezing, rales or rhonchi. On 4L NC  NEUROLOGIC: No focal abnormalities involving strength or sensation are noted.   EXTREMITIES: No clubbing, cyanosis, 2+ BLE edema noted, scrotal edema present. R radial site stable 2+ R radial pulse, no hematoma, minimal ecchymosis, no bleeding      Results:  Results from last 7 days   Lab Units 02/19/25  0519 02/18/25  1734   WBC 10*3/mm3 9.25 9.80   HEMOGLOBIN g/dL 14.7 14.9   HEMATOCRIT % 44.0 44.7   PLATELETS 10*3/mm3 176 180     Results from last 7 days   Lab Units 02/19/25  0519 02/18/25  2120 02/18/25  1734   SODIUM mmol/L 144 140 141   POTASSIUM mmol/L 4.0 3.9 3.7   CHLORIDE mmol/L 105 106 105   CO2 mmol/L 29.0 29.0 25.0   BUN mg/dL 22 24* 23   CREATININE mg/dL 1.50* 1.62* 1.43*   GLUCOSE  mg/dL 178* 180* 181*      Lab Results   Component Value Date    CHOL 105 02/19/2025    TRIG 88 02/19/2025    HDL 38 (L) 02/19/2025    LDL 50 02/19/2025     (H) 02/18/2025    ALT 61 (H) 02/18/2025     Results from last 7 days   Lab Units 02/19/25  0519   HEMOGLOBIN A1C % 6.30*     Results from last 7 days   Lab Units 02/19/25  0519   CHOLESTEROL mg/dL 105   TRIGLYCERIDES mg/dL 88   HDL CHOL mg/dL 38*   LDL CHOL mg/dL 50         Results from last 7 days   Lab Units 02/18/25  1734   PROTIME Seconds 16.7*   INR  1.34*   APTT seconds >200.0*     Results from last 7 days   Lab Units 02/19/25  0733 02/19/25  0519   HSTROP T ng/L >10,000* >10,000*         Intake/Output Summary (Last 24 hours) at 2/19/2025 0951  Last data filed at 2/19/2025 0709  Gross per 24 hour   Intake 907 ml   Output 3900 ml   Net -2993 ml     I personally reviewed the patient's EKG/Telemetry data    Radiology Data:   CT Chest Without Contrast Diagnostic    Result Date: 2/18/2025  Impression: 1. Cardiomegaly with minimal interstitial pulmonary edema. 2. Mild emphysema. Emphysema on CT is an independent risk factor for lung cancer. Low dose lung cancer screening should be considered if patient qualifies based on smoking history or if not already enrolled in a screening program. 3. Aortic valve leaflet calcifications with fusiform aneurysmal dilation of the ascending thoracic aorta measuring up to 4.5 cm. Severe coronary disease. 4. Trace pericardial fluid containing intrinsic high density, hemopericardium is possible. 5. Bilateral striated nephrogram suspicious for possible acute tubular necrosis or pyelonephritis. Correlate with urinalysis. 6. Small sliding hiatal hernia with fluid noted to the level of the upper third thoracic esophagus. Findings suggest gastroesophageal reflux. Electronically Signed: Delmer Benítez MD  2/18/2025 10:00 PM EST  Workstation ID: [id number]    XR Chest 1 View    Result Date: 2/18/2025  Impression: Similar mild CHF  features. No superimposed airspace disease. Electronically Signed: Noman Mchugh MD  2/18/2025 5:28 PM EST  Workstation ID: [id number]     Results for orders placed during the hospital encounter of 04/24/24    Adult Transthoracic Echo Complete W/ Cont if Necessary Per Protocol (With Agitated Saline)    Interpretation Summary    Left ventricular systolic function is normal. Calculated left ventricular EF = 65.8% Left ventricular ejection fraction appears to be 66 - 70%.    Left ventricular wall thickness is consistent with mild to moderate concentric hypertrophy.    Left ventricular diastolic function is consistent with (grade I) impaired relaxation.    Saline test results are negative for right to left atrial level shunt.    Peak velocity of the flow distal to the aortic valve is 263 cm/s. Aortic valve mean pressure gradient is 15 mmHg.    Ascending aorta = 4.4 cm      Current Medications:  aspirin, 81 mg, Oral, Daily  chlorhexidine, 15 mL, Mouth/Throat, Q12H  insulin lispro, 2-9 Units, Subcutaneous, 4x Daily AC & at Bedtime  levothyroxine, 200 mcg, Oral, Q AM  pharmacy consult - MTM, , Not Applicable, Daily  rosuvastatin, 40 mg, Oral, Nightly  sodium chloride, 10 mL, Intravenous, Q12H      cangrelor 50 mg in 250 ml (200 mcg/ml) IV infusion, 0.75 mcg/kg/min, Last Rate: Stopped (02/19/25 0007)  heparin, 12 Units/kg/hr, Last Rate: 12 Units/kg/hr (02/19/25 0633)  nitroglycerin, 5-200 mcg/min, Last Rate: 60 mcg/min (02/19/25 0800)  Pharmacy to Dose Heparin,         Assessment and Plan:     1.  Anterior STEMI:  Emergent cardiac catheterization 2/18 revealed 100% mid LAD stenosis with mixed heavy eccentric calcification and plaque erosion with thrombus present.  Balloon angioplasty carried out.  Stent was not deployed as the significant heavy calcification and calcific nodules prevented advancement of NC balloons therefore atherectomy would be necessary.  Patient has concomitant 90% proximal circumflex and 99% subtotal mid  RCA, he is diabetic and has LV systolic dysfunction therefore CABG was elected. Case was discussed with Dr. Hernandez, patient and patient's family who are in agreement.   Cangrelor was started during the intervention and will be continued until time of surgery.  Continue aspirin, Nitroglycerin drip  Morphine for chest pain  EKG today with improved ST segments, anterior q waves noted      Patient was given aspirin 325 mg, but no oral P2Y12 inhibitor  Echo is pending, pre-op evaluation underway with CT chest, carotid duplex, PFTs     2.  Acute systolic heart failure:  LVEF on left ventriculogram appears to be 35-40% with severe distal anterior, apical and distal inferior hypokinesis  Killip class III heart failure symptoms on initial presentation.   Continue preload and afterload reduction with parenteral nitrates  Lasix 40 mg IV daily for net negative fluid balance, may need repeat dose this afternoon due to ongoing edema and plan for CABG tomorrow   Slowly initiate GDMT depending on time of CABG     3.  CKD stage IIIb:  Initial creatinine 1.6 prior to cardiac catheterization  Monitor with labs daily, Cr 1.5 today, stable     4.  Type 2 diabetes:  Per intensivist team     5.  Primary hypertension:  Patient will be on nitroglycerin drip for acute heart failure titrate for goal blood pressure less than 130/80  BP borderline low for additional therapy at this time, will hold off ARB or BB to allow for additional diuresis in preparation for CABG      6.  Mixed hyperlipidemia:  Started rosuvastatin 40 mg daily  LDL 50       Electronically signed by Samra Cruz PA-C, 02/19/25, 10:06 AM EST.

## 2025-02-20 ENCOUNTER — APPOINTMENT (OUTPATIENT)
Dept: GENERAL RADIOLOGY | Facility: HOSPITAL | Age: 75
End: 2025-02-20
Payer: MEDICARE

## 2025-02-20 ENCOUNTER — ANESTHESIA (OUTPATIENT)
Dept: PERIOP | Facility: HOSPITAL | Age: 75
End: 2025-02-20
Payer: MEDICARE

## 2025-02-20 ENCOUNTER — ANCILLARY PROCEDURE (OUTPATIENT)
Dept: PERIOP | Facility: HOSPITAL | Age: 75
End: 2025-02-20
Payer: MEDICARE

## 2025-02-20 ENCOUNTER — ANESTHESIA EVENT CONVERTED (OUTPATIENT)
Dept: ANESTHESIOLOGY | Facility: HOSPITAL | Age: 75
End: 2025-02-20
Payer: MEDICARE

## 2025-02-20 LAB
ACT BLD: 118 SECONDS (ref 82–152)
ACT BLD: 567 SECONDS (ref 82–152)
ACT BLD: 573 SECONDS (ref 82–152)
ALBUMIN SERPL-MCNC: 3.1 G/DL (ref 3.5–5.2)
ALBUMIN SERPL-MCNC: 3.2 G/DL (ref 3.5–5.2)
ALBUMIN SERPL-MCNC: 3.5 G/DL (ref 3.5–5.2)
ANION GAP SERPL CALCULATED.3IONS-SCNC: 13 MMOL/L (ref 5–15)
ANION GAP SERPL CALCULATED.3IONS-SCNC: 13 MMOL/L (ref 5–15)
ANION GAP SERPL CALCULATED.3IONS-SCNC: 17 MMOL/L (ref 5–15)
APTT PPP: 32.5 SECONDS (ref 22–39)
ARTERIAL PATENCY WRIST A: ABNORMAL
ATMOSPHERIC PRESS: ABNORMAL MM[HG]
BASE EXCESS BLDA CALC-SCNC: -1 MMOL/L (ref -5–5)
BASE EXCESS BLDA CALC-SCNC: -1 MMOL/L (ref 0–2)
BASE EXCESS BLDA CALC-SCNC: 0.2 MMOL/L (ref 0–2)
BASE EXCESS BLDA CALC-SCNC: 0.3 MMOL/L (ref 0–2)
BASE EXCESS BLDA CALC-SCNC: 1 MMOL/L (ref -5–5)
BASOPHILS # BLD AUTO: 0.03 10*3/MM3 (ref 0–0.2)
BASOPHILS NFR BLD AUTO: 0.3 % (ref 0–1.5)
BDY SITE: ABNORMAL
BODY TEMPERATURE: 37
BUN SERPL-MCNC: 23 MG/DL (ref 8–23)
BUN SERPL-MCNC: 24 MG/DL (ref 8–23)
BUN SERPL-MCNC: 24 MG/DL (ref 8–23)
BUN/CREAT SERPL: 16.1 (ref 7–25)
BUN/CREAT SERPL: 17.1 (ref 7–25)
BUN/CREAT SERPL: 17.2 (ref 7–25)
CA-I BLDA-SCNC: 0.91 MMOL/L (ref 1.2–1.32)
CA-I BLDA-SCNC: 0.93 MMOL/L (ref 1.2–1.32)
CA-I BLDA-SCNC: 0.94 MMOL/L (ref 1.2–1.32)
CA-I BLDA-SCNC: 1.27 MMOL/L (ref 1.2–1.32)
CA-I SERPL ISE-MCNC: 1.13 MMOL/L (ref 1.15–1.3)
CALCIUM SPEC-SCNC: 7.7 MG/DL (ref 8.6–10.5)
CALCIUM SPEC-SCNC: 8.1 MG/DL (ref 8.6–10.5)
CALCIUM SPEC-SCNC: 8.4 MG/DL (ref 8.6–10.5)
CHLORIDE SERPL-SCNC: 104 MMOL/L (ref 98–107)
CHLORIDE SERPL-SCNC: 106 MMOL/L (ref 98–107)
CHLORIDE SERPL-SCNC: 98 MMOL/L (ref 98–107)
CO2 BLDA-SCNC: 24.4 MMOL/L (ref 22–33)
CO2 BLDA-SCNC: 25 MMOL/L (ref 24–29)
CO2 BLDA-SCNC: 25.3 MMOL/L (ref 22–33)
CO2 BLDA-SCNC: 26 MMOL/L (ref 24–29)
CO2 BLDA-SCNC: 26 MMOL/L (ref 24–29)
CO2 BLDA-SCNC: 26.9 MMOL/L (ref 22–33)
CO2 BLDA-SCNC: 27 MMOL/L (ref 24–29)
CO2 SERPL-SCNC: 21 MMOL/L (ref 22–29)
CO2 SERPL-SCNC: 22 MMOL/L (ref 22–29)
CO2 SERPL-SCNC: 23 MMOL/L (ref 22–29)
COHGB MFR BLD: 1.2 % (ref 0–2)
COHGB MFR BLD: 1.2 % (ref 0–2)
COHGB MFR BLD: 1.3 % (ref 0–2)
CREAT SERPL-MCNC: 1.34 MG/DL (ref 0.76–1.27)
CREAT SERPL-MCNC: 1.4 MG/DL (ref 0.76–1.27)
CREAT SERPL-MCNC: 1.49 MG/DL (ref 0.76–1.27)
CRP SERPL-MCNC: 12.59 MG/DL (ref 0–0.5)
DEPRECATED RDW RBC AUTO: 39.9 FL (ref 37–54)
DEPRECATED RDW RBC AUTO: 40 FL (ref 37–54)
DEPRECATED RDW RBC AUTO: 40.7 FL (ref 37–54)
EGFRCR SERPLBLD CKD-EPI 2021: 49.2 ML/MIN/1.73
EGFRCR SERPLBLD CKD-EPI 2021: 53.1 ML/MIN/1.73
EGFRCR SERPLBLD CKD-EPI 2021: 55.9 ML/MIN/1.73
EOSINOPHIL # BLD AUTO: 0.03 10*3/MM3 (ref 0–0.4)
EOSINOPHIL NFR BLD AUTO: 0.3 % (ref 0.3–6.2)
EPAP: 0
ERYTHROCYTE [DISTWIDTH] IN BLOOD BY AUTOMATED COUNT: 13.2 % (ref 12.3–15.4)
GLUCOSE BLDC GLUCOMTR-MCNC: 150 MG/DL (ref 70–130)
GLUCOSE BLDC GLUCOMTR-MCNC: 158 MG/DL (ref 70–130)
GLUCOSE BLDC GLUCOMTR-MCNC: 161 MG/DL (ref 70–130)
GLUCOSE BLDC GLUCOMTR-MCNC: 162 MG/DL (ref 70–130)
GLUCOSE BLDC GLUCOMTR-MCNC: 165 MG/DL (ref 70–130)
GLUCOSE BLDC GLUCOMTR-MCNC: 166 MG/DL (ref 70–130)
GLUCOSE BLDC GLUCOMTR-MCNC: 169 MG/DL (ref 70–130)
GLUCOSE BLDC GLUCOMTR-MCNC: 170 MG/DL (ref 70–130)
GLUCOSE BLDC GLUCOMTR-MCNC: 172 MG/DL (ref 70–130)
GLUCOSE BLDC GLUCOMTR-MCNC: 173 MG/DL (ref 70–130)
GLUCOSE BLDC GLUCOMTR-MCNC: 174 MG/DL (ref 70–130)
GLUCOSE BLDC GLUCOMTR-MCNC: 184 MG/DL (ref 70–130)
GLUCOSE BLDC GLUCOMTR-MCNC: 186 MG/DL (ref 70–130)
GLUCOSE BLDC GLUCOMTR-MCNC: 192 MG/DL (ref 70–130)
GLUCOSE SERPL-MCNC: 162 MG/DL (ref 65–99)
GLUCOSE SERPL-MCNC: 178 MG/DL (ref 65–99)
GLUCOSE SERPL-MCNC: 194 MG/DL (ref 65–99)
HCO3 BLDA-SCNC: 23.4 MMOL/L (ref 20–26)
HCO3 BLDA-SCNC: 24 MMOL/L (ref 20–26)
HCO3 BLDA-SCNC: 24.1 MMOL/L (ref 22–26)
HCO3 BLDA-SCNC: 24.9 MMOL/L (ref 22–26)
HCO3 BLDA-SCNC: 25.2 MMOL/L (ref 22–26)
HCO3 BLDA-SCNC: 25.5 MMOL/L (ref 20–26)
HCO3 BLDA-SCNC: 25.5 MMOL/L (ref 22–26)
HCT VFR BLD AUTO: 35.1 % (ref 37.5–51)
HCT VFR BLD AUTO: 36.9 % (ref 37.5–51)
HCT VFR BLD AUTO: 38 % (ref 37.5–51)
HCT VFR BLD CALC: 37.1 % (ref 38–51)
HCT VFR BLD CALC: 38 % (ref 38–51)
HCT VFR BLD CALC: 38.6 % (ref 38–51)
HCT VFR BLDA CALC: 26 % (ref 38–51)
HCT VFR BLDA CALC: 27 % (ref 38–51)
HCT VFR BLDA CALC: 27 % (ref 38–51)
HCT VFR BLDA CALC: 30 % (ref 38–51)
HGB BLD-MCNC: 12 G/DL (ref 13–17.7)
HGB BLD-MCNC: 12.7 G/DL (ref 13–17.7)
HGB BLD-MCNC: 13.1 G/DL (ref 13–17.7)
HGB BLDA-MCNC: 10.2 G/DL (ref 12–17)
HGB BLDA-MCNC: 12.1 G/DL (ref 13.5–17.5)
HGB BLDA-MCNC: 12.4 G/DL (ref 13.5–17.5)
HGB BLDA-MCNC: 12.6 G/DL (ref 13.5–17.5)
HGB BLDA-MCNC: 8.8 G/DL (ref 12–17)
HGB BLDA-MCNC: 9.2 G/DL (ref 12–17)
HGB BLDA-MCNC: 9.2 G/DL (ref 12–17)
IMM GRANULOCYTES # BLD AUTO: 0.05 10*3/MM3 (ref 0–0.05)
IMM GRANULOCYTES NFR BLD AUTO: 0.5 % (ref 0–0.5)
INHALED O2 CONCENTRATION: 100 %
INHALED O2 CONCENTRATION: 40 %
INHALED O2 CONCENTRATION: 60 %
INR PPP: 1.73 (ref 0.89–1.12)
IPAP: 0
LYMPHOCYTES # BLD AUTO: 2.06 10*3/MM3 (ref 0.7–3.1)
LYMPHOCYTES NFR BLD AUTO: 20.5 % (ref 19.6–45.3)
MAGNESIUM SERPL-MCNC: 1.8 MG/DL (ref 1.6–2.4)
MAGNESIUM SERPL-MCNC: 2.4 MG/DL (ref 1.6–2.4)
MCH RBC QN AUTO: 28.8 PG (ref 26.6–33)
MCH RBC QN AUTO: 28.8 PG (ref 26.6–33)
MCH RBC QN AUTO: 29 PG (ref 26.6–33)
MCHC RBC AUTO-ENTMCNC: 34.2 G/DL (ref 31.5–35.7)
MCHC RBC AUTO-ENTMCNC: 34.4 G/DL (ref 31.5–35.7)
MCHC RBC AUTO-ENTMCNC: 34.5 G/DL (ref 31.5–35.7)
MCV RBC AUTO: 83.7 FL (ref 79–97)
MCV RBC AUTO: 84.1 FL (ref 79–97)
MCV RBC AUTO: 84.2 FL (ref 79–97)
METHGB BLD QL: 0.2 % (ref 0–1.5)
METHGB BLD QL: 0.4 % (ref 0–1.5)
METHGB BLD QL: 0.4 % (ref 0–1.5)
MODALITY: ABNORMAL
MONOCYTES # BLD AUTO: 0.85 10*3/MM3 (ref 0.1–0.9)
MONOCYTES NFR BLD AUTO: 8.5 % (ref 5–12)
NEUTROPHILS NFR BLD AUTO: 69.9 % (ref 42.7–76)
NEUTROPHILS NFR BLD AUTO: 7.02 10*3/MM3 (ref 1.7–7)
NRBC BLD AUTO-RTO: 0 /100 WBC (ref 0–0.2)
OXYHGB MFR BLDV: 95.3 % (ref 94–99)
OXYHGB MFR BLDV: 95.6 % (ref 94–99)
OXYHGB MFR BLDV: 98.6 % (ref 94–99)
PA ADP PRP-ACNC: 197 PRU
PAW @ PEAK INSP FLOW SETTING VENT: 0 CMH2O
PCO2 BLDA: 32.6 MM HG (ref 35–45)
PCO2 BLDA: 37.5 MM HG (ref 35–45)
PCO2 BLDA: 39.4 MM HG (ref 35–45)
PCO2 BLDA: 39.4 MM HG (ref 35–45)
PCO2 BLDA: 40.4 MM HG (ref 35–45)
PCO2 BLDA: 41.1 MM HG (ref 35–45)
PCO2 BLDA: 42.8 MM HG (ref 35–45)
PCO2 TEMP ADJ BLD: 32.6 MM HG (ref 35–48)
PCO2 TEMP ADJ BLD: 40.4 MM HG (ref 35–48)
PCO2 TEMP ADJ BLD: 42.8 MM HG (ref 35–48)
PEEP RESPIRATORY: 5 CM[H2O]
PEEP RESPIRATORY: 5 CM[H2O]
PH BLDA: 7.38 PH UNITS (ref 7.35–7.45)
PH BLDA: 7.38 PH UNITS (ref 7.35–7.45)
PH BLDA: 7.38 PH UNITS (ref 7.35–7.6)
PH BLDA: 7.41 PH UNITS (ref 7.35–7.6)
PH BLDA: 7.42 PH UNITS (ref 7.35–7.6)
PH BLDA: 7.43 PH UNITS (ref 7.35–7.6)
PH BLDA: 7.46 PH UNITS (ref 7.35–7.45)
PH, TEMP CORRECTED: 7.38 PH UNITS
PH, TEMP CORRECTED: 7.38 PH UNITS
PH, TEMP CORRECTED: 7.46 PH UNITS
PHOSPHATE SERPL-MCNC: 2.3 MG/DL (ref 2.5–4.5)
PHOSPHATE SERPL-MCNC: 2.9 MG/DL (ref 2.5–4.5)
PHOSPHATE SERPL-MCNC: 3.3 MG/DL (ref 2.5–4.5)
PLATELET # BLD AUTO: 136 10*3/MM3 (ref 140–450)
PLATELET # BLD AUTO: 160 10*3/MM3 (ref 140–450)
PLATELET # BLD AUTO: 173 10*3/MM3 (ref 140–450)
PMV BLD AUTO: 11.1 FL (ref 6–12)
PMV BLD AUTO: 11.5 FL (ref 6–12)
PMV BLD AUTO: 11.7 FL (ref 6–12)
PO2 BLDA: 141 MMHG (ref 80–105)
PO2 BLDA: 226 MM HG (ref 83–108)
PO2 BLDA: 334 MMHG (ref 80–105)
PO2 BLDA: 376 MMHG (ref 80–105)
PO2 BLDA: 400 MMHG (ref 80–105)
PO2 BLDA: 87.6 MM HG (ref 83–108)
PO2 BLDA: 87.9 MM HG (ref 83–108)
PO2 TEMP ADJ BLD: 226 MM HG (ref 83–108)
PO2 TEMP ADJ BLD: 87.6 MM HG (ref 83–108)
PO2 TEMP ADJ BLD: 87.9 MM HG (ref 83–108)
POTASSIUM BLDA-SCNC: 3.5 MMOL/L (ref 3.5–4.9)
POTASSIUM BLDA-SCNC: 3.8 MMOL/L (ref 3.5–4.9)
POTASSIUM BLDA-SCNC: 3.9 MMOL/L (ref 3.5–4.9)
POTASSIUM BLDA-SCNC: 4 MMOL/L (ref 3.5–4.9)
POTASSIUM SERPL-SCNC: 3.2 MMOL/L (ref 3.5–5.2)
POTASSIUM SERPL-SCNC: 3.8 MMOL/L (ref 3.5–5.2)
POTASSIUM SERPL-SCNC: 4.4 MMOL/L (ref 3.5–5.2)
POTASSIUM SERPL-SCNC: 4.4 MMOL/L (ref 3.5–5.2)
PROCALCITONIN SERPL-MCNC: 0.18 NG/ML (ref 0–0.25)
PROTHROMBIN TIME: 20.3 SECONDS (ref 12.2–14.5)
PSV: 10 CMH2O
PSV: 10 CMH2O
QT INTERVAL: 308 MS
QT INTERVAL: 378 MS
QT INTERVAL: 386 MS
QT INTERVAL: 416 MS
QT INTERVAL: 468 MS
QTC INTERVAL: 413 MS
QTC INTERVAL: 425 MS
QTC INTERVAL: 429 MS
QTC INTERVAL: 433 MS
QTC INTERVAL: 468 MS
RBC # BLD AUTO: 4.17 10*6/MM3 (ref 4.14–5.8)
RBC # BLD AUTO: 4.41 10*6/MM3 (ref 4.14–5.8)
RBC # BLD AUTO: 4.52 10*6/MM3 (ref 4.14–5.8)
SAO2 % BLDA: 100 % (ref 95–98)
SAO2 % BLDA: 99 % (ref 95–98)
SET MECH RESP RATE: 14
SODIUM BLD-SCNC: 136 MMOL/L (ref 138–146)
SODIUM BLD-SCNC: 137 MMOL/L (ref 138–146)
SODIUM SERPL-SCNC: 138 MMOL/L (ref 136–145)
SODIUM SERPL-SCNC: 139 MMOL/L (ref 136–145)
SODIUM SERPL-SCNC: 140 MMOL/L (ref 136–145)
TOTAL RATE: 0 BREATHS/MINUTE
TOTAL RATE: 14 BREATHS/MINUTE
TOTAL RATE: 20 BREATHS/MINUTE
TROPONIN T SERPL HS-MCNC: 7557 NG/L
UFH PPP CHRO-ACNC: 0.1 IU/ML (ref 0.3–0.7)
UFH PPP CHRO-ACNC: 0.19 IU/ML (ref 0.3–0.7)
VENTILATOR MODE: ABNORMAL
VT ON VENT VENT: 710 ML
WBC NRBC COR # BLD AUTO: 10.04 10*3/MM3 (ref 3.4–10.8)
WBC NRBC COR # BLD AUTO: 15.62 10*3/MM3 (ref 3.4–10.8)
WBC NRBC COR # BLD AUTO: 16.25 10*3/MM3 (ref 3.4–10.8)

## 2025-02-20 PROCEDURE — 94660 CPAP INITIATION&MGMT: CPT

## 2025-02-20 PROCEDURE — 84132 ASSAY OF SERUM POTASSIUM: CPT | Performed by: INTERNAL MEDICINE

## 2025-02-20 PROCEDURE — 25010000002 ALBUMIN HUMAN 5% PER 50 ML

## 2025-02-20 PROCEDURE — 25010000002 ESMOLOL 100 MG/10ML SOLUTION: Performed by: ANESTHESIOLOGY

## 2025-02-20 PROCEDURE — 25010000002 VANCOMYCIN 1 G RECONSTITUTED SOLUTION: Performed by: ANESTHESIOLOGY

## 2025-02-20 PROCEDURE — 82330 ASSAY OF CALCIUM: CPT

## 2025-02-20 PROCEDURE — 25010000002 MORPHINE PER 10 MG: Performed by: THORACIC SURGERY (CARDIOTHORACIC VASCULAR SURGERY)

## 2025-02-20 PROCEDURE — 85014 HEMATOCRIT: CPT

## 2025-02-20 PROCEDURE — 5A1221Z PERFORMANCE OF CARDIAC OUTPUT, CONTINUOUS: ICD-10-PCS | Performed by: THORACIC SURGERY (CARDIOTHORACIC VASCULAR SURGERY)

## 2025-02-20 PROCEDURE — 94799 UNLISTED PULMONARY SVC/PX: CPT

## 2025-02-20 PROCEDURE — 85520 HEPARIN ASSAY: CPT

## 2025-02-20 PROCEDURE — 25010000002 CEFAZOLIN PER 500 MG: Performed by: PHYSICIAN ASSISTANT

## 2025-02-20 PROCEDURE — 25010000002 PAPAVERINE PER 60 MG: Performed by: THORACIC SURGERY (CARDIOTHORACIC VASCULAR SURGERY)

## 2025-02-20 PROCEDURE — 25010000002 PROPOFOL 10 MG/ML EMULSION: Performed by: PHYSICIAN ASSISTANT

## 2025-02-20 PROCEDURE — 85347 COAGULATION TIME ACTIVATED: CPT

## 2025-02-20 PROCEDURE — 021109W BYPASS CORONARY ARTERY, TWO ARTERIES FROM AORTA WITH AUTOLOGOUS VENOUS TISSUE, OPEN APPROACH: ICD-10-PCS | Performed by: THORACIC SURGERY (CARDIOTHORACIC VASCULAR SURGERY)

## 2025-02-20 PROCEDURE — 25010000002 AMIODARONE IN DEXTROSE 5% 360-4.14 MG/200ML-% SOLUTION: Performed by: NURSE PRACTITIONER

## 2025-02-20 PROCEDURE — 82375 ASSAY CARBOXYHB QUANT: CPT

## 2025-02-20 PROCEDURE — P9041 ALBUMIN (HUMAN),5%, 50ML: HCPCS

## 2025-02-20 PROCEDURE — 25010000002 PROTAMINE SULFATE PER 10 MG: Performed by: ANESTHESIOLOGY

## 2025-02-20 PROCEDURE — 33518 CABG ARTERY-VEIN TWO: CPT | Performed by: THORACIC SURGERY (CARDIOTHORACIC VASCULAR SURGERY)

## 2025-02-20 PROCEDURE — 33533 CABG ARTERIAL SINGLE: CPT | Performed by: PHYSICIAN ASSISTANT

## 2025-02-20 PROCEDURE — 33518 CABG ARTERY-VEIN TWO: CPT | Performed by: PHYSICIAN ASSISTANT

## 2025-02-20 PROCEDURE — 25010000002 MAGNESIUM SULFATE 4 GM/100ML SOLUTION: Performed by: INTERNAL MEDICINE

## 2025-02-20 PROCEDURE — 06BQ4ZZ EXCISION OF LEFT SAPHENOUS VEIN, PERCUTANEOUS ENDOSCOPIC APPROACH: ICD-10-PCS | Performed by: THORACIC SURGERY (CARDIOTHORACIC VASCULAR SURGERY)

## 2025-02-20 PROCEDURE — 25010000002 CEFAZOLIN PER 500 MG: Performed by: ANESTHESIOLOGY

## 2025-02-20 PROCEDURE — 33533 CABG ARTERIAL SINGLE: CPT | Performed by: THORACIC SURGERY (CARDIOTHORACIC VASCULAR SURGERY)

## 2025-02-20 PROCEDURE — 93318 ECHO TRANSESOPHAGEAL INTRAOP: CPT | Performed by: ANESTHESIOLOGY

## 2025-02-20 PROCEDURE — 99233 SBSQ HOSP IP/OBS HIGH 50: CPT | Performed by: INTERNAL MEDICINE

## 2025-02-20 PROCEDURE — 85576 BLOOD PLATELET AGGREGATION: CPT | Performed by: PHYSICIAN ASSISTANT

## 2025-02-20 PROCEDURE — 25810000003 DEXTROSE 5 % WITH KCL 20 MEQ 20 MEQ/L SOLUTION: Performed by: PHYSICIAN ASSISTANT

## 2025-02-20 PROCEDURE — 84132 ASSAY OF SERUM POTASSIUM: CPT

## 2025-02-20 PROCEDURE — 83050 HGB METHEMOGLOBIN QUAN: CPT

## 2025-02-20 PROCEDURE — 25010000002 ACETAMINOPHEN 10 MG/ML SOLUTION: Performed by: PHYSICIAN ASSISTANT

## 2025-02-20 PROCEDURE — 33257 ABLATE ATRIA LMTD ADD-ON: CPT | Performed by: THORACIC SURGERY (CARDIOTHORACIC VASCULAR SURGERY)

## 2025-02-20 PROCEDURE — 93005 ELECTROCARDIOGRAM TRACING: CPT | Performed by: THORACIC SURGERY (CARDIOTHORACIC VASCULAR SURGERY)

## 2025-02-20 PROCEDURE — B246ZZ4 ULTRASONOGRAPHY OF RIGHT AND LEFT HEART, TRANSESOPHAGEAL: ICD-10-PCS | Performed by: THORACIC SURGERY (CARDIOTHORACIC VASCULAR SURGERY)

## 2025-02-20 PROCEDURE — 86140 C-REACTIVE PROTEIN: CPT | Performed by: INTERNAL MEDICINE

## 2025-02-20 PROCEDURE — 25810000003 LACTATED RINGERS PER 1000 ML: Performed by: ANESTHESIOLOGY

## 2025-02-20 PROCEDURE — 25010000002 HEPARIN (PORCINE) PER 1000 UNITS: Performed by: THORACIC SURGERY (CARDIOTHORACIC VASCULAR SURGERY)

## 2025-02-20 PROCEDURE — 71045 X-RAY EXAM CHEST 1 VIEW: CPT

## 2025-02-20 PROCEDURE — 25810000003 SODIUM CHLORIDE PER 500 ML: Performed by: THORACIC SURGERY (CARDIOTHORACIC VASCULAR SURGERY)

## 2025-02-20 PROCEDURE — 85610 PROTHROMBIN TIME: CPT | Performed by: PHYSICIAN ASSISTANT

## 2025-02-20 PROCEDURE — 25010000002 GLYCOPYRROLATE 1 MG/5ML SOLUTION: Performed by: ANESTHESIOLOGY

## 2025-02-20 PROCEDURE — 93010 ELECTROCARDIOGRAM REPORT: CPT | Performed by: INTERNAL MEDICINE

## 2025-02-20 PROCEDURE — 25010000002 FUROSEMIDE PER 20 MG: Performed by: NURSE PRACTITIONER

## 2025-02-20 PROCEDURE — 82947 ASSAY GLUCOSE BLOOD QUANT: CPT

## 2025-02-20 PROCEDURE — 84295 ASSAY OF SERUM SODIUM: CPT

## 2025-02-20 PROCEDURE — 02580ZZ DESTRUCTION OF CONDUCTION MECHANISM, OPEN APPROACH: ICD-10-PCS | Performed by: THORACIC SURGERY (CARDIOTHORACIC VASCULAR SURGERY)

## 2025-02-20 PROCEDURE — 99232 SBSQ HOSP IP/OBS MODERATE 35: CPT | Performed by: NURSE PRACTITIONER

## 2025-02-20 PROCEDURE — 82803 BLOOD GASES ANY COMBINATION: CPT

## 2025-02-20 PROCEDURE — 82330 ASSAY OF CALCIUM: CPT | Performed by: PHYSICIAN ASSISTANT

## 2025-02-20 PROCEDURE — P9041 ALBUMIN (HUMAN),5%, 50ML: HCPCS | Performed by: PHYSICIAN ASSISTANT

## 2025-02-20 PROCEDURE — 85027 COMPLETE CBC AUTOMATED: CPT | Performed by: PHYSICIAN ASSISTANT

## 2025-02-20 PROCEDURE — 84484 ASSAY OF TROPONIN QUANT: CPT

## 2025-02-20 PROCEDURE — 84145 PROCALCITONIN (PCT): CPT | Performed by: INTERNAL MEDICINE

## 2025-02-20 PROCEDURE — 80069 RENAL FUNCTION PANEL: CPT | Performed by: PHYSICIAN ASSISTANT

## 2025-02-20 PROCEDURE — 85025 COMPLETE CBC W/AUTO DIFF WBC: CPT | Performed by: THORACIC SURGERY (CARDIOTHORACIC VASCULAR SURGERY)

## 2025-02-20 PROCEDURE — 25010000002 ALBUMIN HUMAN 5% PER 50 ML: Performed by: PHYSICIAN ASSISTANT

## 2025-02-20 PROCEDURE — 02L70CK OCCLUSION OF LEFT ATRIAL APPENDAGE WITH EXTRALUMINAL DEVICE, OPEN APPROACH: ICD-10-PCS | Performed by: THORACIC SURGERY (CARDIOTHORACIC VASCULAR SURGERY)

## 2025-02-20 PROCEDURE — 25010000002 MIDAZOLAM PER 1 MG: Performed by: ANESTHESIOLOGY

## 2025-02-20 PROCEDURE — 25010000002 VANCOMYCIN 1 G RECONSTITUTED SOLUTION 1 EACH VIAL: Performed by: THORACIC SURGERY (CARDIOTHORACIC VASCULAR SURGERY)

## 2025-02-20 PROCEDURE — 25010000002 POTASSIUM CHLORIDE PER 2 MEQ: Performed by: INTERNAL MEDICINE

## 2025-02-20 PROCEDURE — 25010000002 ONDANSETRON PER 1 MG: Performed by: PHYSICIAN ASSISTANT

## 2025-02-20 PROCEDURE — 94002 VENT MGMT INPAT INIT DAY: CPT

## 2025-02-20 PROCEDURE — 83735 ASSAY OF MAGNESIUM: CPT | Performed by: PHYSICIAN ASSISTANT

## 2025-02-20 PROCEDURE — 25010000002 PROPOFOL 10 MG/ML EMULSION: Performed by: ANESTHESIOLOGY

## 2025-02-20 PROCEDURE — 33508 ENDOSCOPIC VEIN HARVEST: CPT | Performed by: PHYSICIAN ASSISTANT

## 2025-02-20 PROCEDURE — 94761 N-INVAS EAR/PLS OXIMETRY MLT: CPT

## 2025-02-20 PROCEDURE — C1751 CATH, INF, PER/CENT/MIDLINE: HCPCS | Performed by: ANESTHESIOLOGY

## 2025-02-20 PROCEDURE — C1751 CATH, INF, PER/CENT/MIDLINE: HCPCS | Performed by: THORACIC SURGERY (CARDIOTHORACIC VASCULAR SURGERY)

## 2025-02-20 PROCEDURE — 33257 ABLATE ATRIA LMTD ADD-ON: CPT | Performed by: PHYSICIAN ASSISTANT

## 2025-02-20 PROCEDURE — 33508 ENDOSCOPIC VEIN HARVEST: CPT | Performed by: THORACIC SURGERY (CARDIOTHORACIC VASCULAR SURGERY)

## 2025-02-20 PROCEDURE — 02100Z9 BYPASS CORONARY ARTERY, ONE ARTERY FROM LEFT INTERNAL MAMMARY, OPEN APPROACH: ICD-10-PCS | Performed by: THORACIC SURGERY (CARDIOTHORACIC VASCULAR SURGERY)

## 2025-02-20 PROCEDURE — 25010000002 FENTANYL CITRATE (PF) 1000 MCG/20ML SOLUTION: Performed by: ANESTHESIOLOGY

## 2025-02-20 PROCEDURE — 80069 RENAL FUNCTION PANEL: CPT | Performed by: THORACIC SURGERY (CARDIOTHORACIC VASCULAR SURGERY)

## 2025-02-20 PROCEDURE — 93005 ELECTROCARDIOGRAM TRACING: CPT | Performed by: NURSE PRACTITIONER

## 2025-02-20 PROCEDURE — 82805 BLOOD GASES W/O2 SATURATION: CPT

## 2025-02-20 PROCEDURE — 85730 THROMBOPLASTIN TIME PARTIAL: CPT | Performed by: PHYSICIAN ASSISTANT

## 2025-02-20 DEVICE — LIGACLIP MCA MULTIPLE CLIP APPLIERS, 20 SMALL CLIPS
Type: IMPLANTABLE DEVICE | Site: VEIN | Status: FUNCTIONAL
Brand: LIGACLIP

## 2025-02-20 DEVICE — IMPLANTABLE DEVICE
Type: IMPLANTABLE DEVICE | Site: STERNUM | Status: FUNCTIONAL
Brand: SURGIFOAM® ABSORBABLE GELATIN POWDER

## 2025-02-20 RX ORDER — FUROSEMIDE 10 MG/ML
40 INJECTION INTRAMUSCULAR; INTRAVENOUS ONCE
Status: COMPLETED | OUTPATIENT
Start: 2025-02-20 | End: 2025-02-20

## 2025-02-20 RX ORDER — ASPIRIN 325 MG
325 TABLET, DELAYED RELEASE (ENTERIC COATED) ORAL DAILY
Status: DISCONTINUED | OUTPATIENT
Start: 2025-02-21 | End: 2025-02-21

## 2025-02-20 RX ORDER — AMOXICILLIN 250 MG
2 CAPSULE ORAL 2 TIMES DAILY
Status: DISCONTINUED | OUTPATIENT
Start: 2025-02-20 | End: 2025-02-26 | Stop reason: SDUPTHER

## 2025-02-20 RX ORDER — FENTANYL CITRATE 0.05 MG/ML
INJECTION, SOLUTION INTRAMUSCULAR; INTRAVENOUS AS NEEDED
Status: DISCONTINUED | OUTPATIENT
Start: 2025-02-20 | End: 2025-02-20 | Stop reason: SURG

## 2025-02-20 RX ORDER — PROTAMINE SULFATE 10 MG/ML
INJECTION, SOLUTION INTRAVENOUS AS NEEDED
Status: DISCONTINUED | OUTPATIENT
Start: 2025-02-20 | End: 2025-02-20 | Stop reason: SURG

## 2025-02-20 RX ORDER — ROCURONIUM BROMIDE 10 MG/ML
INJECTION, SOLUTION INTRAVENOUS AS NEEDED
Status: DISCONTINUED | OUTPATIENT
Start: 2025-02-20 | End: 2025-02-20 | Stop reason: SURG

## 2025-02-20 RX ORDER — VECURONIUM BROMIDE 1 MG/ML
INJECTION, POWDER, FOR SOLUTION INTRAVENOUS AS NEEDED
Status: DISCONTINUED | OUTPATIENT
Start: 2025-02-20 | End: 2025-02-20 | Stop reason: SURG

## 2025-02-20 RX ORDER — ALBUMIN HUMAN 50 G/1000ML
SOLUTION INTRAVENOUS
Status: COMPLETED
Start: 2025-02-20 | End: 2025-02-20

## 2025-02-20 RX ORDER — GLYCOPYRROLATE 0.2 MG/ML
INJECTION INTRAMUSCULAR; INTRAVENOUS AS NEEDED
Status: DISCONTINUED | OUTPATIENT
Start: 2025-02-20 | End: 2025-02-20 | Stop reason: SURG

## 2025-02-20 RX ORDER — ACETAMINOPHEN 10 MG/ML
1000 INJECTION, SOLUTION INTRAVENOUS ONCE
Status: COMPLETED | OUTPATIENT
Start: 2025-02-20 | End: 2025-02-20

## 2025-02-20 RX ORDER — OXYCODONE HYDROCHLORIDE 5 MG/1
5 TABLET ORAL EVERY 4 HOURS PRN
Status: CANCELLED | OUTPATIENT
Start: 2025-02-20 | End: 2025-02-25

## 2025-02-20 RX ORDER — SODIUM CHLORIDE 0.9 % (FLUSH) 0.9 %
10 SYRINGE (ML) INJECTION AS NEEDED
Status: DISCONTINUED | OUTPATIENT
Start: 2025-02-20 | End: 2025-02-26

## 2025-02-20 RX ORDER — NALOXONE HYDROCHLORIDE 0.4 MG/ML
0.2 INJECTION, SOLUTION INTRAMUSCULAR; INTRAVENOUS; SUBCUTANEOUS AS NEEDED
Status: DISCONTINUED | OUTPATIENT
Start: 2025-02-20 | End: 2025-02-28 | Stop reason: HOSPADM

## 2025-02-20 RX ORDER — HEPARIN SODIUM 1000 [USP'U]/ML
2000 INJECTION, SOLUTION INTRAVENOUS; SUBCUTANEOUS ONCE
Status: DISCONTINUED | OUTPATIENT
Start: 2025-02-20 | End: 2025-02-20

## 2025-02-20 RX ORDER — MORPHINE SULFATE 2 MG/ML
2 INJECTION, SOLUTION INTRAMUSCULAR; INTRAVENOUS
Status: DISCONTINUED | OUTPATIENT
Start: 2025-02-20 | End: 2025-02-21

## 2025-02-20 RX ORDER — TRAMADOL HYDROCHLORIDE 50 MG/1
100 TABLET ORAL EVERY 6 HOURS PRN
Status: CANCELLED | OUTPATIENT
Start: 2025-02-20 | End: 2025-02-25

## 2025-02-20 RX ORDER — PAPAVERINE HYDROCHLORIDE 30 MG/ML
INJECTION INTRAMUSCULAR; INTRAVENOUS AS NEEDED
Status: DISCONTINUED | OUTPATIENT
Start: 2025-02-20 | End: 2025-02-20 | Stop reason: HOSPADM

## 2025-02-20 RX ORDER — POTASSIUM CHLORIDE 29.8 MG/ML
20 INJECTION INTRAVENOUS
Status: COMPLETED | OUTPATIENT
Start: 2025-02-20 | End: 2025-02-20

## 2025-02-20 RX ORDER — ASPIRIN 325 MG
325 TABLET ORAL ONCE
Status: COMPLETED | OUTPATIENT
Start: 2025-02-20 | End: 2025-02-20

## 2025-02-20 RX ORDER — AMIODARONE HYDROCHLORIDE 200 MG/1
200 TABLET ORAL EVERY 12 HOURS
Status: DISCONTINUED | OUTPATIENT
Start: 2025-02-28 | End: 2025-02-28 | Stop reason: HOSPADM

## 2025-02-20 RX ORDER — METOPROLOL TARTRATE 1 MG/ML
2.5 INJECTION, SOLUTION INTRAVENOUS EVERY 6 HOURS SCHEDULED
Status: DISCONTINUED | OUTPATIENT
Start: 2025-02-20 | End: 2025-02-21

## 2025-02-20 RX ORDER — SODIUM CHLORIDE 9 MG/ML
INJECTION, SOLUTION INTRAVENOUS AS NEEDED
Status: DISCONTINUED | OUTPATIENT
Start: 2025-02-20 | End: 2025-02-20 | Stop reason: HOSPADM

## 2025-02-20 RX ORDER — ACETAMINOPHEN 500 MG
1000 TABLET ORAL EVERY 8 HOURS
Status: DISCONTINUED | OUTPATIENT
Start: 2025-02-20 | End: 2025-02-23

## 2025-02-20 RX ORDER — DEXMEDETOMIDINE HYDROCHLORIDE 4 UG/ML
.2-1.5 INJECTION, SOLUTION INTRAVENOUS CONTINUOUS PRN
Status: DISCONTINUED | OUTPATIENT
Start: 2025-02-20 | End: 2025-02-21

## 2025-02-20 RX ORDER — NITROGLYCERIN 0.4 MG/1
0.4 TABLET SUBLINGUAL
Status: DISCONTINUED | OUTPATIENT
Start: 2025-02-20 | End: 2025-02-28 | Stop reason: HOSPADM

## 2025-02-20 RX ORDER — ONDANSETRON 2 MG/ML
4 INJECTION INTRAMUSCULAR; INTRAVENOUS EVERY 6 HOURS PRN
Status: DISCONTINUED | OUTPATIENT
Start: 2025-02-20 | End: 2025-02-28 | Stop reason: HOSPADM

## 2025-02-20 RX ORDER — SODIUM CHLORIDE 0.9 % (FLUSH) 0.9 %
10 SYRINGE (ML) INJECTION EVERY 12 HOURS SCHEDULED
Status: DISCONTINUED | OUTPATIENT
Start: 2025-02-20 | End: 2025-02-26

## 2025-02-20 RX ORDER — ETOMIDATE 2 MG/ML
INJECTION INTRAVENOUS AS NEEDED
Status: DISCONTINUED | OUTPATIENT
Start: 2025-02-20 | End: 2025-02-20 | Stop reason: SURG

## 2025-02-20 RX ORDER — DOPAMINE HYDROCHLORIDE 160 MG/100ML
2-20 INJECTION, SOLUTION INTRAVENOUS CONTINUOUS PRN
Status: DISCONTINUED | OUTPATIENT
Start: 2025-02-20 | End: 2025-02-21

## 2025-02-20 RX ORDER — LIDOCAINE HYDROCHLORIDE 10 MG/ML
0.5 INJECTION, SOLUTION EPIDURAL; INFILTRATION; INTRACAUDAL; PERINEURAL ONCE AS NEEDED
Status: DISCONTINUED | OUTPATIENT
Start: 2025-02-20 | End: 2025-02-20

## 2025-02-20 RX ORDER — MIDAZOLAM HYDROCHLORIDE 1 MG/ML
INJECTION, SOLUTION INTRAMUSCULAR; INTRAVENOUS AS NEEDED
Status: DISCONTINUED | OUTPATIENT
Start: 2025-02-20 | End: 2025-02-20 | Stop reason: SURG

## 2025-02-20 RX ORDER — CALCIUM CHLORIDE 100 MG/ML
INJECTION INTRAVENOUS; INTRAVENTRICULAR AS NEEDED
Status: DISCONTINUED | OUTPATIENT
Start: 2025-02-20 | End: 2025-02-20 | Stop reason: SURG

## 2025-02-20 RX ORDER — FAMOTIDINE 10 MG/ML
20 INJECTION, SOLUTION INTRAVENOUS ONCE
Status: DISCONTINUED | OUTPATIENT
Start: 2025-02-20 | End: 2025-02-20

## 2025-02-20 RX ORDER — NOREPINEPHRINE BITARTRATE 0.03 MG/ML
INJECTION, SOLUTION INTRAVENOUS CONTINUOUS PRN
Status: DISCONTINUED | OUTPATIENT
Start: 2025-02-20 | End: 2025-02-20 | Stop reason: SURG

## 2025-02-20 RX ORDER — ALBUMIN HUMAN 50 G/1000ML
250 SOLUTION INTRAVENOUS AS NEEDED
Status: ACTIVE | OUTPATIENT
Start: 2025-02-20 | End: 2025-02-21

## 2025-02-20 RX ORDER — FAMOTIDINE 20 MG/1
20 TABLET, FILM COATED ORAL ONCE
Status: DISCONTINUED | OUTPATIENT
Start: 2025-02-20 | End: 2025-02-20

## 2025-02-20 RX ORDER — NOREPINEPHRINE BITARTRATE 0.03 MG/ML
.02-.3 INJECTION, SOLUTION INTRAVENOUS CONTINUOUS PRN
Status: DISCONTINUED | OUTPATIENT
Start: 2025-02-20 | End: 2025-02-21

## 2025-02-20 RX ORDER — IBUPROFEN 600 MG/1
1 TABLET ORAL
Status: DISCONTINUED | OUTPATIENT
Start: 2025-02-20 | End: 2025-02-28 | Stop reason: HOSPADM

## 2025-02-20 RX ORDER — NICOTINE POLACRILEX 4 MG
15 LOZENGE BUCCAL
Status: DISCONTINUED | OUTPATIENT
Start: 2025-02-20 | End: 2025-02-28 | Stop reason: HOSPADM

## 2025-02-20 RX ORDER — AMINOCAPROIC ACID 250 MG/ML
INJECTION, SOLUTION INTRAVENOUS AS NEEDED
Status: DISCONTINUED | OUTPATIENT
Start: 2025-02-20 | End: 2025-02-20 | Stop reason: SURG

## 2025-02-20 RX ORDER — MAGNESIUM SULFATE HEPTAHYDRATE 40 MG/ML
4 INJECTION, SOLUTION INTRAVENOUS ONCE
Status: COMPLETED | OUTPATIENT
Start: 2025-02-20 | End: 2025-02-20

## 2025-02-20 RX ORDER — SODIUM CHLORIDE, SODIUM LACTATE, POTASSIUM CHLORIDE, CALCIUM CHLORIDE 600; 310; 30; 20 MG/100ML; MG/100ML; MG/100ML; MG/100ML
9 INJECTION, SOLUTION INTRAVENOUS CONTINUOUS
Status: DISCONTINUED | OUTPATIENT
Start: 2025-02-21 | End: 2025-02-21

## 2025-02-20 RX ORDER — DEXTROSE MONOHYDRATE 25 G/50ML
10-50 INJECTION, SOLUTION INTRAVENOUS
Status: DISCONTINUED | OUTPATIENT
Start: 2025-02-20 | End: 2025-02-28 | Stop reason: HOSPADM

## 2025-02-20 RX ORDER — CEFAZOLIN SODIUM 1 G/3ML
INJECTION, POWDER, FOR SOLUTION INTRAMUSCULAR; INTRAVENOUS AS NEEDED
Status: DISCONTINUED | OUTPATIENT
Start: 2025-02-20 | End: 2025-02-20 | Stop reason: SURG

## 2025-02-20 RX ORDER — VANCOMYCIN/0.9 % SOD CHLORIDE 1.5G/250ML
1000 PLASTIC BAG, INJECTION (ML) INTRAVENOUS ONCE
Status: DISCONTINUED | OUTPATIENT
Start: 2025-02-20 | End: 2025-02-20

## 2025-02-20 RX ORDER — OXYCODONE HYDROCHLORIDE 10 MG/1
10 TABLET ORAL EVERY 4 HOURS PRN
Status: DISCONTINUED | OUTPATIENT
Start: 2025-02-20 | End: 2025-02-24

## 2025-02-20 RX ORDER — PROTAMINE SULFATE 10 MG/ML
50 INJECTION, SOLUTION INTRAVENOUS ONCE
Status: DISCONTINUED | OUTPATIENT
Start: 2025-02-20 | End: 2025-02-21

## 2025-02-20 RX ORDER — AMIODARONE HYDROCHLORIDE 200 MG/1
200 TABLET ORAL DAILY
Status: DISCONTINUED | OUTPATIENT
Start: 2025-03-14 | End: 2025-02-28 | Stop reason: HOSPADM

## 2025-02-20 RX ORDER — OXYCODONE AND ACETAMINOPHEN 5; 325 MG/1; MG/1
2 TABLET ORAL EVERY 4 HOURS PRN
Status: DISCONTINUED | OUTPATIENT
Start: 2025-02-20 | End: 2025-02-20

## 2025-02-20 RX ORDER — MAGNESIUM HYDROXIDE 1200 MG/15ML
LIQUID ORAL AS NEEDED
Status: DISCONTINUED | OUTPATIENT
Start: 2025-02-20 | End: 2025-02-20 | Stop reason: HOSPADM

## 2025-02-20 RX ORDER — HYDROCODONE BITARTRATE AND ACETAMINOPHEN 7.5; 325 MG/1; MG/1
1 TABLET ORAL EVERY 4 HOURS PRN
Status: DISCONTINUED | OUTPATIENT
Start: 2025-02-20 | End: 2025-02-21

## 2025-02-20 RX ORDER — FENTANYL CITRATE 50 UG/ML
25 INJECTION, SOLUTION INTRAMUSCULAR; INTRAVENOUS
Status: DISCONTINUED | OUTPATIENT
Start: 2025-02-20 | End: 2025-02-21

## 2025-02-20 RX ORDER — ALBUTEROL SULFATE 0.83 MG/ML
2.5 SOLUTION RESPIRATORY (INHALATION) EVERY 4 HOURS PRN
Status: DISCONTINUED | OUTPATIENT
Start: 2025-02-20 | End: 2025-02-22

## 2025-02-20 RX ORDER — DOBUTAMINE HYDROCHLORIDE 100 MG/100ML
2-20 INJECTION INTRAVENOUS CONTINUOUS PRN
Status: DISCONTINUED | OUTPATIENT
Start: 2025-02-20 | End: 2025-02-21

## 2025-02-20 RX ORDER — AMIODARONE HYDROCHLORIDE 200 MG/1
200 TABLET ORAL EVERY 8 HOURS
Status: COMPLETED | OUTPATIENT
Start: 2025-02-21 | End: 2025-02-28

## 2025-02-20 RX ORDER — VANCOMYCIN HYDROCHLORIDE 1 G/20ML
INJECTION, POWDER, LYOPHILIZED, FOR SOLUTION INTRAVENOUS AS NEEDED
Status: DISCONTINUED | OUTPATIENT
Start: 2025-02-20 | End: 2025-02-20 | Stop reason: SURG

## 2025-02-20 RX ORDER — POTASSIUM CHLORIDE, DEXTROSE MONOHYDRATE 150; 5 MG/100ML; G/100ML
60 INJECTION, SOLUTION INTRAVENOUS CONTINUOUS
Status: DISCONTINUED | OUTPATIENT
Start: 2025-02-20 | End: 2025-02-20 | Stop reason: SDUPTHER

## 2025-02-20 RX ORDER — ESMOLOL HYDROCHLORIDE 10 MG/ML
INJECTION INTRAVENOUS AS NEEDED
Status: DISCONTINUED | OUTPATIENT
Start: 2025-02-20 | End: 2025-02-20 | Stop reason: SURG

## 2025-02-20 RX ORDER — CHLORHEXIDINE GLUCONATE ORAL RINSE 1.2 MG/ML
15 SOLUTION DENTAL EVERY 12 HOURS SCHEDULED
Status: DISCONTINUED | OUTPATIENT
Start: 2025-02-20 | End: 2025-02-22

## 2025-02-20 RX ORDER — COLCHICINE 0.6 MG/1
0.6 TABLET ORAL EVERY 12 HOURS SCHEDULED
Status: CANCELLED | OUTPATIENT
Start: 2025-02-21

## 2025-02-20 RX ORDER — POTASSIUM CHLORIDE, DEXTROSE MONOHYDRATE 150; 5 MG/100ML; G/100ML
30 INJECTION, SOLUTION INTRAVENOUS CONTINUOUS
Status: DISCONTINUED | OUTPATIENT
Start: 2025-02-20 | End: 2025-02-21

## 2025-02-20 RX ORDER — SODIUM CHLORIDE, SODIUM LACTATE, POTASSIUM CHLORIDE, CALCIUM CHLORIDE 600; 310; 30; 20 MG/100ML; MG/100ML; MG/100ML; MG/100ML
INJECTION, SOLUTION INTRAVENOUS CONTINUOUS PRN
Status: DISCONTINUED | OUTPATIENT
Start: 2025-02-20 | End: 2025-02-20 | Stop reason: SURG

## 2025-02-20 RX ORDER — ASPIRIN 81 MG/1
162 TABLET, CHEWABLE ORAL DAILY
Status: CANCELLED | OUTPATIENT
Start: 2025-02-21

## 2025-02-20 RX ORDER — MIDAZOLAM HYDROCHLORIDE 1 MG/ML
0.5 INJECTION, SOLUTION INTRAMUSCULAR; INTRAVENOUS
Status: DISCONTINUED | OUTPATIENT
Start: 2025-02-20 | End: 2025-02-20

## 2025-02-20 RX ORDER — AMIODARONE HYDROCHLORIDE 200 MG/1
200 TABLET ORAL ONCE
Status: COMPLETED | OUTPATIENT
Start: 2025-02-21 | End: 2025-02-21

## 2025-02-20 RX ADMIN — ACETAMINOPHEN 1000 MG: 10 INJECTION INTRAVENOUS at 14:30

## 2025-02-20 RX ADMIN — MAGNESIUM SULFATE IN WATER FOR 4 G: 40 INJECTION INTRAVENOUS at 15:33

## 2025-02-20 RX ADMIN — PROTAMINE SULFATE 50 MG: 10 INJECTION, SOLUTION INTRAVENOUS at 12:39

## 2025-02-20 RX ADMIN — VANCOMYCIN HYDROCHLORIDE 1.5 G: 1 INJECTION, POWDER, LYOPHILIZED, FOR SOLUTION INTRAVENOUS at 08:30

## 2025-02-20 RX ADMIN — MIDAZOLAM HYDROCHLORIDE 1 MG: 1 INJECTION, SOLUTION INTRAMUSCULAR; INTRAVENOUS at 07:46

## 2025-02-20 RX ADMIN — POTASSIUM CHLORIDE 20 MEQ: 400 INJECTION, SOLUTION INTRAVENOUS at 16:50

## 2025-02-20 RX ADMIN — CHLORHEXIDINE GLUCONATE 15 ML: 1.2 SOLUTION ORAL at 02:04

## 2025-02-20 RX ADMIN — PROPOFOL 25 MCG/KG/MIN: 10 INJECTION, EMULSION INTRAVENOUS at 13:00

## 2025-02-20 RX ADMIN — ROCURONIUM BROMIDE 100 MG: 10 INJECTION INTRAVENOUS at 07:46

## 2025-02-20 RX ADMIN — FENTANYL CITRATE 250 MCG: 0.05 INJECTION, SOLUTION INTRAMUSCULAR; INTRAVENOUS at 07:45

## 2025-02-20 RX ADMIN — PROPOFOL 50 MCG/KG/MIN: 10 INJECTION, EMULSION INTRAVENOUS at 15:34

## 2025-02-20 RX ADMIN — VECURONIUM BROMIDE 8 MG: 20 INJECTION, POWDER, LYOPHILIZED, FOR SOLUTION INTRAVENOUS at 11:09

## 2025-02-20 RX ADMIN — AMINOCAPROIC ACID 10 G: 250 INJECTION, SOLUTION INTRAVENOUS at 08:30

## 2025-02-20 RX ADMIN — FENTANYL CITRATE 250 MCG: 0.05 INJECTION, SOLUTION INTRAMUSCULAR; INTRAVENOUS at 12:01

## 2025-02-20 RX ADMIN — CEFAZOLIN SODIUM 2 G: 1 INJECTION, POWDER, FOR SOLUTION INTRAMUSCULAR; INTRAVENOUS at 12:30

## 2025-02-20 RX ADMIN — POTASSIUM CHLORIDE AND DEXTROSE MONOHYDRATE 30 ML/HR: 150; 5 INJECTION, SOLUTION INTRAVENOUS at 14:13

## 2025-02-20 RX ADMIN — SODIUM CHLORIDE, POTASSIUM CHLORIDE, SODIUM LACTATE AND CALCIUM CHLORIDE: 600; 310; 30; 20 INJECTION, SOLUTION INTRAVENOUS at 07:22

## 2025-02-20 RX ADMIN — MIDAZOLAM HYDROCHLORIDE 2 MG: 1 INJECTION, SOLUTION INTRAMUSCULAR; INTRAVENOUS at 12:01

## 2025-02-20 RX ADMIN — ROSUVASTATIN 40 MG: 20 TABLET, FILM COATED ORAL at 20:58

## 2025-02-20 RX ADMIN — ACETAMINOPHEN 1000 MG: 500 TABLET, FILM COATED ORAL at 21:09

## 2025-02-20 RX ADMIN — MIDAZOLAM HYDROCHLORIDE 2 MG: 1 INJECTION, SOLUTION INTRAMUSCULAR; INTRAVENOUS at 07:21

## 2025-02-20 RX ADMIN — NOREPINEPHRINE BITARTRATE 0.06 MCG/KG/MIN: 0.03 INJECTION, SOLUTION INTRAVENOUS at 23:30

## 2025-02-20 RX ADMIN — MORPHINE SULFATE 2 MG: 2 INJECTION, SOLUTION INTRAMUSCULAR; INTRAVENOUS at 20:58

## 2025-02-20 RX ADMIN — CHLORHEXIDINE GLUCONATE 15 ML: 1.2 SOLUTION ORAL at 20:58

## 2025-02-20 RX ADMIN — INSULIN HUMAN 2 UNITS/HR: 1 INJECTION, SOLUTION INTRAVENOUS at 09:53

## 2025-02-20 RX ADMIN — AMIODARONE HYDROCHLORIDE 0.5 MG/MIN: 1.8 INJECTION, SOLUTION INTRAVENOUS at 18:28

## 2025-02-20 RX ADMIN — MORPHINE SULFATE 2 MG: 2 INJECTION, SOLUTION INTRAMUSCULAR; INTRAVENOUS at 17:12

## 2025-02-20 RX ADMIN — ASPIRIN 325 MG: 325 TABLET ORAL at 14:30

## 2025-02-20 RX ADMIN — CALCIUM CHLORIDE 1 G: 100 INJECTION INTRAVENOUS; INTRAVENTRICULAR at 12:17

## 2025-02-20 RX ADMIN — NOREPINEPHRINE BITARTRATE 0.2 MCG/KG/MIN: 0.03 INJECTION, SOLUTION INTRAVENOUS at 12:00

## 2025-02-20 RX ADMIN — SODIUM CHLORIDE 2 G: 900 INJECTION INTRAVENOUS at 19:45

## 2025-02-20 RX ADMIN — FUROSEMIDE 40 MG: 10 INJECTION, SOLUTION INTRAMUSCULAR; INTRAVENOUS at 23:30

## 2025-02-20 RX ADMIN — HYDROCODONE BITARTRATE AND ACETAMINOPHEN 1 TABLET: 7.5; 325 TABLET ORAL at 17:24

## 2025-02-20 RX ADMIN — ESMOLOL HYDROCHLORIDE 10 MG: 10 INJECTION, SOLUTION INTRAVENOUS at 07:43

## 2025-02-20 RX ADMIN — MUPIROCIN 1 APPLICATION: 20 OINTMENT TOPICAL at 02:04

## 2025-02-20 RX ADMIN — GLYCOPYRROLATE 0.1 MCG: 0.2 INJECTION, SOLUTION INTRAMUSCULAR; INTRAVENOUS at 12:07

## 2025-02-20 RX ADMIN — CEFAZOLIN SODIUM 2 G: 1 INJECTION, POWDER, FOR SOLUTION INTRAMUSCULAR; INTRAVENOUS at 08:30

## 2025-02-20 RX ADMIN — PROTAMINE SULFATE 500 MG: 10 INJECTION, SOLUTION INTRAVENOUS at 12:16

## 2025-02-20 RX ADMIN — OXYCODONE HYDROCHLORIDE AND ACETAMINOPHEN 2 TABLET: 5; 325 TABLET ORAL at 15:28

## 2025-02-20 RX ADMIN — FENTANYL CITRATE 500 MCG: 0.05 INJECTION, SOLUTION INTRAMUSCULAR; INTRAVENOUS at 08:36

## 2025-02-20 RX ADMIN — ETOMIDATE INJECTION 22 MG: 2 SOLUTION INTRAVENOUS at 07:45

## 2025-02-20 RX ADMIN — OXYCODONE HYDROCHLORIDE 10 MG: 10 TABLET ORAL at 19:46

## 2025-02-20 RX ADMIN — AMIODARONE HYDROCHLORIDE 1 MG/MIN: 1.8 INJECTION, SOLUTION INTRAVENOUS at 03:46

## 2025-02-20 RX ADMIN — ACETAMINOPHEN 650 MG: 325 TABLET ORAL at 02:04

## 2025-02-20 RX ADMIN — MUPIROCIN 1 APPLICATION: 20 OINTMENT TOPICAL at 20:58

## 2025-02-20 RX ADMIN — ALBUMIN (HUMAN) 250 ML: 12.5 INJECTION, SOLUTION INTRAVENOUS at 14:13

## 2025-02-20 RX ADMIN — POTASSIUM CHLORIDE 20 MEQ: 400 INJECTION, SOLUTION INTRAVENOUS at 15:44

## 2025-02-20 RX ADMIN — ALBUMIN (HUMAN) 250 ML: 12.5 INJECTION, SOLUTION INTRAVENOUS at 19:51

## 2025-02-20 RX ADMIN — MORPHINE SULFATE 2 MG: 2 INJECTION, SOLUTION INTRAMUSCULAR; INTRAVENOUS at 19:46

## 2025-02-20 RX ADMIN — ONDANSETRON 4 MG: 2 INJECTION INTRAMUSCULAR; INTRAVENOUS at 21:08

## 2025-02-20 RX ADMIN — SENNOSIDES AND DOCUSATE SODIUM 2 TABLET: 50; 8.6 TABLET ORAL at 20:58

## 2025-02-20 NOTE — STS RISK SCORE
STS Risk Score    Procedure Type: Isolated CABG  Perioperative Outcome Estimate %  Operative Mortality 12.6%  Morbidity & Mortality 37.3%  Stroke 4.42%  Renal Failure 17.1%  Reoperation 4.98%  Prolonged Ventilation 27.3%  Deep Sternal Wound Infection 0.349%  Long Hospital Stay (>14 days) 26.5%  Short Hospital Stay (<6 days)* 11.1%      Clinical Summary  Planned Surgery: Isolated CABG, Urgent, First cardiovascular surgery  Demographics: 74 year old, White, male, 116kg, 175.3cm, BMI: 37.8 kg/m²  Lab Values: Creatinine: 1.49 mg/dL, Hematocrit: 38%, WBC Count: 10.4 10³/?L, Platelet Count: 891112 cells/?L  PreOp Medications: ADP Inhibitors <= 5 days, ADP Inhibitors Discontinuation: 1 days  Substance Abuse: Former smoker, Alcohol use: Unknown  Risk Factors / Comorbidities: Hypertension  Pulmonary RF: Severe CLD  Vascular RF: Cerebrovascular Disease: CVA > 30 days  Cardiac Status: Acute heart failure, Ejection Fraction = 44%  Coronary Artery Disease: 3 vessels diseased, STEMI, MI: 1 to 7 Days  Valve Disease: Aortic Stenosis, Moderate MR  Prev. Cardiac Interv: Previous PCI: At this facility > 6 hours

## 2025-02-20 NOTE — PROGRESS NOTES
Critical Care Note     LOS: 2 days   Patient Care Team:  Leslie Rhodes MD as PCP - General (Family Medicine)  Delmer Perdomo MD as Cardiologist (Cardiology)    Chief Complaint/Reason for visit:      STEMI, 100% LAD status post balloon angioplasty, 90% proximal circumflex, 99% mid RCA    Acute systolic heart failure  PAF    S/pCABGX3,LAAL,MAZE 2/20    Chronic kidney disease stage IIIb  Diabetes mellitus type 2  Hypertension        Subjective     Interval History:     Patient underwent CABG x 3, left atrial appendage ligation, maze today.  He did  receive blood products, I suspect Cell Saver 890 mL.  He was placed on amiodarone for some paroxysmal atrial fibrillation in the OR.  Radial artery was explored but was not usable.  He is currently on norepinephrine 0.06 mics per kilogram per minute, amiodarone 1 mg/min, insulin 2.3 units/h, propofol.  He is maintaining sinus bradycardia.  Current ventilator settings rate 12 tidal volume 680 PEEP of 5 and 50%.  On a rate of 14 and 100%, pH 7.46, pCO2 32, pO2 226.  Urine output today 930 mL.  Chest tube output 15 mL.    Review of Systems:    All systems were reviewed and negative except as noted in subjective.      Medical history, surgical history, social history, family history reviewed    Objective     Intake/Output:    Intake/Output Summary (Last 24 hours) at 2/20/2025 1444  Last data filed at 2/20/2025 1330  Gross per 24 hour   Intake 3496 ml   Output 1905 ml   Net 1591 ml       Nutrition:  NPO Diet NPO Type: Strict NPO    Infusions:  amiodarone, 0.5 mg/min, Last Rate: 1 mg/min (02/20/25 1321)  dexmedetomidine, 0.2-1.5 mcg/kg/hr  dextrose 5 % with KCl 20 mEq, 30 mL/hr, Last Rate: 30 mL/hr (02/20/25 1413)  DOBUTamine, 2-20 mcg/kg/min  DOPamine, 2-20 mcg/kg/min  EPINEPHrine, 0.02-0.3 mcg/kg/min  insulin, 0-100 Units/hr, Last Rate: 2.3 Units/hr (02/20/25 1414)  [START ON 2/21/2025] lactated ringers, 9 mL/hr  niCARdipine, 5-15 mg/hr  nitroglycerin, 5-200  "mcg/min, Last Rate: 50 mcg/min (02/20/25 0434)  norepinephrine, 0.02-0.3 mcg/kg/min, Last Rate: 0.01 mcg/kg/min (02/20/25 1350)  phenylephrine, 0.5-3 mcg/kg/min  propofol, 5-50 mcg/kg/min        Mechanical Ventilator Settings:            Resp Rate (Set): 12  Pressure Support (cm H2O): 10 cm H20  FiO2 (%): (S) 50 %  PEEP/CPAP (cm H2O): 5 cm H20    Minute Ventilation (L/min) (Obs): 8.73 L/min  Resp Rate (Observed) Vent: 12  I:E Ratio (Set): 1:2.50  I:E Ratio (Obs): 1:2.5    PIP Observed (cm H2O): 21 cm H2O       Telemetry: Sinus rhythm             Vital Signs  Blood pressure 98/45, pulse 64, temperature 100.4 °F (38 °C), temperature source Bladder, resp. rate 14, height 175.3 cm (69\"), weight 116 kg (255 lb), SpO2 100%.    Physical Exam:  General Appearance:  Overweight older gentleman supine in bed sedated   Head:  Atraumatic   Eyes:          Pupils small, equal, reactive.  Conjunctiva pink   Ears:     Throat: Orally intubated   Neck: Trachea midline, right IJ line   Back:      Lungs:   Sternotomy incision intact. Mediastinal tube and left pleural drain  Symmetric chest expansion without wheeze or rhonchi    Heart:  Regular rhythm, slow rate , S1, S2 auscultated, systolic murmur   Abdomen:   Protuberant, bowel sounds hypoactive, soft   Rectal:   Deferred   Extremities: Trace pretibial edema, left greater than right.  Right brachial arterial line.  Right wrist cath site with some ecchymosis.  Right upper extremity edema.  Left wrist sutures in place.  Right lower extremity Ace wrap   Pulses:    Skin: Cool and dry   Lymph nodes: No cervical adenopathy   Neurologic: Sedated      Results Review:     I reviewed the patient's new clinical results.   Results from last 7 days   Lab Units 02/20/25  1354 02/20/25  0345 02/19/25  0519 02/18/25  2120 02/18/25  1734   SODIUM mmol/L 139 138 144   < > 141   POTASSIUM mmol/L 3.8 3.2* 4.0   < > 3.7   CHLORIDE mmol/L 104 98 105   < > 105   CO2 mmol/L 22.0 23.0 29.0   < > 25.0   BUN " "mg/dL 24* 24* 22   < > 23   CREATININE mg/dL 1.40* 1.49* 1.50*   < > 1.43*   CALCIUM mg/dL 8.4* 8.1* 8.5*   < > 8.6   BILIRUBIN mg/dL  --   --   --   --  0.6   ALK PHOS U/L  --   --   --   --  100   ALT (SGPT) U/L  --   --   --   --  61*   AST (SGOT) U/L  --   --   --   --  276*   GLUCOSE mg/dL 178* 162* 178*   < > 181*    < > = values in this interval not displayed.     Results from last 7 days   Lab Units 02/20/25  1354 02/20/25  1234 02/20/25  1158 02/20/25  1101 02/20/25  0345 02/19/25  0519   WBC 10*3/mm3 16.25*  --   --   --  10.04 9.25   HEMOGLOBIN g/dL 12.7*  --   --   --  13.1 14.7   HEMOGLOBIN, POC g/dL  --  9.2* 10.2*   < >  --   --    HEMATOCRIT % 36.9*  --   --   --  38.0 44.0   HEMATOCRIT POC %  --  27* 30*   < >  --   --    PLATELETS 10*3/mm3 136*  --   --   --  173 176    < > = values in this interval not displayed.     Results from last 7 days   Lab Units 02/20/25  1416   PH, ARTERIAL pH units 7.465*   PO2 ART mm Hg 226.0*   PCO2, ARTERIAL mm Hg 32.6*   HCO3 ART mmol/L 23.4     Lab Results   Component Value Date    BLOODCX No growth at 5 days 05/03/2024     No results found for: \"URINECX\"    I reviewed the patient's new imaging including images and reports.    Chest x-ray after surgery reveals sternotomy wires, left atrial appendage ligation, endotracheal tube above the [place], right IJ line in the superior vena cava, no pneumothorax, minimal basilar atelectasis, small left effusion    Echocardiogram Comments: Intraoperative UBALDO postsurgery       Informed consent was obtained preoperatively.  Ubaldo probe passed without difficulty.Post CABG:  EF 45% on inotropes.  MR alen\ains mild.    Impression: CT chest  1. Cardiomegaly with minimal interstitial pulmonary edema.   2. Mild emphysema. Emphysema on CT is an independent risk factor for lung cancer. Low dose lung cancer screening should be considered if patient qualifies based on smoking history or if not already enrolled in a screening program.   3. " Aortic valve leaflet calcifications with fusiform aneurysmal dilation of the ascending thoracic aorta measuring up to 4.5 cm. Severe coronary disease.   4. Trace pericardial fluid containing intrinsic high density, hemopericardium is possible.   5. Bilateral striated nephrogram suspicious for possible acute tubular necrosis or pyelonephritis. Correlate with urinalysis.   6. Small sliding hiatal hernia with fluid noted to the level of the upper third thoracic esophagus. Findings suggest gastroesophageal reflux.         Electronically Signed: Delmer Benítez MD    2/18/2025 10:00 PM EST     Conclusion left heart catheterization February 18, 2025         Mid % occlusion (mixed thrombotic/calcific/fibrotic stenosis), status post balloon angioplasty with a 2.5 NC balloon at nominal pressure.    Mid circumflex 90%, mid RCA 99% subtotal occlusion with left-to-right collaterals    Discussed with CT surgery, given patient' coronary anatomy, LV systolic dysfunction and diabetic status, CABG is ideal.    Cangrelor started during the intervention and will be continued until time of surgery    IV Lasix for diuresis along with IV nitroglycerin    LVEF 35 to 40% with severe distal anterior, apical and distal inferior hypokinesis    Killip class III acute systolic heart failure present on arrival.  With LVEDP 37 mmHg    LV pressures (S/D/E) : 127/26/37 mmHg    Moderate systolic dysfunction.    There is moderate left ventricular systolic dysfunction.       All medications reviewed.   acetaminophen, 1,000 mg, Intravenous, Once  acetaminophen, 1,000 mg, Oral, Q8H  [START ON 2/21/2025] amiodarone, 200 mg, Oral, Once   Followed by  [START ON 2/21/2025] amiodarone, 200 mg, Oral, Q8H   Followed by  [START ON 2/28/2025] amiodarone, 200 mg, Oral, Q12H   Followed by  [START ON 3/14/2025] amiodarone, 200 mg, Oral, Daily  [START ON 2/21/2025] aspirin, 325 mg, Oral, Daily  ceFAZolin, 2 g, Intravenous, Q8H  chlorhexidine, 15 mL,  Mouth/Throat, Q12H  [START ON 2/21/2025] metoprolol tartrate, 12.5 mg, Oral, Q12H  metoprolol tartrate, 2.5 mg, Intravenous, Q6H  mupirocin, 1 Application, Each Nare, BID  pharmacy consult - MTM, , Not Applicable, Daily  protamine, 50 mg, Intravenous, Once  rosuvastatin, 40 mg, Oral, Nightly  senna-docusate sodium, 2 tablet, Oral, BID  sodium chloride, 10 mL, Intravenous, Q12H          Assessment & Plan       STEMI involving left anterior descending coronary artery    CVA (cerebral vascular accident)    Essential hypertension    Coronary artery disease involving native coronary artery of native heart without angina pectoris    Morbid obesity with BMI of 40.0-44.9, adult    Sleep apnea    PAF (paroxysmal atrial fibrillation)    Type 2 diabetes mellitus    Acute systolic CHF (congestive heart failure)    Coronary artery disease involving native coronary artery of native heart with unstable angina pectoris      74-year-old gentleman, remote smoker (quit 25 years), with hypertension, hypothyroid, dyslipidemia, diabetes, obesity, subarachnoid hemorrhage May 2024, 2 previous strokes without sequelae, paroxysmal atrial fibrillation presenting 2/18 with chest pain and ST elevation.  Heart catheterization revealed severe 3 vessel disease with reduced EF 35 to 40%.  100% mid LAD lesion balloon angioplastied.    2/20 he underwent CABG x 3, left atrial appendage ligation, maze.  Intraoperative JESSY revealed EF 45%, mild MR postprocedure.  He did have some burst of atrial fibrillation and was bolused with amiodarone and placed on a drip.  He is now in the ICU on norepinephrine 0.06 mics per kilogram per minute, with amiodarone 1 mg/min.  He is in sinus rhythm with a rate of 58.  His cardiac output is 5.5, cardiac index 2.4.  He has scant serosanguineous drainage.  Postoperative hemoglobin is 12.7.  Blood products were given in surgery I suspect Cell Saver but cannot tell from [facility].    He does have some mild underlying emphysema.   He had a preoperative CT scan that revealed minimal emphysema and some interstitial edema.  He did undergo diuresis.  Current ventilator settings rate 12 tidal volume 680 PEEP of 5 pressure support 10 and 50%.  He does not have wheeze or rhonchi on examination.    His A1c is 6.3.  He is currently on insulin 2.3 units/h.    Preoperative creatinine was 1.45-1.5.  Postoperatively initial creatinine is 1.4.      PLAN:    Continue amiodarone for atrial fibrillation  Monitor electrolytes and replace  Monitor H&H, mediastinal chest tube output    Anticipate that he will wean per protocol, possibly needing BiPAP post extubation    Aspirin, statin, beta-blocker  With decreased EF, he might benefit from changing from metoprolol to carvedilol.  Will wait until he is off vasopressors and extubated.    Insulin drip per protocol        VTE Prophylaxis: foot pumps    Stress Ulcer Prophylaxis: none    Rani Lilly MD  02/20/25  14:44 EST      Time: Critical care 30 min  I personally provided care to this critically ill patient as documented above.  Critical care time does not include time spent on separately billed procedures.  None of my critical care time was concurrent with other critical care providers.

## 2025-02-20 NOTE — PROGRESS NOTES
[facility]  INPATIENT PROGRESS NOTE         [facility]    2/20/2025      PATIENT IDENTIFICATION:   Name:  Camron Hendrix      MRN:  [id number]     74 y.o.  male             Reason for visit: CAD, acute systolic heart failure, paroxysmal atrial fibrillation, hypertension, hyperlipidemia      SUBJECTIVE:   Intubated and sedated, came out of surgery around 1:20 PM 2/20, discussed with bedside nurse, on low-dose Levophed currently as well as amiodarone due to off-and-on A-fib with RVR which occurred prior to surgery.  Adequate urine output currently.  Weaning sedation currently.  Cardiac index 2.3     OBJECTIVE:  Vitals:    02/20/25 1515 02/20/25 1530 02/20/25 1551 02/20/25 1600   BP:   93/64 90/58   BP Location:    Left arm   Patient Position:    Lying   Pulse: 58 57 57 56   Resp:       Temp:    96.8 °F (36 °C)   TempSrc:    Bladder   SpO2: 100% 100% 99% 100%   Weight:       Height:         FiO2 (%): (S) 40 %     Body mass index is 37.66 kg/m².    Intake/Output Summary (Last 24 hours) at 2/20/2025 1634  Last data filed at 2/20/2025 1600  Gross per 24 hour   Intake 4246 ml   Output 2250 ml   Net 1996 ml       Telemetry: Personally reviewed, normal sinus rhythm, no arrhythmia     Exam:  General Appearance:   Intubated and sedated, no acute distress  Neck:  thyroid not enlarged  supple  Respiratory:  no respiratory distress  normal breath sounds  no rales  Cardiovascular:  no jugular venous distention  regular rhythm  apical impulse normal  S1 normal, S2 normal  no S3, no S4   no murmur  no rub, no thrill  carotid pulses normal; no bruit  pedal pulses normal  lower extremity edema: 1+  Skin:   warm, dry      Allergies   Allergen Reactions    Codeine Hallucinations     Scheduled meds:       acetaminophen, 1,000 mg, Oral, Q8H  [START ON 2/21/2025] amiodarone, 200 mg, Oral, Once   Followed by  [START ON 2/21/2025] amiodarone, 200 mg, Oral, Q8H   Followed by  [START ON  2/28/2025] amiodarone, 200 mg, Oral, Q12H   Followed by  [START ON 3/14/2025] amiodarone, 200 mg, Oral, Daily  [START ON 2/21/2025] aspirin, 325 mg, Oral, Daily  ceFAZolin, 2 g, Intravenous, Q8H  chlorhexidine, 15 mL, Mouth/Throat, Q12H  magnesium sulfate, 4 g, Intravenous, Once  [START ON 2/21/2025] metoprolol tartrate, 12.5 mg, Oral, Q12H  metoprolol tartrate, 2.5 mg, Intravenous, Q6H  mupirocin, 1 Application, Each Nare, BID  pharmacy consult - MTM, , Not Applicable, Daily  potassium chloride, 20 mEq, Intravenous, Q1H  protamine, 50 mg, Intravenous, Once  rosuvastatin, 40 mg, Oral, Nightly  senna-docusate sodium, 2 tablet, Oral, BID  sodium chloride, 10 mL, Intravenous, Q12H      IV meds:                      amiodarone, 0.5 mg/min, Last Rate: 0.5 mg/min (02/20/25 1500)  dexmedetomidine, 0.2-1.5 mcg/kg/hr  dextrose 5 % with KCl 20 mEq, 30 mL/hr, Last Rate: 30 mL/hr (02/20/25 1413)  DOBUTamine, 2-20 mcg/kg/min  DOPamine, 2-20 mcg/kg/min  EPINEPHrine, 0.02-0.3 mcg/kg/min  insulin, 0-100 Units/hr, Last Rate: 2.3 Units/hr (02/20/25 1414)  [START ON 2/21/2025] lactated ringers, 9 mL/hr  niCARdipine, 5-15 mg/hr  nitroglycerin, 5-200 mcg/min, Last Rate: 50 mcg/min (02/20/25 0434)  norepinephrine, 0.02-0.3 mcg/kg/min, Last Rate: 0.08 mcg/kg/min (02/20/25 1600)  phenylephrine, 0.5-3 mcg/kg/min  propofol, 5-50 mcg/kg/min, Last Rate: Stopped (02/20/25 1600)      Data Review:  Results from last 7 days   Lab Units 02/20/25  1354 02/20/25  0345 02/19/25  0519 02/18/25  2120   SODIUM mmol/L 139 138 144 140   BUN mg/dL 24* 24* 22 24*   CREATININE mg/dL 1.40* 1.49* 1.50* 1.62*   GLUCOSE mg/dL 178* 162* 178* 180*     Results from last 7 days   Lab Units 02/20/25  1354 02/20/25  1234 02/20/25  1158 02/20/25  1125 02/20/25  1101 02/20/25  0345 02/19/25  0519 02/18/25  1734   WBC 10*3/mm3 16.25*  --   --   --   --  10.04 9.25 9.80   HEMOGLOBIN g/dL 12.7*  --   --   --   --  13.1 14.7 14.9   HEMOGLOBIN, POC g/dL  --  9.2* 10.2* 9.2*  "8.8*  --   --   --      Results from last 7 days   Lab Units 02/20/25  1354 02/18/25  1734   INR  1.73* 1.34*     Results from last 7 days   Lab Units 02/18/25  1734   ALT (SGPT) U/L 61*   AST (SGOT) U/L 276*     No results found for: \"DIGOXIN\"   Lab Results   Component Value Date    TSH 1.770 04/24/2024     Results from last 7 days   Lab Units 02/19/25  0519   CHOLESTEROL mg/dL 105   HDL CHOL mg/dL 38*       Estimated Creatinine Clearance: 59 mL/min (A) (by C-G formula based on SCr of 1.4 mg/dL (H)).        Imaging (last 24 hr):   I personally reviewed the most recent chest x-ray and other pertinent imaging studies.  Results for orders placed during the hospital encounter of 02/18/25    XR Chest 1 View    Narrative  XR CHEST 1 VW    Date of Exam: 2/20/2025 1:41 PM EST    Indication: Post-Op Check Line & Tube Placement    Comparison: 2/19/2025    Findings:  The patient is undergone CABG and left atrial ligation. Endotracheal tube is in good position above the [place]. Nasoenteric tube passes into the stomach. Cardiac size is stable. There is mild left basilar atelectasis. The lungs are otherwise clear. There  is no pneumothorax seen.    Impression  Impression:  1.Status post CABG and left atrial ligation.  2.Mild left basilar atelectasis.  3.Tubes and lines in good position        Electronically Signed: Kirill Velasco MD  2/20/2025 2:00 PM EST  Workstation ID: [id number]        Last ECHO:  Results for orders placed during the hospital encounter of 02/18/25    Adult Transthoracic Echo Complete W/ Cont if Necessary Per Protocol    Interpretation Summary    Left ventricular systolic function is mildly decreased. Calculated left ventricular EF = 44% Left ventricular ejection fraction appears to be 41 - 45%.    Left ventricular wall thickness is consistent with moderate concentric hypertrophy.    The following left ventricular wall segments are akinetic: mid anterior, apical anterior, apical septal and apex. C/w with LAD " territory infarct.    Left ventricular diastolic function is consistent with (grade Ia w/high LAP) impaired relaxation.    Mild aortic valve stenosis is present.    Peak velocity of the flow distal to the aortic valve is 277 cm/s. Aortic valve mean pressure gradient is 14 mmHg.    The aortic root measures 4.4 cm. The aortic root (corrected for BSA) measures 1.9 cm. Mild dilation of the ascending aorta is present.    Mild to moderate mitral valve regurgitation.    Depressed ejection fraction and wall motion abnormalities are new compared to 4/2024; mild aortic stenosis is unchanged.        PROBLEM LIST:     STEMI involving left anterior descending coronary artery    CVA (cerebral vascular accident)    Essential hypertension    Coronary artery disease involving native coronary artery of native heart without angina pectoris    Morbid obesity with BMI of 40.0-44.9, adult    Sleep apnea    PAF (paroxysmal atrial fibrillation)    Type 2 diabetes mellitus    Acute systolic CHF (congestive heart failure)    Coronary artery disease involving native coronary artery of native heart with unstable angina pectoris        Initial cardiac assessment: 74-year-old gentleman presenting with anterior STEMI status post balloon angioplasty of the LAD but with residual multivessel disease depressed ejection fraction acute systolic heart failure status post 3V CABG with Dr. Borjas 2/20/2025.  Status post left atrial appendage ligation and maze procedure as well.    ASSESSMENT/PLAN:  1.  Anterior STEMI with multivessel CAD EF 45%:  Status post 3V CABG (LIMA-LAD, SVG-OM, SVG-RCA)  On low-dose Levophed currently, continue to support blood pressure  Cardiac index 2.3 which is acceptable  Holding metoprolol while on Levophed  Adequate urine output  Hopeful for extubation today    2.  Acute systolic heart failure:  Adequate diuresis over past 24 hours, continuing to maintain net negative fluid balance  Currently cardiac output is acceptable with  cardiac index of 2.3  Continue Levophed to support blood pressure  Monitor urine output closely agree with albumin    3.  Paroxysmal atrial fibrillation present on admission  Appreciate left atrial Penders ligation and maze procedure during bypass surgery  Currently on amiodarone 0.5 mg/min  Discussed with bedside nurse, may rebolus and increase to 1 mg/min if he has sustained A-fib greater than 30 minutes, or with hemodynamic compromise  No anticoagulation necessary due to left atrial appendage ligation at time of CABG    4.  History of hypertension:  Holding antihypertensives currently while on vasopressors  Will adjust home medications and optimize for GDMT for systolic heart failure    5.  Mixed hyperlipidemia:  Goal LDL less than 50, currently at goal on high-dose rosuvastatin 40 mg daily continue for now.    The patient remains critically ill in the ICU currently.    Discussed with patient's nurse      Carlo Barragan MD  2/20/2025    16:34 EST

## 2025-02-20 NOTE — OP NOTE
DATE OF PROCEDURE: 02/20/25     PREOPERATIVE DIAGNOSES:  1. CAD  2. STEMI  3. Ischemic cardiomyopathy  4. Prior CVA  5. AFib  6. AAA  7. HTN  8. Hypothyroidism  9. Obesity  10. CKD  11. DM, type II     POSTOPERATIVE DIAGNOSES:    same     PROCEDURES PERFORMED:    1.  Urgent coronary artery bypass grafting x 3 with left internal mammary artery to LAD, reverse saphenous vein graft to obtuse marginal branch, and Rocephin screening graft to right posterior descending artery  2.  Modified maze procedure  3.  Left atrial appendage ligation with 50 mm clip  4.  Endoscopic harvest left greater saphenous vein  5.  Transesophageal echocardiography    SURGEON: JONNY Borjas MD       ASSISTANTS:    Assistant: Reed Sahni PA; Blade Stapleton PA  was responsible for performing the following activities: Closing, Placing Dressing, and Harvesting of Vessels and their skilled assistance was necessary for the success of this case.    Circulator: Zelalem Jefferson RN; Aysha Winter RN  Perfusionist: Abram Lema  Scrub Person: David Garcia; Tristan Cancino  Nursing Assistant: Briana Tyler; Emilie Chavez PCT; Urvashi Payton  Assistant: Reed Sahni PA; Blade Stapleton PA      ANESTHESIA: General endotracheal anesthesia      ESTIMATED BLOOD LOSS: 250 mL.       CROSSCLAMP TIME: 80 minutes       TOTAL CARDIOPULMONARY BYPASS TIME: 114 minutes      INDICATIONS: Mr. Hendrix presented with chest pain, EKG consistent with STEMI, taken emergently to Cath Lab for angiogram revealing occluded LAD which was opened with balloon angioplasty, had significant multivessel disease including proximal circumflex and RCA disease, therefore he was referred for consideration of coronary artery bypass grafting.  The patient was felt to be a reasonable candidate for surgical revascularization. The risks and benefits of surgery were discussed with the patient including pain, bleeding, infection, renal failure, stroke and death along with  the STS risk score. The patient understood these risks and wished to proceed with surgery.      DESCRIPTION OF PROCEDURE: The patient was taken to the operating room and placed under general endotracheal anesthesia. A central line, radial arterial line, and Murphy catheter were placed intraoperatively. The patient was prepped and draped in the usual sterile fashion and a timeout was performed verifying the patient's name, procedure, consent, beta blockade administration and antibiotics.  The JESSY probe was placed and findings were consistent of those during preop showing moderately depressed LV function with severe anterior and apical hypokinesis as well as inferior wall hypokinesis, there was mild to moderate mitral regurgitation, mild aortic stenosis without significant aortic insufficiency A median sternotomy incision was made and electrocautery was utilized to gain access to the sternum. A midline sternotomy was performed after lung desufflation and hemostasis was achieved with electrocautery.  The left internal mammary artery was harvested as a skeletonized graft from its bifurcation back to its origin.  Simultaneously, the left radial artery and left greater saphenous vein were harvested endoscopically by the physicians assistant, however the left radial artery harvest was aborted due to poor plethysmography with compression of the artery.  After being transected distally, the LIMA was injected with papaverine, ligated at the distal end with a bulldog clamp, and wrapped in 5 and soaked sponge.  The thymic tissues were divided electrocautery and the pericardium was opened in an inverted T fashion.  After systemic heparinization, a 21 [place] aortic cannula and 29/37 [place] dual stage venous cannula were inserted, secured, connected to the cardiopulmonary bypass pump.  After ensuring therapeutic ACT, cardiopulmonary bypass was initiated and brought up to full flows, the patient was allowed to drift to 34 °C but not  actively cooled.  The harvested conduit was inspected and noted to be of good quality for bypass graft creation, and distal targets were verified.  The transverse and oblique sinuses were then dissected free utilizing electrocautery and blunt dissection, then the Atricure Encompass RF clamp was passed through the sinuses, then multiple ablation lines created to affect bilateral pulmonary vein isolation as well as the left atrial roof and floor lesions.  An antegrade cardioplegia needle was then secured in the ascending aorta, then the ear cross-clamp was applied and 1 L of cold antegrade [place] cardioplegia solution was administered, followed by quick diastolic arrest.  Topical myocardial cooling was also instituted with an ice slurry.  Attention was turned to the inferior wall, the RCA was identified and noted be diffusely calcified, the PDA was also identified and was noted to be heavily calcified in its proximal course however in the midportion there was a soft spot adequate to accommodate bypass grafting and an arteriotomy was made there.  The saphenous vein graft was brought in the field and spatulated, then a distal end to side anastomosis was constructed with 7-0 Prolene suture in a running fashion.  An additional 100 mL of cardioplegia was administered via the graft, which showed a flow of 70 mL/min at a pressure of 130 mmHg.  The graft was then sized to an appropriate length to the ascending aorta by briefly filling the heart, the proximal end was trimmed and spatulated, a stab aortotomy was made with an 11 blade large with a 4.5 mm aortic punch, then a proximal and distal anastomosis was constructed with 6-0 Prolene suture in a running fashion.  Tissue was then turned the lateral wall, the obtuse marginal branch was identified and was noted to have an adequate target for bypass grafting its proximal to midportion and arteriotomy was made there.  The saphenous vein graft was spatulated, and a distal at  this anastomosis was constructed with several Prolene suture in a running fashion.  An additional 100 mL of cardioplegia was administered via the graft, which showed a flow of 60 mL/min pressure of the heart at 30 mmHg.  The left atrial appendage was inspected and measured to accommodate a 50 mm clip across its base, which is then brought into the field and applied.  The ligament of Tyrone was taken down with electrocautery.  A left posterior inferior pericardiotomy was performed with electrocautery several centimeters medial to the left phrenic nerve from distal to the level of the inferior pulmonary vein down to the diaphragm.  [place] was turned to the anterior wall, there was significant ecchymosis along the anterior wall consistent with his recent LAD STEMI, the first diagonal branch was too small to accommodate bypass grafting, the vessel was noted to be heavily calcified in its proximal and midportion, however the mid to distal portion there was adequate soft spot to accommodate bypass grafting and arteriotomy was made there.  The LIMA graft was brought into the field, trimmed and spatulated, then the distal end this anastomosis was constructed with 8-0 Prolene suture in a running fashion.  The bulldog clamp was released and the graft, which showed good flow and hemostasis.  The OM graft was similarly sized to the ascending aorta by briefly filling the heart, the proximal end was trimmed and spatulated, stable aortotomy made with 11 blade and a large with a 4.5 mm aortic punch, then a proximal end-to-side anastomosis was constructed with 6-0 Prolene suture in a running fashion.  The patient was then placed in the headdown position, standard de-airing maneuvers were performed, and with the aortic root vent on high, the aortic cross-clamp was released.  The heart slowly regained a normal sinus rhythm, an episode of fibrillation required at rate cardioversion, ventricular pacing wires were placed and tested for  appropriate capture.  His cardiopulmonary bypass flows were weaned, the heart function was observed on JESSY, LV function remained moderately depressed with moderate LVH, anterior wall contractility was improved, lateral wall relatively unchanged, however the apex and inferior wall remained hypokinetic, the mitral regurgitation remained mild, there is also mild aortic stenosis without significant insufficiency, there was no residual left atrial appendage flow indicating complete occlusion by the clip, there was no LV thrombus.  After the absence of intracardiac air was confirmed, the root vent was removed.  After fully  from cardiopulmonary bypass, the venous and aortic cannulas were sequentially removed, protamine was administered.  Mediastinal hemostasis was ensured, then mediastinal and left pleural drains were placed and connected to Pleur-evac suction.  The sternum was then reapproximated with 8 stainless steel wires in a butterfly fashion.  The fascia, deep dermal layer, and subcuticular layer were then closed in several running layers with absorbable suture.  The left upper extremity aborted harvest site and left lower extremity conduit harvest sites had previously been closed in a similar fashion.  A Prevena incisional wound VAC system was applied to the sternal incision, and sterile dressings to the chest tube sites.  The procedure was then terminated the drapes taken down.  At the conclusion of the case all sponge, needle and instrument counts were correct x 2.  The patient was then transferred to the [facility] and transferred to the ICU in stable condition.    Complications: none    Specimen: none    Implants: Atricure 50mm ProV2 clip    Drains: mediastinal 28Fr arik, left pleural 19Fr arik    Dispo: stable to ICU

## 2025-02-20 NOTE — ANESTHESIA PROCEDURE NOTES
Intra-Op Anesthesia JESSY    Procedure Performed: Intra-Op Anesthesia JESSY       Start Time:        End Time:      Preanesthesia Checklist:  Patient identified, IV assessed, risks and benefits discussed, monitors and equipment assessed, procedure being performed at surgeon's request and anesthesia consent obtained.    General Procedure Information  Diagnostic Indications for Echo:  assessment of ascending aorta, assessment of surgical repair and hemodynamic monitoring  Location performed:  OR  Intubated  Bite block not placed  Heart visualized  Probe Insertion:  Easy  Probe Type:  Multiplane  Modalities:  Color flow mapping, continuous wave Doppler and pulse wave Doppler    Echocardiographic and Doppler Measurements    Ventricles    Right Ventricle:  Hypertrophy not present.  Thrombus not present.  Global function normal.    Left Ventricle:  Cavity size normal.  Thrombus not present.  Global Function moderately impaired.  Ejection Fraction 36%.          Valves    Aortic Valve:  Annulus normal.  Stenosis moderate.  Area: 1.2 cm².  Mean Gradient: 4 mmHg.  Regurgitation absent.  Leaflets normal.  Leaflet motions restricted.      Mitral Valve:  Annulus normal.  Stenosis not present.  Area: 3.6 cm².  Mean Gradient: 1 mmHg.  Regurgitation mild.  Leaflets normal.  Leaflet motions normal.      Tricuspid Valve:  Annulus normal.  Regurgitation mild.  Leaflets normal.  Leaflet motions normal.    Pulmonic Valve:  Annulus normal.        Aorta    Ascending Aorta:  Size normal.  Dissection not present.  Plaque thickness less than 3 mm.  Mobile plaque not present.    Aortic Arch:  Size normal.  Dissection not present.  Plaque thickness less than 3 mm.  Mobile plaque not present.    Descending Aorta:  Size normal.  Dissection not present.  Plaque thickness less than 3 mm.  Mobile plaque not present.        Atria    Right Atrium:  Size normal.  Spontaneous echo contrast not present.  Thrombus not present.  Tumor not present.  Device  present.    Left Atrium:  Size normal.  Spontaneous echo contrast not present.  Thrombus not present.  Tumor not present.  Left atrial appendage normal.      Septa        Ventricular Septum:  Intra-ventricular septum morphology normal.      Diastolic Function Measurements:  Diastolic Dysfunction Grade=  E=  ms  A=  ms  E/A Ratio=  0.6  DT=  ms  S/D=   IVRT=    Other Findings  Pericardium:  pericardial effusion  Pleural Effusion:  none  Pulmonary Arteries:  normal  Pulmonary Venous Flow:  normal    Anesthesia Information  Performed Personally  Anesthesiologist:  Isra Colon MD      Echocardiogram Comments:       Informed consent was obtained preoperatively.  Ubaldo probe passed without difficulty.Post CABG:  EF 45% on inotropes.  MR alen\ains mild.

## 2025-02-20 NOTE — ANESTHESIA PROCEDURE NOTES
Central Line      Patient reassessed immediately prior to procedure    Patient location during procedure: OR  Indications: vascular access  Preanesthetic Checklist  Completed: patient identified, IV checked, site marked, risks and benefits discussed, surgical consent, monitors and equipment checked, pre-op evaluation and timeout performed  Central Line Prep  Sterile Tech:cap, gloves, gown, mask and sterile barriers  Prep: chloraprep  Patient monitoring: blood pressure monitoring, continuous pulse oximetry and EKG  Central Line Procedure  Laterality:right  Location:internal jugular  Catheter Type:Cordis  Catheter Size:9 Fr  Guidance:ultrasound guided  PROCEDURE NOTE/ULTRASOUND INTERPRETATION.  Using ultrasound guidance the potential vascular sites for insertion of the catheter were visualized to determine the patency of the vessel to be used for vascular access.  After selecting the appropriate site for insertion, the needle was visualized under ultrasound being inserted into the internal jugular vein, followed by ultrasound confirmation of wire and catheter placement. There were no abnormalities seen on ultrasound; an image was taken; and the patient tolerated the procedure with no complications. Images: still images obtained, printed/placed on chart  Assessment  Post procedure:biopatch applied, line sutured, occlusive dressing applied and secured with tape  Assessement:blood return through all ports, free fluid flow, chest x-ray ordered and Salazar Test  Complications:no  Patient Tolerance:patient tolerated the procedure well with no apparent complications             Problem: Patient Care Overview (Adult)  Goal: Plan of Care Review  Outcome: Ongoing (interventions implemented as appropriate)    06/10/17 5725   Coping/Psychosocial Response Interventions   Plan Of Care Reviewed With patient   Patient Care Overview   Progress improving   Outcome Evaluation   Outcome Summary/Follow up Plan pt controlled with PO pain meds, pt received 2 units PRBCs today       Goal: Adult Individualization and Mutuality  Outcome: Ongoing (interventions implemented as appropriate)  Goal: Discharge Needs Assessment  Outcome: Ongoing (interventions implemented as appropriate)    Problem: Fundoplication (Nissen,Thal,Toupet) (Pediatric)  Goal: Signs and Symptoms of Listed Potential Problems Will be Absent or Manageable (Fundoplication)  Outcome: Ongoing (interventions implemented as appropriate)

## 2025-02-20 NOTE — ANESTHESIA PROCEDURE NOTES
Arterial Line      Patient reassessed immediately prior to procedure    Patient location during procedure: pre-op   Line placed for hemodynamic monitoring.  Preanesthetic Checklist  Completed: patient identified, IV checked, site marked, risks and benefits discussed, surgical consent, monitors and equipment checked, pre-op evaluation and timeout performed  Arterial Line Prep    Sterile Tech: cap, gloves and sterile barriers  Prep: ChloraPrep  Patient monitoring: blood pressure monitoring, continuous pulse oximetry and EKG  Arterial Line Procedure   Laterality:right  Location:  brachial artery  Catheter size: 20 G   Guidance: ultrasound guided  Number of attempts: 1  Successful placement: yes   Post Assessment   Dressing Type: line sutured, occlusive dressing applied, secured with tape and wrist guard applied.   Complications no  Circ/Move/Sens Assessment: normal and unchanged.   Patient Tolerance: patient tolerated the procedure well with no apparent complications  Additional Notes  Large hematoma from cardiac cath R radial therefore r brachial

## 2025-02-20 NOTE — ANESTHESIA POSTPROCEDURE EVALUATION
Patient: Camron Hendrix    Procedure Summary       Date: 02/20/25 Room / Location:  [facility]    Anesthesia Start: 0718 Anesthesia Stop: 1331    Procedures:       MEDIAN STERNOTOMY, CORONARY ARTERY BYPASS GRAFT X 3 (ON PUMP) USING RIGHT GREATER SAPHENOUS VEIN VIA EVH + LEFT INTERNAL MAMMARY ARTERY GRAFT, MAZE, LEFT ATRIAL APPENDAGE LIGATION, INTRAOP JESSY AS PER ANESTHESIA (Chest)      TRANSESOPHAGEAL ECHOCARDIOGRAM WITH ANESTHESIA (Chest)      MAZE PROCEDURE (Chest) Diagnosis:       Coronary artery disease involving native coronary artery of native heart with unstable angina pectoris      (Coronary artery disease involving native coronary artery of native heart with unstable angina pectoris [I25.110])    Surgeons: Steve Borjas MD Provider: Isra Colon MD    Anesthesia Type: general, Soila, CVL ASA Status: 4            Anesthesia Type: general, Huddy, CVL    Vitals  Vitals Value Taken Time   BP 98/45 02/20/25 1328   Temp 100.4 °F (38 °C) 02/20/25 1328   Pulse 131 02/20/25 1328   Resp 14 02/20/25 1328   SpO2 100 % 02/20/25 1328           Post Anesthesia Care and Evaluation    Patient location during evaluation: ICU  Patient participation: complete - patient cannot participate  Level of consciousness: obtunded/minimal responses  Pain management: adequate    Airway patency: patent  Anesthetic complications: No anesthetic complications  PONV Status: none  Cardiovascular status: hemodynamically stable and acceptable  Respiratory status: acceptable, ETT, intubated and ventilator  Hydration status: acceptable

## 2025-02-20 NOTE — PROGRESS NOTES
"  [facility]   Inpatient Progress Note       LOS: 2 days   Patient Care Team:  Leslie Rhodes MD as PCP - General (Family Medicine)  Delmer Perdomo MD as Cardiologist (Cardiology)    Chief Complaint:  Follow-up for STEMI    Subjective     Interval History:     Patient intubated and sedated. No family at bedside. Immediately s/p CABG. Noted AF with RVR post op that was treated with amiodarone and patient currently back in SR    Review of Systems:   Pertinent positives noted in history, exam, and assessment. Otherwise reviewed and negative.      Objective     Vitals:  Blood pressure 98/45, pulse 64, temperature 100.4 °F (38 °C), temperature source Bladder, resp. rate 14, height 175.3 cm (69\"), weight 116 kg (255 lb), SpO2 100%.     Intake/Output Summary (Last 24 hours) at 2/20/2025 1409  Last data filed at 2/20/2025 1330  Gross per 24 hour   Intake 3496 ml   Output 1905 ml   Net 1591 ml     Vitals reviewed.   Constitutional:       Appearance: Well-developed.      Comments: Intubated and sedated     Neck:      Vascular: No JVD.      Trachea: No tracheal deviation.   Pulmonary:      Breath sounds: Normal breath sounds.      Comments: On MV  Chest:      Comments: Midsternal wound vac in place.   MT in place  Cardiovascular:      Normal rate. Regular rhythm.      Murmurs: There is no murmur.   Edema:     Peripheral edema absent.   Musculoskeletal:         General: No deformity. Skin:     General: Skin is warm and dry.   Neurological:      Comments: sedated            Results Review:     I reviewed the patient's new clinical results.    Results from last 7 days   Lab Units 02/20/25  1234 02/20/25  1101 02/20/25  0345   WBC 10*3/mm3  --   --  10.04   HEMOGLOBIN g/dL  --   --  13.1   HEMOGLOBIN, POC g/dL 9.2*   < >  --    HEMATOCRIT %  --   --  38.0   HEMATOCRIT POC % 27*   < >  --    PLATELETS 10*3/mm3  --   --  173    < > = values in this interval not displayed.     Results from " last 7 days   Lab Units 02/20/25  0345 02/18/25 2120 02/18/25  1734   SODIUM mmol/L 138   < > 141   POTASSIUM mmol/L 3.2*   < > 3.7   CHLORIDE mmol/L 98   < > 105   CO2 mmol/L 23.0   < > 25.0   BUN mg/dL 24*   < > 23   CREATININE mg/dL 1.49*   < > 1.43*   CALCIUM mg/dL 8.1*   < > 8.6   BILIRUBIN mg/dL  --   --  0.6   ALK PHOS U/L  --   --  100   ALT (SGPT) U/L  --   --  61*   AST (SGOT) U/L  --   --  276*   GLUCOSE mg/dL 162*   < > 181*    < > = values in this interval not displayed.     Results from last 7 days   Lab Units 02/20/25  0345   SODIUM mmol/L 138   POTASSIUM mmol/L 3.2*   CHLORIDE mmol/L 98   CO2 mmol/L 23.0   BUN mg/dL 24*   CREATININE mg/dL 1.49*   GLUCOSE mg/dL 162*   CALCIUM mg/dL 8.1*     Results from last 7 days   Lab Units 02/18/25  1734   INR  1.34*     Lab Results   Lab Value Date/Time    TROPONINT 7,557 (C) 02/20/2025 0345    TROPONINT >10,000 (C) 02/19/2025 1804    TROPONINT >10,000 (C) 02/19/2025 0733    TROPONINT >10,000 (C) 02/19/2025 0519    TROPONINT 116 (C) 05/04/2024 0353    TROPONINT 149 (C) 05/03/2024 1752    TROPONINT 156 (C) 05/03/2024 1606    TROPONINT 13 07/19/2023 1616    TROPONINT <0.010 08/03/2021 1421    TROPONINT <0.010 08/03/2021 0340    TROPONINT <0.010 07/30/2021 2221         Results from last 7 days   Lab Units 02/19/25  0519   CHOLESTEROL mg/dL 105   TRIGLYCERIDES mg/dL 88   HDL CHOL mg/dL 38*   LDL CHOL mg/dL 50         Results from last 7 days   Lab Units 02/20/25  0345 02/19/25  1804 02/19/25  0733   HSTROP T ng/L 7,557* >10,000* >10,000*         Tele:  SR    Assessment:      STEMI involving left anterior descending coronary artery    CVA (cerebral vascular accident)    Essential hypertension    Coronary artery disease involving native coronary artery of native heart without angina pectoris    Morbid obesity with BMI of 40.0-44.9, adult    Sleep apnea    PAF (paroxysmal atrial fibrillation)    Type 2 diabetes mellitus    Acute systolic CHF (congestive heart  failure)    Coronary artery disease involving native coronary artery of native heart with unstable angina pectoris      1.  Anterior STEMI:  -Emergent LHC 2/18 revealed 100% mid LAD stenosis with mixed heavy eccentric calcification and plaque erosion with thrombus present.  Balloon angioplasty carried out.  Stent was not deployed as the significant heavy calcification and calcific nodules prevented advancement of NC balloons therefore atherectomy would be necessary.  Patient has concomitant 90% proximal circumflex and 99% subtotal mid RCA, he is diabetic and has LV systolic dysfunction therefore CABG was elected. Case was discussed with Dr. Hernandez, patient and patient's family who are in agreement.   - 2/20/2025 CABG x 3  Dr. Borjas.        2.  Acute systolic heart failure:  LVEF on left ventriculogram appears to be 35-40% with severe distal anterior, apical and distal inferior hypokinesis  Killip class III heart failure symptoms on initial presentation.   Slowly initiate GDMT after CABG as tolerated     3.  CKD stage IIIb:  Initial creatinine 1.6 prior to cardiac catheterization       4.  Type 2 diabetes:  Per intensivist team     5.  Primary hypertension:     6.  Mixed hyperlipidemia:  Started rosuvastatin 40 mg daily  LDL 50     7. Paroxysmal atrial fibrillation  - started on amiodarone protocol.   - noted post op  - s/p MAZE and STEFANO ligation 2/20/25 with Dr. Borjas  - now in SR    Plan:  Continue amiodarone per protocol.  Wean pressors as tolerated.   Initiate GDMT during post operative course as able.   Continue to monitor rhythm.  Cardiology will continue to follow along.     FADUMO Hammonds   Dictated utilizing Dragon dictation

## 2025-02-21 ENCOUNTER — APPOINTMENT (OUTPATIENT)
Dept: GENERAL RADIOLOGY | Facility: HOSPITAL | Age: 75
End: 2025-02-21
Payer: MEDICARE

## 2025-02-21 ENCOUNTER — TRANSCRIBE ORDERS (OUTPATIENT)
Dept: CARDIAC REHAB | Facility: HOSPITAL | Age: 75
End: 2025-02-21
Payer: MEDICARE

## 2025-02-21 DIAGNOSIS — Z95.1 S/P CABG (CORONARY ARTERY BYPASS GRAFT): Primary | ICD-10-CM

## 2025-02-21 LAB
ALBUMIN SERPL-MCNC: 3.6 G/DL (ref 3.5–5.2)
ALBUMIN/GLOB SERPL: 1.9 G/DL
ALP SERPL-CCNC: 62 U/L (ref 39–117)
ALT SERPL W P-5'-P-CCNC: 31 U/L (ref 1–41)
ANION GAP SERPL CALCULATED.3IONS-SCNC: 12 MMOL/L (ref 5–15)
ARTERIAL PATENCY WRIST A: ABNORMAL
AST SERPL-CCNC: 92 U/L (ref 1–40)
ATMOSPHERIC PRESS: ABNORMAL MM[HG]
BASE EXCESS BLDA CALC-SCNC: -2.2 MMOL/L (ref 0–2)
BASOPHILS # BLD AUTO: 0.03 10*3/MM3 (ref 0–0.2)
BASOPHILS NFR BLD AUTO: 0.2 % (ref 0–1.5)
BDY SITE: ABNORMAL
BH BB BLOOD EXPIRATION DATE: NORMAL
BH BB BLOOD EXPIRATION DATE: NORMAL
BH BB BLOOD TYPE BARCODE: 7300
BH BB BLOOD TYPE BARCODE: 7300
BH BB DISPENSE STATUS: NORMAL
BH BB DISPENSE STATUS: NORMAL
BH BB PRODUCT CODE: NORMAL
BH BB PRODUCT CODE: NORMAL
BH BB UNIT NUMBER: NORMAL
BH BB UNIT NUMBER: NORMAL
BILIRUB SERPL-MCNC: 0.6 MG/DL (ref 0–1.2)
BODY TEMPERATURE: 37
BUN SERPL-MCNC: 26 MG/DL (ref 8–23)
BUN/CREAT SERPL: 16.3 (ref 7–25)
CALCIUM SPEC-SCNC: 8.2 MG/DL (ref 8.6–10.5)
CHLORIDE SERPL-SCNC: 102 MMOL/L (ref 98–107)
CO2 BLDA-SCNC: 24.9 MMOL/L (ref 22–33)
CO2 SERPL-SCNC: 25 MMOL/L (ref 22–29)
COHGB MFR BLD: 1.3 % (ref 0–2)
CREAT SERPL-MCNC: 1.6 MG/DL (ref 0.76–1.27)
CROSSMATCH INTERPRETATION: NORMAL
CROSSMATCH INTERPRETATION: NORMAL
DEPRECATED RDW RBC AUTO: 42.5 FL (ref 37–54)
EGFRCR SERPLBLD CKD-EPI 2021: 45.2 ML/MIN/1.73
EOSINOPHIL # BLD AUTO: 0 10*3/MM3 (ref 0–0.4)
EOSINOPHIL NFR BLD AUTO: 0 % (ref 0.3–6.2)
EPAP: 12
ERYTHROCYTE [DISTWIDTH] IN BLOOD BY AUTOMATED COUNT: 13.6 % (ref 12.3–15.4)
GLOBULIN UR ELPH-MCNC: 1.9 GM/DL
GLUCOSE BLDC GLUCOMTR-MCNC: 103 MG/DL (ref 70–130)
GLUCOSE BLDC GLUCOMTR-MCNC: 112 MG/DL (ref 70–130)
GLUCOSE BLDC GLUCOMTR-MCNC: 116 MG/DL (ref 70–130)
GLUCOSE BLDC GLUCOMTR-MCNC: 121 MG/DL (ref 70–130)
GLUCOSE BLDC GLUCOMTR-MCNC: 143 MG/DL (ref 70–130)
GLUCOSE BLDC GLUCOMTR-MCNC: 144 MG/DL (ref 70–130)
GLUCOSE SERPL-MCNC: 141 MG/DL (ref 65–99)
HCO3 BLDA-SCNC: 23.6 MMOL/L (ref 20–26)
HCT VFR BLD AUTO: 35.9 % (ref 37.5–51)
HCT VFR BLD CALC: 34.2 % (ref 38–51)
HGB BLD-MCNC: 12 G/DL (ref 13–17.7)
HGB BLDA-MCNC: 11.1 G/DL (ref 13.5–17.5)
IMM GRANULOCYTES # BLD AUTO: 0.09 10*3/MM3 (ref 0–0.05)
IMM GRANULOCYTES NFR BLD AUTO: 0.6 % (ref 0–0.5)
INHALED O2 CONCENTRATION: 100 %
INR PPP: 1.36 (ref 0.89–1.12)
IPAP: 20
LYMPHOCYTES # BLD AUTO: 1.6 10*3/MM3 (ref 0.7–3.1)
LYMPHOCYTES NFR BLD AUTO: 11.5 % (ref 19.6–45.3)
MAGNESIUM SERPL-MCNC: 2.6 MG/DL (ref 1.6–2.4)
MCH RBC QN AUTO: 28.6 PG (ref 26.6–33)
MCHC RBC AUTO-ENTMCNC: 33.4 G/DL (ref 31.5–35.7)
MCV RBC AUTO: 85.7 FL (ref 79–97)
METHGB BLD QL: 0.5 % (ref 0–1.5)
MODALITY: ABNORMAL
MONOCYTES # BLD AUTO: 1.33 10*3/MM3 (ref 0.1–0.9)
MONOCYTES NFR BLD AUTO: 9.6 % (ref 5–12)
NEUTROPHILS NFR BLD AUTO: 10.84 10*3/MM3 (ref 1.7–7)
NEUTROPHILS NFR BLD AUTO: 78.1 % (ref 42.7–76)
NRBC BLD AUTO-RTO: 0 /100 WBC (ref 0–0.2)
OXYHGB MFR BLDV: 86.2 % (ref 94–99)
PAW @ PEAK INSP FLOW SETTING VENT: 0 CMH2O
PCO2 BLDA: 43.3 MM HG (ref 35–45)
PCO2 TEMP ADJ BLD: 43.3 MM HG (ref 35–48)
PH BLDA: 7.34 PH UNITS (ref 7.35–7.45)
PH, TEMP CORRECTED: 7.34 PH UNITS
PLATELET # BLD AUTO: 155 10*3/MM3 (ref 140–450)
PMV BLD AUTO: 11.8 FL (ref 6–12)
PO2 BLDA: 56 MM HG (ref 83–108)
PO2 TEMP ADJ BLD: 56 MM HG (ref 83–108)
POTASSIUM SERPL-SCNC: 4.5 MMOL/L (ref 3.5–5.2)
PROT SERPL-MCNC: 5.5 G/DL (ref 6–8.5)
PROTHROMBIN TIME: 16.8 SECONDS (ref 12.2–14.5)
QT INTERVAL: 434 MS
QTC INTERVAL: 436 MS
RBC # BLD AUTO: 4.19 10*6/MM3 (ref 4.14–5.8)
SODIUM SERPL-SCNC: 139 MMOL/L (ref 136–145)
TOTAL RATE: 0 BREATHS/MINUTE
UNIT  ABO: NORMAL
UNIT  ABO: NORMAL
UNIT  RH: NORMAL
UNIT  RH: NORMAL
VENTILATOR MODE: ABNORMAL
WBC NRBC COR # BLD AUTO: 13.89 10*3/MM3 (ref 3.4–10.8)

## 2025-02-21 PROCEDURE — 85025 COMPLETE CBC W/AUTO DIFF WBC: CPT | Performed by: PHYSICIAN ASSISTANT

## 2025-02-21 PROCEDURE — 93005 ELECTROCARDIOGRAM TRACING: CPT | Performed by: PHYSICIAN ASSISTANT

## 2025-02-21 PROCEDURE — 80053 COMPREHEN METABOLIC PANEL: CPT | Performed by: PHYSICIAN ASSISTANT

## 2025-02-21 PROCEDURE — 85610 PROTHROMBIN TIME: CPT | Performed by: PHYSICIAN ASSISTANT

## 2025-02-21 PROCEDURE — 25010000002 BUMETANIDE PER 0.5 MG: Performed by: NURSE PRACTITIONER

## 2025-02-21 PROCEDURE — 94640 AIRWAY INHALATION TREATMENT: CPT

## 2025-02-21 PROCEDURE — 25010000002 BUMETANIDE PER 0.5 MG: Performed by: INTERNAL MEDICINE

## 2025-02-21 PROCEDURE — 25010000002 ALBUMIN HUMAN 25% PER 50 ML: Performed by: THORACIC SURGERY (CARDIOTHORACIC VASCULAR SURGERY)

## 2025-02-21 PROCEDURE — 94799 UNLISTED PULMONARY SVC/PX: CPT

## 2025-02-21 PROCEDURE — 99024 POSTOP FOLLOW-UP VISIT: CPT | Performed by: PHYSICIAN ASSISTANT

## 2025-02-21 PROCEDURE — 71045 X-RAY EXAM CHEST 1 VIEW: CPT

## 2025-02-21 PROCEDURE — 93010 ELECTROCARDIOGRAM REPORT: CPT | Performed by: INTERNAL MEDICINE

## 2025-02-21 PROCEDURE — 82948 REAGENT STRIP/BLOOD GLUCOSE: CPT

## 2025-02-21 PROCEDURE — 25010000002 FUROSEMIDE PER 20 MG: Performed by: THORACIC SURGERY (CARDIOTHORACIC VASCULAR SURGERY)

## 2025-02-21 PROCEDURE — 25010000002 CEFAZOLIN PER 500 MG: Performed by: PHYSICIAN ASSISTANT

## 2025-02-21 PROCEDURE — 97162 PT EVAL MOD COMPLEX 30 MIN: CPT

## 2025-02-21 PROCEDURE — 82375 ASSAY CARBOXYHB QUANT: CPT

## 2025-02-21 PROCEDURE — 99232 SBSQ HOSP IP/OBS MODERATE 35: CPT | Performed by: INTERNAL MEDICINE

## 2025-02-21 PROCEDURE — 94664 DEMO&/EVAL PT USE INHALER: CPT

## 2025-02-21 PROCEDURE — P9047 ALBUMIN (HUMAN), 25%, 50ML: HCPCS | Performed by: THORACIC SURGERY (CARDIOTHORACIC VASCULAR SURGERY)

## 2025-02-21 PROCEDURE — 82805 BLOOD GASES W/O2 SATURATION: CPT

## 2025-02-21 PROCEDURE — 25010000002 MORPHINE PER 10 MG: Performed by: THORACIC SURGERY (CARDIOTHORACIC VASCULAR SURGERY)

## 2025-02-21 PROCEDURE — 83735 ASSAY OF MAGNESIUM: CPT | Performed by: PHYSICIAN ASSISTANT

## 2025-02-21 PROCEDURE — 83050 HGB METHEMOGLOBIN QUAN: CPT

## 2025-02-21 RX ORDER — BUMETANIDE 0.25 MG/ML
1 INJECTION, SOLUTION INTRAMUSCULAR; INTRAVENOUS ONCE
Status: COMPLETED | OUTPATIENT
Start: 2025-02-21 | End: 2025-02-21

## 2025-02-21 RX ORDER — TAMSULOSIN HYDROCHLORIDE 0.4 MG/1
1 CAPSULE ORAL DAILY
COMMUNITY

## 2025-02-21 RX ORDER — GUAIFENESIN 600 MG/1
1200 TABLET, EXTENDED RELEASE ORAL EVERY 12 HOURS SCHEDULED
Status: COMPLETED | OUTPATIENT
Start: 2025-02-21 | End: 2025-02-27

## 2025-02-21 RX ORDER — ACETYLCYSTEINE 200 MG/ML
4 SOLUTION ORAL; RESPIRATORY (INHALATION)
Status: DISPENSED | OUTPATIENT
Start: 2025-02-21 | End: 2025-02-24

## 2025-02-21 RX ORDER — ATORVASTATIN CALCIUM 40 MG/1
40 TABLET, FILM COATED ORAL DAILY
COMMUNITY
End: 2025-02-28 | Stop reason: HOSPADM

## 2025-02-21 RX ORDER — ALBUMIN (HUMAN) 12.5 G/50ML
25 SOLUTION INTRAVENOUS ONCE
Status: COMPLETED | OUTPATIENT
Start: 2025-02-21 | End: 2025-02-21

## 2025-02-21 RX ORDER — ASPIRIN 81 MG/1
162 TABLET, CHEWABLE ORAL DAILY
Status: DISCONTINUED | OUTPATIENT
Start: 2025-02-21 | End: 2025-02-25

## 2025-02-21 RX ORDER — INSULIN LISPRO 100 [IU]/ML
2-9 INJECTION, SOLUTION INTRAVENOUS; SUBCUTANEOUS
Status: DISCONTINUED | OUTPATIENT
Start: 2025-02-21 | End: 2025-02-28 | Stop reason: HOSPADM

## 2025-02-21 RX ORDER — FUROSEMIDE 10 MG/ML
20 INJECTION INTRAMUSCULAR; INTRAVENOUS ONCE
Status: COMPLETED | OUTPATIENT
Start: 2025-02-21 | End: 2025-02-21

## 2025-02-21 RX ORDER — TRAMADOL HYDROCHLORIDE 50 MG/1
100 TABLET ORAL EVERY 6 HOURS PRN
Status: DISCONTINUED | OUTPATIENT
Start: 2025-02-21 | End: 2025-02-28 | Stop reason: HOSPADM

## 2025-02-21 RX ORDER — BUMETANIDE 0.25 MG/ML
2 INJECTION, SOLUTION INTRAMUSCULAR; INTRAVENOUS ONCE
Status: COMPLETED | OUTPATIENT
Start: 2025-02-21 | End: 2025-02-21

## 2025-02-21 RX ORDER — CARVEDILOL 3.12 MG/1
3.12 TABLET ORAL 2 TIMES DAILY WITH MEALS
Status: DISCONTINUED | OUTPATIENT
Start: 2025-02-21 | End: 2025-02-24

## 2025-02-21 RX ORDER — METOLAZONE 5 MG/1
20 TABLET ORAL ONCE
Status: COMPLETED | OUTPATIENT
Start: 2025-02-21 | End: 2025-02-21

## 2025-02-21 RX ADMIN — ROSUVASTATIN 40 MG: 20 TABLET, FILM COATED ORAL at 21:26

## 2025-02-21 RX ADMIN — MORPHINE SULFATE 2 MG: 2 INJECTION, SOLUTION INTRAMUSCULAR; INTRAVENOUS at 03:54

## 2025-02-21 RX ADMIN — SODIUM CHLORIDE 2 G: 900 INJECTION INTRAVENOUS at 11:44

## 2025-02-21 RX ADMIN — MUPIROCIN 1 APPLICATION: 20 OINTMENT TOPICAL at 21:28

## 2025-02-21 RX ADMIN — MUPIROCIN 1 APPLICATION: 20 OINTMENT TOPICAL at 08:40

## 2025-02-21 RX ADMIN — HYDROCODONE BITARTRATE AND ACETAMINOPHEN 1 TABLET: 7.5; 325 TABLET ORAL at 02:05

## 2025-02-21 RX ADMIN — AMIODARONE HYDROCHLORIDE 200 MG: 200 TABLET ORAL at 08:39

## 2025-02-21 RX ADMIN — Medication 10 ML: at 09:28

## 2025-02-21 RX ADMIN — MORPHINE SULFATE 2 MG: 2 INJECTION, SOLUTION INTRAMUSCULAR; INTRAVENOUS at 02:05

## 2025-02-21 RX ADMIN — BUMETANIDE 2 MG: 0.25 INJECTION INTRAMUSCULAR; INTRAVENOUS at 22:15

## 2025-02-21 RX ADMIN — ALBUMIN (HUMAN) 25 G: 0.25 INJECTION, SOLUTION INTRAVENOUS at 08:39

## 2025-02-21 RX ADMIN — ALBUTEROL SULFATE 2.5 MG: 2.5 SOLUTION RESPIRATORY (INHALATION) at 21:30

## 2025-02-21 RX ADMIN — SENNOSIDES AND DOCUSATE SODIUM 2 TABLET: 50; 8.6 TABLET ORAL at 21:26

## 2025-02-21 RX ADMIN — SODIUM CHLORIDE 2 G: 900 INJECTION INTRAVENOUS at 21:25

## 2025-02-21 RX ADMIN — FUROSEMIDE 20 MG: 10 INJECTION, SOLUTION INTRAMUSCULAR; INTRAVENOUS at 09:28

## 2025-02-21 RX ADMIN — OXYCODONE HYDROCHLORIDE 10 MG: 10 TABLET ORAL at 11:44

## 2025-02-21 RX ADMIN — OXYCODONE HYDROCHLORIDE 10 MG: 10 TABLET ORAL at 01:05

## 2025-02-21 RX ADMIN — BUMETANIDE 1 MG: 0.25 INJECTION INTRAMUSCULAR; INTRAVENOUS at 15:33

## 2025-02-21 RX ADMIN — METOLAZONE 20 MG: 5 TABLET ORAL at 21:32

## 2025-02-21 RX ADMIN — OXYCODONE HYDROCHLORIDE 10 MG: 10 TABLET ORAL at 21:26

## 2025-02-21 RX ADMIN — ALBUTEROL SULFATE 2.5 MG: 2.5 SOLUTION RESPIRATORY (INHALATION) at 23:15

## 2025-02-21 RX ADMIN — GUAIFENESIN 1200 MG: 600 TABLET ORAL at 23:35

## 2025-02-21 RX ADMIN — AMIODARONE HYDROCHLORIDE 200 MG: 200 TABLET ORAL at 15:33

## 2025-02-21 RX ADMIN — Medication 10 ML: at 21:36

## 2025-02-21 RX ADMIN — ACETYLCYSTEINE 4 ML: 200 SOLUTION ORAL; RESPIRATORY (INHALATION) at 23:16

## 2025-02-21 RX ADMIN — CHLORHEXIDINE GLUCONATE 15 ML: 1.2 SOLUTION ORAL at 21:27

## 2025-02-21 RX ADMIN — SODIUM CHLORIDE 2 G: 900 INJECTION INTRAVENOUS at 03:54

## 2025-02-21 RX ADMIN — ASPIRIN 81 MG CHEWABLE TABLET 162 MG: 81 TABLET CHEWABLE at 08:39

## 2025-02-21 RX ADMIN — SENNOSIDES AND DOCUSATE SODIUM 2 TABLET: 50; 8.6 TABLET ORAL at 08:39

## 2025-02-21 RX ADMIN — AMIODARONE HYDROCHLORIDE 200 MG: 200 TABLET ORAL at 23:35

## 2025-02-21 RX ADMIN — TRAMADOL HYDROCHLORIDE 100 MG: 50 TABLET, COATED ORAL at 23:35

## 2025-02-21 NOTE — PROGRESS NOTES
Pt. Referred for Phase II Cardiac Rehab. Staff discussed benefits of exercise, program protocol, and educational material provided. Teach back verified.  Permission granted from patient for staff to fax referral information to outlying program at this time.  Staff faxed referral info to [facility].

## 2025-02-21 NOTE — CASE MANAGEMENT/SOCIAL WORK
Discharge Planning Assessment  [facility]     Patient Name: Camron Hendrix  MRN: [id number]  Today's Date: 2/21/2025    Admit Date: 2/18/2025    Plan: IDP   Discharge Needs Assessment       Row Name 02/21/25 1141       Living Environment    People in Home alone    Current Living Arrangements home    Potentially Unsafe Housing Conditions none    In the past 12 months has the electric, gas, oil, or water company threatened to shut off services in your home? No    Primary Care Provided by self    Provides Primary Care For no one    Family Caregiver if Needed child(kory), adult    Family Caregiver Names Luanne and Misti    Quality of Family Relationships unable to assess    Able to Return to Prior Arrangements yes       Resource/Environmental Concerns    Resource/Environmental Concerns none    Transportation Concerns none       Transportation Needs    In the past 12 months, has lack of transportation kept you from medical appointments or from getting medications? no    In the past 12 months, has lack of transportation kept you from meetings, work, or from getting things needed for daily living? No       Food Insecurity    Within the past 12 months, you worried that your food would run out before you got the money to buy more. Never true    Within the past 12 months, the food you bought just didn't last and you didn't have money to get more. Never true       Transition Planning    Patient/Family Anticipates Transition to home with family    Patient/Family Anticipated Services at Transition none    Transportation Anticipated family or friend will provide       Discharge Needs Assessment    Readmission Within the Last 30 Days no previous admission in last 30 days    Equipment Currently Used at Home walker, rolling    Concerns to be Addressed no discharge needs identified;denies needs/concerns at this time    Do you want help finding or keeping work or a job? I do not need or want help    Do you want help with school or  training? For example, starting or completing job training or getting a high school diploma, GED or equivalent No    Anticipated Changes Related to Illness none    Equipment Needed After Discharge none                   Discharge Plan       Row Name 02/21/25 1145       Plan    Plan IDP    Patient/Family in Agreement with Plan yes    Plan Comments Spoke with patient at bedside for IDP. Lives in [facility] alone. IADL. RW. States he has OPPT for his vision. Unsure of where it is he goes. Daughter drives him. PCP Leslie Rhodes. Medicare A&B and [facility] with scripts filled at [facility]. Plan is home with daughters to transport. CM will cont to follow.                  Continued Care and Services - Admitted Since 2/18/2025    No active coordination exists for this encounter.       Expected Discharge Date and Time       Expected Discharge Date Expected Discharge Time    Feb 27, 2025            Demographic Summary       Row Name 02/21/25 1141       General Information    Admission Type inpatient    Arrived From home    Referral Source admission list    Reason for Consult discharge planning    Preferred Language English                   Functional Status       Row Name 02/21/25 1141       Functional Status    Usual Activity Tolerance good       Physical Activity    On average, how many days per week do you engage in moderate to strenuous exercise (like a brisk walk)? 0 days    On average, how many minutes do you engage in exercise at this level? 0 min    Number of minutes of exercise per week 0       Functional Status, IADL    Medications independent    Meal Preparation independent    Housekeeping independent    Laundry independent    Shopping independent    If for any reason you need help with day-to-day activities such as bathing, preparing meals, shopping, managing finances, etc., do you get the help you need? I get all the help I need                   Psychosocial    No documentation.                  Abuse/Neglect     No documentation.                  Legal    No documentation.                  Substance Abuse    No documentation.                  Patient Forms    No documentation.                     Nichole Syed RN

## 2025-02-21 NOTE — PLAN OF CARE
Goal Outcome Evaluation:  Plan of Care Reviewed With: patient        Progress: improving  Outcome Evaluation: PT eval is completed. patient presents S/P CABG x3 with MAZE procedure. patient presents with weakness impaired bed mobility transfers and gait as well as decreased standing balance. patient was able to ambulate 15 ft with mod assist of 2 and chair following patient closely for safety. anticipate patient to need IP rehab at D/C    Anticipated Discharge Disposition (PT): inpatient rehabilitation facility

## 2025-02-21 NOTE — PROGRESS NOTES
Cardiothoracic Surgery Progress Note      POD # 1 s/p urgent CABG x 3 (LIMA-LAD, SV-OM, SVG-PDA), modified maze, ALEXANDRA performed 2/20/2025     LOS: 3 days      Subjective:  No acute overnight events.  Resting comfortably in chair.  Denies complaint at this time.  On 0.06 IV Levophed.     Objective:  Vital Signs  Temp:  [96.4 °F (35.8 °C)-100.4 °F (38 °C)] 99.9 °F (37.7 °C)  Heart Rate:  [] 64  Resp:  [12-22] 20  BP: ()/(45-73) 119/71  Arterial Line BP: ()/() 126/58  FiO2 (%):  [40 %-100 %] 40 %    Physical Exam:   General Appearance: alert, appears stated age and cooperative   Lungs: clear to auscultation, respirations regular, respirations even, and diminished at bases   Heart: regular rhythm & normal rate, normal S1, S2, no murmur, no gallop, no rub, and no click   ABD: Normoactive bowel sounds, soft, nontender   Skin: Incision c/d/I   Extremities: Bilateral upper and lower extremity peripheral edema.  Moves all 4.  Warm, well-perfused    Output by Drain (mL) 02/20/25 0701 - 02/20/25 1900 02/20/25 1901 - 02/21/25 0700 02/21/25 0701 - 02/21/25 0839 Range Total   Chest Tube Mediastinal 100 95  195   Closed/Suction Drain Left Pleural Bulb 19 Fr. 110 150  260   NG/OG Tube 16 Fr Left nostril 250   250   Urethral Catheter 390 600  990        Results:    Results from last 7 days   Lab Units 02/21/25  0359   WBC 10*3/mm3 13.89*   HEMOGLOBIN g/dL 12.0*   HEMATOCRIT % 35.9*   PLATELETS 10*3/mm3 155     Results from last 7 days   Lab Units 02/21/25  0359   SODIUM mmol/L 139   POTASSIUM mmol/L 4.5   CHLORIDE mmol/L 102   CO2 mmol/L 25.0   BUN mg/dL 26*   CREATININE mg/dL 1.60*   GLUCOSE mg/dL 141*   CALCIUM mg/dL 8.2*     Imaging:  XR CHEST 1 VW     Date of Exam: 2/21/2025 3:17 AM EST     Indication: Post-Op Heart Surgery     Comparison: Chest x-ray 2/20/2025     Findings:  Interval extubation. Otherwise stable medical support devices. Stable cardiomediastinal silhouette with cardiomegaly and perihilar  vascular congestion. There are increased bibasilar opacities likely reflecting atelectasis. Small left pleural effusion   cannot be excluded. No evidence of pneumothorax.     IMPRESSION:  Impression:  Interval extubation with decrease in lung volumes and increase in bibasilar opacities presumably atelectasis.        Electronically Signed: Peter Alcantara MD    2/21/2025 7:36 AM EST    Workstation ID: [id number]      Assessment:  POD #1 status post urgent CABG x 3 (LIMA-LAD, SV-OM, SVG-PDA), modified maze, LAAL performed 2/20/2025      STEMI involving left anterior descending coronary artery    CVA (cerebral vascular accident)    Essential hypertension    Coronary artery disease involving native coronary artery of native heart without angina pectoris    Morbid obesity with BMI of 40.0-44.9, adult    Sleep apnea    PAF (paroxysmal atrial fibrillation)    Type 2 diabetes mellitus    Acute systolic CHF (congestive heart failure)    Coronary artery disease involving native coronary artery of native heart with unstable angina pectoris      Plan:  Wean vasopressor support as tolerated  25% albumin 25 g IV x 1  Diuresis with Lasix 20 mg IV x 1  Mediastinal bulb transition to Pleur-evac atrium  Continue chest tubes and wires  Pulmonary toilet  Ambulate  Aspirin, statin    Rubi Luna PA-C  02/21/25  08:39 EST

## 2025-02-21 NOTE — THERAPY EVALUATION
Patient Name: Camron Hendrix  : 1950    MRN: [id number]                              Today's Date: 2025       Admit Date: 2025    Visit Dx:     ICD-10-CM ICD-9-CM   1. 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    SAH (subarachnoid hemorrhage)    Type 2 diabetes mellitus    Seizure    Aortic stenosis, mild    STEMI involving left anterior descending coronary artery    Acute systolic CHF (congestive heart failure)    Coronary artery disease involving native coronary artery of native heart with unstable angina pectoris     Past Medical History:   Diagnosis Date    AAA (abdominal aortic aneurysm)     Coronary artery disease     CVA (cerebral vascular accident)     Hypertension     Hypothyroidism     PAF (paroxysmal atrial fibrillation)     TIA (transient ischemic attack)      Past Surgical History:   Procedure Laterality Date    CARDIAC CATHETERIZATION N/A 3/29/2019    Procedure: Left Heart Cath;  Surgeon: Andrea Holloway MD;  Location:  [facility];  Service: Cardiovascular    CARDIAC CATHETERIZATION N/A 2025    Procedure: Left Heart Cath;  Surgeon: Carlo Barragan MD;  Location:  [facility];  Service: Cardiovascular;  Laterality: N/A;    CARDIAC CATHETERIZATION N/A 2025    Procedure: Optical Coherence Tomography;  Surgeon: Carlo Barragan MD;  Location:  [facility];  Service: Cardiovascular;  Laterality: N/A;    CARDIAC CATHETERIZATION N/A 2/18/2025    Procedure: Percutaneous Coronary Intervention;  Surgeon: Carlo Barragan MD;  Location:  [facility];  Service: Cardiovascular;  Laterality: N/A;    CERVICAL FUSION      c4-c5    CORONARY ANGIOPLASTY WITH STENT PLACEMENT      CORONARY ARTERY BYPASS GRAFT N/A 2/20/2025    Procedure: MEDIAN STERNOTOMY, CORONARY ARTERY BYPASS GRAFT X 3 (ON PUMP) USING RIGHT GREATER SAPHENOUS VEIN VIA EVH + LEFT INTERNAL MAMMARY ARTERY GRAFT, MAZE, LEFT ATRIAL APPENDAGE LIGATION, INTRAOP JESSY AS PER ANESTHESIA;  Surgeon: Steve Borjas MD;  Location:  [facility];  Service: Cardiothoracic;  Laterality: N/A;  LEFT ARM EXPLORED, RADIAL ARTERY NOT USABLE    HERNIA REPAIR      MAZE PROCEDURE N/A 2/20/2025    Procedure: MAZE PROCEDURE;  Surgeon: Steve Borjas MD;  Location:  [facility];  Service: Cardiothoracic;  Laterality: N/A;    TONSILECTOMY, ADENOIDECTOMY, BILATERAL MYRINGOTOMY AND TUBES        General Information       Row Name 02/21/25 1406          Physical Therapy Time and Intention    Document Type evaluation  -JORGE     Mode of Treatment physical therapy  -       Row Name 02/21/25 1406          General Information    Patient Profile Reviewed yes  -JORGE     Prior Level of Function independent:;gait;transfer;bed mobility;ADL's  -JORGE     Existing Precautions/Restrictions cardiac;fall;oxygen therapy device and L/min;orthostatic hypotension;sternal  -JORGE     Barriers to Rehab medically complex  -JORGE       Row Name 02/21/25 1406          Living Environment    People in Home alone  -JORGE       Row Name 02/21/25 1406          Home Main Entrance    Number of Stairs, Main Entrance four  -JORGE     Stair Railings, Main Entrance railings safe and in good condition  -JORGE       Row Name 02/21/25 1406          Cognition    Orientation Status (Cognition) oriented x 4  -JORGE       Row Name 02/21/25 1406          Safety Issues/Impairments  Affecting Functional Mobility    Safety Issues Affecting Function (Mobility) safety precautions follow-through/compliance;safety precaution awareness  -JORGE     Impairments Affecting Function (Mobility) balance;endurance/activity tolerance;shortness of breath;strength;pain;postural/trunk control  -JORGE               User Key  (r) = Recorded By, (t) = Taken By, (c) = Cosigned By      Initials Name Provider Type    Ronda Solis PT Physical Therapist                   Mobility       Row Name 02/21/25 1408          Bed Mobility    Comment, (Bed Mobility) patient is OOB and returns to the chair  -JORGE       Row Name 02/21/25 1408          Transfers    Comment, (Transfers) cues for sternal precautions  -JORGE       Row Name 02/21/25 1408          Bed-Chair Transfer    Bed-Chair [place] (Transfers) minimum assist (75% patient effort);2 person assist  -JORGE     Assistive Device (Bed-Chair Transfers) walker, front-wheeled  -JORGE       Row Name 02/21/25 1408          Sit-Stand Transfer    Sit-Stand [place] (Transfers) minimum assist (75% patient effort);2 person assist  -JORGE     Assistive Device (Sit-Stand Transfers) walker, front-wheeled  -JORGE       Row Name 02/21/25 1408          Gait/Stairs (Locomotion)    [place] Level (Gait) moderate assist (50% patient effort);2 person assist  -JORGE     Assistive Device (Gait) walker, front-wheeled  -JORGE     Distance in Feet (Gait) 15  -JORGE     Deviations/Abnormal Patterns (Gait) triston decreased;stride length decreased  -JORGE     Bilateral Gait Deviations forward flexed posture;heel strike decreased  -JORGE     Comment, (Gait/Stairs) patient gets too far from the walker. patient had episodes of staring off but was able to follow commands with delayed response time. Chair behind patient for safety  -JORGE               User Key  (r) = Recorded By, (t) = Taken By, (c) = Cosigned By      Initials Name Provider Type    Ronda Solis PT Physical Therapist                    Obj/Interventions       [facility] Name 02/21/25 1411          Range of Motion Comprehensive    General Range of Motion no range of motion deficits identified  -[facility] Name 02/21/25 1411          Strength Comprehensive (MMT)    Comment, General Manual Muscle Testing (MMT) Assessment generalixed weakness 3+/5  -[facility] Name 02/21/25 1411          Motor Skills    Therapeutic Exercise knee;ankle  -JORGE       Row Name 02/21/25 1411          Knee (Therapeutic Exercise)    Knee (Therapeutic Exercise) AROM (active range of motion)  -     Knee AROM (Therapeutic Exercise) bilateral;flexion;extension;10 repetitions;sitting  -[facility] Name 02/21/25 1411          Ankle (Therapeutic Exercise)    Ankle (Therapeutic Exercise) AROM (active range of motion)  -     Ankle AROM (Therapeutic Exercise) bilateral;dorsiflexion;plantarflexion;10 repetitions;sitting  -[facility] Name 02/21/25 1411          Balance    Balance Assessment sitting static balance;sitting dynamic balance;standing static balance;standing dynamic balance  -JORGE     Static Sitting Balance contact guard  -JORGE     Dynamic Sitting Balance contact guard  -JORGE     Position, Sitting Balance unsupported;sitting in chair  -JORGE     Static Standing Balance minimal assist;2-person assist  -JORGE     Dynamic Standing Balance moderate assist;2-person assist  -JORGE     Position/Device Used, Standing Balance supported;walker, front-wheeled  -JORGE               User Key  (r) = Recorded By, (t) = Taken By, (c) = Cosigned By      Initials Name Provider Type    JORGE Ronda Torrez, PT Physical Therapist                   Goals/Plan       [facility] Name 02/21/25 1416          Bed Mobility Goal 1 (PT)    Activity/Assistive Device (Bed Mobility Goal 1, PT) bed mobility activities, all  -JORGE     [place] Level/Cues Needed (Bed Mobility Goal 1, PT) nonverbal cues (demo/gesture) required  -JORGE     Time Frame (Bed Mobility Goal 1, PT) long term goal (LTG);10 days  -JORGE     Progress/Outcomes (Bed  Mobility Goal 1, PT) goal ongoing  -JORGE       Row Name 02/21/25 1416          Transfer Goal 1 (PT)    Activity/Assistive Device (Transfer Goal 1, PT) sit-to-stand/stand-to-sit  -JORGE     [place] Level/Cues Needed (Transfer Goal 1, PT) independent  -JORGE     Time Frame (Transfer Goal 1, PT) short term goal (STG);5 days  -JORGE     Progress/Outcome (Transfer Goal 1, PT) goal ongoing  -JORGE       Row Name 02/21/25 1416          Gait Training Goal 1 (PT)    Activity/Assistive Device (Gait Training Goal 1, PT) gait (walking locomotion)  -JORGE     [place] Level (Gait Training Goal 1, PT) standby assist  -JORGE     Distance (Gait Training Goal 1, PT) 300  -JORGE     Time Frame (Gait Training Goal 1, PT) long term goal (LTG);10 days  -JORGE       Row Name 02/21/25 1416          Therapy Assessment/Plan (PT)    Planned Therapy Interventions (PT) balance training;bed mobility training;gait training;home exercise program;strengthening;transfer training  -JORGE               User Key  (r) = Recorded By, (t) = Taken By, (c) = Cosigned By      Initials Name Provider Type    JORGE Ronda Torrez, PT Physical Therapist                   Clinical Impression       Row Name 02/21/25 1412          Pain    Pretreatment Pain Rating 2/10  -JORGE     Posttreatment Pain Rating 2/10  -JORGE     Pain Location chest  -JORGE     Pain Management Interventions activity modification encouraged;exercise or physical activity utilized;nursing notified  -JORGE     Response to Pain Interventions activity level improved;mobility function improved;activity participation with tolerable pain  -JORGE       Row Name 02/21/25 1412          Plan of Care Review    Plan of Care Reviewed With patient  -JORGE     Progress improving  -JORGE     Outcome Evaluation PT eval is completed. patient presents S/P CABG x3 with MAZE procedure. patient presents with weakness impaired bed mobility transfers and gait as well as decreased standing balance. patient was able to ambulate 15 ft with mod assist of 2  and chair following patient closely for safety. anticipate patient to need IP rehab at D/C  -JORGE       Row Name 02/21/25 1412          Therapy Assessment/Plan (PT)    Patient/Family Therapy Goals Statement (PT) return to PLOF  -JORGE     Rehab Potential (PT) good  -JORGE     Criteria for Skilled Interventions Met (PT) yes;skilled treatment is necessary  -JORGE     Therapy Frequency (PT) daily  -JORGE     Predicted Duration of Therapy Intervention (PT) 10 days  -JORGE       Row Name 02/21/25 1412          Vital Signs    Pre Systolic BP Rehab 103  -JORGE     Pre Treatment Diastolic BP 55  -JORGE     Post Systolic BP Rehab 99  -JORGE     Post Treatment Diastolic BP 58  -JORGE     Pretreatment Heart Rate (beats/min) 62  -JORGE     Posttreatment Heart Rate (beats/min) 66  -JORGE     Pre SpO2 (%) 91  -JORGE     O2 Delivery Pre Treatment nasal cannula  -JORGE     Intra SpO2 (%) 88  -JORGE     O2 Delivery Intra Treatment nasal cannula  -JORGE     Post SpO2 (%) 93  -JORGE     O2 Delivery Post Treatment nasal cannula  -JORGE     Pre Patient Position Sitting  -JORGE     Intra Patient Position Standing  -JORGE     Post Patient Position Sitting  -JORGE       Row Name 02/21/25 1412          Positioning and Restraints    Pre-Treatment Position sitting in chair/recliner  -JORGE     Post Treatment Position chair  -JORGE     In Chair notified nsg;reclined;sitting;call light within reach;encouraged to call for assist;with nsg  -JORGE               User Key  (r) = Recorded By, (t) = Taken By, (c) = Cosigned By      Initials Name Provider Type    Ronda Solis, PT Physical Therapist                   Outcome Measures       Row Name 02/21/25 1417          How much help from another person do you currently need...    Turning from your back to your side while in flat bed without using bedrails? 1  -JORGE     Moving from lying on back to sitting on the side of a flat bed without bedrails? 1  -JORGE     Moving to and from a bed to a chair (including a wheelchair)? 2  -JORGE     Standing up from a chair using  your arms (e.g., wheelchair, bedside chair)? 3  -JORGE     Climbing 3-5 steps with a railing? 1  -JORGE     To walk in hospital room? 2  -JORGE     AM-PAC 6 Clicks Score (PT) 10  -JORGE               User Key  (r) = Recorded By, (t) = Taken By, (c) = Cosigned By      Initials Name Provider Type    Ronda Solis PT Physical Therapist                                 Physical Therapy Education       Title: PT OT SLP Therapies (In Progress)       Topic: Physical Therapy (In Progress)       Point: Mobility training (In Progress)       Learning Progress Summary            Patient Acceptance, E, NR by JORGE at 2/21/2025 1015                      Point: Home exercise program (In Progress)       Learning Progress Summary            Patient Acceptance, E, NR by JORGE at 2/21/2025 1015                      Point: Body mechanics (In Progress)       Learning Progress Summary            Patient Acceptance, E, NR by JORGE at 2/21/2025 1015                      Point: Precautions (In Progress)       Learning Progress Summary            Patient Acceptance, E, NR by JORGE at 2/21/2025 1015                                      User Key       Initials Effective Dates Name Provider Type Discipline    JORGE 02/03/23 -  Ronda Torrez PT Physical Therapist PT                  PT Recommendation and Plan  Planned Therapy Interventions (PT): balance training, bed mobility training, gait training, home exercise program, strengthening, transfer training  Progress: improving  Outcome Evaluation: PT eval is completed. patient presents S/P CABG x3 with MAZE procedure. patient presents with weakness impaired bed mobility transfers and gait as well as decreased standing balance. patient was able to ambulate 15 ft with mod assist of 2 and chair following patient closely for safety. anticipate patient to need IP rehab at D/C     Time Calculation:   PT Evaluation Complexity  History, PT Evaluation Complexity: 3 or more personal factors and/or  comorbidities  Examination of Body Systems (PT Eval Complexity): total of 4 or more elements  Clinical Presentation (PT Evaluation Complexity): unstable  Clinical Decision Making (PT Evaluation Complexity): moderate complexity  Overall Complexity (PT Evaluation Complexity): moderate complexity     PT Charges       Row Name 02/21/25 1419             Time Calculation    Start Time 1015  -JORGE      PT Received On 02/21/25  -JORGE      PT Goal Re-Cert Due Date 03/03/25  -JORGE         Untimed Charges    PT Eval/Re-eval Minutes 55  -JORGE         Total Minutes    Untimed Charges Total Minutes 55  -JORGE       Total Minutes 55  -JORGE                User Key  (r) = Recorded By, (t) = Taken By, (c) = Cosigned By      Initials Name Provider Type    Ronda Solis, PT Physical Therapist                  Therapy Charges for Today       Code Description Service Date Service Provider Modifiers Qty    84231237208 HC PT EVAL MOD COMPLEXITY 4 2/21/2025 Ronda Torrez PT GP 1            PT G-Codes  AM-PAC 6 Clicks Score (PT): 10  PT Discharge Summary  Anticipated Discharge Disposition (PT): inpatient rehabilitation facility    Ronda Torrez PT  2/21/2025

## 2025-02-21 NOTE — PROGRESS NOTES
Critical Care Note     LOS: 3 days   Patient Care Team:  Leslie Rhodes MD as PCP - General (Family Medicine)  Delmer Perdomo MD as Cardiologist (Cardiology)    Chief Complaint/Reason for visit:      STEMI, 100% LAD status post balloon angioplasty, 90% proximal circumflex, 99% mid RCA    Acute systolic heart failure  PAF    S/pCABGX3,LAAL,MAZE 2/20    Chronic kidney disease stage IIIb  Diabetes mellitus type 2  Hypertension  Obstructive sleep apnea      Subjective     Interval History:     POD#1 CABG x 3, left atrial appendage ligation, maze.  He was extubated to BiPAP and transitioned to high flow.  FiO2 is weaned to 40%, 40 L and saturation is 92%.  He is on norepinephrine 0.06 mics per kilogram per minute.  Cardiac output is 5.5, cardiac index is 2.4.  He does admit to a history of obstructive sleep apnea but is noncompliant with CPAP.  He is currently on insulin 1.5 units/h for glucose management.  Chest tube output 195 mL.  Pleural drain output 260 mL.  Excellent urine output 1.8 L yesterday.  He is having some incisional pain.  He denies nausea.    Review of Systems:    All systems were reviewed and negative except as noted in subjective.      Medical history, surgical history, social history, family history reviewed    Objective     Intake/Output:    Intake/Output Summary (Last 24 hours) at 2/21/2025 1143  Last data filed at 2/21/2025 0800  Gross per 24 hour   Intake 5165.88 ml   Output 2395 ml   Net 2770.88 ml       Nutrition:  Diet: Liquid, Cardiac, Diabetic; Clear Liquid; Healthy Heart (2-3 Na+); Consistent Carbohydrate; Fluid Consistency: Thin (IDDSI 0)    Infusions:  dexmedetomidine, 0.2-1.5 mcg/kg/hr  DOBUTamine, 2-20 mcg/kg/min  DOPamine, 2-20 mcg/kg/min  EPINEPHrine, 0.02-0.3 mcg/kg/min  insulin, 0-100 Units/hr, Last Rate: 1.5 Units/hr (02/21/25 0710)  niCARdipine, 5-15 mg/hr  nitroglycerin, 5-200 mcg/min, Last Rate: 50 mcg/min (02/20/25 0334)  phenylephrine, 0.5-3  "mcg/kg/min        Mechanical Ventilator Settings:            Resp Rate (Set): 12  Pressure Support (cm H2O): 0 cm H20  FiO2 (%): 40 %  PEEP/CPAP (cm H2O): 5 cm H20    Minute Ventilation (L/min) (Obs): 7.82 L/min  Resp Rate (Observed) Vent: 20  I:E Ratio (Set): 1:2.50  I:E Ratio (Obs): 1:2.2    PIP Observed (cm H2O): 7.1 cm H2O       Telemetry: Sinus rhythm             Vital Signs  Blood pressure 103/58, pulse 63, temperature 99.7 °F (37.6 °C), temperature source Bladder, resp. rate 14, height 175.3 cm (69\"), weight 116 kg (255 lb), SpO2 92%.    Physical Exam:  General Appearance:  Overweight older gentleman sitting upright in bed in no respiratory distress   Head:  Atraumatic   Eyes:          Pupils equal, reactive.  Conjunctiva pink   Ears:     Throat: Oral mucosa moist   Neck: Trachea midline, right IJ line   Back:      Lungs:   Sternotomy incision intact. Mediastinal tube and left pleural drain  Symmetric chest expansion without wheeze or rhonchi    Heart:  Regular rhythm, slow rate , S1, S2 auscultated, systolic murmur, moderate rub   Abdomen:   Protuberant, bowel sounds active, soft   Rectal:   Deferred   Extremities: pretibial edema, left greater than right.  Right brachial arterial line.  Right wrist cath site with some ecchymosis.  Right upper extremity edema.  Left wrist sutures in place.  Right lower extremity Ace wrap   Pulses:    Skin: Cool and dry   Lymph nodes: No cervical adenopathy   Neurologic: Awake, face symmetric, speech fluent, moves all extremities      Results Review:     I reviewed the patient's new clinical results.   Results from last 7 days   Lab Units 02/21/25  0359 02/20/25  2306 02/20/25  1715 02/20/25  1354 02/18/25  2120 02/18/25  1734   SODIUM mmol/L 139  --  140 139   < > 141   POTASSIUM mmol/L 4.5 4.4 4.4 3.8   < > 3.7   CHLORIDE mmol/L 102  --  106 104   < > 105   CO2 mmol/L 25.0  --  21.0* 22.0   < > 25.0   BUN mg/dL 26*  --  23 24*   < > 23   CREATININE mg/dL 1.60*  --  1.34* " "1.40*   < > 1.43*   CALCIUM mg/dL 8.2*  --  7.7* 8.4*   < > 8.6   BILIRUBIN mg/dL 0.6  --   --   --   --  0.6   ALK PHOS U/L 62  --   --   --   --  100   ALT (SGPT) U/L 31  --   --   --   --  61*   AST (SGOT) U/L 92*  --   --   --   --  276*   GLUCOSE mg/dL 141*  --  194* 178*   < > 181*    < > = values in this interval not displayed.     Results from last 7 days   Lab Units 02/21/25  0359 02/20/25  1715 02/20/25  1354   WBC 10*3/mm3 13.89* 15.62* 16.25*   HEMOGLOBIN g/dL 12.0* 12.0* 12.7*   HEMATOCRIT % 35.9* 35.1* 36.9*   PLATELETS 10*3/mm3 155 160 136*     Results from last 7 days   Lab Units 02/20/25  2358   PH, ARTERIAL pH units 7.384   PO2 ART mm Hg 87.6   PCO2, ARTERIAL mm Hg 42.8   HCO3 ART mmol/L 25.5     Lab Results   Component Value Date    BLOODCX No growth at 5 days 05/03/2024     No results found for: \"URINECX\"    I reviewed the patient's new imaging including images and reports.    XR CHEST 1 VW    Date of Exam: 2/21/2025 3:17 AM EST    Indication: Post-Op Heart Surgery    Comparison: Chest x-ray 2/20/2025    Findings:  Interval extubation. Otherwise stable medical support devices. Stable cardiomediastinal silhouette with cardiomegaly and perihilar vascular congestion. There are increased bibasilar opacities likely reflecting atelectasis. Small left pleural effusion  cannot be excluded. No evidence of pneumothorax.   Impression:     Impression:  Interval extubation with decrease in lung volumes and increase in bibasilar opacities presumably atelectasis.      Electronically Signed: Peter Alcantara MD   2/21/2025 7:36 AM EST       Echocardiogram Comments: Intraoperative UBALDO postsurgery       Informed consent was obtained preoperatively.  Ubaldo probe passed without difficulty.Post CABG:  EF 45% on inotropes.  MR alen\ains mild.    Impression: CT chest  1. Cardiomegaly with minimal interstitial pulmonary edema.   2. Mild emphysema. Emphysema on CT is an independent risk factor for lung cancer. Low dose lung " cancer screening should be considered if patient qualifies based on smoking history or if not already enrolled in a screening program.   3. Aortic valve leaflet calcifications with fusiform aneurysmal dilation of the ascending thoracic aorta measuring up to 4.5 cm. Severe coronary disease.   4. Trace pericardial fluid containing intrinsic high density, hemopericardium is possible.   5. Bilateral striated nephrogram suspicious for possible acute tubular necrosis or pyelonephritis. Correlate with urinalysis.   6. Small sliding hiatal hernia with fluid noted to the level of the upper third thoracic esophagus. Findings suggest gastroesophageal reflux.         Electronically Signed: Delmer Benítez MD    2/18/2025 10:00 PM EST     Conclusion left heart catheterization February 18, 2025         Mid % occlusion (mixed thrombotic/calcific/fibrotic stenosis), status post balloon angioplasty with a 2.5 NC balloon at nominal pressure.    Mid circumflex 90%, mid RCA 99% subtotal occlusion with left-to-right collaterals    Discussed with CT surgery, given patient' coronary anatomy, LV systolic dysfunction and diabetic status, CABG is ideal.    Cangrelor started during the intervention and will be continued until time of surgery    IV Lasix for diuresis along with IV nitroglycerin    LVEF 35 to 40% with severe distal anterior, apical and distal inferior hypokinesis    Killip class III acute systolic heart failure present on arrival.  With LVEDP 37 mmHg    LV pressures (S/D/E) : 127/26/37 mmHg    Moderate systolic dysfunction.    There is moderate left ventricular systolic dysfunction.       All medications reviewed.   acetaminophen, 1,000 mg, Oral, Q8H  amiodarone, 200 mg, Oral, Q8H   Followed by  [START ON 2/28/2025] amiodarone, 200 mg, Oral, Q12H   Followed by  [START ON 3/14/2025] amiodarone, 200 mg, Oral, Daily  aspirin, 162 mg, Oral, Daily  [Held by provider] carvedilol, 3.125 mg, Oral, BID With Meals  ceFAZolin, 2 g,  Intravenous, Q8H  chlorhexidine, 15 mL, Mouth/Throat, Q12H  mupirocin, 1 Application, Each Nare, BID  pharmacy consult - MTM, , Not Applicable, Daily  protamine, 50 mg, Intravenous, Once  rosuvastatin, 40 mg, Oral, Nightly  senna-docusate sodium, 2 tablet, Oral, BID  sodium chloride, 10 mL, Intravenous, Q12H          Assessment & Plan       STEMI involving left anterior descending coronary artery    CVA (cerebral vascular accident)    Essential hypertension    Coronary artery disease involving native coronary artery of native heart without angina pectoris    Morbid obesity with BMI of 40.0-44.9, adult    Sleep apnea    PAF (paroxysmal atrial fibrillation)    Type 2 diabetes mellitus    Acute systolic CHF (congestive heart failure)    Coronary artery disease involving native coronary artery of native heart with unstable angina pectoris      74-year-old gentleman, remote smoker (quit 25 years), with hypertension, hypothyroid, dyslipidemia, diabetes, obesity, subarachnoid hemorrhage May 2024, 2 previous strokes without sequelae, paroxysmal atrial fibrillation presenting 2/18 with chest pain and ST elevation.  Heart catheterization revealed severe 3 vessel disease with reduced EF 35 to 40%.  100% mid LAD lesion balloon angioplastied.    2/20 he underwent CABG x 3, left atrial appendage ligation, maze.  Intraoperative JESSY revealed EF 45%, mild MR postprocedure.  He did have some burst of atrial fibrillation and was bolused with amiodarone and placed on a drip.  He is maintaining sinus rhythm.  He extubated to BiPAP and then high flow cannula.  Chest x-ray shows poor inspiration with some atelectasis.  Postoperative hemoglobin is 12.  He remains on some norepinephrine 0.06 mics per kilogram per minute, amiodarone 1 mg/min.  Cardiac index is 2.4.      He does have some mild underlying emphysema.  He had a preoperative CT scan that revealed minimal emphysema and some interstitial edema.  He is currently on high flow  cannula.  He does have a history of sleep apnea but is noncompliant with CPAP.    His A1c is 6.3.  He is currently on insulin 1.5 units/h.    Preoperative creatinine was 1.45-1.5.  Postoperatively initial creatinine is 1.6 with adequate urine output.      PLAN:    Continue amiodarone for atrial fibrillation, transition to p.o.  Carvedilol initiated but held for hypotension  Monitor electrolytes and replace  Monitor H&H, mediastinal chest tube output    Incentive spirometry  Wean FiO2 as able  BiPAP at night for sleep apnea    Aspirin, Crestor      Insulin drip per protocol, will transition after breakfast        VTE Prophylaxis: foot pumps    Stress Ulcer Prophylaxis: none    Rani Lilly MD  02/21/25  11:43 EST      Time: Critical care 30 min  I personally provided care to this critically ill patient as documented above.  Critical care time does not include time spent on separately billed procedures.  None of my critical care time was concurrent with other critical care providers.

## 2025-02-21 NOTE — PROGRESS NOTES
[facility] at [facility]  INPATIENT PROGRESS NOTE         [facility]    2/21/2025      PATIENT IDENTIFICATION:   Name:  Camron Hendrix      MRN:  [id number]     74 y.o.  male             Reason for visit: CAD, acute systolic heart failure, paroxysmal atrial fibrillation, hypertension, hyperlipidemia      SUBJECTIVE:   Extubated open recliner, overall states he is improved he is on high flow nasal cannula, rhythm remained stable cardiac index above 2.2.     OBJECTIVE:  Vitals:    02/21/25 1315 02/21/25 1330 02/21/25 1345 02/21/25 1400   BP:  113/79     BP Location:       Patient Position:       Pulse: 64 64 66 64   Resp:    22   Temp:    100.2 °F (37.9 °C)   TempSrc:    Bladder   SpO2: (!) 88% 93% 90% 95%   Weight:       Height:         FiO2 (%): 40 %     Body mass index is 37.66 kg/m².    Intake/Output Summary (Last 24 hours) at 2/21/2025 1531  Last data filed at 2/21/2025 1400  Gross per 24 hour   Intake 3065.88 ml   Output 1800 ml   Net 1265.88 ml       Telemetry: Personally reviewed, normal sinus rhythm, no arrhythmia     Exam:  General Appearance:   Alert and oriented, no acute distress  Neck:  thyroid not enlarged  supple  Respiratory:  no respiratory distress  normal breath sounds  no rales  Cardiovascular:  no jugular venous distention  regular rhythm  apical impulse normal  S1 normal, S2 normal  no S3, no S4   no murmur  no rub, no thrill  carotid pulses normal; no bruit  pedal pulses normal  lower extremity edema: 1+  Skin:   warm, dry      Allergies   Allergen Reactions    Codeine Hallucinations     Scheduled meds:       acetaminophen, 1,000 mg, Oral, Q8H  amiodarone, 200 mg, Oral, Q8H   Followed by  [START ON 2/28/2025] amiodarone, 200 mg, Oral, Q12H   Followed by  [START ON 3/14/2025] amiodarone, 200 mg, Oral, Daily  aspirin, 162 mg, Oral, Daily  bumetanide, 1 mg, Intravenous, Once  [Held by provider] carvedilol, 3.125 mg, Oral, BID With Meals  ceFAZolin, 2 g,  "Intravenous, Q8H  chlorhexidine, 15 mL, Mouth/Throat, Q12H  insulin lispro, 2-9 Units, Subcutaneous, 4x Daily AC & at Bedtime  mupirocin, 1 Application, Each Nare, BID  pharmacy consult - MTM, , Not Applicable, Daily  rosuvastatin, 40 mg, Oral, Nightly  senna-docusate sodium, 2 tablet, Oral, BID  sodium chloride, 10 mL, Intravenous, Q12H      IV meds:                      niCARdipine, 5-15 mg/hr  nitroglycerin, 5-200 mcg/min, Last Rate: 50 mcg/min (02/20/25 0434)  phenylephrine, 0.5-3 mcg/kg/min      Data Review:  Results from last 7 days   Lab Units 02/21/25 0359 02/20/25 1715 02/20/25  1354 02/20/25  0345   SODIUM mmol/L 139 140 139 138   BUN mg/dL 26* 23 24* 24*   CREATININE mg/dL 1.60* 1.34* 1.40* 1.49*   GLUCOSE mg/dL 141* 194* 178* 162*     Results from last 7 days   Lab Units 02/21/25 0359 02/20/25  1715 02/20/25  1354 02/20/25  1234 02/20/25  1158 02/20/25  1101 02/20/25  0345 02/19/25  0519   WBC 10*3/mm3 13.89* 15.62* 16.25*  --   --   --  10.04 9.25   HEMOGLOBIN g/dL 12.0* 12.0* 12.7*  --   --   --  13.1 14.7   HEMOGLOBIN, POC g/dL  --   --   --  9.2* 10.2*   < >  --   --     < > = values in this interval not displayed.     Results from last 7 days   Lab Units 02/21/25 0359 02/20/25  1354 02/18/25  1734   INR  1.36* 1.73* 1.34*     Results from last 7 days   Lab Units 02/21/25 0359 02/18/25  1734   ALT (SGPT) U/L 31 61*   AST (SGOT) U/L 92* 276*     No results found for: \"DIGOXIN\"   Lab Results   Component Value Date    TSH 1.770 04/24/2024     Results from last 7 days   Lab Units 02/19/25  0519   CHOLESTEROL mg/dL 105   HDL CHOL mg/dL 38*       Estimated Creatinine Clearance: 51.6 mL/min (A) (by C-G formula based on SCr of 1.6 mg/dL (H)).        Imaging (last 24 hr):   I personally reviewed the most recent chest x-ray and other pertinent imaging studies.  Results for orders placed during the hospital encounter of 02/18/25    XR Chest 1 View    Narrative  XR CHEST 1 VW    Date of Exam: 2/21/2025 3:17 " AM EST    Indication: Post-Op Heart Surgery    Comparison: Chest x-ray 2/20/2025    Findings:  Interval extubation. Otherwise stable medical support devices. Stable cardiomediastinal silhouette with cardiomegaly and perihilar vascular congestion. There are increased bibasilar opacities likely reflecting atelectasis. Small left pleural effusion  cannot be excluded. No evidence of pneumothorax.    Impression  Impression:  Interval extubation with decrease in lung volumes and increase in bibasilar opacities presumably atelectasis.      Electronically Signed: Peter Alcantara MD  2/21/2025 7:36 AM EST  Workstation ID: [id number]        Last ECHO:  Results for orders placed during the hospital encounter of 02/18/25    Adult Transthoracic Echo Complete W/ Cont if Necessary Per Protocol    Interpretation Summary    Left ventricular systolic function is mildly decreased. Calculated left ventricular EF = 44% Left ventricular ejection fraction appears to be 41 - 45%.    Left ventricular wall thickness is consistent with moderate concentric hypertrophy.    The following left ventricular wall segments are akinetic: mid anterior, apical anterior, apical septal and apex. C/w with LAD territory infarct.    Left ventricular diastolic function is consistent with (grade Ia w/high LAP) impaired relaxation.    Mild aortic valve stenosis is present.    Peak velocity of the flow distal to the aortic valve is 277 cm/s. Aortic valve mean pressure gradient is 14 mmHg.    The aortic root measures 4.4 cm. The aortic root (corrected for BSA) measures 1.9 cm. Mild dilation of the ascending aorta is present.    Mild to moderate mitral valve regurgitation.    Depressed ejection fraction and wall motion abnormalities are new compared to 4/2024; mild aortic stenosis is unchanged.        PROBLEM LIST:     STEMI involving left anterior descending coronary artery    CVA (cerebral vascular accident)    Essential hypertension    Coronary artery disease  involving native coronary artery of native heart without angina pectoris    Morbid obesity with BMI of 40.0-44.9, adult    Sleep apnea    PAF (paroxysmal atrial fibrillation)    Type 2 diabetes mellitus    Acute systolic CHF (congestive heart failure)    Coronary artery disease involving native coronary artery of native heart with unstable angina pectoris        Initial cardiac assessment: 74-year-old gentleman presenting with anterior STEMI status post balloon angioplasty of the LAD but with residual multivessel disease depressed ejection fraction acute systolic heart failure status post 3V CABG with Dr. Borjas 2/20/2025.  Status post left atrial appendage ligation and maze procedure as well.    ASSESSMENT/PLAN:  1.  Anterior STEMI with multivessel CAD EF 45%:  Status post 3V CABG (LIMA-LAD, SVG-OM, SVG-RCA)  On low-dose Levophed currently, continue to support blood pressure  Cardiac index 2.6 which is good  Holding metoprolol while on Levophed, weaning Levophed today.    2.  Acute systolic heart failure:  Adequate diuresis over past 24 hours, continuing to maintain net negative fluid balance  Currently cardiac output is acceptable   Agree with albumin and Lasix.  Continue Levophed to support blood pressure  Monitor urine output closely agree     3.  Paroxysmal atrial fibrillation present on admission  Appreciate left atrial appendage ligation and maze procedure during bypass surgery  Insisting to oral amiodarone, continue for at least 30 days postsurgery    No anticoagulation necessary due to left atrial appendage ligation at time of CABG    4.  History of hypertension:  Holding antihypertensives currently while on vasopressors  Will adjust home medications and optimize for GDMT for systolic heart failure    5.  Mixed hyperlipidemia:  Goal LDL less than 50, currently at goal on high-dose rosuvastatin 40 mg daily continue for now.    The patient remains critically ill in the ICU currently.    Discussed with patient's  nurse      Carlo Barragan MD  2/21/2025    15:31 EST

## 2025-02-22 ENCOUNTER — APPOINTMENT (OUTPATIENT)
Dept: GENERAL RADIOLOGY | Facility: HOSPITAL | Age: 75
End: 2025-02-22
Payer: MEDICARE

## 2025-02-22 LAB
ANION GAP SERPL CALCULATED.3IONS-SCNC: 14 MMOL/L (ref 5–15)
BUN SERPL-MCNC: 36 MG/DL (ref 8–23)
BUN/CREAT SERPL: 19.9 (ref 7–25)
CALCIUM SPEC-SCNC: 8.1 MG/DL (ref 8.6–10.5)
CHLORIDE SERPL-SCNC: 99 MMOL/L (ref 98–107)
CO2 SERPL-SCNC: 24 MMOL/L (ref 22–29)
CREAT SERPL-MCNC: 1.81 MG/DL (ref 0.76–1.27)
DEPRECATED RDW RBC AUTO: 42.5 FL (ref 37–54)
EGFRCR SERPLBLD CKD-EPI 2021: 39 ML/MIN/1.73
ERYTHROCYTE [DISTWIDTH] IN BLOOD BY AUTOMATED COUNT: 13.6 % (ref 12.3–15.4)
GLUCOSE BLDC GLUCOMTR-MCNC: 118 MG/DL (ref 70–130)
GLUCOSE BLDC GLUCOMTR-MCNC: 124 MG/DL (ref 70–130)
GLUCOSE BLDC GLUCOMTR-MCNC: 133 MG/DL (ref 70–130)
GLUCOSE BLDC GLUCOMTR-MCNC: 137 MG/DL (ref 70–130)
GLUCOSE BLDC GLUCOMTR-MCNC: 155 MG/DL (ref 70–130)
GLUCOSE BLDC GLUCOMTR-MCNC: 155 MG/DL (ref 70–130)
GLUCOSE BLDC GLUCOMTR-MCNC: 156 MG/DL (ref 70–130)
GLUCOSE BLDC GLUCOMTR-MCNC: 158 MG/DL (ref 70–130)
GLUCOSE BLDC GLUCOMTR-MCNC: 193 MG/DL (ref 70–130)
GLUCOSE BLDC GLUCOMTR-MCNC: 238 MG/DL (ref 70–130)
GLUCOSE SERPL-MCNC: 173 MG/DL (ref 65–99)
HCT VFR BLD AUTO: 33 % (ref 37.5–51)
HGB BLD-MCNC: 10.8 G/DL (ref 13–17.7)
MCH RBC QN AUTO: 28.3 PG (ref 26.6–33)
MCHC RBC AUTO-ENTMCNC: 32.7 G/DL (ref 31.5–35.7)
MCV RBC AUTO: 86.4 FL (ref 79–97)
PLATELET # BLD AUTO: 118 10*3/MM3 (ref 140–450)
PMV BLD AUTO: 11.8 FL (ref 6–12)
POTASSIUM SERPL-SCNC: 3.8 MMOL/L (ref 3.5–5.2)
POTASSIUM SERPL-SCNC: 3.9 MMOL/L (ref 3.5–5.2)
POTASSIUM SERPL-SCNC: 3.9 MMOL/L (ref 3.5–5.2)
RBC # BLD AUTO: 3.82 10*6/MM3 (ref 4.14–5.8)
SODIUM SERPL-SCNC: 137 MMOL/L (ref 136–145)
WBC NRBC COR # BLD AUTO: 8.88 10*3/MM3 (ref 3.4–10.8)

## 2025-02-22 PROCEDURE — 93005 ELECTROCARDIOGRAM TRACING: CPT | Performed by: PHYSICIAN ASSISTANT

## 2025-02-22 PROCEDURE — 94799 UNLISTED PULMONARY SVC/PX: CPT

## 2025-02-22 PROCEDURE — 82948 REAGENT STRIP/BLOOD GLUCOSE: CPT

## 2025-02-22 PROCEDURE — 94664 DEMO&/EVAL PT USE INHALER: CPT

## 2025-02-22 PROCEDURE — 99232 SBSQ HOSP IP/OBS MODERATE 35: CPT | Performed by: INTERNAL MEDICINE

## 2025-02-22 PROCEDURE — P9047 ALBUMIN (HUMAN), 25%, 50ML: HCPCS | Performed by: THORACIC SURGERY (CARDIOTHORACIC VASCULAR SURGERY)

## 2025-02-22 PROCEDURE — 25010000002 BUMETANIDE PER 0.5 MG: Performed by: THORACIC SURGERY (CARDIOTHORACIC VASCULAR SURGERY)

## 2025-02-22 PROCEDURE — 63710000001 INSULIN LISPRO (HUMAN) PER 5 UNITS: Performed by: INTERNAL MEDICINE

## 2025-02-22 PROCEDURE — 80048 BASIC METABOLIC PNL TOTAL CA: CPT | Performed by: PHYSICIAN ASSISTANT

## 2025-02-22 PROCEDURE — 85027 COMPLETE CBC AUTOMATED: CPT | Performed by: PHYSICIAN ASSISTANT

## 2025-02-22 PROCEDURE — 25010000002 ALBUMIN HUMAN 25% PER 50 ML: Performed by: THORACIC SURGERY (CARDIOTHORACIC VASCULAR SURGERY)

## 2025-02-22 PROCEDURE — 99233 SBSQ HOSP IP/OBS HIGH 50: CPT | Performed by: INTERNAL MEDICINE

## 2025-02-22 PROCEDURE — 71045 X-RAY EXAM CHEST 1 VIEW: CPT

## 2025-02-22 PROCEDURE — 84132 ASSAY OF SERUM POTASSIUM: CPT | Performed by: THORACIC SURGERY (CARDIOTHORACIC VASCULAR SURGERY)

## 2025-02-22 PROCEDURE — 97530 THERAPEUTIC ACTIVITIES: CPT

## 2025-02-22 PROCEDURE — 25010000002 POTASSIUM CHLORIDE PER 2 MEQ: Performed by: THORACIC SURGERY (CARDIOTHORACIC VASCULAR SURGERY)

## 2025-02-22 PROCEDURE — 93010 ELECTROCARDIOGRAM REPORT: CPT | Performed by: INTERNAL MEDICINE

## 2025-02-22 PROCEDURE — 99024 POSTOP FOLLOW-UP VISIT: CPT | Performed by: THORACIC SURGERY (CARDIOTHORACIC VASCULAR SURGERY)

## 2025-02-22 PROCEDURE — 94761 N-INVAS EAR/PLS OXIMETRY MLT: CPT

## 2025-02-22 PROCEDURE — 25010000002 CEFAZOLIN PER 500 MG: Performed by: PHYSICIAN ASSISTANT

## 2025-02-22 RX ORDER — BUMETANIDE 0.25 MG/ML
1 INJECTION, SOLUTION INTRAMUSCULAR; INTRAVENOUS EVERY 12 HOURS
Status: DISCONTINUED | OUTPATIENT
Start: 2025-02-22 | End: 2025-02-25

## 2025-02-22 RX ORDER — POTASSIUM CHLORIDE 29.8 MG/ML
20 INJECTION INTRAVENOUS ONCE
Status: COMPLETED | OUTPATIENT
Start: 2025-02-22 | End: 2025-02-22

## 2025-02-22 RX ORDER — MORPHINE SULFATE 2 MG/ML
2 INJECTION, SOLUTION INTRAMUSCULAR; INTRAVENOUS
Status: DISCONTINUED | OUTPATIENT
Start: 2025-02-22 | End: 2025-02-22

## 2025-02-22 RX ORDER — ALBUMIN (HUMAN) 12.5 G/50ML
25 SOLUTION INTRAVENOUS 2 TIMES DAILY
Status: DISCONTINUED | OUTPATIENT
Start: 2025-02-22 | End: 2025-02-26

## 2025-02-22 RX ORDER — ALBUTEROL SULFATE 0.83 MG/ML
2.5 SOLUTION RESPIRATORY (INHALATION)
Status: DISCONTINUED | OUTPATIENT
Start: 2025-02-22 | End: 2025-02-23

## 2025-02-22 RX ORDER — FUROSEMIDE 10 MG/ML
20 INJECTION INTRAMUSCULAR; INTRAVENOUS EVERY 12 HOURS SCHEDULED
Status: DISCONTINUED | OUTPATIENT
Start: 2025-02-22 | End: 2025-02-22

## 2025-02-22 RX ADMIN — POTASSIUM CHLORIDE 20 MEQ: 29.8 INJECTION, SOLUTION INTRAVENOUS at 21:03

## 2025-02-22 RX ADMIN — ACETAMINOPHEN 1000 MG: 500 TABLET, FILM COATED ORAL at 13:39

## 2025-02-22 RX ADMIN — ALBUMIN (HUMAN) 25 G: 0.25 INJECTION, SOLUTION INTRAVENOUS at 21:03

## 2025-02-22 RX ADMIN — ALBUTEROL SULFATE 2.5 MG: 2.5 SOLUTION RESPIRATORY (INHALATION) at 06:59

## 2025-02-22 RX ADMIN — ACETAMINOPHEN 1000 MG: 500 TABLET, FILM COATED ORAL at 05:49

## 2025-02-22 RX ADMIN — TRAMADOL HYDROCHLORIDE 100 MG: 50 TABLET, COATED ORAL at 21:02

## 2025-02-22 RX ADMIN — ACETYLCYSTEINE 4 ML: 200 SOLUTION ORAL; RESPIRATORY (INHALATION) at 06:59

## 2025-02-22 RX ADMIN — ALBUTEROL SULFATE 2.5 MG: 2.5 SOLUTION RESPIRATORY (INHALATION) at 15:58

## 2025-02-22 RX ADMIN — AMIODARONE HYDROCHLORIDE 200 MG: 200 TABLET ORAL at 16:07

## 2025-02-22 RX ADMIN — SENNOSIDES AND DOCUSATE SODIUM 2 TABLET: 50; 8.6 TABLET ORAL at 08:34

## 2025-02-22 RX ADMIN — GUAIFENESIN 1200 MG: 600 TABLET ORAL at 08:33

## 2025-02-22 RX ADMIN — BUMETANIDE 1 MG: 0.25 INJECTION INTRAMUSCULAR; INTRAVENOUS at 21:04

## 2025-02-22 RX ADMIN — AMIODARONE HYDROCHLORIDE 200 MG: 200 TABLET ORAL at 08:33

## 2025-02-22 RX ADMIN — OXYCODONE HYDROCHLORIDE 10 MG: 10 TABLET ORAL at 12:55

## 2025-02-22 RX ADMIN — GUAIFENESIN 1200 MG: 600 TABLET ORAL at 21:02

## 2025-02-22 RX ADMIN — INSULIN LISPRO 4 UNITS: 100 INJECTION, SOLUTION INTRAVENOUS; SUBCUTANEOUS at 17:27

## 2025-02-22 RX ADMIN — ACETYLCYSTEINE 4 ML: 200 SOLUTION ORAL; RESPIRATORY (INHALATION) at 11:46

## 2025-02-22 RX ADMIN — TRAMADOL HYDROCHLORIDE 100 MG: 50 TABLET, COATED ORAL at 05:49

## 2025-02-22 RX ADMIN — ACETYLCYSTEINE 4 ML: 200 SOLUTION ORAL; RESPIRATORY (INHALATION) at 20:12

## 2025-02-22 RX ADMIN — ROSUVASTATIN 40 MG: 20 TABLET, FILM COATED ORAL at 21:02

## 2025-02-22 RX ADMIN — ALBUTEROL SULFATE 2.5 MG: 2.5 SOLUTION RESPIRATORY (INHALATION) at 11:45

## 2025-02-22 RX ADMIN — ALBUTEROL SULFATE 2.5 MG: 2.5 SOLUTION RESPIRATORY (INHALATION) at 20:12

## 2025-02-22 RX ADMIN — OXYCODONE HYDROCHLORIDE 10 MG: 10 TABLET ORAL at 08:49

## 2025-02-22 RX ADMIN — MUPIROCIN 1 APPLICATION: 20 OINTMENT TOPICAL at 08:34

## 2025-02-22 RX ADMIN — OXYCODONE HYDROCHLORIDE 10 MG: 10 TABLET ORAL at 02:10

## 2025-02-22 RX ADMIN — Medication 12.5 MG: at 09:51

## 2025-02-22 RX ADMIN — SODIUM CHLORIDE 2 G: 900 INJECTION INTRAVENOUS at 04:48

## 2025-02-22 RX ADMIN — ACETYLCYSTEINE 4 ML: 200 SOLUTION ORAL; RESPIRATORY (INHALATION) at 04:22

## 2025-02-22 RX ADMIN — ALBUMIN (HUMAN) 25 G: 0.25 INJECTION, SOLUTION INTRAVENOUS at 08:32

## 2025-02-22 RX ADMIN — POTASSIUM CHLORIDE 20 MEQ: 29.8 INJECTION, SOLUTION INTRAVENOUS at 13:41

## 2025-02-22 RX ADMIN — Medication 10 ML: at 08:54

## 2025-02-22 RX ADMIN — SENNOSIDES AND DOCUSATE SODIUM 2 TABLET: 50; 8.6 TABLET ORAL at 21:02

## 2025-02-22 RX ADMIN — ACETYLCYSTEINE 4 ML: 200 SOLUTION ORAL; RESPIRATORY (INHALATION) at 15:58

## 2025-02-22 RX ADMIN — POTASSIUM CHLORIDE 20 MEQ: 29.8 INJECTION, SOLUTION INTRAVENOUS at 06:19

## 2025-02-22 RX ADMIN — ASPIRIN 81 MG CHEWABLE TABLET 162 MG: 81 TABLET CHEWABLE at 08:34

## 2025-02-22 RX ADMIN — CHLORHEXIDINE GLUCONATE 15 ML: 1.2 SOLUTION ORAL at 08:33

## 2025-02-22 RX ADMIN — INSULIN LISPRO 2 UNITS: 100 INJECTION, SOLUTION INTRAVENOUS; SUBCUTANEOUS at 08:34

## 2025-02-22 RX ADMIN — INSULIN LISPRO 2 UNITS: 100 INJECTION, SOLUTION INTRAVENOUS; SUBCUTANEOUS at 12:09

## 2025-02-22 RX ADMIN — INSULIN LISPRO 2 UNITS: 100 INJECTION, SOLUTION INTRAVENOUS; SUBCUTANEOUS at 21:02

## 2025-02-22 RX ADMIN — MUPIROCIN 1 APPLICATION: 20 OINTMENT TOPICAL at 21:03

## 2025-02-22 RX ADMIN — Medication 10 ML: at 21:04

## 2025-02-22 RX ADMIN — CARVEDILOL 3.12 MG: 3.12 TABLET, FILM COATED ORAL at 17:27

## 2025-02-22 RX ADMIN — ALBUTEROL SULFATE 2.5 MG: 2.5 SOLUTION RESPIRATORY (INHALATION) at 04:22

## 2025-02-22 RX ADMIN — BUMETANIDE 1 MG: 0.25 INJECTION INTRAMUSCULAR; INTRAVENOUS at 08:33

## 2025-02-22 NOTE — THERAPY TREATMENT NOTE
Patient Name: Camron Hendrix  : 1950    MRN: [id number]                              Today's Date: 2025       Admit Date: 2025    Visit Dx:     ICD-10-CM ICD-9-CM   1. 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    SAH (subarachnoid hemorrhage)    Type 2 diabetes mellitus    Seizure    Aortic stenosis, mild    STEMI involving left anterior descending coronary artery    Acute systolic CHF (congestive heart failure)    Coronary artery disease involving native coronary artery of native heart with unstable angina pectoris     Past Medical History:   Diagnosis Date    AAA (abdominal aortic aneurysm)     Coronary artery disease     CVA (cerebral vascular accident)     Hypertension     Hypothyroidism     PAF (paroxysmal atrial fibrillation)     TIA (transient ischemic attack)      Past Surgical History:   Procedure Laterality Date    CARDIAC CATHETERIZATION N/A 3/29/2019    Procedure: Left Heart Cath;  Surgeon: Andrea Holloway MD;  Location:  [facility];  Service: Cardiovascular    CARDIAC CATHETERIZATION N/A 2025    Procedure: Left Heart Cath;  Surgeon: Carlo Barragan MD;  Location:  [facility];  Service: Cardiovascular;  Laterality: N/A;    CARDIAC CATHETERIZATION N/A 2025    Procedure: Optical Coherence Tomography;  Surgeon: Carlo Barragan MD;  Location:  [facility]  [facility];  Service: Cardiovascular;  Laterality: N/A;    CARDIAC CATHETERIZATION N/A 2/18/2025    Procedure: Percutaneous Coronary Intervention;  Surgeon: Carlo Barragan MD;  Location: [facility] CATH INVASIVE LOCATION;  Service: Cardiovascular;  Laterality: N/A;    CERVICAL FUSION      c4-c5    CORONARY ANGIOPLASTY WITH STENT PLACEMENT      CORONARY ARTERY BYPASS GRAFT N/A 2/20/2025    Procedure: MEDIAN STERNOTOMY, CORONARY ARTERY BYPASS GRAFT X 3 (ON PUMP) USING RIGHT GREATER SAPHENOUS VEIN VIA EVH + LEFT INTERNAL MAMMARY ARTERY GRAFT, MAZE, LEFT ATRIAL APPENDAGE LIGATION, INTRAOP JESSY AS PER ANESTHESIA;  Surgeon: Steve Borjas MD;  Location: [facility] OR;  Service: Cardiothoracic;  Laterality: N/A;  LEFT ARM EXPLORED, RADIAL ARTERY NOT USABLE    HERNIA REPAIR      MAZE PROCEDURE N/A 2/20/2025    Procedure: MAZE PROCEDURE;  Surgeon: Steve Borjas MD;  Location: [facility] OR;  Service: Cardiothoracic;  Laterality: N/A;    TONSILECTOMY, ADENOIDECTOMY, BILATERAL MYRINGOTOMY AND TUBES        General Information       Row Name 02/22/25 1338          Physical Therapy Time and Intention    Document Type therapy note (daily note)  -     Mode of Treatment physical therapy  -       Row Name 02/22/25 1338          General Information    Patient Profile Reviewed yes  -JORGE     Existing Precautions/Restrictions cardiac;fall;oxygen therapy device and L/min;orthostatic hypotension;sternal  -JORGE     Barriers to Rehab medically complex  -JORGE       Row Name 02/22/25 1338          Living Environment    People in Home alone  -JORGE       Row Name 02/22/25 1338          Home Main Entrance    Number of Stairs, Main Entrance four  -JORGE       Row Name 02/22/25 1338          Cognition    Orientation Status (Cognition) oriented x 4  -JORGE       Row Name 02/22/25 1338          Safety Issues/Impairments Affecting Functional Mobility    Safety Issues Affecting Function (Mobility) safety precautions follow-through/compliance  -JORGE     Impairments  Affecting Function (Mobility) balance;endurance/activity tolerance;shortness of breath;strength  -JORGE               User Key  (r) = Recorded By, (t) = Taken By, (c) = Cosigned By      Initials Name Provider Type    Ronda Solis PT Physical Therapist                   Mobility       Row Name 02/22/25 1338          Bed Mobility    Bed Mobility rolling left;rolling right;scooting/bridging;supine-sit  -JORGE     Rolling Left McKenzie (Bed Mobility) moderate assist (50% patient effort);2 person assist  -JORGE     Rolling Right McKenzie (Bed Mobility) moderate assist (50% patient effort);2 person assist  -JORGE     Scooting/Bridging McKenzie (Bed Mobility) maximum assist (25% patient effort);2 person assist  -JORGE     Supine-Sit McKenzie (Bed Mobility) maximum assist (25% patient effort);2 person assist  -JORGE     Assistive Device (Bed Mobility) head of bed elevated  -JORGE     Comment, (Bed Mobility) patient needs cues for sequencing and to maintain sternal precautions. patient also holds his breath and needs instructions to breath with movement  -JORGE       Row Name 02/22/25 1338          Bed-Chair Transfer    Bed-Chair McKenzie (Transfers) minimum assist (75% patient effort);2 person assist  -JORGE     Assistive Device (Bed-Chair Transfers) walker, front-wheeled  -JORGE       Row Name 02/22/25 1331          Sit-Stand Transfer    Sit-Stand McKenzie (Transfers) minimum assist (75% patient effort);2 person assist  -JORGE     Assistive Device (Sit-Stand Transfers) walker, front-wheeled  -JORGE     Comment, (Sit-Stand Transfer) cues for sequencing and to maintain sternal precautions  -JORGE       Row Name 02/22/25 1331          Gait/Stairs (Locomotion)    McKenzie Level (Gait) moderate assist (50% patient effort);2 person assist  -JORGE     Assistive Device (Gait) walker, front-wheeled  -JORGE     Distance in Feet (Gait) 50  patient ambulated 50 + 50 ft with 5 min sitting rest  -JORGE     Deviations/Abnormal Patterns (Gait)  triston decreased;stride length decreased  -JORGE     Bilateral Gait Deviations forward flexed posture;heel strike decreased  -JORGE     Comment, (Gait/Stairs) patient needs assist to keep his weight from shifting too far forward patient gets walker too far from his body patient able to correct when asked to stop and regroup  -JORGE               User Key  (r) = Recorded By, (t) = Taken By, (c) = Cosigned By      Initials Name Provider Type    Ronda Solis PT Physical Therapist                   Obj/Interventions       Row Name 02/22/25 1342          Knee (Therapeutic Exercise)    Knee (Therapeutic Exercise) AROM (active range of motion)  -JORGE     Knee AROM (Therapeutic Exercise) bilateral;flexion;extension;10 repetitions;sitting  -JORGE       Row Name 02/22/25 1342          Ankle (Therapeutic Exercise)    Ankle (Therapeutic Exercise) AROM (active range of motion)  -JORGE     Ankle AROM (Therapeutic Exercise) bilateral;dorsiflexion;plantarflexion;10 repetitions;sitting  -JORGE       Row Name 02/22/25 1342          Balance    Balance Assessment sitting static balance;sitting dynamic balance;standing static balance;standing dynamic balance  -JORGE     Static Sitting Balance contact guard  -JORGE     Dynamic Sitting Balance contact guard  -JORGE     Position, Sitting Balance unsupported;sitting edge of bed  -JORGE     Static Standing Balance minimal assist  -JORGE     Dynamic Standing Balance moderate assist;2-person assist  -JORGE     Position/Device Used, Standing Balance supported;walker, front-wheeled  -JORGE     Comment, Balance in standing patient gets his weight too far forward needs cues to correct  -JORGE               User Key  (r) = Recorded By, (t) = Taken By, (c) = Cosigned By      Initials Name Provider Type    Ronda Solis PT Physical Therapist                   Goals/Plan       Row Name 02/22/25 1347          Therapy Assessment/Plan (PT)    Planned Therapy Interventions (PT) balance training;bed mobility training;gait  training;home exercise program;strengthening;transfer training  -JORGE               User Key  (r) = Recorded By, (t) = Taken By, (c) = Cosigned By      Initials Name Provider Type    Ronda Solis, PT Physical Therapist                   Clinical Impression       Row Name 02/22/25 1344          Pain    Pretreatment Pain Rating 3/10  -JORGE     Posttreatment Pain Rating 5/10  -JORGE     Pain Location chest  -JORGE     Pain Management Interventions activity modification encouraged;exercise or physical activity utilized;nursing notified  -JORGE     Response to Pain Interventions activity level improved;mobility function improved;activity participation with tolerable pain  -JORGE       Row Name 02/22/25 1344          Plan of Care Review    Plan of Care Reviewed With patient  -JORGE     Progress improving  -JORGE     Outcome Evaluation patient was able to increase ambulation to 50 +50 ft patient has chair right behind him for safety. patient had stable vitals with activity he requires non rebreather for ambulation to maintain sats over 90%. patient is limited by resp status and weakness. recommend IP rehab at D/C  -       Row Name 02/22/25 1344          Therapy Assessment/Plan (PT)    Patient/Family Therapy Goals Statement (PT) return to OF  -JORGE     Rehab Potential (PT) good  -JORGE     Criteria for Skilled Interventions Met (PT) yes;skilled treatment is necessary  -JORGE     Therapy Frequency (PT) daily  -JORGE       Row Name 02/22/25 1344          Vital Signs    Pre Systolic BP Rehab 122  -JORGE     Pre Treatment Diastolic BP 56  -JORGE     Post Systolic BP Rehab 128  -JORGE     Post Treatment Diastolic BP 69  -JORGE     Pretreatment Heart Rate (beats/min) 70  -JORGE     Posttreatment Heart Rate (beats/min) 74  -JORGE     Pre SpO2 (%) 96  -JORGE     O2 Delivery Pre Treatment hi-flow  -JORGE     Intra SpO2 (%) 92  -JORGE     O2 Delivery Intra Treatment non-rebreather  -JORGE     Post SpO2 (%) 99  -JOGRE     O2 Delivery Post Treatment hi-flow  -JORGE     Pre Patient Position  Supine  -JORGE     Intra Patient Position Standing  -JORGE     Post Patient Position Sitting  -JORGE       Row Name 02/22/25 1344          Positioning and Restraints    Pre-Treatment Position in bed  -JORGE     Post Treatment Position chair  -JORGE     In Chair notified nsg;reclined;sitting;call light within reach;encouraged to call for assist;with family/caregiver;with nsg  -JORGE               User Key  (r) = Recorded By, (t) = Taken By, (c) = Cosigned By      Initials Name Provider Type    Ronda Solis PT Physical Therapist                   Outcome Measures       Row Name 02/22/25 1348          How much help from another person do you currently need...    Turning from your back to your side while in flat bed without using bedrails? 2  -JORGE     Moving from lying on back to sitting on the side of a flat bed without bedrails? 2  -JORGE     Moving to and from a bed to a chair (including a wheelchair)? 3  -JORGE     Standing up from a chair using your arms (e.g., wheelchair, bedside chair)? 3  -JORGE     Climbing 3-5 steps with a railing? 1  -JORGE     To walk in hospital room? 2  -JORGE     AM-PAC 6 Clicks Score (PT) 13  -JORGE               User Key  (r) = Recorded By, (t) = Taken By, (c) = Cosigned By      Initials Name Provider Type    Ronda Solis PT Physical Therapist                                 Physical Therapy Education       Title: PT OT SLP Therapies (In Progress)       Topic: Physical Therapy (In Progress)       Point: Mobility training (In Progress)       Learning Progress Summary            Patient Acceptance, E, NR by JORGE at 2/22/2025 1300    Acceptance, E, NR by JORGE at 2/21/2025 1015                      Point: Home exercise program (In Progress)       Learning Progress Summary            Patient Acceptance, E, NR by JORGE at 2/22/2025 1300    Acceptance, E, NR by JORGE at 2/21/2025 1015                      Point: Body mechanics (In Progress)       Learning Progress Summary            Patient Acceptance, E, NR by JORGE at  2/22/2025 1300    Acceptance, E, NR by JORGE at 2/21/2025 1015                      Point: Precautions (In Progress)       Learning Progress Summary            Patient Acceptance, E, NR by JORGE at 2/22/2025 1300    Acceptance, E, NR by JORGE at 2/21/2025 1015                                      User Key       Initials Effective Dates Name Provider Type Discipline    JORGE 02/03/23 -  Ronda Torrez PT Physical Therapist PT                  PT Recommendation and Plan  Planned Therapy Interventions (PT): balance training, bed mobility training, gait training, home exercise program, strengthening, transfer training  Progress: improving  Outcome Evaluation: patient was able to increase ambulation to 50 +50 ft patient has chair right behind him for safety. patient had stable vitals with activity he requires non rebreather for ambulation to maintain sats over 90%. patient is limited by resp status and weakness. recommend IP rehab at D/C     Time Calculation:   PT Evaluation Complexity  History, PT Evaluation Complexity: 3 or more personal factors and/or comorbidities  Examination of Body Systems (PT Eval Complexity): total of 4 or more elements  Clinical Presentation (PT Evaluation Complexity): unstable  Clinical Decision Making (PT Evaluation Complexity): moderate complexity  Overall Complexity (PT Evaluation Complexity): moderate complexity     PT Charges       Row Name 02/22/25 1348             Time Calculation    Start Time 1300  -JORGE      PT Received On 02/22/25  -JORGE      PT Goal Re-Cert Due Date 03/03/25  -JORGE         Time Calculation- PT    Total Timed Code Minutes- PT 28 minute(s)  -JORGE         Timed Charges    69635 - PT Therapeutic Activity Minutes 28  -JORGE         Total Minutes    Timed Charges Total Minutes 28  -JORGE       Total Minutes 28  -JORGE                User Key  (r) = Recorded By, (t) = Taken By, (c) = Cosigned By      Initials Name Provider Type    Ronda Solis PT Physical Therapist                   Therapy Charges for Today       Code Description Service Date Service Provider Modifiers Qty    02636518807 HC PT EVAL MOD COMPLEXITY 4 2/21/2025 Ronda Torrez, PT GP 1    05285284789 HC PT THERAPEUTIC ACT EA 15 MIN 2/22/2025 Ronda Torrez, PT GP 2            PT G-Codes  AM-PAC 6 Clicks Score (PT): 13  PT Discharge Summary  Anticipated Discharge Disposition (PT): inpatient rehabilitation facility    Ronda Torrez, PT  2/22/2025     Post-Care Instructions: MOHS POST-OPERATIVE WOUND CARE\\n\\n1. Leave bandage on for the first 2 days.  Do not get the bandage wet (i.e., submerged in a bath, pool, or sweating).  Please limit activities while your stitches are in.  Stitches can break.  Skin can tear.  If you feel tension on the stitches you must stop that motion/activity.\\n\\n2. A shower may be taken after the first 2 days. Please take sponge baths during the first two days after your surgery, unless instructed otherwise.\\n\\n3. Carefully remove the bandage so as not to irritate the wound. \\n\\n4. Gently cleanse the area with soap and water.\\n\\n5. Apply a thin layer of Vaseline or Bacitracin ointment to the wound.  If another antibiotic has been prescribed, please apply as instructed on the prescription packaging. \\n\\n8. Cut a piece of non-stick gauze or Telfa pad to a size just large enough to cover the surgical site.\\n\\n9. Apply medical tape or hypoallergenic paper tape (for those with allergy to bandage adhesive) to hold your bandage in place.\\n\\n10.  The surgical site should be cleansed and dressed as described above on a daily basis until the day of   \\n   suture removal.  \\n\\n11.  If shaving around the area, leave your bandage on and shave around it.  Shaving over the sutures could \\n allow the wound to open up or increase your infection risk.\\n\\nDO NOT SWIM OR GO IN A HOT TUB UNTIL SUTURES HAVE BEEN REMOVED.\\n \\nADDITIONAL INSTRUCTIONS:\\n\\n1. Bruising and swelling may occur after the surgery and can increase for up to 48 hours.  Do not be alarmed--this will resolve over time.  Applying ice compresses over the bandage will help to decrease swelling and bruising:   ICE: Wrap a clean hand-towel or handkerchief around your ice pack.\\n Apply over the bandage for 15 minutes on and off for 2 hours to help reduce swelling.\\n\\n2. If slight bleeding occurs, apply continuous pressure for 20 minutes.  If bleeding persists or is severe, call the office.  If the office is closed, call 911 or go to the nearest emergency room.\\n\\n3. If the wound becomes hot or extremely tender to touch or it is bright red around the wound, extending 1 inch beyond the wound edge, please contact us at [phone].  If after hours, call [phone].\\n\\n4. Some oozing from the wound is normal.\\n\\n5. If healing without sutures:  Signs of healing may not be apparent for 2-3 weeks and the wound may take 6-8 weeks or longer to completely heal.  Clean the area with soap and water daily. Apply Vaseline daily or prescription ointment as directed.

## 2025-02-22 NOTE — PROGRESS NOTES
Critical Care Note     LOS: 4 days   Patient Care Team:  Leslie Rhodes MD as PCP - General (Family Medicine)  Delmer Perdomo MD as Cardiologist (Cardiology)    Chief Complaint/Reason for visit:      STEMI, 100% LAD status post balloon angioplasty, 90% proximal circumflex, 99% mid RCA    Acute systolic heart failure  PAF    S/pCABGX3,LAAL,MAZE 2/20    Chronic kidney disease stage IIIb  Diabetes mellitus type 2  Hypertension  Obstructive sleep apnea      Subjective     Interval History:     POD#2 CABG x 3, left atrial appendage ligation, maze.  He was on high flow yesterday and having difficulty maintaining oxygenation and was transitioned to BiPAP.  Overnight he also had oxygen issues and potentially mucous plugging.  Bronchodilators and Mucomyst initiated.  X-ray yesterday also revealed some edema and he underwent diuresis.  Despite 2 doses of diuretics on day shift his urine output was only 485.  On nights he received Zaroxolyn and additional Bumex and had 1.6 L.  This morning he is back on high flow.  He is having some mucus production.  He is net -2.5 L.  He is maintaining sinus rhythm.  He is off norepinephrine.      Review of Systems:    All systems were reviewed and negative except as noted in subjective.      Medical history, surgical history, social history, family history reviewed    Objective     Intake/Output:    Intake/Output Summary (Last 24 hours) at 2/22/2025 1131  Last data filed at 2/22/2025 1000  Gross per 24 hour   Intake 950 ml   Output 2435 ml   Net -1485 ml       Nutrition:  Diet: Liquid, Cardiac, Diabetic; Clear Liquid; Healthy Heart (2-3 Na+); Consistent Carbohydrate; Fluid Consistency: Thin (IDDSI 0)    Infusions:  niCARdipine, 5-15 mg/hr  nitroglycerin, 5-200 mcg/min, Last Rate: 50 mcg/min (02/20/25 0434)  phenylephrine, 0.5-3 mcg/kg/min        Mechanical Ventilator Settings:            Resp Rate (Set): 12  Pressure Support (cm H2O): 0 cm H20  FiO2 (%): 40 %  PEEP/CPAP (cm  "H2O): 5 cm H20    Minute Ventilation (L/min) (Obs): 7.82 L/min  Resp Rate (Observed) Vent: 20  I:E Ratio (Set): 1:2.50  I:E Ratio (Obs): 1:2.2    PIP Observed (cm H2O): 7.1 cm H2O       Telemetry: Sinus rhythm             Vital Signs  Blood pressure 120/67, pulse 70, temperature 99.3 °F (37.4 °C), temperature source Bladder, resp. rate 26, height 175.3 cm (69\"), weight 116 kg (255 lb), SpO2 94%.    Physical Exam:  General Appearance:  Overweight older gentleman sitting upright in bed in no respiratory distress, on high flow cannula   Head:  Atraumatic   Eyes:          Pupils equal, reactive.  Conjunctiva pink   Ears:     Throat: Oral mucosa moist   Neck: Trachea midline, right IJ line   Back:      Lungs:   Sternotomy incision intact. Mediastinal tube and left pleural drain  Symmetric chest expansion with scattered rhonchi, left greater than right and diminished breath sounds at the bases posteriorly    Heart:  Regular rhythm, slow rate , S1, S2 auscultated, systolic murmur, moderate rub   Abdomen:   Protuberant, bowel sounds active, soft   Rectal:   Deferred   Extremities: Improved edema.  Right lower extremity incision intact without erythema.   Pulses:    Skin: Cool and dry   Lymph nodes: No cervical adenopathy   Neurologic: Awake, face symmetric, speech fluent, moves all extremities      Results Review:     I reviewed the patient's new clinical results.   Results from last 7 days   Lab Units 02/22/25  0444 02/21/25  0359 02/20/25  2306 02/20/25  1715 02/18/25  2120 02/18/25  1734   SODIUM mmol/L 137 139  --  140   < > 141   POTASSIUM mmol/L 3.9 4.5 4.4 4.4   < > 3.7   CHLORIDE mmol/L 99 102  --  106   < > 105   CO2 mmol/L 24.0 25.0  --  21.0*   < > 25.0   BUN mg/dL 36* 26*  --  23   < > 23   CREATININE mg/dL 1.81* 1.60*  --  1.34*   < > 1.43*   CALCIUM mg/dL 8.1* 8.2*  --  7.7*   < > 8.6   BILIRUBIN mg/dL  --  0.6  --   --   --  0.6   ALK PHOS U/L  --  62  --   --   --  100   ALT (SGPT) U/L  --  31  --   --   --  " "61*   AST (SGOT) U/L  --  92*  --   --   --  276*   GLUCOSE mg/dL 173* 141*  --  194*   < > 181*    < > = values in this interval not displayed.     Results from last 7 days   Lab Units 02/22/25  0444 02/21/25  0359 02/20/25  1715   WBC 10*3/mm3 8.88 13.89* 15.62*   HEMOGLOBIN g/dL 10.8* 12.0* 12.0*   HEMATOCRIT % 33.0* 35.9* 35.1*   PLATELETS 10*3/mm3 118* 155 160     Results from last 7 days   Lab Units 02/21/25  2230   PH, ARTERIAL pH units 7.344*   PO2 ART mm Hg 56.0*   PCO2, ARTERIAL mm Hg 43.3   HCO3 ART mmol/L 23.6     Lab Results   Component Value Date    BLOODCX No growth at 5 days 05/03/2024     No results found for: \"URINECX\"    I reviewed the patient's new imaging including images and reports.    XR CHEST 1 VW    Date of Exam: 2/22/2025 3:11 AM EST    Indication: Post-Op Heart Surgery    Comparison: CXR 2/21/2025    Findings:  The heart is unchanged in size.    Right jugular central venous catheter is in unchanged position. The tip is in the upper SVC.    Left chest tube is in unchanged position. No pneumothorax is seen.    Subsegmental atelectasis the right lung base is unchanged.    Infiltrate or atelectasis at the left lung base is unchanged. Hazy density of the left lung may represent small effusion, and is unchanged.    Changes of median sternotomy are again appreciated.   Impression:     Impression:  No significant change in comparison to 2/21/2025.    Right jugular central venous catheter and left chest tube remain in place.      Electronically Signed: Oj Cuellar MD   2/22/2025 8:06 AM EST         Echocardiogram Comments: Intraoperative UBALDO postsurgery       Informed consent was obtained preoperatively.  Ubaldo probe passed without difficulty.Post CABG:  EF 45% on inotropes.  MR alen\ains mild.    Impression: CT chest  1. Cardiomegaly with minimal interstitial pulmonary edema.   2. Mild emphysema. Emphysema on CT is an independent risk factor for lung cancer. Low dose lung cancer screening should " be considered if patient qualifies based on smoking history or if not already enrolled in a screening program.   3. Aortic valve leaflet calcifications with fusiform aneurysmal dilation of the ascending thoracic aorta measuring up to 4.5 cm. Severe coronary disease.   4. Trace pericardial fluid containing intrinsic high density, hemopericardium is possible.   5. Bilateral striated nephrogram suspicious for possible acute tubular necrosis or pyelonephritis. Correlate with urinalysis.   6. Small sliding hiatal hernia with fluid noted to the level of the upper third thoracic esophagus. Findings suggest gastroesophageal reflux.         Electronically Signed: Delmer Benítez MD    2/18/2025 10:00 PM EST     Conclusion left heart catheterization February 18, 2025         Mid % occlusion (mixed thrombotic/calcific/fibrotic stenosis), status post balloon angioplasty with a 2.5 NC balloon at nominal pressure.    Mid circumflex 90%, mid RCA 99% subtotal occlusion with left-to-right collaterals    Discussed with CT surgery, given patient' coronary anatomy, LV systolic dysfunction and diabetic status, CABG is ideal.    Cangrelor started during the intervention and will be continued until time of surgery    IV Lasix for diuresis along with IV nitroglycerin    LVEF 35 to 40% with severe distal anterior, apical and distal inferior hypokinesis    Killip class III acute systolic heart failure present on arrival.  With LVEDP 37 mmHg    LV pressures (S/D/E) : 127/26/37 mmHg    Moderate systolic dysfunction.    There is moderate left ventricular systolic dysfunction.       All medications reviewed.   acetaminophen, 1,000 mg, Oral, Q8H  acetylcysteine, 4 mL, Nebulization, Q4H - RT  albumin human, 25 g, Intravenous, BID  amiodarone, 200 mg, Oral, Q8H   Followed by  [START ON 2/28/2025] amiodarone, 200 mg, Oral, Q12H   Followed by  [START ON 3/14/2025] amiodarone, 200 mg, Oral, Daily  aspirin, 162 mg, Oral, Daily  bumetanide, 1 mg,  Intravenous, Q12H  [Held by provider] carvedilol, 3.125 mg, Oral, BID With Meals  chlorhexidine, 15 mL, Mouth/Throat, Q12H  guaiFENesin, 1,200 mg, Oral, Q12H  insulin lispro, 2-9 Units, Subcutaneous, 4x Daily AC & at Bedtime  metoprolol tartrate, 12.5 mg, Oral, Q12H  mupirocin, 1 Application, Each Nare, BID  pharmacy consult - MTM, , Not Applicable, Daily  rosuvastatin, 40 mg, Oral, Nightly  senna-docusate sodium, 2 tablet, Oral, BID  sodium chloride, 10 mL, Intravenous, Q12H          Assessment & Plan       STEMI involving left anterior descending coronary artery    CVA (cerebral vascular accident)    Essential hypertension    Coronary artery disease involving native coronary artery of native heart without angina pectoris    Morbid obesity with BMI of 40.0-44.9, adult    Sleep apnea    PAF (paroxysmal atrial fibrillation)    Type 2 diabetes mellitus    Acute systolic CHF (congestive heart failure)    Coronary artery disease involving native coronary artery of native heart with unstable angina pectoris      74-year-old gentleman, remote smoker (quit 25 years), with hypertension, hypothyroid, dyslipidemia, diabetes, obesity, subarachnoid hemorrhage May 2024, 2 previous strokes without sequelae, paroxysmal atrial fibrillation presenting 2/18 with chest pain and ST elevation.  Heart catheterization revealed severe 3 vessel disease with reduced EF 35 to 40%.  100% mid LAD lesion balloon angioplastied.    2/20 he underwent CABG x 3, left atrial appendage ligation, maze.  Intraoperative JESSY revealed EF 45%, mild MR postprocedure.  He did have some burst of atrial fibrillation and was bolused with amiodarone and placed on a drip.  He is maintaining sinus rhythm.  Amiodarone has transition to p.o.  Mediastinal tube output 70 mL last night, chest tube only 70 mL as well.  Hemoglobin is 10.8.       He extubated NOS to BiPAP and then high flow cannula.  Chest x-ray yesterday revealed atelectasis at the bases and some pulmonary  edema.  He had a poor response to diuretics on day shift.  He received 20 mg of metolazone with 1 mg of Bumex last night and put out 1600 mL.  He also was having difficulty with coughing and atelectasis.  He was placed on Mucomyst and has had some mucus production.      He does have a known history of sleep apnea but is noncompliant with CPAP.  He was wearing BiPAP 16/8 last night and yesterday afternoon.    His A1c is 6.3.  Insulin drip transitioned yesterday.  Glucose running 140-170    Preoperative creatinine was 1.45-1.5.  Postoperatively now a little worse at 1.81, BUN 36, potassium 3.9      PLAN:      Carvedilol initiated but held for hypotension, would consider holding his metoprolol and continuing carvedilol but will defer to cardiology  Monitor electrolytes and replace  Monitor H&H, mediastinal chest tube output    Incentive spirometry  Wean FiO2 as able  BiPAP at night for sleep apnea  Add albuterol to Mucomyst  If Mucomyst becomes too irritating to the airway transition to hypertonic saline  Oral guaifenesin    More diuresis  Monitor BUN, creatinine    Aspirin, Crestor    Continue sliding scale insulin      VTE Prophylaxis: foot pumps    Stress Ulcer Prophylaxis: none    Rani Lilly MD  02/22/25  11:31 EST      Time: Critical care 30 min  I personally provided care to this critically ill patient as documented above.  Critical care time does not include time spent on separately billed procedures.  None of my critical care time was concurrent with other critical care providers.

## 2025-02-22 NOTE — PLAN OF CARE
Goal Outcome Evaluation:  Plan of Care Reviewed With: patient        Progress: improving  Outcome Evaluation: patient was able to increase ambulation to 50 +50 ft patient has chair right behind him for safety. patient had stable vitals with activity he requires non rebreather for ambulation to maintain sats over 90%. patient is limited by resp status and weakness. recommend IP rehab at D/C    Anticipated Discharge Disposition (PT): inpatient rehabilitation facility

## 2025-02-22 NOTE — NURSING NOTE
"0000 assessment patient unable to move LLE. Responded to pain by saying \"that hurts\" but no movement. LUE  weaker than the RUE. Repositioned and paged CT PA. Continued to monitor per PA.   Upon 0200 assessment patient able to move LLE by wiggling toes but remains weaker than RLE. LUE  remains weaker than RUE. Patient states LLE is tingling. Palpable pulses throughout.     This AM patient is neuro intact. Moving all extremities, L weaker than R. Remains on bipap @80%, o2 sats> 89%. Lung sounds coarse and rhonchi. HR and SBP stable. UOP:1600mls. Pleural drain:125mls Medistinal drain:75mls. PRN meds given for pain throughout night, minimal relief.   "

## 2025-02-22 NOTE — PROGRESS NOTES
[facility] at [facility]  INPATIENT PROGRESS NOTE         [facility]    2/22/2025      PATIENT IDENTIFICATION:   Name:  Camron Hendrix      MRN:  [id number]     74 y.o.  male             Reason for visit: CAD, acute systolic heart failure, paroxysmal atrial fibrillation, hypertension, hyperlipidemia      SUBJECTIVE: sore  50% fio2   Off pressors  OBJECTIVE:  Vitals:    02/22/25 0600 02/22/25 0638 02/22/25 0642 02/22/25 0659   BP: 124/77   111/69   BP Location: Left arm      Patient Position: Lying      Pulse: 68 69 69 68   Resp: 26   (!) 29   Temp: 99.7 °F (37.6 °C)      TempSrc: Bladder      SpO2: 98% (!) 88% (!) 89% 92%   Weight:       Height:         FiO2 (%): 40 %     Body mass index is 37.66 kg/m².    Intake/Output Summary (Last 24 hours) at 2/22/2025 0853  Last data filed at 2/22/2025 0832  Gross per 24 hour   Intake 950 ml   Output 2280 ml   Net -1330 ml       Telemetry: Personally reviewed, normal sinus rhythm, no arrhythmia     Exam:  General Appearance:   Awake appropriate 50% fio2 mask  Neck:  thyroid not enlarged  supple  Respiratory:  no respiratory distress  normal breath sounds  no rales  Cardiovascular:  no jugular venous distention  regular rhythm  apical impulse normal  S1 normal, S2 normal  no S3, no S4   no murmur  no rub, no thrill  carotid pulses normal; no bruit  pedal pulses normal  lower extremity edema: 1+  Skin:   warm, dry      Allergies   Allergen Reactions    Codeine Hallucinations     Scheduled meds:       acetaminophen, 1,000 mg, Oral, Q8H  acetylcysteine, 4 mL, Nebulization, Q4H - RT  albumin human, 25 g, Intravenous, BID  amiodarone, 200 mg, Oral, Q8H   Followed by  [START ON 2/28/2025] amiodarone, 200 mg, Oral, Q12H   Followed by  [START ON 3/14/2025] amiodarone, 200 mg, Oral, Daily  aspirin, 162 mg, Oral, Daily  bumetanide, 1 mg, Intravenous, Q12H  [Held by provider] carvedilol, 3.125 mg, Oral, BID With Meals  chlorhexidine, 15 mL,  "Mouth/Throat, Q12H  guaiFENesin, 1,200 mg, Oral, Q12H  insulin lispro, 2-9 Units, Subcutaneous, 4x Daily AC & at Bedtime  mupirocin, 1 Application, Each Nare, BID  pharmacy consult - MTM, , Not Applicable, Daily  rosuvastatin, 40 mg, Oral, Nightly  senna-docusate sodium, 2 tablet, Oral, BID  sodium chloride, 10 mL, Intravenous, Q12H      IV meds:                      niCARdipine, 5-15 mg/hr  nitroglycerin, 5-200 mcg/min, Last Rate: 50 mcg/min (02/20/25 0434)  phenylephrine, 0.5-3 mcg/kg/min      Data Review:  Results from last 7 days   Lab Units 02/22/25  0444 02/21/25  0359 02/20/25  1715 02/20/25  1354   SODIUM mmol/L 137 139 140 139   BUN mg/dL 36* 26* 23 24*   CREATININE mg/dL 1.81* 1.60* 1.34* 1.40*   GLUCOSE mg/dL 173* 141* 194* 178*     Results from last 7 days   Lab Units 02/22/25  0444 02/21/25  0359 02/20/25  1715 02/20/25  1354 02/20/25  1234 02/20/25  1101 02/20/25  0345   WBC 10*3/mm3 8.88 13.89* 15.62* 16.25*  --   --  10.04   HEMOGLOBIN g/dL 10.8* 12.0* 12.0* 12.7*  --   --  13.1   HEMOGLOBIN, POC g/dL  --   --   --   --  9.2*   < >  --     < > = values in this interval not displayed.     Results from last 7 days   Lab Units 02/21/25  0359 02/20/25  1354 02/18/25  1734   INR  1.36* 1.73* 1.34*     Results from last 7 days   Lab Units 02/21/25  0359 02/18/25  1734   ALT (SGPT) U/L 31 61*   AST (SGOT) U/L 92* 276*     No results found for: \"DIGOXIN\"   Lab Results   Component Value Date    TSH 1.770 04/24/2024     Results from last 7 days   Lab Units 02/19/25  0519   CHOLESTEROL mg/dL 105   HDL CHOL mg/dL 38*       Estimated Creatinine Clearance: 45.7 mL/min (A) (by C-G formula based on SCr of 1.81 mg/dL (H)).        Imaging (last 24 hr):   I personally reviewed the most recent chest x-ray and other pertinent imaging studies.  Results for orders placed during the hospital encounter of 02/18/25    XR Chest 1 View    Narrative  XR CHEST 1 VW    Date of Exam: 2/21/2025 10:39 PM EST    Indication: diminished " left lung sounds    Comparison: Chest radiograph 2/21/2025    Findings:  Gastric tube removed. Right IJ central catheter tip terminates over upper SVC. Prior median sternotomy, CABG and left atrial appendage closure device. Left thoracotomy tube in stable position without visualized pneumothorax. Overall improved inspiratory  effort/lung aeration. Persistent and similar-appearing small effusion with left greater than right bibasilar airspace opacities suggesting atelectasis versus airspace disease in the right clinical setting. Similar prominence of the central vasculature  suggesting vascular congestion without overt edema. No significant right effusion or pneumothorax. Aortic atherosclerotic disease. Degenerative related osseous change.    Impression  Impression:  Improved inspiratory effort and removed gastric tube, otherwise radiographically stable chest.      Electronically Signed: Delmer Benítez MD  2/21/2025 10:58 PM EST  Workstation ID: [id number]        Last ECHO:  Results for orders placed during the hospital encounter of 02/18/25    Adult Transthoracic Echo Complete W/ Cont if Necessary Per Protocol    Interpretation Summary    Left ventricular systolic function is mildly decreased. Calculated left ventricular EF = 44% Left ventricular ejection fraction appears to be 41 - 45%.    Left ventricular wall thickness is consistent with moderate concentric hypertrophy.    The following left ventricular wall segments are akinetic: mid anterior, apical anterior, apical septal and apex. C/w with LAD territory infarct.    Left ventricular diastolic function is consistent with (grade Ia w/high LAP) impaired relaxation.    Mild aortic valve stenosis is present.    Peak velocity of the flow distal to the aortic valve is 277 cm/s. Aortic valve mean pressure gradient is 14 mmHg.    The aortic root measures 4.4 cm. The aortic root (corrected for BSA) measures 1.9 cm. Mild dilation of the ascending aorta is present.    Mild  to moderate mitral valve regurgitation.    Depressed ejection fraction and wall motion abnormalities are new compared to 4/2024; mild aortic stenosis is unchanged.        PROBLEM LIST:     STEMI involving left anterior descending coronary artery    CVA (cerebral vascular accident)    Essential hypertension    Coronary artery disease involving native coronary artery of native heart without angina pectoris    Morbid obesity with BMI of 40.0-44.9, adult    Sleep apnea    PAF (paroxysmal atrial fibrillation)    Type 2 diabetes mellitus    Acute systolic CHF (congestive heart failure)    Coronary artery disease involving native coronary artery of native heart with unstable angina pectoris        Initial cardiac assessment: 74-year-old gentleman presenting with anterior STEMI status post balloon angioplasty of the LAD but with residual multivessel disease depressed ejection fraction acute systolic heart failure status post 3V CABG with Dr. Borjas 2/20/2025.  Status post left atrial appendage ligation and maze procedure as well.    ASSESSMENT/PLAN:  1.  Anterior STEMI with multivessel CAD EF 45%:  Status post 3V CABG (LIMA-LAD, SVG-OM, SVG-RCA)    Held metoprolol while on Levophed, ND >210ms.        2.  Acute systolic heart failure:  Adequate diuresis over past 24 hours, continuing to maintain net negative fluid balance  Currently cardiac output is acceptable with cardiac index of 2.3  Continue Levophed to support blood pressure  Monitor urine output closely agree with albumin    3.  Paroxysmal atrial fibrillation present on admission  Appreciate STEFANO ligation and maze procedure during bypass surgery  Currently on amiodarone   No anticoagulation necessary due to left atrial appendage ligation at time of CABG    4.  History of hypertension:  Holding antihypertensives currently while on vasopressors  Will adjust home medications and optimize for GDMT for systolic heart failure    5.  Mixed hyperlipidemia:  Goal LDL less than  50, currently at goal on high-dose rosuvastatin 40 mg daily continue for now.    6.  A/CRF- trending labs w diuresis    The patient remains critically ill in the ICU currently.    Discussed with patient's nurse      Ankit Kamara MD  2/22/2025    08:53 EST

## 2025-02-22 NOTE — NURSING NOTE
2200~ Patient transferred from chair to bed using lift. Patient on hiflow NC at 70% 40 L sats >90%. Placed on Bipap 40% and sats dropped to 74%> Respiratory at bedside changing bipap settings. No improvement after 10 mins. NP notified, ABG obtained, xray obtained. Patient coughed and immediately sats increased to 99%. Patient NT suctioned and educated on coughing and deep breathing

## 2025-02-22 NOTE — PROGRESS NOTES
Cardiothoracic Surgery Progress Note      POD # 2 s/p CABGx3, Maze, LAAL     LOS: 4 days      Subjective:  Some LLE weakness overnight that appears resolved this AM, off levo, tolerated BiPAP    Objective:  Vital Signs  Temp:  [98.2 °F (36.8 °C)-100.2 °F (37.9 °C)] 99.3 °F (37.4 °C)  Heart Rate:  [63-94] 70  Resp:  [15-30] 24  BP: (106-146)/(53-81) 120/67  Arterial Line BP: ()/(46-60) 128/60    Physical Exam:   General Appearance: alert, appears stated age and cooperative   Lungs:  takes shallow breaths, decreased lung sounds at bases   Heart: regular rhythm & normal rate, normal S1, S2, no murmur, no gallop, no rub, and no click   Skin: Incision c/d/i     Results:    Results from last 7 days   Lab Units 02/22/25  0444   WBC 10*3/mm3 8.88   HEMOGLOBIN g/dL 10.8*   HEMATOCRIT % 33.0*   PLATELETS 10*3/mm3 118*     Results from last 7 days   Lab Units 02/22/25  1203 02/22/25  0444   SODIUM mmol/L  --  137   POTASSIUM mmol/L 3.8 3.9   CHLORIDE mmol/L  --  99   CO2 mmol/L  --  24.0   BUN mg/dL  --  36*   CREATININE mg/dL  --  1.81*   GLUCOSE mg/dL  --  173*   CALCIUM mg/dL  --  8.1*       Assessment:  POD #2 s/p urgent CABG x 3 (LIMA-LAD, SV-OM, SVG-PDA), modified maze, LAAL performed 2/20/2025       STEMI involving left anterior descending coronary artery    CVA (cerebral vascular accident)    Essential hypertension    Coronary artery disease involving native coronary artery of native heart without angina pectoris    Morbid obesity with BMI of 40.0-44.9, adult    Sleep apnea    PAF (paroxysmal atrial fibrillation)    Type 2 diabetes mellitus    Acute systolic CHF (congestive heart failure)    Coronary artery disease involving native coronary artery of native heart with unstable angina pectoris    Plan:  Off pressors, hemodynamics stable, remains in sinus  Responding to diuresis, Cr up to 1.8  Cont 25% albumin and lasix, monitor Cr  D/C mediastinal tube, pleural likely out tmrw  Cont TPW  Pulmonary  toilet  Ambulate/OOB  Aspirin, statin, BB    Steve Borjas MD  02/22/25  12:38 EST

## 2025-02-22 NOTE — SIGNIFICANT NOTE
See RN note.    In short, called for hypoxia despite maximal NIPPV settings.  No air movement auscultated on L.      Results from last 7 days   Lab Units 02/21/25  2230   PH, ARTERIAL pH units 7.344*   PO2 ART mm Hg 56.0*   PCO2, ARTERIAL mm Hg 43.3   HCO3 ART mmol/L 23.6     US of L lung did not reveal any PTX.  CXR STAT ordered.  While pending, patient coughed forcefully and spO2 improved and BS audible on L.  CXR shows aeration bilaterally, actually improved from prior study, most likely 2/t + pressure on NIPPV.      Patient has had ineffective pulmonary toileting 2/t mentation.  Will schedule mucomyst/albuterol q 4 w/ NT suctioning afterwarsd x 1 D.  Mucolytics ordered as well.  Flutter valve ordered.  Aggressive pulmonary toileting required.  If has issues w/ mucous plugging again may require intubation and bronchoscopy.

## 2025-02-23 ENCOUNTER — APPOINTMENT (OUTPATIENT)
Dept: GENERAL RADIOLOGY | Facility: HOSPITAL | Age: 75
End: 2025-02-23
Payer: MEDICARE

## 2025-02-23 LAB
ANION GAP SERPL CALCULATED.3IONS-SCNC: 11 MMOL/L (ref 5–15)
BUN SERPL-MCNC: 43 MG/DL (ref 8–23)
BUN/CREAT SERPL: 23.2 (ref 7–25)
CALCIUM SPEC-SCNC: 8.2 MG/DL (ref 8.6–10.5)
CHLORIDE SERPL-SCNC: 94 MMOL/L (ref 98–107)
CO2 SERPL-SCNC: 29 MMOL/L (ref 22–29)
CREAT SERPL-MCNC: 1.85 MG/DL (ref 0.76–1.27)
DEPRECATED RDW RBC AUTO: 41.9 FL (ref 37–54)
EGFRCR SERPLBLD CKD-EPI 2021: 38 ML/MIN/1.73
ERYTHROCYTE [DISTWIDTH] IN BLOOD BY AUTOMATED COUNT: 13.4 % (ref 12.3–15.4)
GLUCOSE BLDC GLUCOMTR-MCNC: 130 MG/DL (ref 70–130)
GLUCOSE BLDC GLUCOMTR-MCNC: 144 MG/DL (ref 70–130)
GLUCOSE BLDC GLUCOMTR-MCNC: 280 MG/DL (ref 70–130)
GLUCOSE SERPL-MCNC: 153 MG/DL (ref 65–99)
HCT VFR BLD AUTO: 31 % (ref 37.5–51)
HGB BLD-MCNC: 10.3 G/DL (ref 13–17.7)
MCH RBC QN AUTO: 28.5 PG (ref 26.6–33)
MCHC RBC AUTO-ENTMCNC: 33.2 G/DL (ref 31.5–35.7)
MCV RBC AUTO: 85.9 FL (ref 79–97)
PLATELET # BLD AUTO: 136 10*3/MM3 (ref 140–450)
PMV BLD AUTO: 11.9 FL (ref 6–12)
POTASSIUM SERPL-SCNC: 3.6 MMOL/L (ref 3.5–5.2)
POTASSIUM SERPL-SCNC: 3.6 MMOL/L (ref 3.5–5.2)
POTASSIUM SERPL-SCNC: 3.7 MMOL/L (ref 3.5–5.2)
QT INTERVAL: 414 MS
QTC INTERVAL: 440 MS
RBC # BLD AUTO: 3.61 10*6/MM3 (ref 4.14–5.8)
SODIUM SERPL-SCNC: 134 MMOL/L (ref 136–145)
WBC NRBC COR # BLD AUTO: 6.59 10*3/MM3 (ref 3.4–10.8)

## 2025-02-23 PROCEDURE — 94799 UNLISTED PULMONARY SVC/PX: CPT

## 2025-02-23 PROCEDURE — P9047 ALBUMIN (HUMAN), 25%, 50ML: HCPCS | Performed by: THORACIC SURGERY (CARDIOTHORACIC VASCULAR SURGERY)

## 2025-02-23 PROCEDURE — 63710000001 INSULIN LISPRO (HUMAN) PER 5 UNITS: Performed by: INTERNAL MEDICINE

## 2025-02-23 PROCEDURE — 82948 REAGENT STRIP/BLOOD GLUCOSE: CPT

## 2025-02-23 PROCEDURE — 25010000002 BUMETANIDE PER 0.5 MG: Performed by: THORACIC SURGERY (CARDIOTHORACIC VASCULAR SURGERY)

## 2025-02-23 PROCEDURE — 94660 CPAP INITIATION&MGMT: CPT

## 2025-02-23 PROCEDURE — 84132 ASSAY OF SERUM POTASSIUM: CPT | Performed by: THORACIC SURGERY (CARDIOTHORACIC VASCULAR SURGERY)

## 2025-02-23 PROCEDURE — 71045 X-RAY EXAM CHEST 1 VIEW: CPT

## 2025-02-23 PROCEDURE — 93005 ELECTROCARDIOGRAM TRACING: CPT | Performed by: INTERNAL MEDICINE

## 2025-02-23 PROCEDURE — 85027 COMPLETE CBC AUTOMATED: CPT | Performed by: PHYSICIAN ASSISTANT

## 2025-02-23 PROCEDURE — 94664 DEMO&/EVAL PT USE INHALER: CPT

## 2025-02-23 PROCEDURE — 99232 SBSQ HOSP IP/OBS MODERATE 35: CPT | Performed by: INTERNAL MEDICINE

## 2025-02-23 PROCEDURE — 93010 ELECTROCARDIOGRAM REPORT: CPT | Performed by: INTERNAL MEDICINE

## 2025-02-23 PROCEDURE — 80048 BASIC METABOLIC PNL TOTAL CA: CPT | Performed by: PHYSICIAN ASSISTANT

## 2025-02-23 PROCEDURE — 25010000002 ALBUMIN HUMAN 25% PER 50 ML: Performed by: THORACIC SURGERY (CARDIOTHORACIC VASCULAR SURGERY)

## 2025-02-23 RX ORDER — POTASSIUM CHLORIDE 1.5 G/1.58G
20 POWDER, FOR SOLUTION ORAL ONCE
Status: COMPLETED | OUTPATIENT
Start: 2025-02-23 | End: 2025-02-23

## 2025-02-23 RX ORDER — ALBUTEROL SULFATE 0.83 MG/ML
2.5 SOLUTION RESPIRATORY (INHALATION)
Status: COMPLETED | OUTPATIENT
Start: 2025-02-23 | End: 2025-02-24

## 2025-02-23 RX ORDER — ACETAMINOPHEN 500 MG
1000 TABLET ORAL EVERY 8 HOURS PRN
Status: DISCONTINUED | OUTPATIENT
Start: 2025-02-23 | End: 2025-02-28 | Stop reason: HOSPADM

## 2025-02-23 RX ORDER — MORPHINE SULFATE 2 MG/ML
2 INJECTION, SOLUTION INTRAMUSCULAR; INTRAVENOUS
Status: DISCONTINUED | OUTPATIENT
Start: 2025-02-23 | End: 2025-02-24

## 2025-02-23 RX ORDER — POTASSIUM CHLORIDE 1.5 G/1.58G
40 POWDER, FOR SOLUTION ORAL EVERY 4 HOURS
Status: COMPLETED | OUTPATIENT
Start: 2025-02-23 | End: 2025-02-23

## 2025-02-23 RX ADMIN — ACETYLCYSTEINE 4 ML: 200 SOLUTION ORAL; RESPIRATORY (INHALATION) at 09:14

## 2025-02-23 RX ADMIN — ALBUTEROL SULFATE 2.5 MG: 2.5 SOLUTION RESPIRATORY (INHALATION) at 16:38

## 2025-02-23 RX ADMIN — BUMETANIDE 1 MG: 0.25 INJECTION INTRAMUSCULAR; INTRAVENOUS at 21:03

## 2025-02-23 RX ADMIN — OXYCODONE HYDROCHLORIDE 10 MG: 10 TABLET ORAL at 00:07

## 2025-02-23 RX ADMIN — ALBUTEROL SULFATE 2.5 MG: 2.5 SOLUTION RESPIRATORY (INHALATION) at 12:28

## 2025-02-23 RX ADMIN — ACETAMINOPHEN 1000 MG: 500 TABLET, FILM COATED ORAL at 03:11

## 2025-02-23 RX ADMIN — BUMETANIDE 1 MG: 0.25 INJECTION INTRAMUSCULAR; INTRAVENOUS at 08:04

## 2025-02-23 RX ADMIN — MUPIROCIN 1 APPLICATION: 20 OINTMENT TOPICAL at 08:04

## 2025-02-23 RX ADMIN — ACETYLCYSTEINE 4 ML: 200 SOLUTION ORAL; RESPIRATORY (INHALATION) at 16:38

## 2025-02-23 RX ADMIN — ROSUVASTATIN 40 MG: 20 TABLET, FILM COATED ORAL at 21:03

## 2025-02-23 RX ADMIN — AMIODARONE HYDROCHLORIDE 200 MG: 200 TABLET ORAL at 15:31

## 2025-02-23 RX ADMIN — OXYCODONE HYDROCHLORIDE 10 MG: 10 TABLET ORAL at 19:43

## 2025-02-23 RX ADMIN — ALBUTEROL SULFATE 2.5 MG: 2.5 SOLUTION RESPIRATORY (INHALATION) at 03:54

## 2025-02-23 RX ADMIN — AMIODARONE HYDROCHLORIDE 200 MG: 200 TABLET ORAL at 00:06

## 2025-02-23 RX ADMIN — GUAIFENESIN 1200 MG: 600 TABLET ORAL at 21:03

## 2025-02-23 RX ADMIN — ACETYLCYSTEINE 4 ML: 200 SOLUTION ORAL; RESPIRATORY (INHALATION) at 03:54

## 2025-02-23 RX ADMIN — SENNOSIDES AND DOCUSATE SODIUM 2 TABLET: 50; 8.6 TABLET ORAL at 08:03

## 2025-02-23 RX ADMIN — AMIODARONE HYDROCHLORIDE 200 MG: 200 TABLET ORAL at 08:04

## 2025-02-23 RX ADMIN — ACETAMINOPHEN 1000 MG: 500 TABLET, FILM COATED ORAL at 12:52

## 2025-02-23 RX ADMIN — TRAMADOL HYDROCHLORIDE 100 MG: 50 TABLET, COATED ORAL at 03:11

## 2025-02-23 RX ADMIN — SENNOSIDES AND DOCUSATE SODIUM 2 TABLET: 50; 8.6 TABLET ORAL at 21:03

## 2025-02-23 RX ADMIN — Medication 10 ML: at 21:05

## 2025-02-23 RX ADMIN — INSULIN LISPRO 6 UNITS: 100 INJECTION, SOLUTION INTRAVENOUS; SUBCUTANEOUS at 21:02

## 2025-02-23 RX ADMIN — ACETYLCYSTEINE 4 ML: 200 SOLUTION ORAL; RESPIRATORY (INHALATION) at 19:40

## 2025-02-23 RX ADMIN — Medication 10 ML: at 08:05

## 2025-02-23 RX ADMIN — ALBUMIN (HUMAN) 25 G: 0.25 INJECTION, SOLUTION INTRAVENOUS at 08:02

## 2025-02-23 RX ADMIN — ALBUMIN (HUMAN) 25 G: 0.25 INJECTION, SOLUTION INTRAVENOUS at 21:04

## 2025-02-23 RX ADMIN — ALBUTEROL SULFATE 2.5 MG: 2.5 SOLUTION RESPIRATORY (INHALATION) at 09:14

## 2025-02-23 RX ADMIN — POTASSIUM CHLORIDE 20 MEQ: 1.5 POWDER, FOR SOLUTION ORAL at 08:00

## 2025-02-23 RX ADMIN — ACETYLCYSTEINE 4 ML: 200 SOLUTION ORAL; RESPIRATORY (INHALATION) at 00:05

## 2025-02-23 RX ADMIN — ACETYLCYSTEINE 4 ML: 200 SOLUTION ORAL; RESPIRATORY (INHALATION) at 23:14

## 2025-02-23 RX ADMIN — MUPIROCIN 1 APPLICATION: 20 OINTMENT TOPICAL at 21:03

## 2025-02-23 RX ADMIN — CARVEDILOL 3.12 MG: 3.12 TABLET, FILM COATED ORAL at 18:01

## 2025-02-23 RX ADMIN — POTASSIUM CHLORIDE 40 MEQ: 1.5 FOR SOLUTION ORAL at 18:01

## 2025-02-23 RX ADMIN — ACETYLCYSTEINE 4 ML: 200 SOLUTION ORAL; RESPIRATORY (INHALATION) at 12:28

## 2025-02-23 RX ADMIN — ALBUTEROL SULFATE 2.5 MG: 2.5 SOLUTION RESPIRATORY (INHALATION) at 23:14

## 2025-02-23 RX ADMIN — INSULIN LISPRO 6 UNITS: 100 INJECTION, SOLUTION INTRAVENOUS; SUBCUTANEOUS at 12:40

## 2025-02-23 RX ADMIN — GUAIFENESIN 1200 MG: 600 TABLET ORAL at 08:04

## 2025-02-23 RX ADMIN — ALBUTEROL SULFATE 2.5 MG: 2.5 SOLUTION RESPIRATORY (INHALATION) at 00:05

## 2025-02-23 RX ADMIN — CARVEDILOL 3.12 MG: 3.12 TABLET, FILM COATED ORAL at 08:04

## 2025-02-23 RX ADMIN — AMIODARONE HYDROCHLORIDE 200 MG: 200 TABLET ORAL at 23:20

## 2025-02-23 RX ADMIN — ASPIRIN 81 MG CHEWABLE TABLET 162 MG: 81 TABLET CHEWABLE at 08:03

## 2025-02-23 RX ADMIN — POTASSIUM CHLORIDE 40 MEQ: 1.5 FOR SOLUTION ORAL at 13:53

## 2025-02-23 RX ADMIN — ALBUTEROL SULFATE 2.5 MG: 2.5 SOLUTION RESPIRATORY (INHALATION) at 19:40

## 2025-02-23 NOTE — PROGRESS NOTES
Critical Care Note     LOS: 5 days   Patient Care Team:  Leslie Rhodes MD as PCP - General (Family Medicine)  Delmer Perdomo MD as Cardiologist (Cardiology)    Chief Complaint/Reason for visit:      STEMI, 100% LAD status post balloon angioplasty, 90% proximal circumflex, 99% mid RCA    Acute systolic heart failure  PAF    S/pCABGX3,LAAL,MAZE 2/20    Chronic kidney disease stage IIIb  Diabetes mellitus type 2  Hypertension  Obstructive sleep apnea      Subjective     Interval History:     POD#3 CABG x 3, left atrial appendage ligation, maze.  He had some atrial fibrillation for several hours last night with a controlled rate and converted back into sinus rhythm.  He admits to a cough but is no longer bringing up mucoid secretions.  He is on high flow cannula but down to 40% FiO2.  He walked 120 feet with therapy today.  He is tolerating a p.o. diet.  He remains afebrile.  High flow was weaned to 3 L and saturation is 93%.  Brisk diuresis yesterday of 3.1 L.  Chest tubes pulled today.            Review of Systems:    All systems were reviewed and negative except as noted in subjective.      Medical history, surgical history, social history, family history reviewed    Objective     Intake/Output:    Intake/Output Summary (Last 24 hours) at 2/23/2025 1641  Last data filed at 2/23/2025 1400  Gross per 24 hour   Intake 939 ml   Output 3940 ml   Net -3001 ml       Nutrition:  Diet: Cardiac, Diabetic; Healthy Heart (2-3 Na+); Consistent Carbohydrate; Fluid Consistency: Thin (IDDSI 0)    Infusions:  niCARdipine, 5-15 mg/hr        Mechanical Ventilator Settings:            Resp Rate (Set): 12  Pressure Support (cm H2O): 0 cm H20  FiO2 (%): 40 %  PEEP/CPAP (cm H2O): 5 cm H20    Minute Ventilation (L/min) (Obs): 7.82 L/min  Resp Rate (Observed) Vent: 20  I:E Ratio (Set): 1:2.50  I:E Ratio (Obs): 1:2.2    PIP Observed (cm H2O): 7.1 cm H2O       Telemetry: Sinus rhythm             Vital Signs  Blood pressure  "109/65, pulse 64, temperature 99 °F (37.2 °C), temperature source Bladder, resp. rate 18, height 175.3 cm (69\"), weight 116 kg (255 lb), SpO2 93%.    Physical Exam:  General Appearance:  Overweight older gentleman sitting upright in bed in no respiratory distress, on high flow cannula   Head:  Atraumatic   Eyes:          Pupils equal, reactive.  Conjunctiva pink   Ears:     Throat: Oral mucosa moist   Neck: Trachea midline, right IJ line   Back:      Lungs:   Sternotomy incision intact.  Better.  Clear examination with less rhonchi.  Still diminished in the left base    Heart:  Regular rhythm, slow rate , S1, S2 auscultated, systolic murmur,    Abdomen:   Protuberant, bowel sounds active, soft   Rectal:   Deferred   Extremities: Improved edema.  Right lower extremity incision intact without erythema.   Pulses:    Skin: Cool and dry   Lymph nodes: No cervical adenopathy   Neurologic: Awake, face symmetric, speech fluent, moves all extremities      Results Review:     I reviewed the patient's new clinical results.   Results from last 7 days   Lab Units 02/23/25  1246 02/23/25  0522 02/22/25  1826 02/22/25  1203 02/22/25  0444 02/21/25  0359 02/18/25  2120 02/18/25  1734   SODIUM mmol/L  --  134*  --   --  137 139   < > 141   POTASSIUM mmol/L 3.6 3.7 3.9   < > 3.9 4.5   < > 3.7   CHLORIDE mmol/L  --  94*  --   --  99 102   < > 105   CO2 mmol/L  --  29.0  --   --  24.0 25.0   < > 25.0   BUN mg/dL  --  43*  --   --  36* 26*   < > 23   CREATININE mg/dL  --  1.85*  --   --  1.81* 1.60*   < > 1.43*   CALCIUM mg/dL  --  8.2*  --   --  8.1* 8.2*   < > 8.6   BILIRUBIN mg/dL  --   --   --   --   --  0.6  --  0.6   ALK PHOS U/L  --   --   --   --   --  62  --  100   ALT (SGPT) U/L  --   --   --   --   --  31  --  61*   AST (SGOT) U/L  --   --   --   --   --  92*  --  276*   GLUCOSE mg/dL  --  153*  --   --  173* 141*   < > 181*    < > = values in this interval not displayed.     Results from last 7 days   Lab Units " "02/23/25  0522 02/22/25  0444 02/21/25  0359   WBC 10*3/mm3 6.59 8.88 13.89*   HEMOGLOBIN g/dL 10.3* 10.8* 12.0*   HEMATOCRIT % 31.0* 33.0* 35.9*   PLATELETS 10*3/mm3 136* 118* 155     Results from last 7 days   Lab Units 02/21/25  2230   PH, ARTERIAL pH units 7.344*   PO2 ART mm Hg 56.0*   PCO2, ARTERIAL mm Hg 43.3   HCO3 ART mmol/L 23.6     Lab Results   Component Value Date    BLOODCX No growth at 5 days 05/03/2024     No results found for: \"URINECX\"    I reviewed the patient's new imaging including images and reports.      XR CHEST 1 VW    Date of Exam: 2/23/2025 6:30 AM EST    Indication: Chest tube dislodged    Comparison: 2/22/2025.    Findings:  Stable right-sided central venous catheter. Stable findings of prior median sternotomy. Stable mild to moderate pulmonary edema and small left pleural effusion. Stable cardiomegaly. No new lung opacity. Indistinct pulmonary vasculature. No acute osseous  abnormality.   Impression:     Impression:  1.Stable right-sided central venous catheter.  2.Stable mild to moderate pulmonary edema and small left pleural effusion.  3.Stable cardiomegaly.          Electronically Signed: Pratik Hernandez MD   2/23/2025 7:01 AM EST       Echocardiogram Comments: Intraoperative UBALDO postsurgery       Informed consent was obtained preoperatively.  Ubaldo probe passed without difficulty.Post CABG:  EF 45% on inotropes.  MR alen\ains mild.    Impression: CT chest  1. Cardiomegaly with minimal interstitial pulmonary edema.   2. Mild emphysema. Emphysema on CT is an independent risk factor for lung cancer. Low dose lung cancer screening should be considered if patient qualifies based on smoking history or if not already enrolled in a screening program.   3. Aortic valve leaflet calcifications with fusiform aneurysmal dilation of the ascending thoracic aorta measuring up to 4.5 cm. Severe coronary disease.   4. Trace pericardial fluid containing intrinsic high density, hemopericardium is " possible.   5. Bilateral striated nephrogram suspicious for possible acute tubular necrosis or pyelonephritis. Correlate with urinalysis.   6. Small sliding hiatal hernia with fluid noted to the level of the upper third thoracic esophagus. Findings suggest gastroesophageal reflux.         Electronically Signed: Delmer Benítez MD    2/18/2025 10:00 PM EST     Conclusion left heart catheterization February 18, 2025         Mid % occlusion (mixed thrombotic/calcific/fibrotic stenosis), status post balloon angioplasty with a 2.5 NC balloon at nominal pressure.    Mid circumflex 90%, mid RCA 99% subtotal occlusion with left-to-right collaterals    Discussed with CT surgery, given patient' coronary anatomy, LV systolic dysfunction and diabetic status, CABG is ideal.    Cangrelor started during the intervention and will be continued until time of surgery    IV Lasix for diuresis along with IV nitroglycerin    LVEF 35 to 40% with severe distal anterior, apical and distal inferior hypokinesis    Killip class III acute systolic heart failure present on arrival.  With LVEDP 37 mmHg    LV pressures (S/D/E) : 127/26/37 mmHg    Moderate systolic dysfunction.    There is moderate left ventricular systolic dysfunction.       All medications reviewed.   acetylcysteine, 4 mL, Nebulization, Q4H - RT  albumin human, 25 g, Intravenous, BID  albuterol, 2.5 mg, Nebulization, Q4H - RT  amiodarone, 200 mg, Oral, Q8H   Followed by  [START ON 2/28/2025] amiodarone, 200 mg, Oral, Q12H   Followed by  [START ON 3/14/2025] amiodarone, 200 mg, Oral, Daily  aspirin, 162 mg, Oral, Daily  bumetanide, 1 mg, Intravenous, Q12H  carvedilol, 3.125 mg, Oral, BID With Meals  guaiFENesin, 1,200 mg, Oral, Q12H  insulin lispro, 2-9 Units, Subcutaneous, 4x Daily AC & at Bedtime  mupirocin, 1 Application, Each Nare, BID  pharmacy consult - MTM, , Not Applicable, Daily  potassium chloride, 40 mEq, Oral, Q4H  rosuvastatin, 40 mg, Oral,  Nightly  senna-docusate sodium, 2 tablet, Oral, BID  sodium chloride, 10 mL, Intravenous, Q12H          Assessment & Plan       STEMI involving left anterior descending coronary artery    CVA (cerebral vascular accident)    Essential hypertension    Coronary artery disease involving native coronary artery of native heart without angina pectoris    Morbid obesity with BMI of 40.0-44.9, adult    Sleep apnea    PAF (paroxysmal atrial fibrillation)    Type 2 diabetes mellitus    Acute systolic CHF (congestive heart failure)    Coronary artery disease involving native coronary artery of native heart with unstable angina pectoris      74-year-old gentleman, remote smoker (quit 25 years), with hypertension, hypothyroid, dyslipidemia, diabetes, obesity, subarachnoid hemorrhage May 2024, 2 previous strokes without sequelae, paroxysmal atrial fibrillation presenting 2/18 with chest pain and ST elevation.  Heart catheterization revealed severe 3 vessel disease with reduced EF 35 to 40%.  100% mid LAD lesion balloon angioplastied.    2/20 he underwent CABG x 3, left atrial appendage ligation, maze.  Intraoperative JESSY revealed EF 45%, mild MR postprocedure.  He did have some burst of atrial fibrillation and was bolused with amiodarone and placed on a drip.  Amiodarone has transition to p.o. he is having some episodes of atrial fibrillation but is mostly staying in sinus rhythm.     He extubated NOS to BiPAP and then high flow cannula.  Chest x-ray  revealed atelectasis at the bases and some pulmonary edema.  .  He received 20 mg of metolazone with 1 mg of Bumex last night and put out 1600 mL.  He then received an additional diuretics and has had a brisk response with improvement in edema and oxygenation.  He has now been weaned to 3 L.    2/23 chest tubes removed    He does have a known history of sleep apnea but is noncompliant with CPAP.  He was wearing BiPAP 16/8 last night     His A1c is 6.3.  Insulin drip transitioned  yesterday.  Glucose running 140-280    Preoperative creatinine was 1.45-1.5.  Postoperatively now a little worse at 1.85, BUN 43, potassium 3.7      PLAN:    Ongoing diuresis, Bumex 1 mg IV every 12 hours  Monitor BUN, creatinine  Replace electrolytes as needed  Chest tubes pulled today    Incentive spirometry  Wean FiO2 as able  BiPAP at night for sleep apnea  Stop Mucomyst  Continue guaifenesin   Acapella valve    Aspirin, Crestor    Continue sliding scale insulin  May need to add meal coverage with sliding scale or add Lantus.  He was on nothing for blood sugar as an outpatient    Continue to mobilize with therapy    VTE Prophylaxis: foot pumps    Stress Ulcer Prophylaxis: none    Rani Lilly MD  02/23/25  16:41 EST      Time: Critical care 25 min  I personally provided care to this critically ill patient as documented above.  Critical care time does not include time spent on separately billed procedures.  None of my critical care time was concurrent with other critical care providers.

## 2025-02-23 NOTE — PROGRESS NOTES
[facility]  INPATIENT PROGRESS NOTE         [facility]    2/23/2025      PATIENT IDENTIFICATION:   Name:  Camron Hendrix      MRN:  [id number]     74 y.o.  male             Reason for visit: CAD, acute systolic heart failure, paroxysmal atrial fibrillation, hypertension, hyperlipidemia      SUBJECTIVE:   4 hours afib last pm  MTs out      OBJECTIVE:  Vitals:    02/23/25 0530 02/23/25 0600 02/23/25 0630 02/23/25 0804   BP: 121/73 115/69 119/70 129/79   BP Location:  Left arm     Patient Position:  Lying     Pulse: 64 64 65 65   Resp:  20     Temp:  99.3 °F (37.4 °C)     TempSrc:  Bladder     SpO2: 98% 98% 98%    Weight:       Height:         FiO2 (%): 40 %     Body mass index is 37.66 kg/m².    Intake/Output Summary (Last 24 hours) at 2/23/2025 0845  Last data filed at 2/23/2025 0802  Gross per 24 hour   Intake 509 ml   Output 3190 ml   Net -2681 ml       Telemetry: Personally reviewed, normal sinus rhythm, no arrhythmia     Exam:  General Appearance:   Awake appropriate 50% fio2 mask  Neck:  thyroid not enlarged  supple  Respiratory:  no respiratory distress  normal breath sounds  no rales  Cardiovascular:  no jugular venous distention  regular rhythm  apical impulse normal  S1 normal, S2 normal  no S3, no S4   no murmur  no rub, no thrill  carotid pulses normal; no bruit  pedal pulses normal  lower extremity edema: 1+  Skin:   warm, dry      Allergies   Allergen Reactions    Codeine Hallucinations     Scheduled meds:       acetylcysteine, 4 mL, Nebulization, Q4H - RT  albumin human, 25 g, Intravenous, BID  albuterol, 2.5 mg, Nebulization, Q4H - RT  amiodarone, 200 mg, Oral, Q8H   Followed by  [START ON 2/28/2025] amiodarone, 200 mg, Oral, Q12H   Followed by  [START ON 3/14/2025] amiodarone, 200 mg, Oral, Daily  aspirin, 162 mg, Oral, Daily  bumetanide, 1 mg, Intravenous, Q12H  carvedilol, 3.125 mg, Oral, BID With Meals  guaiFENesin, 1,200 mg, Oral,  "Q12H  insulin lispro, 2-9 Units, Subcutaneous, 4x Daily AC & at Bedtime  mupirocin, 1 Application, Each Nare, BID  pharmacy consult - MTM, , Not Applicable, Daily  rosuvastatin, 40 mg, Oral, Nightly  senna-docusate sodium, 2 tablet, Oral, BID  sodium chloride, 10 mL, Intravenous, Q12H      IV meds:                      niCARdipine, 5-15 mg/hr      Data Review:  Results from last 7 days   Lab Units 02/23/25  0522 02/22/25  0444 02/21/25  0359 02/20/25  1715   SODIUM mmol/L 134* 137 139 140   BUN mg/dL 43* 36* 26* 23   CREATININE mg/dL 1.85* 1.81* 1.60* 1.34*   GLUCOSE mg/dL 153* 173* 141* 194*     Results from last 7 days   Lab Units 02/23/25  0522 02/22/25  0444 02/21/25  0359 02/20/25  1715 02/20/25  1354   WBC 10*3/mm3 6.59 8.88 13.89* 15.62* 16.25*   HEMOGLOBIN g/dL 10.3* 10.8* 12.0* 12.0* 12.7*     Results from last 7 days   Lab Units 02/21/25  0359 02/20/25  1354 02/18/25  1734   INR  1.36* 1.73* 1.34*     Results from last 7 days   Lab Units 02/21/25  0359 02/18/25  1734   ALT (SGPT) U/L 31 61*   AST (SGOT) U/L 92* 276*     No results found for: \"DIGOXIN\"   Lab Results   Component Value Date    TSH 1.770 04/24/2024     Results from last 7 days   Lab Units 02/19/25  0519   CHOLESTEROL mg/dL 105   HDL CHOL mg/dL 38*       Estimated Creatinine Clearance: 44.7 mL/min (A) (by C-G formula based on SCr of 1.85 mg/dL (H)).        Imaging (last 24 hr):   I personally reviewed the most recent chest x-ray and other pertinent imaging studies.  Results for orders placed during the hospital encounter of 02/18/25    XR Chest 1 View    Narrative  XR CHEST 1 VW    Date of Exam: 2/23/2025 6:30 AM EST    Indication: Chest tube dislodged    Comparison: 2/22/2025.    Findings:  Stable right-sided central venous catheter. Stable findings of prior median sternotomy. Stable mild to moderate pulmonary edema and small left pleural effusion. Stable cardiomegaly. No new lung opacity. Indistinct pulmonary vasculature. No acute " osseous  abnormality.    Impression  Impression:  1.Stable right-sided central venous catheter.  2.Stable mild to moderate pulmonary edema and small left pleural effusion.  3.Stable cardiomegaly.          Electronically Signed: Pratik Hernandez MD  2/23/2025 7:01 AM EST  Workstation ID: [id number]        Last ECHO:  Results for orders placed during the hospital encounter of 02/18/25    Adult Transthoracic Echo Complete W/ Cont if Necessary Per Protocol    Interpretation Summary    Left ventricular systolic function is mildly decreased. Calculated left ventricular EF = 44% Left ventricular ejection fraction appears to be 41 - 45%.    Left ventricular wall thickness is consistent with moderate concentric hypertrophy.    The following left ventricular wall segments are akinetic: mid anterior, apical anterior, apical septal and apex. C/w with LAD territory infarct.    Left ventricular diastolic function is consistent with (grade Ia w/high LAP) impaired relaxation.    Mild aortic valve stenosis is present.    Peak velocity of the flow distal to the aortic valve is 277 cm/s. Aortic valve mean pressure gradient is 14 mmHg.    The aortic root measures 4.4 cm. The aortic root (corrected for BSA) measures 1.9 cm. Mild dilation of the ascending aorta is present.    Mild to moderate mitral valve regurgitation.    Depressed ejection fraction and wall motion abnormalities are new compared to 4/2024; mild aortic stenosis is unchanged.        PROBLEM LIST:     STEMI involving left anterior descending coronary artery    CVA (cerebral vascular accident)    Essential hypertension    Coronary artery disease involving native coronary artery of native heart without angina pectoris    Morbid obesity with BMI of 40.0-44.9, adult    Sleep apnea    PAF (paroxysmal atrial fibrillation)    Type 2 diabetes mellitus    Acute systolic CHF (congestive heart failure)    Coronary artery disease involving native coronary artery of native heart with  unstable angina pectoris        Initial cardiac assessment: 74-year-old gentleman presenting with anterior STEMI status post balloon angioplasty of the LAD but with residual multivessel disease depressed ejection fraction acute systolic heart failure status post 3V CABG with Dr. Borjas 2/20/2025.  Status post left atrial appendage ligation and maze procedure as well.    ASSESSMENT/PLAN:  1.  Anterior STEMI with multivessel CAD EF 45%:  Status post 3V CABG (LIMA-LAD, SVG-OM, SVG-RCA) 2/20/25 Dr Borjas    Held metoprolol while on Levophed,         2.  Acute systolic heart failure:   continuing to maintain net negative fluid balance  L pleural effusion marginally better      3.  Paroxysmal atrial fibrillation present on admission  Appreciate STEFANO ligation and maze procedure during bypass surgery  Currently on amiodarone   No anticoagulation necessary due to left atrial appendage ligation at time of CABG    4.  History of hypertension:  Holding antihypertensives currently while on vasopressors  Will adjust home medications and optimize for GDMT for systolic heart failure    5.  Mixed hyperlipidemia:  Goal LDL less than 50, currently at goal on high-dose rosuvastatin 40 mg daily continue for now.    6.  A/CRF- trending labs w diuresis          Ankit Kamara MD  2/23/2025    08:45 EST

## 2025-02-23 NOTE — PROGRESS NOTES
Nutrition Services    Patient Name:  Camron Hendrix  YOB: 1950  MRN: [id number]  Admit Date:  2/18/2025     Pt identified NPO/Clr liq diet > 72 hrs. Clear liquid supplement added. Menu provided for likely diet advance. Follow per protocol.     Electronically signed by:  Isabella Castaneda RD  02/22/25 22:23 EST

## 2025-02-23 NOTE — PROGRESS NOTES
Cardiothoracic Surgery Progress Note      POD # 3 s/p CABGx3, Maze, LAAL      LOS: 5 days      Subjective:  No complaints, rate-controlled afib overnight but back in sinus, tolerating biPAP    Objective:  Vital Signs  Temp:  [99 °F (37.2 °C)-99.5 °F (37.5 °C)] 99 °F (37.2 °C)  Heart Rate:  [] 64  Resp:  [14-24] 18  BP: ()/(62-89) 109/65    Physical Exam:  General Appearance: alert, appears stated age and cooperative  Lungs:  takes shallow breaths, decreased lung sounds at bases  Heart: regular rhythm & normal rate, normal S1, S2, no murmur, no gallop, no rub, and no click  Skin: Incision c/d/i     Results:    Results from last 7 days   Lab Units 02/23/25  0522   WBC 10*3/mm3 6.59   HEMOGLOBIN g/dL 10.3*   HEMATOCRIT % 31.0*   PLATELETS 10*3/mm3 136*     Results from last 7 days   Lab Units 02/23/25  1246 02/23/25  0522   SODIUM mmol/L  --  134*   POTASSIUM mmol/L 3.6 3.7   CHLORIDE mmol/L  --  94*   CO2 mmol/L  --  29.0   BUN mg/dL  --  43*   CREATININE mg/dL  --  1.85*   GLUCOSE mg/dL  --  153*   CALCIUM mg/dL  --  8.2*       Assessment:    Cardiothoracic Surgery Progress Note        POD # 2 s/p CABGx3, Maze, LAAL      LOS: 4 days       Subjective:  Some LLE weakness overnight that appears resolved this AM, off levo, tolerated BiPAP     Objective:  Vital Signs  Temp:  [98.2 °F (36.8 °C)-100.2 °F (37.9 °C)] 99.3 °F (37.4 °C)  Heart Rate:  [63-94] 70  Resp:  [15-30] 24  BP: (106-146)/(53-81) 120/67  Arterial Line BP: ()/(46-60) 128/60     Physical Exam:              General Appearance: alert, appears stated age and cooperative              Lungs:  takes shallow breaths, decreased lung sounds at bases              Heart: regular rhythm & normal rate, normal S1, S2, no murmur, no gallop, no rub, and no click              Skin: Incision c/d/i                Results:          Results from last 7 days   Lab Units 02/22/25  0444   WBC 10*3/mm3 8.88   HEMOGLOBIN g/dL 10.8*   HEMATOCRIT % 33.0*   PLATELETS  10*3/mm3 118*            Results from last 7 days   Lab Units 02/22/25  1203 02/22/25  0444   SODIUM mmol/L  --  137   POTASSIUM mmol/L 3.8 3.9   CHLORIDE mmol/L  --  99   CO2 mmol/L  --  24.0   BUN mg/dL  --  36*   CREATININE mg/dL  --  1.81*   GLUCOSE mg/dL  --  173*   CALCIUM mg/dL  --  8.1*         Assessment:  POD #2 s/p urgent CABG x 3 (LIMA-LAD, SV-OM, SVG-PDA), modified maze, LAAL performed 2/20/2025       STEMI involving left anterior descending coronary artery    CVA (cerebral vascular accident)    Essential hypertension    Coronary artery disease involving native coronary artery of native heart without angina pectoris    Morbid obesity with BMI of 40.0-44.9, adult    Sleep apnea    PAF (paroxysmal atrial fibrillation)    Type 2 diabetes mellitus    Acute systolic CHF (congestive heart failure)    Coronary artery disease involving native coronary artery of native heart with unstable angina pectoris     Plan:  Some rate-controlled afib but pressures stable, no anticoagulation  Cr stable, good UOP, cont bumex  Pleural tube d/c'd  Cont TPW due to rythmn abberancy  Pulmonary toilet  Ambulate/OOB  Aspirin, statin, BB  Stable for downgrade       Steve Borjas MD  02/23/25  16:41 EST

## 2025-02-24 ENCOUNTER — APPOINTMENT (OUTPATIENT)
Dept: GENERAL RADIOLOGY | Facility: HOSPITAL | Age: 75
End: 2025-02-24
Payer: MEDICARE

## 2025-02-24 LAB
ABO GROUP BLD: NORMAL
ANION GAP SERPL CALCULATED.3IONS-SCNC: 12 MMOL/L (ref 5–15)
BASOPHILS # BLD AUTO: 0.02 10*3/MM3 (ref 0–0.2)
BASOPHILS NFR BLD AUTO: 0.3 % (ref 0–1.5)
BLD GP AB SCN SERPL QL: NEGATIVE
BUN SERPL-MCNC: 49 MG/DL (ref 8–23)
BUN/CREAT SERPL: 28 (ref 7–25)
CALCIUM SPEC-SCNC: 8.7 MG/DL (ref 8.6–10.5)
CHLORIDE SERPL-SCNC: 95 MMOL/L (ref 98–107)
CO2 SERPL-SCNC: 27 MMOL/L (ref 22–29)
CREAT SERPL-MCNC: 1.75 MG/DL (ref 0.76–1.27)
DEPRECATED RDW RBC AUTO: 41.7 FL (ref 37–54)
EGFRCR SERPLBLD CKD-EPI 2021: 40.6 ML/MIN/1.73
EOSINOPHIL # BLD AUTO: 0.16 10*3/MM3 (ref 0–0.4)
EOSINOPHIL NFR BLD AUTO: 2.7 % (ref 0.3–6.2)
ERYTHROCYTE [DISTWIDTH] IN BLOOD BY AUTOMATED COUNT: 13.4 % (ref 12.3–15.4)
GLUCOSE BLDC GLUCOMTR-MCNC: 147 MG/DL (ref 70–130)
GLUCOSE BLDC GLUCOMTR-MCNC: 256 MG/DL (ref 70–130)
GLUCOSE BLDC GLUCOMTR-MCNC: 261 MG/DL (ref 70–130)
GLUCOSE BLDC GLUCOMTR-MCNC: 369 MG/DL (ref 70–130)
GLUCOSE SERPL-MCNC: 138 MG/DL (ref 65–99)
HCT VFR BLD AUTO: 30 % (ref 37.5–51)
HGB BLD-MCNC: 10 G/DL (ref 13–17.7)
IMM GRANULOCYTES # BLD AUTO: 0.02 10*3/MM3 (ref 0–0.05)
IMM GRANULOCYTES NFR BLD AUTO: 0.3 % (ref 0–0.5)
LYMPHOCYTES # BLD AUTO: 0.74 10*3/MM3 (ref 0.7–3.1)
LYMPHOCYTES NFR BLD AUTO: 12.3 % (ref 19.6–45.3)
MAGNESIUM SERPL-MCNC: 2.3 MG/DL (ref 1.6–2.4)
MCH RBC QN AUTO: 28.6 PG (ref 26.6–33)
MCHC RBC AUTO-ENTMCNC: 33.3 G/DL (ref 31.5–35.7)
MCV RBC AUTO: 85.7 FL (ref 79–97)
MONOCYTES # BLD AUTO: 0.75 10*3/MM3 (ref 0.1–0.9)
MONOCYTES NFR BLD AUTO: 12.4 % (ref 5–12)
NEUTROPHILS NFR BLD AUTO: 4.34 10*3/MM3 (ref 1.7–7)
NEUTROPHILS NFR BLD AUTO: 72 % (ref 42.7–76)
NRBC BLD AUTO-RTO: 0 /100 WBC (ref 0–0.2)
PHOSPHATE SERPL-MCNC: 2.3 MG/DL (ref 2.5–4.5)
PLATELET # BLD AUTO: 148 10*3/MM3 (ref 140–450)
PMV BLD AUTO: 11.3 FL (ref 6–12)
POTASSIUM SERPL-SCNC: 3.1 MMOL/L (ref 3.5–5.2)
POTASSIUM SERPL-SCNC: 4 MMOL/L (ref 3.5–5.2)
QT INTERVAL: 324 MS
QTC INTERVAL: 422 MS
RBC # BLD AUTO: 3.5 10*6/MM3 (ref 4.14–5.8)
RH BLD: POSITIVE
SODIUM SERPL-SCNC: 134 MMOL/L (ref 136–145)
T&S EXPIRATION DATE: NORMAL
WBC NRBC COR # BLD AUTO: 6.03 10*3/MM3 (ref 3.4–10.8)

## 2025-02-24 PROCEDURE — 25010000002 BUMETANIDE PER 0.5 MG: Performed by: THORACIC SURGERY (CARDIOTHORACIC VASCULAR SURGERY)

## 2025-02-24 PROCEDURE — 94799 UNLISTED PULMONARY SVC/PX: CPT

## 2025-02-24 PROCEDURE — 86901 BLOOD TYPING SEROLOGIC RH(D): CPT | Performed by: THORACIC SURGERY (CARDIOTHORACIC VASCULAR SURGERY)

## 2025-02-24 PROCEDURE — 97530 THERAPEUTIC ACTIVITIES: CPT

## 2025-02-24 PROCEDURE — 85025 COMPLETE CBC W/AUTO DIFF WBC: CPT | Performed by: THORACIC SURGERY (CARDIOTHORACIC VASCULAR SURGERY)

## 2025-02-24 PROCEDURE — 93005 ELECTROCARDIOGRAM TRACING: CPT | Performed by: THORACIC SURGERY (CARDIOTHORACIC VASCULAR SURGERY)

## 2025-02-24 PROCEDURE — 63710000001 INSULIN LISPRO (HUMAN) PER 5 UNITS: Performed by: INTERNAL MEDICINE

## 2025-02-24 PROCEDURE — 25010000002 POTASSIUM CHLORIDE PER 2 MEQ: Performed by: THORACIC SURGERY (CARDIOTHORACIC VASCULAR SURGERY)

## 2025-02-24 PROCEDURE — 86850 RBC ANTIBODY SCREEN: CPT | Performed by: THORACIC SURGERY (CARDIOTHORACIC VASCULAR SURGERY)

## 2025-02-24 PROCEDURE — 99232 SBSQ HOSP IP/OBS MODERATE 35: CPT | Performed by: PHYSICIAN ASSISTANT

## 2025-02-24 PROCEDURE — 86900 BLOOD TYPING SEROLOGIC ABO: CPT | Performed by: THORACIC SURGERY (CARDIOTHORACIC VASCULAR SURGERY)

## 2025-02-24 PROCEDURE — 84100 ASSAY OF PHOSPHORUS: CPT | Performed by: THORACIC SURGERY (CARDIOTHORACIC VASCULAR SURGERY)

## 2025-02-24 PROCEDURE — 99232 SBSQ HOSP IP/OBS MODERATE 35: CPT | Performed by: INTERNAL MEDICINE

## 2025-02-24 PROCEDURE — 82948 REAGENT STRIP/BLOOD GLUCOSE: CPT

## 2025-02-24 PROCEDURE — 80048 BASIC METABOLIC PNL TOTAL CA: CPT | Performed by: PHYSICIAN ASSISTANT

## 2025-02-24 PROCEDURE — 93010 ELECTROCARDIOGRAM REPORT: CPT | Performed by: INTERNAL MEDICINE

## 2025-02-24 PROCEDURE — 25010000002 ALBUMIN HUMAN 25% PER 50 ML: Performed by: THORACIC SURGERY (CARDIOTHORACIC VASCULAR SURGERY)

## 2025-02-24 PROCEDURE — 25010000002 HEPARIN (PORCINE) PER 1000 UNITS: Performed by: INTERNAL MEDICINE

## 2025-02-24 PROCEDURE — 83735 ASSAY OF MAGNESIUM: CPT | Performed by: THORACIC SURGERY (CARDIOTHORACIC VASCULAR SURGERY)

## 2025-02-24 PROCEDURE — 71045 X-RAY EXAM CHEST 1 VIEW: CPT

## 2025-02-24 PROCEDURE — 94660 CPAP INITIATION&MGMT: CPT

## 2025-02-24 PROCEDURE — P9047 ALBUMIN (HUMAN), 25%, 50ML: HCPCS | Performed by: THORACIC SURGERY (CARDIOTHORACIC VASCULAR SURGERY)

## 2025-02-24 PROCEDURE — 84132 ASSAY OF SERUM POTASSIUM: CPT | Performed by: THORACIC SURGERY (CARDIOTHORACIC VASCULAR SURGERY)

## 2025-02-24 RX ORDER — POTASSIUM CHLORIDE 1500 MG/1
40 TABLET, EXTENDED RELEASE ORAL EVERY 4 HOURS
Status: COMPLETED | OUTPATIENT
Start: 2025-02-24 | End: 2025-02-25

## 2025-02-24 RX ORDER — CARVEDILOL 3.12 MG/1
3.12 TABLET ORAL ONCE
Status: COMPLETED | OUTPATIENT
Start: 2025-02-24 | End: 2025-02-24

## 2025-02-24 RX ORDER — HEPARIN SODIUM 5000 [USP'U]/ML
5000 INJECTION, SOLUTION INTRAVENOUS; SUBCUTANEOUS EVERY 8 HOURS SCHEDULED
Status: DISCONTINUED | OUTPATIENT
Start: 2025-02-24 | End: 2025-02-28 | Stop reason: HOSPADM

## 2025-02-24 RX ORDER — POTASSIUM CHLORIDE 29.8 MG/ML
20 INJECTION INTRAVENOUS
Status: COMPLETED | OUTPATIENT
Start: 2025-02-24 | End: 2025-02-24

## 2025-02-24 RX ORDER — LEVOTHYROXINE SODIUM 100 UG/1
200 TABLET ORAL
Status: DISCONTINUED | OUTPATIENT
Start: 2025-02-24 | End: 2025-02-28 | Stop reason: HOSPADM

## 2025-02-24 RX ORDER — CARVEDILOL 6.25 MG/1
6.25 TABLET ORAL 2 TIMES DAILY WITH MEALS
Status: DISCONTINUED | OUTPATIENT
Start: 2025-02-24 | End: 2025-02-28 | Stop reason: HOSPADM

## 2025-02-24 RX ORDER — OXYCODONE HYDROCHLORIDE 5 MG/1
5 TABLET ORAL EVERY 4 HOURS PRN
Status: DISPENSED | OUTPATIENT
Start: 2025-02-24 | End: 2025-02-27

## 2025-02-24 RX ADMIN — ALBUTEROL SULFATE 2.5 MG: 2.5 SOLUTION RESPIRATORY (INHALATION) at 03:57

## 2025-02-24 RX ADMIN — OXYCODONE HYDROCHLORIDE 5 MG: 5 TABLET ORAL at 17:59

## 2025-02-24 RX ADMIN — HEPARIN SODIUM 5000 UNITS: 5000 INJECTION INTRAVENOUS; SUBCUTANEOUS at 21:46

## 2025-02-24 RX ADMIN — MUPIROCIN 1 APPLICATION: 20 OINTMENT TOPICAL at 08:29

## 2025-02-24 RX ADMIN — Medication 10 ML: at 20:35

## 2025-02-24 RX ADMIN — BUMETANIDE 1 MG: 0.25 INJECTION INTRAMUSCULAR; INTRAVENOUS at 08:27

## 2025-02-24 RX ADMIN — ACETAMINOPHEN 1000 MG: 500 TABLET, FILM COATED ORAL at 13:04

## 2025-02-24 RX ADMIN — INSULIN LISPRO 6 UNITS: 100 INJECTION, SOLUTION INTRAVENOUS; SUBCUTANEOUS at 17:25

## 2025-02-24 RX ADMIN — LEVOTHYROXINE SODIUM 200 MCG: 0.1 TABLET ORAL at 12:50

## 2025-02-24 RX ADMIN — GUAIFENESIN 1200 MG: 600 TABLET ORAL at 08:27

## 2025-02-24 RX ADMIN — POTASSIUM CHLORIDE 40 MEQ: 1500 TABLET, EXTENDED RELEASE ORAL at 20:33

## 2025-02-24 RX ADMIN — POTASSIUM CHLORIDE 20 MEQ: 400 INJECTION, SOLUTION INTRAVENOUS at 01:40

## 2025-02-24 RX ADMIN — CARVEDILOL 3.12 MG: 3.12 TABLET, FILM COATED ORAL at 08:27

## 2025-02-24 RX ADMIN — SENNOSIDES AND DOCUSATE SODIUM 2 TABLET: 50; 8.6 TABLET ORAL at 08:27

## 2025-02-24 RX ADMIN — MUPIROCIN 1 APPLICATION: 20 OINTMENT TOPICAL at 20:34

## 2025-02-24 RX ADMIN — CARVEDILOL 6.25 MG: 6.25 TABLET, FILM COATED ORAL at 20:34

## 2025-02-24 RX ADMIN — ACETYLCYSTEINE 4 ML: 200 SOLUTION ORAL; RESPIRATORY (INHALATION) at 03:57

## 2025-02-24 RX ADMIN — ALBUMIN (HUMAN) 25 G: 0.25 INJECTION, SOLUTION INTRAVENOUS at 08:27

## 2025-02-24 RX ADMIN — INSULIN LISPRO 8 UNITS: 100 INJECTION, SOLUTION INTRAVENOUS; SUBCUTANEOUS at 12:50

## 2025-02-24 RX ADMIN — HEPARIN SODIUM 5000 UNITS: 5000 INJECTION INTRAVENOUS; SUBCUTANEOUS at 13:49

## 2025-02-24 RX ADMIN — ASPIRIN 81 MG CHEWABLE TABLET 162 MG: 81 TABLET CHEWABLE at 08:27

## 2025-02-24 RX ADMIN — BUMETANIDE 1 MG: 0.25 INJECTION INTRAMUSCULAR; INTRAVENOUS at 20:34

## 2025-02-24 RX ADMIN — INSULIN LISPRO 8 UNITS: 100 INJECTION, SOLUTION INTRAVENOUS; SUBCUTANEOUS at 20:34

## 2025-02-24 RX ADMIN — CARVEDILOL 3.12 MG: 3.12 TABLET, FILM COATED ORAL at 13:49

## 2025-02-24 RX ADMIN — SENNOSIDES AND DOCUSATE SODIUM 2 TABLET: 50; 8.6 TABLET ORAL at 20:34

## 2025-02-24 RX ADMIN — GUAIFENESIN 1200 MG: 600 TABLET ORAL at 20:33

## 2025-02-24 RX ADMIN — AMIODARONE HYDROCHLORIDE 200 MG: 200 TABLET ORAL at 17:24

## 2025-02-24 RX ADMIN — Medication 10 ML: at 08:29

## 2025-02-24 RX ADMIN — OXYCODONE HYDROCHLORIDE 10 MG: 10 TABLET ORAL at 01:40

## 2025-02-24 RX ADMIN — POTASSIUM CHLORIDE 20 MEQ: 400 INJECTION, SOLUTION INTRAVENOUS at 00:17

## 2025-02-24 RX ADMIN — ROSUVASTATIN 40 MG: 20 TABLET, FILM COATED ORAL at 20:34

## 2025-02-24 RX ADMIN — ALBUMIN (HUMAN) 25 G: 0.25 INJECTION, SOLUTION INTRAVENOUS at 20:31

## 2025-02-24 RX ADMIN — AMIODARONE HYDROCHLORIDE 200 MG: 200 TABLET ORAL at 08:27

## 2025-02-24 NOTE — PLAN OF CARE
Goal Outcome Evaluation:  Plan of Care Reviewed With: patient        Progress: improving  Outcome Evaluation: Pt ambulated 110ft +110ft with modA x2 and B UE support on tripod monitor. Pt required consistent cues for upright posture and improved management of monitor. Pt unsteady with tendency to veer to the left, unable to correct to avoid obstacles. Pt required several standing rest breaks. Ambulation distance limited by increased SOA and worsening gait mechanics. Periods of decreased command following and poor safety awareness noted. Continue to recommend PT skilled care and D/C to IP rehab facility.

## 2025-02-24 NOTE — PROGRESS NOTES
Intensive Care Follow-up     Hospital:  LOS: 6 days   Mr. Camron Hendrix, 74 y.o. male is followed for:   STEMI involving left anterior descending coronary artery        Subjective     74-year-old gentleman, remote smoker (quit 25 years), with hypertension, hypothyroid, dyslipidemia, diabetes, obesity, subarachnoid hemorrhage May 2024, 2 previous strokes without sequelae, paroxysmal atrial fibrillation presenting 2/18 with chest pain and ST elevation.  Heart catheterization revealed severe 3 vessel disease with reduced EF 35 to 40%.  100% mid LAD lesion balloon angioplastied.     2/20 he underwent CABG x 3, left atrial appendage ligation, maze.  Intraoperative JESSY revealed EF 45%, mild MR postprocedure.  He did have some burst of atrial fibrillation and was bolused with amiodarone and placed on a drip.  Amiodarone has transition to p.o. he is having some episodes of atrial fibrillation but is mostly staying in sinus rhythm.  Interval History:  Paresthesia reviewed.  The patient had previously been in atrial fibrillation but is currently in normal sinus rhythm.  He did wear BiPAP overnight but is currently on 4 L nasal cannula.  He does complain of pain primarily at his incision sites.  Urinary outflow remains good.    The patient's past medical, surgical and social history were reviewed and updated in Epic as appropriate.        Objective     Infusions:  niCARdipine, 5-15 mg/hr      Medications:  albumin human, 25 g, Intravenous, BID  amiodarone, 200 mg, Oral, Q8H   Followed by  [START ON 2/28/2025] amiodarone, 200 mg, Oral, Q12H   Followed by  [START ON 3/14/2025] amiodarone, 200 mg, Oral, Daily  aspirin, 162 mg, Oral, Daily  bumetanide, 1 mg, Intravenous, Q12H  carvedilol, 3.125 mg, Oral, BID With Meals  guaiFENesin, 1,200 mg, Oral, Q12H  heparin (porcine), 5,000 Units, Subcutaneous, Q8H  insulin lispro, 2-9 Units, Subcutaneous, 4x Daily AC & at Bedtime  levothyroxine, 200 mcg, Oral, Q AM  mupirocin, 1  "Application, Each Nare, BID  pharmacy consult - MTM, , Not Applicable, Daily  rosuvastatin, 40 mg, Oral, Nightly  senna-docusate sodium, 2 tablet, Oral, BID  sodium chloride, 10 mL, Intravenous, Q12H        Vital Sign Min/Max for last 24 hours  Temp  Min: 97.6 °F (36.4 °C)  Max: 100.4 °F (38 °C)   BP  Min: 105/69  Max: 135/75   Pulse  Min: 59  Max: 73   Resp  Min: 13  Max: 28   SpO2  Min: 93 %  Max: 100 %   Flow (L/min) (Oxygen Therapy)  Min: 2  Max: 40       Input/Output for last 24 hour shift  02/23 0701 - 02/24 0700  In: 1340 [P.O.:1040]  Out: 3650 [Urine:3650]      Objective:  General Appearance:  Uncomfortable and in no acute distress.    Vital signs: (most recent): Blood pressure 106/63, pulse 64, temperature 97.6 °F (36.4 °C), temperature source Bladder, resp. rate 20, height 175.3 cm (69\"), weight 116 kg (255 lb), SpO2 96%.    HEENT: Normal HEENT exam.    Lungs:  Normal effort and normal respiratory rate.  There are decreased breath sounds.    Heart: Normal rate.  Regular rhythm.  S1 normal and S2 normal.    Abdomen: Abdomen is soft and non-distended.  Bowel sounds are normal.   There is no abdominal tenderness.     Extremities: Normal range of motion.  There is dependent edema.    Neurological: Patient is alert and oriented to person, place and time.    Pupils:  Pupils are equal, round, and reactive to light.  Pupils are equal.   Skin:  Warm.                Results from last 7 days   Lab Units 02/24/25  0432 02/23/25  0522 02/22/25  0444   WBC 10*3/mm3 6.03 6.59 8.88   HEMOGLOBIN g/dL 10.0* 10.3* 10.8*   PLATELETS 10*3/mm3 148 136* 118*     Results from last 7 days   Lab Units 02/24/25  0405 02/23/25  2320 02/23/25  1246 02/23/25  0522 02/22/25  1203 02/22/25  0444 02/21/25  0359 02/20/25  2306 02/20/25  1715 02/20/25  1354 02/20/25  0345   SODIUM mmol/L 134*  --   --  134*  --  137 139  --  140 139 138   POTASSIUM mmol/L 4.0 3.6 3.6 3.7   < > 3.9 4.5   < > 4.4 3.8 3.2*   CO2 mmol/L 27.0  --   --  29.0  --  " 24.0 25.0  --  21.0* 22.0 23.0   BUN mg/dL 49*  --   --  43*  --  36* 26*  --  23 24* 24*   CREATININE mg/dL 1.75*  --   --  1.85*  --  1.81* 1.60*  --  1.34* 1.40* 1.49*   MAGNESIUM mg/dL  --   --   --   --   --   --  2.6*  --  2.4 1.8  --    PHOSPHORUS mg/dL  --   --   --   --   --   --   --   --  2.9 3.3 2.3*   GLUCOSE mg/dL 138*  --   --  153*  --  173* 141*  --  194* 178* 162*    < > = values in this interval not displayed.     Estimated Creatinine Clearance: 47.2 mL/min (A) (by C-G formula based on SCr of 1.75 mg/dL (H)).    Results from last 7 days   Lab Units 02/21/25  2230   PH, ARTERIAL pH units 7.344*   PCO2, ARTERIAL mm Hg 43.3   PO2 ART mm Hg 56.0*         I reviewed the patient's results and images.     Assessment & Plan   Impression      Coronary artery disease status post CABG following STEMI  Paroxysmal atrial fibrillation  Postoperative respiratory insufficiency, acute  Diabetes mellitus  Prior cerebrovascular accident       Plan        Wean oxygen as tolerated.  BiPAP as needed with sleep.  Currently in normal sinus rhythm.  Patient will continue to receive albumin and Bumex for diuresis.  Continue to monitor renal function closely.  Glucose control as before.  Now the chest tubes have been removed, initiate chemical DVT prophylaxis.  Pain control as needed.    Plan of care and goals reviewed with mulitdisciplinary/antibiotic stewardship team during rounds.   I discussed the patient's findings and my recommendations with patient and nursing staff         Toñito Camara MD, [facility]  Pulmonology and Critical Care Medicine

## 2025-02-24 NOTE — PROGRESS NOTES
CTS Progress Note      POD 4 s/p  s/p CABGx3, Maze, LAAL      LOS: 6 days     Subjective  No acute events overnight. VSS on 4L NC. NSR this am. Ambulated 3x yesterday.  Sitting up in bedside chair with no complaints.    Objective    Vital Signs  Temp:  [99 °F (37.2 °C)-100.4 °F (38 °C)] 99.3 °F (37.4 °C)  Heart Rate:  [59-72] 60  Resp:  [13-28] 20  BP: (105-134)/(62-79) 117/65    Physical Exam:   General Appearance: alert, appears stated age and cooperative   Lungs: Decreased lung sounds justyn bases   Heart: regular rhythm & normal rate, normal S1, S2 and no murmur, no gallop, no rub   Extremities: warm, 1+ BLE edema   Chest: sternum stable   Skin: Incision c/d/i     Results     Results from last 7 days   Lab Units 02/24/25  0432   WBC 10*3/mm3 6.03   HEMOGLOBIN g/dL 10.0*   HEMATOCRIT % 30.0*   PLATELETS 10*3/mm3 148     Results from last 7 days   Lab Units 02/24/25  0405   SODIUM mmol/L 134*   POTASSIUM mmol/L 4.0   CHLORIDE mmol/L 95*   CO2 mmol/L 27.0   BUN mg/dL 49*   CREATININE mg/dL 1.75*   GLUCOSE mg/dL 138*   CALCIUM mg/dL 8.7       Imaging Results (Last 24 Hours)       Procedure Component Value Units Date/Time    XR Chest 1 View [[id number]] Collected: 02/24/25 0711     Updated: 02/24/25 0716    Narrative:      XR CHEST 1 VW    Date of Exam: 2/24/2025 3:29 AM EST    Indication: Tubes removed    Comparison: 2/23/2025    Findings:  Right IJ central venous catheter tip overlies the upper SVC. Stable cardiomegaly. Status post sternotomy and CABG. Left atrial appendage occlusion device noted. Persistent bibasilar airspace disease left atelectasis. Suspect small left pleural effusion,   stable. No significant effusion on the right. Negative for pneumothorax on the right. Small left apical pneumothorax measures 1 cm. Stable central pulmonary vascular congestion.      Impression:      Impression:  1. Small left apical pneumothorax measures 1 cm.  2. Stable right IJ central venous catheter.  3. Persistent bibasilar  airspace disease likely atelectasis with small left pleural effusion, stable.            Electronically Signed: Lazaro Hooker MD    2/24/2025 7:13 AM EST    Workstation ID: [id number]            Assessment  POD #4 s/p urgent CABG x 3 (LIMA-LAD, SV-OM, SVG-PDA), modified maze, LAAL performed 2/20/2025     STEMI involving left anterior descending coronary artery    CVA (cerebral vascular accident)    Essential hypertension    Coronary artery disease involving native coronary artery of native heart without angina pectoris    Morbid obesity with BMI of 40.0-44.9, adult    Sleep apnea    PAF (paroxysmal atrial fibrillation)    Type 2 diabetes mellitus    Acute systolic CHF (congestive heart failure)    Coronary artery disease involving native coronary artery of native heart with unstable angina pectoris      Plan   D/C central line  D/C castillo after diuresis  Diuresis as tolerated  Wean oxygen as tolerated  Monitor renal function-stable  Ambulate TID  Pulm toilet  Aspirin, statin, BB  Transfer to telemetry    Olga Zapata PA-C  02/24/25  07:42 EST

## 2025-02-24 NOTE — THERAPY TREATMENT NOTE
Patient Name: Camron Hendrix  : 1950    MRN: [id number]                              Today's Date: 2025       Admit Date: 2025    Visit Dx:     ICD-10-CM ICD-9-CM   1. 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    SAH (subarachnoid hemorrhage)    Type 2 diabetes mellitus    Seizure    Aortic stenosis, mild    STEMI involving left anterior descending coronary artery    Acute systolic CHF (congestive heart failure)    Coronary artery disease involving native coronary artery of native heart with unstable angina pectoris     Past Medical History:   Diagnosis Date    AAA (abdominal aortic aneurysm)     Coronary artery disease     CVA (cerebral vascular accident)     Hypertension     Hypothyroidism     PAF (paroxysmal atrial fibrillation)     TIA (transient ischemic attack)      Past Surgical History:   Procedure Laterality Date    CARDIAC CATHETERIZATION N/A 3/29/2019    Procedure: Left Heart Cath;  Surgeon: Andrea Holloway MD;  Location:  [facility];  Service: Cardiovascular    CARDIAC CATHETERIZATION N/A 2025    Procedure: Left Heart Cath;  Surgeon: Carlo Barragan MD;  Location:  [facility];  Service: Cardiovascular;  Laterality: N/A;    CARDIAC CATHETERIZATION N/A 2025    Procedure: Optical Coherence Tomography;  Surgeon: Carlo Barragan MD;  Location:  [facility]  [facility]ATION;  Service: Cardiovascular;  Laterality: N/A;    CARDIAC CATHETERIZATION N/A 2/18/2025    Procedure: Percutaneous Coronary Intervention;  Surgeon: Carlo Barragan MD;  Location: [facility] CATH INVASIVE LOCATION;  Service: Cardiovascular;  Laterality: N/A;    CERVICAL FUSION      c4-c5    CORONARY ANGIOPLASTY WITH STENT PLACEMENT      CORONARY ARTERY BYPASS GRAFT N/A 2/20/2025    Procedure: MEDIAN STERNOTOMY, CORONARY ARTERY BYPASS GRAFT X 3 (ON PUMP) USING RIGHT GREATER SAPHENOUS VEIN VIA EVH + LEFT INTERNAL MAMMARY ARTERY GRAFT, MAZE, LEFT ATRIAL APPENDAGE LIGATION, INTRAOP JESSY AS PER ANESTHESIA;  Surgeon: Steve Borjas MD;  Location: [facility] OR;  Service: Cardiothoracic;  Laterality: N/A;  LEFT ARM EXPLORED, RADIAL ARTERY NOT USABLE    HERNIA REPAIR      MAZE PROCEDURE N/A 2/20/2025    Procedure: MAZE PROCEDURE;  Surgeon: Steve Borjas MD;  Location: [facility] OR;  Service: Cardiothoracic;  Laterality: N/A;    TONSILECTOMY, ADENOIDECTOMY, BILATERAL MYRINGOTOMY AND TUBES        General Information       Row Name 02/24/25 1101          Physical Therapy Time and Intention    Document Type therapy note (daily note)  -KG     Mode of Treatment physical therapy  -KG       Row Name 02/24/25 1101          General Information    Existing Precautions/Restrictions cardiac;fall;oxygen therapy device and L/min;sternal;other (see comments)  confusion  -KG     Barriers to Rehab medically complex;cognitive status;hearing deficit  -KG       Row Name 02/24/25 1101          Cognition    Orientation Status (Cognition) oriented x 3  -KG       Row Name 02/24/25 1101          Safety Issues/Impairments Affecting Functional Mobility    Safety Issues Affecting Function (Mobility) ability to follow commands;awareness of need for assistance;insight into deficits/self-awareness;safety precaution awareness;safety precautions follow-through/compliance;sequencing abilities  -KG     Impairments Affecting Function (Mobility)  balance;cognition;coordination;endurance/activity tolerance;postural/trunk control;pain;shortness of breath;strength  -KG     Cognitive Impairments, Mobility Safety/Performance attention;awareness, need for assistance;insight into deficits/self-awareness;safety precaution awareness;safety precaution follow-through;sequencing abilities  -KG               User Key  (r) = Recorded By, (t) = Taken By, (c) = Cosigned By      Initials Name Provider Type    KG Veronique Carl, PT Physical Therapist                   Mobility       Row Name 02/24/25 1102          Bed Mobility    Comment, (Bed Mobility) UIC  -KG       Row Name 02/24/25 1102          Transfers    Comment, (Transfers) VC's for sequencing and technique. Increased cues for safe hand placement to maintain sternal precautions.  -KG       Row Name 02/24/25 1102          Sit-Stand Transfer    Sit-Stand [place] (Transfers) minimum assist (75% patient effort);2 person assist;verbal cues  -KG       Row Name 02/24/25 1102          Gait/Stairs (Locomotion)    [place] Level (Gait) moderate assist (50% patient effort);2 person assist;verbal cues  progressed to periods of [place] x2  -KG     Assistive Device (Gait) other (see comments)  B UE support on tripod monitor  -KG     Distance in Feet (Gait) 110  +110  -KG     Deviations/Abnormal Patterns (Gait) bilateral deviations;triston decreased;stride length decreased  -KG     Bilateral Gait Deviations forward flexed posture;heel strike decreased  -KG     Comment, (Gait/Stairs) Pt demonstrated step through gait pattern with slow triston and decreased step length. Pt very unsteady at times with tendency to veer to the left. Difficulty navigating to avoid obstacles on the left side and required verbal and tactile cues to correct. Pt required several standing rest breaks. Max cueing for upright posture and to keep monitor close to body. Pt confused contributing to periods of decreased command following and poor  safety awareness. Ambulation distance limited by worsening gait mechanics and increased SOA.  -KG               User Key  (r) = Recorded By, (t) = Taken By, (c) = Cosigned By      Initials Name Provider Type    Veronique Lakhani PT Physical Therapist                   Obj/Interventions       Row Name 02/24/25 1104          Balance    Balance Assessment sitting static balance;standing static balance;standing dynamic balance  -KG     Static Sitting Balance contact guard  -KG     Position, Sitting Balance unsupported;sitting in chair  -KG     Static Standing Balance minimal assist  -KG     Dynamic Standing Balance minimal assist;2-person assist  -KG     Position/Device Used, Standing Balance supported  -KG               User Key  (r) = Recorded By, (t) = Taken By, (c) = Cosigned By      Initials Name Provider Type    Veronique Lakhani PT Physical Therapist                   Goals/Plan    No documentation.                  Clinical Impression       Row Name 02/24/25 1104          Pain    Additional Documentation Pain Scale: FACES Pre/Post-Treatment (Group)  -KG       Row Name 02/24/25 1104          Pain Scale: FACES Pre/Post-Treatment    Pain: FACES Scale, Pretreatment 4-->hurts little more  -KG     Posttreatment Pain Rating 4-->hurts little more  -KG       Row Name 02/24/25 1104          Plan of Care Review    Plan of Care Reviewed With patient  -KG     Progress improving  -KG     Outcome Evaluation Pt ambulated 110ft +110ft with modA x2 and B UE support on tripod monitor. Pt required consistent cues for upright posture and improved management of monitor. Pt unsteady with tendency to veer to the left, unable to correct to avoid obstacles. Pt required several standing rest breaks. Ambulation distance limited by increased SOA and worsening gait mechanics. Periods of decreased command following and poor safety awareness noted. Continue to recommend PT skilled care and D/C to  rehab facility.  -KG        Row Name 02/24/25 1104          Vital Signs    Pre Systolic BP Rehab 120  -KG     Pre Treatment Diastolic BP 66  -KG     Post Systolic BP Rehab 130  -KG     Post Treatment Diastolic BP 66  -KG     Pretreatment Heart Rate (beats/min) 64  -KG     Posttreatment Heart Rate (beats/min) 73  -KG     Pre SpO2 (%) 100  -KG     O2 Delivery Pre Treatment supplemental O2  -KG     Post SpO2 (%) 96  -KG     O2 Delivery Post Treatment supplemental O2  -KG     Pre Patient Position Sitting  -KG     Intra Patient Position Standing  -KG     Post Patient Position Sitting  -KG       Row Name 02/24/25 1104          Positioning and Restraints    Pre-Treatment Position sitting in chair/recliner  -KG     Post Treatment Position chair  -KG     In Chair notified nsg;reclined;call light within reach;encouraged to call for assist;RUE elevated;LUE elevated;legs elevated  -KG               User Key  (r) = Recorded By, (t) = Taken By, (c) = Cosigned By      Initials Name Provider Type    Veronique Lakhani N, PT Physical Therapist                   Outcome Measures       Row Name 02/24/25 1106          How much help from another person do you currently need...    Turning from your back to your side while in flat bed without using bedrails? 2  -KG     Moving from lying on back to sitting on the side of a flat bed without bedrails? 2  -KG     Moving to and from a bed to a chair (including a wheelchair)? 3  -KG     Standing up from a chair using your arms (e.g., wheelchair, bedside chair)? 3  -KG     Climbing 3-5 steps with a railing? 2  -KG     To walk in hospital room? 2  -KG     AM-PAC 6 Clicks Score (PT) 14  -KG     Highest Level of Mobility Goal 4 --> Transfer to chair/commode  -KG       Row Name 02/24/25 1106          Functional Assessment    Outcome Measure Options AM-PAC 6 Clicks Basic Mobility (PT)  -KG               User Key  (r) = Recorded By, (t) = Taken By, (c) = Cosigned By      Initials Name Provider Type    Veronique Lakhani  N, PT Physical Therapist                                 Physical Therapy Education       Title: PT OT SLP Therapies (In Progress)       Topic: Physical Therapy (In Progress)       Point: Mobility training (In Progress)       Learning Progress Summary            Patient Acceptance, E, NR by KG at 2/24/2025 0827    Acceptance, E, NR by JORGE at 2/22/2025 1300    Acceptance, E, NR by JORGE at 2/21/2025 1015                      Point: Home exercise program (In Progress)       Learning Progress Summary            Patient Acceptance, E, NR by KG at 2/24/2025 0827    Acceptance, E, NR by JORGE at 2/22/2025 1300    Acceptance, E, NR by JORGE at 2/21/2025 1015                      Point: Body mechanics (In Progress)       Learning Progress Summary            Patient Acceptance, E, NR by KG at 2/24/2025 0827    Acceptance, E, NR by JORGE at 2/22/2025 1300    Acceptance, E, NR by JORGE at 2/21/2025 1015                      Point: Precautions (In Progress)       Learning Progress Summary            Patient Acceptance, E, NR by KG at 2/24/2025 0827    Acceptance, E, NR by JORGE at 2/22/2025 1300    Acceptance, E, NR by JORGE at 2/21/2025 1015                                      User Key       Initials Effective Dates Name Provider Type Discipline     02/03/23 -  Ronda Torrez, PT Physical Therapist PT     05/22/20 -  Veronique Carl, PT Physical Therapist PT                  PT Recommendation and Plan     Progress: improving  Outcome Evaluation: Pt ambulated 110ft +110ft with modA x2 and B UE support on tripod monitor. Pt required consistent cues for upright posture and improved management of monitor. Pt unsteady with tendency to veer to the left, unable to correct to avoid obstacles. Pt required several standing rest breaks. Ambulation distance limited by increased SOA and worsening gait mechanics. Periods of decreased command following and poor safety awareness noted. Continue to recommend PT skilled care and D/C to IP rehab  facility.     Time Calculation:         PT Charges       Row Name 02/24/25 0827             Time Calculation    Start Time 0827  -KG      PT Received On 02/24/25  -KG      PT Goal Re-Cert Due Date 03/03/25  -KG         Time Calculation- PT    Total Timed Code Minutes- PT 24 minute(s)  -KG         Timed Charges    17134 - PT Therapeutic Activity Minutes 24  -KG         Total Minutes    Timed Charges Total Minutes 24  -KG       Total Minutes 24  -KG                User Key  (r) = Recorded By, (t) = Taken By, (c) = Cosigned By      Initials Name Provider Type    KG Veronique Carl, PT Physical Therapist                  Therapy Charges for Today       Code Description Service Date Service Provider Modifiers Qty    [id number] HC PT THERAPEUTIC ACT EA 15 MIN 2/24/2025 Veronique Carl, PT GP 2            PT G-Codes  Outcome Measure Options: AM-PAC 6 Clicks Basic Mobility (PT)  AM-PAC 6 Clicks Score (PT): 14       Patricia Carl PT  2/24/2025

## 2025-02-24 NOTE — PROGRESS NOTES
"  [facility]  PROGRESS NOTE    Date of Admission: 2/18/2025  Date of Service: 02/24/25    Primary Care Physician: Leslie Rhodes MD    Chief Complaint: f/u MVCAD with STEMI, HFrEF, PAF  Problem List:   STEMI involving left anterior descending coronary artery    CVA (cerebral vascular accident)    Essential hypertension    Coronary artery disease involving native coronary artery of native heart without angina pectoris    Morbid obesity with BMI of 40.0-44.9, adult    Sleep apnea    PAF (paroxysmal atrial fibrillation)    Type 2 diabetes mellitus    Acute systolic CHF (congestive heart failure)    Coronary artery disease involving native coronary artery of native heart with unstable angina pectoris      Subjective      Patient sitting up in chair, pain with coughing. Denies any cardiac complaints. In NSR this morning. Daughter at bedside       Objective   Vitals: /66   Pulse 64   Temp 99.3 °F (37.4 °C) (Bladder)   Resp 20   Ht 175.3 cm (69\")   Wt 116 kg (255 lb)   SpO2 95%   BMI 37.66 kg/m²     Physical Exam:  GENERAL: Alert, cooperative, in no acute distress.   HEENT: Normocephalic, no jugular venous distention  HEART: Regular rhythm, normal rate, and no murmurs, gallops, or rubs.   LUNGS: Diffuse ronchi, no wheezing, rales  NEUROLOGIC: No focal abnormalities involving strength or sensation are noted.   EXTREMITIES: No clubbing, cyanosis, 1-2+ BLE edema noted, + scrotal edema. RUE forearm ecchymosis, no hematoma      Results:  Results from last 7 days   Lab Units 02/24/25  0432 02/23/25  0522 02/22/25  0444   WBC 10*3/mm3 6.03 6.59 8.88   HEMOGLOBIN g/dL 10.0* 10.3* 10.8*   HEMATOCRIT % 30.0* 31.0* 33.0*   PLATELETS 10*3/mm3 148 136* 118*     Results from last 7 days   Lab Units 02/24/25  0405 02/23/25  2320 02/23/25  1246 02/23/25  0522 02/22/25  1203 02/22/25  0444   SODIUM mmol/L 134*  --   --  134*  --  137   POTASSIUM mmol/L 4.0 3.6 3.6 3.7   < > 3.9 "   CHLORIDE mmol/L 95*  --   --  94*  --  99   CO2 mmol/L 27.0  --   --  29.0  --  24.0   BUN mg/dL 49*  --   --  43*  --  36*   CREATININE mg/dL 1.75*  --   --  1.85*  --  1.81*   GLUCOSE mg/dL 138*  --   --  153*  --  173*    < > = values in this interval not displayed.      Lab Results   Component Value Date    CHOL 105 02/19/2025    TRIG 88 02/19/2025    HDL 38 (L) 02/19/2025    LDL 50 02/19/2025    AST 92 (H) 02/21/2025    ALT 31 02/21/2025     Results from last 7 days   Lab Units 02/19/25  0519   HEMOGLOBIN A1C % 6.30*     Results from last 7 days   Lab Units 02/19/25  0519   CHOLESTEROL mg/dL 105   TRIGLYCERIDES mg/dL 88   HDL CHOL mg/dL 38*   LDL CHOL mg/dL 50         Results from last 7 days   Lab Units 02/21/25  0359 02/20/25  1354 02/18/25  1734   PROTIME Seconds 16.8* 20.3* 16.7*   INR  1.36* 1.73* 1.34*   APTT seconds  --  32.5 >200.0*     Results from last 7 days   Lab Units 02/20/25  0345 02/19/25  1804 02/19/25  0733   HSTROP T ng/L 7,557* >10,000* >10,000*       Intake/Output Summary (Last 24 hours) at 2/24/2025 0910  Last data filed at 2/24/2025 0907  Gross per 24 hour   Intake 1160 ml   Output 3550 ml   Net -2390 ml     I personally reviewed the patient's EKG/Telemetry data    Radiology Data:   XR Chest 1 View    Result Date: 2/24/2025  Impression: 1. Small left apical pneumothorax measures 1 cm. 2. Stable right IJ central venous catheter. 3. Persistent bibasilar airspace disease likely atelectasis with small left pleural effusion, stable. Electronically Signed: Lazaro Hooker MD  2/24/2025 7:13 AM EST  Workstation ID: [id number]    XR Chest 1 View    Result Date: 2/23/2025  Impression: 1.Stable right-sided central venous catheter. 2.Stable mild to moderate pulmonary edema and small left pleural effusion. 3.Stable cardiomegaly. Electronically Signed: Pratik Hernandez MD  2/23/2025 7:01 AM EST  Workstation ID: [id number]     Results for orders placed during the hospital encounter of 02/18/25    Adult  Transthoracic Echo Complete W/ Cont if Necessary Per Protocol    Interpretation Summary    Left ventricular systolic function is mildly decreased. Calculated left ventricular EF = 44% Left ventricular ejection fraction appears to be 41 - 45%.    Left ventricular wall thickness is consistent with moderate concentric hypertrophy.    The following left ventricular wall segments are akinetic: mid anterior, apical anterior, apical septal and apex. C/w with LAD territory infarct.    Left ventricular diastolic function is consistent with (grade Ia w/high LAP) impaired relaxation.    Mild aortic valve stenosis is present.    Peak velocity of the flow distal to the aortic valve is 277 cm/s. Aortic valve mean pressure gradient is 14 mmHg.    The aortic root measures 4.4 cm. The aortic root (corrected for BSA) measures 1.9 cm. Mild dilation of the ascending aorta is present.    Mild to moderate mitral valve regurgitation.    Depressed ejection fraction and wall motion abnormalities are new compared to 4/2024; mild aortic stenosis is unchanged.        Current Medications:  acetylcysteine, 4 mL, Nebulization, Q4H - RT  albumin human, 25 g, Intravenous, BID  amiodarone, 200 mg, Oral, Q8H   Followed by  [START ON 2/28/2025] amiodarone, 200 mg, Oral, Q12H   Followed by  [START ON 3/14/2025] amiodarone, 200 mg, Oral, Daily  aspirin, 162 mg, Oral, Daily  bumetanide, 1 mg, Intravenous, Q12H  carvedilol, 3.125 mg, Oral, BID With Meals  guaiFENesin, 1,200 mg, Oral, Q12H  insulin lispro, 2-9 Units, Subcutaneous, 4x Daily AC & at Bedtime  mupirocin, 1 Application, Each Nare, BID  pharmacy consult - MTM, , Not Applicable, Daily  rosuvastatin, 40 mg, Oral, Nightly  senna-docusate sodium, 2 tablet, Oral, BID  sodium chloride, 10 mL, Intravenous, Q12H      niCARdipine, 5-15 mg/hr      Initial cardiac assessment: 74-year-old gentleman presenting with anterior STEMI status post balloon angioplasty of the LAD but with residual multivessel  disease depressed ejection fraction acute systolic heart failure status post 3V CABG with Dr. Borjas 2/20/2025.  Status post left atrial appendage ligation and maze procedure as well.     ASSESSMENT/PLAN:  1.  Anterior STEMI with multivessel CAD EF 45%:  Status post 3V CABG (LIMA-LAD, SVG-OM, SVG-RCA) 2/20/25 Dr Borjas  Continue ASA, statin. BB   Sternal incision care per CTS     2.  Acute systolic heart failure:  EF 41-45% pre-op  Continuing to maintain net negative fluid balance  L pleural effusion marginally better  On IV Bumex 1 mg BID      3.  Paroxysmal atrial fibrillation present on admission  Appreciate STEFANO ligation and maze procedure during bypass surgery  Currently on amiodarone   No anticoagulation necessary due to left atrial appendage ligation at time of CABG     4.  History of hypertension:  Stable, on low dose Coreg   Will adjust home medications and optimize for GDMT for systolic heart failure     5.  Mixed hyperlipidemia:  Goal LDL less than 50, currently at goal on high-dose rosuvastatin 40 mg daily continue for now.     6.  A/CRF- trending labs w diuresis  Cr stable, slightly improved this AM      Electronically signed by Samra Cruz PA-C, 02/24/25, 10:09 AM EST.

## 2025-02-24 NOTE — NURSING NOTE
Cordis found on bedside table, seen to be pulled out by patient, sitting in the chair, no bleeding, site dressed with xeroform, gauze, and tegaderm. Pt tolerated well.

## 2025-02-24 NOTE — CASE MANAGEMENT/SOCIAL WORK
Continued Stay Note  [facility]     Patient Name: Camron Hendrix  MRN: [id number]  Today's Date: 2/24/2025    Admit Date: 2/18/2025    Plan: IRF   Discharge Plan       Row Name 02/24/25 1435       Plan    Plan IRF    Patient/Family in Agreement with Plan yes    Plan Comments SW met with pt and his daughter, Heena this morning.  Pt and daughter are interested in rehab at discharge.  Pt has been to [facility] in the past and this is their first choice as referrals go.  SW left a message with referral information with [facility]OTT[facility] Reyes liaison.  CM/RD will continue to follow for discharge placements.                   Discharge Codes    No documentation.                 Expected Discharge Date and Time       Expected Discharge Date Expected Discharge Time    Feb 27, 2025               BOLIVAR Rojas

## 2025-02-25 ENCOUNTER — APPOINTMENT (OUTPATIENT)
Dept: GENERAL RADIOLOGY | Facility: HOSPITAL | Age: 75
End: 2025-02-25
Payer: MEDICARE

## 2025-02-25 LAB
ACT BLD: 406 SECONDS (ref 82–152)
ACT BLD: 417 SECONDS (ref 82–152)
ACT BLD: 504 SECONDS (ref 82–152)
ANION GAP SERPL CALCULATED.3IONS-SCNC: 12 MMOL/L (ref 5–15)
BASE EXCESS BLDA CALC-SCNC: -1 MMOL/L (ref -5–5)
BASE EXCESS BLDA CALC-SCNC: 0 MMOL/L (ref -5–5)
BASE EXCESS BLDA CALC-SCNC: 1 MMOL/L (ref -5–5)
BASE EXCESS BLDA CALC-SCNC: 1 MMOL/L (ref -5–5)
BUN SERPL-MCNC: 55 MG/DL (ref 8–23)
BUN/CREAT SERPL: 31.3 (ref 7–25)
CA-I BLDA-SCNC: 0.86 MMOL/L (ref 1.2–1.32)
CA-I BLDA-SCNC: 0.89 MMOL/L (ref 1.2–1.32)
CA-I BLDA-SCNC: 1.01 MMOL/L (ref 1.2–1.32)
CA-I BLDA-SCNC: 1.07 MMOL/L (ref 1.2–1.32)
CALCIUM SPEC-SCNC: 8.9 MG/DL (ref 8.6–10.5)
CHLORIDE SERPL-SCNC: 92 MMOL/L (ref 98–107)
CO2 BLDA-SCNC: 25 MMOL/L (ref 24–29)
CO2 BLDA-SCNC: 28 MMOL/L (ref 24–29)
CO2 BLDA-SCNC: 28 MMOL/L (ref 24–29)
CO2 BLDA-SCNC: 29 MMOL/L (ref 24–29)
CO2 SERPL-SCNC: 34 MMOL/L (ref 22–29)
CREAT SERPL-MCNC: 1.76 MG/DL (ref 0.76–1.27)
DEPRECATED RDW RBC AUTO: 41 FL (ref 37–54)
EGFRCR SERPLBLD CKD-EPI 2021: 40.3 ML/MIN/1.73
ERYTHROCYTE [DISTWIDTH] IN BLOOD BY AUTOMATED COUNT: 13.3 % (ref 12.3–15.4)
GLUCOSE BLDC GLUCOMTR-MCNC: 149 MG/DL (ref 70–130)
GLUCOSE BLDC GLUCOMTR-MCNC: 154 MG/DL (ref 70–130)
GLUCOSE BLDC GLUCOMTR-MCNC: 158 MG/DL (ref 70–130)
GLUCOSE BLDC GLUCOMTR-MCNC: 162 MG/DL (ref 70–130)
GLUCOSE BLDC GLUCOMTR-MCNC: 172 MG/DL (ref 70–130)
GLUCOSE BLDC GLUCOMTR-MCNC: 218 MG/DL (ref 70–130)
GLUCOSE BLDC GLUCOMTR-MCNC: 222 MG/DL (ref 70–130)
GLUCOSE BLDC GLUCOMTR-MCNC: 350 MG/DL (ref 70–130)
GLUCOSE BLDC GLUCOMTR-MCNC: 356 MG/DL (ref 70–130)
GLUCOSE SERPL-MCNC: 139 MG/DL (ref 65–99)
HCO3 BLDA-SCNC: 24.3 MMOL/L (ref 22–26)
HCO3 BLDA-SCNC: 26.3 MMOL/L (ref 22–26)
HCO3 BLDA-SCNC: 26.8 MMOL/L (ref 22–26)
HCO3 BLDA-SCNC: 27.1 MMOL/L (ref 22–26)
HCT VFR BLD AUTO: 32 % (ref 37.5–51)
HCT VFR BLDA CALC: 27 % (ref 38–51)
HCT VFR BLDA CALC: 28 % (ref 38–51)
HCT VFR BLDA CALC: 35 % (ref 38–51)
HCT VFR BLDA CALC: 37 % (ref 38–51)
HGB BLD-MCNC: 10.9 G/DL (ref 13–17.7)
HGB BLDA-MCNC: 11.9 G/DL (ref 12–17)
HGB BLDA-MCNC: 12.6 G/DL (ref 12–17)
HGB BLDA-MCNC: 9.2 G/DL (ref 12–17)
HGB BLDA-MCNC: 9.5 G/DL (ref 12–17)
MCH RBC QN AUTO: 28.8 PG (ref 26.6–33)
MCHC RBC AUTO-ENTMCNC: 34.1 G/DL (ref 31.5–35.7)
MCV RBC AUTO: 84.4 FL (ref 79–97)
PCO2 BLDA: 35.8 MM HG (ref 35–45)
PCO2 BLDA: 46.5 MM HG (ref 35–45)
PCO2 BLDA: 56 MM HG (ref 35–45)
PCO2 BLDA: 57.1 MM HG (ref 35–45)
PH BLDA: 7.27 PH UNITS (ref 7.35–7.6)
PH BLDA: 7.29 PH UNITS (ref 7.35–7.6)
PH BLDA: 7.37 PH UNITS (ref 7.35–7.6)
PH BLDA: 7.44 PH UNITS (ref 7.35–7.6)
PLATELET # BLD AUTO: 202 10*3/MM3 (ref 140–450)
PMV BLD AUTO: 10.5 FL (ref 6–12)
PO2 BLDA: 356 MMHG (ref 80–105)
PO2 BLDA: 369 MMHG (ref 80–105)
PO2 BLDA: 53 MMHG (ref 80–105)
PO2 BLDA: 79 MMHG (ref 80–105)
POTASSIUM BLDA-SCNC: 3.2 MMOL/L (ref 3.5–4.9)
POTASSIUM BLDA-SCNC: 3.2 MMOL/L (ref 3.5–4.9)
POTASSIUM BLDA-SCNC: 3.3 MMOL/L (ref 3.5–4.9)
POTASSIUM BLDA-SCNC: 3.5 MMOL/L (ref 3.5–4.9)
POTASSIUM SERPL-SCNC: 3.7 MMOL/L (ref 3.5–5.2)
POTASSIUM SERPL-SCNC: 3.7 MMOL/L (ref 3.5–5.2)
RBC # BLD AUTO: 3.79 10*6/MM3 (ref 4.14–5.8)
SAO2 % BLDA: 100 % (ref 95–98)
SAO2 % BLDA: 100 % (ref 95–98)
SAO2 % BLDA: 82 % (ref 95–98)
SAO2 % BLDA: 96 % (ref 95–98)
SODIUM BLD-SCNC: 134 MMOL/L (ref 138–146)
SODIUM BLD-SCNC: 135 MMOL/L (ref 138–146)
SODIUM BLD-SCNC: 137 MMOL/L (ref 138–146)
SODIUM BLD-SCNC: 137 MMOL/L (ref 138–146)
SODIUM SERPL-SCNC: 138 MMOL/L (ref 136–145)
WBC NRBC COR # BLD AUTO: 5.77 10*3/MM3 (ref 3.4–10.8)

## 2025-02-25 PROCEDURE — 94799 UNLISTED PULMONARY SVC/PX: CPT

## 2025-02-25 PROCEDURE — 63710000001 INSULIN LISPRO (HUMAN) PER 5 UNITS: Performed by: INTERNAL MEDICINE

## 2025-02-25 PROCEDURE — 94660 CPAP INITIATION&MGMT: CPT

## 2025-02-25 PROCEDURE — 63710000001 INSULIN GLARGINE PER 5 UNITS: Performed by: INTERNAL MEDICINE

## 2025-02-25 PROCEDURE — 71045 X-RAY EXAM CHEST 1 VIEW: CPT

## 2025-02-25 PROCEDURE — P9047 ALBUMIN (HUMAN), 25%, 50ML: HCPCS | Performed by: THORACIC SURGERY (CARDIOTHORACIC VASCULAR SURGERY)

## 2025-02-25 PROCEDURE — 97166 OT EVAL MOD COMPLEX 45 MIN: CPT

## 2025-02-25 PROCEDURE — 85027 COMPLETE CBC AUTOMATED: CPT | Performed by: THORACIC SURGERY (CARDIOTHORACIC VASCULAR SURGERY)

## 2025-02-25 PROCEDURE — 99024 POSTOP FOLLOW-UP VISIT: CPT | Performed by: PHYSICIAN ASSISTANT

## 2025-02-25 PROCEDURE — 25010000002 BUMETANIDE PER 0.5 MG: Performed by: THORACIC SURGERY (CARDIOTHORACIC VASCULAR SURGERY)

## 2025-02-25 PROCEDURE — 25010000002 HEPARIN (PORCINE) PER 1000 UNITS: Performed by: INTERNAL MEDICINE

## 2025-02-25 PROCEDURE — 99232 SBSQ HOSP IP/OBS MODERATE 35: CPT | Performed by: INTERNAL MEDICINE

## 2025-02-25 PROCEDURE — 80048 BASIC METABOLIC PNL TOTAL CA: CPT | Performed by: THORACIC SURGERY (CARDIOTHORACIC VASCULAR SURGERY)

## 2025-02-25 PROCEDURE — 97116 GAIT TRAINING THERAPY: CPT

## 2025-02-25 PROCEDURE — 84132 ASSAY OF SERUM POTASSIUM: CPT | Performed by: THORACIC SURGERY (CARDIOTHORACIC VASCULAR SURGERY)

## 2025-02-25 PROCEDURE — 82948 REAGENT STRIP/BLOOD GLUCOSE: CPT

## 2025-02-25 PROCEDURE — 25010000002 ALBUMIN HUMAN 25% PER 50 ML: Performed by: THORACIC SURGERY (CARDIOTHORACIC VASCULAR SURGERY)

## 2025-02-25 RX ORDER — CLOPIDOGREL BISULFATE 75 MG/1
75 TABLET ORAL DAILY
Status: DISCONTINUED | OUTPATIENT
Start: 2025-02-25 | End: 2025-02-28 | Stop reason: HOSPADM

## 2025-02-25 RX ORDER — ASPIRIN 81 MG/1
81 TABLET, CHEWABLE ORAL DAILY
Status: DISCONTINUED | OUTPATIENT
Start: 2025-02-25 | End: 2025-02-28 | Stop reason: HOSPADM

## 2025-02-25 RX ORDER — POTASSIUM CHLORIDE 1.5 G/1.58G
20 POWDER, FOR SOLUTION ORAL ONCE
Status: COMPLETED | OUTPATIENT
Start: 2025-02-25 | End: 2025-02-25

## 2025-02-25 RX ORDER — POTASSIUM CHLORIDE 750 MG/1
40 CAPSULE, EXTENDED RELEASE ORAL DAILY
Status: COMPLETED | OUTPATIENT
Start: 2025-02-25 | End: 2025-02-26

## 2025-02-25 RX ORDER — POLYETHYLENE GLYCOL 3350 17 G/17G
17 POWDER, FOR SOLUTION ORAL DAILY PRN
Status: DISCONTINUED | OUTPATIENT
Start: 2025-02-25 | End: 2025-02-26

## 2025-02-25 RX ORDER — BUMETANIDE 0.25 MG/ML
0.5 INJECTION, SOLUTION INTRAMUSCULAR; INTRAVENOUS EVERY 12 HOURS
Status: DISCONTINUED | OUTPATIENT
Start: 2025-02-25 | End: 2025-02-25

## 2025-02-25 RX ADMIN — AMIODARONE HYDROCHLORIDE 200 MG: 200 TABLET ORAL at 08:38

## 2025-02-25 RX ADMIN — TRAMADOL HYDROCHLORIDE 100 MG: 50 TABLET, COATED ORAL at 02:08

## 2025-02-25 RX ADMIN — SENNOSIDES AND DOCUSATE SODIUM 2 TABLET: 50; 8.6 TABLET ORAL at 08:37

## 2025-02-25 RX ADMIN — INSULIN LISPRO 4 UNITS: 100 INJECTION, SOLUTION INTRAVENOUS; SUBCUTANEOUS at 18:10

## 2025-02-25 RX ADMIN — OXYCODONE HYDROCHLORIDE 5 MG: 5 TABLET ORAL at 02:59

## 2025-02-25 RX ADMIN — ASPIRIN 81 MG 81 MG: 81 TABLET ORAL at 08:37

## 2025-02-25 RX ADMIN — HEPARIN SODIUM 5000 UNITS: 5000 INJECTION INTRAVENOUS; SUBCUTANEOUS at 14:43

## 2025-02-25 RX ADMIN — INSULIN LISPRO 2 UNITS: 100 INJECTION, SOLUTION INTRAVENOUS; SUBCUTANEOUS at 08:38

## 2025-02-25 RX ADMIN — OXYCODONE HYDROCHLORIDE 5 MG: 5 TABLET ORAL at 18:10

## 2025-02-25 RX ADMIN — POTASSIUM CHLORIDE 40 MEQ: 1500 TABLET, EXTENDED RELEASE ORAL at 00:03

## 2025-02-25 RX ADMIN — AMIODARONE HYDROCHLORIDE 200 MG: 200 TABLET ORAL at 00:03

## 2025-02-25 RX ADMIN — POTASSIUM CHLORIDE 40 MEQ: 1500 TABLET, EXTENDED RELEASE ORAL at 04:57

## 2025-02-25 RX ADMIN — POTASSIUM CHLORIDE 40 MEQ: 750 CAPSULE, EXTENDED RELEASE ORAL at 10:14

## 2025-02-25 RX ADMIN — AMIODARONE HYDROCHLORIDE 200 MG: 200 TABLET ORAL at 23:12

## 2025-02-25 RX ADMIN — LEVOTHYROXINE SODIUM 200 MCG: 0.1 TABLET ORAL at 05:02

## 2025-02-25 RX ADMIN — Medication 10 ML: at 20:24

## 2025-02-25 RX ADMIN — INSULIN LISPRO 8 UNITS: 100 INJECTION, SOLUTION INTRAVENOUS; SUBCUTANEOUS at 21:04

## 2025-02-25 RX ADMIN — BUMETANIDE 0.5 MG: 0.25 INJECTION INTRAMUSCULAR; INTRAVENOUS at 08:37

## 2025-02-25 RX ADMIN — CLOPIDOGREL BISULFATE 75 MG: 75 TABLET ORAL at 08:37

## 2025-02-25 RX ADMIN — ALBUMIN (HUMAN) 25 G: 0.25 INJECTION, SOLUTION INTRAVENOUS at 08:39

## 2025-02-25 RX ADMIN — TRAMADOL HYDROCHLORIDE 100 MG: 50 TABLET, COATED ORAL at 16:44

## 2025-02-25 RX ADMIN — INSULIN LISPRO 4 UNITS: 100 INJECTION, SOLUTION INTRAVENOUS; SUBCUTANEOUS at 12:18

## 2025-02-25 RX ADMIN — SENNOSIDES AND DOCUSATE SODIUM 2 TABLET: 50; 8.6 TABLET ORAL at 21:03

## 2025-02-25 RX ADMIN — HEPARIN SODIUM 5000 UNITS: 5000 INJECTION INTRAVENOUS; SUBCUTANEOUS at 05:02

## 2025-02-25 RX ADMIN — Medication 10 ML: at 08:41

## 2025-02-25 RX ADMIN — OXYCODONE HYDROCHLORIDE 5 MG: 5 TABLET ORAL at 22:47

## 2025-02-25 RX ADMIN — CARVEDILOL 6.25 MG: 6.25 TABLET, FILM COATED ORAL at 08:37

## 2025-02-25 RX ADMIN — ROSUVASTATIN 40 MG: 20 TABLET, FILM COATED ORAL at 21:03

## 2025-02-25 RX ADMIN — HEPARIN SODIUM 5000 UNITS: 5000 INJECTION INTRAVENOUS; SUBCUTANEOUS at 21:03

## 2025-02-25 RX ADMIN — AMIODARONE HYDROCHLORIDE 200 MG: 200 TABLET ORAL at 16:44

## 2025-02-25 RX ADMIN — ALBUMIN (HUMAN) 25 G: 0.25 INJECTION, SOLUTION INTRAVENOUS at 21:04

## 2025-02-25 RX ADMIN — GUAIFENESIN 1200 MG: 600 TABLET ORAL at 21:03

## 2025-02-25 RX ADMIN — CARVEDILOL 6.25 MG: 6.25 TABLET, FILM COATED ORAL at 18:10

## 2025-02-25 RX ADMIN — POTASSIUM CHLORIDE 20 MEQ: 1.5 POWDER, FOR SOLUTION ORAL at 18:10

## 2025-02-25 RX ADMIN — MUPIROCIN 1 APPLICATION: 20 OINTMENT TOPICAL at 08:38

## 2025-02-25 RX ADMIN — INSULIN GLARGINE 10 UNITS: 100 INJECTION, SOLUTION SUBCUTANEOUS at 10:14

## 2025-02-25 RX ADMIN — ACETAMINOPHEN 1000 MG: 500 TABLET, FILM COATED ORAL at 21:03

## 2025-02-25 RX ADMIN — GUAIFENESIN 1200 MG: 600 TABLET ORAL at 08:37

## 2025-02-25 NOTE — THERAPY EVALUATION
Patient Name: Camron Hendrix  : 1950    MRN: [id number]                              Today's Date: 2025       Admit Date: 2025    Visit Dx:     ICD-10-CM ICD-9-CM   1. 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    SAH (subarachnoid hemorrhage)    Type 2 diabetes mellitus    Seizure    Aortic stenosis, mild    STEMI involving left anterior descending coronary artery    Acute systolic CHF (congestive heart failure)    Coronary artery disease involving native coronary artery of native heart with unstable angina pectoris     Past Medical History:   Diagnosis Date    AAA (abdominal aortic aneurysm)     Coronary artery disease     CVA (cerebral vascular accident)     Hypertension     Hypothyroidism     PAF (paroxysmal atrial fibrillation)     TIA (transient ischemic attack)      Past Surgical History:   Procedure Laterality Date    CARDIAC CATHETERIZATION N/A 3/29/2019    Procedure: Left Heart Cath;  Surgeon: Andrea Holloway MD;  Location:  [facility];  Service: Cardiovascular    CARDIAC CATHETERIZATION N/A 2025    Procedure: Left Heart Cath;  Surgeon: Carlo Barragan MD;  Location:  [facility];  Service: Cardiovascular;  Laterality: N/A;    CARDIAC CATHETERIZATION N/A 2025    Procedure: Optical Coherence Tomography;  Surgeon: Carlo Barragan MD;  Location:  [facility];  Service: Cardiovascular;  Laterality: N/A;    CARDIAC CATHETERIZATION N/A 2/18/2025    Procedure: Percutaneous Coronary Intervention;  Surgeon: Carlo Barragan MD;  Location:  [facility];  Service: Cardiovascular;  Laterality: N/A;    CERVICAL FUSION      c4-c5    CORONARY ANGIOPLASTY WITH STENT PLACEMENT      CORONARY ARTERY BYPASS GRAFT N/A 2/20/2025    Procedure: MEDIAN STERNOTOMY, CORONARY ARTERY BYPASS GRAFT X 3 (ON PUMP) USING RIGHT GREATER SAPHENOUS VEIN VIA EVH + LEFT INTERNAL MAMMARY ARTERY GRAFT, MAZE, LEFT ATRIAL APPENDAGE LIGATION, INTRAOP JESSY AS PER ANESTHESIA;  Surgeon: Steve Borjas MD;  Location:  [facility];  Service: Cardiothoracic;  Laterality: N/A;  LEFT ARM EXPLORED, RADIAL ARTERY NOT USABLE    HERNIA REPAIR      MAZE PROCEDURE N/A 2/20/2025    Procedure: MAZE PROCEDURE;  Surgeon: Steve Borjas MD;  Location:  [facility];  Service: Cardiothoracic;  Laterality: N/A;    TONSILECTOMY, ADENOIDECTOMY, BILATERAL MYRINGOTOMY AND TUBES        General Information       Row Name 02/25/25 0955          OT Time and Intention    Document Type evaluation  -AJ     Mode of Treatment occupational therapy  -       Row Name 02/25/25 0955          General Information    Patient Profile Reviewed yes  -AJ     Prior Level of Function independent:;all household mobility;community mobility;ADL's;transfer  -AJ     Existing Precautions/Restrictions cardiac;fall;oxygen therapy device and L/min;sternal;other (see comments)  Delayed processing/conversational confusion  -AJ     Barriers to Rehab medically complex;cognitive status  -       Row Name 02/25/25 0955          Living Environment    People in Home alone  -       Row Name 02/25/25 0955          Home Main Entrance    Number of Stairs, Main Entrance four  -AJ     Stair Railings, Main Entrance railings safe and in good condition  -       Row Name 02/25/25 0955          Stairs Within Home, Primary    Number of Stairs, Within  Home, Primary none  -       Row Name 02/25/25 0955          Cognition    Orientation Status (Cognition) oriented to;person;place;verbal cues/prompts needed for orientation;time  -       Row Name 02/25/25 0955          Safety Issues/Impairments Affecting Functional Mobility    Safety Issues Affecting Function (Mobility) ability to follow commands;awareness of need for assistance;insight into deficits/self-awareness;problem-solving;safety precaution awareness;safety precautions follow-through/compliance;sequencing abilities  -     Impairments Affecting Function (Mobility) balance;cognition;coordination;endurance/activity tolerance;pain;postural/trunk control;strength;shortness of breath  -     Cognitive Impairments, Mobility Safety/Performance attention;awareness, need for assistance;insight into deficits/self-awareness;safety precaution awareness;safety precaution follow-through;sequencing abilities;problem-solving/reasoning;judgment  -               User Key  (r) = Recorded By, (t) = Taken By, (c) = Cosigned By      Initials Name Provider Type     Alyson Mancera OT Occupational Therapist                     Mobility/ADL's       Row Name 02/25/25 0957          Bed Mobility    Comment, (Bed Mobility) UIC  -       Row Name 02/25/25 0957          Transfers    Transfers sit-stand transfer;stand-sit transfer  -       Row Name 02/25/25 0957          Sit-Stand Transfer    Sit-Stand [place] (Transfers) minimum assist (75% patient effort);2 person assist;verbal cues;nonverbal cues (demo/gesture)  -     Assistive Device (Sit-Stand Transfers) other (see comments)  Tripod monitor  -     Comment, (Sit-Stand Transfer) Frequent cueing to maintain spinal precautions with transitioning and VCs for sequencing/improved body mechanics. Progressed to minAx1.  -       Row Name 02/25/25 0957          Stand-Sit Transfer    Stand-Sit [place] (Transfers) minimum assist (75% patient effort);2 person  assist;verbal cues  -     Comment, (Stand-Sit Transfer) Cues for alignment and eccentric lowering  -       Row Name 02/25/25 0957          Functional Mobility    Functional Mobility- Ind. Level minimum assist (75% patient effort);2 person assist required;verbal cues required;nonverbal cues required (demo/gesture)  -     Functional Mobility- Device other (see comments)  Tripod telemonitor  -     Functional Mobility-Distance (Feet) --  <HH distance  -     Functional Mobility- Comment c/o dizziness and SOA with ambulation. L lateral lean/slight LOB noted with ambulation. Defer to PT for further mobility details.  -       Row Name 02/25/25 0957          Activities of Daily Living    BADL Assessment/Intervention grooming;feeding  -[facility] Name 02/25/25 0957          Grooming Assessment/Training    [place] Level (Grooming) oral care regimen;wash face, hands;set up  -     Position (Grooming) supported sitting  -[facility] Name 02/25/25 0957          Self-Feeding Assessment/Training    [place] Level (Feeding) liquids to mouth;set up  -     Position (Feeding) supported sitting  -               User Key  (r) = Recorded By, (t) = Taken By, (c) = Cosigned By      Initials Name Provider Type    Alyson Kang OT Occupational Therapist                   Obj/Interventions       Row Name 02/25/25 1001          Sensory Assessment (Somatosensory)    Sensory Assessment (Somatosensory) unable/difficult to assess  -[facility] Name 02/25/25 1001          Range of Motion Comprehensive    General Range of Motion bilateral upper extremity ROM WFL  -[facility] Name 02/25/25 1001          Strength Comprehensive (MMT)    Comment, General Manual Muscle Testing (MMT) Assessment BUE MMT n/t d/t sternal precautions  -       Row Name 02/25/25 1001          Balance    Balance Assessment sitting static balance;sitting dynamic balance;sit to stand dynamic balance;standing dynamic balance;standing static  balance  -AJ     Static Sitting Balance standby assist  -AJ     Dynamic Sitting Balance contact guard  -AJ     Position, Sitting Balance unsupported;sitting in chair  -AJ     Static Standing Balance contact guard  -AJ     Dynamic Standing Balance minimal assist;2-person assist;verbal cues;non-verbal cues (demo/gesture)  -AJ     Position/Device Used, Standing Balance supported  -AJ     Balance Interventions sitting;standing;sit to stand;supported;static;dynamic;occupation based/functional task  -AJ               User Key  (r) = Recorded By, (t) = Taken By, (c) = Cosigned By      Initials Name Provider Type    AJ Alyson Mancera, OT Occupational Therapist                   Goals/Plan       Row Name 02/25/25 1007          Bed Mobility Goal 1 (OT)    Activity/Assistive Device (Bed Mobility Goal 1, OT) rolling to left;rolling to right;sit to supine;supine to sit  -AJ     [place] Level/Cues Needed (Bed Mobility Goal 1, OT) minimum assist (75% or more patient effort)  -AJ     Time Frame (Bed Mobility Goal 1, OT) long term goal (LTG);10 days  -AJ     Progress/Outcomes (Bed Mobility Goal 1, OT) new goal  -       Row Name 02/25/25 1007          Transfer Goal 1 (OT)    Activity/Assistive Device (Transfer Goal 1, OT) sit-to-stand/stand-to-sit;bed-to-chair/chair-to-bed;toilet  -AJ     [place] Level/Cues Needed (Transfer Goal 1, OT) contact guard required  -AJ     Time Frame (Transfer Goal 1, OT) long term goal (LTG);10 days  -AJ     Progress/Outcome (Transfer Goal 1, OT) new goal  -       Row Name 02/25/25 1007          Toileting Goal 1 (OT)    Activity/Device (Toileting Goal 1, OT) adjust/manage clothing;perform perineal hygiene  -AJ     [place] Level/Cues Needed (Toileting Goal 1, OT) minimum assist (75% or more patient effort)  -AJ     Time Frame (Toileting Goal 1, OT) long term goal (LTG);10 days  -AJ     Progress/Outcome (Toileting Goal 1, OT) new goal  -       Row Name 02/25/25 1007          Therapy  Assessment/Plan (OT)    Planned Therapy Interventions (OT) activity tolerance training;adaptive equipment training;BADL retraining;functional balance retraining;IADL retraining;occupation/activity based interventions;patient/caregiver education/training;ROM/therapeutic exercise;strengthening exercise;transfer/mobility retraining  -               User Key  (r) = Recorded By, (t) = Taken By, (c) = Cosigned By      Initials Name Provider Type    AJ Alyson Mancera OT Occupational Therapist                   Clinical Impression       Row Name 02/25/25 1003          Pain Assessment    Pretreatment Pain Rating 10/10  -     Posttreatment Pain Rating 10/10  -     Pain Location chest  -     Pain Side/Orientation other (see comments)  incisional  -     Pain Management Interventions activity modification encouraged;exercise or physical activity utilized;nursing notified;positioning techniques utilized;breathing exercises utilized  -     Response to Pain Interventions activity participation with tolerable pain  -     Pre/Posttreatment Pain Comment Nursing aware and managing  -       Row Name 02/25/25 1003          Plan of Care Review    Plan of Care Reviewed With patient  -     Progress no change  -     Outcome Evaluation OT eval complete. Pt presents below fxl baseline with ADLs and fxl mobility, limited by pain, poor safety awareness/followthrough, generalized weakness, and poor activity tolerance. Pt was able to ambulated <HH distance with minAx2 (w/seated RB) and performed grooming in chair. Pt would benefit from ongoing skilled OT services to progress to PLOF. Rec d/c to IRF if medically appropriate.  -       Row Name 02/25/25 1003          Therapy Assessment/Plan (OT)    Patient/Family Therapy Goal Statement (OT) Return to PLOF  -     Rehab Potential (OT) good  -     Criteria for Skilled Therapeutic Interventions Met (OT) yes;meets criteria;skilled treatment is necessary  -     Therapy  Frequency (OT) daily  -AJ     Predicted Duration of Therapy Intervention (OT) 10 days  -AJ       Row Name 02/25/25 1003          Therapy Plan Review/Discharge Plan (OT)    Anticipated Discharge Disposition (OT) inpatient rehabilitation facility  -       Row Name 02/25/25 1003          Vital Signs    Pre Systolic BP Rehab 117  -AJ     Pre Treatment Diastolic BP 70  -AJ     Intra Systolic BP Rehab 146  -AJ     Intra Treatment Diastolic BP 81  -AJ     Post Systolic BP Rehab 134  -AJ     Post Treatment Diastolic BP 71  -AJ     Pretreatment Heart Rate (beats/min) 69  -AJ     Posttreatment Heart Rate (beats/min) 72  -AJ     Pre SpO2 (%) 90  -AJ     O2 Delivery Pre Treatment nasal cannula  -AJ     Intra SpO2 (%) 89  -AJ     O2 Delivery Intra Treatment nasal cannula  -AJ     Post SpO2 (%) 98  -AJ     O2 Delivery Post Treatment nasal cannula  -AJ     Pre Patient Position Sitting  -AJ     Intra Patient Position Standing  -AJ     Post Patient Position Sitting  -AJ       Row Name 02/25/25 1003          Positioning and Restraints    Pre-Treatment Position sitting in chair/recliner  -AJ     Post Treatment Position chair  -AJ     In Chair notified nsg;reclined;sitting;call light within reach;encouraged to call for assist;ABD pillow;legs elevated;waffle cushion;RLE elevated;LLE elevated;RUE elevated;LUE elevated  -AJ               User Key  (r) = Recorded By, (t) = Taken By, (c) = Cosigned By      Initials Name Provider Type    Alyson Kang, MEAGAN Occupational Therapist                   Outcome Measures       Row Name 02/25/25 1008          How much help from another is currently needed...    Putting on and taking off regular lower body clothing? 1  -AJ     Bathing (including washing, rinsing, and drying) 2  -AJ     Toileting (which includes using toilet bed pan or urinal) 1  -AJ     Putting on and taking off regular upper body clothing 2  -AJ     Taking care of personal grooming (such as brushing teeth) 3  -AJ     Eating  meals 4  -     AM-PAC 6 Clicks Score (OT) 13  -       Row Name 02/25/25 1008          Functional Assessment    Outcome Measure Options AM-PAC 6 Clicks Daily Activity (OT)  -               User Key  (r) = Recorded By, (t) = Taken By, (c) = Cosigned By      Initials Name Provider Type    Alyson Kang OT Occupational Therapist                    Occupational Therapy Education       Title: PT OT SLP Therapies (In Progress)       Topic: Occupational Therapy (In Progress)       Point: ADL training (Done)       Description:   Instruct learner(s) on proper safety adaptation and remediation techniques during self care or transfers.   Instruct in proper use of assistive devices.                  Learning Progress Summary            Patient Acceptance, E, VU,NR by  at 2/25/2025 1009                      Point: Home exercise program (Not Started)       Description:   Instruct learner(s) on appropriate technique for monitoring, assisting and/or progressing therapeutic exercises/activities.                  Learner Progress:  Not documented in this visit.              Point: Precautions (Done)       Description:   Instruct learner(s) on prescribed precautions during self-care and functional transfers.                  Learning Progress Summary            Patient Acceptance, E, VU,NR by NASIMA at 2/25/2025 1009                      Point: Body mechanics (Done)       Description:   Instruct learner(s) on proper positioning and spine alignment during self-care, functional mobility activities and/or exercises.                  Learning Progress Summary            Patient Acceptance, E, VU,NR by NASIMA at 2/25/2025 1009                                      User Key       Initials Effective Dates Name Provider Type Discipline     08/26/24 -  Alyson Mancera OT Occupational Therapist OT                  OT Recommendation and Plan  Planned Therapy Interventions (OT): activity tolerance training, adaptive equipment training,  BADL retraining, functional balance retraining, IADL retraining, occupation/activity based interventions, patient/caregiver education/training, ROM/therapeutic exercise, strengthening exercise, transfer/mobility retraining  Therapy Frequency (OT): daily  Plan of Care Review  Plan of Care Reviewed With: patient  Progress: no change  Outcome Evaluation: OT eval complete. Pt presents below fxl baseline with ADLs and fxl mobility, limited by pain, poor safety awareness/followthrough, generalized weakness, and poor activity tolerance. Pt was able to ambulated <HH distance with minAx2 (w/seated RB) and performed grooming in chair. Pt would benefit from ongoing skilled OT services to progress to PLOF. Rec d/c to IRF if medically appropriate.     Time Calculation:   Evaluation Complexity (OT)  Review Occupational Profile/Medical/Therapy History Complexity: expanded/moderate complexity  Assessment, Occupational Performance/Identification of Deficit Complexity: 3-5 performance deficits  Clinical Decision Making Complexity (OT): detailed assessment/moderate complexity  Overall Complexity of Evaluation (OT): moderate complexity     Time Calculation- OT       Row Name 02/25/25 1009             Time Calculation- OT    OT Start Time 0848  -AJ      OT Received On 02/25/25  -AJ      OT Goal Re-Cert Due Date 03/07/25  -AJ         Untimed Charges    OT Eval/Re-eval Minutes 50  -AJ         Total Minutes    Untimed Charges Total Minutes 50  -AJ       Total Minutes 50  -AJ                User Key  (r) = Recorded By, (t) = Taken By, (c) = Cosigned By      Initials Name Provider Type    AJ Alyson Mancera OT Occupational Therapist                  Therapy Charges for Today       Code Description Service Date Service Provider Modifiers Qty    [id number]  OT EVAL MOD COMPLEXITY 4 2/25/2025 Alyson Mancera OT GO 1                 Alyson Mancera OT  2/25/2025

## 2025-02-25 NOTE — PLAN OF CARE
Goal Outcome Evaluation:  Plan of Care Reviewed With: patient        Progress: no change  Outcome Evaluation: OT eval complete. Pt presents below fxl baseline with ADLs and fxl mobility, limited by pain, poor safety awareness/followthrough, generalized weakness, and poor activity tolerance. Pt was able to ambulated <HH distance with minAx2 (w/seated RB) and performed grooming in chair. Pt would benefit from ongoing skilled OT services to progress to PLOF. Rec d/c to IRF if medically appropriate.    Anticipated Discharge Disposition (OT): inpatient rehabilitation facility

## 2025-02-25 NOTE — PLAN OF CARE
Goal Outcome Evaluation:           Progress: improving  Outcome Evaluation: Patient disoriented to place and situation at times. Ambulating 220 feet 2 times. On 2L NC. Pulled his cordis out on his own. F/C dc'd by RN. VSS at this time.

## 2025-02-25 NOTE — PROGRESS NOTES
[facility] at [facility]  INPATIENT PROGRESS NOTE         [facility] 4H    2/25/2025      PATIENT IDENTIFICATION:   Name:  Camron Hendrix      MRN:  [id number]     74 y.o.  male             Reason for visit: CAD, hypertension, hyperlipidemia, CHF      SUBJECTIVE:   Resting comfortably, transferred to telemetry, no new cardiac complaints.     OBJECTIVE:  Vitals:    02/25/25 0837 02/25/25 0900 02/25/25 1000 02/25/25 1100   BP: 117/70 146/81 125/61 136/86   BP Location:    Left arm   Patient Position:    Lying   Pulse: 66  63 65   Resp:    16   Temp:    98.2 °F (36.8 °C)   TempSrc:    Oral   SpO2:   99% 100%   Weight:       Height:         FiO2 (%): 40 %     Body mass index is 37.66 kg/m².    Intake/Output Summary (Last 24 hours) at 2/25/2025 1525  Last data filed at 2/25/2025 1317  Gross per 24 hour   Intake 890 ml   Output 2150 ml   Net -1260 ml       Telemetry: Personally reviewed, normal sinus rhythm, no arrhythmia     Exam:  General Appearance:   well developed  well nourished, obese  Neck:  thyroid not enlarged  supple  Respiratory:  no respiratory distress  normal breath sounds  no rales  Cardiovascular:  no jugular venous distention  regular rhythm  apical impulse normal  S1 normal, S2 normal  no S3, no S4   no murmur  no rub, no thrill  carotid pulses normal; no bruit  pedal pulses normal  lower extremity edema: 1+  Skin:   warm, dry      Allergies   Allergen Reactions    Codeine Hallucinations     Scheduled meds:       albumin human, 25 g, Intravenous, BID  amiodarone, 200 mg, Oral, Q8H   Followed by  [START ON 2/28/2025] amiodarone, 200 mg, Oral, Q12H   Followed by  [START ON 3/14/2025] amiodarone, 200 mg, Oral, Daily  aspirin, 81 mg, Oral, Daily  bumetanide, 0.5 mg, Intravenous, Q12H  carvedilol, 6.25 mg, Oral, BID With Meals  clopidogrel, 75 mg, Oral, Daily  guaiFENesin, 1,200 mg, Oral, Q12H  heparin (porcine), 5,000 Units, Subcutaneous, Q8H  insulin glargine, 10  "Units, Subcutaneous, Daily  insulin lispro, 2-9 Units, Subcutaneous, 4x Daily AC & at Bedtime  levothyroxine, 200 mcg, Oral, Q AM  pharmacy consult - MTM, , Not Applicable, Daily  potassium chloride, 40 mEq, Oral, Daily  rosuvastatin, 40 mg, Oral, Nightly  senna-docusate sodium, 2 tablet, Oral, BID  sodium chloride, 10 mL, Intravenous, Q12H      IV meds:                      niCARdipine, 5-15 mg/hr      Data Review:  Results from last 7 days   Lab Units 02/25/25  0308 02/24/25  0405 02/23/25  0522 02/22/25  0444   SODIUM mmol/L 138 134* 134* 137   BUN mg/dL 55* 49* 43* 36*   CREATININE mg/dL 1.76* 1.75* 1.85* 1.81*   GLUCOSE mg/dL 139* 138* 153* 173*     Results from last 7 days   Lab Units 02/25/25  0308 02/24/25  0432 02/23/25  0522 02/22/25  0444 02/21/25  0359   WBC 10*3/mm3 5.77 6.03 6.59 8.88 13.89*   HEMOGLOBIN g/dL 10.9* 10.0* 10.3* 10.8* 12.0*     Results from last 7 days   Lab Units 02/21/25  0359 02/20/25  1354 02/18/25  1734   INR  1.36* 1.73* 1.34*     Results from last 7 days   Lab Units 02/21/25  0359 02/18/25  1734   ALT (SGPT) U/L 31 61*   AST (SGOT) U/L 92* 276*     No results found for: \"DIGOXIN\"   Lab Results   Component Value Date    TSH 1.770 04/24/2024     Results from last 7 days   Lab Units 02/19/25  0519   CHOLESTEROL mg/dL 105   HDL CHOL mg/dL 38*       Estimated Creatinine Clearance: 47 mL/min (A) (by C-G formula based on SCr of 1.76 mg/dL (H)).        Imaging (last 24 hr):   I personally reviewed the most recent chest x-ray and other pertinent imaging studies.  Results for orders placed during the hospital encounter of 02/18/25    XR Chest 1 View    Narrative  XR CHEST 1 VW    Date of Exam: 2/25/2025 3:39 AM EST    Indication: pneumothorax    Comparison: 2/24/2025    Findings:  Status post sternotomy. Stable mild cardiomegaly. Left atrial manage clip noted. There is persistent mild bibasilar airspace disease left greater than right which may relate to atelectasis. Small left pleural " effusion unchanged. No effusion on the right.  No right-sided pneumothorax. Small left apical pneumothorax measures 5 mm, previously 10 mm.    Impression  Impression:  1. Small left apical pneumothorax measures 5 mm, previously 10 mm.  2. Persistent bibasilar airspace disease left greater than right which may relate to atelectasis with small left pleural effusion.          Electronically Signed: Lazaro Hooker MD  2/25/2025 7:48 AM EST  Workstation ID: [id number]        Last ECHO:  Results for orders placed during the hospital encounter of 02/18/25    Adult Transthoracic Echo Complete W/ Cont if Necessary Per Protocol    Interpretation Summary    Left ventricular systolic function is mildly decreased. Calculated left ventricular EF = 44% Left ventricular ejection fraction appears to be 41 - 45%.    Left ventricular wall thickness is consistent with moderate concentric hypertrophy.    The following left ventricular wall segments are akinetic: mid anterior, apical anterior, apical septal and apex. C/w with LAD territory infarct.    Left ventricular diastolic function is consistent with (grade Ia w/high LAP) impaired relaxation.    Mild aortic valve stenosis is present.    Peak velocity of the flow distal to the aortic valve is 277 cm/s. Aortic valve mean pressure gradient is 14 mmHg.    The aortic root measures 4.4 cm. The aortic root (corrected for BSA) measures 1.9 cm. Mild dilation of the ascending aorta is present.    Mild to moderate mitral valve regurgitation.    Depressed ejection fraction and wall motion abnormalities are new compared to 4/2024; mild aortic stenosis is unchanged.        PROBLEM LIST:     STEMI involving left anterior descending coronary artery    CVA (cerebral vascular accident)    Essential hypertension    Coronary artery disease involving native coronary artery of native heart without angina pectoris    Morbid obesity with BMI of 40.0-44.9, adult    Sleep apnea    PAF (paroxysmal atrial  fibrillation)    Type 2 diabetes mellitus    Acute systolic CHF (congestive heart failure)    Coronary artery disease involving native coronary artery of native heart with unstable angina pectoris    Initial cardiac assessment: 74-year-old gentleman presenting with anterior STEMI status post balloon angioplasty of the LAD but with residual multivessel disease depressed ejection fraction acute systolic heart failure status post 3V CABG with Dr. Borjas 2/20/2025.  Status post left atrial appendage ligation and maze procedure as well.     ASSESSMENT/PLAN:  1.  Anterior STEMI with multivessel CAD EF 45%:  Status post 3V CABG (LIMA-LAD, SVG-OM, SVG-RCA) 2/20/25 Dr Borjas  Continue ASA, statin. BB   Sternal incision care per CTS      2.  Acute systolic heart failure:  EF 41-45% pre-op  Continuing to maintain net negative fluid balance  L pleural effusion marginally better  Holding Bumex as of 2/25, BUN trending up, fairly euvolemic on exam  Likely will resume Bumex 1 mg p.o. daily prior to discharge  Hold home dose losartan but may resume at 25 mg daily at discharge if renal function and blood pressure stable    3.  Paroxysmal atrial fibrillation present on admission  Appreciate STEFANO ligation and maze procedure during bypass surgery  Currently on amiodarone   Rates well-controlled, continue beta-blocker, with carvedilol  No anticoagulation necessary due to left atrial appendage ligation at time of CABG     4.  History of hypertension:  Stable, on low dose Coreg   Hopefully home on losartan as well     5.  Mixed hyperlipidemia:  Goal LDL less than 50, currently at goal on high-dose rosuvastatin 40 mg daily continue for now.     6.  A/CRF- trending labs w diuresis  BUN trending up, holding Bumex  Hold losartan as well but if renal function remains stable will resume at 25 mg daily        Carlo Barragan MD  2/25/2025    15:25 EST

## 2025-02-25 NOTE — THERAPY TREATMENT NOTE
Patient Name: Camron Hendrix  : 1950    MRN: [id number]                              Today's Date: 2025       Admit Date: 2025    Visit Dx:     ICD-10-CM ICD-9-CM   1. 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    SAH (subarachnoid hemorrhage)    Type 2 diabetes mellitus    Seizure    Aortic stenosis, mild    STEMI involving left anterior descending coronary artery    Acute systolic CHF (congestive heart failure)    Coronary artery disease involving native coronary artery of native heart with unstable angina pectoris     Past Medical History:   Diagnosis Date    AAA (abdominal aortic aneurysm)     Coronary artery disease     CVA (cerebral vascular accident)     Hypertension     Hypothyroidism     PAF (paroxysmal atrial fibrillation)     TIA (transient ischemic attack)      Past Surgical History:   Procedure Laterality Date    CARDIAC CATHETERIZATION N/A 3/29/2019    Procedure: Left Heart Cath;  Surgeon: Andrea Holloway MD;  Location:  [facility];  Service: Cardiovascular    CARDIAC CATHETERIZATION N/A 2025    Procedure: Left Heart Cath;  Surgeon: Carlo Barragan MD;  Location:  [facility];  Service: Cardiovascular;  Laterality: N/A;    CARDIAC CATHETERIZATION N/A 2025    Procedure: Optical Coherence Tomography;  Surgeon: Carlo Barragan MD;  Location:  [facility];  Service: Cardiovascular;  Laterality: N/A;    CARDIAC CATHETERIZATION N/A 2/18/2025    Procedure: Percutaneous Coronary Intervention;  Surgeon: Carlo Barragan MD;  Location:  [facility];  Service: Cardiovascular;  Laterality: N/A;    CERVICAL FUSION      c4-c5    CORONARY ANGIOPLASTY WITH STENT PLACEMENT      CORONARY ARTERY BYPASS GRAFT N/A 2/20/2025    Procedure: MEDIAN STERNOTOMY, CORONARY ARTERY BYPASS GRAFT X 3 (ON PUMP) USING RIGHT GREATER SAPHENOUS VEIN VIA EVH + LEFT INTERNAL MAMMARY ARTERY GRAFT, MAZE, LEFT ATRIAL APPENDAGE LIGATION, INTRAOP JESSY AS PER ANESTHESIA;  Surgeon: Steve Borjas MD;  Location: [facility] OR;  Service: Cardiothoracic;  Laterality: N/A;  LEFT ARM EXPLORED, RADIAL ARTERY NOT USABLE    HERNIA REPAIR      MAZE PROCEDURE N/A 2/20/2025    Procedure: MAZE PROCEDURE;  Surgeon: Steve Borjas MD;  Location: [facility] OR;  Service: Cardiothoracic;  Laterality: N/A;    TONSILECTOMY, ADENOIDECTOMY, BILATERAL MYRINGOTOMY AND TUBES        General Information       Row Name 02/25/25 1034          Physical Therapy Time and Intention    Document Type therapy note (daily note) (P)   -HS     Mode of Treatment physical therapy (P)   -       Row Name 02/25/25 1034          General Information    Patient Profile Reviewed yes (P)   -HS     Existing Precautions/Restrictions cardiac;fall;oxygen therapy device and L/min;sternal;other (see comments) (P)   Delayed processing/conversational confusion  -HS     Barriers to Rehab medically complex;cognitive status (P)   -       Row Name 02/25/25 1034          Cognition    Orientation Status (Cognition) oriented to;person;place;verbal cues/prompts needed for orientation;time (P)   -HS       Row Name 02/25/25 1034          Safety Issues/Impairments Affecting Functional Mobility    Safety Issues Affecting Function (Mobility) ability to follow commands;awareness of need for assistance;insight into deficits/self-awareness;safety  precaution awareness;safety precautions follow-through/compliance;sequencing abilities (P)   -HS     Impairments Affecting Function (Mobility) balance;cognition;coordination;endurance/activity tolerance;pain;postural/trunk control;strength;shortness of breath (P)   -HS     Cognitive Impairments, Mobility Safety/Performance attention;awareness, need for assistance;insight into deficits/self-awareness;problem-solving/reasoning;safety precaution awareness;safety precaution follow-through;sequencing abilities (P)   -HS               User Key  (r) = Recorded By, (t) = Taken By, (c) = Cosigned By      Initials Name Provider Type     Maribell Bob, PT Student PT Student                   Mobility       Row Name 02/25/25 1035          Bed-Chair Transfer    Assistive Device (Bed-Chair Transfers) -- (P)   BUE support on monitor  -HS       Row Name 02/25/25 1035          Sit-Stand Transfer    Sit-Stand Challenge (Transfers) minimum assist (75% patient effort);2 person assist;verbal cues;nonverbal cues (demo/gesture) (P)   -HS     Assistive Device (Sit-Stand Transfers) other (see comments) (P)   BUE support on tripod monitor  -     Comment, (Sit-Stand Transfer) VC's for sequencing and technique, required frequent cues for safe hand placement to maintain sternal precautions (P)   -HS       Row Name 02/25/25 1035          Gait/Stairs (Locomotion)    Challenge Level (Gait) moderate assist (50% patient effort);2 person assist;verbal cues (P)   -HS     Assistive Device (Gait) other (see comments) (P)   BUE support on tripod monitor  -     Patient was able to Ambulate yes (P)   -HS     Distance in Feet (Gait) 25 (P)   -HS     Deviations/Abnormal Patterns (Gait) bilateral deviations;triston decreased;stride length decreased (P)   -HS     Bilateral Gait Deviations forward flexed posture;heel strike decreased (P)   -HS     Comment, (Gait/Stairs) Pt demo'd slow triston, fwd flexed posture, and decreased step length. Pt was  unsteady at times with a tendency to veer to the left. Pt required frequent ques for obstacle navigation and assistance w/ tripod mobility. (P)   -HS               User Key  (r) = Recorded By, (t) = Taken By, (c) = Cosigned By      Initials Name Provider Type     Maribell Bob, PT Student PT Student                   Obj/Interventions       Row Name 02/25/25 1040          Motor Skills    Therapeutic Exercise hip (P)   -HS       Row Name 02/25/25 1040          Hip (Therapeutic Exercise)    Hip (Therapeutic Exercise) strengthening exercise (P)   -HS     Hip Strengthening (Therapeutic Exercise) marching while standing;2 sets;15 repititions (P)   -HS       Row Name 02/25/25 1040          Balance    Balance Assessment sitting static balance;sitting dynamic balance;standing static balance;standing dynamic balance (P)   -HS     Static Sitting Balance standby assist (P)   -HS     Dynamic Sitting Balance contact guard (P)   -HS     Position, Sitting Balance unsupported;sitting in chair (P)   -HS     Static Standing Balance minimal assist;verbal cues (P)   -HS     Dynamic Standing Balance moderate assist;2-person assist;verbal cues (P)   -HS     Position/Device Used, Standing Balance supported (P)   -HS     Balance Interventions sitting;standing;sit to stand;supported;static;dynamic (P)   -HS               User Key  (r) = Recorded By, (t) = Taken By, (c) = Cosigned By      Initials Name Provider Type    Maribell Rosas, PT Student PT Student                   Goals/Plan    No documentation.                  Clinical Impression       Row Name 02/25/25 1041          Pain    Pretreatment Pain Rating 10/10 (P)   -HS     Posttreatment Pain Rating 10/10 (P)   -HS     Pain Location chest (P)   -HS     Pain Management Interventions activity modification encouraged;exercise or physical activity utilized;nursing notified;breathing exercises utilized (P)   -HS     Response to Pain Interventions activity participation with tolerable  pain (P)   -HS       Row Name 02/25/25 1041          Plan of Care Review    Plan of Care Reviewed With patient (P)   -HS     Progress no change (P)   -HS     Outcome Evaluation Pt ambulated 25' Mod A x 2 w/ BUE support on tripod monitor & 1.5LNC. Further ambulation was limited due to pain, unsteadiness, and dizziness this date. Pt performed standing marches w/ BUE & single UE support to improve balance and LE strengthening. Pt will continue to benefit from skilled IPPT to address remaining impairments. PT rec IRF at d/c when medically appropriate. (P)   -HS       Row Name 02/25/25 1041          Therapy Assessment/Plan (PT)    Rehab Potential (PT) good (P)   -HS     Criteria for Skilled Interventions Met (PT) yes;meets criteria;skilled treatment is necessary (P)   -HS     Therapy Frequency (PT) daily (P)   -HS     Predicted Duration of Therapy Intervention (PT) 10 days (P)   -HS       Row Name 02/25/25 1041          Vital Signs    Pre Systolic BP Rehab 117 (P)   -HS     Pre Treatment Diastolic BP 70 (P)   -HS     Intra Systolic BP Rehab 146 (P)   -HS     Intra Treatment Diastolic BP 81 (P)   -HS     Post Systolic BP Rehab 134 (P)   -HS     Post Treatment Diastolic BP 71 (P)   -HS     Pretreatment Heart Rate (beats/min) 65 (P)   -HS     Posttreatment Heart Rate (beats/min) 72 (P)   -HS     Pre SpO2 (%) 98 (P)   -HS     O2 Delivery Pre Treatment nasal cannula (P)   -HS     Intra SpO2 (%) 89 (P)   -HS     O2 Delivery Intra Treatment nasal cannula (P)   -HS     Post SpO2 (%) 99 (P)   -HS     O2 Delivery Post Treatment nasal cannula (P)   -HS     Pre Patient Position Sitting (P)   -HS     Intra Patient Position Standing (P)   -HS     Post Patient Position Sitting (P)   -HS       Row Name 02/25/25 1041          Positioning and Restraints    Pre-Treatment Position sitting in chair/recliner (P)   -HS     Post Treatment Position chair (P)   -HS     In Chair notified nsg;reclined;sitting;call light within reach;encouraged to  call for assist;waffle cushion;heels elevated;legs elevated;RUE elevated;LUE elevated (P)   -HS               User Key  (r) = Recorded By, (t) = Taken By, (c) = Cosigned By      Initials Name Provider Type     Maribell Bob, PT Student PT Student                   Outcome Measures       Row Name 02/25/25 1048          How much help from another person do you currently need...    Turning from your back to your side while in flat bed without using bedrails? 3 (P)   -HS     Moving from lying on back to sitting on the side of a flat bed without bedrails? 3 (P)   -HS     Moving to and from a bed to a chair (including a wheelchair)? 3 (P)   -HS     Standing up from a chair using your arms (e.g., wheelchair, bedside chair)? 3 (P)   -HS     Climbing 3-5 steps with a railing? 2 (P)   -HS     To walk in hospital room? 2 (P)   -HS     AM-PAC 6 Clicks Score (PT) 16 (P)   -HS     Highest Level of Mobility Goal 5 --> Static standing (P)   -HS       Row Name 02/25/25 1048 02/25/25 1008       Functional Assessment    Outcome Measure Options AM-PAC 6 Clicks Basic Mobility (PT) (P)   -HS AM-PAC 6 Clicks Daily Activity (OT)  -AJ              User Key  (r) = Recorded By, (t) = Taken By, (c) = Cosigned By      Initials Name Provider Type    Alyson Kang, OT Occupational Therapist    Maribell Rosas, PT Student PT Student                                 Physical Therapy Education       Title: PT OT SLP Therapies (In Progress)       Topic: Physical Therapy (In Progress)       Point: Mobility training (In Progress)       Learning Progress Summary            Patient Acceptance, E,D, NR by HS at 2/25/2025 1048    Comment: sternal precautions    Acceptance, E, NR by KG at 2/24/2025 0827    Acceptance, E, NR by JORGE at 2/22/2025 1300    Acceptance, E, NR by JORGE at 2/21/2025 1015                      Point: Home exercise program (In Progress)       Learning Progress Summary            Patient Acceptance, E,D, NR by  at 2/25/2025 1048     Comment: sternal precautions    Acceptance, E, NR by KG at 2/24/2025 0827    Acceptance, E, NR by JORGE at 2/22/2025 1300    Acceptance, E, NR by JORGE at 2/21/2025 1015                      Point: Body mechanics (In Progress)       Learning Progress Summary            Patient Acceptance, E,D, NR by HS at 2/25/2025 1048    Comment: sternal precautions    Acceptance, E, NR by KG at 2/24/2025 0827    Acceptance, E, NR by JORGE at 2/22/2025 1300    Acceptance, E, NR by JORGE at 2/21/2025 1015                      Point: Precautions (In Progress)       Learning Progress Summary            Patient Acceptance, E,D, NR by HS at 2/25/2025 1048    Comment: sternal precautions    Acceptance, E, NR by KG at 2/24/2025 0827    Acceptance, E, NR by JORGE at 2/22/2025 1300    Acceptance, E, NR by JORGE at 2/21/2025 1015                                      User Key       Initials Effective Dates Name Provider Type Discipline    JORGE 02/03/23 -  Ronda Torrez, PT Physical Therapist PT    KG 05/22/20 -  Veronique Carl, PT Physical Therapist PT    HS 01/14/25 -  Maribell Bob, PT Student PT Student PT                  PT Recommendation and Plan     Progress: (P) no change  Outcome Evaluation: (P) Pt ambulated 25' Mod A x 2 w/ BUE support on tripod monitor & 1.5LNC. Further ambulation was limited due to pain, unsteadiness, and dizziness this date. Pt performed standing marches w/ BUE & single UE support to improve balance and LE strengthening. Pt will continue to benefit from skilled IPPT to address remaining impairments. PT rec IRF at d/c when medically appropriate.     Time Calculation:         PT Charges       Row Name 02/25/25 1049             Time Calculation    Start Time 0846 (P)   -HS      PT Received On 02/25/25 (P)   -HS      PT Goal Re-Cert Due Date 03/03/35 (P)   -HS         Time Calculation- PT    Total Timed Code Minutes- PT 15 minute(s) (P)   -HS         Timed Charges    45202 - Gait Training Minutes  15 (P)   -HS          Total Minutes    Timed Charges Total Minutes 15 (P)   -HS       Total Minutes 15 (P)   -HS                User Key  (r) = Recorded By, (t) = Taken By, (c) = Cosigned By      Initials Name Provider Type     Maribell Bob, PT Student PT Student                  Therapy Charges for Today       Code Description Service Date Service Provider Modifiers Qty    [id number] HC GAIT TRAINING EA 15 MIN 2/25/2025 Maribell Bob, PT Student GP 1            PT G-Codes  Outcome Measure Options: (P) AM-PAC 6 Clicks Basic Mobility (PT)  AM-PAC 6 Clicks Score (PT): (P) 16  AM-PAC 6 Clicks Score (OT): 13  PT Discharge Summary  Anticipated Discharge Disposition (PT): (P) inpatient rehabilitation facility    Maribell Bob PT Student  2/25/2025

## 2025-02-25 NOTE — PROGRESS NOTES
CTS Progress Note      POD 5 s/p CABG x 3, maze, LAAL        Subjective  Sitting up in chair.  Conversant.  Pleasant.  No new complaint.  Reportedly been slightly confused but pleasant.  Oriented to place and time      Objective    Physical Exam:   Vital Signs   Temp:  [97.3 °F (36.3 °C)-98.1 °F (36.7 °C)] 98 °F (36.7 °C)  Heart Rate:  [] 64  Resp:  [15-26] 18  BP: ()/(60-86) 129/66   GEN: NAD   CV: Regular rate and rhythm    RESP: Decreased in bases otherwise clear to auscultation   EXT: Warm with 1+ pitting edema lower extremities   Incision: Sternum is stable.  Clean dry and intact.  EVH site healing well       Results     Results from last 7 days   Lab Units 02/25/25  0308   WBC 10*3/mm3 5.77   HEMOGLOBIN g/dL 10.9*   HEMATOCRIT % 32.0*   PLATELETS 10*3/mm3 202     Results from last 7 days   Lab Units 02/25/25  0308   SODIUM mmol/L 138   POTASSIUM mmol/L 3.7   CHLORIDE mmol/L 92*   CO2 mmol/L 34.0*   BUN mg/dL 55*   CREATININE mg/dL 1.76*   GLUCOSE mg/dL 139*   CALCIUM mg/dL 8.9     Results from last 7 days   Lab Units 02/21/25  0359 02/20/25  1354   INR  1.36* 1.73*   APTT seconds  --  32.5       CXR: Status post sternotomy. Stable mild cardiomegaly. Left atrial manage clip noted. There is persistent mild bibasilar airspace disease left greater than right which may relate to atelectasis. Small left pleural effusion unchanged. No effusion on the right.   No right-sided pneumothorax. Small left apical pneumothorax measures 5 mm, previously 10 mm.      Assessment & Plan     POD 5 s/p CABG x 3, maze, LAAL      STEMI involving left anterior descending coronary artery    CVA (cerebral vascular accident)    Essential hypertension    Coronary artery disease involving native coronary artery of native heart without angina pectoris    Morbid obesity with BMI of 40.0-44.9, adult    Sleep apnea    PAF (paroxysmal atrial fibrillation)    Type 2 diabetes mellitus    Acute systolic CHF (congestive heart  failure)    Coronary artery disease involving native coronary artery of native heart with unstable angina pectoris        Plan   Diuresis as tolerated  Wean oxygen as tolerated  Monitor renal function-stable, BMP in the a.m.  Ambulate TID  Pulm toilet  Aspirin, statin, BB  Awaiting bed availability for transfer to telemetry  Should be ready for discharge soon  MIKI Marshall  02/25/25  08:06 EST

## 2025-02-25 NOTE — SIGNIFICANT NOTE
"Chair alarm noted to be alarming at 1505. Technician responded, witnessed patient fall to ground upon entrance to room. Technician called for help, nurse responded immediately. Upon nurse arrival, patient noted to be siting on the floor. Focused assessment completed, no abnormalities from baseline assessment. Patient did not hit head, no injury noted. Patient reports no new pain at this time. Several floor nurses and technician in attendance, assisted patient to feet with x3 assist and gait belt with sternal pillow in place. Patient assisted to chair with guidance. Vital signs taken, noted to be within baseline for patient (see documented vital signs). Full head-to-toe assessment completed by nurse, no deviations from previous assessment noted. All incisions inspected and intact, no changes noted. Patient endorses no new pain. Upon questioning about fall, patient stated, \"I had to go to the bathroom, so I stood up and tried to walk. I lost my footing and just kind of fell down\". Patient reoriented to place and situation. Patient reoriented to use of call bell. Patient legs elevated in chair, bedside table placed over patient's lap per patient request. Chair alarm verified, all alarms active. Call placed to attending MD, Dr. Steve Borjas, no new orders noted.   "

## 2025-02-25 NOTE — PLAN OF CARE
Goal Outcome Evaluation:  Plan of Care Reviewed With: (P) patient        Progress: (P) no change  Outcome Evaluation: (P) Pt ambulated 25' Mod A x 2 w/ BUE support on tripod monitor & 1.5LNC. Further ambulation was limited due to pain, unsteadiness, and dizziness this date. Pt performed standing marches w/ BUE & single UE support to improve balance and LE strengthening. Pt will continue to benefit from skilled IPPT to address remaining impairments. PT rec IRF at d/c when medically appropriate.    Anticipated Discharge Disposition (PT): (P) inpatient rehabilitation facility

## 2025-02-26 ENCOUNTER — APPOINTMENT (OUTPATIENT)
Dept: GENERAL RADIOLOGY | Facility: HOSPITAL | Age: 75
End: 2025-02-26
Payer: MEDICARE

## 2025-02-26 LAB
ANION GAP SERPL CALCULATED.3IONS-SCNC: 9 MMOL/L (ref 5–15)
BUN SERPL-MCNC: 54 MG/DL (ref 8–23)
BUN/CREAT SERPL: 31.6 (ref 7–25)
CALCIUM SPEC-SCNC: 9.1 MG/DL (ref 8.6–10.5)
CHLORIDE SERPL-SCNC: 92 MMOL/L (ref 98–107)
CO2 SERPL-SCNC: 34 MMOL/L (ref 22–29)
CREAT SERPL-MCNC: 1.71 MG/DL (ref 0.76–1.27)
EGFRCR SERPLBLD CKD-EPI 2021: 41.7 ML/MIN/1.73
GLUCOSE BLDC GLUCOMTR-MCNC: 101 MG/DL (ref 70–130)
GLUCOSE BLDC GLUCOMTR-MCNC: 156 MG/DL (ref 70–130)
GLUCOSE BLDC GLUCOMTR-MCNC: 171 MG/DL (ref 70–130)
GLUCOSE BLDC GLUCOMTR-MCNC: 173 MG/DL (ref 70–130)
GLUCOSE SERPL-MCNC: 203 MG/DL (ref 65–99)
POTASSIUM SERPL-SCNC: 3.3 MMOL/L (ref 3.5–5.2)
POTASSIUM SERPL-SCNC: 3.9 MMOL/L (ref 3.5–5.2)
POTASSIUM SERPL-SCNC: 4 MMOL/L (ref 3.5–5.2)
QT INTERVAL: 398 MS
QTC INTERVAL: 410 MS
SODIUM SERPL-SCNC: 135 MMOL/L (ref 136–145)

## 2025-02-26 PROCEDURE — 94799 UNLISTED PULMONARY SVC/PX: CPT

## 2025-02-26 PROCEDURE — 25010000002 HEPARIN (PORCINE) PER 1000 UNITS: Performed by: PHYSICIAN ASSISTANT

## 2025-02-26 PROCEDURE — 80048 BASIC METABOLIC PNL TOTAL CA: CPT | Performed by: PHYSICIAN ASSISTANT

## 2025-02-26 PROCEDURE — 99024 POSTOP FOLLOW-UP VISIT: CPT

## 2025-02-26 PROCEDURE — 25010000002 BUMETANIDE PER 0.5 MG

## 2025-02-26 PROCEDURE — 63710000001 INSULIN LISPRO (HUMAN) PER 5 UNITS: Performed by: STUDENT IN AN ORGANIZED HEALTH CARE EDUCATION/TRAINING PROGRAM

## 2025-02-26 PROCEDURE — 94660 CPAP INITIATION&MGMT: CPT

## 2025-02-26 PROCEDURE — 25010000002 HEPARIN (PORCINE) PER 1000 UNITS: Performed by: INTERNAL MEDICINE

## 2025-02-26 PROCEDURE — 25010000002 ALBUMIN HUMAN 25% PER 50 ML: Performed by: PHYSICIAN ASSISTANT

## 2025-02-26 PROCEDURE — 99232 SBSQ HOSP IP/OBS MODERATE 35: CPT | Performed by: PHYSICIAN ASSISTANT

## 2025-02-26 PROCEDURE — 71045 X-RAY EXAM CHEST 1 VIEW: CPT

## 2025-02-26 PROCEDURE — 25010000002 ONDANSETRON PER 1 MG: Performed by: PHYSICIAN ASSISTANT

## 2025-02-26 PROCEDURE — 63710000001 INSULIN GLARGINE PER 5 UNITS: Performed by: PHYSICIAN ASSISTANT

## 2025-02-26 PROCEDURE — 82948 REAGENT STRIP/BLOOD GLUCOSE: CPT

## 2025-02-26 PROCEDURE — 94664 DEMO&/EVAL PT USE INHALER: CPT

## 2025-02-26 PROCEDURE — 63710000001 INSULIN LISPRO (HUMAN) PER 5 UNITS: Performed by: PHYSICIAN ASSISTANT

## 2025-02-26 PROCEDURE — P9047 ALBUMIN (HUMAN), 25%, 50ML: HCPCS | Performed by: PHYSICIAN ASSISTANT

## 2025-02-26 PROCEDURE — 84132 ASSAY OF SERUM POTASSIUM: CPT | Performed by: STUDENT IN AN ORGANIZED HEALTH CARE EDUCATION/TRAINING PROGRAM

## 2025-02-26 PROCEDURE — 99232 SBSQ HOSP IP/OBS MODERATE 35: CPT | Performed by: STUDENT IN AN ORGANIZED HEALTH CARE EDUCATION/TRAINING PROGRAM

## 2025-02-26 RX ORDER — BISACODYL 10 MG
10 SUPPOSITORY, RECTAL RECTAL DAILY PRN
Status: DISCONTINUED | OUTPATIENT
Start: 2025-02-26 | End: 2025-02-28 | Stop reason: HOSPADM

## 2025-02-26 RX ORDER — INSULIN LISPRO 100 [IU]/ML
5 INJECTION, SOLUTION INTRAVENOUS; SUBCUTANEOUS
Status: DISCONTINUED | OUTPATIENT
Start: 2025-02-26 | End: 2025-02-28 | Stop reason: HOSPADM

## 2025-02-26 RX ORDER — BUMETANIDE 0.25 MG/ML
1 INJECTION, SOLUTION INTRAMUSCULAR; INTRAVENOUS ONCE
Status: COMPLETED | OUTPATIENT
Start: 2025-02-26 | End: 2025-02-26

## 2025-02-26 RX ORDER — POTASSIUM CHLORIDE 1500 MG/1
20 TABLET, EXTENDED RELEASE ORAL ONCE
Status: DISCONTINUED | OUTPATIENT
Start: 2025-02-26 | End: 2025-02-28

## 2025-02-26 RX ORDER — POLYETHYLENE GLYCOL 3350 17 G/17G
17 POWDER, FOR SOLUTION ORAL DAILY PRN
Status: DISCONTINUED | OUTPATIENT
Start: 2025-02-26 | End: 2025-02-26

## 2025-02-26 RX ORDER — BISACODYL 5 MG/1
5 TABLET, DELAYED RELEASE ORAL DAILY PRN
Status: DISCONTINUED | OUTPATIENT
Start: 2025-02-26 | End: 2025-02-26

## 2025-02-26 RX ORDER — TAMSULOSIN HYDROCHLORIDE 0.4 MG/1
0.4 CAPSULE ORAL DAILY
Status: DISCONTINUED | OUTPATIENT
Start: 2025-02-26 | End: 2025-02-28 | Stop reason: HOSPADM

## 2025-02-26 RX ORDER — BISACODYL 10 MG
10 SUPPOSITORY, RECTAL RECTAL DAILY PRN
Status: DISCONTINUED | OUTPATIENT
Start: 2025-02-26 | End: 2025-02-26

## 2025-02-26 RX ORDER — POLYETHYLENE GLYCOL 3350 17 G/17G
17 POWDER, FOR SOLUTION ORAL 2 TIMES DAILY
Status: DISCONTINUED | OUTPATIENT
Start: 2025-02-26 | End: 2025-02-28 | Stop reason: HOSPADM

## 2025-02-26 RX ORDER — HYDROXYZINE HYDROCHLORIDE 25 MG/1
25 TABLET, FILM COATED ORAL 3 TIMES DAILY PRN
Status: DISCONTINUED | OUTPATIENT
Start: 2025-02-26 | End: 2025-02-26

## 2025-02-26 RX ORDER — AMOXICILLIN 250 MG
2 CAPSULE ORAL 2 TIMES DAILY PRN
Status: DISCONTINUED | OUTPATIENT
Start: 2025-02-26 | End: 2025-02-26

## 2025-02-26 RX ORDER — FINASTERIDE 5 MG/1
5 TABLET, FILM COATED ORAL DAILY
Status: DISCONTINUED | OUTPATIENT
Start: 2025-02-26 | End: 2025-02-28 | Stop reason: HOSPADM

## 2025-02-26 RX ORDER — AMOXICILLIN 250 MG
2 CAPSULE ORAL 2 TIMES DAILY
Status: DISCONTINUED | OUTPATIENT
Start: 2025-02-26 | End: 2025-02-28 | Stop reason: HOSPADM

## 2025-02-26 RX ORDER — POTASSIUM CHLORIDE 1500 MG/1
40 TABLET, EXTENDED RELEASE ORAL EVERY 4 HOURS
Status: COMPLETED | OUTPATIENT
Start: 2025-02-26 | End: 2025-02-26

## 2025-02-26 RX ORDER — IPRATROPIUM BROMIDE AND ALBUTEROL SULFATE 2.5; .5 MG/3ML; MG/3ML
3 SOLUTION RESPIRATORY (INHALATION)
Status: DISCONTINUED | OUTPATIENT
Start: 2025-02-26 | End: 2025-02-28 | Stop reason: HOSPADM

## 2025-02-26 RX ORDER — BISACODYL 5 MG/1
5 TABLET, DELAYED RELEASE ORAL DAILY
Status: DISCONTINUED | OUTPATIENT
Start: 2025-02-26 | End: 2025-02-28 | Stop reason: HOSPADM

## 2025-02-26 RX ADMIN — IPRATROPIUM BROMIDE AND ALBUTEROL SULFATE 3 ML: 2.5; .5 SOLUTION RESPIRATORY (INHALATION) at 19:28

## 2025-02-26 RX ADMIN — AMIODARONE HYDROCHLORIDE 200 MG: 200 TABLET ORAL at 16:00

## 2025-02-26 RX ADMIN — LEVOTHYROXINE SODIUM 200 MCG: 0.1 TABLET ORAL at 05:00

## 2025-02-26 RX ADMIN — ASPIRIN 81 MG 81 MG: 81 TABLET ORAL at 08:32

## 2025-02-26 RX ADMIN — IPRATROPIUM BROMIDE AND ALBUTEROL SULFATE 3 ML: 2.5; .5 SOLUTION RESPIRATORY (INHALATION) at 13:07

## 2025-02-26 RX ADMIN — OXYCODONE HYDROCHLORIDE 5 MG: 5 TABLET ORAL at 03:15

## 2025-02-26 RX ADMIN — CARVEDILOL 6.25 MG: 6.25 TABLET, FILM COATED ORAL at 18:36

## 2025-02-26 RX ADMIN — HEPARIN SODIUM 5000 UNITS: 5000 INJECTION INTRAVENOUS; SUBCUTANEOUS at 05:00

## 2025-02-26 RX ADMIN — TAMSULOSIN HYDROCHLORIDE 0.4 MG: 0.4 CAPSULE ORAL at 16:00

## 2025-02-26 RX ADMIN — AMIODARONE HYDROCHLORIDE 200 MG: 200 TABLET ORAL at 08:32

## 2025-02-26 RX ADMIN — BUMETANIDE 1 MG: 0.25 INJECTION INTRAMUSCULAR; INTRAVENOUS at 12:48

## 2025-02-26 RX ADMIN — SENNOSIDES AND DOCUSATE SODIUM 2 TABLET: 50; 8.6 TABLET ORAL at 22:11

## 2025-02-26 RX ADMIN — INSULIN LISPRO 2 UNITS: 100 INJECTION, SOLUTION INTRAVENOUS; SUBCUTANEOUS at 12:58

## 2025-02-26 RX ADMIN — INSULIN LISPRO 2 UNITS: 100 INJECTION, SOLUTION INTRAVENOUS; SUBCUTANEOUS at 08:30

## 2025-02-26 RX ADMIN — ONDANSETRON 4 MG: 2 INJECTION INTRAMUSCULAR; INTRAVENOUS at 03:15

## 2025-02-26 RX ADMIN — ACETAMINOPHEN 1000 MG: 500 TABLET, FILM COATED ORAL at 22:11

## 2025-02-26 RX ADMIN — BISACODYL 5 MG: 5 TABLET, COATED ORAL at 12:48

## 2025-02-26 RX ADMIN — TRAMADOL HYDROCHLORIDE 100 MG: 50 TABLET, COATED ORAL at 08:33

## 2025-02-26 RX ADMIN — POLYETHYLENE GLYCOL 3350 17 G: 17 POWDER, FOR SOLUTION ORAL at 12:48

## 2025-02-26 RX ADMIN — INSULIN LISPRO 2 UNITS: 100 INJECTION, SOLUTION INTRAVENOUS; SUBCUTANEOUS at 22:34

## 2025-02-26 RX ADMIN — HEPARIN SODIUM 5000 UNITS: 5000 INJECTION INTRAVENOUS; SUBCUTANEOUS at 15:59

## 2025-02-26 RX ADMIN — INSULIN LISPRO 5 UNITS: 100 INJECTION, SOLUTION INTRAVENOUS; SUBCUTANEOUS at 12:58

## 2025-02-26 RX ADMIN — INSULIN LISPRO 5 UNITS: 100 INJECTION, SOLUTION INTRAVENOUS; SUBCUTANEOUS at 08:31

## 2025-02-26 RX ADMIN — ALBUMIN (HUMAN) 25 G: 0.25 INJECTION, SOLUTION INTRAVENOUS at 08:29

## 2025-02-26 RX ADMIN — POTASSIUM CHLORIDE 40 MEQ: 1500 TABLET, EXTENDED RELEASE ORAL at 04:59

## 2025-02-26 RX ADMIN — CARVEDILOL 6.25 MG: 6.25 TABLET, FILM COATED ORAL at 08:28

## 2025-02-26 RX ADMIN — GUAIFENESIN 1200 MG: 600 TABLET ORAL at 22:11

## 2025-02-26 RX ADMIN — GUAIFENESIN 1200 MG: 600 TABLET ORAL at 08:28

## 2025-02-26 RX ADMIN — ROSUVASTATIN 40 MG: 20 TABLET, FILM COATED ORAL at 22:10

## 2025-02-26 RX ADMIN — ACETAMINOPHEN 1000 MG: 500 TABLET, FILM COATED ORAL at 14:30

## 2025-02-26 RX ADMIN — HEPARIN SODIUM 5000 UNITS: 5000 INJECTION INTRAVENOUS; SUBCUTANEOUS at 22:11

## 2025-02-26 RX ADMIN — IPRATROPIUM BROMIDE AND ALBUTEROL SULFATE 3 ML: 2.5; .5 SOLUTION RESPIRATORY (INHALATION) at 16:39

## 2025-02-26 RX ADMIN — INSULIN GLARGINE 10 UNITS: 100 INJECTION, SOLUTION SUBCUTANEOUS at 08:31

## 2025-02-26 RX ADMIN — SENNOSIDES AND DOCUSATE SODIUM 2 TABLET: 50; 8.6 TABLET ORAL at 08:32

## 2025-02-26 RX ADMIN — FINASTERIDE 5 MG: 5 TABLET, FILM COATED ORAL at 16:00

## 2025-02-26 RX ADMIN — CLOPIDOGREL BISULFATE 75 MG: 75 TABLET ORAL at 08:32

## 2025-02-26 RX ADMIN — MINERAL OIL 1 ENEMA: 100 ENEMA RECTAL at 16:00

## 2025-02-26 RX ADMIN — POTASSIUM CHLORIDE 40 MEQ: 1500 TABLET, EXTENDED RELEASE ORAL at 02:19

## 2025-02-26 RX ADMIN — POTASSIUM CHLORIDE 40 MEQ: 750 CAPSULE, EXTENDED RELEASE ORAL at 08:32

## 2025-02-26 NOTE — PLAN OF CARE
Goal Outcome Evaluation:              Outcome Evaluation: pt has been pleasant. AxOx3. pt had some complaints of pain. see mar. pt was able to ambulate throughout the halls x2. Bowel regimen given. pt had to be in and out cathed x2 today. pt tolerated well. call light within reach.

## 2025-02-26 NOTE — PLAN OF CARE
Problem: Adult Inpatient Plan of Care  Goal: Absence of Hospital-Acquired Illness or Injury  Intervention: Identify and Manage Fall Risk  Recent Flowsheet Documentation  Taken 2/26/2025 0615 by Aiden Camara RN  Safety Promotion/Fall Prevention: safety round/check completed  Taken 2/26/2025 0405 by Aiden Camara RN  Safety Promotion/Fall Prevention: safety round/check completed  Taken 2/26/2025 0220 by Aiden Camara RN  Safety Promotion/Fall Prevention: safety round/check completed  Taken 2/26/2025 0020 by Aiden Camara RN  Safety Promotion/Fall Prevention: safety round/check completed  Taken 2/25/2025 2220 by Aiden Camara RN  Safety Promotion/Fall Prevention: safety round/check completed  Taken 2/25/2025 1948 by Aiden Camara RN  Safety Promotion/Fall Prevention:   safety round/check completed   nonskid shoes/slippers when out of bed  Intervention: Prevent Skin Injury  Recent Flowsheet Documentation  Taken 2/26/2025 0615 by Aiden Camara RN  Body Position:   right   turned  Skin Protection: incontinence pads utilized  Taken 2/26/2025 0405 by Aiden Camara RN  Body Position: position maintained  Skin Protection: incontinence pads utilized  Taken 2/26/2025 0220 by Aiden Camara RN  Body Position:   turned   supine  Skin Protection: incontinence pads utilized  Taken 2/26/2025 0020 by Aiden Camara RN  Body Position:   turned   left  Skin Protection: incontinence pads utilized  Taken 2/25/2025 2220 by Aiden Camara RN  Skin Protection: incontinence pads utilized  Taken 2/25/2025 1948 by Aiden Camara RN  Skin Protection: incontinence pads utilized  Goal: Optimal Comfort and Wellbeing  Intervention: Monitor Pain and Promote Comfort  Recent Flowsheet Documentation  Taken 2/25/2025 1948 by Aiden Camara RN  Pain Management Interventions: unnecessary movement minimized  Intervention: Provide Person-Centered Care  Recent Flowsheet Documentation  Taken  2/25/2025 1948 by Aiden Camara, RN  Trust Relationship/Rapport:   care explained   reassurance provided   thoughts/feelings acknowledged   Goal Outcome Evaluation:

## 2025-02-26 NOTE — PLAN OF CARE
Problem: Adult Inpatient Plan of Care  Goal: Plan of Care Review  Outcome: Progressing  Flowsheets (Taken 2/25/2025 1933)  Progress: no change  Outcome Evaluation: Patient transferred to floor today from ICU. Fall noted at 1505 with no injury. Pain management ongoing, patient noted to have worsening incisional sternal pain. MD aware.  Plan of Care Reviewed With: patient  Goal: Absence of Hospital-Acquired Illness or Injury  Outcome: Progressing  Intervention: Identify and Manage Fall Risk  Recent Flowsheet Documentation  Taken 2/25/2025 1825 by Svitlana Leon RN  Safety Promotion/Fall Prevention:   activity supervised   assistive device/personal items within reach   clutter free environment maintained   fall prevention program maintained   nonskid shoes/slippers when out of bed   safety round/check completed  Taken 2/25/2025 1635 by Svitlana Leon RN  Safety Promotion/Fall Prevention:   activity supervised   assistive device/personal items within reach   clutter free environment maintained   fall prevention program maintained   gait belt   nonskid shoes/slippers when out of bed   room organization consistent   safety round/check completed   toileting scheduled  Taken 2/25/2025 1555 by Svitlana Leon RN  Safety Promotion/Fall Prevention:   activity supervised   assistive device/personal items within reach   clutter free environment maintained   fall prevention program maintained   nonskid shoes/slippers when out of bed   safety round/check completed  Taken 2/25/2025 1510 by Svitlana Leon RN  Safety Promotion/Fall Prevention:   activity supervised   assistive device/personal items within reach   clutter free environment maintained   fall prevention program maintained   gait belt   nonskid shoes/slippers when out of bed   room organization consistent   safety round/check completed   toileting scheduled  Taken 2/25/2025 1355 by Svitlana Leon RN  Safety Promotion/Fall Prevention:   activity supervised    assistive device/personal items within reach   clutter free environment maintained   fall prevention program maintained   nonskid shoes/slippers when out of bed   safety round/check completed  Taken 2/25/2025 1115 by Svitlana Leon RN  Safety Promotion/Fall Prevention:   activity supervised   assistive device/personal items within reach   clutter free environment maintained   fall prevention program maintained   nonskid shoes/slippers when out of bed   safety round/check completed  Intervention: Prevent Skin Injury  Recent Flowsheet Documentation  Taken 2/25/2025 1825 by Svitlana Leon RN  Body Position:   position maintained   supine  Skin Protection: incontinence pads utilized  Taken 2/25/2025 1635 by Svitlana Leon RN  Body Position: supine  Taken 2/25/2025 1555 by Svitlana Leon RN  Body Position:   position maintained   legs elevated  Skin Protection: incontinence pads utilized  Taken 2/25/2025 1510 by Svitlana Leon RN  Body Position: (sitting in chair)   legs elevated   supine  Skin Protection: incontinence pads utilized  Taken 2/25/2025 1355 by Svitlana Leon RN  Body Position: legs elevated  Skin Protection: incontinence pads utilized  Taken 2/25/2025 1115 by Svitlana Leon RN  Body Position:   supine   position maintained  Skin Protection:   incontinence pads utilized   silicone foam dressing in place  Intervention: Prevent and Manage VTE (Venous Thromboembolism) Risk  Recent Flowsheet Documentation  Taken 2/25/2025 1510 by Svitlana Leon RN  VTE Prevention/Management: (see MAR) other (see comments)  Taken 2/25/2025 1115 by Svitlana Leon RN  VTE Prevention/Management: (see MAR) other (see comments)  Intervention: Prevent Infection  Recent Flowsheet Documentation  Taken 2/25/2025 1825 by Svitlana Leon RN  Infection Prevention: environmental surveillance performed  Taken 2/25/2025 1635 by Svitlana eLon RN  Infection Prevention: environmental surveillance performed  Taken 2/25/2025  1555 by Svitlana Leon RN  Infection Prevention: environmental surveillance performed  Taken 2/25/2025 1510 by Svitlana Leon RN  Infection Prevention: environmental surveillance performed  Taken 2/25/2025 1355 by Svitlana Leon RN  Infection Prevention: environmental surveillance performed  Taken 2/25/2025 1115 by Svitlana Leon RN  Infection Prevention: environmental surveillance performed     Problem: Fall Injury Risk  Goal: Absence of Fall and Fall-Related Injury  Outcome: Progressing  Intervention: Identify and Manage Contributors  Recent Flowsheet Documentation  Taken 2/25/2025 1115 by Svitlana Leon RN  Medication Review/Management: medications reviewed  Intervention: Promote Injury-Free Environment  Recent Flowsheet Documentation  Taken 2/25/2025 1825 by Svitlana Leon RN  Safety Promotion/Fall Prevention:   activity supervised   assistive device/personal items within reach   clutter free environment maintained   fall prevention program maintained   nonskid shoes/slippers when out of bed   safety round/check completed  Taken 2/25/2025 1635 by Svitlana Leon RN  Safety Promotion/Fall Prevention:   activity supervised   assistive device/personal items within reach   clutter free environment maintained   fall prevention program maintained   gait belt   nonskid shoes/slippers when out of bed   room organization consistent   safety round/check completed   toileting scheduled  Taken 2/25/2025 1555 by Svitlana Leon RN  Safety Promotion/Fall Prevention:   activity supervised   assistive device/personal items within reach   clutter free environment maintained   fall prevention program maintained   nonskid shoes/slippers when out of bed   safety round/check completed  Taken 2/25/2025 1510 by Svitlana Leon RN  Safety Promotion/Fall Prevention:   activity supervised   assistive device/personal items within reach   clutter free environment maintained   fall prevention program maintained   gait belt    nonskid shoes/slippers when out of bed   room organization consistent   safety round/check completed   toileting scheduled  Taken 2/25/2025 1355 by Svitlana Leon RN  Safety Promotion/Fall Prevention:   activity supervised   assistive device/personal items within reach   clutter free environment maintained   fall prevention program maintained   nonskid shoes/slippers when out of bed   safety round/check completed  Taken 2/25/2025 1115 by Svitlana Leon RN  Safety Promotion/Fall Prevention:   activity supervised   assistive device/personal items within reach   clutter free environment maintained   fall prevention program maintained   nonskid shoes/slippers when out of bed   safety round/check completed   Goal Outcome Evaluation:  Plan of Care Reviewed With: patient        Progress: no change  Outcome Evaluation: Patient transferred to floor today from ICU. Fall noted at 1505 with no injury. Pain management ongoing, patient noted to have worsening incisional sternal pain. MD aware.

## 2025-02-26 NOTE — CASE MANAGEMENT/SOCIAL WORK
Discharge Planning Assessment  [facility]     Patient Name: Camron Hendrix  MRN: [id number]  Today's Date: 2/26/2025    Admit Date: 2/18/2025    Plan: IPR       Discharge Plan       Row Name 02/26/25 1624       Plan    Plan IPR    Patient/Family in Agreement with Plan yes    Plan Comments I have spoken to Mr. Hendrix at bedside today regading the discharge plan.  He remains agreeable to submission of referral to [facility] as first choice.  I have confirmed with Lisa at  that she will review.  CM will cont to follow plan of care and provide assistance with discharge planning as recommendations become available.    Final Discharge Disposition Code 62 - inpatient rehab facility                  Continued Care and Services - Admitted Since 2/18/2025       Destination       Service Provider Request Status Services Address Phone Fax Patient Preferred    [facility] Pending -  -- [address] [facility] [id number] [phone] --                  Expected Discharge Date and Time       Expected Discharge Date Expected Discharge Time    Feb 27, 2025             Shi Lange RN

## 2025-02-26 NOTE — PROGRESS NOTES
"  [facility]  PROGRESS NOTE    Date of Admission: 2/18/2025  Date of Service: 02/26/25    Primary Care Physician: Leslie Rhodes MD    Chief Complaint: f/u CAD, HTN, HLD, CHF  Problem List:   STEMI involving left anterior descending coronary artery    CVA (cerebral vascular accident)    Essential hypertension    Coronary artery disease involving native coronary artery of native heart without angina pectoris    Morbid obesity with BMI of 40.0-44.9, adult    Sleep apnea    PAF (paroxysmal atrial fibrillation)    Type 2 diabetes mellitus    Acute systolic CHF (congestive heart failure)    Coronary artery disease involving native coronary artery of native heart with unstable angina pectoris      Subjective      Patient sustained a fall last evening. Currently lying in bed, reports he is feeling hot. Denies cardiac complaints. He has not had a BM in several days. Also reports he has difficulty urinating.       Objective   Vitals: /78 (BP Location: Left arm, Patient Position: Lying)   Pulse 65   Temp 98 °F (36.7 °C) (Oral)   Resp 18   Ht 175.3 cm (69\")   Wt 125 kg (274 lb 11.2 oz)   SpO2 92%   BMI 40.57 kg/m²     Physical Exam:  GENERAL: Alert, cooperative, in no acute distress.   HEENT: Normocephalic, no jugular venous distention  HEART: Regular rhythm, normal rate, and no murmurs, gallops, or rubs.   LUNGS: No wheezing, rales or rhonchi. 95% on 2L NC  NEUROLOGIC: No focal abnormalities involving strength or sensation are noted.   EXTREMITIES: No clubbing, cyanosis, 1+ edema noted.     Results:  Results from last 7 days   Lab Units 02/25/25  0308 02/24/25  0432 02/23/25  0522   WBC 10*3/mm3 5.77 6.03 6.59   HEMOGLOBIN g/dL 10.9* 10.0* 10.3*   HEMATOCRIT % 32.0* 30.0* 31.0*   PLATELETS 10*3/mm3 202 148 136*     Results from last 7 days   Lab Units 02/26/25  0906 02/25/25  2348 02/25/25  1544 02/25/25  0308 02/24/25  1904 02/24/25  0405   SODIUM mmol/L 135*  --   --  " 138  --  134*   POTASSIUM mmol/L 3.9 3.3* 3.7 3.7   < > 4.0   CHLORIDE mmol/L 92*  --   --  92*  --  95*   CO2 mmol/L 34.0*  --   --  34.0*  --  27.0   BUN mg/dL 54*  --   --  55*  --  49*   CREATININE mg/dL 1.71*  --   --  1.76*  --  1.75*   GLUCOSE mg/dL 203*  --   --  139*  --  138*    < > = values in this interval not displayed.      Lab Results   Component Value Date    CHOL 105 02/19/2025    TRIG 88 02/19/2025    HDL 38 (L) 02/19/2025    LDL 50 02/19/2025    AST 92 (H) 02/21/2025    ALT 31 02/21/2025         Results from last 7 days   Lab Units 02/21/25  0359 02/20/25  1354   PROTIME Seconds 16.8* 20.3*   INR  1.36* 1.73*   APTT seconds  --  32.5     Results from last 7 days   Lab Units 02/20/25  0345 02/19/25  1804   HSTROP T ng/L 7,557* >10,000*       Intake/Output Summary (Last 24 hours) at 2/26/2025 1006  Last data filed at 2/26/2025 0619  Gross per 24 hour   Intake 600 ml   Output 800 ml   Net -200 ml     I personally reviewed the patient's EKG/Telemetry data    Radiology Data:   XR Chest 1 View    Result Date: 2/25/2025  Impression: 1. Small left apical pneumothorax measures 5 mm, previously 10 mm. 2. Persistent bibasilar airspace disease left greater than right which may relate to atelectasis with small left pleural effusion. Electronically Signed: Lazaro Hooker MD  2/25/2025 7:48 AM EST  Workstation ID: [id number]       Current Medications:  albumin human, 25 g, Intravenous, BID  amiodarone, 200 mg, Oral, Q8H   Followed by  [START ON 2/28/2025] amiodarone, 200 mg, Oral, Q12H   Followed by  [START ON 3/14/2025] amiodarone, 200 mg, Oral, Daily  aspirin, 81 mg, Oral, Daily  carvedilol, 6.25 mg, Oral, BID With Meals  clopidogrel, 75 mg, Oral, Daily  guaiFENesin, 1,200 mg, Oral, Q12H  heparin (porcine), 5,000 Units, Subcutaneous, Q8H  insulin glargine, 10 Units, Subcutaneous, Daily  insulin lispro, 2-9 Units, Subcutaneous, 4x Daily AC & at Bedtime  Insulin Lispro, 5 Units, Subcutaneous, TID With  Meals  ipratropium-albuterol, 3 mL, Nebulization, 4x Daily - RT  levothyroxine, 200 mcg, Oral, Q AM  pharmacy consult - MTM, , Not Applicable, Daily  rosuvastatin, 40 mg, Oral, Nightly  senna-docusate sodium, 2 tablet, Oral, BID           Initial cardiac assessment: 74-year-old gentleman presenting with anterior STEMI status post balloon angioplasty of the LAD but with residual multivessel disease depressed ejection fraction acute systolic heart failure status post 3V CABG with Dr. Borjas 2/20/2025.  Status post left atrial appendage ligation and maze procedure as well.      ASSESSMENT/PLAN:  1.  Anterior STEMI with multivessel CAD EF 45%:  Status post 3V CABG (LIMA-LAD, SVG-OM, SVG-RCA) 2/20/25 Dr Borjas  Continue ASA, statin. BB   Sternal incision care per CTS      2.  Acute systolic heart failure:  EF 41-45% pre-op  Continue to maintain net negative fluid balance  Holding Bumex as of 2/25, BUN trending up, fairly euvolemic on exam  Likely will resume Bumex 1 mg p.o. daily prior to discharge  Hold home dose losartan but may resume at 25 mg daily at discharge if renal function and blood pressure stable     3.  Paroxysmal atrial fibrillation present on admission  Appreciate STEFANO ligation and maze procedure during bypass surgery  Currently on amiodarone   Rates well-controlled, continue beta-blocker, with carvedilol  No anticoagulation necessary due to left atrial appendage ligation at time of CABG     4.  History of hypertension:  Stable, on low dose Coreg   Hopefully home on losartan as well     5.  Mixed hyperlipidemia:  Goal LDL less than 50, currently at goal on high-dose rosuvastatin 40 mg daily continue for now.     6.  A/CRF- trending labs w diuresis  BUN trending up, holding Bumex  Hold losartan as well but if renal function remains stable will resume at 25 mg daily    7. Urinary retention   Per hospitalists       Electronically signed by Samra Cruz PA-C, 02/26/25, 10:09 AM EST.

## 2025-02-26 NOTE — PROGRESS NOTES
CTS Progress Note      POD # 6 s/p CABG x 3, maze, LAAL      Subjective  Fell last night, landed on his back. Dyspneic this morning.  On 2L NC saturations 92%.    Objective    Physical Exam:   Vital Signs   Temp:  [97.7 °F (36.5 °C)-98.4 °F (36.9 °C)] 97.7 °F (36.5 °C)  Heart Rate:  [59-73] 63  Resp:  [16-20] 18  BP: (111-146)/(61-86) 141/77   GEN: NAD   CV: Regular rate and rhythm    RESP: Decreased in bases   EXT: Warm with 1+ pitting edema lower extremities   Incision: Sternum is stable.  Wound vac in place. Clean dry and intact.  EVH site healing well       Results     Results from last 7 days   Lab Units 02/25/25  0308   WBC 10*3/mm3 5.77   HEMOGLOBIN g/dL 10.9*   HEMATOCRIT % 32.0*   PLATELETS 10*3/mm3 202     Results from last 7 days   Lab Units 02/25/25  2348 02/25/25  1544 02/25/25  0308   SODIUM mmol/L  --   --  138   POTASSIUM mmol/L 3.3*   < > 3.7   CHLORIDE mmol/L  --   --  92*   CO2 mmol/L  --   --  34.0*   BUN mg/dL  --   --  55*   CREATININE mg/dL  --   --  1.76*   GLUCOSE mg/dL  --   --  139*   CALCIUM mg/dL  --   --  8.9    < > = values in this interval not displayed.     Results from last 7 days   Lab Units 02/21/25  0359 02/20/25  1354   INR  1.36* 1.73*   APTT seconds  --  32.5       Assessment & Plan     POD # 6 s/p CABG x 3, maze, LAAL      STEMI involving left anterior descending coronary artery    CVA (cerebral vascular accident)    Essential hypertension    Coronary artery disease involving native coronary artery of native heart without angina pectoris    Morbid obesity with BMI of 40.0-44.9, adult    Sleep apnea    PAF (paroxysmal atrial fibrillation)    Type 2 diabetes mellitus    Acute systolic CHF (congestive heart failure)    Coronary artery disease involving native coronary artery of native heart with unstable angina pectoris        Plan   Duonebs   Diuresis as tolerated by kidney function  Wean oxygen as tolerated  Ambulate TID  Pulm toilet  Aspirin, statin, BB, Plavix    Kirill MONTIEL  MD Lisa  02/26/25  07:59 EST

## 2025-02-26 NOTE — PROGRESS NOTES
[facility] Medicine Services  PROGRESS NOTE    Patient Name: Camron Hendrix  : 1950  MRN: [id number]    Date of Admission: 2025  Primary Care Physician: Leslie Rhodes MD    Subjective   Subjective     CC:  Follow-up STEMI    HPI:  Reports confusion, depression.  No SI or HI.  Has shortness of breath and nausea.  Has difficulty urinating.  Patient's daughter, grandson, granddaughter bedside and patient okay with them being updated.  No bowel movement since admission per family.      Objective   Objective     Vital Signs:   Temp:  [97.7 °F (36.5 °C)-98.4 °F (36.9 °C)] 98 °F (36.7 °C)  Heart Rate:  [59-73] 65  Resp:  [18] 18  BP: (111-143)/(67-78) 143/78  Flow (L/min) (Oxygen Therapy):  [2] 2     Physical Exam:  Constitutional: No acute distress, awake, alert  HENT: NCAT, mucous membranes moist  Respiratory: Clear to auscultation bilaterally, respiratory effort normal   Cardiovascular: RRR  Gastrointestinal: Positive bowel sounds, soft, nontender, distended  Musculoskeletal: No bilateral ankle edema  Psychiatric: Appropriate affect, cooperative  Neurologic: Oriented x 4, strength symmetric in all extremities, Cranial Nerves grossly intact to confrontation, speech clear  Skin: No rashes, wound VAC in place over sternotomy site      Results Reviewed:  LAB RESULTS:      Lab 25  0308 25  0432 25  0522 25  0444 25  0359 25  1715 25  1354 25  0748 25  0345 25  2026 25  1804   0000   WBC 5.77 6.03 6.59 8.88 13.89*   < > 16.25*  --  10.04  --   --    < >   HEMOGLOBIN 10.9* 10.0* 10.3* 10.8* 12.0*   < > 12.7*  --  13.1  --   --    < >   HEMOGLOBIN, POC  --   --   --   --   --   --   --    < >  --   --   --   --    HEMATOCRIT 32.0* 30.0* 31.0* 33.0* 35.9*   < > 36.9*  --  38.0  --   --    < >   HEMATOCRIT POC  --   --   --   --   --   --   --    < >  --   --   --   --    PLATELETS 202 148 136* 118* 155   < > 136*  --   173  --   --    < >   NEUTROS ABS  --  4.34  --   --  10.84*  --   --   --  7.02*  --   --   --    IMMATURE GRANS (ABS)  --  0.02  --   --  0.09*  --   --   --  0.05  --   --   --    LYMPHS ABS  --  0.74  --   --  1.60  --   --   --  2.06  --   --   --    MONOS ABS  --  0.75  --   --  1.33*  --   --   --  0.85  --   --   --    EOS ABS  --  0.16  --   --  0.00  --   --   --  0.03  --   --   --    MCV 84.4 85.7 85.9 86.4 85.7   < > 83.7  --  84.1  --   --    < >   CRP  --   --   --   --   --   --   --   --  12.59*  --   --   --    PROCALCITONIN  --   --   --   --   --   --   --   --  0.18  --   --   --    PROTIME  --   --   --   --  16.8*  --  20.3*  --   --   --   --   --    APTT  --   --   --   --   --   --  32.5  --   --   --   --   --    HEPARIN ANTI-XA  --   --   --   --   --   --  0.10*  --  0.19* 0.34  --   --    HSTROP T  --   --   --   --   --   --   --   --  7,557*  --  >10,000*  --     < > = values in this interval not displayed.         Lab 02/26/25  0906 02/25/25  2348 02/25/25  1544 02/25/25  0308 02/24/25  1904 02/24/25  0405 02/23/25  1246 02/23/25  0522 02/22/25  1203 02/22/25  0444 02/21/25  0359 02/20/25  2306 02/20/25  1715 02/20/25  1354 02/20/25  0345   SODIUM 135*  --   --  138  --  134*  --  134*  --  137 139  --  140 139 138   POTASSIUM 3.9 3.3* 3.7 3.7 3.1* 4.0   < > 3.7   < > 3.9 4.5   < > 4.4 3.8 3.2*   CHLORIDE 92*  --   --  92*  --  95*  --  94*  --  99 102  --  106 104 98   CO2 34.0*  --   --  34.0*  --  27.0  --  29.0  --  24.0 25.0  --  21.0* 22.0 23.0   ANION GAP 9.0  --   --  12.0  --  12.0  --  11.0  --  14.0 12.0  --  13.0 13.0 17.0*   BUN 54*  --   --  55*  --  49*  --  43*  --  36* 26*  --  23 24* 24*   CREATININE 1.71*  --   --  1.76*  --  1.75*  --  1.85*  --  1.81* 1.60*  --  1.34* 1.40* 1.49*   EGFR 41.7*  --   --  40.3*  --  40.6*  --  38.0*  --  39.0* 45.2*  --  55.9* 53.1* 49.2*   GLUCOSE 203*  --   --  139*  --  138*  --  153*  --  173* 141*  --  194* 178* 162*   CALCIUM  9.1  --   --  8.9  --  8.7  --  8.2*  --  8.1* 8.2*  --  7.7* 8.4* 8.1*   IONIZED CALCIUM  --   --   --   --   --   --   --   --   --   --   --   --   --  1.13*  --    MAGNESIUM  --   --   --   --  2.3  --   --   --   --   --  2.6*  --  2.4 1.8  --    PHOSPHORUS  --   --   --   --  2.3*  --   --   --   --   --   --   --  2.9 3.3 2.3*    < > = values in this interval not displayed.         Lab 02/21/25  0359 02/20/25  1715 02/20/25  1354 02/20/25  0345   TOTAL PROTEIN 5.5*  --   --   --    ALBUMIN 3.6 3.2* 3.1* 3.5   GLOBULIN 1.9  --   --   --    ALT (SGPT) 31  --   --   --    AST (SGOT) 92*  --   --   --    BILIRUBIN 0.6  --   --   --    ALK PHOS 62  --   --   --          Lab 02/21/25  0359 02/20/25  1354 02/20/25  0345 02/19/25  1804   HSTROP T  --   --  7,557* >10,000*   PROTIME 16.8* 20.3*  --   --    INR 1.36* 1.73*  --   --              Lab 02/24/25  0405   ABO TYPING B   RH TYPING Positive   ANTIBODY SCREEN Negative         Lab 02/21/25  2230 02/20/25  2358 02/20/25  1705   PH, ARTERIAL 7.344* 7.384 7.382   PCO2, ARTERIAL 43.3 42.8 40.4   PO2 ART 56.0* 87.6 87.9   FIO2 100 60 40   HCO3 ART 23.6 25.5 24.0   BASE EXCESS ART -2.2* 0.3 -1.0*   CARBOXYHEMOGLOBIN 1.3 1.2 1.3     Brief Urine Lab Results  (Last result in the past 365 days)        Color   Clarity   Blood   Leuk Est   Nitrite   Protein   CREAT   Urine HCG        05/03/24 1742 Dark Yellow   Clear   Negative   Negative   Negative   30 mg/dL (1+)                   Microbiology Results Abnormal       None            XR Chest 1 View    Result Date: 2/26/2025  XR CHEST 1 VW Date of Exam: 2/26/2025 9:51 AM EST Indication: postop Comparison: 2/25/2025 Findings: Sternotomy wires and left atrial appendage exclusion clip are noted. Heart is enlarged. Vasculature is cephalized and there is a mild interstitial edema pattern present. There appear to be minimal pleural effusions. Left basilar atelectasis is resolving and no new focal pulmonary disease is seen  elsewhere. There is no evidence of pneumothorax.     Impression: Impression: 1. Cardiomegaly and appearance of mild volume overload with mild interstitial edema. 2. Minimal pleural effusions. 3. Mild, decreasing left basilar atelectasis. Electronically Signed: Live Swift MD  2/26/2025 10:38 AM EST  Workstation ID: [id number]    XR Chest 1 View    Result Date: 2/25/2025  XR CHEST 1 VW Date of Exam: 2/25/2025 3:39 AM EST Indication: pneumothorax Comparison: 2/24/2025 Findings: Status post sternotomy. Stable mild cardiomegaly. Left atrial manage clip noted. There is persistent mild bibasilar airspace disease left greater than right which may relate to atelectasis. Small left pleural effusion unchanged. No effusion on the right.  No right-sided pneumothorax. Small left apical pneumothorax measures 5 mm, previously 10 mm.     Impression: Impression: 1. Small left apical pneumothorax measures 5 mm, previously 10 mm. 2. Persistent bibasilar airspace disease left greater than right which may relate to atelectasis with small left pleural effusion. Electronically Signed: Lazaro Hooker MD  2/25/2025 7:48 AM EST  Workstation ID: [id number]     Results for orders placed during the hospital encounter of 02/18/25    Adult Transthoracic Echo Complete W/ Cont if Necessary Per Protocol    Interpretation Summary    Left ventricular systolic function is mildly decreased. Calculated left ventricular EF = 44% Left ventricular ejection fraction appears to be 41 - 45%.    Left ventricular wall thickness is consistent with moderate concentric hypertrophy.    The following left ventricular wall segments are akinetic: mid anterior, apical anterior, apical septal and apex. C/w with LAD territory infarct.    Left ventricular diastolic function is consistent with (grade Ia w/high LAP) impaired relaxation.    Mild aortic valve stenosis is present.    Peak velocity of the flow distal to the aortic valve is 277 cm/s. Aortic valve mean pressure gradient  is 14 mmHg.    The aortic root measures 4.4 cm. The aortic root (corrected for BSA) measures 1.9 cm. Mild dilation of the ascending aorta is present.    Mild to moderate mitral valve regurgitation.    Depressed ejection fraction and wall motion abnormalities are new compared to 4/2024; mild aortic stenosis is unchanged.      Current medications:  Scheduled Meds:amiodarone, 200 mg, Oral, Q8H   Followed by  [START ON 2/28/2025] amiodarone, 200 mg, Oral, Q12H   Followed by  [START ON 3/14/2025] amiodarone, 200 mg, Oral, Daily  aspirin, 81 mg, Oral, Daily  senna-docusate sodium, 2 tablet, Oral, BID   And  polyethylene glycol, 17 g, Oral, BID   And  bisacodyl, 5 mg, Oral, Daily  carvedilol, 6.25 mg, Oral, BID With Meals  clopidogrel, 75 mg, Oral, Daily  finasteride, 5 mg, Oral, Daily  guaiFENesin, 1,200 mg, Oral, Q12H  heparin (porcine), 5,000 Units, Subcutaneous, Q8H  insulin glargine, 10 Units, Subcutaneous, Daily  insulin lispro, 2-9 Units, Subcutaneous, 4x Daily AC & at Bedtime  Insulin Lispro, 5 Units, Subcutaneous, TID With Meals  ipratropium-albuterol, 3 mL, Nebulization, 4x Daily - RT  levothyroxine, 200 mcg, Oral, Q AM  mineral oil, 1 enema, Rectal, Once  pharmacy consult - MTM, , Not Applicable, Daily  rosuvastatin, 40 mg, Oral, Nightly  tamsulosin, 0.4 mg, Oral, Daily      Continuous Infusions:   PRN Meds:.  acetaminophen    senna-docusate sodium **AND** polyethylene glycol **AND** bisacodyl **AND** bisacodyl    Calcium Replacement - Follow Nurse / BPA Driven Protocol    dextrose    dextrose    glucagon (human recombinant)    hydrOXYzine    Magnesium Cardiology Dose Replacement - Follow Nurse / BPA Driven Protocol    naloxone    nitroglycerin    ondansetron    oxyCODONE    Phosphorus Replacement - Follow Nurse / BPA Driven Protocol    Potassium Replacement - Follow Nurse / BPA Driven Protocol    traMADol    Assessment & Plan   Assessment & Plan     Active Hospital Problems    Diagnosis  POA    **STEMI  involving left anterior descending coronary artery [I21.02]  Unknown    Acute systolic CHF (congestive heart failure) [I50.21]  Unknown    Coronary artery disease involving native coronary artery of native heart with unstable angina pectoris [I25.110]  Unknown    Type 2 diabetes mellitus [E11.9]  Yes    PAF (paroxysmal atrial fibrillation) [I48.0]  Yes    Sleep apnea [G47.30]  Yes    Morbid obesity with BMI of 40.0-44.9, adult [E66.01, Z68.41]  Not Applicable    Essential hypertension [I10]  Yes    Coronary artery disease involving native coronary artery of native heart without angina pectoris [I25.10]  Yes    CVA (cerebral vascular accident) [I63.9]  Yes      Resolved Hospital Problems   No resolved problems to display.        Brief Hospital Course to date:  Camron Hendrix is a 74 y.o. male with history of CAD status post PCI, prior subarachnoid hemorrhage, A-fib on warfarin, DM2, AAA, HTN, HLD, who presented for evaluation of crushing substernal chest pain.  Found to have an anterior STEMI.  Emergent cardiac catheterization revealed 100% mid LAD stenosis with mixed heavy eccentric calcification and plaque erosion with thrombus present status post balloon angioplasty.  Patient had concomitant 90% proximal circumflex and 99% subtotal mid RCA blockages as well.  Evaluated by CTS.  Status post CABG.  Initially managed in the ICU.  Transitioned out to the floor service on 2/25/2025.    Anterior STEMI  Acute systolic heart failure, LVEF 35 to 40%  Multivessel CAD status post 3 vessel CABG on 2/20/2025  HTN  HLD  Left pleural effusion  -Continue aspirin, statin, beta-blocker  -Telemetry monitoring    A-fib on warfarin  -Status post STEFANO ligation and maze procedure during bypass surgery  -Continue beta-blocker, amiodarone  -No anticoagulation necessary due to left atrial appendage ligation at time of CABG per cardiology    Urinary retention  -Tamsulosin, finasteride, urinary retention protocol    Constipation  -No bowel  movement since admission, bowel regimen added    DM2  -SSI, Lantus, glucose checks, hypoglycemia protocol    CKD stage IIIb  Obesity  Prior subarachnoid hemorrhage  AAA  Hypothyroidism-Synthroid    Expected Discharge Location and Transportation: D  Expected Discharge   Expected Discharge Date: 2/27/2025; Expected Discharge Time:      VTE Prophylaxis:  Pharmacologic & mechanical VTE prophylaxis orders are present.         AM-PAC 6 Clicks Score (PT): 17 (02/25/25 1948)    CODE STATUS:   Code Status and Medical Interventions: CPR (Attempt to Resuscitate); Full Support   Ordered at: 02/20/25 1333     Code Status (Patient has no pulse and is not breathing):    CPR (Attempt to Resuscitate)     Medical Interventions (Patient has pulse or is breathing):    Full Support       Yana Flaherty MD  02/26/25

## 2025-02-27 ENCOUNTER — APPOINTMENT (OUTPATIENT)
Dept: GENERAL RADIOLOGY | Facility: HOSPITAL | Age: 75
End: 2025-02-27
Payer: MEDICARE

## 2025-02-27 LAB
ANION GAP SERPL CALCULATED.3IONS-SCNC: 16 MMOL/L (ref 5–15)
BUN SERPL-MCNC: 46 MG/DL (ref 8–23)
BUN/CREAT SERPL: 31.9 (ref 7–25)
CALCIUM SPEC-SCNC: 9 MG/DL (ref 8.6–10.5)
CHLORIDE SERPL-SCNC: 92 MMOL/L (ref 98–107)
CO2 SERPL-SCNC: 28 MMOL/L (ref 22–29)
CREAT SERPL-MCNC: 1.44 MG/DL (ref 0.76–1.27)
DEPRECATED RDW RBC AUTO: 41.1 FL (ref 37–54)
EGFRCR SERPLBLD CKD-EPI 2021: 51.3 ML/MIN/1.73
ERYTHROCYTE [DISTWIDTH] IN BLOOD BY AUTOMATED COUNT: 13.2 % (ref 12.3–15.4)
GLUCOSE BLDC GLUCOMTR-MCNC: 139 MG/DL (ref 70–130)
GLUCOSE BLDC GLUCOMTR-MCNC: 182 MG/DL (ref 70–130)
GLUCOSE BLDC GLUCOMTR-MCNC: 191 MG/DL (ref 70–130)
GLUCOSE BLDC GLUCOMTR-MCNC: 202 MG/DL (ref 70–130)
GLUCOSE SERPL-MCNC: 135 MG/DL (ref 65–99)
HCT VFR BLD AUTO: 34.5 % (ref 37.5–51)
HGB BLD-MCNC: 11.5 G/DL (ref 13–17.7)
MCH RBC QN AUTO: 28.5 PG (ref 26.6–33)
MCHC RBC AUTO-ENTMCNC: 33.3 G/DL (ref 31.5–35.7)
MCV RBC AUTO: 85.6 FL (ref 79–97)
PLATELET # BLD AUTO: 261 10*3/MM3 (ref 140–450)
PMV BLD AUTO: 10.3 FL (ref 6–12)
POTASSIUM SERPL-SCNC: 3.6 MMOL/L (ref 3.5–5.2)
RBC # BLD AUTO: 4.03 10*6/MM3 (ref 4.14–5.8)
SODIUM SERPL-SCNC: 136 MMOL/L (ref 136–145)
WBC NRBC COR # BLD AUTO: 6.98 10*3/MM3 (ref 3.4–10.8)

## 2025-02-27 PROCEDURE — 25010000002 HEPARIN (PORCINE) PER 1000 UNITS: Performed by: PHYSICIAN ASSISTANT

## 2025-02-27 PROCEDURE — 63710000001 INSULIN GLARGINE PER 5 UNITS: Performed by: PHYSICIAN ASSISTANT

## 2025-02-27 PROCEDURE — 97110 THERAPEUTIC EXERCISES: CPT

## 2025-02-27 PROCEDURE — 97116 GAIT TRAINING THERAPY: CPT

## 2025-02-27 PROCEDURE — 85027 COMPLETE CBC AUTOMATED: CPT

## 2025-02-27 PROCEDURE — 94799 UNLISTED PULMONARY SVC/PX: CPT

## 2025-02-27 PROCEDURE — 99232 SBSQ HOSP IP/OBS MODERATE 35: CPT | Performed by: STUDENT IN AN ORGANIZED HEALTH CARE EDUCATION/TRAINING PROGRAM

## 2025-02-27 PROCEDURE — 99232 SBSQ HOSP IP/OBS MODERATE 35: CPT | Performed by: INTERNAL MEDICINE

## 2025-02-27 PROCEDURE — 63710000001 INSULIN LISPRO (HUMAN) PER 5 UNITS: Performed by: PHYSICIAN ASSISTANT

## 2025-02-27 PROCEDURE — 82948 REAGENT STRIP/BLOOD GLUCOSE: CPT

## 2025-02-27 PROCEDURE — 80048 BASIC METABOLIC PNL TOTAL CA: CPT

## 2025-02-27 PROCEDURE — 99024 POSTOP FOLLOW-UP VISIT: CPT

## 2025-02-27 PROCEDURE — 71045 X-RAY EXAM CHEST 1 VIEW: CPT

## 2025-02-27 PROCEDURE — 63710000001 INSULIN LISPRO (HUMAN) PER 5 UNITS: Performed by: STUDENT IN AN ORGANIZED HEALTH CARE EDUCATION/TRAINING PROGRAM

## 2025-02-27 PROCEDURE — 97530 THERAPEUTIC ACTIVITIES: CPT

## 2025-02-27 RX ORDER — SACUBITRIL AND VALSARTAN 24; 26 MG/1; MG/1
1 TABLET, FILM COATED ORAL EVERY 12 HOURS SCHEDULED
Status: DISCONTINUED | OUTPATIENT
Start: 2025-02-27 | End: 2025-02-28 | Stop reason: HOSPADM

## 2025-02-27 RX ORDER — BUMETANIDE 1 MG/1
1 TABLET ORAL DAILY
Status: DISCONTINUED | OUTPATIENT
Start: 2025-02-27 | End: 2025-02-28 | Stop reason: HOSPADM

## 2025-02-27 RX ADMIN — GUAIFENESIN 1200 MG: 600 TABLET ORAL at 09:26

## 2025-02-27 RX ADMIN — POLYETHYLENE GLYCOL 3350 17 G: 17 POWDER, FOR SOLUTION ORAL at 09:26

## 2025-02-27 RX ADMIN — TAMSULOSIN HYDROCHLORIDE 0.4 MG: 0.4 CAPSULE ORAL at 09:26

## 2025-02-27 RX ADMIN — GUAIFENESIN 1200 MG: 600 TABLET ORAL at 20:38

## 2025-02-27 RX ADMIN — OXYCODONE HYDROCHLORIDE 5 MG: 5 TABLET ORAL at 09:56

## 2025-02-27 RX ADMIN — INSULIN LISPRO 2 UNITS: 100 INJECTION, SOLUTION INTRAVENOUS; SUBCUTANEOUS at 17:37

## 2025-02-27 RX ADMIN — INSULIN LISPRO 4 UNITS: 100 INJECTION, SOLUTION INTRAVENOUS; SUBCUTANEOUS at 09:27

## 2025-02-27 RX ADMIN — INSULIN GLARGINE 10 UNITS: 100 INJECTION, SOLUTION SUBCUTANEOUS at 09:27

## 2025-02-27 RX ADMIN — AMIODARONE HYDROCHLORIDE 200 MG: 200 TABLET ORAL at 17:36

## 2025-02-27 RX ADMIN — IPRATROPIUM BROMIDE AND ALBUTEROL SULFATE 3 ML: 2.5; .5 SOLUTION RESPIRATORY (INHALATION) at 10:05

## 2025-02-27 RX ADMIN — INSULIN LISPRO 2 UNITS: 100 INJECTION, SOLUTION INTRAVENOUS; SUBCUTANEOUS at 20:38

## 2025-02-27 RX ADMIN — IPRATROPIUM BROMIDE AND ALBUTEROL SULFATE 3 ML: 2.5; .5 SOLUTION RESPIRATORY (INHALATION) at 20:05

## 2025-02-27 RX ADMIN — BUMETANIDE 1 MG: 1 TABLET ORAL at 09:26

## 2025-02-27 RX ADMIN — POLYETHYLENE GLYCOL 3350 17 G: 17 POWDER, FOR SOLUTION ORAL at 20:39

## 2025-02-27 RX ADMIN — ASPIRIN 81 MG 81 MG: 81 TABLET ORAL at 09:26

## 2025-02-27 RX ADMIN — OXYCODONE HYDROCHLORIDE 5 MG: 5 TABLET ORAL at 02:02

## 2025-02-27 RX ADMIN — INSULIN LISPRO 5 UNITS: 100 INJECTION, SOLUTION INTRAVENOUS; SUBCUTANEOUS at 17:37

## 2025-02-27 RX ADMIN — ROSUVASTATIN 40 MG: 20 TABLET, FILM COATED ORAL at 20:38

## 2025-02-27 RX ADMIN — CARVEDILOL 6.25 MG: 6.25 TABLET, FILM COATED ORAL at 17:36

## 2025-02-27 RX ADMIN — FINASTERIDE 5 MG: 5 TABLET, FILM COATED ORAL at 09:26

## 2025-02-27 RX ADMIN — BISACODYL 5 MG: 5 TABLET, COATED ORAL at 09:27

## 2025-02-27 RX ADMIN — SENNOSIDES AND DOCUSATE SODIUM 2 TABLET: 50; 8.6 TABLET ORAL at 20:38

## 2025-02-27 RX ADMIN — INSULIN LISPRO 5 UNITS: 100 INJECTION, SOLUTION INTRAVENOUS; SUBCUTANEOUS at 09:28

## 2025-02-27 RX ADMIN — AMIODARONE HYDROCHLORIDE 200 MG: 200 TABLET ORAL at 00:34

## 2025-02-27 RX ADMIN — SACUBITRIL AND VALSARTAN 1 TABLET: 24; 26 TABLET, FILM COATED ORAL at 09:26

## 2025-02-27 RX ADMIN — HEPARIN SODIUM 5000 UNITS: 5000 INJECTION INTRAVENOUS; SUBCUTANEOUS at 20:39

## 2025-02-27 RX ADMIN — IPRATROPIUM BROMIDE AND ALBUTEROL SULFATE 3 ML: 2.5; .5 SOLUTION RESPIRATORY (INHALATION) at 17:39

## 2025-02-27 RX ADMIN — SACUBITRIL AND VALSARTAN 1 TABLET: 24; 26 TABLET, FILM COATED ORAL at 20:38

## 2025-02-27 RX ADMIN — CLOPIDOGREL BISULFATE 75 MG: 75 TABLET ORAL at 09:27

## 2025-02-27 RX ADMIN — HEPARIN SODIUM 5000 UNITS: 5000 INJECTION INTRAVENOUS; SUBCUTANEOUS at 05:50

## 2025-02-27 RX ADMIN — CARVEDILOL 6.25 MG: 6.25 TABLET, FILM COATED ORAL at 09:27

## 2025-02-27 RX ADMIN — HEPARIN SODIUM 5000 UNITS: 5000 INJECTION INTRAVENOUS; SUBCUTANEOUS at 15:38

## 2025-02-27 RX ADMIN — LEVOTHYROXINE SODIUM 200 MCG: 0.1 TABLET ORAL at 05:50

## 2025-02-27 RX ADMIN — AMIODARONE HYDROCHLORIDE 200 MG: 200 TABLET ORAL at 09:26

## 2025-02-27 RX ADMIN — INSULIN LISPRO 5 UNITS: 100 INJECTION, SOLUTION INTRAVENOUS; SUBCUTANEOUS at 12:39

## 2025-02-27 RX ADMIN — SENNOSIDES AND DOCUSATE SODIUM 2 TABLET: 50; 8.6 TABLET ORAL at 09:26

## 2025-02-27 NOTE — PROGRESS NOTES
[facility] at [facility]  INPATIENT PROGRESS NOTE         [facility]    2/27/2025      PATIENT IDENTIFICATION:   Name:  Camron Hendrix      MRN:  [id number]     74 y.o.  male             Reason for visit: CAD, hypertension, hyperlipidemia, CHF      SUBJECTIVE:   Up in chair today, overall feels stable, working with physical therapy, on room air, eating breakfast this morning.     OBJECTIVE:  Vitals:    02/27/25 0100 02/27/25 0300 02/27/25 0450 02/27/25 0500   BP:   130/72    BP Location:   Left arm    Patient Position:   Lying    Pulse: 64 64 65 69   Resp:   16    Temp:   98.3 °F (36.8 °C)    TempSrc:   Oral    SpO2: 95% 97%     Weight:   124 kg (273 lb)    Height:         FiO2 (%): 40 %     Body mass index is 40.32 kg/m².    Intake/Output Summary (Last 24 hours) at 2/27/2025 0852  Last data filed at 2/27/2025 0220  Gross per 24 hour   Intake --   Output 2600 ml   Net -2600 ml       Telemetry: Personally reviewed, normal sinus rhythm, no arrhythmia     Exam:  General Appearance:   · well developed  · well nourished  Neck:  · thyroid not enlarged  · supple  Respiratory:  · no respiratory distress  · normal breath sounds  · no rales, decreased at the bases  Cardiovascular:  · no jugular venous distention  · regular rhythm  · apical impulse normal  · S1 normal, S2 normal  · no S3, no S4   · no murmur  · no rub, no thrill  · carotid pulses normal; no bruit  · pedal pulses normal  · lower extremity edema: 1-2+  Skin:   warm, dry        Allergies   Allergen Reactions    Codeine Hallucinations     Scheduled meds:       amiodarone, 200 mg, Oral, Q8H   Followed by  [START ON 2/28/2025] amiodarone, 200 mg, Oral, Q12H   Followed by  [START ON 3/14/2025] amiodarone, 200 mg, Oral, Daily  aspirin, 81 mg, Oral, Daily  senna-docusate sodium, 2 tablet, Oral, BID   And  polyethylene glycol, 17 g, Oral, BID   And  bisacodyl, 5 mg, Oral, Daily  carvedilol, 6.25 mg, Oral, BID With  "Meals  clopidogrel, 75 mg, Oral, Daily  finasteride, 5 mg, Oral, Daily  guaiFENesin, 1,200 mg, Oral, Q12H  heparin (porcine), 5,000 Units, Subcutaneous, Q8H  insulin glargine, 10 Units, Subcutaneous, Daily  insulin lispro, 2-9 Units, Subcutaneous, 4x Daily AC & at Bedtime  Insulin Lispro, 5 Units, Subcutaneous, TID With Meals  ipratropium-albuterol, 3 mL, Nebulization, 4x Daily - RT  levothyroxine, 200 mcg, Oral, Q AM  pharmacy consult - MTM, , Not Applicable, Daily  potassium chloride ER, 20 mEq, Oral, Once  rosuvastatin, 40 mg, Oral, Nightly  sacubitril-valsartan, 1 tablet, Oral, Q12H  tamsulosin, 0.4 mg, Oral, Daily      IV meds:                           Data Review:  Results from last 7 days   Lab Units 02/27/25  0448 02/26/25  0906 02/25/25  0308 02/24/25  0405   SODIUM mmol/L 136 135* 138 134*   BUN mg/dL 46* 54* 55* 49*   CREATININE mg/dL 1.44* 1.71* 1.76* 1.75*   GLUCOSE mg/dL 135* 203* 139* 138*     Results from last 7 days   Lab Units 02/27/25  0448 02/25/25  0308 02/24/25  0432 02/23/25  0522 02/22/25  0444   WBC 10*3/mm3 6.98 5.77 6.03 6.59 8.88   HEMOGLOBIN g/dL 11.5* 10.9* 10.0* 10.3* 10.8*     Results from last 7 days   Lab Units 02/21/25  0359 02/20/25  1354   INR  1.36* 1.73*     Results from last 7 days   Lab Units 02/21/25  0359   ALT (SGPT) U/L 31   AST (SGOT) U/L 92*     No results found for: \"DIGOXIN\"   Lab Results   Component Value Date    TSH 1.770 04/24/2024           Invalid input(s): \"LDLCALC\"      Estimated Creatinine Clearance: 59.5 mL/min (A) (by C-G formula based on SCr of 1.44 mg/dL (H)).        Imaging (last 24 hr):   I personally reviewed the most recent chest x-ray and other pertinent imaging studies.  Results for orders placed during the hospital encounter of 02/18/25    XR Chest 1 View    Narrative  XR CHEST 1 VW    Date of Exam: 2/27/2025 2:13 AM EST    Indication: postop    Comparison: 2/26/2025    Findings:  The heart is enlarged. The vasculature appears upper limits of " normal. There is patchy left basilar atelectasis which appears a little increased. Lungs appear stable elsewhere. No pneumothorax is seen.    Impression  Impression:  Mildly increased left basilar atelectasis.      Electronically Signed: Live Swift MD  2/27/2025 7:55 AM EST  Workstation ID: [id number]        Last ECHO:  Results for orders placed during the hospital encounter of 02/18/25    Adult Transthoracic Echo Complete W/ Cont if Necessary Per Protocol    Interpretation Summary    Left ventricular systolic function is mildly decreased. Calculated left ventricular EF = 44% Left ventricular ejection fraction appears to be 41 - 45%.    Left ventricular wall thickness is consistent with moderate concentric hypertrophy.    The following left ventricular wall segments are akinetic: mid anterior, apical anterior, apical septal and apex. C/w with LAD territory infarct.    Left ventricular diastolic function is consistent with (grade Ia w/high LAP) impaired relaxation.    Mild aortic valve stenosis is present.    Peak velocity of the flow distal to the aortic valve is 277 cm/s. Aortic valve mean pressure gradient is 14 mmHg.    The aortic root measures 4.4 cm. The aortic root (corrected for BSA) measures 1.9 cm. Mild dilation of the ascending aorta is present.    Mild to moderate mitral valve regurgitation.    Depressed ejection fraction and wall motion abnormalities are new compared to 4/2024; mild aortic stenosis is unchanged.        PROBLEM LIST:     STEMI involving left anterior descending coronary artery    CVA (cerebral vascular accident)    Essential hypertension    Coronary artery disease involving native coronary artery of native heart without angina pectoris    Morbid obesity with BMI of 40.0-44.9, adult    Sleep apnea    PAF (paroxysmal atrial fibrillation)    Type 2 diabetes mellitus    Acute systolic CHF (congestive heart failure)    Coronary artery disease involving native coronary artery of native heart with  unstable angina pectoris    Initial cardiac assessment: 74-year-old gentleman presenting with anterior STEMI status post balloon angioplasty of the LAD but with residual multivessel disease depressed ejection fraction acute systolic heart failure status post 3V CABG with Dr. Borjas 2/20/2025.  Status post left atrial appendage ligation and maze procedure as well.     ASSESSMENT/PLAN:  1.  Anterior STEMI with multivessel CAD EF 45%:  Status post 3V CABG (LIMA-LAD, SVG-OM, SVG-RCA) 2/20/25 Dr Borjas  Continue ASA, statin. BB   Sternal incision care per CTS      2.  Acute systolic heart failure:  EF 41-45% pre-op  Continuing to maintain net negative fluid balance  Patient received Bumex 1 mg IV 2/26 with good response    Start Bumex 1 mg p.o. daily 2/27  Continue carvedilol  Start Entresto low-dose (patient was on losartan at home)    Plan to add Jardiance 10 mg daily 2/28 if renal function blood pressure stable.      3.  Paroxysmal atrial fibrillation present on admission  Appreciate STEFANO ligation and maze procedure during bypass surgery  Currently on amiodarone   Continue carvedilol at current dose  No anticoagulation necessary due to left atrial appendage ligation at time of CABG     4.  History of hypertension:  Stable, on low dose Coreg   Started Entresto 2/27, uptitrate as needed for goal blood pressure less than 130/80     5.  Mixed hyperlipidemia:  Goal LDL less than 50, currently at goal on high-dose rosuvastatin 40 mg daily continue for now.     6.  A/CRF  Stable renal function currently, continue monitoring with daily BMP        Carlo Barragan MD  2/27/2025    08:52 EST

## 2025-02-27 NOTE — PLAN OF CARE
Goal Outcome Evaluation:  Plan of Care Reviewed With: patient, child (dtr)        Progress: improving  Outcome Evaluation: Improved functional mobility and endurance noted by improved level of assist with transfers and ambulation, along with increased ambulation distance this session.  Decreased safety awareness and intermittent confusion throughout.  Con to present below baseline with gait instability, decreased balance, SOA/MENON, decreased functional endurance, and weakness limiting indep with mobility and ability to safely navigate home environment.  Will con to benefit from skilled IP PT to improve deficits and promote return to PLOF.  Once medically appropriate, rec IPR upon d/c for best fxl outcome.    Anticipated Discharge Disposition (PT): inpatient rehabilitation facility

## 2025-02-27 NOTE — PROGRESS NOTES
[facility]  PROGRESS NOTE    Patient Name: Camron Hendrix  : 1950  MRN: [id number]    Date of Admission: 2025  Primary Care Physician: Leslie Rhodes MD    Subjective   Subjective     CC:  Follow-up STEMI    HPI:  Urinary retention requiring straight caths.  Had a BM that was small.  Wound VAC removed.  Chest pain controlled.  Patient's family member bedside    Objective   Objective     Vital Signs:   Temp:  [98 °F (36.7 °C)-98.9 °F (37.2 °C)] 98.3 °F (36.8 °C)  Heart Rate:  [58-69] 69  Resp:  [16-20] 16  BP: (125-143)/(68-80) 130/72  Flow (L/min) (Oxygen Therapy):  [2] 2     Physical Exam:  Constitutional: No acute distress, awake, alert  HENT: NCAT, mucous membranes moist  Respiratory: Clear to auscultation bilaterally, respiratory effort normal   Cardiovascular: RRR  Gastrointestinal: Normoactive bowel sounds, soft, nontender, distended  Musculoskeletal: No bilateral ankle edema  Psychiatric: Appropriate affect, cooperative  Neurologic: Alert, oriented, symmetric facies, speech clear  Skin: No rashes, sternotomy site c/d/i      Results Reviewed:  LAB RESULTS:      Lab 25  0448 25  0308 25  0432 25  0522 25  0444 25  0359 25  1715 25  1354   WBC 6.98 5.77 6.03 6.59 8.88 13.89*   < > 16.25*   HEMOGLOBIN 11.5* 10.9* 10.0* 10.3* 10.8* 12.0*   < > 12.7*   HEMATOCRIT 34.5* 32.0* 30.0* 31.0* 33.0* 35.9*   < > 36.9*   PLATELETS 261 202 148 136* 118* 155   < > 136*   NEUTROS ABS  --   --  4.34  --   --  10.84*  --   --    IMMATURE GRANS (ABS)  --   --  0.02  --   --  0.09*  --   --    LYMPHS ABS  --   --  0.74  --   --  1.60  --   --    MONOS ABS  --   --  0.75  --   --  1.33*  --   --    EOS ABS  --   --  0.16  --   --  0.00  --   --    MCV 85.6 84.4 85.7 85.9 86.4 85.7   < > 83.7   PROTIME  --   --   --   --   --  16.8*  --  20.3*   APTT  --   --   --   --   --   --   --  32.5   HEPARIN ANTI-XA  --   --   --   --    --   --   --  0.10*    < > = values in this interval not displayed.         Lab 02/27/25  0448 02/26/25 2017 02/26/25  0906 02/25/25  2348 02/25/25  1544 02/25/25  0308 02/24/25  1904 02/24/25  0405 02/23/25  1246 02/23/25  0522 02/22/25  0444 02/21/25  0359 02/20/25  2306 02/20/25  1715 02/20/25  1354   SODIUM 136  --  135*  --   --  138  --  134*  --  134*   < > 139  --  140 139   POTASSIUM 3.6 4.0 3.9 3.3* 3.7 3.7 3.1* 4.0   < > 3.7   < > 4.5   < > 4.4 3.8   CHLORIDE 92*  --  92*  --   --  92*  --  95*  --  94*   < > 102  --  106 104   CO2 28.0  --  34.0*  --   --  34.0*  --  27.0  --  29.0   < > 25.0  --  21.0* 22.0   ANION GAP 16.0*  --  9.0  --   --  12.0  --  12.0  --  11.0   < > 12.0  --  13.0 13.0   BUN 46*  --  54*  --   --  55*  --  49*  --  43*   < > 26*  --  23 24*   CREATININE 1.44*  --  1.71*  --   --  1.76*  --  1.75*  --  1.85*   < > 1.60*  --  1.34* 1.40*   EGFR 51.3*  --  41.7*  --   --  40.3*  --  40.6*  --  38.0*   < > 45.2*  --  55.9* 53.1*   GLUCOSE 135*  --  203*  --   --  139*  --  138*  --  153*   < > 141*  --  194* 178*   CALCIUM 9.0  --  9.1  --   --  8.9  --  8.7  --  8.2*   < > 8.2*  --  7.7* 8.4*   IONIZED CALCIUM  --   --   --   --   --   --   --   --   --   --   --   --   --   --  1.13*   MAGNESIUM  --   --   --   --   --   --  2.3  --   --   --   --  2.6*  --  2.4 1.8   PHOSPHORUS  --   --   --   --   --   --  2.3*  --   --   --   --   --   --  2.9 3.3    < > = values in this interval not displayed.         Lab 02/21/25  0359 02/20/25  1715 02/20/25  1354   TOTAL PROTEIN 5.5*  --   --    ALBUMIN 3.6 3.2* 3.1*   GLOBULIN 1.9  --   --    ALT (SGPT) 31  --   --    AST (SGOT) 92*  --   --    BILIRUBIN 0.6  --   --    ALK PHOS 62  --   --          Lab 02/21/25  0359 02/20/25  1354   PROTIME 16.8* 20.3*   INR 1.36* 1.73*             Lab 02/24/25  0405   ABO TYPING B   RH TYPING Positive   ANTIBODY SCREEN Negative         Lab 02/21/25  2230 02/20/25  2358 02/20/25  1705   PH, ARTERIAL  7.344* 7.384 7.382   PCO2, ARTERIAL 43.3 42.8 40.4   PO2 ART 56.0* 87.6 87.9   FIO2 100 60 40   HCO3 ART 23.6 25.5 24.0   BASE EXCESS ART -2.2* 0.3 -1.0*   CARBOXYHEMOGLOBIN 1.3 1.2 1.3     Brief Urine Lab Results  (Last result in the past 365 days)        Color   Clarity   Blood   Leuk Est   Nitrite   Protein   CREAT   Urine HCG        05/03/24 1742 Dark Yellow   Clear   Negative   Negative   Negative   30 mg/dL (1+)                   Microbiology Results Abnormal       None            XR Chest 1 View    Result Date: 2/27/2025  XR CHEST 1 VW Date of Exam: 2/27/2025 2:13 AM EST Indication: postop Comparison: 2/26/2025 Findings: The heart is enlarged. The vasculature appears upper limits of normal. There is patchy left basilar atelectasis which appears a little increased. Lungs appear stable elsewhere. No pneumothorax is seen.     Impression: Impression: Mildly increased left basilar atelectasis. Electronically Signed: Live Swift MD  2/27/2025 7:55 AM EST  Workstation ID: [id number]    XR Chest 1 View    Result Date: 2/26/2025  XR CHEST 1 VW Date of Exam: 2/26/2025 9:51 AM EST Indication: postop Comparison: 2/25/2025 Findings: Sternotomy wires and left atrial appendage exclusion clip are noted. Heart is enlarged. Vasculature is cephalized and there is a mild interstitial edema pattern present. There appear to be minimal pleural effusions. Left basilar atelectasis is resolving and no new focal pulmonary disease is seen elsewhere. There is no evidence of pneumothorax.     Impression: Impression: 1. Cardiomegaly and appearance of mild volume overload with mild interstitial edema. 2. Minimal pleural effusions. 3. Mild, decreasing left basilar atelectasis. Electronically Signed: Live Swift MD  2/26/2025 10:38 AM EST  Workstation ID: [id number]     Results for orders placed during the hospital encounter of 02/18/25    Adult Transthoracic Echo Complete W/ Cont if Necessary Per Protocol    Interpretation Summary    Left  ventricular systolic function is mildly decreased. Calculated left ventricular EF = 44% Left ventricular ejection fraction appears to be 41 - 45%.    Left ventricular wall thickness is consistent with moderate concentric hypertrophy.    The following left ventricular wall segments are akinetic: mid anterior, apical anterior, apical septal and apex. C/w with LAD territory infarct.    Left ventricular diastolic function is consistent with (grade Ia w/high LAP) impaired relaxation.    Mild aortic valve stenosis is present.    Peak velocity of the flow distal to the aortic valve is 277 cm/s. Aortic valve mean pressure gradient is 14 mmHg.    The aortic root measures 4.4 cm. The aortic root (corrected for BSA) measures 1.9 cm. Mild dilation of the ascending aorta is present.    Mild to moderate mitral valve regurgitation.    Depressed ejection fraction and wall motion abnormalities are new compared to 4/2024; mild aortic stenosis is unchanged.      Current medications:  Scheduled Meds:amiodarone, 200 mg, Oral, Q8H   Followed by  [START ON 2/28/2025] amiodarone, 200 mg, Oral, Q12H   Followed by  [START ON 3/14/2025] amiodarone, 200 mg, Oral, Daily  aspirin, 81 mg, Oral, Daily  senna-docusate sodium, 2 tablet, Oral, BID   And  polyethylene glycol, 17 g, Oral, BID   And  bisacodyl, 5 mg, Oral, Daily  carvedilol, 6.25 mg, Oral, BID With Meals  clopidogrel, 75 mg, Oral, Daily  finasteride, 5 mg, Oral, Daily  guaiFENesin, 1,200 mg, Oral, Q12H  heparin (porcine), 5,000 Units, Subcutaneous, Q8H  insulin glargine, 10 Units, Subcutaneous, Daily  insulin lispro, 2-9 Units, Subcutaneous, 4x Daily AC & at Bedtime  Insulin Lispro, 5 Units, Subcutaneous, TID With Meals  ipratropium-albuterol, 3 mL, Nebulization, 4x Daily - RT  levothyroxine, 200 mcg, Oral, Q AM  pharmacy consult - MTM, , Not Applicable, Daily  potassium chloride ER, 20 mEq, Oral, Once  rosuvastatin, 40 mg, Oral, Nightly  sacubitril-valsartan, 1 tablet, Oral,  Q12H  tamsulosin, 0.4 mg, Oral, Daily      Continuous Infusions:   PRN Meds:.  acetaminophen    senna-docusate sodium **AND** polyethylene glycol **AND** bisacodyl **AND** bisacodyl    Calcium Replacement - Follow Nurse / BPA Driven Protocol    dextrose    dextrose    glucagon (human recombinant)    Magnesium Cardiology Dose Replacement - Follow Nurse / BPA Driven Protocol    naloxone    nitroglycerin    ondansetron    oxyCODONE    Phosphorus Replacement - Follow Nurse / BPA Driven Protocol    Potassium Replacement - Follow Nurse / BPA Driven Protocol    traMADol    Assessment & Plan   Assessment & Plan     Active Hospital Problems    Diagnosis  POA    **STEMI involving left anterior descending coronary artery [I21.02]  Unknown    Acute systolic CHF (congestive heart failure) [I50.21]  Unknown    Coronary artery disease involving native coronary artery of native heart with unstable angina pectoris [I25.110]  Unknown    Type 2 diabetes mellitus [E11.9]  Yes    PAF (paroxysmal atrial fibrillation) [I48.0]  Yes    Sleep apnea [G47.30]  Yes    Morbid obesity with BMI of 40.0-44.9, adult [E66.01, Z68.41]  Not Applicable    Essential hypertension [I10]  Yes    Coronary artery disease involving native coronary artery of native heart without angina pectoris [I25.10]  Yes    CVA (cerebral vascular accident) [I63.9]  Yes      Resolved Hospital Problems   No resolved problems to display.        Brief Hospital Course to date:  Camron Hendrix is a 74 y.o. male with history of CAD status post PCI, prior subarachnoid hemorrhage, A-fib on warfarin, DM2, AAA, HTN, HLD, who presented for evaluation of crushing substernal chest pain.  Found to have an anterior STEMI.  Emergent cardiac catheterization revealed 100% mid LAD stenosis with mixed heavy eccentric calcification and plaque erosion with thrombus present status post balloon angioplasty.  Patient had concomitant 90% proximal circumflex and 99% subtotal mid RCA blockages as well.   Evaluated by CTS.  Status post CABG.  Initially managed in the ICU.  Transitioned out to the floor service on 2/25/2025.    Anterior STEMI  Acute systolic heart failure, LVEF 35 to 40%  Multivessel CAD status post 3 vessel CABG on 2/20/2025  HTN  HLD  Left pleural effusion  -Continue aspirin, statin, beta-blocker  -Telemetry monitoring    A-fib on warfarin  -Status post STEFANO ligation and maze procedure during bypass surgery  -Continue beta-blocker, amiodarone  -No anticoagulation necessary due to left atrial appendage ligation at time of CABG per cardiology    Urinary retention  BPH  -Tamsulosin, finasteride, urinary retention protocol    Constipation  -Bowel regimen; constipation likely contributing to urinary retention    DM2  -SSI, Lantus, glucose checks, hypoglycemia protocol    CKD stage IIIb-creatinine improving with straight cath  Obesity  Prior subarachnoid hemorrhage  AAA  Hypothyroidism-Synthroid    Expected Discharge Location and Transportation: [facility]  Expected Discharge   Expected Discharge Date: 2/27/2025; Expected Discharge Time:      VTE Prophylaxis:  Pharmacologic & mechanical VTE prophylaxis orders are present.         AM-PAC 6 Clicks Score (PT): 17 (02/27/25 0400)    CODE STATUS:   Code Status and Medical Interventions: CPR (Attempt to Resuscitate); Full Support   Ordered at: 02/20/25 1333     Code Status (Patient has no pulse and is not breathing):    CPR (Attempt to Resuscitate)     Medical Interventions (Patient has pulse or is breathing):    Full Support       Yana Flaheryt MD  02/27/25

## 2025-02-27 NOTE — PROGRESS NOTES
"          Clinical Nutrition Assessment   Patient Name: Camron Hendrix  YOB: 1950  MRN: [id number]  Date of Encounter: 02/27/25 11:09 EST  Admission date: 2/18/2025  Reason for Visit: LOS    Assessment   Nutrition Assessment   Admission Diagnosis:  STEMI involving left anterior descending coronary artery [I21.02]    Problem List:    STEMI involving left anterior descending coronary artery    CVA (cerebral vascular accident)    Essential hypertension    Coronary artery disease involving native coronary artery of native heart without angina pectoris    Morbid obesity with BMI of 40.0-44.9, adult    Sleep apnea    PAF (paroxysmal atrial fibrillation)    Type 2 diabetes mellitus    Acute systolic CHF (congestive heart failure)    Coronary artery disease involving native coronary artery of native heart with unstable angina pectoris      PMH:   He  has a past medical history of AAA (abdominal aortic aneurysm), Coronary artery disease, CVA (cerebral vascular accident) (2011), Hypertension, Hypothyroidism, PAF (paroxysmal atrial fibrillation), and TIA (transient ischemic attack).    PSH:  He  has a past surgical history that includes Coronary angioplasty with stent; Cervical fusion; Cardiac catheterization (N/A, 3/29/2019); Tonsilectomy, adenoidectomy, bilateral myringotomy and tubes; Hernia repair; Cardiac catheterization (N/A, 2/18/2025); Cardiac catheterization (N/A, 2/18/2025); Cardiac catheterization (N/A, 2/18/2025); Coronary artery bypass graft (N/A, 2/20/2025); and Maze Procedure (N/A, 2/20/2025).    Applicable Nutrition History:   T2DM  CVA  PAF  HTN/CHF    Anthropometrics   Height: Height: 175.3 cm (69\")  Last Filed Weight: Weight: 124 kg (273 lb) (02/27/25 0450)  Method: Weight Method: Bed scale  BMI: BMI (Calculated): 40.3    UBW:   Weight      Weight (kg) Weight (lbs) Weight Method   7/19/2023 128.549 kg  283 lb 6.4 oz  Stated     124.739 kg  275 lb     4/24/2024 129 kg  284 lb 6.3 oz  Bed scale  "   4/25/2024 130.8 kg  288 lb 5.8 oz  Bed scale    4/26/2024 130.636 kg  288 lb     4/28/2024 130.636 kg  288 lb     5/3/2024 129 kg  284 lb 6.3 oz  Bed scale     129.275 kg  285 lb  Stated    2/18/2025 115.667 kg  255 lb     2/19/2025 115.667 kg  255 lb     2/26/2025 124.603 kg  274 lb 11.2 oz  Bed scale    2/27/2025 123.832 kg  273 lb  Bed scale      Weight change: No significant changes    Nutrition Focused Physical Exam    Date: 02/27/25     Pt does not meet criteria for malnutrition diagnosis, at this time.      Subjective   Reported/Observed/Food/Nutrition Related History:   02/27/25  Patient screened per nutrition protocol for length of stay. Presented for NSTEMI. S/p CABG on 2/20. Reported decrease in appetite and poor PO intake. Was eating well until surgery, then appetite has decreased. Also, has not been receiving what he orders.  No significant weight changes noted in EMR  Patient's family has been brining in premier protein and has boost breeze ordered. Stated he drinks one premier and all of his boost breeze per day. Declined any chewing or swallowing difficulties. NKFA.    Current Nutrition Prescription   PO: Diet: Cardiac, Diabetic; Healthy Heart (2-3 Na+); Consistent Carbohydrate; Fluid Consistency: Thin (IDDSI 0)  Oral Nutrition Supplement: n/a  Intake: 33% avg PO intake documented since admit    Assessment & Plan   Nutrition Diagnosis   Date: 02/27/25           Updated:    Problem Inadequate oral intake   Etiology CABG   Signs/Symptoms Reported and documented PO intake 50% or less   Status: New    Goal:   Nutrition to support treatment and Increase intake    Nutrition Intervention      Follow treatment progress, Care plan reviewed, Interview for preferences, Encourage intake, Supplement provided    Nutrition POC  Encourage adequate PO intake as able    Monitoring/Evaluation:   Per protocol, PO intake, Supplement intake, Weight, Symptoms, POC/GOC    Anita Ulloa MS,RD,LD  Time Spent: 30min

## 2025-02-27 NOTE — THERAPY TREATMENT NOTE
Patient Name: Camron Hendrix  : 1950    MRN: [id number]                              Today's Date: 2025       Admit Date: 2025    Visit Dx:     ICD-10-CM ICD-9-CM   1. 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    SAH (subarachnoid hemorrhage)    Type 2 diabetes mellitus    Seizure    Aortic stenosis, mild    STEMI involving left anterior descending coronary artery    Acute systolic CHF (congestive heart failure)    Coronary artery disease involving native coronary artery of native heart with unstable angina pectoris     Past Medical History:   Diagnosis Date    AAA (abdominal aortic aneurysm)     Coronary artery disease     CVA (cerebral vascular accident)     Hypertension     Hypothyroidism     PAF (paroxysmal atrial fibrillation)     TIA (transient ischemic attack)      Past Surgical History:   Procedure Laterality Date    CARDIAC CATHETERIZATION N/A 3/29/2019    Procedure: Left Heart Cath;  Surgeon: Andrea Holloway MD;  Location:  [facility];  Service: Cardiovascular    CARDIAC CATHETERIZATION N/A 2025    Procedure: Left Heart Cath;  Surgeon: Carlo Barragan MD;  Location:  [facility];  Service: Cardiovascular;  Laterality: N/A;    CARDIAC CATHETERIZATION N/A 2025    Procedure: Optical Coherence Tomography;  Surgeon: Carlo Barragan MD;  Location:  [facility];  Service: Cardiovascular;  Laterality: N/A;    CARDIAC CATHETERIZATION N/A 2/18/2025    Procedure: Percutaneous Coronary Intervention;  Surgeon: Carlo Barragan MD;  Location:  [facility];  Service: Cardiovascular;  Laterality: N/A;    CERVICAL FUSION      c4-c5    CORONARY ANGIOPLASTY WITH STENT PLACEMENT      CORONARY ARTERY BYPASS GRAFT N/A 2/20/2025    Procedure: MEDIAN STERNOTOMY, CORONARY ARTERY BYPASS GRAFT X 3 (ON PUMP) USING RIGHT GREATER SAPHENOUS VEIN VIA EVH + LEFT INTERNAL MAMMARY ARTERY GRAFT, MAZE, LEFT ATRIAL APPENDAGE LIGATION, INTRAOP JESSY AS PER ANESTHESIA;  Surgeon: Steve Borjas MD;  Location: [facility] OR;  Service: Cardiothoracic;  Laterality: N/A;  LEFT ARM EXPLORED, RADIAL ARTERY NOT USABLE    HERNIA REPAIR      MAZE PROCEDURE N/A 2/20/2025    Procedure: MAZE PROCEDURE;  Surgeon: Steve Borjas MD;  Location: [facility] OR;  Service: Cardiothoracic;  Laterality: N/A;    TONSILECTOMY, ADENOIDECTOMY, BILATERAL MYRINGOTOMY AND TUBES        General Information       Row Name 02/27/25 1459          Physical Therapy Time and Intention    Document Type therapy note (daily note)  -     Mode of Treatment physical therapy  -       Row Name 02/27/25 1459          General Information    Patient Profile Reviewed yes  -BA     Existing Precautions/Restrictions cardiac;fall;sternal;other (see comments)  delayed processing; intermittent confusion; monitor BP  -     Barriers to Rehab medically complex;cognitive status  -       Row Name 02/27/25 1459          Cognition    Orientation Status (Cognition) oriented x 4;verbal cues/prompts needed for orientation;situation;other (see comments)  Able to report why he was in the hospital, but would exhibit intermittent situational confusion throughout session.  -       Row Name 02/27/25 1459          Safety Issues/Impairments Affecting Functional Mobility    Safety Issues Affecting Function (Mobility) awareness of need for  assistance;impulsivity;insight into deficits/self-awareness;judgment;positioning of assistive device;problem-solving;safety precaution awareness;safety precautions follow-through/compliance;sequencing abilities  -     Impairments Affecting Function (Mobility) balance;cognition;coordination;endurance/activity tolerance;motor planning;pain;postural/trunk control;shortness of breath;strength  -     Cognitive Impairments, Mobility Safety/Performance awareness, need for assistance;insight into deficits/self-awareness;judgment;problem-solving/reasoning;safety precaution awareness;safety precaution follow-through;sequencing abilities;impulsivity;attention  -     Comment, Safety Issues/Impairments (Mobility) Decreased processing throughout.  Intermittent confusion.  Required increased VCs for improved safety awareness.  Reeducated on sternal precautions; required frequent VCs/TCs for adherence to sternal precautions.  Improvement with slightly less cues as session progressed.  -               User Key  (r) = Recorded By, (t) = Taken By, (c) = Cosigned By      Initials Name Provider Type    Rubi De La Torre, PT Physical Therapist                   Mobility       Row Name 02/27/25 1506          Bed Mobility    Comment, (Bed Mobility) Received [facility] upon arrival and returned to chair.  -       Row Name 02/27/25 1506          Sit-Stand Transfer    Sit-Stand [place] (Transfers) minimum assist (75% patient effort);2 person assist;1 person assist;verbal cues;nonverbal cues (demo/gesture)  -     Assistive Device (Sit-Stand Transfers) walker, front-wheeled  -     Comment, (Sit-Stand Transfer) STS x 3 reps total; from chair with minAx2 progressing to from toilet and chair again with minAx1.  Impulsive with first stand; needing to use the restroom.  VCs/TCs for optimal pre-positioning, sequencing, and hand placement with maintaining sternal precautions.  Reported no dizziness with standing.  -       Row Name  02/27/25 1506          Gait/Stairs (Locomotion)    [place] Level (Gait) minimum assist (75% patient effort);1 person assist;1 person to manage equipment;verbal cues;nonverbal cues (demo/gesture);other (see comments)  chair follow  -     Assistive Device (Gait) walker, front-wheeled  -     Distance in Feet (Gait) 20  +85+115  -BA     Deviations/Abnormal Patterns (Gait) bilateral deviations;triston decreased;gait speed decreased;stride length decreased;base of support, narrow  -BA     Bilateral Gait Deviations heel strike decreased  -     Comment, (Gait/Stairs) Ambulated to restroom > hallway > room.  Demo'd step through gait pattern with decreased triston and step length, along with more narrow SARITHA.  Tends to veer toward L side near end of ambulation distance when trying to navigate and visually scan environment to find room.  Required 1 seated rest break when ambulationg in hallway d/t SOA/MENON and fatigue.  VCs/TCs for PLB, walker management/steering, optimal walker positioning, improved stride length, improved heel strike upon IC, and straight forward line of progression.  Gait distance limited by fatigue and SOA/MENON.  -               User Key  (r) = Recorded By, (t) = Taken By, (c) = Cosigned By      Initials Name Provider Type     Rubi Waldron, PT Physical Therapist                   Obj/Interventions       Row Name 02/27/25 1514          Motor Skills    Therapeutic Exercise hip;knee;ankle  -       Row Name 02/27/25 1514          Hip (Therapeutic Exercise)    Hip (Therapeutic Exercise) strengthening exercise  -     Hip Strengthening (Therapeutic Exercise) bilateral;heel slides;aBduction;aDduction;sitting;10 repetitions  -       Row Name 02/27/25 1514          Knee (Therapeutic Exercise)    Knee (Therapeutic Exercise) strengthening exercise  -     Knee Strengthening (Therapeutic Exercise) bilateral;SLR (straight leg raise);sitting;10 repetitions  -       Row Name 02/27/25 1514           Ankle (Therapeutic Exercise)    Ankle (Therapeutic Exercise) AROM (active range of motion)  -     Ankle AROM (Therapeutic Exercise) bilateral;dorsiflexion;plantarflexion;sitting;10 repetitions  -       Row Name 02/27/25 1514          Balance    Balance Assessment sitting static balance;sitting dynamic balance;standing static balance;standing dynamic balance  -     Static Sitting Balance standby assist  -     Dynamic Sitting Balance contact guard  -BA     Position, Sitting Balance unsupported;sitting in chair;other (see comments)  sitting on toilet  -     Static Standing Balance minimal assist;2-person assist;1-person assist;verbal cues;non-verbal cues (demo/gesture)  -     Dynamic Standing Balance minimal assist;1-person assist;1 person to manage equipment;verbal cues  -     Position/Device Used, Standing Balance supported;walker, front-wheeled  -     Comment, Balance Mild unsteadiness with standing and ambulation activity with FWW; no overt LOB noted.  Prolonged standing with minAx1 and FWW for dep pericare following toileting.  -               User Key  (r) = Recorded By, (t) = Taken By, (c) = Cosigned By      Initials Name Provider Type     Rubi Waldron, PT Physical Therapist                   Goals/Plan    No documentation.                  Clinical Impression       Row Name 02/27/25 1521          Pain    Pretreatment Pain Rating 8/10  -BA     Posttreatment Pain Rating 4/10  -BA     Pain Location chest  -     Pain Side/Orientation other (see comments)  incisional  -     Pain Management Interventions activity modification encouraged;exercise or physical activity utilized;nursing notified  -     Response to Pain Interventions activity participation with decreased pain  -       Row Name 02/27/25 1521          Plan of Care Review    Plan of Care Reviewed With patient;child  dtr  -BA     Progress improving  -     Outcome Evaluation Improved functional mobility and endurance  noted by improved level of assist with transfers and ambulation, along with increased ambulation distance this session.  Decreased safety awareness and intermittent confusion throughout.  Con to present below baseline with gait instability, decreased balance, SOA/MENON, decreased functional endurance, and weakness limiting indep with mobility and ability to safely navigate home environment.  Will con to benefit from skilled IP PT to improve deficits and promote return to PLOF.  Once medically appropriate, rec IPR upon d/c for best fxl outcome.  -BA       Row Name 02/27/25 1521          Vital Signs    Pre Systolic BP Rehab 152  taken prior to PT arrival  -BA     Pre Treatment Diastolic BP 84  -BA     Post Systolic BP Rehab 102  sitting in chair following ambulation  -BA     Post Treatment Diastolic BP 59  -BA     Posttreatment Heart Rate (beats/min) 65  -BA     O2 Delivery Pre Treatment room air  -BA     O2 Delivery Intra Treatment room air  -BA     Post SpO2 (%) 97  -BA     O2 Delivery Post Treatment room air  -BA     Pre Patient Position Sitting  -BA     Intra Patient Position Standing  -BA     Post Patient Position Sitting  -BA       Row Name 02/27/25 1521          Positioning and Restraints    Pre-Treatment Position sitting in chair/recliner  -BA     Post Treatment Position chair  -BA     In Chair notified nsg;reclined;call light within reach;encouraged to call for assist;exit alarm on;with family/caregiver;legs elevated  -BA               User Key  (r) = Recorded By, (t) = Taken By, (c) = Cosigned By      Initials Name Provider Type    Rubi De La Torre, PT Physical Therapist                   Outcome Measures       Row Name 02/27/25 1527 02/27/25 0400       How much help from another person do you currently need...    Turning from your back to your side while in flat bed without using bedrails? 3  -BA 3  -EMRE    Moving from lying on back to sitting on the side of a flat bed without bedrails? 2  -BA 3  -EMRE     Moving to and from a bed to a chair (including a wheelchair)? 3  -BA 3  -EMRE    Standing up from a chair using your arms (e.g., wheelchair, bedside chair)? 3  -BA 3  -EMRE    Climbing 3-5 steps with a railing? 2  -BA 2  -EMRE    To walk in hospital room? 3  -BA 3  -EMRE    AM-PAC 6 Clicks Score (PT) 16  -BA 17  -EMRE    Highest Level of Mobility Goal 5 --> Static standing  -BA 5 --> Static standing  -EMRE      Row Name 02/27/25 1527          Functional Assessment    Outcome Measure Options AM-PAC 6 Clicks Basic Mobility (PT)  -BA               User Key  (r) = Recorded By, (t) = Taken By, (c) = Cosigned By      Initials Name Provider Type    Veronica Gamboa, RN Registered Nurse    Rubi De La Torre, KARLY Physical Therapist                                 Physical Therapy Education       Title: PT OT SLP Therapies (In Progress)       Topic: Physical Therapy (Done)       Point: Mobility training (Done)       Learning Progress Summary            Patient Acceptance, E, VU,NR by BA at 2/27/2025 1528    Acceptance, E,D, NR by HS at 2/25/2025 1048    Comment: sternal precautions    Acceptance, E, NR by KG at 2/24/2025 0827    Acceptance, E, NR by JORGE at 2/22/2025 1300    Acceptance, E, NR by JORGE at 2/21/2025 1015                      Point: Home exercise program (Done)       Learning Progress Summary            Patient Acceptance, E, VU,NR by BA at 2/27/2025 1528    Acceptance, E,D, NR by HS at 2/25/2025 1048    Comment: sternal precautions    Acceptance, E, NR by KG at 2/24/2025 0827    Acceptance, E, NR by JORGE at 2/22/2025 1300    Acceptance, E, NR by JORGE at 2/21/2025 1015                      Point: Body mechanics (Done)       Learning Progress Summary            Patient Acceptance, E, VU,NR by BA at 2/27/2025 1528    Acceptance, E,D, NR by HS at 2/25/2025 1048    Comment: sternal precautions    Acceptance, E, NR by KG at 2/24/2025 0827    Acceptance, E, NR by JORGE at 2/22/2025 1300    Acceptance, E, NR by JORGE at 2/21/2025 1019                       Point: Precautions (Done)       Learning Progress Summary            Patient Acceptance, E, VU,NR by BA at 2/27/2025 1528    Acceptance, E,D, NR by HS at 2/25/2025 1048    Comment: sternal precautions    Acceptance, E, NR by KG at 2/24/2025 0827    Acceptance, E, NR by JORGE at 2/22/2025 1300    Acceptance, E, NR by JORGE at 2/21/2025 1015                                      User Key       Initials Effective Dates Name Provider Type Discipline    JORGE 02/03/23 -  Ronda Torrez, PT Physical Therapist PT    KG 05/22/20 -  Veronique Carl, PT Physical Therapist PT    BA 09/21/21 -  Rubi Waldron, PT Physical Therapist PT    HS 01/14/25 -  Maribell Bob, PT Student PT Student PT                  PT Recommendation and Plan     Progress: improving  Outcome Evaluation: Improved functional mobility and endurance noted by improved level of assist with transfers and ambulation, along with increased ambulation distance this session.  Decreased safety awareness and intermittent confusion throughout.  Con to present below baseline with gait instability, decreased balance, SOA/MENON, decreased functional endurance, and weakness limiting indep with mobility and ability to safely navigate home environment.  Will con to benefit from skilled IP PT to improve deficits and promote return to PLOF.  Once medically appropriate, rec IPR upon d/c for best fxl outcome.     Time Calculation:         PT Charges       Row Name 02/27/25 1528             Time Calculation    Start Time 1124  -BA      PT Received On 02/27/25  -BA         Time Calculation- PT    Total Timed Code Minutes- PT 41 minute(s)  -BA         Timed Charges    32636 - PT Therapeutic Exercise Minutes 12  -BA      46529 - Gait Training Minutes  16  -BA      66049 - PT Therapeutic Activity Minutes 13  -BA         Total Minutes    Timed Charges Total Minutes 41  -BA       Total Minutes 41  -BA                User Key  (r) = Recorded By, (t) = Taken By,  (c) = Cosigned By      Initials Name Provider Type    Rubi De La Torre, PT Physical Therapist                  Therapy Charges for Today       Code Description Service Date Service Provider Modifiers Qty    85775871298 HC PT THER PROC EA 15 MIN 2/27/2025 Rubi Waldron, PT GP 1    62078370910 HC GAIT TRAINING EA 15 MIN 2/27/2025 Rubi Waldron, PT GP 1    04588270428 HC PT THERAPEUTIC ACT EA 15 MIN 2/27/2025 Rubi Waldron, PT GP 1    29367862946 HC PT THER SUPP EA 15 MIN 2/27/2025 Rubi Waldron, PT GP 2            PT G-Codes  Outcome Measure Options: AM-PAC 6 Clicks Basic Mobility (PT)  AM-PAC 6 Clicks Score (PT): 16  AM-PAC 6 Clicks Score (OT): 13  PT Discharge Summary  Anticipated Discharge Disposition (PT): inpatient rehabilitation facility    Rubi Waldron PT  2/27/2025

## 2025-02-27 NOTE — CASE MANAGEMENT/SOCIAL WORK
Continued Stay Note  [facility]     Patient Name: Camron Hendrix  MRN: [id number]  Today's Date: 2/27/2025    Admit Date: 2/18/2025    Plan: [facility]   Discharge Plan       Row Name 02/27/25 0911       Plan    Plan [facility]    Patient/Family in Agreement with Plan yes    Plan Comments Spoke to patient at bedside. Plan is [facility]. [facility] if following. CM will continue to follow.    Final Discharge Disposition Code 62 - inpatient rehab facility                   Discharge Codes    No documentation.                 Expected Discharge Date and Time       Expected Discharge Date Expected Discharge Time    Feb 27, 2025               Nacho Kimball RN

## 2025-02-27 NOTE — PROGRESS NOTES
CTS Progress Note      POD # 7 s/p CABG x 3, maze, LAAL    Subjective  Denies chest pain or dyspnea.    Objective    Physical Exam:   Vital Signs   Temp:  [98 °F (36.7 °C)-98.9 °F (37.2 °C)] 98.3 °F (36.8 °C)  Heart Rate:  [58-69] 69  Resp:  [16-20] 16  BP: (125-143)/(68-80) 130/72   GEN: NAD   CV: Regular rate and rhythm    RESP: Decreased in bases   EXT: Warm with 1+ pitting edema lower extremities   Incision: Sternum is stable    Results     Results from last 7 days   Lab Units 02/27/25  0448   WBC 10*3/mm3 6.98   HEMOGLOBIN g/dL 11.5*   HEMATOCRIT % 34.5*   PLATELETS 10*3/mm3 261     Results from last 7 days   Lab Units 02/27/25  0448   SODIUM mmol/L 136   POTASSIUM mmol/L 3.6   CHLORIDE mmol/L 92*   CO2 mmol/L 28.0   BUN mg/dL 46*   CREATININE mg/dL 1.44*   GLUCOSE mg/dL 135*   CALCIUM mg/dL 9.0     Results from last 7 days   Lab Units 02/21/25  0359 02/20/25  1354   INR  1.36* 1.73*   APTT seconds  --  32.5       Assessment & Plan     POD # 7 s/p CABG x 3, maze, LAAL      STEMI involving left anterior descending coronary artery    CVA (cerebral vascular accident)    Essential hypertension    Coronary artery disease involving native coronary artery of native heart without angina pectoris    Morbid obesity with BMI of 40.0-44.9, adult    Sleep apnea    PAF (paroxysmal atrial fibrillation)    Type 2 diabetes mellitus    Acute systolic CHF (congestive heart failure)    Coronary artery disease involving native coronary artery of native heart with unstable angina pectoris        Plan   D/C wound vac  Duonebs   Diuresis as tolerated by kidney function  Wean oxygen as tolerated  Ambulate TID  Pulm toilet  Aspirin, statin, BB, Plavix   to work on rehab placement - medically ready    Kirill Gonzalez MD  02/27/25  07:54 EST

## 2025-02-27 NOTE — PLAN OF CARE
Goal Outcome Evaluation:  Plan of Care Reviewed With: patient           Outcome Evaluation: MADINA MADSEN. Soap allison enema given. Intermittent confusion. Patient resting in bed, call light in reach.

## 2025-02-28 ENCOUNTER — APPOINTMENT (OUTPATIENT)
Dept: GENERAL RADIOLOGY | Facility: HOSPITAL | Age: 75
End: 2025-02-28
Payer: MEDICARE

## 2025-02-28 VITALS
DIASTOLIC BLOOD PRESSURE: 65 MMHG | TEMPERATURE: 98 F | OXYGEN SATURATION: 94 % | HEIGHT: 69 IN | RESPIRATION RATE: 16 BRPM | HEART RATE: 64 BPM | BODY MASS INDEX: 39.55 KG/M2 | SYSTOLIC BLOOD PRESSURE: 107 MMHG | WEIGHT: 267 LBS

## 2025-02-28 LAB
ANION GAP SERPL CALCULATED.3IONS-SCNC: 15 MMOL/L (ref 5–15)
BUN SERPL-MCNC: 38 MG/DL (ref 8–23)
BUN/CREAT SERPL: 27.5 (ref 7–25)
CALCIUM SPEC-SCNC: 8.7 MG/DL (ref 8.6–10.5)
CHLORIDE SERPL-SCNC: 93 MMOL/L (ref 98–107)
CO2 SERPL-SCNC: 26 MMOL/L (ref 22–29)
CREAT SERPL-MCNC: 1.38 MG/DL (ref 0.76–1.27)
DEPRECATED RDW RBC AUTO: 39.5 FL (ref 37–54)
EGFRCR SERPLBLD CKD-EPI 2021: 54 ML/MIN/1.73
ERYTHROCYTE [DISTWIDTH] IN BLOOD BY AUTOMATED COUNT: 13 % (ref 12.3–15.4)
GLUCOSE BLDC GLUCOMTR-MCNC: 158 MG/DL (ref 70–130)
GLUCOSE BLDC GLUCOMTR-MCNC: 175 MG/DL (ref 70–130)
GLUCOSE SERPL-MCNC: 154 MG/DL (ref 65–99)
HCT VFR BLD AUTO: 35.9 % (ref 37.5–51)
HGB BLD-MCNC: 12.1 G/DL (ref 13–17.7)
MCH RBC QN AUTO: 28 PG (ref 26.6–33)
MCHC RBC AUTO-ENTMCNC: 33.7 G/DL (ref 31.5–35.7)
MCV RBC AUTO: 83.1 FL (ref 79–97)
PLATELET # BLD AUTO: 334 10*3/MM3 (ref 140–450)
PMV BLD AUTO: 10 FL (ref 6–12)
POTASSIUM SERPL-SCNC: 3.3 MMOL/L (ref 3.5–5.2)
RBC # BLD AUTO: 4.32 10*6/MM3 (ref 4.14–5.8)
SODIUM SERPL-SCNC: 134 MMOL/L (ref 136–145)
WBC NRBC COR # BLD AUTO: 8.37 10*3/MM3 (ref 3.4–10.8)

## 2025-02-28 PROCEDURE — 94799 UNLISTED PULMONARY SVC/PX: CPT

## 2025-02-28 PROCEDURE — 82948 REAGENT STRIP/BLOOD GLUCOSE: CPT

## 2025-02-28 PROCEDURE — 63710000001 INSULIN LISPRO (HUMAN) PER 5 UNITS: Performed by: STUDENT IN AN ORGANIZED HEALTH CARE EDUCATION/TRAINING PROGRAM

## 2025-02-28 PROCEDURE — 63710000001 INSULIN LISPRO (HUMAN) PER 5 UNITS: Performed by: PHYSICIAN ASSISTANT

## 2025-02-28 PROCEDURE — 71045 X-RAY EXAM CHEST 1 VIEW: CPT

## 2025-02-28 PROCEDURE — 85027 COMPLETE CBC AUTOMATED: CPT

## 2025-02-28 PROCEDURE — 25010000002 HEPARIN (PORCINE) PER 1000 UNITS: Performed by: PHYSICIAN ASSISTANT

## 2025-02-28 PROCEDURE — 99231 SBSQ HOSP IP/OBS SF/LOW 25: CPT | Performed by: INTERNAL MEDICINE

## 2025-02-28 PROCEDURE — 80048 BASIC METABOLIC PNL TOTAL CA: CPT

## 2025-02-28 PROCEDURE — 99232 SBSQ HOSP IP/OBS MODERATE 35: CPT | Performed by: STUDENT IN AN ORGANIZED HEALTH CARE EDUCATION/TRAINING PROGRAM

## 2025-02-28 PROCEDURE — 99024 POSTOP FOLLOW-UP VISIT: CPT

## 2025-02-28 PROCEDURE — 63710000001 INSULIN GLARGINE PER 5 UNITS: Performed by: PHYSICIAN ASSISTANT

## 2025-02-28 RX ORDER — INSULIN LISPRO 100 [IU]/ML
5 INJECTION, SOLUTION INTRAVENOUS; SUBCUTANEOUS
Start: 2025-02-28

## 2025-02-28 RX ORDER — TRAMADOL HYDROCHLORIDE 50 MG/1
100 TABLET ORAL EVERY 6 HOURS PRN
Start: 2025-02-28 | End: 2025-03-01

## 2025-02-28 RX ORDER — ASPIRIN 81 MG/1
81 TABLET, CHEWABLE ORAL DAILY
Start: 2025-03-01

## 2025-02-28 RX ORDER — ACETAMINOPHEN 500 MG
1000 TABLET ORAL EVERY 8 HOURS PRN
Start: 2025-02-28

## 2025-02-28 RX ORDER — FINASTERIDE 5 MG/1
5 TABLET, FILM COATED ORAL DAILY
Start: 2025-03-01

## 2025-02-28 RX ORDER — NITROGLYCERIN 0.4 MG/1
0.4 TABLET SUBLINGUAL
Qty: 25 TABLET | Refills: 5 | Status: SHIPPED | OUTPATIENT
Start: 2025-02-28

## 2025-02-28 RX ORDER — CARVEDILOL 6.25 MG/1
6.25 TABLET ORAL 2 TIMES DAILY WITH MEALS
Qty: 60 TABLET | Refills: 5 | Status: SHIPPED | OUTPATIENT
Start: 2025-02-28

## 2025-02-28 RX ORDER — SACUBITRIL AND VALSARTAN 24; 26 MG/1; MG/1
1 TABLET, FILM COATED ORAL EVERY 12 HOURS SCHEDULED
Qty: 60 TABLET | Refills: 5 | Status: SHIPPED | OUTPATIENT
Start: 2025-02-28

## 2025-02-28 RX ORDER — HYDROXYZINE HYDROCHLORIDE 25 MG/1
50 TABLET, FILM COATED ORAL ONCE
Status: COMPLETED | OUTPATIENT
Start: 2025-02-28 | End: 2025-02-28

## 2025-02-28 RX ORDER — CLOPIDOGREL BISULFATE 75 MG/1
75 TABLET ORAL DAILY
Start: 2025-03-01

## 2025-02-28 RX ORDER — POTASSIUM CHLORIDE 1500 MG/1
20 TABLET, EXTENDED RELEASE ORAL DAILY
Qty: 30 TABLET | Refills: 0 | Status: SHIPPED | OUTPATIENT
Start: 2025-02-28

## 2025-02-28 RX ORDER — POTASSIUM CHLORIDE 1500 MG/1
40 TABLET, EXTENDED RELEASE ORAL EVERY 4 HOURS
Status: DISCONTINUED | OUTPATIENT
Start: 2025-02-28 | End: 2025-02-28 | Stop reason: HOSPADM

## 2025-02-28 RX ORDER — NICOTINE POLACRILEX 4 MG
15 LOZENGE BUCCAL
Start: 2025-02-28

## 2025-02-28 RX ORDER — ROSUVASTATIN CALCIUM 40 MG/1
40 TABLET, COATED ORAL NIGHTLY
Qty: 30 TABLET | Refills: 5 | Status: SHIPPED | OUTPATIENT
Start: 2025-02-28

## 2025-02-28 RX ORDER — INSULIN LISPRO 100 [IU]/ML
2-9 INJECTION, SOLUTION INTRAVENOUS; SUBCUTANEOUS
Start: 2025-02-28

## 2025-02-28 RX ORDER — AMIODARONE HYDROCHLORIDE 200 MG/1
TABLET ORAL
Qty: 37 TABLET | Refills: 0
Start: 2025-02-28

## 2025-02-28 RX ORDER — BISACODYL 10 MG
10 SUPPOSITORY, RECTAL RECTAL DAILY PRN
Start: 2025-02-28

## 2025-02-28 RX ORDER — POLYETHYLENE GLYCOL 3350 17 G/17G
17 POWDER, FOR SOLUTION ORAL 2 TIMES DAILY
Start: 2025-02-28

## 2025-02-28 RX ORDER — AMOXICILLIN 250 MG
2 CAPSULE ORAL 2 TIMES DAILY
Start: 2025-02-28

## 2025-02-28 RX ORDER — BUMETANIDE 1 MG/1
1 TABLET ORAL DAILY
Qty: 30 TABLET | Refills: 2 | Status: SHIPPED | OUTPATIENT
Start: 2025-03-01

## 2025-02-28 RX ADMIN — TAMSULOSIN HYDROCHLORIDE 0.4 MG: 0.4 CAPSULE ORAL at 08:12

## 2025-02-28 RX ADMIN — AMIODARONE HYDROCHLORIDE 200 MG: 200 TABLET ORAL at 08:10

## 2025-02-28 RX ADMIN — INSULIN LISPRO 2 UNITS: 100 INJECTION, SOLUTION INTRAVENOUS; SUBCUTANEOUS at 12:23

## 2025-02-28 RX ADMIN — POTASSIUM CHLORIDE 40 MEQ: 1500 TABLET, EXTENDED RELEASE ORAL at 13:55

## 2025-02-28 RX ADMIN — BUMETANIDE 1 MG: 1 TABLET ORAL at 08:12

## 2025-02-28 RX ADMIN — INSULIN LISPRO 5 UNITS: 100 INJECTION, SOLUTION INTRAVENOUS; SUBCUTANEOUS at 12:23

## 2025-02-28 RX ADMIN — ASPIRIN 81 MG 81 MG: 81 TABLET ORAL at 08:09

## 2025-02-28 RX ADMIN — POLYETHYLENE GLYCOL 3350 17 G: 17 POWDER, FOR SOLUTION ORAL at 08:12

## 2025-02-28 RX ADMIN — CARVEDILOL 6.25 MG: 6.25 TABLET, FILM COATED ORAL at 08:09

## 2025-02-28 RX ADMIN — HYDROXYZINE HYDROCHLORIDE 50 MG: 25 TABLET, FILM COATED ORAL at 13:54

## 2025-02-28 RX ADMIN — HEPARIN SODIUM 5000 UNITS: 5000 INJECTION INTRAVENOUS; SUBCUTANEOUS at 13:55

## 2025-02-28 RX ADMIN — HEPARIN SODIUM 5000 UNITS: 5000 INJECTION INTRAVENOUS; SUBCUTANEOUS at 06:36

## 2025-02-28 RX ADMIN — INSULIN GLARGINE 10 UNITS: 100 INJECTION, SOLUTION SUBCUTANEOUS at 08:13

## 2025-02-28 RX ADMIN — INSULIN LISPRO 5 UNITS: 100 INJECTION, SOLUTION INTRAVENOUS; SUBCUTANEOUS at 08:14

## 2025-02-28 RX ADMIN — SENNOSIDES AND DOCUSATE SODIUM 2 TABLET: 50; 8.6 TABLET ORAL at 08:09

## 2025-02-28 RX ADMIN — AMIODARONE HYDROCHLORIDE 200 MG: 200 TABLET ORAL at 00:53

## 2025-02-28 RX ADMIN — BISACODYL 5 MG: 5 TABLET, COATED ORAL at 08:10

## 2025-02-28 RX ADMIN — CLOPIDOGREL BISULFATE 75 MG: 75 TABLET ORAL at 08:12

## 2025-02-28 RX ADMIN — IPRATROPIUM BROMIDE AND ALBUTEROL SULFATE 3 ML: 2.5; .5 SOLUTION RESPIRATORY (INHALATION) at 12:16

## 2025-02-28 RX ADMIN — TRAMADOL HYDROCHLORIDE 100 MG: 50 TABLET, COATED ORAL at 08:09

## 2025-02-28 RX ADMIN — INSULIN LISPRO 2 UNITS: 100 INJECTION, SOLUTION INTRAVENOUS; SUBCUTANEOUS at 08:12

## 2025-02-28 RX ADMIN — LEVOTHYROXINE SODIUM 200 MCG: 0.1 TABLET ORAL at 06:37

## 2025-02-28 RX ADMIN — SACUBITRIL AND VALSARTAN 1 TABLET: 24; 26 TABLET, FILM COATED ORAL at 08:12

## 2025-02-28 RX ADMIN — FINASTERIDE 5 MG: 5 TABLET, FILM COATED ORAL at 08:10

## 2025-02-28 NOTE — PROGRESS NOTES
CTS Progress Note      POD # 8 s/p CABG x 3, maze, LAAL    Subjective  Denies chest pain or dyspnea. Sitting up in chair.    Objective    Physical Exam:   Vital Signs   Temp:  [97.2 °F (36.2 °C)-98 °F (36.7 °C)] 97.2 °F (36.2 °C)  Heart Rate:  [64-77] 71  Resp:  [16-18] 17  BP: (104-152)/(66-96) 149/96   GEN: NAD   CV: Regular rate and rhythm    RESP: Decreased in bases   EXT: Warm with 1+ pitting edema lower extremities   Incision: Sternum is stable    Results     Results from last 7 days   Lab Units 02/27/25  0448   WBC 10*3/mm3 6.98   HEMOGLOBIN g/dL 11.5*   HEMATOCRIT % 34.5*   PLATELETS 10*3/mm3 261     Results from last 7 days   Lab Units 02/27/25  0448   SODIUM mmol/L 136   POTASSIUM mmol/L 3.6   CHLORIDE mmol/L 92*   CO2 mmol/L 28.0   BUN mg/dL 46*   CREATININE mg/dL 1.44*   GLUCOSE mg/dL 135*   CALCIUM mg/dL 9.0     Assessment & Plan     POD # 8 s/p CABG x 3, maze, LAAL      STEMI - s/p CABG x 3, modified maze, LAAL 2/20/25    CVA (cerebral vascular accident)    Essential hypertension    Coronary artery disease involving native coronary artery of native heart without angina pectoris    Morbid obesity with BMI of 40.0-44.9, adult    Sleep apnea    PAF (paroxysmal atrial fibrillation)    Type 2 diabetes mellitus    Acute systolic CHF (congestive heart failure)    Coronary artery disease involving native coronary artery of native heart with unstable angina pectoris        Plan   Duonebs   Diuresis as tolerated by kidney function  Wean oxygen as tolerated  Ambulate TID  Pulm toilet  Aspirin, statin, BB, Plavix   to work on rehab placement - medically ready    Carmen Ford, APRN  02/28/25  07:48 EST

## 2025-02-28 NOTE — PROGRESS NOTES
[facility] at [facility]  INPATIENT PROGRESS NOTE         [facility] 4H    2/28/2025      PATIENT IDENTIFICATION:   Name:  Camron Hendrix      MRN:  [id number]     74 y.o.  male             Reason for visit: CAD, hypertension, hyperlipidemia, CHF      SUBJECTIVE:   Stable, eager to get to rehab     OBJECTIVE:  Vitals:    02/28/25 0811 02/28/25 1120 02/28/25 1218 02/28/25 1400   BP: 130/88 95/58  107/65   BP Location: Right arm Right arm     Patient Position: Sitting Lying     Pulse: 71 59 62 64   Resp: 18 16 16    Temp: 97.5 °F (36.4 °C) 98 °F (36.7 °C)     TempSrc: Axillary Oral     SpO2: 94% 95% 94% 94%   Weight:       Height:         FiO2 (%): 40 %     Body mass index is 39.43 kg/m².    Intake/Output Summary (Last 24 hours) at 2/28/2025 1557  Last data filed at 2/28/2025 1030  Gross per 24 hour   Intake --   Output 1625 ml   Net -1625 ml       Telemetry: Personally reviewed, normal sinus rhythm, no arrhythmia     Exam:  General Appearance:   · well developed  · well nourished  Neck:  · thyroid not enlarged  · supple  Respiratory:  · no respiratory distress  · normal breath sounds  · no rales  Cardiovascular:  · no jugular venous distention  · regular rhythm  · apical impulse normal  · S1 normal, S2 normal  · no S3, no S4   · no murmur  · no rub, no thrill  · carotid pulses normal; no bruit  · pedal pulses normal  · lower extremity edema: 1+  Skin:   warm, dry        Allergies   Allergen Reactions    Codeine Hallucinations     Scheduled meds:       amiodarone, 200 mg, Oral, Q12H   Followed by  [START ON 3/14/2025] amiodarone, 200 mg, Oral, Daily  aspirin, 81 mg, Oral, Daily  senna-docusate sodium, 2 tablet, Oral, BID   And  polyethylene glycol, 17 g, Oral, BID   And  bisacodyl, 5 mg, Oral, Daily  bumetanide, 1 mg, Oral, Daily  carvedilol, 6.25 mg, Oral, BID With Meals  clopidogrel, 75 mg, Oral, Daily  finasteride, 5 mg, Oral, Daily  heparin (porcine), 5,000 Units,  "Subcutaneous, Q8H  insulin glargine, 10 Units, Subcutaneous, Daily  insulin lispro, 2-9 Units, Subcutaneous, 4x Daily AC & at Bedtime  Insulin Lispro, 5 Units, Subcutaneous, TID With Meals  ipratropium-albuterol, 3 mL, Nebulization, 4x Daily - RT  levothyroxine, 200 mcg, Oral, Q AM  pharmacy consult - MTM, , Not Applicable, Daily  potassium chloride ER, 40 mEq, Oral, Q4H  rosuvastatin, 40 mg, Oral, Nightly  sacubitril-valsartan, 1 tablet, Oral, Q12H  tamsulosin, 0.4 mg, Oral, Daily      IV meds:                           Data Review:  Results from last 7 days   Lab Units 02/28/25  1207 02/27/25  0448 02/26/25  0906 02/25/25  0308   SODIUM mmol/L 134* 136 135* 138   BUN mg/dL 38* 46* 54* 55*   CREATININE mg/dL 1.38* 1.44* 1.71* 1.76*   GLUCOSE mg/dL 154* 135* 203* 139*     Results from last 7 days   Lab Units 02/28/25  1207 02/27/25  0448 02/25/25  0308 02/24/25  0432 02/23/25  0522   WBC 10*3/mm3 8.37 6.98 5.77 6.03 6.59   HEMOGLOBIN g/dL 12.1* 11.5* 10.9* 10.0* 10.3*                 No results found for: \"DIGOXIN\"   Lab Results   Component Value Date    TSH 1.770 04/24/2024           Invalid input(s): \"LDLCALC\"      Estimated Creatinine Clearance: 61.2 mL/min (A) (by C-G formula based on SCr of 1.38 mg/dL (H)).        Imaging (last 24 hr):   I personally reviewed the most recent chest x-ray and other pertinent imaging studies.  Results for orders placed during the hospital encounter of 02/18/25    XR Chest 1 View    Narrative  XR CHEST 1 VW    Date of Exam: 2/28/2025 3:44 AM EST    Indication: postop    Comparison: 2/27/2025    Findings:  Cardiomediastinal silhouette remains enlarged similar to the previous exam. There is left basilar airspace disease or atelectasis which is similar to prior. Mild patchy right basilar atelectasis has developed. Minimal left pleural effusion is again  suspected. No acute osseous abnormality identified. Median sternotomy wires are present.    Impression  Impression:  Cardiomegaly " with stable left basilar and new mild right basilar opacity suggestive of atelectasis. Trace left pleural effusion is suspected.        Electronically Signed: Vivian Carson MD  2/28/2025 7:07 AM EST  Workstation ID: [id number]        Last ECHO:  Results for orders placed during the hospital encounter of 02/18/25    Adult Transthoracic Echo Complete W/ Cont if Necessary Per Protocol    Interpretation Summary    Left ventricular systolic function is mildly decreased. Calculated left ventricular EF = 44% Left ventricular ejection fraction appears to be 41 - 45%.    Left ventricular wall thickness is consistent with moderate concentric hypertrophy.    The following left ventricular wall segments are akinetic: mid anterior, apical anterior, apical septal and apex. C/w with LAD territory infarct.    Left ventricular diastolic function is consistent with (grade Ia w/high LAP) impaired relaxation.    Mild aortic valve stenosis is present.    Peak velocity of the flow distal to the aortic valve is 277 cm/s. Aortic valve mean pressure gradient is 14 mmHg.    The aortic root measures 4.4 cm. The aortic root (corrected for BSA) measures 1.9 cm. Mild dilation of the ascending aorta is present.    Mild to moderate mitral valve regurgitation.    Depressed ejection fraction and wall motion abnormalities are new compared to 4/2024; mild aortic stenosis is unchanged.        PROBLEM LIST:     STEMI - s/p CABG x 3, modified maze, LAAL 2/20/25    CVA (cerebral vascular accident)    Essential hypertension    Coronary artery disease involving native coronary artery of native heart without angina pectoris    Morbid obesity with BMI of 40.0-44.9, adult    Sleep apnea    PAF (paroxysmal atrial fibrillation)    Type 2 diabetes mellitus    Acute systolic CHF (congestive heart failure)    Coronary artery disease involving native coronary artery of native heart with unstable angina pectoris    Initial cardiac assessment: 74-year-old gentleman  presenting with anterior STEMI status post balloon angioplasty of the LAD but with residual multivessel disease depressed ejection fraction acute systolic heart failure status post 3V CABG with Dr. Borjas 2/20/2025.  Status post left atrial appendage ligation and maze procedure as well.     ASSESSMENT/PLAN:  1.  Anterior STEMI with multivessel CAD EF 45%:  Status post 3V CABG (LIMA-LAD, SVG-OM, SVG-RCA) 2/20/25 Dr Borjas  Continue ASA, statin. BB   Sternal incision care per CTS   Edema improving     2.  Acute systolic heart failure:  EF 41-45% pre-op  Continuing to maintain net negative fluid balance    Continue Bumex 1 mg p.o. daily 2/27  Continue carvedilol  S continue tart Entresto low-dose (patient was on losartan at home) (started at [facility] 2/27/2025.    Plan to add Jardiance 10 mg daily 2/28 if renal function blood pressure stable.      3.  Paroxysmal atrial fibrillation present on admission  Appreciate STEFANO ligation and maze procedure during bypass surgery  Currently on amiodarone   Continue carvedilol at current dose  No anticoagulation necessary due to left atrial appendage ligation at time of CABG     4.  History of hypertension:  Stable, on low dose Coreg   Started Entresto 2/27, uptitrate as needed for goal blood pressure less than 130/80     5.  Mixed hyperlipidemia:  Goal LDL less than 50, currently at goal on high-dose rosuvastatin 40 mg daily continue for now.     6.  A/CRF  Stable renal function currently, continue monitoring with daily BMP    Stable for discharge to rehab    Follow-up with cardiology in 6-8 weeks    Carlo Barragan MD  2/28/2025    15:57 EST

## 2025-02-28 NOTE — PROGRESS NOTES
[facility] Medicine Services  PROGRESS NOTE    Patient Name: Camron Hendrix  : 1950  MRN: [id number]    Date of Admission: 2025  Primary Care Physician: Leslie Rhodes MD    Subjective   Subjective     CC:  Follow-up STEMI    HPI:  Incisional chest discomfort.  No shortness of breath.  Still with urinary retention requiring straight cath.  No nausea or vomiting.  Had 4 bowel movements.    Objective   Objective     Vital Signs:   Temp:  [97.2 °F (36.2 °C)-98 °F (36.7 °C)] 98 °F (36.7 °C)  Heart Rate:  [59-77] 62  Resp:  [16-18] 16  BP: ()/(58-96) 95/58     Physical Exam:  Constitutional: No acute distress, awake, alert, obese  HENT: NCAT, mucous membranes moist  Respiratory: Clear to auscultation bilaterally, respiratory effort normal   Cardiovascular: RRR  Gastrointestinal: Normoactive bowel sounds, soft, nontender, distended  Musculoskeletal: No bilateral ankle edema  Psychiatric: Appropriate affect, cooperative  Neurologic: Alert, oriented to self, [facility], , symmetric facies, speech clear  Skin: No rashes, sternotomy site c/d/i      Results Reviewed:  LAB RESULTS:      Lab 25  1207 258 25  0432 25  0522   WBC 8.37 6.98 5.77 6.03 6.59   HEMOGLOBIN 12.1* 11.5* 10.9* 10.0* 10.3*   HEMATOCRIT 35.9* 34.5* 32.0* 30.0* 31.0*   PLATELETS 334 261 202 148 136*   NEUTROS ABS  --   --   --  4.34  --    IMMATURE GRANS (ABS)  --   --   --  0.02  --    LYMPHS ABS  --   --   --  0.74  --    MONOS ABS  --   --   --  0.75  --    EOS ABS  --   --   --  0.16  --    MCV 83.1 85.6 84.4 85.7 85.9         Lab 25  1207 25  04425  0906 25  2348 25  1544 25  0308 25  1904 25  0405   SODIUM 134* 136  --  135*  --   --  138  --  134*   POTASSIUM 3.3* 3.6 4.0 3.9 3.3*   < > 3.7 3.1* 4.0   CHLORIDE 93* 92*  --  92*  --   --  92*  --  95*   CO2 26.0 28.0  --  34.0*  --   --   34.0*  --  27.0   ANION GAP 15.0 16.0*  --  9.0  --   --  12.0  --  12.0   BUN 38* 46*  --  54*  --   --  55*  --  49*   CREATININE 1.38* 1.44*  --  1.71*  --   --  1.76*  --  1.75*   EGFR 54.0* 51.3*  --  41.7*  --   --  40.3*  --  40.6*   GLUCOSE 154* 135*  --  203*  --   --  139*  --  138*   CALCIUM 8.7 9.0  --  9.1  --   --  8.9  --  8.7   MAGNESIUM  --   --   --   --   --   --   --  2.3  --    PHOSPHORUS  --   --   --   --   --   --   --  2.3*  --     < > = values in this interval not displayed.                         Lab 02/24/25  0405   ABO TYPING B   RH TYPING Positive   ANTIBODY SCREEN Negative         Lab 02/21/25  2230   PH, ARTERIAL 7.344*   PCO2, ARTERIAL 43.3   PO2 ART 56.0*   FIO2 100   HCO3 ART 23.6   BASE EXCESS ART -2.2*   CARBOXYHEMOGLOBIN 1.3     Brief Urine Lab Results  (Last result in the past 365 days)        Color   Clarity   Blood   Leuk Est   Nitrite   Protein   CREAT   Urine HCG        05/03/24 1742 Dark Yellow   Clear   Negative   Negative   Negative   30 mg/dL (1+)                   Microbiology Results Abnormal       None            XR Chest 1 View    Result Date: 2/28/2025  XR CHEST 1 VW Date of Exam: 2/28/2025 3:44 AM EST Indication: postop Comparison: 2/27/2025 Findings: Cardiomediastinal silhouette remains enlarged similar to the previous exam. There is left basilar airspace disease or atelectasis which is similar to prior. Mild patchy right basilar atelectasis has developed. Minimal left pleural effusion is again suspected. No acute osseous abnormality identified. Median sternotomy wires are present.     Impression: Impression: Cardiomegaly with stable left basilar and new mild right basilar opacity suggestive of atelectasis. Trace left pleural effusion is suspected. Electronically Signed: Vivian Carson MD  2/28/2025 7:07 AM EST  Workstation ID: [id number]    XR Chest 1 View    Result Date: 2/27/2025  XR CHEST 1 VW Date of Exam: 2/27/2025 2:13 AM EST Indication: postop Comparison:  2/26/2025 Findings: The heart is enlarged. The vasculature appears upper limits of normal. There is patchy left basilar atelectasis which appears a little increased. Lungs appear stable elsewhere. No pneumothorax is seen.     Impression: Impression: Mildly increased left basilar atelectasis. Electronically Signed: Live Swift MD  2/27/2025 7:55 AM EST  Workstation ID: [id number]     Results for orders placed during the hospital encounter of 02/18/25    Adult Transthoracic Echo Complete W/ Cont if Necessary Per Protocol    Interpretation Summary    Left ventricular systolic function is mildly decreased. Calculated left ventricular EF = 44% Left ventricular ejection fraction appears to be 41 - 45%.    Left ventricular wall thickness is consistent with moderate concentric hypertrophy.    The following left ventricular wall segments are akinetic: mid anterior, apical anterior, apical septal and apex. C/w with LAD territory infarct.    Left ventricular diastolic function is consistent with (grade Ia w/high LAP) impaired relaxation.    Mild aortic valve stenosis is present.    Peak velocity of the flow distal to the aortic valve is 277 cm/s. Aortic valve mean pressure gradient is 14 mmHg.    The aortic root measures 4.4 cm. The aortic root (corrected for BSA) measures 1.9 cm. Mild dilation of the ascending aorta is present.    Mild to moderate mitral valve regurgitation.    Depressed ejection fraction and wall motion abnormalities are new compared to 4/2024; mild aortic stenosis is unchanged.      Current medications:  Scheduled Meds:amiodarone, 200 mg, Oral, Q12H   Followed by  [START ON 3/14/2025] amiodarone, 200 mg, Oral, Daily  aspirin, 81 mg, Oral, Daily  senna-docusate sodium, 2 tablet, Oral, BID   And  polyethylene glycol, 17 g, Oral, BID   And  bisacodyl, 5 mg, Oral, Daily  bumetanide, 1 mg, Oral, Daily  carvedilol, 6.25 mg, Oral, BID With Meals  clopidogrel, 75 mg, Oral, Daily  finasteride, 5 mg, Oral,  Daily  heparin (porcine), 5,000 Units, Subcutaneous, Q8H  insulin glargine, 10 Units, Subcutaneous, Daily  insulin lispro, 2-9 Units, Subcutaneous, 4x Daily AC & at Bedtime  Insulin Lispro, 5 Units, Subcutaneous, TID With Meals  ipratropium-albuterol, 3 mL, Nebulization, 4x Daily - RT  levothyroxine, 200 mcg, Oral, Q AM  pharmacy consult - MTM, , Not Applicable, Daily  rosuvastatin, 40 mg, Oral, Nightly  sacubitril-valsartan, 1 tablet, Oral, Q12H  tamsulosin, 0.4 mg, Oral, Daily      Continuous Infusions:   PRN Meds:.  acetaminophen    senna-docusate sodium **AND** polyethylene glycol **AND** bisacodyl **AND** bisacodyl    Calcium Replacement - Follow Nurse / BPA Driven Protocol    dextrose    dextrose    glucagon (human recombinant)    Magnesium Cardiology Dose Replacement - Follow Nurse / BPA Driven Protocol    naloxone    nitroglycerin    ondansetron    Phosphorus Replacement - Follow Nurse / BPA Driven Protocol    Potassium Replacement - Follow Nurse / BPA Driven Protocol    traMADol    Assessment & Plan   Assessment & Plan     Active Hospital Problems    Diagnosis  POA    **STEMI - s/p CABG x 3, modified maze, LAAL 2/20/25 [I21.02]  Unknown    Acute systolic CHF (congestive heart failure) [I50.21]  Unknown    Coronary artery disease involving native coronary artery of native heart with unstable angina pectoris [I25.110]  Unknown    Type 2 diabetes mellitus [E11.9]  Yes    PAF (paroxysmal atrial fibrillation) [I48.0]  Yes    Sleep apnea [G47.30]  Yes    Morbid obesity with BMI of 40.0-44.9, adult [E66.01, Z68.41]  Not Applicable    Essential hypertension [I10]  Yes    Coronary artery disease involving native coronary artery of native heart without angina pectoris [I25.10]  Yes    CVA (cerebral vascular accident) [I63.9]  Yes      Resolved Hospital Problems   No resolved problems to display.        Brief Hospital Course to date:  Camron Hendrix is a 74 y.o. male with history of CAD status post PCI, prior  subarachnoid hemorrhage, A-fib on warfarin, DM2, AAA, HTN, HLD, who presented for evaluation of crushing substernal chest pain.  Found to have an anterior STEMI.  Emergent cardiac catheterization revealed 100% mid LAD stenosis with mixed heavy eccentric calcification and plaque erosion with thrombus present status post balloon angioplasty.  Patient had concomitant 90% proximal circumflex and 99% subtotal mid RCA blockages as well.  Evaluated by CTS.  Status post CABG.  Initially managed in the ICU.  Transitioned out to the floor service on 2/25/2025.    Anterior STEMI  Acute systolic heart failure, LVEF 35 to 40%  Multivessel CAD status post 3 vessel CABG on 2/20/2025  HTN  HLD  Left pleural effusion  -Continue aspirin, statin, beta-blocker  -Telemetry monitoring    A-fib on warfarin  -Status post STEFANO ligation and maze procedure during bypass surgery  -Continue beta-blocker, amiodarone  -No anticoagulation necessary due to left atrial appendage ligation at time of CABG per cardiology    Urinary retention  BPH  -Tamsulosin, finasteride, urinary retention protocol  -Has been requiring straight caths, continue at rehab, discussed with CTS    Constipation  -Bowel regimen; constipation likely contributing to urinary retention    DM2  -SSI, Lantus, glucose checks, hypoglycemia protocol    CKD stage IIIb-creatinine improving with straight cath  Obesity  Prior subarachnoid hemorrhage  AAA  Hypothyroidism-Synthroid    Expected Discharge Location and Transportation: [place]  Expected Discharge   Expected Discharge Date: 2/27/2025; Expected Discharge Time:      VTE Prophylaxis:  Pharmacologic & mechanical VTE prophylaxis orders are present.         AM-PAC 6 Clicks Score (PT): 16 (02/28/25 0400)    CODE STATUS:   Code Status and Medical Interventions: CPR (Attempt to Resuscitate); Full Support   Ordered at: 02/20/25 1713     Code Status (Patient has no pulse and is not breathing):    CPR (Attempt to Resuscitate)      Medical Interventions (Patient has pulse or is breathing):    Full Support       Yana Flaherty MD  02/28/25

## 2025-02-28 NOTE — DISCHARGE PLACEMENT REQUEST
"Avel Gutierrez (74 y.o. Male)       Date of Birth   1950    Social Security Number       Address   [address] [place] [address]    Home Phone   [phone]    MRN   [id number]       V[employer]   Worship    Marital Status                               Admission Date   2/18/25    Admission Type   Elective    Admitting Provider   Steve Borjas MD    Attending Provider   Steve Borjas MD    Department, Room/Bed   [address], S486/1       Discharge Date       Discharge Disposition   Rehab Facility or Unit (DC - External)    Discharge Destination                                 Attending Provider: Steve Borjas MD    Allergies: Codeine    Isolation: None   Infection: None   Code Status: CPR    Ht: 175.3 cm (69\")   Wt: 121 kg (267 lb)    Admission Cmt: None   Principal Problem: STEMI - s/p CABG x 3, modified maze, LAAL 2/20/25 [I21.02]                   Active Insurance as of 2/18/2025       Primary Coverage       Payor Plan Insurance Group Employer/Plan Group    MEDICARE MEDICARE A & B        Payor Plan Address Payor Plan Phone Number Payor Plan Fax Number Effective Dates    [address] [phone]  5/1/2014 - None Entered    [facility] [id number]         Subscriber Name Subscriber Birth Date Member ID       AVEL GUTIERREZ 1950 [id number]               Secondary Coverage       Payor Plan Insurance Group Employer/Plan Group    [employer] [place] [employer] [place]        Payor Plan Address Payor Plan Phone Number Payor Plan Fax Number Effective Dates    [address] [place] [address] [phone]  6/1/2016 - None Entered    [place] [id number]         Subscriber Name Subscriber Birth Date Member ID       AVEL GUTIERREZ 1950 [id number]                     Emergency Contacts        (Rel.) Home Phone Work Phone Mobile Phone    Heena Adan (Daughter) [phone] -- [phone]    VargheseMisti (Daughter) [phone] -- [phone]               "   Discharge Summary        Carmen FordFADUMO at 25 1325              [facility] Cardiothoracic Surgery  DISCHARGE SUMMARY    Patient Name: Camron Hendrix  : 1950  MRN: [id number]    Date of Admission: 2025  2:33 PM  Date of Discharge:  2025  Primary Care Physician: Leslie Rhodes MD  Referred By: Carlo Barragan MD    IP Care Team:  Patient Care Team:  Leslie Rhodes MD as PCP - General (Family Medicine)  ConorDelmer MD as Cardiologist (Cardiology)    Consults       Date and Time Order Name Status Description    2025  1:33 PM Inpatient Consult to Cardiology Completed     2025  1:33 PM Inpatient Intensivist Consult - For Vent Management      2025  4:07 PM Inpatient Cardiothoracic Surgery Consult Completed             Hospital Course     Presenting Problem: CAD/STEMI    Active Hospital Problems    Diagnosis  POA    **STEMI - s/p CABG x 3, modified ALEXANDRA garrett 25 [I21.02]  Unknown    Acute systolic CHF (congestive heart failure) [I50.21]  Unknown    Coronary artery disease involving native coronary artery of native heart with unstable angina pectoris [I25.110]  Unknown    Type 2 diabetes mellitus [E11.9]  Yes    PAF (paroxysmal atrial fibrillation) [I48.0]  Yes    Sleep apnea [G47.30]  Yes    Morbid obesity with BMI of 40.0-44.9, adult [E66.01, Z68.41]  Not Applicable    Essential hypertension [I10]  Yes    Coronary artery disease involving native coronary artery of native heart without angina pectoris [I25.10]  Yes    CVA (cerebral vascular accident) [I63.9]  Yes      Resolved Hospital Problems   No resolved problems to display.        Procedures Performed:  Procedure(s):  MEDIAN STERNOTOMY, CORONARY ARTERY BYPASS GRAFT X 3 (ON PUMP) USING RIGHT GREATER SAPHENOUS VEIN VIA EVH + LEFT INTERNAL MAMMARY ARTERY GRAFT, MAZE, LEFT ATRIAL APPENDAGE LIGATION, INTRAOP JESSY AS PER ANESTHESIA  MAZE PROCEDURE       Hospital Course:  Camron Hendrix is  a 74 y.o. male who was taken to the operating room on 2/20/2025 and underwent CABG x 3, MAZE, LAAL w/ 50 mm Atricure clip w/ Dr. Borjas. He was taken to the ICU in stable condition and was extubated per ICU protocol. He was evaluated by PT/OT was deemed appropriate for rehab.  He was discharged to rehab in stable condition on POD # 8 in stable condition.  His hospital course is below.    CAD/STEMI s/p CABG x 3, MAZE, LAAL  2/22: Mediastinal chest tube and pacing wires were removed  2/23: Pleural chest tubes were removed  2/24: Central line and castillo catheter were removed.  Transfer to telemetry orders were placed  2/26: Transferred to telemetry floor  2/27: Sternal wound vac was removed  ASA, statin, BB, Plavix    Paroxysmal A-fib  S/p MAZE, LAAL  Continue amiodarone, BB  No need for anticoagulation due to LAAL time of surgery    Acute systolic heart failure  Preop EF 41-45%    Urinary retention/BPH  Continue Flomax, finasteride  Has been requiring straight caths, continue at rehab    CKD stage IIIb  Creatinine improving       Discharge Follow Up Recommendations for outpatient labs/diagnostics:  Follow-up with heart valve in 1 week  Follow-up with PCP in 1 to 2 weeks  Follow-up with CT surgery in 2 to 4 weeks  Follow-up with Cardiology in 4 weeks        DO NOT REMOVE SUTURES AND/OR STAPLES THIS WILL BE ADDRESSED AT CT SURGERY FOLLOW-UP  If you have any questions or concerns please reach out to our office at [phone]    Day of Discharge     HPI:   Patient is a 74 y.o. male with a history of abdominal aortic aneurysm, CVA, hypertension, hypothyroidism, paroxysmal atrial fibrillation and a known history of coronary artery disease having had previous coronary stents to his LAD in the past.  Transferred here from outside facility with a diagnosis of STEMI.  Upon transfer he was  evaluated by cardiology and taken promptly to the cardiac catheterization lab.  He was treated for an anterior STEMI and received balloon  angioplasty to the mid LAD.  Stent was not deployed is secondary to heavy calcification.  He was found to have significant coronary artery disease.  Patient was placed on aspirin therapy and IV nitroglycerin.  He was treated for an acute systolic heart failure.  Echocardiogram showed his ejection fraction of 35 to 40%.  He received some gentle diuresis.  CT surgical evaluation recommended for possible CABG.        Vital Signs:   Temp:  [97.2 °F (36.2 °C)-98 °F (36.7 °C)] 98 °F (36.7 °C)  Heart Rate:  [59-77] 62  Resp:  [16-18] 16  BP: ()/(58-96) 95/58      Physical Exam:  GEN: NAD   CV: Regular rate and rhythm     RESP: Decreased in bases    EXT: Warm with 1+ pitting edema lower extremities    Incision: Sternum is stable    Pertinent  and/or Most Recent Results     LAB RESULTS:          Lab 02/28/25  1207 02/27/25  0448 02/25/25  0308 02/24/25  0432 02/23/25  0522   WBC 8.37 6.98 5.77 6.03 6.59   HEMOGLOBIN 12.1* 11.5* 10.9* 10.0* 10.3*   HEMATOCRIT 35.9* 34.5* 32.0* 30.0* 31.0*   PLATELETS 334 261 202 148 136*   NEUTROS ABS  --   --   --  4.34  --    IMMATURE GRANS (ABS)  --   --   --  0.02  --    LYMPHS ABS  --   --   --  0.74  --    MONOS ABS  --   --   --  0.75  --    EOS ABS  --   --   --  0.16  --    MCV 83.1 85.6 84.4 85.7 85.9         Lab 02/28/25  1207 02/27/25  0448 02/26/25 2017 02/26/25  0906 02/25/25  2348 02/25/25  1544 02/25/25  0308 02/24/25  1904 02/24/25  0405   SODIUM 134* 136  --  135*  --   --  138  --  134*   POTASSIUM 3.3* 3.6 4.0 3.9 3.3*   < > 3.7 3.1* 4.0   CHLORIDE 93* 92*  --  92*  --   --  92*  --  95*   CO2 26.0 28.0  --  34.0*  --   --  34.0*  --  27.0   ANION GAP 15.0 16.0*  --  9.0  --   --  12.0  --  12.0   BUN 38* 46*  --  54*  --   --  55*  --  49*   CREATININE 1.38* 1.44*  --  1.71*  --   --  1.76*  --  1.75*   EGFR 54.0* 51.3*  --  41.7*  --   --  40.3*  --  40.6*   GLUCOSE 154* 135*  --  203*  --   --  139*  --  138*   CALCIUM 8.7 9.0  --  9.1  --   --  8.9  --  8.7    MAGNESIUM  --   --   --   --   --   --   --  2.3  --    PHOSPHORUS  --   --   --   --   --   --   --  2.3*  --     < > = values in this interval not displayed.                     Lab 02/24/25  0405   ABO TYPING B   RH TYPING Positive   ANTIBODY SCREEN Negative         Lab 02/21/25  2230   PH, ARTERIAL 7.344*   PCO2, ARTERIAL 43.3   PO2 ART 56.0*   FIO2 100   HCO3 ART 23.6   BASE EXCESS ART -2.2*   CARBOXYHEMOGLOBIN 1.3     Brief Urine Lab Results  (Last result in the past 365 days)        Color   Clarity   Blood   Leuk Est   Nitrite   Protein   CREAT   Urine HCG        05/03/24 1742 Dark Yellow   Clear   Negative   Negative   Negative   30 mg/dL (1+)                 Microbiology Results (last 10 days)       Procedure Component Value - Date/Time    COVID PRE-OP / PRE-PROCEDURE SCREENING ORDER (NO ISOLATION) - Swab, Nasopharynx [[id number]]  (Normal) Collected: 02/18/25 1724    Lab Status: Final result Specimen: Swab from Nasopharynx Updated: 02/18/25 1823    Narrative:      The following orders were created for panel order COVID PRE-OP / PRE-PROCEDURE SCREENING ORDER (NO ISOLATION) - Swab, Nasopharynx.  Procedure                               Abnormality         Status                     ---------                               -----------         ------                     COVID-19, FLU A/B, RSV P...[[id number]]  Normal              Final result                 Please view results for these tests on the individual orders.    COVID-19, FLU A/B, RSV PCR 1 HR TAT - Swab, Nasopharynx [[id number]]  (Normal) Collected: 02/18/25 1724    Lab Status: Final result Specimen: Swab from Nasopharynx Updated: 02/18/25 1823     COVID19 Not Detected     Influenza A PCR Not Detected     Influenza B PCR Not Detected     RSV, PCR Not Detected    Narrative:      Fact sheet for providers: https://www.fda.gov/media/068932/download    Fact sheet for patients: https://www.fda.gov/media/700133/download    Test performed by PCR.            XR  Chest 1 View    Result Date: 2/28/2025  XR CHEST 1 VW Date of Exam: 2/28/2025 3:44 AM EST Indication: postop Comparison: 2/27/2025 Findings: Cardiomediastinal silhouette remains enlarged similar to the previous exam. There is left basilar airspace disease or atelectasis which is similar to prior. Mild patchy right basilar atelectasis has developed. Minimal left pleural effusion is again suspected. No acute osseous abnormality identified. Median sternotomy wires are present.     Impression: Cardiomegaly with stable left basilar and new mild right basilar opacity suggestive of atelectasis. Trace left pleural effusion is suspected. Electronically Signed: Vivian Carson MD  2/28/2025 7:07 AM EST  Workstation ID: [id number]    XR Chest 1 View    Result Date: 2/27/2025  XR CHEST 1 VW Date of Exam: 2/27/2025 2:13 AM EST Indication: postop Comparison: 2/26/2025 Findings: The heart is enlarged. The vasculature appears upper limits of normal. There is patchy left basilar atelectasis which appears a little increased. Lungs appear stable elsewhere. No pneumothorax is seen.     Impression: Mildly increased left basilar atelectasis. Electronically Signed: Live Swift MD  2/27/2025 7:55 AM EST  Workstation ID: [id number]    XR Chest 1 View    Result Date: 2/26/2025  XR CHEST 1 VW Date of Exam: 2/26/2025 9:51 AM EST Indication: postop Comparison: 2/25/2025 Findings: Sternotomy wires and left atrial appendage exclusion clip are noted. Heart is enlarged. Vasculature is cephalized and there is a mild interstitial edema pattern present. There appear to be minimal pleural effusions. Left basilar atelectasis is resolving and no new focal pulmonary disease is seen elsewhere. There is no evidence of pneumothorax.     Impression: 1. Cardiomegaly and appearance of mild volume overload with mild interstitial edema. 2. Minimal pleural effusions. 3. Mild, decreasing left basilar atelectasis. Electronically Signed: Live Swift MD  2/26/2025 10:38 AM  EST  Workstation ID: [id number]    XR Chest 1 View    Result Date: 2/25/2025  XR CHEST 1 VW Date of Exam: 2/25/2025 3:39 AM EST Indication: pneumothorax Comparison: 2/24/2025 Findings: Status post sternotomy. Stable mild cardiomegaly. Left atrial manage clip noted. There is persistent mild bibasilar airspace disease left greater than right which may relate to atelectasis. Small left pleural effusion unchanged. No effusion on the right.  No right-sided pneumothorax. Small left apical pneumothorax measures 5 mm, previously 10 mm.     Impression: 1. Small left apical pneumothorax measures 5 mm, previously 10 mm. 2. Persistent bibasilar airspace disease left greater than right which may relate to atelectasis with small left pleural effusion. Electronically Signed: Lazrao Hooker MD  2/25/2025 7:48 AM EST  Workstation ID: [id number]    XR Chest 1 View    Result Date: 2/24/2025  XR CHEST 1 VW Date of Exam: 2/24/2025 3:29 AM EST Indication: Tubes removed Comparison: 2/23/2025 Findings: Right IJ central venous catheter tip overlies the upper SVC. Stable cardiomegaly. Status post sternotomy and CABG. Left atrial appendage occlusion device noted. Persistent bibasilar airspace disease left atelectasis. Suspect small left pleural effusion, stable. No significant effusion on the right. Negative for pneumothorax on the right. Small left apical pneumothorax measures 1 cm. Stable central pulmonary vascular congestion.     Impression: 1. Small left apical pneumothorax measures 1 cm. 2. Stable right IJ central venous catheter. 3. Persistent bibasilar airspace disease likely atelectasis with small left pleural effusion, stable. Electronically Signed: Lazaro Hooker MD  2/24/2025 7:13 AM EST  Workstation ID: [id number]    XR Chest 1 View    Result Date: 2/23/2025  XR CHEST 1 VW Date of Exam: 2/23/2025 6:30 AM EST Indication: Chest tube dislodged Comparison: 2/22/2025. Findings: Stable right-sided central venous catheter. Stable findings of prior  median sternotomy. Stable mild to moderate pulmonary edema and small left pleural effusion. Stable cardiomegaly. No new lung opacity. Indistinct pulmonary vasculature. No acute osseous abnormality.     Impression: 1.Stable right-sided central venous catheter. 2.Stable mild to moderate pulmonary edema and small left pleural effusion. 3.Stable cardiomegaly. Electronically Signed: Pratik Hernandez MD  2/23/2025 7:01 AM EST  Workstation ID: [id number]    XR Chest 1 View    Result Date: 2/22/2025  XR CHEST 1 VW Date of Exam: 2/22/2025 3:11 AM EST Indication: Post-Op Heart Surgery Comparison: CXR 2/21/2025 Findings: The heart is unchanged in size. Right jugular central venous catheter is in unchanged position. The tip is in the upper SVC. Left chest tube is in unchanged position. No pneumothorax is seen. Subsegmental atelectasis the right lung base is unchanged. Infiltrate or atelectasis at the left lung base is unchanged. Hazy density of the left lung may represent small effusion, and is unchanged. Changes of median sternotomy are again appreciated.     Impression: No significant change in comparison to 2/21/2025. Right jugular central venous catheter and left chest tube remain in place. Electronically Signed: Oj Cuellar MD  2/22/2025 8:06 AM EST  Workstation ID: [id number]    XR Chest 1 View    Result Date: 2/21/2025  XR CHEST 1 VW Date of Exam: 2/21/2025 10:39 PM EST Indication: diminished left lung sounds Comparison: Chest radiograph 2/21/2025 Findings: Gastric tube removed. Right IJ central catheter tip terminates over upper SVC. Prior median sternotomy, CABG and left atrial appendage closure device. Left thoracotomy tube in stable position without visualized pneumothorax. Overall improved inspiratory effort/lung aeration. Persistent and similar-appearing small effusion with left greater than right bibasilar airspace opacities suggesting atelectasis versus airspace disease in the right clinical setting. Similar  prominence of the central vasculature suggesting vascular congestion without overt edema. No significant right effusion or pneumothorax. Aortic atherosclerotic disease. Degenerative related osseous change.     Impression: Improved inspiratory effort and removed gastric tube, otherwise radiographically stable chest. Electronically Signed: Delmer Benítez MD  2/21/2025 10:58 PM EST  Workstation ID: [id number]    XR Chest 1 View    Result Date: 2/21/2025  XR CHEST 1 VW Date of Exam: 2/21/2025 3:17 AM EST Indication: Post-Op Heart Surgery Comparison: Chest x-ray 2/20/2025 Findings: Interval extubation. Otherwise stable medical support devices. Stable cardiomediastinal silhouette with cardiomegaly and perihilar vascular congestion. There are increased bibasilar opacities likely reflecting atelectasis. Small left pleural effusion cannot be excluded. No evidence of pneumothorax.     Impression: Interval extubation with decrease in lung volumes and increase in bibasilar opacities presumably atelectasis. Electronically Signed: Peter Alcantara MD  2/21/2025 7:36 AM EST  Workstation ID: [id number]    XR Chest 1 View    Result Date: 2/20/2025  XR CHEST 1 VW Date of Exam: 2/20/2025 1:41 PM EST Indication: Post-Op Check Line & Tube Placement Comparison: 2/19/2025 Findings: The patient is undergone CABG and left atrial ligation. Endotracheal tube is in good position above the [place]. Nasoenteric tube passes into the stomach. Cardiac size is stable. There is mild left basilar atelectasis. The lungs are otherwise clear. There  is no pneumothorax seen.     Impression: 1.Status post CABG and left atrial ligation. 2.Mild left basilar atelectasis. 3.Tubes and lines in good position Electronically Signed: Kirill Velasco MD  2/20/2025 2:00 PM EST  Workstation ID: [id number]    Central Line    Result Date: 2/20/2025  Isra Colon MD     2/20/2025  9:21 AM Central Line Patient reassessed immediately prior to procedure Patient location during  procedure: OR Indications: vascular access Preanesthetic Checklist Completed: patient identified, IV checked, site marked, risks and benefits discussed, surgical consent, monitors and equipment checked, pre-op evaluation and timeout performed Central Line Prep Sterile Tech:cap, gloves, gown, mask and sterile barriers Prep: chloraprep Patient monitoring: blood pressure monitoring, continuous pulse oximetry and EKG Central Line Procedure Laterality:right Location:internal jugular Catheter Type:Cordis Catheter Size:9 Fr Guidance:ultrasound guided PROCEDURE NOTE/ULTRASOUND INTERPRETATION.  Using ultrasound guidance the potential vascular sites for insertion of the catheter were visualized to determine the patency of the vessel to be used for vascular access.  After selecting the appropriate site for insertion, the needle was visualized under ultrasound being inserted into the internal jugular vein, followed by ultrasound confirmation of wire and catheter placement. There were no abnormalities seen on ultrasound; an image was taken; and the patient tolerated the procedure with no complications. Images: still images obtained, printed/placed on chart Assessment Post procedure:biopatch applied, line sutured, occlusive dressing applied and secured with tape Assessement:blood return through all ports, free fluid flow, chest x-ray ordered and Salazar Test Complications:no Patient Tolerance:patient tolerated the procedure well with no apparent complications     Arterial Line    Result Date: 2/20/2025  Isra Colon MD     2/20/2025 10:33 AM Arterial Line Patient reassessed immediately prior to procedure Patient location during procedure: pre-op Line placed for hemodynamic monitoring. Preanesthetic Checklist Completed: patient identified, IV checked, site marked, risks and benefits discussed, surgical consent, monitors and equipment checked, pre-op evaluation and timeout performed Arterial Line Prep  Sterile Tech: cap, gloves  and sterile barriers Prep: ChloraPrep Patient monitoring: blood pressure monitoring, continuous pulse oximetry and EKG Arterial Line Procedure Laterality:right Location:  brachial artery Catheter size: 20 G Guidance: ultrasound guided Number of attempts: 1 Successful placement: yes Post Assessment Dressing Type: line sutured, occlusive dressing applied, secured with tape and wrist guard applied. Complications no Circ/Move/Sens Assessment: normal and unchanged. Patient Tolerance: patient tolerated the procedure well with no apparent complications Additional Notes Large hematoma from cardiac cath R radial therefore r brachial     XR Chest 1 View    Result Date: 2025  XR CHEST 1 VW Date of Exam: 2025 6:11 PM EST Indication: SOA Comparison: Chest radiograph 2025 Findings: Borderline/mild cardiomegaly. Stable mild likely chronic prominence of the central vasculature without overt edema, focal consolidation, large effusion or pneumothorax. Stable platelike atelectasis versus scar in the lingula. Aortic atherosclerotic disease. Degenerative related osseous change.     Impression: Radiographically stable chest without convincing acute abnormality. Electronically Signed: Delmer Benítez MD  2025 6:31 PM EST  Workstation ID: [id number]    Spirometry    Result Date: 2025  PFT interpretation PATIENT NAME:  Camron Hendrix MRN:  [id number] :  1950; Age:  74 y.o. Impression: 1. Available data suggest non specific defect.  FEV1 1.34 L, 45 % predicted. 2. Pattern concerning for restrictive lung disease.  Consider lung volume study to confirm. Shan Jackson MD, [facility]    Duplex Vein Mapping Lower Extremity - Bilateral CAR    Result Date: 2025    The right great saphenous vein is patent  and of adequate size in the thigh.   The right great saphenous vein is patent and of adequate size in the calf.   The right small saphenous vein is patent and of adequate  size.   The left great saphenous vein is patent and of adequate size in the thigh.   The left great saphenous vein is patent  and of adequate size in the calf.   The left small saphenous vein is patent and of adequate size.     Duplex Upper Extremity Art / Grafts - Bilateral CAR    Result Date: 2/19/2025    Normal bilateral upper extremity arterial duplex, revealing no significant stenosis and multiphasic waveforms throughout.     Carotid Duplex - Bilateral    Result Date: 2/19/2025    Right internal carotid artery demonstrates a less than 50% stenosis. Homogeneous plaque noted.   Antegrade right vertebral flow.   Left internal carotid artery demonstrates a less than 50% stenosis. Heterogeneous plaque noted.   Antegrade left vertebral flow. No significant change in velocities compared to 2017 carotid duplex.     XR Chest 1 View    Result Date: 2/19/2025  XR CHEST 1 VW Date of Exam: 2/19/2025 11:07 AM EST Indication: chf Comparison: 2/18/2025 Findings: Heart size is borderline enlarged. The pulmonary vascular pattern in the chest is normal. There is minimal atelectasis in the lung bases. The lungs are otherwise clear.     Impression: Borderline cardiomegaly without evidence of CHF. Mild bibasilar atelectasis. Electronically Signed: Kirill Velasco MD  2/19/2025 12:53 PM EST  Workstation ID: [id number]    CT Chest Without Contrast Diagnostic    Result Date: 2/18/2025  CT CHEST WO CONTRAST DIAGNOSTIC Date of Exam: 2/18/2025 9:35 PM EST Indication: Preop evaluation for coronary bypass grafting. Comparison:  chest radiograph 2/18/2025, chest CT 5/3/2024 Technique: Axial CT images were obtained of the chest without contrast administration.  Reconstructed coronal and sagittal images were also obtained. Automated exposure control and iterative construction methods were used. Findings: Thyroid unremarkable. No supraclavicular adenopathy. Diffuse aortic atherosclerotic disease with fusiform aneurysmal dilation of the ascending  thoracic aorta measuring up to 4.5 x 4.1 cm similar to prior. Severe coronary atherosclerotic disease. Aortic valve leaflet calcifications. Mild cardiomegaly. Trace intermediate to high density pericardial fluid. Small sliding hiatal hernia. No suspicious mediastinal or hilar adenopathy. There is fluid noted in the esophagus to the level of the mid trachea. Trachea patent. Minimal smooth interlobar septal thickening. Mild emphysematous change. Linear bandlike areas of atelectasis versus scar. Dependent atelectasis without infectious consolidation, effusion or pneumothorax. No suspicious pulmonary nodule. Heterogeneous striated nephrogram with areas of intrinsic high density. Tiny low-density hepatic segment 2 lesion too small to accurately characterize favoring benign cyst. Intrinsic high density throughout the liver which could reflect hepatic metal deposition or medication related change (commonly seen with amiodarone). No other acute findings partially imaged upper abdomen. No acute chest wall findings. Degenerative elated changes throughout the spine without acute displaced fracture or aggressive  lesion.     Impression: 1. Cardiomegaly with minimal interstitial pulmonary edema. 2. Mild emphysema. Emphysema on CT is an independent risk factor for lung cancer. Low dose lung cancer screening should be considered if patient qualifies based on smoking history or if not already enrolled in a screening program. 3. Aortic valve leaflet calcifications with fusiform aneurysmal dilation of the ascending thoracic aorta measuring up to 4.5 cm. Severe coronary disease. 4. Trace pericardial fluid containing intrinsic high density, hemopericardium is possible. 5. Bilateral striated nephrogram suspicious for possible acute tubular necrosis or pyelonephritis. Correlate with urinalysis. 6. Small sliding hiatal hernia with fluid noted to the level of the upper third thoracic esophagus. Findings suggest gastroesophageal reflux.  Electronically Signed: Delmer Benítez MD  2/18/2025 10:00 PM EST  Workstation ID: [id number]    XR Chest 1 View    Result Date: 2/18/2025  XR CHEST 1 VW Date of Exam: 2/18/2025 4:37 PM EST Indication: preop preop Comparison: 5/3/2024 Findings: Cardiomediastinal silhouette is prominent, similar prior examination. Similar pulmonary vascular congestion and interstitial coarsening. No airspace disease, pneumothorax, nor pleural effusion. No acute osseous abnormality identified.     Impression: Similar mild CHF features. No superimposed airspace disease. Electronically Signed: Noman Mchugh MD  2/18/2025 5:28 PM EST  Workstation ID: [id number]    Cardiac Catheterization/Vascular Study    Result Date: 2/18/2025    Mid % occlusion (mixed thrombotic/calcific/fibrotic stenosis), status post balloon angioplasty with a 2.5 NC balloon at nominal pressure.   Mid circumflex 90%, mid RCA 99% subtotal occlusion with left-to-right collaterals   Discussed with CT surgery, given patient' coronary anatomy, LV systolic dysfunction and diabetic status, CABG is ideal.   Cangrelor started during the intervention and will be continued until time of surgery   IV Lasix for diuresis along with IV nitroglycerin   LVEF 35 to 40% with severe distal anterior, apical and distal inferior hypokinesis   Killip class III acute systolic heart failure present on arrival.  With LVEDP 37 mmHg   LV pressures (S/D/E) : 127/26/37 mmHg   Moderate systolic dysfunction.   There is moderate left ventricular systolic dysfunction. Plan discussed with Dr. Hernandez as well as the patient and his daughter at bedside     Optical Coherence Tomography    Result Date: 2/18/2025    Mid % occlusion (mixed thrombotic/calcific/fibrotic stenosis), status post balloon angioplasty with a 2.5 NC balloon at nominal pressure.   Mid circumflex 90%, mid RCA 99% subtotal occlusion with left-to-right collaterals   Discussed with CT surgery, given patient' coronary anatomy,  LV systolic dysfunction and diabetic status, CABG is ideal.   Cangrelor started during the intervention and will be continued until time of surgery   IV Lasix for diuresis along with IV nitroglycerin   LVEF 35 to 40% with severe distal anterior, apical and distal inferior hypokinesis   Killip class III acute systolic heart failure present on arrival.  With LVEDP 37 mmHg   LV pressures (S/D/E) : 127/26/37 mmHg   Moderate systolic dysfunction.   There is moderate left ventricular systolic dysfunction. Plan discussed with Dr. Hernandez as well as the patient and his daughter at bedside      Results for orders placed during the hospital encounter of 02/18/25    Duplex Upper Extremity Art / Grafts - Bilateral CAR    Interpretation Summary    Normal bilateral upper extremity arterial duplex, revealing no significant stenosis and multiphasic waveforms throughout.      Results for orders placed during the hospital encounter of 02/18/25    Duplex Upper Extremity Art / Grafts - Bilateral CAR    Interpretation Summary    Normal bilateral upper extremity arterial duplex, revealing no significant stenosis and multiphasic waveforms throughout.      Results for orders placed during the hospital encounter of 02/18/25    Adult Transthoracic Echo Complete W/ Cont if Necessary Per Protocol    Interpretation Summary    Left ventricular systolic function is mildly decreased. Calculated left ventricular EF = 44% Left ventricular ejection fraction appears to be 41 - 45%.    Left ventricular wall thickness is consistent with moderate concentric hypertrophy.    The following left ventricular wall segments are akinetic: mid anterior, apical anterior, apical septal and apex. C/w with LAD territory infarct.    Left ventricular diastolic function is consistent with (grade Ia w/high LAP) impaired relaxation.    Mild aortic valve stenosis is present.    Peak velocity of the flow distal to the aortic valve is 277 cm/s. Aortic valve mean pressure  gradient is 14 mmHg.    The aortic root measures 4.4 cm. The aortic root (corrected for BSA) measures 1.9 cm. Mild dilation of the ascending aorta is present.    Mild to moderate mitral valve regurgitation.    Depressed ejection fraction and wall motion abnormalities are new compared to 4/2024; mild aortic stenosis is unchanged.        Discharge Details        Discharge Medications        New Medications        Instructions Start Date   acetaminophen 500 MG tablet  Commonly known as: TYLENOL   1,000 mg, Oral, Every 8 Hours PRN      amiodarone 200 MG tablet  Commonly known as: PACERONE   Take 1 tablet by mouth twice daily for 7 days, THEN take 1 tablet by mouth daily      aspirin 81 MG chewable tablet   81 mg, Oral, Daily   Start Date: March 1, 2025     bisacodyl 10 MG suppository  Commonly known as: DULCOLAX   10 mg, Rectal, Daily PRN      clopidogrel 75 MG tablet  Commonly known as: PLAVIX   75 mg, Oral, Daily   Start Date: March 1, 2025     dextrose 40 % gel  Commonly known as: GLUTOSE   15 g, Oral, Every 15 Minutes PRN      finasteride 5 MG tablet  Commonly known as: PROSCAR   5 mg, Oral, Daily   Start Date: March 1, 2025     insulin glargine 100 UNIT/ML injection  Commonly known as: LANTUS, SEMGLEE   10 Units, Subcutaneous, Daily   Start Date: March 1, 2025     Insulin Lispro 100 UNIT/ML injection  Commonly known as: humaLOG   2-9 Units, Subcutaneous, 4 Times Daily Before Meals & Nightly      Insulin Lispro 100 UNIT/ML injection  Commonly known as: humaLOG   5 Units, Subcutaneous, 3 Times Daily With Meals      nitroglycerin 0.4 MG SL tablet  Commonly known as: NITROSTAT   0.4 mg, Sublingual, Every 5 Minutes PRN, Take no more than 3 doses in 15 minutes.      polyethylene glycol 17 g packet  Commonly known as: MIRALAX   17 g, Oral, 2 Times Daily      potassium chloride 20 MEQ CR tablet  Commonly known as: KLOR-CON M20   20 mEq, Oral, Daily, Take with Bumex      rosuvastatin 40 MG tablet  Commonly known as:  CRESTOR   40 mg, Oral, Nightly      sacubitril-valsartan 24-26 MG tablet  Commonly known as: ENTRESTO   1 tablet, Oral, Every 12 Hours Scheduled      sennosides-docusate 8.6-50 MG per tablet  Commonly known as: PERICOLACE   2 tablets, Oral, 2 Times Daily      traMADol 50 MG tablet  Commonly known as: ULTRAM   100 mg, Oral, Every 6 Hours PRN             Changes to Medications        Instructions Start Date   carvedilol 6.25 MG tablet  Commonly known as: COREG  What changed:   medication strength  how much to take  how to take this  when to take this   6.25 mg, Oral, 2 Times Daily With Meals             Continue These Medications        Instructions Start Date   bumetanide 1 MG tablet  Commonly known as: BUMEX   1 mg, Oral, Daily   Start Date: March 1, 2025     levothyroxine 200 MCG tablet  Commonly known as: SYNTHROID, LEVOTHROID   200 mcg, Per G Tube, Every Early Morning      tamsulosin 0.4 MG capsule 24 hr capsule  Commonly known as: FLOMAX   1 capsule, Oral, Daily             Stop These Medications      amLODIPine 10 MG tablet  Commonly known as: NORVASC     atorvastatin 40 MG tablet  Commonly known as: LIPITOR     hydrALAZINE 100 MG tablet  Commonly known as: APRESOLINE     losartan 100 MG tablet  Commonly known as: COZAAR     mirtazapine 7.5 MG tablet  Commonly known as: REMERON              Allergies   Allergen Reactions    Codeine Hallucinations         Discharge Disposition:  Rehab Facility or Unit (DC - External)    Diet:  Hospital:  Diet Order   Procedures    Diet: Cardiac, Diabetic; Healthy Heart (2-3 Na+); Consistent Carbohydrate; Fluid Consistency: Thin (IDDSI 0)       Activity:  Activity Instructions       Activity as Tolerated      Bathing Restrictions      You may shower    Type of Restriction: Bathing    Bathing Restrictions: No Tub Bath    Driving Restrictions      Type of Restriction: Driving    Driving Restrictions: No Driving While Taking Narcotics    Lifting Restrictions      Type of  "Restriction: Lifting    Lifting Restrictions: Lifting Restriction (Indicate Limit)    Weight Limit (Pounds): 10    Length of Lifting Restriction: until seen at next follow-up appointment            Restrictions or Other Recommendations:  No driving until seen by your cardiac surgeon at your follow up appointment. Do not drive while taking narcotics. Get up and get dressed each morning; do not stay in bed. Walking is the best form of exercise because it increases circulation throughout the body and to the heart muscle. Get plenty of sleep at night (8-10 hours). It is important for your breast bone (sternum) to heal properly after surgery.   Please follow these guidelines:  -No lifting, pulling, or pushing greater than 10 lbs for 12 weeks.   -Do not push or pull with your arms, especially when rising from a chair   -Have someone help you get up or roll to your side, and push off with your elbow to get out of bed.  -Avoid activities that cause you to strain or pull on your chest, such as driving a  or tractor, raking, vacuuming, moving heavy items, shoveling, opening tight jars or swinging a golf club.  -If you have \"clicking\" in your sternum, avoid activities that cause clicking until the sternum heals.  -Canes or walkers may be used only for balance. Do not place your full weight on any of these devices until the chest is completely healed (usually 12 weeks).       CODE STATUS:    Code Status and Medical Interventions: CPR (Attempt to Resuscitate); Full Support   Ordered at: 02/20/25 1333     Code Status (Patient has no pulse and is not breathing):    CPR (Attempt to Resuscitate)     Medical Interventions (Patient has pulse or is breathing):    Full Support       No future appointments.    Additional Instructions for the Follow-ups that You Need to Schedule       Ambulatory Referral to Cardiac Rehab   As directed      Call MD With Problems / Concerns   As directed      Instructions:   Please call the CT " Surgery office with any questions or concerns you may have [phone]    Order Comments: Instructions: Please call the CT Surgery office with any questions or concerns you may have [phone]         Discharge Follow-up with PCP   As directed       Currently Documented PCP:    Leslie Rhodes MD    PCP Phone Number:    [phone]     Follow Up Details: Follow Up With PCP Within 7-14 Days        Discharge Follow-up with Specialty: Cardiology; 1 Month   As directed      Specialty: Cardiology   Follow Up: 1 Month        Discharge Follow-up with Specialty: Cardiothoracic Surgery   As directed      Follow Up Details: Follow Up in 2-4 Weeks   Specialty: Cardiothoracic Surgery        Discharge Follow-up with Specialty: [facility]; 1 Week   As directed      Specialty: [facility]   Follow Up: 1 Week   Follow Up Details: Follow Up With [facility] Within 7 Days of Discharge. If Discharged on a Weekend, Schedule Follow Up For The Following Friday at 0900.        Cardiac Rehab Evaluation and Enrollment   Mar 05, 2025      Reason for Consult: Education                Discharge Education:  Post-Op Education:  Patient was advised on responsible use of opioids in the post-surgical setting.  Patient was advised these medications are highly addictive.  Patient advised on proper disposal of unused opioid medication and was urged to discard any unused opioid medication. I reviewed the patient's sternal precautions and outlined the physical activity suitable for each benchmark at the 6-week 3-month and 1 year intervals.  Wound Care:  Patient educated regarding care of post-operative wounds.  Patient is to wash with plain soap and water and pat dry.  Patient is not to use salves on the incision sites.  Patient instructed regarding the signs and symptoms of infection and when to call the office with any concerns.    Special Instructions:   1.  Keep incisions clean and dry at all times. Take a shower  daily. Do not take a tub bath or use hot tubs until seen by the cardiac surgeon in your follow-up visit. Clean  your body and incisions daily with Dial soap and water. Always use a clean washcloth. Do not scrub your incision(s). Do not re-use dressings on your incision(s). Do not use any lotions, creams, oils, powders, antibiotic ointment (i.e., Neosporin), peroxide, alcohol, or iodine UNLESS told to do by the cardiac surgeon.  Patient may shower, but no tub baths until CT Surgery followup.   2.  No lifting over 10 lbs until CT Surgery follow up.  3.  No driving until released to do so by the surgeon.      Surgical Leg Precautions:   -Avoid sitting in one position or standing for long periods of time.  -Do not cross your legs.  -Keep your legs elevated while sitting  -Do not use any lotions, creams, oils, powders, antibiotic ointment (i.e. Neosporin), peroxide, alcohol, or iodine unless specifically prescribed by the cardiac surgeon.  -If elastic stockings (TAMIE hose) were prescribed to you, wear the stockings while you are up for at least two weeks after discharge. The stockings help decrease swelling. However, remove stockings at bedtime. Wash the stockings with mild soap and water, and make sure they are completely dry before you put them back on.    When to Call the Cardiac Surgeon:  -Increased swelling, redness, tenderness, and drainage  -Increased warmth in the skin around an incision  -Large amount of clear or pinkish drainage  -Sudden increased amount of drainage  -White, yellow, or greenish drainage  -Odor, which may be foul or sweet, coming from an incision  -An opening in your incisions  -Temperature higher than 101 degrees Fahrenheit   -Temperature higher than 100 degrees Fahrenheit two times within 24 hours  -Do not hesitate to call the cardiac surgery nurse navigator or cardiac surgeon if you have concerns or questions.     *[facility] cardiac surgery nurse navigator is available  Monday-Friday 8 a.m. to 4:30 p.m. [phone]  Call 911:  -Chest pain (pain similar to what you experienced prior to surgery)  -Fainting spells  -Bright red stool  -Vomiting bright red blood  -Shortness of breath not relieved by rest  -Sudden numbness or weakness in arms or legs  -Sudden, severe headache  -Heart rate faster than 150 beats/minute with shortness of breath or a new irregular heart rate    FADUMO Foley  02/28/25  14:12 EST    Time: I spent 45 minutes on this discharge activity which included: face-to-face encounter with the patient, reviewing the data in the system, coordination of the care with the nursing staff as well as consultants, documentation, and entering orders.      Electronically signed by Carmen Ford APRN at 02/28/25 4221

## 2025-02-28 NOTE — PLAN OF CARE
Problem: Adult Inpatient Plan of Care  Goal: Plan of Care Review  Outcome: Progressing  Goal: Patient-Specific Goal (Individualized)  Outcome: Progressing  Goal: Absence of Hospital-Acquired Illness or Injury  Outcome: Progressing  Intervention: Identify and Manage Fall Risk  Recent Flowsheet Documentation  Taken 2/28/2025 0400 by Veronica Mace RN  Safety Promotion/Fall Prevention:   fall prevention program maintained   clutter free environment maintained  Intervention: Prevent Skin Injury  Recent Flowsheet Documentation  Taken 2/28/2025 0400 by Veronica Mace RN  Body Position: position changed independently  Skin Protection: incontinence pads utilized  Goal: Optimal Comfort and Wellbeing  Outcome: Progressing  Intervention: Monitor Pain and Promote Comfort  Recent Flowsheet Documentation  Taken 2/28/2025 0400 by Veronica Mace RN  Pain Management Interventions: pain management plan reviewed with patient/caregiver  Taken 2/27/2025 2000 by Veronica Mace RN  Pain Management Interventions: pain management plan reviewed with patient/caregiver  Intervention: Provide Person-Centered Care  Recent Flowsheet Documentation  Taken 2/28/2025 0400 by Veronica Mace RN  Trust Relationship/Rapport: care explained  Taken 2/27/2025 2000 by Veronica Mace RN  Trust Relationship/Rapport: care explained  Goal: Readiness for Transition of Care  Outcome: Progressing     Problem: Cardiac Catheterization (Diagnostic/Interventional)  Goal: Absence of Bleeding  Outcome: Progressing  Goal: Absence of Contrast-Induced Injury  Outcome: Progressing  Goal: Stable Heart Rate and Rhythm  Outcome: Progressing  Goal: Absence of Embolism Signs and Symptoms  Outcome: Progressing  Goal: Anesthesia/Sedation Recovery  Outcome: Progressing  Intervention: Optimize Anesthesia Recovery  Recent Flowsheet Documentation  Taken 2/28/2025 0400 by Veronica Mace RN  Patient Tolerance (IS): fair  Safety Promotion/Fall  Prevention:   fall prevention program maintained   clutter free environment maintained  Taken 2/28/2025 0000 by Veronica Mace RN  Patient Tolerance (IS): fair  Taken 2/27/2025 2000 by Veronica Mace RN  Patient Tolerance (IS): fair  Goal: Optimal Pain Control and Function  Outcome: Progressing  Intervention: Prevent or Manage Pain  Recent Flowsheet Documentation  Taken 2/28/2025 0400 by Veronica Mace RN  Pain Management Interventions: pain management plan reviewed with patient/caregiver  Taken 2/27/2025 2000 by Veronica Mace RN  Pain Management Interventions: pain management plan reviewed with patient/caregiver  Goal: Absence of Vascular Access Complication  Outcome: Progressing  Intervention: Prevent and Manage Access Complications  Recent Flowsheet Documentation  Taken 2/28/2025 0400 by Veronica Mace RN  Activity Management: activity encouraged  Head of Bed (HOB) Positioning: HOB elevated  Bleeding Precautions: blood pressure closely monitored     Problem: Chest Pain  Goal: Resolution of Chest Pain Symptoms  Outcome: Progressing     Problem: Fall Injury Risk  Goal: Absence of Fall and Fall-Related Injury  Outcome: Progressing  Intervention: Promote Injury-Free Environment  Recent Flowsheet Documentation  Taken 2/28/2025 0400 by Veronica Mace RN  Safety Promotion/Fall Prevention:   fall prevention program maintained   clutter free environment maintained     Problem: Noninvasive Ventilation Acute  Goal: Effective Unassisted Ventilation and Oxygenation  Outcome: Progressing   Goal Outcome Evaluation:

## 2025-02-28 NOTE — CASE MANAGEMENT/SOCIAL WORK
Case Management Discharge Note      Final Note: Pt is being discharged to [facility] unit today. [employer] van will pick pt up, in his room, at 1500. RN call report to [phone]. If no answer call House Supervisor at [phone]. RN fax D/C summary to [phone].  updated pt's daughter, Heena, by phone. CM updated all Medical Staff through EPIC chat. No other needs voiced or identified. IMM given over phone to daughter, Heena.         Selected Continued Care - Admitted Since 2/18/2025       Destination Coordination complete.      Service Provider Services Address Phone Fax Patient Preferred    [facility] [address] [facility] [address] [phone] --       Internal Comment last updated by Dunia Evans RN 2/28/2025 1120    MED                         Durable Medical Equipment    No services have been selected for the patient.                Dialysis/Infusion    No services have been selected for the patient.                Home Medical Care    No services have been selected for the patient.                Therapy    No services have been selected for the patient.                Community Resources    No services have been selected for the patient.                Community & DME    No services have been selected for the patient.                    Transportation Services  W/C Van: Other ([employer] wheelchair @ 1500)    Final Discharge Disposition Code: 62 - inpatient rehab facility

## 2025-03-01 ENCOUNTER — APPOINTMENT (OUTPATIENT)
Dept: GENERAL RADIOLOGY | Facility: HOSPITAL | Age: 75
End: 2025-03-01
Payer: MEDICARE

## 2025-03-01 ENCOUNTER — APPOINTMENT (OUTPATIENT)
Dept: CT IMAGING | Facility: HOSPITAL | Age: 75
End: 2025-03-01
Payer: MEDICARE

## 2025-03-01 ENCOUNTER — HOSPITAL ENCOUNTER (EMERGENCY)
Facility: HOSPITAL | Age: 75
Discharge: HOME | End: 2025-03-01
Attending: EMERGENCY MEDICINE
Payer: MEDICARE

## 2025-03-01 VITALS
SYSTOLIC BLOOD PRESSURE: 133 MMHG | WEIGHT: 270.5 LBS | OXYGEN SATURATION: 98 % | HEART RATE: 61 BPM | HEIGHT: 69 IN | RESPIRATION RATE: 14 BRPM | DIASTOLIC BLOOD PRESSURE: 71 MMHG | TEMPERATURE: 97.6 F | BODY MASS INDEX: 40.07 KG/M2

## 2025-03-01 DIAGNOSIS — R79.89 ELEVATED SERUM CREATININE: ICD-10-CM

## 2025-03-01 DIAGNOSIS — R07.9 NONSPECIFIC CHEST PAIN: Primary | ICD-10-CM

## 2025-03-01 DIAGNOSIS — I25.2 CORONARY ARTERY DISEASE WITH HISTORY OF MYOCARDIAL INFARCTION WITHOUT HISTORY OF CABG: ICD-10-CM

## 2025-03-01 DIAGNOSIS — I25.10 CORONARY ARTERY DISEASE WITH HISTORY OF MYOCARDIAL INFARCTION WITHOUT HISTORY OF CABG: ICD-10-CM

## 2025-03-01 LAB
ALBUMIN SERPL-MCNC: 3.8 G/DL (ref 3.5–5.2)
ALBUMIN/GLOB SERPL: 1.7 G/DL
ALP SERPL-CCNC: 164 U/L (ref 39–117)
ALT SERPL W P-5'-P-CCNC: 107 U/L (ref 1–41)
ANION GAP SERPL CALCULATED.3IONS-SCNC: 12 MMOL/L (ref 5–15)
AST SERPL-CCNC: 57 U/L (ref 1–40)
BASOPHILS # BLD AUTO: 0.04 10*3/MM3 (ref 0–0.2)
BASOPHILS NFR BLD AUTO: 0.4 % (ref 0–1.5)
BILIRUB SERPL-MCNC: 0.9 MG/DL (ref 0–1.2)
BUN SERPL-MCNC: 44 MG/DL (ref 8–23)
BUN/CREAT SERPL: 27.8 (ref 7–25)
CALCIUM SPEC-SCNC: 8.6 MG/DL (ref 8.6–10.5)
CHLORIDE SERPL-SCNC: 92 MMOL/L (ref 98–107)
CO2 SERPL-SCNC: 29 MMOL/L (ref 22–29)
CREAT SERPL-MCNC: 1.58 MG/DL (ref 0.76–1.27)
DEPRECATED RDW RBC AUTO: 39.7 FL (ref 37–54)
EGFRCR SERPLBLD CKD-EPI 2021: 45.9 ML/MIN/1.73
EOSINOPHIL # BLD AUTO: 0.43 10*3/MM3 (ref 0–0.4)
EOSINOPHIL NFR BLD AUTO: 4.1 % (ref 0.3–6.2)
ERYTHROCYTE [DISTWIDTH] IN BLOOD BY AUTOMATED COUNT: 13.2 % (ref 12.3–15.4)
GEN 5 1HR TROPONIN T REFLEX: 2905 NG/L
GLOBULIN UR ELPH-MCNC: 2.3 GM/DL
GLUCOSE SERPL-MCNC: 152 MG/DL (ref 65–99)
HCT VFR BLD AUTO: 33.6 % (ref 37.5–51)
HGB BLD-MCNC: 11.7 G/DL (ref 13–17.7)
HOLD SPECIMEN: NORMAL
IMM GRANULOCYTES # BLD AUTO: 0.15 10*3/MM3 (ref 0–0.05)
IMM GRANULOCYTES NFR BLD AUTO: 1.4 % (ref 0–0.5)
LIPASE SERPL-CCNC: 43 U/L (ref 13–60)
LYMPHOCYTES # BLD AUTO: 1.41 10*3/MM3 (ref 0.7–3.1)
LYMPHOCYTES NFR BLD AUTO: 13.4 % (ref 19.6–45.3)
MAGNESIUM SERPL-MCNC: 2.1 MG/DL (ref 1.6–2.4)
MCH RBC QN AUTO: 28.7 PG (ref 26.6–33)
MCHC RBC AUTO-ENTMCNC: 34.8 G/DL (ref 31.5–35.7)
MCV RBC AUTO: 82.4 FL (ref 79–97)
MONOCYTES # BLD AUTO: 0.83 10*3/MM3 (ref 0.1–0.9)
MONOCYTES NFR BLD AUTO: 7.9 % (ref 5–12)
NEUTROPHILS NFR BLD AUTO: 7.63 10*3/MM3 (ref 1.7–7)
NEUTROPHILS NFR BLD AUTO: 72.8 % (ref 42.7–76)
NRBC BLD AUTO-RTO: 0 /100 WBC (ref 0–0.2)
NT-PROBNP SERPL-MCNC: 4548 PG/ML (ref 0–900)
PLATELET # BLD AUTO: 353 10*3/MM3 (ref 140–450)
PMV BLD AUTO: 9.9 FL (ref 6–12)
POTASSIUM SERPL-SCNC: 3.5 MMOL/L (ref 3.5–5.2)
PROT SERPL-MCNC: 6.1 G/DL (ref 6–8.5)
QT INTERVAL: 450 MS
QT INTERVAL: 484 MS
QTC INTERVAL: 453 MS
QTC INTERVAL: 491 MS
RBC # BLD AUTO: 4.08 10*6/MM3 (ref 4.14–5.8)
SODIUM SERPL-SCNC: 133 MMOL/L (ref 136–145)
TROPONIN T % DELTA: 0
TROPONIN T NUMERIC DELTA: -4 NG/L
TROPONIN T SERPL HS-MCNC: 2909 NG/L
WBC NRBC COR # BLD AUTO: 10.49 10*3/MM3 (ref 3.4–10.8)
WHOLE BLOOD HOLD COAG: NORMAL
WHOLE BLOOD HOLD SPECIMEN: NORMAL

## 2025-03-01 PROCEDURE — 84484 ASSAY OF TROPONIN QUANT: CPT | Performed by: EMERGENCY MEDICINE

## 2025-03-01 PROCEDURE — 93005 ELECTROCARDIOGRAM TRACING: CPT | Performed by: EMERGENCY MEDICINE

## 2025-03-01 PROCEDURE — 74176 CT ABD & PELVIS W/O CONTRAST: CPT

## 2025-03-01 PROCEDURE — 83735 ASSAY OF MAGNESIUM: CPT | Performed by: EMERGENCY MEDICINE

## 2025-03-01 PROCEDURE — 71045 X-RAY EXAM CHEST 1 VIEW: CPT

## 2025-03-01 PROCEDURE — 99284 EMERGENCY DEPT VISIT MOD MDM: CPT

## 2025-03-01 PROCEDURE — 83880 ASSAY OF NATRIURETIC PEPTIDE: CPT | Performed by: EMERGENCY MEDICINE

## 2025-03-01 PROCEDURE — 85025 COMPLETE CBC W/AUTO DIFF WBC: CPT | Performed by: EMERGENCY MEDICINE

## 2025-03-01 PROCEDURE — 36415 COLL VENOUS BLD VENIPUNCTURE: CPT

## 2025-03-01 PROCEDURE — 99283 EMERGENCY DEPT VISIT LOW MDM: CPT | Performed by: INTERNAL MEDICINE

## 2025-03-01 PROCEDURE — 80053 COMPREHEN METABOLIC PANEL: CPT | Performed by: EMERGENCY MEDICINE

## 2025-03-01 PROCEDURE — 83690 ASSAY OF LIPASE: CPT | Performed by: EMERGENCY MEDICINE

## 2025-03-01 RX ORDER — QUETIAPINE FUMARATE 25 MG/1
12.5 TABLET, FILM COATED ORAL NIGHTLY PRN
COMMUNITY

## 2025-03-01 RX ORDER — CALCIUM CARBONATE 500 MG/1
1 TABLET, CHEWABLE ORAL DAILY PRN
COMMUNITY

## 2025-03-01 RX ORDER — LUBIPROSTONE 8 UG/1
8 CAPSULE ORAL 2 TIMES DAILY WITH MEALS
COMMUNITY

## 2025-03-01 RX ORDER — SODIUM CHLORIDE 0.9 % (FLUSH) 0.9 %
10 SYRINGE (ML) INJECTION AS NEEDED
Status: DISCONTINUED | OUTPATIENT
Start: 2025-03-01 | End: 2025-03-01 | Stop reason: HOSPADM

## 2025-03-01 RX ORDER — ONDANSETRON 4 MG/1
4 TABLET, FILM COATED ORAL EVERY 6 HOURS PRN
COMMUNITY

## 2025-03-01 RX ORDER — IPRATROPIUM BROMIDE AND ALBUTEROL SULFATE 2.5; .5 MG/3ML; MG/3ML
3 SOLUTION RESPIRATORY (INHALATION) EVERY 6 HOURS PRN
COMMUNITY

## 2025-03-01 RX ORDER — NITROGLYCERIN 0.4 MG/1
0.4 TABLET SUBLINGUAL
Status: DISCONTINUED | OUTPATIENT
Start: 2025-03-01 | End: 2025-03-01 | Stop reason: HOSPADM

## 2025-03-01 RX ORDER — IBUPROFEN/PSEUDOEPHEDRINE HCL 200MG-30MG
1 TABLET ORAL NIGHTLY PRN
COMMUNITY

## 2025-03-01 RX ORDER — ACETAMINOPHEN 500 MG
1000 TABLET ORAL ONCE
Status: COMPLETED | OUTPATIENT
Start: 2025-03-01 | End: 2025-03-01

## 2025-03-01 RX ORDER — CLONIDINE HYDROCHLORIDE 0.1 MG/1
0.1 TABLET ORAL EVERY 6 HOURS PRN
COMMUNITY

## 2025-03-01 RX ORDER — ATORVASTATIN CALCIUM 80 MG/1
80 TABLET, FILM COATED ORAL NIGHTLY
COMMUNITY

## 2025-03-01 RX ORDER — LACTULOSE 10 G/15ML
20 SOLUTION ORAL 2 TIMES DAILY
COMMUNITY

## 2025-03-01 RX ADMIN — NITROGLYCERIN 0.5 INCH: 20 OINTMENT TOPICAL at 11:25

## 2025-03-01 RX ADMIN — ACETAMINOPHEN 1000 MG: 500 TABLET, FILM COATED ORAL at 17:51

## 2025-03-01 RX ADMIN — NITROGLYCERIN 0.4 MG: 0.4 TABLET SUBLINGUAL at 11:43

## 2025-03-01 NOTE — PROGRESS NOTES
"  [facility]   Inpatient Progress Note       LOS: 0 days   Patient Care Team:  Leslie Rhodes MD as PCP - General (Family Medicine)  Delmer Perdomo MD as Cardiologist (Cardiology)    Chief Complaint:  chest pain    Subjective     Interval History:     Patient recently discharged yesterday to [facility].  Was doing well.  Apparently around midnight he developed severe substernal chest pain that woke him up.  He responded to nitroglycerin and was sent here.  He is currently pain-free.  He is accompanied by his daughter.  He is mildly confused.  Daughter states he has a history of \"panic\" disorder.  He does note some abdominal distention and states he has not had a bowel movement since his surgery.  He also states he required an In-N-Out catheterization due to bladder distention last evening.    Review of Systems:   Pertinent positives noted in history, exam, and assessment. Otherwise reviewed and negative.      Objective     Vitals:  Blood pressure 116/68, pulse 60, temperature 97.6 °F (36.4 °C), temperature source Oral, resp. rate 14, height 175.3 cm (69\"), weight 123 kg (270 lb 8.1 oz), SpO2 98%.   No intake or output data in the 24 hours ending 03/01/25 1146  Vitals reviewed.   Constitutional:       Appearance: Well-developed and not in distress.   Neck:      Thyroid: No thyromegaly.      Vascular: No carotid bruit or JVD.   Pulmonary:      Breath sounds: Normal breath sounds.   Chest:      Comments: Well-healed sternotomy scar  Cardiovascular:      Regular rhythm.      No gallop. No S3 and S4 gallop.   Pulses:     Intact distal pulses.      Carotid: 2+ bilaterally.     Radial: 2+ bilaterally.  Edema:     Peripheral edema absent.   Abdominal:      General: Bowel sounds are normal.      Palpations: Abdomen is soft. There is no abdominal mass.      Tenderness: There is no abdominal tenderness.   Musculoskeletal:         General: No deformity.      Extremities: No " clubbing present.Skin:     General: Skin is warm and dry.      Findings: No rash.   Neurological:      Mental Status: Alert and oriented to person, place, and time.            Results Review:     I reviewed the patient's new clinical results.    Results from last 7 days   Lab Units 03/01/25  1046   WBC 10*3/mm3 10.49   HEMOGLOBIN g/dL 11.7*   HEMATOCRIT % 33.6*   PLATELETS 10*3/mm3 353     Results from last 7 days   Lab Units 03/01/25  1046   SODIUM mmol/L 133*   POTASSIUM mmol/L 3.5   CHLORIDE mmol/L 92*   CO2 mmol/L 29.0   BUN mg/dL 44*   CREATININE mg/dL 1.58*   CALCIUM mg/dL 8.6   BILIRUBIN mg/dL 0.9   ALK PHOS U/L 164*   ALT (SGPT) U/L 107*   AST (SGOT) U/L 57*   GLUCOSE mg/dL 152*     Results from last 7 days   Lab Units 03/01/25  1046   SODIUM mmol/L 133*   POTASSIUM mmol/L 3.5   CHLORIDE mmol/L 92*   CO2 mmol/L 29.0   BUN mg/dL 44*   CREATININE mg/dL 1.58*   GLUCOSE mg/dL 152*   CALCIUM mg/dL 8.6         Lab Results   Lab Value Date/Time    TROPONINT 2,909 (C) 03/01/2025 1046    TROPONINT 7,557 (C) 02/20/2025 0345    TROPONINT >10,000 (C) 02/19/2025 1804    TROPONINT >10,000 (C) 02/19/2025 0733    TROPONINT >10,000 (C) 02/19/2025 0519    TROPONINT 116 (C) 05/04/2024 0353    TROPONINT 149 (C) 05/03/2024 1752    TROPONINT 156 (C) 05/03/2024 1606    TROPONINT 13 07/19/2023 1616    TROPONINT <0.010 08/03/2021 1421    TROPONINT <0.010 08/03/2021 0340    TROPONINT <0.010 07/30/2021 2221             Results from last 7 days   Lab Units 03/01/25  1046   PROBNP pg/mL 4,548.0*     Results from last 7 days   Lab Units 03/01/25  1046   HSTROP T ng/L 2,909*         Tele: Sinus rhythm    Assessment:      * No active hospital problems. *      1.  Atypical chest pain   Anterior STEMI with multivessel CAD EF 45%:  Status post 3V CABG (LIMA-LAD, SVG-OM, SVG-RCA) 2/20/25 Dr Borjas  Discharged to rehab yesterday and representing with chest pain today.  Troponins are elevated, but flat.  Much less than prior to hospital  discharge     2.  Chronic HFrEF:  EF 41-45% pre-op  Clinically euvolemic       3.  Paroxysmal atrial fibrillation   S/p STEFANO ligation and maze procedure during bypass surgery       4.  Hypertension     5.  Mixed hyperlipidemia:     6.  A/CRF    Plan:  Doubt this represents ACS.  No ECG or enzyme evidence of infarction, and very atypical presentation.  May represent some degree of panic/anxiety disorder, per daughter, as well as may be some upper abdominal epigastric discomfort.  Do not see need for hospital mission for cardiac standpoint.  He may be sent back to [facility] for further rehab.  Have discussed this with the patient and daughter.  Defer workup of possible abdominal complaints to other physicians.      Andrea Holloway MD    Dictated utilizing Dragon dictation

## 2025-03-01 NOTE — DISCHARGE INSTRUCTIONS
Increase your fluid intake somewhat which will help lower your creatinine.    If you have any concern or worsening condition please return emergency department.    AVOID SOLID FOODS FOR AT LEAST 24 HRS.    Please review the medications you are supposed to be taking according to prior physician recommendations. I have not changed your home medications during this visit. If your discharge instructions indicate that I have changed your home medications, this is not the case, and you should disregard. If you have any questions about the medication you should be taking at home, please call your physician.

## 2025-03-01 NOTE — ED PROVIDER NOTES
EMERGENCY DEPARTMENT ENCOUNTER    Pt Name: Camron Hendrix  MRN: [id number]  Pt :   1950  Room Number:    Date of encounter:  3/1/2025  PCP: Leslie Rhodes MD  ED Provider: Yoav Aldana MD    Historian: Paramedic      HPI:  Chief Complaint: Chest discomfort        Context: Camron Hendrix is a 74 y.o. male who presents to the ED c/o chest discomfort starting shortly prior to 911 being activated at [facility].  The patient had undergone coronary artery stenting and then coronary bypass surgery and just was discharged to [facility] rehab yesterday.  The patient is uncertain whether his chest discomfort is similar to what he had prior to his hospital admission.  He notes his chest discomfort was 9-10 out of 10 on the pain scale.  Paramedics administered a single nitroglycerin along with aspirin 81 mg x 4 and the patient noted significant relief down to a level 2 out of 10 on the pain scale.  He continues having the same amount of discomfort.  He reports some mild shortness of breath which she notes came on overnight.  He is uncertain about taking diuretics.      PAST MEDICAL HISTORY  Past Medical History:   Diagnosis Date    AAA (abdominal aortic aneurysm)     Coronary artery disease     CVA (cerebral vascular accident)     Hypertension     Hypothyroidism     PAF (paroxysmal atrial fibrillation)     TIA (transient ischemic attack)          PAST SURGICAL HISTORY  Past Surgical History:   Procedure Laterality Date    CARDIAC CATHETERIZATION N/A 3/29/2019    Procedure: Left Heart Cath;  Surgeon: Andrea Holloway MD;  Location:  [facility] CATH INVASIVE [facility];  Service: Cardiovascular    CARDIAC CATHETERIZATION N/A 2025    Procedure: Left Heart Cath;  Surgeon: Carlo Barragan MD;  Location:  [facility];  Service: Cardiovascular;  Laterality: N/A;    CARDIAC CATHETERIZATION N/A 2025    Procedure: Optical Coherence Tomography;  Surgeon: Carlo Barragan  MD;  Location:  [facility]AS[facility];  Service: Cardiovascular;  Laterality: N/A;    CARDIAC CATHETERIZATION N/A 2025    Procedure: Percutaneous Coronary Intervention;  Surgeon: Carlo Barragan MD;  Location: [facility] CATH INVASIVE LOCATION;  Service: Cardiovascular;  Laterality: N/A;    CERVICAL FUSION      c4-c5    CORONARY ANGIOPLASTY WITH STENT PLACEMENT      CORONARY ARTERY BYPASS GRAFT N/A 2025    Procedure: MEDIAN STERNOTOMY, CORONARY ARTERY BYPASS GRAFT X 3 (ON PUMP) USING RIGHT GREATER SAPHENOUS VEIN VIA EVH + LEFT INTERNAL MAMMARY ARTERY GRAFT, MAZE, LEFT ATRIAL APPENDAGE LIGATION, INTRAOP JESSY AS PER ANESTHESIA;  Surgeon: Steve Borjas MD;  Location:  [facility] OR;  Service: Cardiothoracic;  Laterality: N/A;  LEFT ARM EXPLORED, RADIAL ARTERY NOT USABLE    HERNIA REPAIR      MAZE PROCEDURE N/A 2025    Procedure: MAZE PROCEDURE;  Surgeon: Steve Borjas MD;  Location: [facility] OR;  Service: Cardiothoracic;  Laterality: N/A;    TONSILECTOMY, ADENOIDECTOMY, BILATERAL MYRINGOTOMY AND TUBES           FAMILY HISTORY  Family History   Problem Relation Age of Onset    Heart failure Mother     Aneurysm Father          SOCIAL HISTORY  Social History     Socioeconomic History    Marital status:    Tobacco Use    Smoking status: Former     Current packs/day: 0.00     Average packs/day: 1 pack/day for 20.0 years (20.0 ttl pk-yrs)     Types: Cigarettes     Start date: 1965     Quit date: 1985     Years since quittin.4     Passive exposure: Past    Smokeless tobacco: Never   Vaping Use    Vaping status: Never Used   Substance and Sexual Activity    Alcohol use: Yes     Comment: occasional     Drug use: No    Sexual activity: Defer         ALLERGIES  Codeine        REVIEW OF SYSTEMS  Review of Systems       All systems reviewed and negative except for those discussed in HPI.       PHYSICAL EXAM    I have reviewed the triage vital signs and nursing notes.    ED Triage Vitals    Temp Pulse Resp BP SpO2   -- -- -- -- --      Temp src Heart Rate Source Patient Position BP Location FiO2 (%)   -- -- -- -- --       Physical Exam  GENERAL:   Appears in no acute distress.  I evaluate the patient immediately upon arrival to room 23 with paramedics at his side.  HENT: Nares patent.  EYES: No scleral icterus.  CV: Regular rhythm, regular rate.  No murmurs gallops rubs  RESPIRATORY: Normal effort.  No audible wheezes, rales or rhonchi.  Diminished in lower lung fields   ABDOMEN: Soft, nontender  MUSCULOSKELETAL: No deformities.   NEURO: Alert, moves all extremities, follows commands.  SKIN: Warm, dry, no rash visualized.      LAB RESULTS  Recent Results (from the past 24 hours)   ECG 12 Lead ED Triage Standing Order; Chest Pain    Collection Time: 03/01/25 10:37 AM   Result Value Ref Range    QT Interval 484 ms    QTC Interval 491 ms   High Sensitivity Troponin T    Collection Time: 03/01/25 10:46 AM    Specimen: Blood   Result Value Ref Range    HS Troponin T 2,909 (C) <22 ng/L   Comprehensive Metabolic Panel    Collection Time: 03/01/25 10:46 AM    Specimen: Blood   Result Value Ref Range    Glucose 152 (H) 65 - 99 mg/dL    BUN 44 (H) 8 - 23 mg/dL    Creatinine 1.58 (H) 0.76 - 1.27 mg/dL    Sodium 133 (L) 136 - 145 mmol/L    Potassium 3.5 3.5 - 5.2 mmol/L    Chloride 92 (L) 98 - 107 mmol/L    CO2 29.0 22.0 - 29.0 mmol/L    Calcium 8.6 8.6 - 10.5 mg/dL    Total Protein 6.1 6.0 - 8.5 g/dL    Albumin 3.8 3.5 - 5.2 g/dL    ALT (SGPT) 107 (H) 1 - 41 U/L    AST (SGOT) 57 (H) 1 - 40 U/L    Alkaline Phosphatase 164 (H) 39 - 117 U/L    Total Bilirubin 0.9 0.0 - 1.2 mg/dL    Globulin 2.3 gm/dL    A/G Ratio 1.7 g/dL    BUN/Creatinine Ratio 27.8 (H) 7.0 - 25.0    Anion Gap 12.0 5.0 - 15.0 mmol/L    eGFR 45.9 (L) >60.0 mL/min/1.73   Lipase    Collection Time: 03/01/25 10:46 AM    Specimen: Blood   Result Value Ref Range    Lipase 43 13 - 60 U/L   BNP    Collection Time: 03/01/25 10:46 AM    Specimen: Blood    Result Value Ref Range    proBNP 4,548.0 (H) 0.0 - 900.0 pg/mL   Green Top (Gel)    Collection Time: 03/01/25 10:46 AM   Result Value Ref Range    Extra Tube Hold for add-ons.    Lavender Top    Collection Time: 03/01/25 10:46 AM   Result Value Ref Range    Extra Tube hold for add-on    Gold Top - SST    Collection Time: 03/01/25 10:46 AM   Result Value Ref Range    Extra Tube Hold for add-ons.    Gray Top    Collection Time: 03/01/25 10:46 AM   Result Value Ref Range    Extra Tube Hold for add-ons.    Light Blue Top    Collection Time: 03/01/25 10:46 AM   Result Value Ref Range    Extra Tube Hold for add-ons.    CBC Auto Differential    Collection Time: 03/01/25 10:46 AM    Specimen: Blood   Result Value Ref Range    WBC 10.49 3.40 - 10.80 10*3/mm3    RBC 4.08 (L) 4.14 - 5.80 10*6/mm3    Hemoglobin 11.7 (L) 13.0 - 17.7 g/dL    Hematocrit 33.6 (L) 37.5 - 51.0 %    MCV 82.4 79.0 - 97.0 fL    MCH 28.7 26.6 - 33.0 pg    MCHC 34.8 31.5 - 35.7 g/dL    RDW 13.2 12.3 - 15.4 %    RDW-SD 39.7 37.0 - 54.0 fl    MPV 9.9 6.0 - 12.0 fL    Platelets 353 140 - 450 10*3/mm3    Neutrophil % 72.8 42.7 - 76.0 %    Lymphocyte % 13.4 (L) 19.6 - 45.3 %    Monocyte % 7.9 5.0 - 12.0 %    Eosinophil % 4.1 0.3 - 6.2 %    Basophil % 0.4 0.0 - 1.5 %    Immature Grans % 1.4 (H) 0.0 - 0.5 %    Neutrophils, Absolute 7.63 (H) 1.70 - 7.00 10*3/mm3    Lymphocytes, Absolute 1.41 0.70 - 3.10 10*3/mm3    Monocytes, Absolute 0.83 0.10 - 0.90 10*3/mm3    Eosinophils, Absolute 0.43 (H) 0.00 - 0.40 10*3/mm3    Basophils, Absolute 0.04 0.00 - 0.20 10*3/mm3    Immature Grans, Absolute 0.15 (H) 0.00 - 0.05 10*3/mm3    nRBC 0.0 0.0 - 0.2 /100 WBC   High Sensitivity Troponin T 1Hr    Collection Time: 03/01/25 11:46 AM    Specimen: Blood   Result Value Ref Range    HS Troponin T 2,905 (C) <22 ng/L    Troponin T Numeric Delta -4 ng/L    Troponin T % Delta 0 Abnormal if >/= 20%   Magnesium    Collection Time: 03/01/25 11:46 AM    Specimen: Blood   Result Value  Ref Range    Magnesium 2.1 1.6 - 2.4 mg/dL   ECG 12 Lead ED Triage Standing Order; Chest Pain    Collection Time: 03/01/25 11:48 AM   Result Value Ref Range    QT Interval 450 ms    QTC Interval 453 ms       If labs were ordered, I independently reviewed the results and considered them in treating the patient.        RADIOLOGY  CT Abdomen Pelvis Without Contrast    Result Date: 3/1/2025  CT ABDOMEN PELVIS WO CONTRAST Date of Exam: 3/1/2025 1:03 PM EST Indication: Abdominal distention. Coronary artery bypass 2/20/2025 Comparison: 5/3/2024 Technique: Axial CT images were obtained of the abdomen and pelvis without the administration of contrast. Reconstructed coronal and sagittal images were also obtained. Automated exposure control and iterative construction methods were used. FINDINGS: Atelectasis is noted in the lower lobes. Small bilateral pleural effusions are noted. Postoperative changes are noted related to recent cardiac bypass. The liver, spleen, and pancreas are unremarkable without contrast. The gallbladder is distended. No gallstones are seen. There is no evidence for gallbladder wall thickening or pericholecystic edema. There is no evidence of cholecystitis. No biliary dilatation is observed. The bilateral adrenal glands are symmetric and unremarkable without contrast. No definitive nephrolithiasis is seen bilaterally. There is no hydronephrosis or hydroureter. There is no evidence for obstructive uropathy. No stones are seen in the bladder. The bilateral kidneys are otherwise unremarkable without contrast. There is no bowel dilatation or obstruction. A normal-appearing appendix is observed. There is no evidence for acute appendicitis. No significant free fluid is observed. No abnormal fluid collections are identified. No significant lymphadenopathy is seen  throughout the abdomen or pelvis. The bladder is partially decompressed. Moderate atherosclerosis is noted. No acute osseous abnormalities are  observed.     1.No evidence for significant nephrolithiasis. There is no evidence for obstructive uropathy. 2.No evidence for acute abnormality throughout the abdomen or pelvis on this noncontrast exam. 3.Postoperative changes are noted involving the lower chest. Electronically Signed: Caio Coffman MD  3/1/2025 1:41 PM EST  Workstation ID: [id number]    XR Chest 1 View    Result Date: 3/1/2025  XR CHEST 1 VW Date of Exam: 3/1/2025 10:54 AM EST Indication: Chest Pain Triage Protocol Comparison: Chest radiograph 2/28/2025. Findings: Sternotomy. Left atrial clip appendage clip. Mildly enlarged cardiac silhouette, unchanged. Similar trace left pleural effusion. Streaky bibasilar opacities most suggestive of atelectasis, left greater than right. No pneumothorax. No acute osseous abnormality.     Impression: No significant change compared to 2/28/2025 chest radiograph, including suspected trace left pleural effusion and streaky left basilar opacities most suggestive of atelectasis, less likely pneumonia. Electronically Signed: Pasha Fermin MD  3/1/2025 11:17 AM EST  Workstation ID: [id number]     I ordered and independently reviewed the above noted radiographic studies.      I viewed images of chest x-ray which showed no consolidative infiltrates with mild vascular congestion per my independent interpretation.    See radiologist's dictation for official interpretation.        PROCEDURES    Procedures    ECG 12 Lead ED Triage Standing Order; Chest Pain   Final Result   Test Reason : ED Triage Standing Order~   Blood Pressure :   */*   mmHG   Vent. Rate :  61 BPM     Atrial Rate :  61 BPM      P-R Int : 218 ms          QRS Dur : 130 ms       QT Int : 450 ms       P-R-T Axes :  60 -46 106 degrees     QTcB Int : 453 ms      Sinus rhythm with 1st degree AV block   Left axis deviation   Left bundle branch block   Abnormal ECG   When compared with ECG of 01-Mar-2025 10:37,   No significant change was found   Confirmed by RAHUL   VERNON CH ([id number]) on 3/1/2025 3:34:20 PM      Referred By: EDMD           Confirmed By: VERNON KAY MD      ECG 12 Lead ED Triage Standing Order; Chest Pain   Final Result   Test Reason : ED Triage Standing Order~   Blood Pressure :   */*   mmHG   Vent. Rate :  62 BPM     Atrial Rate :  62 BPM      P-R Int : 220 ms          QRS Dur : 136 ms       QT Int : 484 ms       P-R-T Axes :  52 -45  94 degrees     QTcB Int : 491 ms      Sinus rhythm with 1st degree AV block   Left axis deviation   Nonspecific intraventricular block   Inferior infarct , age undetermined   Abnormal ECG   Confirmed by VERNON KAY MD ([id number]) on 3/1/2025 11:29:22 AM      Referred By: ASHWIN           Confirmed By: VERNON KAY MD          MEDICATIONS GIVEN IN ER    Medications   sodium chloride 0.9 % flush 10 mL (has no administration in time range)   nitroglycerin (NITROSTAT) SL tablet 0.4 mg (0.4 mg Sublingual Given 3/1/25 1143)   acetaminophen (TYLENOL) tablet 1,000 mg (has no administration in time range)   nitroglycerin (NITROSTAT) ointment 0.5 inch (0.5 inches Topical Given 3/1/25 1125)         MEDICAL DECISION MAKING, PROGRESS, and CONSULTS    All labs, if obtained, have been independently reviewed by me.  All radiology studies, if obtained, have been reviewed by me and the radiologist dictating the report.  All EKGs, if obtained, have been independently viewed and interpreted by me/my attending physician.      Discussion below represents my analysis of pertinent findings related to patient's condition, differential diagnosis, treatment plan and final disposition.              HEART Score: 7   Shared Decision Making  I discussed the findings with the patient/patient representative who is in agreement with the treatment plan and the final disposition.  Risks and benefits of discharge and/or observation/admission were discussed: Yes                             Differential diagnosis:    Acute coronary syndrome versus chest wall pain,  etc.      Additional sources:    - Discussed/ obtained information from independent historians: I spoke with paramedics at bedside.    - External (non-ED) record review: I reviewed discharge summary dated yesterday when the patient was being released from our facility and sent to [facility].  He was diagnosed with STEMI.    I reviewed CABG operative note from CT surgery, Dr. Steve Borjas, dated 2/20/2025.    - Chronic or social conditions impacting care: Obesity with BMI 39.4        Orders placed during this visit:  Orders Placed This Encounter   Procedures    XR Chest 1 View    CT Abdomen Pelvis Without Contrast    Aguirre Draw    High Sensitivity Troponin T    Comprehensive Metabolic Panel    Lipase    BNP    CBC Auto Differential    High Sensitivity Troponin T 1Hr    Magnesium    NPO Diet NPO Type: Strict NPO    Undress & Gown    Continuous Pulse Oximetry    REMOVE NTG PASTE  Misc Nursing Order (Specify)    Oxygen Therapy- Nasal Cannula; Titrate 1-6 LPM Per SpO2; 90 - 95%    ECG 12 Lead ED Triage Standing Order; Chest Pain    ECG 12 Lead ED Triage Standing Order; Chest Pain    Insert Peripheral IV    CBC & Differential    Green Top (Gel)    Lavender Top    Gold Top - SST    Gray Top    Light Blue Top         Additional orders considered but not ordered:  [facility]    ED Course:    Consultants:      ED Course as of 03/01/25 1748   Sat Mar 01, 2025   1100 Ntg sl and paste ordered. [MS]   1132 I had ordered sublingual nitroglycerin along with nitroglycerin paste.  I have paged on-call cardiology Dr. Holloway to discuss the case further. [MS]   1140 I spoke with Dr. Holloway, cardiology.  He will evaluate the patient in the emergency department to determine final disposition. [MS]   1323 Daughter at bedside.  I spoke with her in detail.  She does feel his abdomen is more distended than baseline.  He feels that he has no discomfort whatsoever chest or abdomen. [MS]   1323 Dr. Holloway has evaluated the patient  at bedside and spoken with the patient's daughter.  He feels the patient is safe from a cardiac standpoint for returning back to [facility].  I will await the CT scan reading of the abdomen pelvis. [MS]      ED Course User Index  [MS] Yoav Aldana MD              Shared Decision Making:  After my consideration of clinical presentation and any laboratory/radiology studies obtained, I discussed the findings with the patient/patient representative who is in agreement with the treatment plan and the final disposition.   Risks and benefits of discharge and/or observation/admission were discussed.       AS OF 17:48 EST VITALS:    BP - 94/65  HR - 68  TEMP - 97.6 °F (36.4 °C) (Oral)  O2 SATS - 98%                  DIAGNOSIS  Final diagnoses:   Nonspecific chest pain   Coronary artery disease with history of myocardial infarction without history of CABG   Elevated serum creatinine         DISPOSITION  DISCHARGE    Patient discharged in stable condition.    Reviewed implications of results, diagnosis, meds, responsibility to follow up, warning signs and symptoms of possible worsening, potential complications and reasons to return to ER.    Patient/Family voiced understanding of above instructions.    Discussed plan for discharge, as there is no emergent indication for admission.  Pt/family is agreeable and understands need for follow up and possible repeat testing.  Pt/family is aware that discharge does not mean that nothing is wrong but that it indicates no emergency is currently present that requires admission and they must continue care with follow-up as given below or with a physician of their choice.     FOLLOW-UP  Leslie Rhodes MD  [address]  [facility] [address] [phone]          [facility] EMERGENCY DEPARTMENT  [address] [facility] [address] [phone]    IF YOU HAVE ANY CONCERN OF WORSENING CONDITION         Medication List        Changed      levothyroxine  200 MCG tablet  Commonly known as: SYNTHROID, LEVOTHROID  Administer 1 tablet per G tube Every Morning.  What changed: how to take this                  Please note that portions of this document were completed with voice recognition software.        Yoav Aldana MD  03/01/25 [id number]

## 2025-03-17 NOTE — PROGRESS NOTES
[facility] Cardiothoracic Surgery Office Follow Up Note     Date of Encounter: 2025     Name: Camron Hendrix  : 1950     Referred By: No ref. provider found  PCP: Leslie Rhodes MD    Chief Complaint:    Chief Complaint   Patient presents with    Post-op Follow-up     Hospital follow-up s/p CABG x3 25.       Subjective      History of Present Illness:    Camron Hendrix is a 74 y.o. male former smoker with history of JOSEPH, HTN, HLD on statin therapy, insulin-dependent DM, CKD, dementia, seizure, SAH, CVA, AAA, CHF, PAF aortic stenosis, CAD with STEMI s/p urgent CABG x 3 with right EVH, modified maze, and left atrial appendage ligation with 50 mm clip on 2025 by Dr. Borjas.  Hospital course per discharge summary.  Sternal wound VAC was removed on  and patient met criteria for discharge on POD #8 to [facility].  Patient had ED visit 3/1/2025 for chest discomfort with unrevealing workup and was discharged later same day back to [facility].  He presents today for his postoperative eval. He is now staying with his daughter who is very judicious in her care for her father..  He denies any unusual or uncontrolled chest pain, still taking Tylenol as needed.  He has no complaints of sternal mobility.  He does have some mild MENON but is able to walk 1/4 mile with some rest breaks.  Denies orthopnea but has had cough that daughter believes is allergy related.  Compliant with I-S and flutter valve use.  He has no concerns regarding incisional healing.  He has follow up with cardiologist Dr Barragan 4/3/2025      Review of Systems:  Review of Systems   Constitutional: Positive for malaise/fatigue. Negative for chills, decreased appetite, diaphoresis, fever, night sweats, weight gain and weight loss.   HENT:  Negative for hoarse voice.    Eyes:  Negative for blurred vision, double vision and visual disturbance.   Cardiovascular:  Positive for dyspnea on exertion. Negative for chest pain,  claudication, irregular heartbeat, leg swelling, near-syncope, orthopnea, palpitations, paroxysmal nocturnal dyspnea and syncope.   Respiratory:  Positive for cough. Negative for hemoptysis, shortness of breath, sputum production and wheezing.    Hematologic/Lymphatic: Negative for adenopathy and bleeding problem. Does not bruise/bleed easily.   Skin:  Negative for color change, nail changes, poor wound healing and rash.   Musculoskeletal:  Negative for back pain, falls and muscle cramps.   Gastrointestinal:  Negative for abdominal pain, dysphagia and heartburn.   Genitourinary:  Negative for flank pain.   Neurological:  Positive for dizziness. Negative for brief paralysis, disturbances in coordination, focal weakness, headaches, light-headedness, loss of balance, numbness, paresthesias, sensory change, vertigo and weakness.   Psychiatric/Behavioral:  Negative for depression and suicidal ideas.    Allergic/Immunologic: Negative for persistent infections.       I have reviewed the following portions of the patient's history: problem list, current medications, allergies, past surgical history, past medical history, past social history, past family history, and ROS and confirm it's accurate.    Allergies:  Allergies   Allergen Reactions    Codeine Hallucinations       Medications:      Current Outpatient Medications:     acetaminophen (TYLENOL) 500 MG tablet, Take 2 tablets by mouth Every 8 (Eight) Hours As Needed for Mild Pain., Disp: , Rfl:     amiodarone (PACERONE) 200 MG tablet, Take 1 tablet by mouth twice daily for 7 days, THEN take 1 tablet by mouth daily, Disp: 37 tablet, Rfl: 0    aspirin 81 MG chewable tablet, Chew 1 tablet Daily., Disp: , Rfl:     bumetanide (BUMEX) 1 MG tablet, Take 1 tablet by mouth Daily., Disp: 30 tablet, Rfl: 2    carvedilol (COREG) 6.25 MG tablet, Take 1 tablet by mouth 2 (Two) Times a Day With Meals. (Patient taking differently: Take 0.5 tablets by mouth 2 (Two) Times a Day With  Meals.), Disp: 60 tablet, Rfl: 5    clopidogrel (PLAVIX) 75 MG tablet, Take 1 tablet by mouth Daily., Disp: , Rfl:     Dextromethorphan-guaiFENesin  MG/10ML liquid, Take 5 mL by mouth Every 8 (Eight) Hours As Needed (for coughing)., Disp: , Rfl:     dextrose (GLUTOSE) 40 % gel, Take 15 g by mouth Every 15 (Fifteen) Minutes As Needed for Low Blood Sugar (per [employer])., Disp: , Rfl:     docusate sodium (COLACE) 50 MG capsule, Take 1 capsule by mouth As Needed for Constipation., Disp: , Rfl:     finasteride (PROSCAR) 5 MG tablet, Take 1 tablet by mouth Daily., Disp: , Rfl:     glipizide (GLUCOTROL XL) 2.5 MG 24 hr tablet, Take 1 tablet by mouth Daily., Disp: , Rfl:     glucagon (GLUCAGEN) 1 MG injection, Inject 1 mg under the skin into the appropriate area as directed 1 (One) Time As Needed for Low Blood Sugar., Disp: , Rfl:     Glucose 15 GM/32ML gel, Take 15 g by mouth Daily As Needed (FOR HYPOGLYCEMIA)., Disp: , Rfl:     LACTOBACILLUS BIFIDUS PO, Take  by mouth., Disp: , Rfl:     levothyroxine (SYNTHROID, LEVOTHROID) 200 MCG tablet, Administer 1 tablet per G tube Every Morning. (Patient taking differently: Take 1 tablet by mouth Every Morning.), Disp: , Rfl:     MIRTAZAPINE PO, Take  by mouth., Disp: , Rfl:     nitroglycerin (NITROSTAT) 0.4 MG SL tablet, Place 1 tablet under the tongue Every 5 (Five) Minutes As Needed for Chest Pain. Take no more than 3 doses in 15 minutes., Disp: 25 tablet, Rfl: 5    potassium chloride (KLOR-CON M20) 20 MEQ CR tablet, Take 1 tablet by mouth Daily. Take with Bumex, Disp: 30 tablet, Rfl: 0    rosuvastatin (CRESTOR) 40 MG tablet, Take 1 tablet by mouth Every Night., Disp: 30 tablet, Rfl: 5    sacubitril-valsartan (ENTRESTO) 24-26 MG tablet, Take 1 tablet by mouth Every 12 (Twelve) Hours., Disp: 60 tablet, Rfl: 5    tamsulosin (FLOMAX) 0.4 MG capsule 24 hr capsule, Take 1 capsule by mouth Daily., Disp: , Rfl:     traMADol (ULTRAM) 50 MG tablet, Take 1 tablet by mouth 2 (Two)  Times a Day., Disp: , Rfl:     vancomycin (VANCOCIN) 125 MG capsule, Take 2 capsules by mouth Every 6 (Six) Hours., Disp: , Rfl:     History:   Past Medical History:   Diagnosis Date    AAA (abdominal aortic aneurysm)     Coronary artery disease     CVA (cerebral vascular accident) 2011    Hypertension     Hypothyroidism     PAF (paroxysmal atrial fibrillation)     TIA (transient ischemic attack)        Past Surgical History:   Procedure Laterality Date    CARDIAC CATHETERIZATION N/A 3/29/2019    Procedure: Left Heart Cath;  Surgeon: Andrea Holloway MD;  Location:  [facility];  Service: Cardiovascular    CARDIAC CATHETERIZATION N/A 2/18/2025    Procedure: Left Heart Cath;  Surgeon: Carlo Barragan MD;  Location:  [facility];  Service: Cardiovascular;  Laterality: N/A;    CARDIAC CATHETERIZATION N/A 2/18/2025    Procedure: Optical Coherence Tomography;  Surgeon: Carlo Barragan MD;  Location:  [facility];  Service: Cardiovascular;  Laterality: N/A;    CARDIAC CATHETERIZATION N/A 2/18/2025    Procedure: Percutaneous Coronary Intervention;  Surgeon: Carlo Barragan MD;  Location:  [facility];  Service: Cardiovascular;  Laterality: N/A;    CERVICAL FUSION      c4-c5    CORONARY ANGIOPLASTY WITH STENT PLACEMENT      CORONARY ARTERY BYPASS GRAFT N/A 2/20/2025    Procedure: MEDIAN STERNOTOMY, CORONARY ARTERY BYPASS GRAFT X 3 (ON PUMP) USING RIGHT GREATER SAPHENOUS VEIN VIA EVH + LEFT INTERNAL MAMMARY ARTERY GRAFT, MAZE, LEFT ATRIAL APPENDAGE LIGATION, INTRAOP JESSY AS PER ANESTHESIA;  Surgeon: Steve Borjas MD;  Location: [facility] OR;  Service: Cardiothoracic;  Laterality: N/A;  LEFT ARM EXPLORED, RADIAL ARTERY NOT USABLE    HERNIA REPAIR      MAZE PROCEDURE N/A 2/20/2025    Procedure: MAZE PROCEDURE;  Surgeon: Steve Borjas MD;  Location: [facility] OR;  Service: Cardiothoracic;  Laterality: N/A;    TONSILECTOMY, ADENOIDECTOMY, BILATERAL  "MYRINGOTOMY AND TUBES         Social History     Socioeconomic History    Marital status:    Tobacco Use    Smoking status: Former     Current packs/day: 0.00     Average packs/day: 1 pack/day for 20.0 years (20.0 ttl pk-yrs)     Types: Cigarettes     Start date: 1965     Quit date: 1985     Years since quittin.5     Passive exposure: Past    Smokeless tobacco: Never   Vaping Use    Vaping status: Never Used   Substance and Sexual Activity    Alcohol use: Yes     Comment: occasional     Drug use: No    Sexual activity: Defer        Family History   Problem Relation Age of Onset    Heart failure Mother     Aneurysm Father        Objective   Physical Exam:  Vitals:    25 1231   BP: 140/70   BP Location: Right arm   Patient Position: Sitting   Pulse: 70   Temp: 98.5 °F (36.9 °C)   SpO2: 98%   Weight: 117 kg (258 lb)   Height: 175.3 cm (69\")      Body mass index is 38.1 kg/m².    Physical Exam  Vitals and nursing note reviewed.   Constitutional:       Appearance: Normal appearance. He is obese.   Neck:      Vascular: No carotid bruit.   Cardiovascular:      Rate and Rhythm: Normal rate and regular rhythm.      Pulses: Normal pulses.      Heart sounds: Normal heart sounds, S1 normal and S2 normal. No murmur heard.  Pulmonary:      Effort: Pulmonary effort is normal.      Breath sounds: Normal breath sounds.   Abdominal:      Palpations: Abdomen is soft.   Musculoskeletal:         General: Normal range of motion.      Cervical back: Normal range of motion and neck supple.      Right lower leg: No edema.      Left lower leg: No edema.   Skin:     General: Skin is warm and dry.      Capillary Refill: Capillary refill takes less than 2 seconds.      Comments: Sternotomy incision: wound edges well approximated, no erythema, edema, or induration  MTS: well healing without erythema, edema, or drainage  EVH: well healing without erythema, edema, or drainage   Neurological:      General: No focal " deficit present.      Mental Status: He is alert and oriented to person, place, and time. Mental status is at baseline.      GCS: GCS eye subscore is 4. GCS verbal subscore is 5. GCS motor subscore is 6.      Motor: Motor function is intact.      Coordination: Coordination is intact.      Gait: Gait is intact.   Psychiatric:         Mood and Affect: Mood normal.         Speech: Speech normal.         Behavior: Behavior normal. Behavior is cooperative.         Cognition and Memory: Cognition normal.         Imaging/Labs:  XR Chest 2 View (03/18/2025 12:59)       Assessment / Plan      Assessment / Plan:  Diagnoses and all orders for this visit:    1. S/P CABG (coronary artery bypass graft) (Primary)       s/p urgent CABG x 3 with right EVH, modified maze, and left atrial appendage ligation with 50 mm clip on 2/28/2025 by Dr. Borjas.   Postoperative course per HPI  D/C POD #8.  No readmissions  Initial postop eval with no uncontrolled pain, concerning upper respiratory complaints, or incisional healing concerns  On exam he has well-healing sternotomy, abscess, EVH sites  Continue wound care with plain antibacterial dial soap  CXR in clinic without PTX.  scant residual left effusion with bibasilar atelectasis.  Sternal wires intact.  Discussed increasing ambulation as tolerated and continuing use of incentive spirometer.  Sternal restrictions reviewed  Cardiac rehab encouraged once 6 weeks postop  Follow with cardiology as previously determined     Patient Education:  Activity Restrictions: You may resume driving at 6 weeks post-operatively. No lifting more than 10 lbs for 12 weeks (3 months). At this time you can slowly increase your weight as tolerated. You should not partake in any overhead activities or movements that involve twisting of your upper chest and torso such as (but not limited to) golfing or tennis, lawn mowing including zero turn mowers, use of lawn trimmers or edgers, shoveling, painting, vacuuming,  mopping, sweeping, etc.     Wound Care: continue to keep your incisions clean and dry. You may use plain antibacterial soap (i.e. dial) and water to clean and pat dry. No lotions, creams, oils, powders, salves, or ointments. Please watch for signs and symptoms of infection which can include but are not limited to: increased pain or tenderness around your incision, warmth to the site or fever, redness or red streaking, and foul smelling or appearing drainage. Clear drainage and bloody drainage are not worrisome. If your wound opens up please contact our office.      Follow Up:   Return for PRN: on as needed basis.   Or sooner for any further concerns or worsening sign and symptoms. If unable to reach us in the office please dial 911 or go to the nearest emergency department.      FADUMO Rea  [facility] Cardiothoracic Surgery

## 2025-03-18 ENCOUNTER — TELEPHONE (OUTPATIENT)
Dept: CARDIAC REHAB | Facility: HOSPITAL | Age: 75
End: 2025-03-18
Payer: MEDICARE

## 2025-03-18 ENCOUNTER — OFFICE VISIT (OUTPATIENT)
Dept: CARDIAC SURGERY | Facility: CLINIC | Age: 75
End: 2025-03-18
Payer: MEDICARE

## 2025-03-18 ENCOUNTER — TRANSCRIBE ORDERS (OUTPATIENT)
Dept: CARDIAC REHAB | Facility: HOSPITAL | Age: 75
End: 2025-03-18
Payer: MEDICARE

## 2025-03-18 VITALS
WEIGHT: 258 LBS | HEIGHT: 69 IN | OXYGEN SATURATION: 98 % | SYSTOLIC BLOOD PRESSURE: 140 MMHG | TEMPERATURE: 98.5 F | DIASTOLIC BLOOD PRESSURE: 70 MMHG | BODY MASS INDEX: 38.21 KG/M2 | HEART RATE: 70 BPM

## 2025-03-18 DIAGNOSIS — Z95.1 S/P CABG (CORONARY ARTERY BYPASS GRAFT): Primary | ICD-10-CM

## 2025-03-18 PROCEDURE — 1159F MED LIST DOCD IN RCRD: CPT | Performed by: NURSE PRACTITIONER

## 2025-03-18 PROCEDURE — 3078F DIAST BP <80 MM HG: CPT | Performed by: NURSE PRACTITIONER

## 2025-03-18 PROCEDURE — 99024 POSTOP FOLLOW-UP VISIT: CPT | Performed by: NURSE PRACTITIONER

## 2025-03-18 PROCEDURE — 3077F SYST BP >= 140 MM HG: CPT | Performed by: NURSE PRACTITIONER

## 2025-03-18 PROCEDURE — 1160F RVW MEDS BY RX/DR IN RCRD: CPT | Performed by: NURSE PRACTITIONER

## 2025-03-18 RX ORDER — DEXTROMETHORPHAN HBR AND GUAIFENESIN ORAL SOLUTION 10; 100 MG/5ML; MG/5ML
5 LIQUID ORAL EVERY 8 HOURS PRN
COMMUNITY

## 2025-03-18 RX ORDER — GLIPIZIDE 2.5 MG/1
2.5 TABLET, EXTENDED RELEASE ORAL DAILY
COMMUNITY
Start: 2025-03-12

## 2025-03-18 RX ORDER — TRAMADOL HYDROCHLORIDE 50 MG/1
50 TABLET ORAL 2 TIMES DAILY
COMMUNITY
Start: 2025-03-07

## 2025-03-18 RX ORDER — VANCOMYCIN HYDROCHLORIDE 125 MG/1
250 CAPSULE ORAL EVERY 6 HOURS
COMMUNITY
Start: 2025-03-13

## 2025-03-18 NOTE — TELEPHONE ENCOUNTER
Patient's daughter called in regard to [facility] Cardiac Rehab. Pt. Referred for Phase II Cardiac Rehab. Staff discussed benefits of exercise and program protocol. Teach back verified.  Permission granted from patient for staff to fax referral information to outlying program at this time.  Staff faxed referral info to [facility].

## 2025-04-03 ENCOUNTER — OFFICE VISIT (OUTPATIENT)
Dept: CARDIOLOGY | Facility: CLINIC | Age: 75
End: 2025-04-03
Payer: MEDICARE

## 2025-04-03 VITALS
BODY MASS INDEX: 37.59 KG/M2 | SYSTOLIC BLOOD PRESSURE: 134 MMHG | HEIGHT: 69 IN | WEIGHT: 253.8 LBS | OXYGEN SATURATION: 99 % | HEART RATE: 62 BPM | DIASTOLIC BLOOD PRESSURE: 76 MMHG

## 2025-04-03 DIAGNOSIS — I71.43 INFRARENAL ABDOMINAL AORTIC ANEURYSM (AAA) WITHOUT RUPTURE: ICD-10-CM

## 2025-04-03 DIAGNOSIS — I10 ESSENTIAL HYPERTENSION: Chronic | ICD-10-CM

## 2025-04-03 DIAGNOSIS — I34.0 NONRHEUMATIC MITRAL VALVE REGURGITATION: ICD-10-CM

## 2025-04-03 DIAGNOSIS — I21.02 STEMI INVOLVING LEFT ANTERIOR DESCENDING CORONARY ARTERY: Chronic | ICD-10-CM

## 2025-04-03 DIAGNOSIS — I25.10 CORONARY ARTERY DISEASE INVOLVING NATIVE CORONARY ARTERY OF NATIVE HEART WITHOUT ANGINA PECTORIS: Primary | ICD-10-CM

## 2025-04-03 DIAGNOSIS — I48.0 PAF (PAROXYSMAL ATRIAL FIBRILLATION): Chronic | ICD-10-CM

## 2025-04-03 PROBLEM — I71.40 AAA (ABDOMINAL AORTIC ANEURYSM): Chronic | Status: ACTIVE | Noted: 2017-09-20

## 2025-04-03 RX ORDER — CARVEDILOL 3.12 MG/1
3.12 TABLET ORAL 2 TIMES DAILY WITH MEALS
Qty: 180 TABLET | Refills: 3 | Status: SHIPPED | OUTPATIENT
Start: 2025-04-03

## 2025-04-03 RX ORDER — BUMETANIDE 1 MG/1
1 TABLET ORAL DAILY
Qty: 90 TABLET | Refills: 3 | Status: SHIPPED | OUTPATIENT
Start: 2025-04-03

## 2025-04-03 RX ORDER — VALSARTAN 80 MG/1
80 TABLET ORAL DAILY
Qty: 90 TABLET | Refills: 3 | Status: SHIPPED | OUTPATIENT
Start: 2025-04-03

## 2025-04-03 RX ORDER — ROSUVASTATIN CALCIUM 40 MG/1
40 TABLET, COATED ORAL NIGHTLY
Qty: 90 TABLET | Refills: 3 | Status: SHIPPED | OUTPATIENT
Start: 2025-04-03

## 2025-04-03 RX ORDER — CLOPIDOGREL BISULFATE 75 MG/1
75 TABLET ORAL DAILY
Qty: 90 TABLET | Refills: 3 | Status: SHIPPED | OUTPATIENT
Start: 2025-04-03

## 2025-04-03 RX ORDER — POTASSIUM CHLORIDE 1500 MG/1
20 TABLET, EXTENDED RELEASE ORAL DAILY
Qty: 90 TABLET | Refills: 3 | Status: SHIPPED | OUTPATIENT
Start: 2025-04-03

## 2025-04-03 RX ORDER — POTASSIUM CHLORIDE 750 MG/1
30 TABLET, EXTENDED RELEASE ORAL DAILY
COMMUNITY
End: 2025-04-03 | Stop reason: SDUPTHER

## 2025-04-03 NOTE — PROGRESS NOTES
OFFICE VISIT  NOTE  [facility] CARDIOLOGY      Name: Camron Hendrix    Date: 4/3/2025  MRN:  [id number]  :  1950      REFERRING/PRIMARY PROVIDER:  Leslie Rhodes MD    Chief Complaint   Patient presents with    Hospital Follow Up Visit    Coronary artery disease involving native coronary artery of       HPI: Camron Hendrix is a 74 y.o. male who presents for CAD.  History of mid LAD anterior STEMI 2025, status post balloon angioplasty of the mid LAD but patient had severe mid circumflex 90% and mid RCA 99% stenosis given his systolic dysfunction with diabetes CABG was elected and carried out by Dr. Borjas on 2025, LIMA-LAD, SVG-OM, SVG-PDA, with modified maze and left atrial Penders ligation.  Did well postoperatively, EF 40-45% with mild aortic valve stenosis mild to moderate mitral valve regurgitation.  Overall he is doing fairly well, walking 45 minutes a day with his walker sits down mid walk for a break, denies chest pain, does have some sternal soreness when he coughs.  He is enrolled in cardiac rehab in [place].  Complains that Entresto was too expensive.    Past Medical History:   Diagnosis Date    AAA (abdominal aortic aneurysm)     Coronary artery disease     CVA (cerebral vascular accident)     Hypertension     Hypothyroidism     PAF (paroxysmal atrial fibrillation)     TIA (transient ischemic attack)        Past Surgical History:   Procedure Laterality Date    CARDIAC CATHETERIZATION N/A 3/29/2019    Procedure: Left Heart Cath;  Surgeon: Andrea Holloway MD;  Location:  [facility];  Service: Cardiovascular    CARDIAC CATHETERIZATION N/A 2025    Procedure: Left Heart Cath;  Surgeon: Carlo Barragan MD;  Location:  [facility];  Service: Cardiovascular;  Laterality: N/A;    CARDIAC CATHETERIZATION N/A 2025    Procedure: Optical Coherence Tomography;  Surgeon: Carlo Barragan MD;  Location:  [facility];  Service: Cardiovascular;  Laterality: N/A;    CARDIAC CATHETERIZATION N/A 2025    Procedure: Percutaneous Coronary Intervention;  Surgeon: Carlo Barragan MD;  Location:  [facility];  Service: Cardiovascular;  Laterality: N/A;    CERVICAL FUSION      c4-c5    CORONARY ANGIOPLASTY WITH STENT PLACEMENT      CORONARY ARTERY BYPASS GRAFT N/A 2025    Procedure: MEDIAN STERNOTOMY, CORONARY ARTERY BYPASS GRAFT X 3 (ON PUMP) USING RIGHT GREATER SAPHENOUS VEIN VIA EVH + LEFT INTERNAL MAMMARY ARTERY GRAFT, MAZE, LEFT ATRIAL APPENDAGE LIGATION, INTRAOP JESSY AS PER ANESTHESIA;  Surgeon: Steve Borjas MD;  Location:  [facility];  Service: Cardiothoracic;  Laterality: N/A;  LEFT ARM EXPLORED, RADIAL ARTERY NOT USABLE    HERNIA REPAIR      MAZE PROCEDURE N/A 2025    Procedure: MAZE PROCEDURE;  Surgeon: Steve Borjas MD;  Location:  [facility];  Service: Cardiothoracic;  Laterality: N/A;    TONSILECTOMY, ADENOIDECTOMY, BILATERAL MYRINGOTOMY AND TUBES         Social History     Socioeconomic History    Marital status:    Tobacco Use    Smoking status: Former     Current packs/day: 0.00     Average packs/day: 1 pack/day for 20.0 years (20.0 ttl pk-yrs)     Types: Cigarettes     Start date: 1965     Quit date: 1985     Years since quittin.5     Passive exposure: Past    Smokeless tobacco: Never   Vaping Use    Vaping status: Never Used   Substance and Sexual Activity    Alcohol use: Not Currently     Comment: occasional     Drug use: No    Sexual activity: Defer       Family History   Problem Relation Age of Onset    Heart failure Mother     Aneurysm Father         ROS:   Constitutional no fever,  no weight loss   Skin no rash, no subcutaneous nodules   Otolaryngeal no difficulty swallowing   Cardiovascular See HPI   Pulmonary no cough, no sputum production   Gastrointestinal no constipation, no diarrhea   Genitourinary no dysuria, no hematuria   Hematologic no  easy bruisability, no abnormal bleeding   Musculoskeletal no muscle pain   Neurologic no dizziness, no falls         Allergies   Allergen Reactions    Codeine Hallucinations         Current Outpatient Medications:     acetaminophen (TYLENOL) 500 MG tablet, Take 2 tablets by mouth Every 8 (Eight) Hours As Needed for Mild Pain., Disp: , Rfl:     amiodarone (PACERONE) 200 MG tablet, Take 1 tablet by mouth twice daily for 7 days, THEN take 1 tablet by mouth daily, Disp: 37 tablet, Rfl: 0    aspirin 81 MG chewable tablet, Chew 1 tablet Daily., Disp: , Rfl:     bumetanide (BUMEX) 1 MG tablet, Take 1 tablet by mouth Daily., Disp: 30 tablet, Rfl: 2    carvedilol (COREG) 6.25 MG tablet, Take 1 tablet by mouth 2 (Two) Times a Day With Meals. (Patient taking differently: Take 0.5 tablets by mouth 2 (Two) Times a Day With Meals.), Disp: 60 tablet, Rfl: 5    clopidogrel (PLAVIX) 75 MG tablet, Take 1 tablet by mouth Daily., Disp: , Rfl:     docusate sodium (COLACE) 50 MG capsule, Take 1 capsule by mouth As Needed for Constipation., Disp: , Rfl:     finasteride (PROSCAR) 5 MG tablet, Take 1 tablet by mouth Daily., Disp: , Rfl:     glipizide (GLUCOTROL XL) 2.5 MG 24 hr tablet, Take 1 tablet by mouth Daily., Disp: , Rfl:     glucagon (GLUCAGEN) 1 MG injection, Inject 1 mg under the skin into the appropriate area as directed 1 (One) Time As Needed for Low Blood Sugar., Disp: , Rfl:     Glucose 15 GM/32ML gel, Take 15 g by mouth Daily As Needed (FOR HYPOGLYCEMIA)., Disp: , Rfl:     LACTOBACILLUS BIFIDUS PO, Take  by mouth., Disp: , Rfl:     levothyroxine (SYNTHROID, LEVOTHROID) 200 MCG tablet, Administer 1 tablet per G tube Every Morning. (Patient taking differently: Take 1 tablet by mouth Every Morning.), Disp: , Rfl:     nitroglycerin (NITROSTAT) 0.4 MG SL tablet, Place 1 tablet under the tongue Every 5 (Five) Minutes As Needed for Chest Pain. Take no more than 3 doses in 15 minutes., Disp: 25 tablet, Rfl: 5    potassium chloride 10  "MEQ CR tablet, Take 3 tablets by mouth Daily., Disp: , Rfl:     rosuvastatin (CRESTOR) 40 MG tablet, Take 1 tablet by mouth Every Night., Disp: 30 tablet, Rfl: 5    sacubitril-valsartan (ENTRESTO) 24-26 MG tablet, Take 1 tablet by mouth Every 12 (Twelve) Hours., Disp: 60 tablet, Rfl: 5    tamsulosin (FLOMAX) 0.4 MG capsule 24 hr capsule, Take 1 capsule by mouth Daily., Disp: , Rfl:     traMADol (ULTRAM) 50 MG tablet, Take 1 tablet by mouth 2 (Two) Times a Day., Disp: , Rfl:     Vitals:    04/03/25 1053   BP: 134/76   BP Location: Left arm   Patient Position: Sitting   Cuff Size: Adult   Pulse: 62   SpO2: 99%   Weight: 115 kg (253 lb 12.8 oz)   Height: 175.3 cm (69\")     Body mass index is 37.48 kg/m².    PHYSICAL EXAM:    General Appearance:   well developed  well nourished  HENT:   oropharynx moist  lips not cyanotic  Neck:  thyroid not enlarged  supple  Respiratory:  no respiratory distress  normal breath sounds  no rales  Cardiovascular:  no jugular venous distention  regular rhythm  apical impulse normal  S1 normal, S2 normal  no S3, no S4   no murmur  no rub, no thrill  carotid pulses normal; no bruit  lower extremity edema: none      Musculoskeletal:  no clubbing of fingers.   normocephalic, head atraumatic  Skin:   warm, dry  Psychiatric:  judgement and insight appropriate  normal mood and affect    RESULTS:     ECG 12 Lead    Date/Time: 4/3/2025 11:26 AM  Performed by: Carlo Barragan MD    Authorized by: Carlo Barragan MD  Comparison: compared with previous ECG from 3/1/2025  Similar to previous ECG  Rhythm: sinus rhythm  Rate: normal  BPM: 68  QRS axis: left    Clinical impression: abnormal EKG  Comments: Old anterior septal infarct from previous MI 2/2025.  Unchanged otherwise.          Results for orders placed during the hospital encounter of 02/18/25    Adult Transthoracic Echo Complete W/ Cont if Necessary Per Protocol    Interpretation Summary    Left ventricular systolic function is mildly " "decreased. Calculated left ventricular EF = 44% Left ventricular ejection fraction appears to be 41 - 45%.    Left ventricular wall thickness is consistent with moderate concentric hypertrophy.    The following left ventricular wall segments are akinetic: mid anterior, apical anterior, apical septal and apex. C/w with LAD territory infarct.    Left ventricular diastolic function is consistent with (grade Ia w/high LAP) impaired relaxation.    Mild aortic valve stenosis is present.    Peak velocity of the flow distal to the aortic valve is 277 cm/s. Aortic valve mean pressure gradient is 14 mmHg.    The aortic root measures 4.4 cm. The aortic root (corrected for BSA) measures 1.9 cm. Mild dilation of the ascending aorta is present.    Mild to moderate mitral valve regurgitation.    Depressed ejection fraction and wall motion abnormalities are new compared to 4/2024; mild aortic stenosis is unchanged.        Labs:  Lab Results   Component Value Date    CHOL 105 02/19/2025    TRIG 88 02/19/2025    HDL 38 (L) 02/19/2025    LDL 50 02/19/2025    AST 57 (H) 03/01/2025     (H) 03/01/2025     Lab Results   Component Value Date    HGBA1C 6.30 (H) 02/19/2025     No components found for: \"CREATINININE\"  eGFR Non  Amer   Date Value Ref Range Status   08/03/2021 97 >60 mL/min/1.73 Final   07/31/2021 88 >60 mL/min/1.73 Final   07/30/2021 78 >60 mL/min/1.73 Final       Most recent PCP note, imaging tests, and labs reviewed.    ASSESSMENT:  Problem List Items Addressed This Visit       Essential hypertension (Chronic)    AAA (abdominal aortic aneurysm) (Chronic)    Coronary artery disease involving native coronary artery of native heart without angina pectoris - Primary (Chronic)    Overview   H/O abnormal MPS with [facility].  S/P stenting of the \" maker,\" idb.  Follows with Dr. Job Perdomo.  NSTEMI  - slight elevation of troponins in setting of hypokalemia, cold, atrial fibrillation.  March 29, 2019 cardiac " catheterization non-flow-limiting disease, 40% distal circumflex and 50% mid and distal RCA.  LVEF 75% with mid cavity gradient of approximately 18 mmHg.         PAF (paroxysmal atrial fibrillation) (Chronic)    Overview   CHADS2 Vasc = 6.  First occurrence noted in ED 2019 at OSH.          STEMI - s/p CABG x 3, modified maze, LAAL 2/20/25 (Chronic)       PLAN:    1.  CAD:  STEMI 2/18/2025, status post balloon angioplasty then CABG 2/20/2025 by Dr. Borjas, 3V CABG LIMA-LAD, SVG-OM SVG-PDA  EF 45% postop  Continue current medical therapy with aspirin, clopidogrel, and rosuvastatin I will refill today  Continue blood pressure control and cardiac rehab    2.  Mixed hyperlipidemia:  Continue rosuvastatin 40 mg daily with goal LDL less than 70  Repeat labs with PCP    3.  Primary hypertension:  Goal blood pressure less than 130/80  Fairly well-controlled, Entresto too expensive, switched to valsartan 80 mg daily  Continue carvedilol  If not at goal at next visit, will titrate up valsartan    4.  Valvular heart disease:  Mild to moderate mitral valve regurgitation and mild aortic stenosis 2/2025  Monitor for symptoms.    Advance Care Planning   ACP discussion was held with the patient during this visit. Patient has an advance directive (not in EMR), copy requested.         Return to clinic in 9 months, or sooner as needed.    Thank you for the opportunity to share in the care of your patient; please do not hesitate to call me with any questions.     Carlo Barragan MD, [facility], [facility]  Office: [phone]  [facility] [place] [address]    04/03/25

## 2025-05-19 ENCOUNTER — TELEPHONE (OUTPATIENT)
Dept: CARDIOLOGY | Facility: CLINIC | Age: 75
End: 2025-05-19
Payer: MEDICARE

## 2025-05-19 DIAGNOSIS — I25.10 CORONARY ARTERY DISEASE INVOLVING NATIVE CORONARY ARTERY OF NATIVE HEART WITHOUT ANGINA PECTORIS: Chronic | ICD-10-CM

## 2025-05-19 DIAGNOSIS — I48.0 PAF (PAROXYSMAL ATRIAL FIBRILLATION): Primary | Chronic | ICD-10-CM

## 2025-05-19 NOTE — TELEPHONE ENCOUNTER
Ana from [facility] called and left voicemail stating that she was concerned about the patient in regards to hypertension (212/100 then finally went down to 176/83), x2 episodes of dizziness and chest pain that wakes patient up at night. Rates chest pain 6/10. She will be faxing over the trends for the patient.     Also spoke with patient's daughter, Heena. She is not sure if the patient has been checking his blood pressure at home, but she has noticed that his blood pressure has been slowly trending upward during cardiac rehab. Reports compliance with medications listed in chart. Please advise.. thank you

## 2025-05-20 ENCOUNTER — TELEPHONE (OUTPATIENT)
Dept: CARDIAC SURGERY | Facility: CLINIC | Age: 75
End: 2025-05-20
Payer: MEDICARE

## 2025-05-20 RX ORDER — VALSARTAN 160 MG/1
160 TABLET ORAL DAILY
Qty: 180 TABLET | Refills: 0 | Status: SHIPPED | OUTPATIENT
Start: 2025-05-20

## 2025-05-20 NOTE — TELEPHONE ENCOUNTER
Patient's daughter called to report that her dad is still having a lot of pain in his sternal area. She says the pain is waking him up at night. Per Roro THORNE I made the patient a follow up appointment to be evaluated.

## 2025-05-20 NOTE — TELEPHONE ENCOUNTER
Left voicemail on patient's daughter's voicemail with Dr. Barragan's recommendations. Orders placed.

## 2025-06-17 NOTE — PROGRESS NOTES
[facility] Cardiothoracic Surgery Office Follow Up Note     Date of Encounter: 2025     Name: Camron Hendrix  : 1950     Referred By: No ref. provider found  PCP: Leslie Rhodes MD    Chief Complaint:    Chief Complaint   Patient presents with    Chest Pain     Follow up for sternal pain s/p CABG 25.       Subjective      History of Present Illness:    Camron Hendrix is a 75 y.o. male former smoker with history of JOSEPH, HTN, HLD on statin therapy, insulin-dependent DM, CKD, dementia, seizure, SAH, CVA, AAA, CHF, PAF aortic stenosis, CAD with STEMI s/p urgent CABG x 3 with right EVH, modified maze, and left atrial appendage ligation with 50 mm clip on 2025 by Dr. Borjas.  Hospital course per discharge summary.  Sternal wound VAC was removed on  and patient met criteria for discharge on POD #8 to [facility].  Patient had ED visit 3/1/2025 for chest discomfort with unrevealing workup and was discharged later same day back to [facility].  He was seen 3/18/2025 his postoperative eval with no uncontrolled pain, concerning respiratory complaints, or incisional healing concerns. He had well healing incisions and no sternal mobility on exam. He was recommended increased ambulation for some atelectasis on CXR and PRNed to his cardiologist Dr Barragan.     Daughter/pt contacted our office with some sternal pain concerns and presents today for in person eval. Pt states he wakes up sore in his chest, sleeping curled up on his side. He is sore after cardiac rehab. He also is sore after gardening or hoeing in his yard. He does not feel any repetitive clicking or rubbing but did feel a pop once this week when getting up out of his chair.  Daughter is concerned about patient not progressive with his activity due to some breathing issues and is wondering about pulmonary rehab.   He has follow up with cardiologist Dr Barragan 4/3/2025    Review of Systems:  Review of Systems   Constitutional:  Negative. Negative for chills, decreased appetite, diaphoresis, fever, malaise/fatigue, night sweats, weight gain and weight loss.   HENT: Negative.  Negative for hoarse voice.    Eyes:  Positive for double vision. Negative for blurred vision and visual disturbance.   Cardiovascular:  Positive for dyspnea on exertion and leg swelling. Negative for chest pain, claudication, irregular heartbeat, near-syncope, orthopnea, palpitations, paroxysmal nocturnal dyspnea and syncope.   Respiratory:  Positive for cough. Negative for hemoptysis, shortness of breath, sputum production and wheezing.    Hematologic/Lymphatic: Negative for adenopathy and bleeding problem. Bruises/bleeds easily.   Skin: Negative.  Negative for color change, nail changes, poor wound healing and rash.   Musculoskeletal:  Positive for falls. Negative for back pain and muscle cramps.   Gastrointestinal: Negative.  Negative for abdominal pain, dysphagia and heartburn.   Genitourinary: Negative.  Negative for flank pain.   Neurological:  Positive for dizziness and loss of balance. Negative for brief paralysis, disturbances in coordination, focal weakness, headaches, light-headedness, numbness, paresthesias, sensory change, vertigo and weakness.   Psychiatric/Behavioral:  Positive for depression. Negative for suicidal ideas.    Allergic/Immunologic: Negative.  Negative for persistent infections.       I have reviewed the following portions of the patient's history: problem list, current medications, allergies, past surgical history, past medical history, past social history, past family history, and ROS and confirm it's accurate.    Allergies:  Allergies   Allergen Reactions    Codeine Hallucinations       Medications:      Current Outpatient Medications:     acetaminophen (TYLENOL) 500 MG tablet, Take 2 tablets by mouth Every 8 (Eight) Hours As Needed for Mild Pain., Disp: , Rfl:     aspirin 81 MG chewable tablet, Chew 1 tablet Daily., Disp: , Rfl:      bumetanide (BUMEX) 1 MG tablet, Take 1 tablet by mouth Daily., Disp: 90 tablet, Rfl: 3    carvedilol (COREG) 3.125 MG tablet, Take 1 tablet by mouth 2 (Two) Times a Day With Meals., Disp: 180 tablet, Rfl: 3    clopidogrel (PLAVIX) 75 MG tablet, Take 1 tablet by mouth Daily., Disp: 90 tablet, Rfl: 3    docusate sodium (COLACE) 50 MG capsule, Take 1 capsule by mouth As Needed for Constipation., Disp: , Rfl:     finasteride (PROSCAR) 5 MG tablet, Take 1 tablet by mouth Daily., Disp: , Rfl:     glipizide (GLUCOTROL XL) 2.5 MG 24 hr tablet, Take 1 tablet by mouth Daily., Disp: , Rfl:     glucagon (GLUCAGEN) 1 MG injection, Inject 1 mg under the skin into the appropriate area as directed 1 (One) Time As Needed for Low Blood Sugar., Disp: , Rfl:     Glucose 15 GM/32ML gel, Take 15 g by mouth Daily As Needed (FOR HYPOGLYCEMIA)., Disp: , Rfl:     LACTOBACILLUS BIFIDUS PO, Take  by mouth., Disp: , Rfl:     levothyroxine (SYNTHROID, LEVOTHROID) 200 MCG tablet, Administer 1 tablet per G tube Every Morning. (Patient taking differently: Take 1 tablet by mouth Every Morning.), Disp: , Rfl:     nitroglycerin (NITROSTAT) 0.4 MG SL tablet, Place 1 tablet under the tongue Every 5 (Five) Minutes As Needed for Chest Pain. Take no more than 3 doses in 15 minutes., Disp: 25 tablet, Rfl: 5    rosuvastatin (CRESTOR) 40 MG tablet, Take 1 tablet by mouth Every Night., Disp: 90 tablet, Rfl: 3    tamsulosin (FLOMAX) 0.4 MG capsule 24 hr capsule, Take 1 capsule by mouth Daily., Disp: , Rfl:     traMADol (ULTRAM) 50 MG tablet, Take 1 tablet by mouth 2 (Two) Times a Day., Disp: , Rfl:     valsartan (DIOVAN) 160 MG tablet, Take 1 tablet by mouth Daily., Disp: 180 tablet, Rfl: 0    potassium chloride (K-TAB) 20 MEQ tablet controlled-release ER tablet, Take 1 tablet by mouth Daily. (Patient not taking: Reported on 6/18/2025), Disp: 90 tablet, Rfl: 3    History:   Past Medical History:   Diagnosis Date    AAA (abdominal aortic aneurysm)     Coronary  artery disease     CVA (cerebral vascular accident) 2011    Hypertension     Hypothyroidism     PAF (paroxysmal atrial fibrillation)     TIA (transient ischemic attack)        Past Surgical History:   Procedure Laterality Date    CARDIAC CATHETERIZATION N/A 3/29/2019    Procedure: Left Heart Cath;  Surgeon: Andrea Holloway MD;  Location:  [facility];  Service: Cardiovascular    CARDIAC CATHETERIZATION N/A 2/18/2025    Procedure: Left Heart Cath;  Surgeon: Carlo Barragan MD;  Location:  [facility];  Service: Cardiovascular;  Laterality: N/A;    CARDIAC CATHETERIZATION N/A 2/18/2025    Procedure: Optical Coherence Tomography;  Surgeon: Carlo Barragan MD;  Location:  [facility];  Service: Cardiovascular;  Laterality: N/A;    CARDIAC CATHETERIZATION N/A 2/18/2025    Procedure: Percutaneous Coronary Intervention;  Surgeon: Carlo Barragan MD;  Location: [facility] CATH INVASIVE LOCATION;  Service: Cardiovascular;  Laterality: N/A;    CERVICAL FUSION      c4-c5    CORONARY ANGIOPLASTY WITH STENT PLACEMENT      CORONARY ARTERY BYPASS GRAFT N/A 2/20/2025    Procedure: MEDIAN STERNOTOMY, CORONARY ARTERY BYPASS GRAFT X 3 (ON PUMP) USING RIGHT GREATER SAPHENOUS VEIN VIA EVH + LEFT INTERNAL MAMMARY ARTERY GRAFT, MAZE, LEFT ATRIAL APPENDAGE LIGATION, INTRAOP JESSY AS PER ANESTHESIA;  Surgeon: Steve Borjas MD;  Location: [facility];  Service: Cardiothoracic;  Laterality: N/A;  LEFT ARM EXPLORED, RADIAL ARTERY NOT USABLE    HERNIA REPAIR      MAZE PROCEDURE N/A 2/20/2025    Procedure: MAZE PROCEDURE;  Surgeon: Steve Borjas MD;  Location: [facility] OR;  Service: Cardiothoracic;  Laterality: N/A;    TONSILECTOMY, ADENOIDECTOMY, BILATERAL MYRINGOTOMY AND TUBES         Social History     Socioeconomic History    Marital status:     Number of children: 2   Tobacco Use    Smoking status: Former     Current packs/day: 0.00     Average packs/day: 1 pack/day for 20.0  "years (20.0 ttl pk-yrs)     Types: Cigarettes     Start date: 1965     Quit date: 1985     Years since quittin.7     Passive exposure: Past    Smokeless tobacco: Never   Vaping Use    Vaping status: Never Used   Substance and Sexual Activity    Alcohol use: Not Currently     Comment: occasional     Drug use: No    Sexual activity: Defer        Family History   Problem Relation Age of Onset    Heart failure Mother     Aneurysm Father        Objective   Physical Exam:  Vitals:    25 1448   BP: 169/89   BP Location: Right arm   Patient Position: Sitting   Pulse: 75   Temp: 97.6 °F (36.4 °C)   SpO2: 99%   Weight: 120 kg (265 lb)   Height: 175.3 cm (69\")  Comment: patient reports      Body mass index is 39.13 kg/m².    Physical Exam  Vitals and nursing note reviewed.   Constitutional:       Appearance: Normal appearance. He is obese.   Cardiovascular:      Rate and Rhythm: Normal rate.   Pulmonary:      Effort: Pulmonary effort is normal.   Chest:       Skin:     General: Skin is warm and dry.   Neurological:      Mental Status: He is alert and oriented to person, place, and time. Mental status is at baseline.         Imaging/Labs:  XR Chest 2 View (2025 14:57)     XR Chest 2 View (2025 12:59)   1.Stable cardiomegaly with postsurgical changes of CABG.  2.Small left-sided pleural effusion with bibasilar atelectasis.   Electronically Signed: August Olsen   Assessment / Plan      Assessment / Plan:  Diagnoses and all orders for this visit:    1. S/P CABG (coronary artery bypass graft) (Primary)       s/p urgent CABG x 3 with right EVH, modified maze, and left atrial appendage ligation with 50 mm clip on 2025 by Dr. Borjas.   Postoperative course per HPI  D/C POD #8.  No readmissions  Initial postop eval 3/15/2025 - with no uncontrolled pain, concerning upper respiratory complaints, or incisional healing concerns. Exam he has well-healing sternotomy, abscess, EVH sites  CXR in clinic " without PTX. Sternal wires intact. scant residual left effusion with bibasilar atelectasis tx with increasing ambulation as tolerated and continuing use of incentive spirometer.    RTC with complaints of sternal soreness and discomfort after exertional activities. No clicking or rubbing, One episode of popping.   On exam pt has point tenderness on mid sternal incision with no sternal movement or palpable nonunion  CXR in clinic with sternal wires remain intact.   Exam and symptomology most consistent with a costochondritis. It sounds like pt is overdoing it too soon after surgery and should pull back on his physical activity and return to sternal restrictions with no side lying.   We discussed risk for non-union and chronic sternal pain with early return to strenuous labor including nonunion or wire fracture. Pt and daughter verbalize understanding and will contact our office for further concerns   Follow-up with PCP for respiratory complaints and consideration of PFTs  Follow with cardiology as previously determined       Follow Up:   Return for PRN: on as needed basis.   Or sooner for any further concerns or worsening sign and symptoms. If unable to reach us in the office please dial 911 or go to the nearest emergency department.      FADUMO Rea  [facility] Cardiothoracic Surgery      Time Spent: I spent 28 minutes caring for Camron on this date of service. This time includes time spent by me in the following activities: preparing for the visit, reviewing tests, obtaining and/or reviewing a separately obtained history, performing a medically appropriate examination and/or evaluation, counseling and educating the patient/family/caregiver, ordering medications, tests, or procedures, referring and communicating with other health care professionals, documenting information in the medical record, independently interpreting results and communicating that information with the patient/family/caregiver, and  care coordination.

## 2025-06-18 ENCOUNTER — OFFICE VISIT (OUTPATIENT)
Dept: CARDIAC SURGERY | Facility: CLINIC | Age: 75
End: 2025-06-18
Payer: MEDICARE

## 2025-06-18 VITALS
BODY MASS INDEX: 39.25 KG/M2 | HEART RATE: 75 BPM | HEIGHT: 69 IN | DIASTOLIC BLOOD PRESSURE: 89 MMHG | OXYGEN SATURATION: 99 % | WEIGHT: 265 LBS | SYSTOLIC BLOOD PRESSURE: 169 MMHG | TEMPERATURE: 97.6 F

## 2025-06-18 DIAGNOSIS — Z95.1 S/P CABG (CORONARY ARTERY BYPASS GRAFT): Primary | ICD-10-CM

## 2025-07-07 ENCOUNTER — TELEPHONE (OUTPATIENT)
Dept: CARDIOLOGY | Facility: CLINIC | Age: 75
End: 2025-07-07
Payer: MEDICARE

## 2025-07-07 NOTE — TELEPHONE ENCOUNTER
Spoke with patient's daughter with concerns over patient's blood pressure. Patient's daughter was concerned over patient's blood pressure being elevated, especially at cardiac rehab. Received records from cardiac rehab. Per Dr. Barragan, advise patient to log pressures for 1 week. Patient's daughter agreeable and understood.

## (undated) DEVICE — SUT SILK 2/0 CT1 CR8 18IN C022D

## (undated) DEVICE — MICRO HVTSA, 0.5G AND HVTSA SOURCEMARK PRODUCT CODE M1206 AND M1206-01: Brand: EXOFIN MICRO HVTSA, 0.5G

## (undated) DEVICE — DRESSING, SURESITE SELECT, 2.8'X3.50': Brand: MEDLINE

## (undated) DEVICE — GW PERIPH GUIDERIGHT STD/EXCHNG/J/TIP SS 0.035IN 5X260CM

## (undated) DEVICE — DRN WND CH RND FUL/FLUT NO/TROC 3/8IN 28F

## (undated) DEVICE — TB SXN SURG DLP MALL/CARD SFT/TP 6F 6IN

## (undated) DEVICE — Device

## (undated) DEVICE — GLIDESHEATH BASIC HYDROPHILIC COATED INTRODUCER SHEATH: Brand: GLIDESHEATH

## (undated) DEVICE — NC TREK NEO™ CORONARY DILATATION CATHETER 2.50 X 12 MM / RAPID-EXCHANGE: Brand: NC TREK NEO™

## (undated) DEVICE — GLV SURG BIOGELULTRATOUCH POLYISPRN PF LF SZ7 STRL

## (undated) DEVICE — KT CATH JUG PUNC PED 20G 5IN CA/5

## (undated) DEVICE — 1230 DOUBLE MAGNET NEEDLE COUNTER,BLACK: Brand: DEVON

## (undated) DEVICE — ST INF PRI SMRTSTE 20DRP 2VLV 24ML 117

## (undated) DEVICE — BANDAGE,GAUZE,BULKEE II,4.5"X4.1YD,STRL: Brand: MEDLINE

## (undated) DEVICE — DRSNG SURESITE WNDW 4X4.5

## (undated) DEVICE — DRAPE,SPLIT,CV,CLR ANES,STERILE: Brand: MEDLINE

## (undated) DEVICE — ANTIBACTERIAL UNDYED BRAIDED (POLYGLACTIN 910), SYNTHETIC ABSORBABLE SUTURE: Brand: COATED VICRYL

## (undated) DEVICE — MODEL BT2000 P/N 700287-012KIT CONTENTS: MANIFOLD WITH SALINE AND CONTRAST PORTS, SALINE TUBING WITH SPIKE AND HAND SYRINGE, TRANSDUCER: Brand: BT2000 AUTOMATED MANIFOLD KIT

## (undated) DEVICE — CLTH CLENS READYCLEANSE PERI CARE PK/5

## (undated) DEVICE — KT MANIFOLD CATHLAB CUST

## (undated) DEVICE — KT CATH IMG DRAGONFLY/OPSTAR 2.7F 135CM

## (undated) DEVICE — DRAPE,REIN 53X77,STERILE: Brand: MEDLINE

## (undated) DEVICE — FOGARTY SPRING CLIPS 6MM: Brand: FOGARTY SOFTJAW

## (undated) DEVICE — CANN VESL DLP 1WY BLNT/TP 3MM

## (undated) DEVICE — SUT PROLN 4/0 V7 36IN 8975H

## (undated) DEVICE — BNDG,ELSTC,MATRIX,STRL,4"X5YD,LF,HOOK&LP: Brand: MEDLINE

## (undated) DEVICE — Device: Brand: ASAHI SION BLUE

## (undated) DEVICE — SENSR CERBRL O2 PK/2

## (undated) DEVICE — GLIDESHEATH SLENDER STAINLESS STEEL KIT: Brand: GLIDESHEATH SLENDER

## (undated) DEVICE — COVER,MAYO STAND,STERILE: Brand: MEDLINE

## (undated) DEVICE — RESVR JP 400CC

## (undated) DEVICE — CVR PROB ULTRASND/TRANSD W/GEL LNG 18X250CM STRL

## (undated) DEVICE — GUIDE CATHETER: Brand: MACH1™

## (undated) DEVICE — APPL CHLORAPREP TINTED 26ML TEAL

## (undated) DEVICE — SUT PROLN 6/0 C1 D/A 30IN 8706H

## (undated) DEVICE — SUT ETHIB 1 CT1 30IN  X425H

## (undated) DEVICE — CATHETER,FOLEY,100%SILICONE,18FR,10ML,LF: Brand: MEDLINE

## (undated) DEVICE — 3M™ MEDIPORE™ H SOFT CLOTH SURGICAL TAPE, 2863, 3 IN X 10 YD, 12/CASE: Brand: 3M™ MEDIPORE™

## (undated) DEVICE — KIT,ANTI FOG,W/SPONGE & FLUID,SOFT PACK: Brand: MEDLINE

## (undated) DEVICE — ST EXT IV SMARTSITE 2VLV SP M LL 5ML IV1

## (undated) DEVICE — PK CATH CARD 10

## (undated) DEVICE — INTENDED FOR TISSUE SEPARATION, AND OTHER PROCEDURES THAT REQUIRE A SHARP SURGICAL BLADE TO PUNCTURE OR CUT.: Brand: BARD-PARKER ® STAINLESS STEEL BLADES

## (undated) DEVICE — MINI TREK CORONARY DILATATION CATHETER 2.0 MM X 12 MM / RAPID-EXCHANGE: Brand: MINI TREK

## (undated) DEVICE — SPNG GZ WOVN 4X4IN 12PLY 10/BX STRL

## (undated) DEVICE — SUT MONOCRYL PLS ANTIB UND 3/0  PS1 27IN

## (undated) DEVICE — DEV INFL MONARCH 25W

## (undated) DEVICE — ST ACC MICROPUNCTURE .018 TRANSLSS/SS/TP 5F/10CM 21G/7CM

## (undated) DEVICE — MARKER,SKIN,W/RULER,DUAL,STOP: Brand: MEDLINE

## (undated) DEVICE — SUT PROLN 2/0 PC3 8833H

## (undated) DEVICE — CATH DIAG EXPO M/ PK 6FR FL4/FR4 PIG 3PK

## (undated) DEVICE — DISPOSABLE TOURNIQUET CUFF SINGLE BLADDER, DUAL PORT AND QUICK CONNECT CONNECTOR: Brand: COLOR CUFF

## (undated) DEVICE — [HIGH FLOW INSUFFLATOR,  DO NOT USE IF PACKAGE IS DAMAGED,  KEEP DRY,  KEEP AWAY FROM SUNLIGHT,  PROTECT FROM HEAT AND RADIOACTIVE SOURCES.]: Brand: PNEUMOSURE

## (undated) DEVICE — PK PERFUS CUST W/CARDIOPLEGIA

## (undated) DEVICE — DR ROGERS OH: Brand: MEDLINE INDUSTRIES, INC.

## (undated) DEVICE — SUT SILK 0/0 CT2 18IN C027D

## (undated) DEVICE — SUT SILK 4/0 TIES 18IN A183H

## (undated) DEVICE — TR BAND RADIAL ARTERY COMPRESSION DEVICE: Brand: TR BAND

## (undated) DEVICE — ADAPT ANTEGRADE RETRGR

## (undated) DEVICE — SUCTION CANISTER 2500CC: Brand: DEROYAL

## (undated) DEVICE — SUT PROLN CARDIO V5 3/0 36IN 8936H

## (undated) DEVICE — BLAKE SILICONE DRAIN, 19 FR ROUND, HUBLESS WITH 1/4" BENDABLE TROCAR: Brand: BLAKE

## (undated) DEVICE — SUT PROLN SURGILENE SURGIPRO 8 0 24IN BL

## (undated) DEVICE — TOWEL,OR,DSP,ST,NATURAL,DLX,4/PK,20PK/CS: Brand: MEDLINE

## (undated) DEVICE — BLD SCLPL BEAVR MINI STR 2BVL 180D LF

## (undated) DEVICE — 12 FOOT DISPOSABLE EXTENSION CABLE WITH SAFE CONNECT / SCREW-DOWN

## (undated) DEVICE — FLTR RESERV PERFUS INTERSEPT 02 STRL

## (undated) DEVICE — ADULT, W/LG. BACK PAD, RADIOTRANSPARENT ELEMENT AND LEAD WIRE COMPATIBLE W/: Brand: DEFIBRILLATION ELECTRODES

## (undated) DEVICE — DEV COMP RAD PRELUDESYNC 24CM

## (undated) DEVICE — BANDAGE,ELASTIC,ESMARK,STERILE,4"X9',LF: Brand: MEDLINE

## (undated) DEVICE — PK HEART OPN 10

## (undated) DEVICE — PINNACLE INTRODUCER SHEATH: Brand: PINNACLE

## (undated) DEVICE — SYR LUERLOK 30CC

## (undated) DEVICE — CATH DIAG EXPO .056 FL3.5 6F 100CM

## (undated) DEVICE — AVID DUAL STAGE VENOUS DRAINAGE CANNULA: Brand: AVID DUAL STAGE VENOUS DRAINAGE CANNULA

## (undated) DEVICE — ELECTRD BLD EZ CLN MOD XLNG 2.75IN

## (undated) DEVICE — PATIENT RETURN ELECTRODE, SINGLE-USE, CONTACT QUALITY MONITORING, ADULT, WITH 9FT CORD, FOR PATIENTS WEIGING OVER 33LBS. (15KG): Brand: MEGADYNE

## (undated) DEVICE — OASIS DRAIN, SINGLE, INLINE & ATS COMPATIBLE: Brand: OASIS

## (undated) DEVICE — MODEL AT P65, P/N 701554-001KIT CONTENTS: HAND CONTROLLER, 3-WAY HIGH-PRESSURE STOPCOCK WITH ROTATING END AND PREMIUM HIGH-PRESSURE TUBING: Brand: ANGIOTOUCH® KIT

## (undated) DEVICE — GW J TP FIX CORE .035 150

## (undated) DEVICE — BOWL UTIL STRL 32OZ

## (undated) DEVICE — LEVEL SENSORS PADS ARE USED TO ATTACH THE LEVEL SENSORS TO A HARD SHELL RESERVOIR. INCLUDES COUPLING GEL.: Brand: TERUMO® ADVANCED PERFUSION SYSTEM 1

## (undated) DEVICE — SUT PROLN 3/0 V7 D/A 36IN 8976H

## (undated) DEVICE — CANN AORT ROOT DLP VNT 14G 7F

## (undated) DEVICE — TB SXN DLP RIGD MACROSUCKER 6F 3IN

## (undated) DEVICE — SYS VASOVIEW2 HEMOPRO ENDOSCOPIC HARVST VESL

## (undated) DEVICE — EZ GLIDE AORTIC CANNULA: Brand: EDWARDS LIFESCIENCES EZ GLIDE AORTIC CANNULA

## (undated) DEVICE — TUBING, SUCTION, 1/4" X 10', STRAIGHT: Brand: MEDLINE

## (undated) DEVICE — PK ATS CUST W CARDIOTOMY RESEVOIR

## (undated) DEVICE — SUT SILK 2 SUTUPAK TIE 60IN SA8H 2STRAND

## (undated) DEVICE — PREVENA INCISION MANAGEMENT SYSTEM- PEEL & PLACE DRESSING: Brand: PREVENA™ PEEL & PLACE™

## (undated) DEVICE — CLMP ABLAT ISOLATORSYNERGY TRNSPOLAR

## (undated) DEVICE — CONNECT Y INTERSEPT W/LL 3/8 X 3/8 X 3/8IN

## (undated) DEVICE — SUT PROLN 7/0 BV1 D/A 24IN 8702H